# Patient Record
Sex: FEMALE | Race: WHITE | Employment: UNEMPLOYED | URBAN - METROPOLITAN AREA
[De-identification: names, ages, dates, MRNs, and addresses within clinical notes are randomized per-mention and may not be internally consistent; named-entity substitution may affect disease eponyms.]

---

## 2019-04-16 ENCOUNTER — TELEPHONE (OUTPATIENT)
Dept: SURGERY | Age: 59
End: 2019-04-16

## 2019-04-16 NOTE — TELEPHONE ENCOUNTER
Called patient left message on voice mail to schedule appointment with Dr. Clara Phillip. Dr. Jhoana Betts sent over progress notes ( scanned in chart) for Dr. Clara Phillip for Consult appointment. Spoke with patients Brother [phone], he will contact her also and give her message to call us.

## 2019-05-08 ENCOUNTER — OFFICE VISIT (OUTPATIENT)
Dept: SURGERY | Age: 59
End: 2019-05-08
Payer: COMMERCIAL

## 2019-05-08 VITALS
SYSTOLIC BLOOD PRESSURE: 125 MMHG | HEART RATE: 110 BPM | DIASTOLIC BLOOD PRESSURE: 76 MMHG | TEMPERATURE: 97.8 F | HEIGHT: 68 IN | BODY MASS INDEX: 35.77 KG/M2 | OXYGEN SATURATION: 96 % | WEIGHT: 236 LBS

## 2019-05-08 DIAGNOSIS — Z17.0 MALIGNANT NEOPLASM OF UPPER-INNER QUADRANT OF LEFT BREAST IN FEMALE, ESTROGEN RECEPTOR POSITIVE (HCC): Primary | ICD-10-CM

## 2019-05-08 DIAGNOSIS — Z72.0 TOBACCO ABUSE: ICD-10-CM

## 2019-05-08 DIAGNOSIS — C50.212 MALIGNANT NEOPLASM OF UPPER-INNER QUADRANT OF LEFT BREAST IN FEMALE, ESTROGEN RECEPTOR POSITIVE (HCC): Primary | ICD-10-CM

## 2019-05-08 PROCEDURE — 99205 OFFICE O/P NEW HI 60 MIN: CPT | Performed by: SURGERY

## 2019-05-08 RX ORDER — AMLODIPINE BESYLATE 10 MG/1
10 TABLET ORAL DAILY
Status: ON HOLD | COMMUNITY
End: 2020-03-07 | Stop reason: HOSPADM

## 2019-05-08 ASSESSMENT — ENCOUNTER SYMPTOMS
SHORTNESS OF BREATH: 1
BACK PAIN: 0
DIARRHEA: 1
APNEA: 0
ABDOMINAL DISTENTION: 0
ABDOMINAL PAIN: 0

## 2019-05-08 NOTE — PROGRESS NOTES
Maritza Cowden is a 66 y.o. postmenopausal woman who initially presented to [facility]. [address] with chest pain in , and was noted to have a large left breast mass. She reports a breast mass on the left, since 2018, associated with an axillary mass and thickened overlying skin. She was subsequently diagnosed with left breast infiltrating ductal carcinoma grade 2 ER + DC+ HER2 positive, clinical stage IIIA. Initial chest CT 19 showed 4.9x4.8cm mass with standing and skin thickening. Multiple enlarged left axillary nodes present, largest 3.5x2.2cm. She also had prominent nodes in the mediastinum, with right hilar node at 1.7x1.1cm. Bronchoscopy and biopsy 19 negative. Bone scan with no evidence of mets. CT abd/pelvis with left nephrectomy, cholecystectomy, appendectomy, cortical scar upper mid right kidney. She has undergone neoadjuvant chemotherapy with 5 rounds of TCHP and has noted decrease in the size of the mass, improvement in skin changes. Diarrhea is under control from perjeta. She does have a family history of breast cancer, in her mother at age 79. Her mother  at age 68. Ilene Covarrubias tells me that she was not a candidate for genetic testing. She has a h/o amphetamine abuse. She currently lives in a nursing home, as she was previously homeless. She is accompanied by her brother, Devon Hester, who traveled from [place] for the appointment. She is a current smoker, although is smoking less and using nicotine patch. Menstrual history:  Menarche age 17.      Age first live birth 15  Breastfeeding No  postmenopausal   Ovaries/uterus intact  Oral contraceptives Yes for 5 years  Hormone replacement No     Past Medical History:   Diagnosis Date    Eczema     on hands bi for yrs    Hypertension     Kidney calculi     L-kidney 35yrs ago    Kidney disorder     one kidney/L-kidney removed 35yrs ago abscess kidney stone   :  Past Surgical History: Procedure Laterality Date    APPENDECTOMY      CHOLECYSTECTOMY      TUBAL LIGATION     :  Social History     Socioeconomic History    Marital status: Single     Spouse name: Not on file    Number of children: 1    Years of education: 8    Highest education level: Not on file   Occupational History    Not on file   Social Needs    Financial resource strain: Not on file    Food insecurity:     Worry: Not on file     Inability: Not on file    Transportation needs:     Medical: Not on file     Non-medical: Not on file   Tobacco Use    Smoking status: Current Every Day Smoker     Packs/day: 1.00     Years: 35.00     Pack years: 35.00    Smokeless tobacco: Never Used   Substance and Sexual Activity    Alcohol use: No    Drug use: No    Sexual activity: Not on file   Lifestyle    Physical activity:     Days per week: Not on file     Minutes per session: Not on file    Stress: Not on file   Relationships    Social connections:     Talks on phone: Not on file     Gets together: Not on file     Attends [employer] service: Not on file     Active member of club or organization: Not on file     Attends meetings of clubs or organizations: Not on file     Relationship status: Not on file    Intimate partner violence:     Fear of current or ex partner: Not on file     Emotionally abused: Not on file     Physically abused: Not on file     Forced sexual activity: Not on file   Other Topics Concern    Not on file   Social History Narrative    Not on file     Current Outpatient Medications on File Prior to Visit   Medication Sig Dispense Refill    amLODIPine (NORVASC) 10 MG tablet Take 10 mg by mouth daily      phenyleph-promethazine-codeine (PHENERGAN VC W/CODEINE) 5-6.25-10 MG/5ML SYRP syrup Take 5 mLs by mouth every 6 hours as needed. 280 mL 0    albuterol (PROVENTIL) (5 MG/ML) 0.5% nebulizer solution Take 0.5 mLs by nebulization every 4 hours.  60 each 1    albuterol (PROVENTIL HFA) 108 (90 BASE) MCG/ACT inhaler Inhale 2 puffs into the lungs every 6 hours as needed for Wheezing. 1 Inhaler 3    nicotine (NICODERM CQ) 14 MG/24HR Place 1 patch onto the skin daily. 30 patch 1    ALPRAZolam (XANAX) 0.5 MG tablet Take 0.5 mg by mouth 3 times daily as needed for Anxiety. No current facility-administered medications on file prior to visit. Allergies   Allergen Reactions    Pcn [Penicillins] Anaphylaxis     Review of Systems   Constitutional: Positive for fatigue. Negative for chills and fever. Respiratory: Positive for shortness of breath. Negative for apnea. Cardiovascular: Positive for leg swelling. Negative for chest pain. Gastrointestinal: Positive for diarrhea. Negative for abdominal distention and abdominal pain. Genitourinary: Negative for difficulty urinating and dysuria. Musculoskeletal: Negative for back pain. Allergic/Immunologic: Negative for environmental allergies and food allergies. Neurological: Negative for dizziness, light-headedness and headaches. Hematological: Negative for adenopathy. Does not bruise/bleed easily. Psychiatric/Behavioral: Negative for dysphoric mood. The patient is not nervous/anxious. Exam:  /76   Pulse 110   Temp 97.8 °F (36.6 °C) (Oral)   Ht 5' 8\" (1.727 m)   Wt 236 lb (107 kg)   SpO2 96%   Breastfeeding? No   BMI 35.88 kg/m²   Physical Exam  Constitutional: She appears well-nourished. No apparent distress. Head: Normocephalic and atraumatic. Eyes: EOM are normal. Pupils are equal, round, and reactive to light. Neck: Neck supple. No tracheal deviation present. Cardiovascular: Regular rhythm, mildly tachycardic. Pulmonary: Respirations are non-labored no wheezing. Lymphatics: No palpable cervical, supraclavicular, or right axillary lymphadenopathy. Breast:  Right: No masses, nipple discharge, or overlying skin changes. Very dense breast tissue in upper outer quadrant. Flattened nipple.    Left: 10:30, 7CMFN 4x3.5cm mass, fixed to chest wall,  Nipple is flat, symmetric. Overlying skin is thickened and mildly erythematous. She continues to have left palpable axillary lymphadenopathy. Hypoechoic mass and prominent left axillary node easily identified on sonogram.   Skin: Left breast skin changes evident. Extremities: Well perfused, no edema. Neurologic: Alert and oriented. Assessment/Plan:    Clinical [id number] left sided infiltrating ductal carcinoma grade 2ER + GA+ HER2 positive. She is s/p 5 rounds TCHP, with only partial clinical response. Round 6 scheduled for 5/28/19. Consider re-staging scans given minimal clinical response. Will see her 6/5/19 with repeat breast imaging prior to appointment. Conference presentation in the interim. Re-address option for genetic counseling/testing, given her personal and fam hx. Dr. Gypsy Tripp referral to discuss options to potentially assist with closure. Might consider wise pattern to preserve skin laterally. She would like reconstruction, but we discussed that this would need to be delayed. We discussed left modified radical mastectomy, adjuvant chest wall radiation, adjuvant chemo with TDM1, 10 years endocrine therapy. Smoking cessation strongly, strongly encouraged. All of the patient's questions were answered at this time. Approximately 60 minutes of time were spent in this visit of which 50% or more of the time was related to coordination of care.

## 2019-05-20 ENCOUNTER — OFFICE VISIT (OUTPATIENT)
Dept: SURGERY | Age: 59
End: 2019-05-20
Payer: COMMERCIAL

## 2019-05-20 VITALS
HEART RATE: 110 BPM | WEIGHT: 227 LBS | OXYGEN SATURATION: 95 % | BODY MASS INDEX: 34.4 KG/M2 | RESPIRATION RATE: 16 BRPM | SYSTOLIC BLOOD PRESSURE: 140 MMHG | HEIGHT: 68 IN | DIASTOLIC BLOOD PRESSURE: 88 MMHG | TEMPERATURE: 97.4 F

## 2019-05-20 DIAGNOSIS — C50.912 MALIGNANT NEOPLASM OF LEFT FEMALE BREAST, UNSPECIFIED ESTROGEN RECEPTOR STATUS, UNSPECIFIED SITE OF BREAST (HCC): Primary | ICD-10-CM

## 2019-05-20 PROCEDURE — 99205 OFFICE O/P NEW HI 60 MIN: CPT | Performed by: SURGERY

## 2019-05-20 PROCEDURE — 3017F COLORECTAL CA SCREEN DOC REV: CPT | Performed by: SURGERY

## 2019-05-20 PROCEDURE — G8427 DOCREV CUR MEDS BY ELIG CLIN: HCPCS | Performed by: SURGERY

## 2019-05-20 PROCEDURE — G8417 CALC BMI ABV UP PARAM F/U: HCPCS | Performed by: SURGERY

## 2019-05-20 PROCEDURE — 4004F PT TOBACCO SCREEN RCVD TLK: CPT | Performed by: SURGERY

## 2019-05-20 NOTE — PROGRESS NOTES
[facility]    CC: Breast CA     Referring physician: Radha Lobo MD    HPI: This is a 66 y.o. female with a PMHx as delineated below who presents in consultation for breast reconstruction. She was found to have left breast cancer and desires to proceed with left mastectomy. Given her pathology, has high risk disease and will require adjuvant radiation therapy. Plastic surgery was consulted for assistance with coverage. The specifics of her breast cancer workup / treatment is as follows:     Diagnosis: Infiltrating ductal   Mo/Yr Diagnosed: 4/19  Breast Involved:Left  Stage: 3A  Node: Positive  ER: Positive LA: Positive HER2: Positive    Post Op Plan:  Oncologist: Gail Buitrago MD  Radiation: YES (WILL NEED ADJUVANT TREATMENT)    Chemo/Meds: Completed 5 rounds of neoadjuvant (1 more left)  Pregnancy/Miscarriages: 3 / 0    PMHx:   Past Medical History:   Diagnosis Date    Eczema     on hands bi for yrs    Hypertension     Kidney calculi     L-kidney 35yrs ago    Kidney disorder     one kidney/L-kidney removed 35yrs ago abscess kidney stone     PSHx:   Past Surgical History:   Procedure Laterality Date    APPENDECTOMY      CHOLECYSTECTOMY      TUBAL LIGATION       Allergies: Allergies   Allergen Reactions    Pcn [Penicillins] Anaphylaxis     FHx: Past history of breast CA: Yes (mom)    Meds:   Current Outpatient Medications   Medication Sig Dispense Refill    amLODIPine (NORVASC) 10 MG tablet Take 10 mg by mouth daily      phenyleph-promethazine-codeine (PHENERGAN VC W/CODEINE) 5-6.25-10 MG/5ML SYRP syrup Take 5 mLs by mouth every 6 hours as needed. 280 mL 0    albuterol (PROVENTIL) (5 MG/ML) 0.5% nebulizer solution Take 0.5 mLs by nebulization every 4 hours. 60 each 1    albuterol (PROVENTIL HFA) 108 (90 BASE) MCG/ACT inhaler Inhale 2 puffs into the lungs every 6 hours as needed for Wheezing.  1 Inhaler 3    nicotine (NICODERM CQ) 14 MG/24HR Place 1 patch onto the skin 3   L: 3  The left breast size is larger with erythema medially compared to the right breast.  There was a large, fixed, palpable mass in the superomedial aspect of the left breast with no palpable lymphadenopathy in the ipsilateral left axilla. Nipple retraction: No    Left breast sternal notch to nipple: 31.6 cm  Right breast sternal notch to nipple: 31 cm    Left breast nipple to inframammary fold: 10.1 cm  Right breast nipple to inframammary fold: 10 cm    Left breast width 15.6 cm  Right breast width 15.6 cm    IMAGING: Reviewed    IMP: 66 y.o. female with breast cancer who presents for discussion regarding chesy reconstruction options  PLAN: Difficult situation given comorbid condition. Patient to have skin biopsies after completion of her final stage of chemo. If skin margins negative for malignancy, then will attempt closure with Rashid pattern to advance lateral breast tissue for closure. If skin margins positive, then will need a latiss flap for chest wall coverage in prep for radiation therapy. If skin doesn't heal as the patient is continuing to actively smoke, would then perform a latiss flap. Given the kocher incision, would ideally attempt to avoid her abdomen as a donor site, but may require if issues after radiation therapy. Will see again after evaluation with Dr. Shaina Higgins and re-staging. She was strongly counseled on the importance of complete nicotine abstinence. A discussion regarding surgicaloptions including immediate vs delayed approaches, implant based vs autologous, as well as additional planned revisional surgeries was performed with the patient. Multiple autologous donor sites were discussed as well. The risks, benefits, alternatives, outcomes, personnel involved were discussed.   Specifically, the risks including, but not limited to: bleeding that may necessitate transfusion or operation, infection, seroma, reoperation, nonhealing, poor cosmetic outcome, asymmetry, scarring, partial or total flap loss, donor site morbidity, VTE (DVT/PE), and death were discussed. \"A significant amount of time was also allocated to nicotine's effect on wound healing and thepatient understands that a sub-optimal wound healing and cosmetic result may occur with continued utilization. All questions were answered in a satisfactory manner according to the patient. This consultation lasted 60 minutes with > 50% of the time spent in counseling and coordination of care of the patient's treatment.     Fredi Pathak MD  [address] [facility]  05/20/19

## 2019-05-23 DIAGNOSIS — Z17.0 MALIGNANT NEOPLASM OF UPPER-INNER QUADRANT OF LEFT BREAST IN FEMALE, ESTROGEN RECEPTOR POSITIVE (HCC): Primary | ICD-10-CM

## 2019-05-23 DIAGNOSIS — C50.212 MALIGNANT NEOPLASM OF UPPER-INNER QUADRANT OF LEFT BREAST IN FEMALE, ESTROGEN RECEPTOR POSITIVE (HCC): Primary | ICD-10-CM

## 2019-05-30 ENCOUNTER — HOSPITAL ENCOUNTER (OUTPATIENT)
Age: 59
Setting detail: SPECIMEN
Discharge: HOME | End: 2019-05-30
Payer: COMMERCIAL

## 2019-06-05 ENCOUNTER — HOSPITAL ENCOUNTER (OUTPATIENT)
Dept: MAMMOGRAPHY | Age: 59
Discharge: HOME | End: 2019-06-05
Payer: COMMERCIAL

## 2019-06-05 ENCOUNTER — HOSPITAL ENCOUNTER (OUTPATIENT)
Dept: ULTRASOUND IMAGING | Age: 59
Discharge: HOME | End: 2019-06-05
Payer: COMMERCIAL

## 2019-06-05 ENCOUNTER — OFFICE VISIT (OUTPATIENT)
Dept: SURGERY | Age: 59
End: 2019-06-05
Payer: COMMERCIAL

## 2019-06-05 VITALS — HEART RATE: 111 BPM | OXYGEN SATURATION: 97 % | DIASTOLIC BLOOD PRESSURE: 94 MMHG | SYSTOLIC BLOOD PRESSURE: 146 MMHG

## 2019-06-05 DIAGNOSIS — C50.212 MALIGNANT NEOPLASM OF UPPER-INNER QUADRANT OF LEFT BREAST IN FEMALE, ESTROGEN RECEPTOR POSITIVE (HCC): ICD-10-CM

## 2019-06-05 DIAGNOSIS — Z17.0 MALIGNANT NEOPLASM OF UPPER-INNER QUADRANT OF LEFT BREAST IN FEMALE, ESTROGEN RECEPTOR POSITIVE (HCC): ICD-10-CM

## 2019-06-05 DIAGNOSIS — C50.212 MALIGNANT NEOPLASM OF UPPER-INNER QUADRANT OF LEFT BREAST IN FEMALE, ESTROGEN RECEPTOR POSITIVE (HCC): Primary | ICD-10-CM

## 2019-06-05 DIAGNOSIS — Z17.0 MALIGNANT NEOPLASM OF UPPER-INNER QUADRANT OF LEFT BREAST IN FEMALE, ESTROGEN RECEPTOR POSITIVE (HCC): Primary | ICD-10-CM

## 2019-06-05 PROCEDURE — 11104 PUNCH BX SKIN SINGLE LESION: CPT | Performed by: SURGERY

## 2019-06-05 PROCEDURE — 76642 ULTRASOUND BREAST LIMITED: CPT

## 2019-06-05 PROCEDURE — G0279 TOMOSYNTHESIS, MAMMO: HCPCS

## 2019-06-05 PROCEDURE — 11105 PUNCH BX SKIN EA SEP/ADDL: CPT | Performed by: SURGERY

## 2019-06-05 ASSESSMENT — ENCOUNTER SYMPTOMS
COLOR CHANGE: 1
BACK PAIN: 0
ABDOMINAL DISTENTION: 0
SHORTNESS OF BREATH: 0
ABDOMINAL PAIN: 0
DIARRHEA: 0
APNEA: 0

## 2019-06-05 NOTE — PROGRESS NOTES
Sulma Sherman is a 66 y.o. postmenopausal woman with left breast infiltrating ductal carcinoma grade 2 ER + TX+ HER2 positive, clinical stage IIIA. She has undergone neoadjuvant chemotherapy with 6 rounds of TCHP and has noted decrease in the size of the mass, improvement in skin changes. Imaging today reviewed with Dr. Dona Vasquez, primary breast mass decreased from 5x5cm to 2.5x3.3cm. Multiple enlarged axillary lymph nodes remain. Presented at conference, all agree with plan to perform punch biopsy on 4 quadrants of skin to evaluate for involvement vs edema. We also agreed to re-stage with PET, prior to surgery, given poor/partial clinical response to therapy. She has seen Dr. Skye Chapin, who recommends wise pattern mastectomy vs LD flap for coverage, depending on punch biopsy results. She has a h/o amphetamine abuse. She currently lives in a nursing home, as she was previously homeless. She is accompanied by her brother, Jarret Gaines, who traveled from [place] for the appointment. She is a current smoker, although is smoking less and using nicotine patch. Initial chest CT 19 showed 4.9x4.8cm mass with standing and skin thickening. Multiple enlarged left axillary nodes present, largest 3.5x2.2cm. She also had prominent nodes in the mediastinum, with right hilar node at 1.7x1.1cm. Bronchoscopy and biopsy 19 negative. Bone scan with no evidence of mets. CT abd/pelvis with left nephrectomy, cholecystectomy, appendectomy, cortical scar upper mid right kidney. Positive family history of breast cancer, in her mother at age 79. Her mother  at age 68. Fredy Magui tells me that she was not a candidate for genetic testing.    Past Medical History:   Diagnosis Date    Cancer [facility])     Breast cancer    Eczema     on hands bi for yrs    Hypertension     Kidney calculi     L-kidney 35yrs ago    Kidney disorder     one kidney/L-kidney removed 35yrs ago abscess kidney stone   :  Past (90 BASE) MCG/ACT inhaler Inhale 2 puffs into the lungs every 6 hours as needed for Wheezing. 1 Inhaler 3    nicotine (NICODERM CQ) 14 MG/24HR Place 1 patch onto the skin daily. 30 patch 1     No current facility-administered medications on file prior to visit. Allergies   Allergen Reactions    Pcn [Penicillins] Anaphylaxis     Review of Systems   Constitutional: Positive for fatigue. Negative for chills and fever. Respiratory: Negative for apnea and shortness of breath. Cardiovascular: Positive for leg swelling. Negative for chest pain. Gastrointestinal: Negative for abdominal distention, abdominal pain and diarrhea. Genitourinary: Negative for difficulty urinating and dysuria. Musculoskeletal: Negative for back pain. Skin: Positive for color change. Allergic/Immunologic: Negative for environmental allergies and food allergies. Neurological: Negative for dizziness, light-headedness and headaches. Hematological: Negative for adenopathy. Does not bruise/bleed easily. Psychiatric/Behavioral: Negative for dysphoric mood. The patient is not nervous/anxious. Exam:  BP (!) 146/94   Pulse 111   SpO2 97%   Physical Exam  Constitutional: She appears well-nourished. No apparent distress. Head: Normocephalic and atraumatic. Eyes: EOM are normal. Pupils are equal, round, and reactive to light. Neck: Neck supple. No tracheal deviation present. Cardiovascular: Regular rhythm, mildly tachycardic. Pulmonary: Respirations are non-labored no wheezing. Lymphatics: No palpable cervical, supraclavicular, or right axillary lymphadenopathy. Breast:  Right: No masses, nipple discharge, or overlying skin changes. Very dense breast tissue in upper outer quadrant. Flattened nipple. Left: 10:30, 7CMFN 3x3cm mass, mobile,  Nipple is flat, symmetric. Overlying skin is thickened and mildly erythematous, improved from last visit. She continues to have left palpable axillary lymphadenopathy. Skin: Left breast skin changes evident. Extremities: Well perfused, no edema. Neurologic: Alert and oriented. Assessment/Plan:    Clinical [id number] left sided infiltrating ductal carcinoma grade 2 ER + AK+ HER2 positive. She is s/p 6 rounds TCHP, with partial clinical response. Punch biopsies performed today at 12,3,6,9, left breast skin, 3mm each, utilizing lidocaine and sterile technique. Patient preferred to have no stitches, and therefore sterile bandaids were placed. Results should be available tomorrow. Plan is for left modified radical mastectomy (closure to be determined by biopsy results), adjuvant chest wall radiation, adjuvant chemo with TDM1, 10 years endocrine therapy. Smoking cessation strongly, strongly encouraged. Tentatively schedule for June 8th or 9th, approximately 6 weeks s/p last chemotherapy. All of the patient's questions were answered at this time. Approximately 30 minutes of time were spent in this visit of which 50% or more of the time was related to coordination of care.

## 2019-06-07 ENCOUNTER — TELEPHONE (OUTPATIENT)
Dept: SURGERY | Age: 59
End: 2019-06-07

## 2019-06-18 ENCOUNTER — TELEPHONE (OUTPATIENT)
Dept: SURGERY | Age: 59
End: 2019-06-18

## 2019-06-21 ENCOUNTER — TELEPHONE (OUTPATIENT)
Dept: SURGERY | Age: 59
End: 2019-06-21

## 2019-06-24 ENCOUNTER — TELEPHONE (OUTPATIENT)
Dept: SURGERY | Age: 59
End: 2019-06-24

## 2019-06-24 NOTE — TELEPHONE ENCOUNTER
Patient informed surgery is scheduled for July 2, 2019 at [facility].. Patient informed to arrive at 7:00 AM and follow all pre-op instructions. Patient verbalized understanding.

## 2019-06-28 NOTE — PROGRESS NOTES
option #2 if you have not been preregistered yet. On the day of your procedure bring your insurance card and photo ID. You will be registered at your bedside once brought back to your room. 5. DO NOT EAT ANYTHING eight hours prior to surgery. May have 8 ounces of water 4 hours prior to surgery. 6. MEDICATIONS    Take the following medications with a SMALL sip of water: AMLODIPINE    Use your usual dose of inhalers the morning of surgery. BRING your rescue inhaler with you to hospital.    Anesthesia does NOT want you to take insulin the morning of surgery. They will control your blood sugar while you are at the hospital. Please contact your ordering physician for instructions regarding your insulin the night before your procedure. If you have an insulin pump, please keep it set on basal rate. 7. Do not swallow water when brushing teeth. No gum, candy, mints or ice chips. Refrain from smoking or at least decrease the amount. 8. Dress in loose, comfortable clothing appropriate for redressing after your procedure. Do not wear jewelry (including body piercings), make-up (especially NO eye make-up), fingernail polish (NO toenail polish if foot/leg surgery), lotion, powders or metal hairclips. 9. Dentures, glasses, or contacts will need to be removed before your procedure. Bring cases for your glasses, contacts, dentures, or hearing aids to protect them while you are in surgery. 10. If you use a CPAP, please bring it with you on the day of your procedure. 11. We recommend that valuable personal  belongings such as cash, cell phones, e-tablets or jewelry, be left at home during your stay. The hospital will not be responsible for valuables that are not secured in the hospital safe. However, if your insurance requires a co-pay, you may want to bring a method of payment, i.e. Check or credit card, if you wish to pay your co-pay the day of surgery.       12. If you are to stay overnight, you may bring a bag with personal items. Please have any large items you may need brought in by your family after your arrival to your hospital room. 15. If you have a Living Will or Durable Power of , please bring a copy on the day of your procedure. 15. With your permission, one family member may accompany you while you are being prepared for surgery. Once you are ready, additional family members may join you. HOW WE KEEP YOU SAFE and WORK TO PREVENT SURGICAL SITE INFECTIONS:  1. Health care workers should always check your ID bracelet to verify your name and birth date. You will be asked many times to state your name, date of birth, and allergies. 2. Health care workers should always clean their hands with soap or alcohol gel before providing care to you. It is okay to ask anyone if they cleaned their hands before they touch you. 3. You will be actively involved in verifying the type of procedure you are having and ensuring the correct surgical site. This will be confirmed multiple times prior to your procedure. Do NOT michelle your surgery site UNLESS instructed to by your surgeon. 4. Do not shave or wax for 72 hours prior to procedure near your operative site. Shaving with a razor can irritate your skin and make it easier to develop an infection. On the day of your procedure, any hair that needs to be removed near the surgical site will be clipped by a healthcare worker using a special clippers designed to avoid skin irritation. 5. When you are in the operating room, your surgical site will be cleansed with a special soap, and in most cases, you will be given an antibiotic before the surgery begins. What to expect AFTER YOUR PROCEDURE:  1. Immediately following your procedure, your will be taken to the PACU for the first phase of your recovery.   Your nurse will help you recover from any potential side effects of anesthesia, such as extreme drowsiness, changes in your vital signs or

## 2019-07-01 ENCOUNTER — ANESTHESIA EVENT (OUTPATIENT)
Dept: OPERATING ROOM | Age: 59
DRG: 362 | End: 2019-07-01
Payer: COMMERCIAL

## 2019-07-02 ENCOUNTER — HOSPITAL ENCOUNTER (INPATIENT)
Age: 59
LOS: 6 days | Discharge: NURSING, REHAB OR LONG-TERM CARE | DRG: 362 | End: 2019-07-08
Attending: SURGERY | Admitting: SURGERY
Payer: COMMERCIAL

## 2019-07-02 ENCOUNTER — ANESTHESIA (OUTPATIENT)
Dept: OPERATING ROOM | Age: 59
DRG: 362 | End: 2019-07-02
Payer: COMMERCIAL

## 2019-07-02 VITALS
RESPIRATION RATE: 22 BRPM | SYSTOLIC BLOOD PRESSURE: 108 MMHG | TEMPERATURE: 94.6 F | OXYGEN SATURATION: 100 % | DIASTOLIC BLOOD PRESSURE: 75 MMHG

## 2019-07-02 PROBLEM — C50.912: Status: ACTIVE | Noted: 2019-07-02

## 2019-07-02 LAB
A/G RATIO: 1.1 (ref 1.1–2.2)
ABO/RH: NORMAL
ALBUMIN SERPL-MCNC: 3.7 G/DL (ref 3.4–5)
ALP BLD-CCNC: 130 U/L (ref 40–129)
ALT SERPL-CCNC: 14 U/L (ref 10–40)
ANION GAP SERPL CALCULATED.3IONS-SCNC: 14 MMOL/L (ref 3–16)
ANTIBODY SCREEN: NORMAL
AST SERPL-CCNC: 15 U/L (ref 15–37)
BILIRUB SERPL-MCNC: <0.2 MG/DL (ref 0–1)
BUN BLDV-MCNC: 31 MG/DL (ref 7–20)
CALCIUM SERPL-MCNC: 9.3 MG/DL (ref 8.3–10.6)
CHLORIDE BLD-SCNC: 106 MMOL/L (ref 99–110)
CO2: 24 MMOL/L (ref 21–32)
CREAT SERPL-MCNC: 1.1 MG/DL (ref 0.6–1.1)
GFR AFRICAN AMERICAN: >60
GFR NON-AFRICAN AMERICAN: 51
GLOBULIN: 3.5 G/DL
GLUCOSE BLD-MCNC: 105 MG/DL (ref 70–99)
HCT VFR BLD CALC: 33.6 % (ref 36–48)
HEMOGLOBIN: 11.2 G/DL (ref 12–16)
MCH RBC QN AUTO: 34.6 PG (ref 26–34)
MCHC RBC AUTO-ENTMCNC: 33.3 G/DL (ref 31–36)
MCV RBC AUTO: 103.8 FL (ref 80–100)
PDW BLD-RTO: 15.2 % (ref 12.4–15.4)
PLATELET # BLD: 350 K/UL (ref 135–450)
PMV BLD AUTO: 6.8 FL (ref 5–10.5)
POTASSIUM SERPL-SCNC: 4 MMOL/L (ref 3.5–5.1)
RBC # BLD: 3.23 M/UL (ref 4–5.2)
SODIUM BLD-SCNC: 144 MMOL/L (ref 136–145)
TOTAL PROTEIN: 7.2 G/DL (ref 6.4–8.2)
WBC # BLD: 9.5 K/UL (ref 4–11)

## 2019-07-02 PROCEDURE — 2500000003 HC RX 250 WO HCPCS: Performed by: NURSE ANESTHETIST, CERTIFIED REGISTERED

## 2019-07-02 PROCEDURE — 6370000000 HC RX 637 (ALT 250 FOR IP): Performed by: NURSE ANESTHETIST, CERTIFIED REGISTERED

## 2019-07-02 PROCEDURE — 94150 VITAL CAPACITY TEST: CPT

## 2019-07-02 PROCEDURE — 2500000003 HC RX 250 WO HCPCS: Performed by: SURGERY

## 2019-07-02 PROCEDURE — 94761 N-INVAS EAR/PLS OXIMETRY MLT: CPT

## 2019-07-02 PROCEDURE — 6360000002 HC RX W HCPCS: Performed by: SURGERY

## 2019-07-02 PROCEDURE — 3700000000 HC ANESTHESIA ATTENDED CARE: Performed by: SURGERY

## 2019-07-02 PROCEDURE — 6370000000 HC RX 637 (ALT 250 FOR IP): Performed by: STUDENT IN AN ORGANIZED HEALTH CARE EDUCATION/TRAINING PROGRAM

## 2019-07-02 PROCEDURE — 3600000012 HC SURGERY LEVEL 2 ADDTL 15MIN: Performed by: SURGERY

## 2019-07-02 PROCEDURE — 19307 MAST MOD RAD: CPT | Performed by: SURGERY

## 2019-07-02 PROCEDURE — 88309 TISSUE EXAM BY PATHOLOGIST: CPT

## 2019-07-02 PROCEDURE — 2580000003 HC RX 258: Performed by: ANESTHESIOLOGY

## 2019-07-02 PROCEDURE — 3700000001 HC ADD 15 MINUTES (ANESTHESIA): Performed by: SURGERY

## 2019-07-02 PROCEDURE — 6360000002 HC RX W HCPCS: Performed by: ANESTHESIOLOGY

## 2019-07-02 PROCEDURE — 7100000001 HC PACU RECOVERY - ADDTL 15 MIN: Performed by: SURGERY

## 2019-07-02 PROCEDURE — 3600000002 HC SURGERY LEVEL 2 BASE: Performed by: SURGERY

## 2019-07-02 PROCEDURE — 2580000003 HC RX 258: Performed by: SURGERY

## 2019-07-02 PROCEDURE — 86901 BLOOD TYPING SEROLOGIC RH(D): CPT

## 2019-07-02 PROCEDURE — 86850 RBC ANTIBODY SCREEN: CPT

## 2019-07-02 PROCEDURE — 85027 COMPLETE CBC AUTOMATED: CPT

## 2019-07-02 PROCEDURE — 2700000000 HC OXYGEN THERAPY PER DAY

## 2019-07-02 PROCEDURE — 2580000003 HC RX 258: Performed by: NURSE ANESTHETIST, CERTIFIED REGISTERED

## 2019-07-02 PROCEDURE — 7100000000 HC PACU RECOVERY - FIRST 15 MIN: Performed by: SURGERY

## 2019-07-02 PROCEDURE — 1200000000 HC SEMI PRIVATE

## 2019-07-02 PROCEDURE — 80053 COMPREHEN METABOLIC PANEL: CPT

## 2019-07-02 PROCEDURE — 2580000003 HC RX 258: Performed by: STUDENT IN AN ORGANIZED HEALTH CARE EDUCATION/TRAINING PROGRAM

## 2019-07-02 PROCEDURE — C9290 INJ, BUPIVACAINE LIPOSOME: HCPCS | Performed by: SURGERY

## 2019-07-02 PROCEDURE — 2709999900 HC NON-CHARGEABLE SUPPLY: Performed by: SURGERY

## 2019-07-02 PROCEDURE — 2720000010 HC SURG SUPPLY STERILE: Performed by: SURGERY

## 2019-07-02 PROCEDURE — 88305 TISSUE EXAM BY PATHOLOGIST: CPT

## 2019-07-02 PROCEDURE — 86900 BLOOD TYPING SEROLOGIC ABO: CPT

## 2019-07-02 PROCEDURE — 6360000002 HC RX W HCPCS: Performed by: NURSE ANESTHETIST, CERTIFIED REGISTERED

## 2019-07-02 PROCEDURE — 0HTU0ZZ RESECTION OF LEFT BREAST, OPEN APPROACH: ICD-10-PCS | Performed by: SURGERY

## 2019-07-02 RX ORDER — LABETALOL 20 MG/4 ML (5 MG/ML) INTRAVENOUS SYRINGE
5 EVERY 10 MIN PRN
Status: DISCONTINUED | OUTPATIENT
Start: 2019-07-02 | End: 2019-07-02 | Stop reason: HOSPADM

## 2019-07-02 RX ORDER — SODIUM CHLORIDE, SODIUM LACTATE, POTASSIUM CHLORIDE, CALCIUM CHLORIDE 600; 310; 30; 20 MG/100ML; MG/100ML; MG/100ML; MG/100ML
INJECTION, SOLUTION INTRAVENOUS CONTINUOUS PRN
Status: DISCONTINUED | OUTPATIENT
Start: 2019-07-02 | End: 2019-07-02 | Stop reason: SDUPTHER

## 2019-07-02 RX ORDER — HYDROXYZINE 50 MG/1
25 TABLET, FILM COATED ORAL DAILY
Status: ON HOLD | COMMUNITY
End: 2020-03-07 | Stop reason: HOSPADM

## 2019-07-02 RX ORDER — CEFAZOLIN SODIUM 2 G/50ML
2 SOLUTION INTRAVENOUS ONCE
Status: DISCONTINUED | OUTPATIENT
Start: 2019-07-02 | End: 2019-07-08 | Stop reason: HOSPADM

## 2019-07-02 RX ORDER — ROCURONIUM BROMIDE 10 MG/ML
INJECTION, SOLUTION INTRAVENOUS PRN
Status: DISCONTINUED | OUTPATIENT
Start: 2019-07-02 | End: 2019-07-02 | Stop reason: SDUPTHER

## 2019-07-02 RX ORDER — EPHEDRINE SULFATE 50 MG/ML
INJECTION, SOLUTION INTRAVENOUS PRN
Status: DISCONTINUED | OUTPATIENT
Start: 2019-07-02 | End: 2019-07-02 | Stop reason: SDUPTHER

## 2019-07-02 RX ORDER — CHOLECALCIFEROL (VITAMIN D3) 25 MCG
2 TABLET ORAL NIGHTLY
Status: ON HOLD | COMMUNITY
End: 2019-08-08

## 2019-07-02 RX ORDER — PERPHENAZINE 4 MG/1
4 TABLET, FILM COATED ORAL 2 TIMES DAILY
Status: ON HOLD | COMMUNITY
End: 2019-12-06 | Stop reason: HOSPADM

## 2019-07-02 RX ORDER — FAMOTIDINE 20 MG/1
40 TABLET, FILM COATED ORAL DAILY
Status: DISCONTINUED | OUTPATIENT
Start: 2019-07-02 | End: 2019-07-08 | Stop reason: HOSPADM

## 2019-07-02 RX ORDER — CLINDAMYCIN PHOSPHATE 900 MG/50ML
900 INJECTION INTRAVENOUS ONCE
Status: COMPLETED | OUTPATIENT
Start: 2019-07-02 | End: 2019-07-02

## 2019-07-02 RX ORDER — VASOPRESSIN 20 U/ML
INJECTION PARENTERAL PRN
Status: DISCONTINUED | OUTPATIENT
Start: 2019-07-02 | End: 2019-07-02 | Stop reason: SDUPTHER

## 2019-07-02 RX ORDER — OXYCODONE HYDROCHLORIDE AND ACETAMINOPHEN 5; 325 MG/1; MG/1
2 TABLET ORAL EVERY 4 HOURS PRN
Status: DISCONTINUED | OUTPATIENT
Start: 2019-07-02 | End: 2019-07-06

## 2019-07-02 RX ORDER — ALBUTEROL SULFATE 2.5 MG/3ML
2.5 SOLUTION RESPIRATORY (INHALATION) EVERY 4 HOURS PRN
Status: DISCONTINUED | OUTPATIENT
Start: 2019-07-02 | End: 2019-07-08 | Stop reason: HOSPADM

## 2019-07-02 RX ORDER — PROPOFOL 10 MG/ML
INJECTION, EMULSION INTRAVENOUS PRN
Status: DISCONTINUED | OUTPATIENT
Start: 2019-07-02 | End: 2019-07-02 | Stop reason: SDUPTHER

## 2019-07-02 RX ORDER — DEXAMETHASONE SODIUM PHOSPHATE 4 MG/ML
INJECTION, SOLUTION INTRA-ARTICULAR; INTRALESIONAL; INTRAMUSCULAR; INTRAVENOUS; SOFT TISSUE PRN
Status: DISCONTINUED | OUTPATIENT
Start: 2019-07-02 | End: 2019-07-02 | Stop reason: SDUPTHER

## 2019-07-02 RX ORDER — ONDANSETRON 2 MG/ML
INJECTION INTRAMUSCULAR; INTRAVENOUS PRN
Status: DISCONTINUED | OUTPATIENT
Start: 2019-07-02 | End: 2019-07-02 | Stop reason: SDUPTHER

## 2019-07-02 RX ORDER — MORPHINE SULFATE 2 MG/ML
2 INJECTION, SOLUTION INTRAMUSCULAR; INTRAVENOUS
Status: DISCONTINUED | OUTPATIENT
Start: 2019-07-02 | End: 2019-07-03

## 2019-07-02 RX ORDER — 0.9 % SODIUM CHLORIDE 0.9 %
500 INTRAVENOUS SOLUTION INTRAVENOUS
Status: DISCONTINUED | OUTPATIENT
Start: 2019-07-02 | End: 2019-07-02 | Stop reason: HOSPADM

## 2019-07-02 RX ORDER — MIDAZOLAM HYDROCHLORIDE 1 MG/ML
INJECTION INTRAMUSCULAR; INTRAVENOUS PRN
Status: DISCONTINUED | OUTPATIENT
Start: 2019-07-02 | End: 2019-07-02

## 2019-07-02 RX ORDER — MEPERIDINE HYDROCHLORIDE 25 MG/ML
12.5 INJECTION INTRAMUSCULAR; INTRAVENOUS; SUBCUTANEOUS EVERY 5 MIN PRN
Status: DISCONTINUED | OUTPATIENT
Start: 2019-07-02 | End: 2019-07-02

## 2019-07-02 RX ORDER — OXYCODONE HYDROCHLORIDE AND ACETAMINOPHEN 5; 325 MG/1; MG/1
1 TABLET ORAL ONCE
Status: DISCONTINUED | OUTPATIENT
Start: 2019-07-02 | End: 2019-07-02 | Stop reason: HOSPADM

## 2019-07-02 RX ORDER — CHLORPROMAZINE HYDROCHLORIDE 25 MG/1
25 TABLET, FILM COATED ORAL NIGHTLY
Status: ON HOLD | COMMUNITY
End: 2019-08-27 | Stop reason: CLARIF

## 2019-07-02 RX ORDER — BUPIVACAINE HYDROCHLORIDE 2.5 MG/ML
INJECTION, SOLUTION EPIDURAL; INFILTRATION; INTRACAUDAL PRN
Status: DISCONTINUED | OUTPATIENT
Start: 2019-07-02 | End: 2019-07-02 | Stop reason: ALTCHOICE

## 2019-07-02 RX ORDER — TRAZODONE HYDROCHLORIDE 50 MG/1
50 TABLET ORAL NIGHTLY
Status: ON HOLD | COMMUNITY
End: 2019-08-27 | Stop reason: CLARIF

## 2019-07-02 RX ORDER — SUCCINYLCHOLINE/SOD CL,ISO/PF 200MG/10ML
SYRINGE (ML) INTRAVENOUS PRN
Status: DISCONTINUED | OUTPATIENT
Start: 2019-07-02 | End: 2019-07-02 | Stop reason: SDUPTHER

## 2019-07-02 RX ORDER — ONDANSETRON 2 MG/ML
4 INJECTION INTRAMUSCULAR; INTRAVENOUS EVERY 30 MIN PRN
Status: DISCONTINUED | OUTPATIENT
Start: 2019-07-02 | End: 2019-07-02 | Stop reason: HOSPADM

## 2019-07-02 RX ORDER — ALBUTEROL SULFATE 90 UG/1
AEROSOL, METERED RESPIRATORY (INHALATION) PRN
Status: DISCONTINUED | OUTPATIENT
Start: 2019-07-02 | End: 2019-07-02 | Stop reason: SDUPTHER

## 2019-07-02 RX ORDER — PROMETHAZINE HYDROCHLORIDE 25 MG/ML
6.25 INJECTION, SOLUTION INTRAMUSCULAR; INTRAVENOUS
Status: DISCONTINUED | OUTPATIENT
Start: 2019-07-02 | End: 2019-07-02 | Stop reason: HOSPADM

## 2019-07-02 RX ORDER — ONDANSETRON 4 MG/1
4 TABLET, FILM COATED ORAL EVERY 8 HOURS PRN
Status: ON HOLD | COMMUNITY
End: 2019-08-27 | Stop reason: CLARIF

## 2019-07-02 RX ORDER — SODIUM CHLORIDE 0.9 % (FLUSH) 0.9 %
10 SYRINGE (ML) INJECTION PRN
Status: DISCONTINUED | OUTPATIENT
Start: 2019-07-02 | End: 2019-07-08 | Stop reason: HOSPADM

## 2019-07-02 RX ORDER — FENTANYL CITRATE 50 UG/ML
25 INJECTION, SOLUTION INTRAMUSCULAR; INTRAVENOUS EVERY 5 MIN PRN
Status: DISCONTINUED | OUTPATIENT
Start: 2019-07-02 | End: 2019-07-02

## 2019-07-02 RX ORDER — DEXAMETHASONE 4 MG/1
8 TABLET ORAL 2 TIMES DAILY WITH MEALS
Status: ON HOLD | COMMUNITY
End: 2019-08-09 | Stop reason: HOSPADM

## 2019-07-02 RX ORDER — LORATADINE 10 MG/1
10 CAPSULE, LIQUID FILLED ORAL 2 TIMES DAILY
Status: ON HOLD | COMMUNITY
End: 2019-08-09 | Stop reason: HOSPADM

## 2019-07-02 RX ORDER — HYDRALAZINE HYDROCHLORIDE 20 MG/ML
5 INJECTION INTRAMUSCULAR; INTRAVENOUS EVERY 10 MIN PRN
Status: DISCONTINUED | OUTPATIENT
Start: 2019-07-02 | End: 2019-07-02

## 2019-07-02 RX ORDER — MAGNESIUM HYDROXIDE 1200 MG/15ML
LIQUID ORAL CONTINUOUS PRN
Status: COMPLETED | OUTPATIENT
Start: 2019-07-02 | End: 2019-07-02

## 2019-07-02 RX ORDER — DIPHENHYDRAMINE HYDROCHLORIDE 50 MG/ML
12.5 INJECTION INTRAMUSCULAR; INTRAVENOUS
Status: DISCONTINUED | OUTPATIENT
Start: 2019-07-02 | End: 2019-07-02 | Stop reason: HOSPADM

## 2019-07-02 RX ORDER — ARIPIPRAZOLE 10 MG/1
10 TABLET ORAL EVERY EVENING
COMMUNITY
End: 2021-06-10

## 2019-07-02 RX ORDER — SODIUM CHLORIDE, SODIUM LACTATE, POTASSIUM CHLORIDE, CALCIUM CHLORIDE 600; 310; 30; 20 MG/100ML; MG/100ML; MG/100ML; MG/100ML
INJECTION, SOLUTION INTRAVENOUS CONTINUOUS
Status: DISCONTINUED | OUTPATIENT
Start: 2019-07-02 | End: 2019-07-02

## 2019-07-02 RX ORDER — ALBUTEROL SULFATE 2.5 MG/3ML
SOLUTION RESPIRATORY (INHALATION) EVERY 6 HOURS PRN
Status: DISCONTINUED | OUTPATIENT
Start: 2019-07-02 | End: 2019-07-08 | Stop reason: HOSPADM

## 2019-07-02 RX ORDER — LIDOCAINE HYDROCHLORIDE 20 MG/ML
INJECTION, SOLUTION INTRAVENOUS PRN
Status: DISCONTINUED | OUTPATIENT
Start: 2019-07-02 | End: 2019-07-02 | Stop reason: SDUPTHER

## 2019-07-02 RX ORDER — ACETAMINOPHEN 325 MG/1
650 TABLET ORAL EVERY 4 HOURS PRN
Status: DISCONTINUED | OUTPATIENT
Start: 2019-07-02 | End: 2019-07-08 | Stop reason: HOSPADM

## 2019-07-02 RX ORDER — HYDROXYZINE HYDROCHLORIDE 25 MG/1
25 TABLET, FILM COATED ORAL
Status: ON HOLD | COMMUNITY
End: 2019-08-09 | Stop reason: HOSPADM

## 2019-07-02 RX ORDER — SODIUM CHLORIDE 0.9 % (FLUSH) 0.9 %
10 SYRINGE (ML) INJECTION EVERY 12 HOURS SCHEDULED
Status: DISCONTINUED | OUTPATIENT
Start: 2019-07-02 | End: 2019-07-08 | Stop reason: HOSPADM

## 2019-07-02 RX ORDER — FENTANYL CITRATE 50 UG/ML
INJECTION, SOLUTION INTRAMUSCULAR; INTRAVENOUS PRN
Status: DISCONTINUED | OUTPATIENT
Start: 2019-07-02 | End: 2019-07-02 | Stop reason: SDUPTHER

## 2019-07-02 RX ORDER — ONDANSETRON 2 MG/ML
4 INJECTION INTRAMUSCULAR; INTRAVENOUS EVERY 6 HOURS PRN
Status: DISCONTINUED | OUTPATIENT
Start: 2019-07-02 | End: 2019-07-08 | Stop reason: HOSPADM

## 2019-07-02 RX ORDER — FAMOTIDINE 40 MG/1
40 TABLET, FILM COATED ORAL DAILY
COMMUNITY
End: 2021-06-10

## 2019-07-02 RX ORDER — TRAZODONE HYDROCHLORIDE 50 MG/1
50 TABLET ORAL NIGHTLY
Status: DISCONTINUED | OUTPATIENT
Start: 2019-07-02 | End: 2019-07-08 | Stop reason: HOSPADM

## 2019-07-02 RX ORDER — MIDAZOLAM HYDROCHLORIDE 1 MG/ML
INJECTION INTRAMUSCULAR; INTRAVENOUS PRN
Status: DISCONTINUED | OUTPATIENT
Start: 2019-07-02 | End: 2019-07-02 | Stop reason: SDUPTHER

## 2019-07-02 RX ORDER — OXYCODONE HYDROCHLORIDE AND ACETAMINOPHEN 5; 325 MG/1; MG/1
1 TABLET ORAL EVERY 4 HOURS PRN
Status: DISCONTINUED | OUTPATIENT
Start: 2019-07-02 | End: 2019-07-06

## 2019-07-02 RX ORDER — GUAIFENESIN 600 MG/1
600 TABLET, EXTENDED RELEASE ORAL 2 TIMES DAILY
COMMUNITY
End: 2021-06-10

## 2019-07-02 RX ADMIN — PHENYLEPHRINE HYDROCHLORIDE 80 MCG: 10 INJECTION, SOLUTION INTRAMUSCULAR; INTRAVENOUS; SUBCUTANEOUS at 09:46

## 2019-07-02 RX ADMIN — SODIUM CHLORIDE, SODIUM LACTATE, POTASSIUM CHLORIDE, AND CALCIUM CHLORIDE: 600; 310; 30; 20 INJECTION, SOLUTION INTRAVENOUS at 13:02

## 2019-07-02 RX ADMIN — PHENYLEPHRINE HYDROCHLORIDE 80 MCG: 10 INJECTION, SOLUTION INTRAMUSCULAR; INTRAVENOUS; SUBCUTANEOUS at 09:45

## 2019-07-02 RX ADMIN — PHENYLEPHRINE HYDROCHLORIDE 80 MCG: 10 INJECTION, SOLUTION INTRAMUSCULAR; INTRAVENOUS; SUBCUTANEOUS at 09:42

## 2019-07-02 RX ADMIN — PHENYLEPHRINE HYDROCHLORIDE 80 MCG: 10 INJECTION, SOLUTION INTRAMUSCULAR; INTRAVENOUS; SUBCUTANEOUS at 09:34

## 2019-07-02 RX ADMIN — CLINDAMYCIN PHOSPHATE 900 MG: 18 INJECTION, SOLUTION INTRAVENOUS at 09:30

## 2019-07-02 RX ADMIN — PHENYLEPHRINE HYDROCHLORIDE 80 MCG: 10 INJECTION, SOLUTION INTRAMUSCULAR; INTRAVENOUS; SUBCUTANEOUS at 09:43

## 2019-07-02 RX ADMIN — PROPOFOL 100 MG: 10 INJECTION, EMULSION INTRAVENOUS at 09:28

## 2019-07-02 RX ADMIN — DEXAMETHASONE SODIUM PHOSPHATE 4 MG: 4 INJECTION, SOLUTION INTRAMUSCULAR; INTRAVENOUS at 09:22

## 2019-07-02 RX ADMIN — PHENYLEPHRINE HYDROCHLORIDE 80 MCG: 10 INJECTION, SOLUTION INTRAMUSCULAR; INTRAVENOUS; SUBCUTANEOUS at 09:30

## 2019-07-02 RX ADMIN — SODIUM CHLORIDE, SODIUM LACTATE, POTASSIUM CHLORIDE, AND CALCIUM CHLORIDE: 600; 310; 30; 20 INJECTION, SOLUTION INTRAVENOUS at 09:45

## 2019-07-02 RX ADMIN — Medication 100 MG: at 09:28

## 2019-07-02 RX ADMIN — VASOPRESSIN 2 UNITS: 20 INJECTION INTRAVENOUS at 09:48

## 2019-07-02 RX ADMIN — FAMOTIDINE 20 MG: 10 INJECTION, SOLUTION INTRAVENOUS at 09:36

## 2019-07-02 RX ADMIN — Medication 10 ML: at 21:14

## 2019-07-02 RX ADMIN — SODIUM CHLORIDE, POTASSIUM CHLORIDE, SODIUM LACTATE AND CALCIUM CHLORIDE: 600; 310; 30; 20 INJECTION, SOLUTION INTRAVENOUS at 08:35

## 2019-07-02 RX ADMIN — FENTANYL CITRATE 50 MCG: 50 INJECTION INTRAMUSCULAR; INTRAVENOUS at 09:21

## 2019-07-02 RX ADMIN — VASOPRESSIN 2 UNITS: 20 INJECTION INTRAVENOUS at 09:51

## 2019-07-02 RX ADMIN — EPHEDRINE SULFATE 25 MG: 50 INJECTION, SOLUTION INTRAMUSCULAR; INTRAVENOUS; SUBCUTANEOUS at 09:41

## 2019-07-02 RX ADMIN — VASOPRESSIN 2 UNITS: 20 INJECTION INTRAVENOUS at 09:57

## 2019-07-02 RX ADMIN — HYDROMORPHONE HYDROCHLORIDE 0.5 MG: 1 INJECTION, SOLUTION INTRAMUSCULAR; INTRAVENOUS; SUBCUTANEOUS at 15:15

## 2019-07-02 RX ADMIN — VASOPRESSIN 2 UNITS: 20 INJECTION INTRAVENOUS at 09:45

## 2019-07-02 RX ADMIN — PHENYLEPHRINE HYDROCHLORIDE 80 MCG: 10 INJECTION, SOLUTION INTRAMUSCULAR; INTRAVENOUS; SUBCUTANEOUS at 09:44

## 2019-07-02 RX ADMIN — FENTANYL CITRATE 50 MCG: 50 INJECTION INTRAMUSCULAR; INTRAVENOUS at 10:23

## 2019-07-02 RX ADMIN — TRAZODONE HYDROCHLORIDE 50 MG: 50 TABLET ORAL at 21:13

## 2019-07-02 RX ADMIN — EPHEDRINE SULFATE 25 MG: 50 INJECTION, SOLUTION INTRAMUSCULAR; INTRAVENOUS; SUBCUTANEOUS at 09:45

## 2019-07-02 RX ADMIN — OXYCODONE HYDROCHLORIDE AND ACETAMINOPHEN 2 TABLET: 5; 325 TABLET ORAL at 19:45

## 2019-07-02 RX ADMIN — ONDANSETRON 4 MG: 2 INJECTION INTRAMUSCULAR; INTRAVENOUS at 09:36

## 2019-07-02 RX ADMIN — SODIUM CHLORIDE, SODIUM LACTATE, POTASSIUM CHLORIDE, AND CALCIUM CHLORIDE: 600; 310; 30; 20 INJECTION, SOLUTION INTRAVENOUS at 09:15

## 2019-07-02 RX ADMIN — MIDAZOLAM HYDROCHLORIDE 1 MG: 2 INJECTION, SOLUTION INTRAMUSCULAR; INTRAVENOUS at 09:20

## 2019-07-02 RX ADMIN — FENTANYL CITRATE 50 MCG: 50 INJECTION INTRAMUSCULAR; INTRAVENOUS at 10:11

## 2019-07-02 RX ADMIN — Medication 2 PUFF: at 09:23

## 2019-07-02 RX ADMIN — FENTANYL CITRATE 50 MCG: 50 INJECTION INTRAMUSCULAR; INTRAVENOUS at 10:15

## 2019-07-02 RX ADMIN — SODIUM CHLORIDE, SODIUM LACTATE, POTASSIUM CHLORIDE, AND CALCIUM CHLORIDE: 600; 310; 30; 20 INJECTION, SOLUTION INTRAVENOUS at 09:57

## 2019-07-02 RX ADMIN — ROCURONIUM BROMIDE 5 MG: 10 INJECTION, SOLUTION INTRAVENOUS at 09:28

## 2019-07-02 RX ADMIN — LIDOCAINE HYDROCHLORIDE 100 MG: 20 INJECTION, SOLUTION INTRAVENOUS at 09:28

## 2019-07-02 ASSESSMENT — PULMONARY FUNCTION TESTS
PIF_VALUE: 20
PIF_VALUE: 0
PIF_VALUE: 20
PIF_VALUE: 21
PIF_VALUE: 20
PIF_VALUE: 20
PIF_VALUE: 1
PIF_VALUE: 26
PIF_VALUE: 20
PIF_VALUE: 22
PIF_VALUE: 21
PIF_VALUE: 20
PIF_VALUE: 21
PIF_VALUE: 21
PIF_VALUE: 20
PIF_VALUE: 21
PIF_VALUE: 19
PIF_VALUE: 20
PIF_VALUE: 13
PIF_VALUE: 20
PIF_VALUE: 21
PIF_VALUE: 21
PIF_VALUE: 20
PIF_VALUE: 21
PIF_VALUE: 21
PIF_VALUE: 20
PIF_VALUE: 21
PIF_VALUE: 20
PIF_VALUE: 20
PIF_VALUE: 26
PIF_VALUE: 20
PIF_VALUE: 22
PIF_VALUE: 20
PIF_VALUE: 21
PIF_VALUE: 20
PIF_VALUE: 21
PIF_VALUE: 23
PIF_VALUE: 20
PIF_VALUE: 19
PIF_VALUE: 20
PIF_VALUE: 10
PIF_VALUE: 21
PIF_VALUE: 20
PIF_VALUE: 21
PIF_VALUE: 22
PIF_VALUE: 20
PIF_VALUE: 20
PIF_VALUE: 21
PIF_VALUE: 21
PIF_VALUE: 20
PIF_VALUE: 22
PIF_VALUE: 20
PIF_VALUE: 20
PIF_VALUE: 19
PIF_VALUE: 18
PIF_VALUE: 20
PIF_VALUE: 21
PIF_VALUE: 22
PIF_VALUE: 19
PIF_VALUE: 21
PIF_VALUE: 22
PIF_VALUE: 20
PIF_VALUE: 21
PIF_VALUE: 20
PIF_VALUE: 21
PIF_VALUE: 20
PIF_VALUE: 19
PIF_VALUE: 21
PIF_VALUE: 20
PIF_VALUE: 21
PIF_VALUE: 20
PIF_VALUE: 22
PIF_VALUE: 21
PIF_VALUE: 20
PIF_VALUE: 21
PIF_VALUE: 2
PIF_VALUE: 20
PIF_VALUE: 22
PIF_VALUE: 20
PIF_VALUE: 20
PIF_VALUE: 27
PIF_VALUE: 21
PIF_VALUE: 32
PIF_VALUE: 20
PIF_VALUE: 21
PIF_VALUE: 20
PIF_VALUE: 22
PIF_VALUE: 21
PIF_VALUE: 19
PIF_VALUE: 20
PIF_VALUE: 19
PIF_VALUE: 21
PIF_VALUE: 22
PIF_VALUE: 20
PIF_VALUE: 20
PIF_VALUE: 6
PIF_VALUE: 1
PIF_VALUE: 20
PIF_VALUE: 3
PIF_VALUE: 24
PIF_VALUE: 21
PIF_VALUE: 20
PIF_VALUE: 21
PIF_VALUE: 20
PIF_VALUE: 20
PIF_VALUE: 19
PIF_VALUE: 20
PIF_VALUE: 20
PIF_VALUE: 27
PIF_VALUE: 20
PIF_VALUE: 21
PIF_VALUE: 20
PIF_VALUE: 19
PIF_VALUE: 21
PIF_VALUE: 21
PIF_VALUE: 20
PIF_VALUE: 21
PIF_VALUE: 20
PIF_VALUE: 21
PIF_VALUE: 20
PIF_VALUE: 22
PIF_VALUE: 22
PIF_VALUE: 19
PIF_VALUE: 4
PIF_VALUE: 20
PIF_VALUE: 18
PIF_VALUE: 20
PIF_VALUE: 20
PIF_VALUE: 0
PIF_VALUE: 19
PIF_VALUE: 20
PIF_VALUE: 21
PIF_VALUE: 20
PIF_VALUE: 22
PIF_VALUE: 20
PIF_VALUE: 19
PIF_VALUE: 21
PIF_VALUE: 20
PIF_VALUE: 20
PIF_VALUE: 21
PIF_VALUE: 21
PIF_VALUE: 20
PIF_VALUE: 21
PIF_VALUE: 21
PIF_VALUE: 3
PIF_VALUE: 20
PIF_VALUE: 21
PIF_VALUE: 20
PIF_VALUE: 0
PIF_VALUE: 19
PIF_VALUE: 21
PIF_VALUE: 20
PIF_VALUE: 22
PIF_VALUE: 19
PIF_VALUE: 22
PIF_VALUE: 20
PIF_VALUE: 21
PIF_VALUE: 21
PIF_VALUE: 20
PIF_VALUE: 21
PIF_VALUE: 20
PIF_VALUE: 21
PIF_VALUE: 20
PIF_VALUE: 19
PIF_VALUE: 20
PIF_VALUE: 21
PIF_VALUE: 21
PIF_VALUE: 23
PIF_VALUE: 21
PIF_VALUE: 20
PIF_VALUE: 5
PIF_VALUE: 20
PIF_VALUE: 20
PIF_VALUE: 21
PIF_VALUE: 20
PIF_VALUE: 21
PIF_VALUE: 20
PIF_VALUE: 19
PIF_VALUE: 21
PIF_VALUE: 19
PIF_VALUE: 27
PIF_VALUE: 20
PIF_VALUE: 20
PIF_VALUE: 21
PIF_VALUE: 21
PIF_VALUE: 20
PIF_VALUE: 20
PIF_VALUE: 21
PIF_VALUE: 20
PIF_VALUE: 21
PIF_VALUE: 18
PIF_VALUE: 21
PIF_VALUE: 19
PIF_VALUE: 21
PIF_VALUE: 20
PIF_VALUE: 21
PIF_VALUE: 21
PIF_VALUE: 20
PIF_VALUE: 20
PIF_VALUE: 22
PIF_VALUE: 20
PIF_VALUE: 20
PIF_VALUE: 21
PIF_VALUE: 21
PIF_VALUE: 20
PIF_VALUE: 20
PIF_VALUE: 21
PIF_VALUE: 21
PIF_VALUE: 19
PIF_VALUE: 21
PIF_VALUE: 19
PIF_VALUE: 20
PIF_VALUE: 22
PIF_VALUE: 21
PIF_VALUE: 19
PIF_VALUE: 22
PIF_VALUE: 0
PIF_VALUE: 20
PIF_VALUE: 2
PIF_VALUE: 22
PIF_VALUE: 21
PIF_VALUE: 20

## 2019-07-02 ASSESSMENT — PAIN DESCRIPTION - ORIENTATION: ORIENTATION: LEFT

## 2019-07-02 ASSESSMENT — PAIN SCALES - GENERAL
PAINLEVEL_OUTOF10: 6
PAINLEVEL_OUTOF10: 0
PAINLEVEL_OUTOF10: 7
PAINLEVEL_OUTOF10: 3
PAINLEVEL_OUTOF10: 4

## 2019-07-02 ASSESSMENT — PAIN - FUNCTIONAL ASSESSMENT: PAIN_FUNCTIONAL_ASSESSMENT: 0-10

## 2019-07-02 ASSESSMENT — PAIN DESCRIPTION - LOCATION: LOCATION: BREAST

## 2019-07-02 ASSESSMENT — PAIN DESCRIPTION - PAIN TYPE: TYPE: SURGICAL PAIN

## 2019-07-02 ASSESSMENT — PAIN DESCRIPTION - ONSET: ONSET: GRADUAL

## 2019-07-02 ASSESSMENT — LIFESTYLE VARIABLES: SMOKING_STATUS: 1

## 2019-07-02 ASSESSMENT — PAIN DESCRIPTION - DESCRIPTORS: DESCRIPTORS: ACHING
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
## 2019-07-02 NOTE — PROGRESS NOTES
PACU Transfer Note    Vitals:    07/02/19 1548   BP:    Pulse: 97   Resp: 13   Temp:    SpO2: 99%       In: 189 [P.O.:50; I.V.:139]  Out: 40 [Drains:40]    Pain assessment: VS stable. Pain level tolerable. No nausea. Family called and updated. Report given to Mandy Corey at bedside. Patient to transfer to room # [phone] as scheduled.   Pain Level: 6    Report given to Receiving unit RN.    7/2/2019 3:52 PM

## 2019-07-02 NOTE — BRIEF OP NOTE
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 Surgeon(s):  MD Jenn Rivas MD    Assistant: Dr. Sobia Read  PGY-IV    Estimated Blood Loss (mL): less than 50     Complications: None    Specimens:   ID Type Source Tests Collected by Time Destination   A : Left Modified Radical Mastectomy; 2 Stitch Long Lateral, 2 Stitch Short Superior, Skin is Anterior  Tissue Breast SURGICAL PATHOLOGY Jenn Hickman MD 7/2/2019 1234    B : New Anterior Margin Left Breast Tissue Tissue SURGICAL PATHOLOGY Jenn Hickman MD 7/2/2019 1242        Implants:  * No implants in log *      Drains:   Closed/Suction Drain Left Breast Bulb 15 [address] (Active)   Dressing Status Clean;Dry; Intact 7/2/2019  1:59 PM       Closed/Suction Drain Left Breast Bulb 15 [address] (Active)   Dressing Status Clean;Dry; Intact 7/2/2019  2:00 PM       Urethral Catheter 16 fr (Active)       Findings: see op note    Nishant Gusman MD  Date: 7/2/2019  Time: 2:06 PM

## 2019-07-02 NOTE — PROGRESS NOTES
Pt instructed on the use and importance of IS. Pt able to achieve 1483-7484 mL towards a minimum goal of 986 mL. Pt demonstrated proper technique and understanding.
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
## 2019-07-02 NOTE — PROGRESS NOTES
RESPIRATORY THERAPY ASSESSMENT    Name:  Bubba Sargent  Medical Record Number:  [id number]  Age: 64 y.o. Gender: female  : 1960  Today's Date:  2019  Room:  5325321-01    Assessment     Is the patient being admitted for a COPD or Asthma exacerbation? No   (If yes the patient will be seen every 4 hours for the first 24 hours and then reassessed)    Patient Admission Diagnosis      Allergies  Allergies   Allergen Reactions    Pcn [Penicillins] Anaphylaxis       Minimum Predicted Vital Capacity:     986          Actual Vital Capacity:      5020-4166              Pulmonary History:Asthma  Home Oxygen Therapy:  room air  Home Respiratory Therapy:Albuterol   Current Respiratory Therapy:  Q4 Albuterol neb, Q4 Albuterol MDI          Respiratory Severity Index(RSI)   Patients with orders for inhalation medications, oxygen, or any therapeutic treatment modality will be placed on Respiratory Protocol. They will be assessed with the first treatment and at least every 72 hours thereafter. The following severity scale will be used to determine frequency of treatment intervention.     Smoking History: Pulmonary Disease or Smoking History, Greater than 15 pack year = 2    Social History  Social History     Tobacco Use    Smoking status: Current Every Day Smoker     Packs/day: 0.75     Years: 35.00     Pack years: 26.25    Smokeless tobacco: Never Used    Tobacco comment: trying to quit smoking    Substance Use Topics    Alcohol use: No    Drug use: No       Recent Surgical History: None = 0  Past Surgical History  Past Surgical History:   Procedure Laterality Date    APPENDECTOMY      CHOLECYSTECTOMY      MASTECTOMY Left 2019    LEFT MODIFIED RADICAL MASTECTOMY; EXPAREL INTERCOSTAL BLOCK performed by Viviane Cortes MD at [place] [address] Left     TUBAL LIGATION      TUNNELED VENOUS PORT PLACEMENT      still in        Level of Consciousness: Alert, Oriented, and Cooperative =

## 2019-07-03 LAB
ALBUMIN SERPL-MCNC: 3.5 G/DL (ref 3.4–5)
ANION GAP SERPL CALCULATED.3IONS-SCNC: 13 MMOL/L (ref 3–16)
BASOPHILS ABSOLUTE: 0.1 K/UL (ref 0–0.2)
BASOPHILS RELATIVE PERCENT: 0.4 %
BUN BLDV-MCNC: 32 MG/DL (ref 7–20)
CALCIUM SERPL-MCNC: 9.3 MG/DL (ref 8.3–10.6)
CHLORIDE BLD-SCNC: 103 MMOL/L (ref 99–110)
CO2: 24 MMOL/L (ref 21–32)
CREAT SERPL-MCNC: 1.1 MG/DL (ref 0.6–1.1)
EOSINOPHILS ABSOLUTE: 0 K/UL (ref 0–0.6)
EOSINOPHILS RELATIVE PERCENT: 0.1 %
GFR AFRICAN AMERICAN: >60
GFR NON-AFRICAN AMERICAN: 51
GLUCOSE BLD-MCNC: 103 MG/DL (ref 70–99)
HCT VFR BLD CALC: 30.6 % (ref 36–48)
HEMOGLOBIN: 10 G/DL (ref 12–16)
LYMPHOCYTES ABSOLUTE: 1.9 K/UL (ref 1–5.1)
LYMPHOCYTES RELATIVE PERCENT: 14.6 %
MAGNESIUM: 1.6 MG/DL (ref 1.8–2.4)
MCH RBC QN AUTO: 34.4 PG (ref 26–34)
MCHC RBC AUTO-ENTMCNC: 32.8 G/DL (ref 31–36)
MCV RBC AUTO: 104.8 FL (ref 80–100)
MONOCYTES ABSOLUTE: 1.1 K/UL (ref 0–1.3)
MONOCYTES RELATIVE PERCENT: 8.4 %
NEUTROPHILS ABSOLUTE: 10.2 K/UL (ref 1.7–7.7)
NEUTROPHILS RELATIVE PERCENT: 76.5 %
PDW BLD-RTO: 15.5 % (ref 12.4–15.4)
PHOSPHORUS: 4 MG/DL (ref 2.5–4.9)
PLATELET # BLD: 287 K/UL (ref 135–450)
PMV BLD AUTO: 7 FL (ref 5–10.5)
POTASSIUM SERPL-SCNC: 4.5 MMOL/L (ref 3.5–5.1)
RBC # BLD: 2.92 M/UL (ref 4–5.2)
SODIUM BLD-SCNC: 140 MMOL/L (ref 136–145)
WBC # BLD: 13.3 K/UL (ref 4–11)

## 2019-07-03 PROCEDURE — 36415 COLL VENOUS BLD VENIPUNCTURE: CPT

## 2019-07-03 PROCEDURE — 6360000002 HC RX W HCPCS: Performed by: STUDENT IN AN ORGANIZED HEALTH CARE EDUCATION/TRAINING PROGRAM

## 2019-07-03 PROCEDURE — 6370000000 HC RX 637 (ALT 250 FOR IP): Performed by: STUDENT IN AN ORGANIZED HEALTH CARE EDUCATION/TRAINING PROGRAM

## 2019-07-03 PROCEDURE — 94760 N-INVAS EAR/PLS OXIMETRY 1: CPT

## 2019-07-03 PROCEDURE — 80069 RENAL FUNCTION PANEL: CPT

## 2019-07-03 PROCEDURE — 83735 ASSAY OF MAGNESIUM: CPT

## 2019-07-03 PROCEDURE — 2580000003 HC RX 258: Performed by: STUDENT IN AN ORGANIZED HEALTH CARE EDUCATION/TRAINING PROGRAM

## 2019-07-03 PROCEDURE — 85025 COMPLETE CBC W/AUTO DIFF WBC: CPT

## 2019-07-03 PROCEDURE — 1200000000 HC SEMI PRIVATE

## 2019-07-03 RX ORDER — MAGNESIUM SULFATE IN WATER 40 MG/ML
2 INJECTION, SOLUTION INTRAVENOUS ONCE
Status: COMPLETED | OUTPATIENT
Start: 2019-07-03 | End: 2019-07-03

## 2019-07-03 RX ADMIN — NITROGLYCERIN 0.5 INCH: 20 OINTMENT TOPICAL at 14:01

## 2019-07-03 RX ADMIN — FAMOTIDINE 40 MG: 20 TABLET ORAL at 08:27

## 2019-07-03 RX ADMIN — OXYCODONE HYDROCHLORIDE AND ACETAMINOPHEN 2 TABLET: 5; 325 TABLET ORAL at 23:22

## 2019-07-03 RX ADMIN — MAGNESIUM SULFATE HEPTAHYDRATE 2 G: 40 INJECTION, SOLUTION INTRAVENOUS at 11:34

## 2019-07-03 RX ADMIN — OXYCODONE HYDROCHLORIDE AND ACETAMINOPHEN 1 TABLET: 5; 325 TABLET ORAL at 13:16

## 2019-07-03 RX ADMIN — ENOXAPARIN SODIUM 40 MG: 40 INJECTION SUBCUTANEOUS at 08:27

## 2019-07-03 RX ADMIN — OXYCODONE HYDROCHLORIDE AND ACETAMINOPHEN 2 TABLET: 5; 325 TABLET ORAL at 03:06

## 2019-07-03 RX ADMIN — TRAZODONE HYDROCHLORIDE 50 MG: 50 TABLET ORAL at 21:47

## 2019-07-03 RX ADMIN — Medication 10 ML: at 21:47

## 2019-07-03 RX ADMIN — Medication 10 ML: at 08:27

## 2019-07-03 RX ADMIN — OXYCODONE HYDROCHLORIDE AND ACETAMINOPHEN 1 TABLET: 5; 325 TABLET ORAL at 19:10

## 2019-07-03 ASSESSMENT — PAIN SCALES - GENERAL
PAINLEVEL_OUTOF10: 5
PAINLEVEL_OUTOF10: 7
PAINLEVEL_OUTOF10: 6
PAINLEVEL_OUTOF10: 0
PAINLEVEL_OUTOF10: 4
PAINLEVEL_OUTOF10: 7
PAINLEVEL_OUTOF10: 4
PAINLEVEL_OUTOF10: 0

## 2019-07-03 ASSESSMENT — PAIN DESCRIPTION - FREQUENCY
FREQUENCY: INTERMITTENT

## 2019-07-03 ASSESSMENT — PAIN DESCRIPTION - ORIENTATION
ORIENTATION: LEFT

## 2019-07-03 ASSESSMENT — PAIN DESCRIPTION - LOCATION
LOCATION: BREAST

## 2019-07-03 ASSESSMENT — PAIN DESCRIPTION - ONSET
ONSET: ON-GOING
ONSET: GRADUAL

## 2019-07-03 ASSESSMENT — PAIN DESCRIPTION - DESCRIPTORS
DESCRIPTORS: ACHING

## 2019-07-03 ASSESSMENT — PAIN - FUNCTIONAL ASSESSMENT: PAIN_FUNCTIONAL_ASSESSMENT: ACTIVITIES ARE NOT PREVENTED

## 2019-07-03 ASSESSMENT — PAIN DESCRIPTION - PAIN TYPE
TYPE: SURGICAL PAIN

## 2019-07-03 ASSESSMENT — PAIN DESCRIPTION - PROGRESSION
CLINICAL_PROGRESSION: GRADUALLY IMPROVING
CLINICAL_PROGRESSION: GRADUALLY IMPROVING

## 2019-07-03 NOTE — ANESTHESIA POSTPROCEDURE EVALUATION
Department of Anesthesiology  Postprocedure Note    Patient: Karen Fuentes  MRN: [id number]  YOB: 1960  Date of evaluation: 7/2/2019  Time:  9:59 PM     Procedure Summary     Date:  07/02/19 Room / Location:  [facility] / [facility]    Anesthesia Start:  3885 Anesthesia Stop:  0420    Procedure:  LEFT MODIFIED RADICAL MASTECTOMY; EXPAREL INTERCOSTAL BLOCK (Left Breast) Diagnosis:  (MALIGANANT NEOPLASM OF UPPER INNER QUADRANT OF THE LEFT BREATS IN FEMALE ESTROGEN RECEPTOR POSITIVE B69.437)    Surgeon:  Aylin Baker MD Responsible Provider:  Lorne España MD    Anesthesia Type:  general ASA Status:  3          Anesthesia Type: No value filed. Carole Phase I: Carole Score: 9    Carole Phase II:      Last vitals: Reviewed and per EMR flowsheets.        Anesthesia Post Evaluation    Patient location during evaluation: PACU  Patient participation: complete - patient participated  Level of consciousness: awake and alert  Pain score: 6  Airway patency: patent  Nausea & Vomiting: no nausea and no vomiting  Complications: no  Cardiovascular status: hemodynamically stable  Respiratory status: acceptable  Hydration status: euvolemic

## 2019-07-03 NOTE — CARE COORDINATION
Cm following, per surgery note pt will stay until Friday. Spoke with Gloria Alicia [phone], will be ready to take pt back on Friday. Pt is long term care and ill not require precert per Gloria Alicia.   Electronically signed by Jodi Javier RN on 7/3/2019 at 11:45 AM  [phone]
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
## 2019-07-03 NOTE — PROGRESS NOTES
Surgery Daily Progress Note      CC: Stage 3 L Breast Cancer    SUBJECTIVE:  No overnight events, no N/V, tolerated CLD. ROS:   A 14 point review of systems was conducted, significant findings as noted above. All other systems negative. OBJECTIVE:    PHYSICAL EXAM:  Vitals:    07/02/19 1718 07/02/19 1946 07/02/19 2308 07/03/19 0248   BP:  120/74 114/67 130/86   Pulse:  100 90 85   Resp:  14 14 14   Temp:  97.4 °F (36.3 °C) 97.1 °F (36.2 °C) 97.6 °F (36.4 °C)   TempSrc:  Oral Oral Oral   SpO2: 98% 96% 94% 95%   Weight:       Height:           General appearance: alert, no acute distress, grooming appropriate  Chest/Lungs: CTAB, no crackles/rales, wheezes/rhonchi, normal effort, incisions are clean/dry/intact at mastectomy site, 2 CEZAR's with SS output, appropriately tender, no hematoma, bleeding  Cardiovascular: RRR, no murmurs/gallops/rubs  Abdomen: soft, non-tender, non-distended, +BS, no guarding/rigidity  Extremities: no edema, no cyanosis  Neuro: A&Ox3, no focal deficits, sensation intact    ASSESSMENT & PLAN:   This is a 64 y.o. female with a diagnosis of stage 3 breast cancer s/p L radical mastectomy.    -Cont CEZAR's to suction  -Cont gen diet  -Saline lock  -PO pain meds  -F/U labs   -OOB and IS use  -Staying until Friday    Alla Fuentes DO PGY1  07/03/19  6:15 AM     Patient seen and examined. Inferior lateral portion of wound with evidence of superficial ischemia. Will monitor. Otherwise doing very well. Agree with assessment and plan.

## 2019-07-04 PROCEDURE — 6360000002 HC RX W HCPCS: Performed by: STUDENT IN AN ORGANIZED HEALTH CARE EDUCATION/TRAINING PROGRAM

## 2019-07-04 PROCEDURE — 2580000003 HC RX 258: Performed by: STUDENT IN AN ORGANIZED HEALTH CARE EDUCATION/TRAINING PROGRAM

## 2019-07-04 PROCEDURE — 1200000000 HC SEMI PRIVATE

## 2019-07-04 PROCEDURE — 6370000000 HC RX 637 (ALT 250 FOR IP): Performed by: STUDENT IN AN ORGANIZED HEALTH CARE EDUCATION/TRAINING PROGRAM

## 2019-07-04 RX ORDER — AMLODIPINE BESYLATE 10 MG/1
10 TABLET ORAL DAILY
Status: DISCONTINUED | OUTPATIENT
Start: 2019-07-04 | End: 2019-07-08 | Stop reason: HOSPADM

## 2019-07-04 RX ADMIN — OXYCODONE HYDROCHLORIDE AND ACETAMINOPHEN 1 TABLET: 5; 325 TABLET ORAL at 07:45

## 2019-07-04 RX ADMIN — FAMOTIDINE 40 MG: 20 TABLET ORAL at 07:49

## 2019-07-04 RX ADMIN — OXYCODONE HYDROCHLORIDE AND ACETAMINOPHEN 1 TABLET: 5; 325 TABLET ORAL at 19:29

## 2019-07-04 RX ADMIN — ENOXAPARIN SODIUM 40 MG: 40 INJECTION SUBCUTANEOUS at 07:45

## 2019-07-04 RX ADMIN — Medication 10 ML: at 07:45

## 2019-07-04 RX ADMIN — AMLODIPINE BESYLATE 10 MG: 10 TABLET ORAL at 13:31

## 2019-07-04 RX ADMIN — Medication 10 ML: at 21:26

## 2019-07-04 RX ADMIN — OXYCODONE HYDROCHLORIDE AND ACETAMINOPHEN 2 TABLET: 5; 325 TABLET ORAL at 13:33

## 2019-07-04 RX ADMIN — TRAZODONE HYDROCHLORIDE 50 MG: 50 TABLET ORAL at 21:25

## 2019-07-04 RX ADMIN — OXYCODONE HYDROCHLORIDE AND ACETAMINOPHEN 2 TABLET: 5; 325 TABLET ORAL at 23:27

## 2019-07-04 ASSESSMENT — PAIN SCALES - GENERAL
PAINLEVEL_OUTOF10: 7
PAINLEVEL_OUTOF10: 7
PAINLEVEL_OUTOF10: 6
PAINLEVEL_OUTOF10: 2
PAINLEVEL_OUTOF10: 0
PAINLEVEL_OUTOF10: 2
PAINLEVEL_OUTOF10: 0
PAINLEVEL_OUTOF10: 5

## 2019-07-04 ASSESSMENT — PAIN DESCRIPTION - PROGRESSION
CLINICAL_PROGRESSION: GRADUALLY WORSENING

## 2019-07-04 ASSESSMENT — PAIN DESCRIPTION - ORIENTATION
ORIENTATION: LEFT

## 2019-07-04 ASSESSMENT — PAIN DESCRIPTION - FREQUENCY
FREQUENCY: INTERMITTENT
FREQUENCY: INTERMITTENT
FREQUENCY: CONTINUOUS

## 2019-07-04 ASSESSMENT — PAIN DESCRIPTION - PAIN TYPE
TYPE: SURGICAL PAIN

## 2019-07-04 ASSESSMENT — PAIN DESCRIPTION - DESCRIPTORS
DESCRIPTORS: ACHING

## 2019-07-04 ASSESSMENT — PAIN DESCRIPTION - LOCATION
LOCATION: BREAST

## 2019-07-04 ASSESSMENT — PAIN DESCRIPTION - ONSET
ONSET: ON-GOING
ONSET: GRADUAL
ONSET: GRADUAL

## 2019-07-04 NOTE — PROGRESS NOTES
Patient alert and oriented. VSS Patient c/o  pain or nausea ,Percocet given per protocol. Surgical bras in place. 2JPs in place with milky output. Assessment done. see flowsheet. Patient in bed lowest position call light within reach. All needs are met at this time.  Will continue to monitor  /75   Pulse 94   Temp 98.5 °F (36.9 °C) (Oral)   Resp 16   Ht 5' 8\" (1.727 m)   Wt 220 lb (99.8 kg)   LMP 03/09/2014   SpO2 95%   BMI 33.45 kg/m²

## 2019-07-04 NOTE — PROGRESS NOTES
Pt has been A&Ox4, VSS, lungs clear, using IS well, no adventitious heart sounds, active bowel sounds, pt's surgical sites remain CD&I with fluff gauze and surgical bra. No numbness or tingling in LUE. Pt voiding well, and ambulating in hallways. Will continue to monitor.

## 2019-07-05 PROCEDURE — 99024 POSTOP FOLLOW-UP VISIT: CPT | Performed by: SURGERY

## 2019-07-05 PROCEDURE — 1200000000 HC SEMI PRIVATE

## 2019-07-05 PROCEDURE — 6370000000 HC RX 637 (ALT 250 FOR IP): Performed by: STUDENT IN AN ORGANIZED HEALTH CARE EDUCATION/TRAINING PROGRAM

## 2019-07-05 PROCEDURE — 2580000003 HC RX 258: Performed by: STUDENT IN AN ORGANIZED HEALTH CARE EDUCATION/TRAINING PROGRAM

## 2019-07-05 PROCEDURE — 6360000002 HC RX W HCPCS: Performed by: STUDENT IN AN ORGANIZED HEALTH CARE EDUCATION/TRAINING PROGRAM

## 2019-07-05 RX ADMIN — FAMOTIDINE 40 MG: 20 TABLET ORAL at 09:15

## 2019-07-05 RX ADMIN — OXYCODONE HYDROCHLORIDE AND ACETAMINOPHEN 1 TABLET: 5; 325 TABLET ORAL at 05:05

## 2019-07-05 RX ADMIN — AMLODIPINE BESYLATE 10 MG: 10 TABLET ORAL at 09:15

## 2019-07-05 RX ADMIN — Medication 10 ML: at 20:03

## 2019-07-05 RX ADMIN — OXYCODONE HYDROCHLORIDE AND ACETAMINOPHEN 1 TABLET: 5; 325 TABLET ORAL at 09:15

## 2019-07-05 RX ADMIN — ENOXAPARIN SODIUM 40 MG: 40 INJECTION SUBCUTANEOUS at 09:15

## 2019-07-05 RX ADMIN — OXYCODONE HYDROCHLORIDE AND ACETAMINOPHEN 2 TABLET: 5; 325 TABLET ORAL at 13:32

## 2019-07-05 RX ADMIN — OXYCODONE HYDROCHLORIDE AND ACETAMINOPHEN 2 TABLET: 5; 325 TABLET ORAL at 19:59

## 2019-07-05 RX ADMIN — TRAZODONE HYDROCHLORIDE 50 MG: 50 TABLET ORAL at 20:03

## 2019-07-05 RX ADMIN — Medication 10 ML: at 13:33

## 2019-07-05 ASSESSMENT — PAIN SCALES - GENERAL
PAINLEVEL_OUTOF10: 7
PAINLEVEL_OUTOF10: 4
PAINLEVEL_OUTOF10: 8
PAINLEVEL_OUTOF10: 6
PAINLEVEL_OUTOF10: 0
PAINLEVEL_OUTOF10: 6

## 2019-07-05 ASSESSMENT — PAIN DESCRIPTION - LOCATION
LOCATION: BREAST

## 2019-07-05 ASSESSMENT — PAIN DESCRIPTION - PAIN TYPE
TYPE: SURGICAL PAIN
TYPE: SURGICAL PAIN

## 2019-07-05 ASSESSMENT — PAIN DESCRIPTION - ORIENTATION
ORIENTATION: LEFT

## 2019-07-05 ASSESSMENT — PAIN DESCRIPTION - DESCRIPTORS
DESCRIPTORS: ACHING

## 2019-07-05 ASSESSMENT — PAIN DESCRIPTION - PROGRESSION
CLINICAL_PROGRESSION: GRADUALLY WORSENING
CLINICAL_PROGRESSION: GRADUALLY WORSENING

## 2019-07-05 ASSESSMENT — PAIN DESCRIPTION - FREQUENCY
FREQUENCY: INTERMITTENT
FREQUENCY: INTERMITTENT

## 2019-07-05 ASSESSMENT — PAIN DESCRIPTION - ONSET
ONSET: GRADUAL
ONSET: GRADUAL

## 2019-07-05 NOTE — DISCHARGE INSTR - COC
Continuity of Care Form    Patient Name: Chey Cedeno   :  1960  MRN:  [id number]    Admit date:  2019  Discharge date:  2019    Code Status Order: Full Code   Advance Directives:   [facility] Documentation     Date/Time Healthcare Directive Type of Healthcare Directive Copy in [address] [place] [address] [address] Agent's Name Healthcare Agent's Phone Number    19 1904  No, patient does not have an advance directive for healthcare treatment -- -- -- -- --    19 0757  No, patient does not have an advance directive for healthcare treatment -- -- -- -- --    19 1106  No, patient does not have an advance directive for healthcare treatment -- -- -- -- --          Admitting Physician:  Chelsea Santoyo MD  [facility]P: [facility]    Discharging Nurse: [facility] Unit/Room#: 2368/4203-74  Discharging Unit Phone Number: ***    Emergency Contact:   Extended Emergency Contact Information  Primary Emergency Contact: devante españa  Home Phone: [phone]  Work Phone: [phone]  Mobile Phone: [phone]  Relation: Brother/Sister   needed?  No    Past Surgical History:  Past Surgical History:   Procedure Laterality Date    APPENDECTOMY      CHOLECYSTECTOMY      MASTECTOMY Left 2019    LEFT MODIFIED RADICAL MASTECTOMY; EXPAREL INTERCOSTAL BLOCK performed by Chelsea Santoyo MD at [facility] Left 's    TUBAL LIGATION      TUNNELED VENOUS PORT PLACEMENT  2019    still in        Immunization History:   Immunization History   Administered Date(s) Administered    Influenza Virus Vaccine 2014    Pneumococcal Polysaccharide ([id number]) 2014       Active Problems:  Patient Active Problem List   Diagnosis Code    Sepsis ([facility] A41.9    Cancer of left breast, stage 3 ([facility] C50.912       Isolation/Infection:   Isolation          No Isolation            Nurse Assessment:  Last Vital Signs: BP (!) 144/80   Pulse 91

## 2019-07-06 PROCEDURE — 6370000000 HC RX 637 (ALT 250 FOR IP): Performed by: SURGERY

## 2019-07-06 PROCEDURE — 2580000003 HC RX 258: Performed by: STUDENT IN AN ORGANIZED HEALTH CARE EDUCATION/TRAINING PROGRAM

## 2019-07-06 PROCEDURE — 6370000000 HC RX 637 (ALT 250 FOR IP): Performed by: STUDENT IN AN ORGANIZED HEALTH CARE EDUCATION/TRAINING PROGRAM

## 2019-07-06 PROCEDURE — 1200000000 HC SEMI PRIVATE

## 2019-07-06 PROCEDURE — 6360000002 HC RX W HCPCS: Performed by: STUDENT IN AN ORGANIZED HEALTH CARE EDUCATION/TRAINING PROGRAM

## 2019-07-06 RX ORDER — OXYCODONE HYDROCHLORIDE AND ACETAMINOPHEN 5; 325 MG/1; MG/1
1 TABLET ORAL EVERY 6 HOURS PRN
Status: DISCONTINUED | OUTPATIENT
Start: 2019-07-06 | End: 2019-07-08 | Stop reason: HOSPADM

## 2019-07-06 RX ORDER — IBUPROFEN 200 MG
400 TABLET ORAL EVERY 6 HOURS PRN
Status: DISCONTINUED | OUTPATIENT
Start: 2019-07-06 | End: 2019-07-08 | Stop reason: HOSPADM

## 2019-07-06 RX ADMIN — OXYCODONE HYDROCHLORIDE AND ACETAMINOPHEN 1 TABLET: 5; 325 TABLET ORAL at 00:03

## 2019-07-06 RX ADMIN — ENOXAPARIN SODIUM 40 MG: 40 INJECTION SUBCUTANEOUS at 09:03

## 2019-07-06 RX ADMIN — Medication 10 ML: at 09:04

## 2019-07-06 RX ADMIN — OXYCODONE HYDROCHLORIDE AND ACETAMINOPHEN 1 TABLET: 5; 325 TABLET ORAL at 06:02

## 2019-07-06 RX ADMIN — AMLODIPINE BESYLATE 10 MG: 10 TABLET ORAL at 09:04

## 2019-07-06 RX ADMIN — TRAZODONE HYDROCHLORIDE 50 MG: 50 TABLET ORAL at 20:29

## 2019-07-06 RX ADMIN — OXYCODONE HYDROCHLORIDE AND ACETAMINOPHEN 1 TABLET: 5; 325 TABLET ORAL at 18:48

## 2019-07-06 RX ADMIN — IBUPROFEN 400 MG: 200 TABLET, FILM COATED ORAL at 15:11

## 2019-07-06 RX ADMIN — Medication 10 ML: at 20:29

## 2019-07-06 RX ADMIN — FAMOTIDINE 40 MG: 20 TABLET ORAL at 09:04

## 2019-07-06 RX ADMIN — ACETAMINOPHEN 650 MG: 325 TABLET ORAL at 11:30

## 2019-07-06 ASSESSMENT — PAIN DESCRIPTION - FREQUENCY
FREQUENCY: INTERMITTENT

## 2019-07-06 ASSESSMENT — PAIN DESCRIPTION - LOCATION
LOCATION: BREAST

## 2019-07-06 ASSESSMENT — PAIN DESCRIPTION - PAIN TYPE
TYPE: ACUTE PAIN
TYPE: SURGICAL PAIN

## 2019-07-06 ASSESSMENT — PAIN DESCRIPTION - DESCRIPTORS
DESCRIPTORS: ACHING

## 2019-07-06 ASSESSMENT — PAIN SCALES - GENERAL
PAINLEVEL_OUTOF10: 6
PAINLEVEL_OUTOF10: 3
PAINLEVEL_OUTOF10: 5
PAINLEVEL_OUTOF10: 5
PAINLEVEL_OUTOF10: 4
PAINLEVEL_OUTOF10: 5
PAINLEVEL_OUTOF10: 4
PAINLEVEL_OUTOF10: 4
PAINLEVEL_OUTOF10: 0
PAINLEVEL_OUTOF10: 0

## 2019-07-06 ASSESSMENT — PAIN DESCRIPTION - ONSET
ONSET: GRADUAL

## 2019-07-06 ASSESSMENT — PAIN DESCRIPTION - PROGRESSION
CLINICAL_PROGRESSION: GRADUALLY WORSENING

## 2019-07-06 ASSESSMENT — PAIN DESCRIPTION - ORIENTATION
ORIENTATION: LEFT

## 2019-07-06 ASSESSMENT — PAIN - FUNCTIONAL ASSESSMENT: PAIN_FUNCTIONAL_ASSESSMENT: ACTIVITIES ARE NOT PREVENTED

## 2019-07-06 NOTE — PROGRESS NOTES
General Surgery   Daily Progress Note    CC: Stage 3 L Breast Cancer    SUBJECTIVE:  Patient rested well overnight. Pain is well-controlled. Continues tolerate a general diet well, reporting no post-prandial pain, nausea, or vomiting. Patient is voiding independently and without difficulty. ROS:   A 14 point review of systems was conducted, significant findings as noted above. All other systems negative. OBJECTIVE:  PHYSICAL EXAM:  Vitals:    07/05/19 1556 07/05/19 1959 07/05/19 2345 07/06/19 0345   BP: 125/74 136/78 122/75 122/72   Pulse: 76 84 81 82   Resp:  16 14 16   Temp: 98.1 °F (36.7 °C) 98 °F (36.7 °C) 98.4 °F (36.9 °C) 98.4 °F (36.9 °C)   TempSrc: Oral Oral Oral Oral   SpO2: 92%  94% 95%   Weight:       Height:         General appearance: alert, no acute distress, grooming appropriate  Chest/Lungs: CTAB, no crackles/rales, wheezes/rhonchi, normal effort, incisions are clean/dry/intact at mastectomy site, lateral superior area of superficial ischemia is slightly improving, 2 CEZAR's-- lateral drain with milky-appearing output, medial with serosang, appropriately tender, no hematoma, bleeding  Cardiovascular: RRR, no murmurs/gallops/rubs  Abdomen: soft, non-tender, non-distended, no guarding/rigidity  Extremities: no edema, no cyanosis  Neuro: A&Ox3, no focal deficits, sensation intact    ASSESSMENT & PLAN:   This is a 64 y.o. female with a diagnosis of stage 3 breast cancer s/p L radical mastectomy(7/2). POD4    - Continue Nitropaste for total of 3 days for superficial ischemia  - Continue CEZAR's to suction  - Continue general diet  - Encourage frequent ambulation and OOB to chair during the day  - Continue frequent use of IS (minimum 10x/hr)   - Patient requires ECHO prior to starting Herceptin -- Will need to stay until Monday for ECHO     Brissa Flores MD, MPH  PGY-1 General Surgery  07/06/19  7:27 AM  [phone]    I am post-call and off campus today.  If you have any questions or concerns regarding this patient's care today, please page:  Jermain Dominguez DO PGY-1 [phone]

## 2019-07-06 NOTE — CARE COORDINATION
CM  Received  Call from MD  Stating pt  Not medically ready for  D/c today  [address] and  And placed on will call. CM cont to follow for  D/c planning and return to [facility]. Patricia Lai RN Case Manager  [facility]  [address] [place] [address].   [facility] [address] [phone]  Fax [phone]

## 2019-07-07 PROCEDURE — 6370000000 HC RX 637 (ALT 250 FOR IP): Performed by: SURGERY

## 2019-07-07 PROCEDURE — 97110 THERAPEUTIC EXERCISES: CPT

## 2019-07-07 PROCEDURE — 97161 PT EVAL LOW COMPLEX 20 MIN: CPT

## 2019-07-07 PROCEDURE — 6370000000 HC RX 637 (ALT 250 FOR IP): Performed by: STUDENT IN AN ORGANIZED HEALTH CARE EDUCATION/TRAINING PROGRAM

## 2019-07-07 PROCEDURE — 2580000003 HC RX 258: Performed by: STUDENT IN AN ORGANIZED HEALTH CARE EDUCATION/TRAINING PROGRAM

## 2019-07-07 PROCEDURE — 6360000002 HC RX W HCPCS: Performed by: STUDENT IN AN ORGANIZED HEALTH CARE EDUCATION/TRAINING PROGRAM

## 2019-07-07 PROCEDURE — 1200000000 HC SEMI PRIVATE

## 2019-07-07 RX ADMIN — OXYCODONE HYDROCHLORIDE AND ACETAMINOPHEN 2 TABLET: 5; 325 TABLET ORAL at 08:07

## 2019-07-07 RX ADMIN — OXYCODONE HYDROCHLORIDE AND ACETAMINOPHEN 1 TABLET: 5; 325 TABLET ORAL at 00:46

## 2019-07-07 RX ADMIN — OXYCODONE HYDROCHLORIDE AND ACETAMINOPHEN 1 TABLET: 5; 325 TABLET ORAL at 20:28

## 2019-07-07 RX ADMIN — ENOXAPARIN SODIUM 40 MG: 40 INJECTION SUBCUTANEOUS at 08:08

## 2019-07-07 RX ADMIN — AMLODIPINE BESYLATE 10 MG: 10 TABLET ORAL at 08:07

## 2019-07-07 RX ADMIN — FAMOTIDINE 40 MG: 20 TABLET ORAL at 08:07

## 2019-07-07 RX ADMIN — Medication 10 ML: at 08:08

## 2019-07-07 RX ADMIN — OXYCODONE HYDROCHLORIDE AND ACETAMINOPHEN 2 TABLET: 5; 325 TABLET ORAL at 14:31

## 2019-07-07 RX ADMIN — TRAZODONE HYDROCHLORIDE 50 MG: 50 TABLET ORAL at 20:25

## 2019-07-07 RX ADMIN — Medication 10 ML: at 20:25

## 2019-07-07 ASSESSMENT — PAIN DESCRIPTION - FREQUENCY: FREQUENCY: INTERMITTENT

## 2019-07-07 ASSESSMENT — PAIN SCALES - GENERAL
PAINLEVEL_OUTOF10: 2
PAINLEVEL_OUTOF10: 7
PAINLEVEL_OUTOF10: 2
PAINLEVEL_OUTOF10: 4
PAINLEVEL_OUTOF10: 6
PAINLEVEL_OUTOF10: 6
PAINLEVEL_OUTOF10: 0

## 2019-07-07 ASSESSMENT — PAIN DESCRIPTION - ORIENTATION
ORIENTATION: LEFT

## 2019-07-07 ASSESSMENT — PAIN DESCRIPTION - LOCATION
LOCATION: BREAST

## 2019-07-07 ASSESSMENT — PAIN DESCRIPTION - PAIN TYPE
TYPE: SURGICAL PAIN

## 2019-07-07 NOTE — PLAN OF CARE
Problem: Pain:  Description  Pain management should include both nonpharmacologic and pharmacologic interventions. Goal: Pain level will decrease  Description  Pain level will decrease  7/7/2019 1740 by Cara Alicia RN  Outcome: Met This Shift  7/7/2019 0351 by Ayaka Chang RN  Outcome: Ongoing  Goal: Control of acute pain  Description  Control of acute pain  Outcome: Met This Shift  Goal: Control of chronic pain  Description  Control of chronic pain  Outcome: Met This Shift     Problem:  Activity:  Goal: Ability to maintain or regain function will improve  Description  Ability to maintain or regain function will improve  7/7/2019 1740 by Cara Alicia RN  Outcome: Met This Shift  7/7/2019 0351 by Ayaka Chang RN  Outcome: Ongoing  Goal: Ability to tolerate increased activity will improve  Description  Ability to tolerate increased activity will improve  Outcome: Met This Shift     Problem: Physical Regulation:  Goal: Postoperative complications will be avoided or minimized  Description  Postoperative complications will be avoided or minimized  Outcome: Met This Shift     Problem: Self-Concept:  Goal: Level of anxiety will decrease  Description  Level of anxiety will decrease  Outcome: Met This Shift  Goal: Ability to verbalize positive feelings about self will improve  Description  Ability to verbalize positive feelings about self will improve  Outcome: Met This Shift     Problem: Sensory:  Goal: Pain level will decrease  Description  Pain level will decrease  7/7/2019 1740 by Cara Alicia RN  Outcome: Met This Shift  7/7/2019 0351 by Ayaka Chang RN  Outcome: Ongoing  Goal: Expressions of feelings of enhanced comfort will increase  Description  Expressions of feelings of enhanced comfort will increase  Outcome: Met This Shift     Problem: Skin Integrity:  Goal: Demonstration of wound healing without infection will improve  Description  Demonstration of wound healing without

## 2019-07-08 VITALS
RESPIRATION RATE: 16 BRPM | HEART RATE: 110 BPM | BODY MASS INDEX: 33.34 KG/M2 | WEIGHT: 220 LBS | SYSTOLIC BLOOD PRESSURE: 131 MMHG | OXYGEN SATURATION: 97 % | TEMPERATURE: 98.4 F | HEIGHT: 68 IN | DIASTOLIC BLOOD PRESSURE: 87 MMHG

## 2019-07-08 LAB
LV EF: 58 %
LVEF MODALITY: NORMAL

## 2019-07-08 PROCEDURE — 97110 THERAPEUTIC EXERCISES: CPT

## 2019-07-08 PROCEDURE — 6370000000 HC RX 637 (ALT 250 FOR IP): Performed by: SURGERY

## 2019-07-08 PROCEDURE — 2580000003 HC RX 258: Performed by: STUDENT IN AN ORGANIZED HEALTH CARE EDUCATION/TRAINING PROGRAM

## 2019-07-08 PROCEDURE — 6370000000 HC RX 637 (ALT 250 FOR IP): Performed by: STUDENT IN AN ORGANIZED HEALTH CARE EDUCATION/TRAINING PROGRAM

## 2019-07-08 PROCEDURE — 6360000004 HC RX CONTRAST MEDICATION: Performed by: INTERNAL MEDICINE

## 2019-07-08 PROCEDURE — C8929 TTE W OR WO FOL WCON,DOPPLER: HCPCS

## 2019-07-08 PROCEDURE — 6360000002 HC RX W HCPCS: Performed by: STUDENT IN AN ORGANIZED HEALTH CARE EDUCATION/TRAINING PROGRAM

## 2019-07-08 RX ADMIN — AMLODIPINE BESYLATE 10 MG: 10 TABLET ORAL at 09:32

## 2019-07-08 RX ADMIN — Medication 10 ML: at 09:33

## 2019-07-08 RX ADMIN — OXYCODONE HYDROCHLORIDE AND ACETAMINOPHEN 1 TABLET: 5; 325 TABLET ORAL at 12:48

## 2019-07-08 RX ADMIN — ENOXAPARIN SODIUM 40 MG: 40 INJECTION SUBCUTANEOUS at 09:32

## 2019-07-08 RX ADMIN — FAMOTIDINE 40 MG: 20 TABLET ORAL at 09:32

## 2019-07-08 RX ADMIN — PERFLUTREN 1.65 MG: 6.52 INJECTION, SUSPENSION INTRAVENOUS at 12:22

## 2019-07-08 ASSESSMENT — PAIN DESCRIPTION - LOCATION: LOCATION: BREAST

## 2019-07-08 ASSESSMENT — PAIN DESCRIPTION - PAIN TYPE: TYPE: ACUTE PAIN

## 2019-07-08 ASSESSMENT — PAIN DESCRIPTION - ONSET: ONSET: GRADUAL

## 2019-07-08 ASSESSMENT — PAIN SCALES - GENERAL
PAINLEVEL_OUTOF10: 3
PAINLEVEL_OUTOF10: 0
PAINLEVEL_OUTOF10: 0
PAINLEVEL_OUTOF10: 6

## 2019-07-08 ASSESSMENT — PAIN - FUNCTIONAL ASSESSMENT: PAIN_FUNCTIONAL_ASSESSMENT: ACTIVITIES ARE NOT PREVENTED

## 2019-07-08 ASSESSMENT — PAIN DESCRIPTION - PROGRESSION: CLINICAL_PROGRESSION: GRADUALLY WORSENING

## 2019-07-08 ASSESSMENT — PAIN DESCRIPTION - ORIENTATION: ORIENTATION: LEFT

## 2019-07-08 ASSESSMENT — PAIN DESCRIPTION - DESCRIPTORS: DESCRIPTORS: DISCOMFORT;SORE

## 2019-07-08 ASSESSMENT — PAIN DESCRIPTION - FREQUENCY: FREQUENCY: INTERMITTENT

## 2019-07-08 NOTE — PLAN OF CARE
Problem: Pain:  Goal: Pain level will decrease  Description  Pain level will decrease  7/8/2019 0035 by Elaine Mckeon, RN  Outcome: Ongoing   Explained to patient and daughter the dose of pain medication ordered in [facility]. Pt verbalized understanding that if pain isn't managed, we will let the MD know. Pt verbalized satisfaction after medicated.

## 2019-07-08 NOTE — PROGRESS NOTES
Patient alert and oriented, up as tolerated. Vitals stable. Surgical pain controlled. Surgical incision with surgical tape and glue, wound clean and dry, edges well approximated. Patient received discharge orders. Report called to [place] at [facility]. IV removed. Daughter to transport to Nursing home.

## 2019-07-08 NOTE — FLOWSHEET NOTE
07/08/19 1500   Encounter Summary   Services provided to: Patient and family together   Referral/Consult From: Rounding   Continue Visiting   (7/8, johny )   Complexity of Encounter Moderate   Length of Encounter 15 minutes   Routine   Type Initial   Assessment Calm; Approachable; Hopeful   Intervention Active listening;Explored feelings, thoughts, concerns;Nurtured hope   Outcome Comfort;Expressed gratitude

## 2019-07-08 NOTE — PROGRESS NOTES
Physical Therapy    Daily Treatment Note and Discharge Summary    Assessment:  Pt demonstrates independence with HEP. Tolerated 1 rep shoulder stretch to about 120º of flexion. Pt reports working on HEP throughout the day. PT goal met. No other PT needs identified. Will sign off. Pt to keep performing HEP as prescribed by PT. Pt plans to return to [facility] with PT for L UE stretching upon d/c. Discharge Recommendations:  Leah Brown scored a 23/24 on the AM-PAC short mobility form. At this time, no further PT is recommended upon discharge due to PT goals met. Recommend patient returns to prior setting with prior services. Equipment Needs:  None    Chart Reviewed: Yes   Other position/activity restrictions: PT order: stretching for lymphedema prevention. PT had L axilary lymph nodes dissection   Additional Pertinent Hx: LEFT MODIFIED RADICAL MASTECTOMY; EXPAREL INTERCOSTAL BLOCK on 7/2      Diagnosis: MALIGANANT NEOPLASM OF UPPER INNER QUADRANT OF THE LEFT BREATS IN FEMALE ESTROGEN RECEPTOR POSITIVE   Treatment Diagnosis: impaired LUE ROM      Subjective:  Pt found supine. \"I am leaving today. \"  Pt reports independence with HEP and agreeable for PT. Pain:  Surgical pain, not rated, RN aware. \"I just had a pain pill. \"      Objective:    Exercises  Scapular squeezes: x20 independently  Shoulder shrugs: x10 independently  Elbow flexion/extension: x20 independently  Hand flexion/extension: x20 independently  Shoulder flexion: x10 reps independently (to 90º flexion)  Shoulder flexion stretch: 1 rep x15 sec hold to ~120º flexion (pt refused to do more than one stretch)    Patient Education  Reviewed HEP. Pt verbalizes understanding. Safety Devices  Pt left with needs in reach, supine in bed with daughter in room. Assessment:  Pt demonstrates independence with HEP. Tolerated 1 rep shoulder stretch to about 120º of flexion. Pt reports working on HEP throughout the day. PT goal met.   No other

## 2019-07-08 NOTE — CARE COORDINATION
Case Management Assessment            Discharge Note                    Date / Time of Note: 7/8/2019 3:23 PM                  Discharge Note Completed by: Renee Arronshoshana    Patient Name: Danielle West   YOB: 1960  Diagnosis: Cancer of left breast, stage 3 ([address] Kalani Ware   Date / Time: 7/2/2019  7:13 AM    Current PCP: Kimberley Leonard patient: No    Hospitalization in the last 30 days: No    Advance Directives:  Code Status: Full Code  [place] DNR form completed and on chart: No    Financial:  Payor: Urvashi Salmeron / Plan: Gallo Costa / Product Type: *No Product type* /      Pharmacy:  No Pharmacies [address] [place] [employer] medications?: Potential Assistance Purchasing Medications: No  Assistance provided by Case Management: None at this time    Does patient want to participate in local refill/ meds to beds program?: Not Assessed    Meds To [employer] [employer] Rules:  1. Can ONLY be done Monday- Friday between 8:30am-5pm  2. Prescription(s) must be in pharmacy by 3pm to be filled same day  3. Copy of patient's insurance/ prescription drug card and patient face sheet must be sent along with the prescription(s)  4. Cost of Rx cannot be added to hospital bill. If financial assistance is needed, please contact unit  or ;  or  CANNOT provide pharmacy voucher for patients co-pays  5.  Patients can then  the prescription on their way out of the hospital at discharge, or pharmacy can deliver to the bedside if staff is available. (payment due at time of pick-up or delivery - cash, check, or card accepted)     Able to afford home medications/ co-pay costs: Yes    ADLS:  Current PT AM-PAC Score: 23 /24  Current OT AM-PAC Score:   /24      DISCHARGE Disposition: [facility] (C): [place]  Phone: [phone]  Fax: [phone]    LOC at discharge: [address] Completed: Yes    Notification completed in HENS/PAS?:  Not Applicable    IMM Completed:   Not Indicated    Transportation:  Transportation PLAN for discharge: family daughter Adriana Davis driving back  Mode of Transport: private car        Additional CM Notes: Pt from [facility] 1690 will return Long term, per Courtney hollis to return LTC, no precert needed, no HENS needed. Pt daughter Adriana Davis will drive home    Shoaib and her family were provided with choice of provider; she and her family are in agreement with the discharge plan.     Care Transitions patient: Hilda Brizuela RN  [facility]  Case Management Department  Ph: [phone]  Fax: [phone]

## 2019-07-11 ENCOUNTER — OFFICE VISIT (OUTPATIENT)
Dept: SURGERY | Age: 59
End: 2019-07-11

## 2019-07-11 VITALS
DIASTOLIC BLOOD PRESSURE: 75 MMHG | WEIGHT: 228.6 LBS | SYSTOLIC BLOOD PRESSURE: 121 MMHG | HEART RATE: 88 BPM | HEIGHT: 68 IN | BODY MASS INDEX: 34.65 KG/M2

## 2019-07-11 DIAGNOSIS — C50.212 MALIGNANT NEOPLASM OF UPPER-INNER QUADRANT OF LEFT BREAST IN FEMALE, ESTROGEN RECEPTOR POSITIVE (HCC): Primary | ICD-10-CM

## 2019-07-11 DIAGNOSIS — Z17.0 MALIGNANT NEOPLASM OF UPPER-INNER QUADRANT OF LEFT BREAST IN FEMALE, ESTROGEN RECEPTOR POSITIVE (HCC): Primary | ICD-10-CM

## 2019-07-11 PROCEDURE — 99024 POSTOP FOLLOW-UP VISIT: CPT | Performed by: SURGERY

## 2019-07-11 RX ORDER — SULFAMETHOXAZOLE AND TRIMETHOPRIM 800; 160 MG/1; MG/1
1 TABLET ORAL 2 TIMES DAILY
Qty: 28 TABLET | Refills: 0 | Status: SHIPPED | OUTPATIENT
Start: 2019-07-11 | End: 2019-07-25

## 2019-07-16 ENCOUNTER — TELEPHONE (OUTPATIENT)
Dept: SURGERY | Age: 59
End: 2019-07-16

## 2019-07-16 ENCOUNTER — OFFICE VISIT (OUTPATIENT)
Dept: SURGERY | Age: 59
End: 2019-07-16

## 2019-07-16 VITALS
HEART RATE: 105 BPM | BODY MASS INDEX: 34.25 KG/M2 | WEIGHT: 226 LBS | DIASTOLIC BLOOD PRESSURE: 73 MMHG | HEIGHT: 68 IN | SYSTOLIC BLOOD PRESSURE: 128 MMHG | OXYGEN SATURATION: 96 %

## 2019-07-16 DIAGNOSIS — Z17.0 MALIGNANT NEOPLASM OF UPPER-INNER QUADRANT OF LEFT BREAST IN FEMALE, ESTROGEN RECEPTOR POSITIVE (HCC): Primary | ICD-10-CM

## 2019-07-16 DIAGNOSIS — C50.212 MALIGNANT NEOPLASM OF UPPER-INNER QUADRANT OF LEFT BREAST IN FEMALE, ESTROGEN RECEPTOR POSITIVE (HCC): Primary | ICD-10-CM

## 2019-07-16 PROCEDURE — 99024 POSTOP FOLLOW-UP VISIT: CPT | Performed by: SURGERY

## 2019-07-16 NOTE — PATIENT INSTRUCTIONS
Ms. Nury Giles needs to be on a no-fat diet. A special chocolate protein powder will arrive via [employer], at [facility], with Ms. Nury Giles' name on it. Please mix only with water, not milk (this has fat). Also, frozen bananas would help add texture and taste to the shake. No dairy, so no cheese, no milk, oil, butter, meat. Please help her eat a diet with lots of beans, vegetables, tomatoes. Non dairy, vegetarian soups would be acceptable. Regarding her wound, she will need daily wet to dry dressings. Use sterile gauze, sterile normal saline (small amount), and a large tegaderm with the goal of completely sealing the wound. This is important, so that the drains can hold suction. Please continue to measure daily drain output and record.    She will see Dr. Stevie Saini next Wednesday, July 24th, at 2pm.   I (Dr. Esther Foreman) will see her the following week on Wednesday, July 31st, at 1:30pm.     Thank you,     Georgette Bojorquez MD  [phone]

## 2019-07-16 NOTE — TELEPHONE ENCOUNTER
Joanie Glover from [facility] called [phone]. Protein shake- How times a day should patient be given? Please call [phone]  Thank you.

## 2019-07-17 ENCOUNTER — TELEPHONE (OUTPATIENT)
Dept: SURGERY | Age: 59
End: 2019-07-17

## 2019-07-17 NOTE — TELEPHONE ENCOUNTER
She would like to know how long patient will be on the shakes and the name of them? Please advise. Thank you.

## 2019-07-23 ENCOUNTER — TELEPHONE (OUTPATIENT)
Dept: SURGERY | Age: 59
End: 2019-07-23

## 2019-07-24 ENCOUNTER — OFFICE VISIT (OUTPATIENT)
Dept: SURGERY | Age: 59
End: 2019-07-24
Payer: COMMERCIAL

## 2019-07-24 VITALS
DIASTOLIC BLOOD PRESSURE: 86 MMHG | WEIGHT: 223 LBS | TEMPERATURE: 97.5 F | HEART RATE: 96 BPM | OXYGEN SATURATION: 99 % | BODY MASS INDEX: 33.91 KG/M2 | SYSTOLIC BLOOD PRESSURE: 131 MMHG

## 2019-07-24 DIAGNOSIS — C50.912 MALIGNANT NEOPLASM OF LEFT FEMALE BREAST, UNSPECIFIED ESTROGEN RECEPTOR STATUS, UNSPECIFIED SITE OF BREAST (HCC): Primary | ICD-10-CM

## 2019-07-24 DIAGNOSIS — Z09 POSTOP CHECK: ICD-10-CM

## 2019-07-24 PROCEDURE — 99213 OFFICE O/P EST LOW 20 MIN: CPT | Performed by: SURGERY

## 2019-07-24 PROCEDURE — G8417 CALC BMI ABV UP PARAM F/U: HCPCS | Performed by: SURGERY

## 2019-07-24 PROCEDURE — 1111F DSCHRG MED/CURRENT MED MERGE: CPT | Performed by: SURGERY

## 2019-07-24 PROCEDURE — 3017F COLORECTAL CA SCREEN DOC REV: CPT | Performed by: SURGERY

## 2019-07-24 PROCEDURE — G8427 DOCREV CUR MEDS BY ELIG CLIN: HCPCS | Performed by: SURGERY

## 2019-07-24 PROCEDURE — 1036F TOBACCO NON-USER: CPT | Performed by: SURGERY

## 2019-07-31 ENCOUNTER — OFFICE VISIT (OUTPATIENT)
Dept: SURGERY | Age: 59
End: 2019-07-31
Payer: COMMERCIAL

## 2019-07-31 ENCOUNTER — OFFICE VISIT (OUTPATIENT)
Dept: SURGERY | Age: 59
End: 2019-07-31

## 2019-07-31 VITALS
DIASTOLIC BLOOD PRESSURE: 72 MMHG | BODY MASS INDEX: 36.34 KG/M2 | WEIGHT: 239 LBS | HEART RATE: 107 BPM | SYSTOLIC BLOOD PRESSURE: 134 MMHG | OXYGEN SATURATION: 97 %

## 2019-07-31 VITALS
WEIGHT: 223 LBS | TEMPERATURE: 98 F | HEART RATE: 103 BPM | DIASTOLIC BLOOD PRESSURE: 91 MMHG | BODY MASS INDEX: 33.91 KG/M2 | OXYGEN SATURATION: 99 % | SYSTOLIC BLOOD PRESSURE: 147 MMHG

## 2019-07-31 DIAGNOSIS — C50.212 MALIGNANT NEOPLASM OF UPPER-INNER QUADRANT OF LEFT BREAST IN FEMALE, ESTROGEN RECEPTOR POSITIVE (HCC): Primary | ICD-10-CM

## 2019-07-31 DIAGNOSIS — Z17.0 MALIGNANT NEOPLASM OF UPPER-INNER QUADRANT OF LEFT BREAST IN FEMALE, ESTROGEN RECEPTOR POSITIVE (HCC): Primary | ICD-10-CM

## 2019-07-31 DIAGNOSIS — C50.912 MALIGNANT NEOPLASM OF LEFT FEMALE BREAST, UNSPECIFIED ESTROGEN RECEPTOR STATUS, UNSPECIFIED SITE OF BREAST (HCC): Primary | ICD-10-CM

## 2019-07-31 PROCEDURE — G8417 CALC BMI ABV UP PARAM F/U: HCPCS | Performed by: SURGERY

## 2019-07-31 PROCEDURE — 99213 OFFICE O/P EST LOW 20 MIN: CPT | Performed by: SURGERY

## 2019-07-31 PROCEDURE — G8428 CUR MEDS NOT DOCUMENT: HCPCS | Performed by: SURGERY

## 2019-07-31 PROCEDURE — 3017F COLORECTAL CA SCREEN DOC REV: CPT | Performed by: SURGERY

## 2019-07-31 PROCEDURE — 1036F TOBACCO NON-USER: CPT | Performed by: SURGERY

## 2019-07-31 PROCEDURE — 1111F DSCHRG MED/CURRENT MED MERGE: CPT | Performed by: SURGERY

## 2019-07-31 PROCEDURE — 99024 POSTOP FOLLOW-UP VISIT: CPT | Performed by: SURGERY

## 2019-07-31 NOTE — PROGRESS NOTES
healed  NEURO: No focal deficits, no obvious CN deficits  BACK: Bilateral latiss muscle intact  BREAST: Medial/lateral incision well healed              T intersection with granulation tissue without purulence                  Drain more serous than previous     IMP: 58 y.o. female with breast cancer who presents after mastectomy with debridement for mastectomy skin flap necrosis  PLAN: Will plan for STSG with possible ATT at [facility]. Will admit for 23 hour observation. Continue with vac in the interim changed qMWF.       R/B/A/O/P discussed in detail with patient who agrees to proceed.     Brisa Rabago MD  [address] Reconstructive Surgery  [phone]  07/31/19

## 2019-07-31 NOTE — Clinical Note
Drain is decreasing and the wound has excellent granulation tissue. I'm scheduling her for skin graft next week to get her to radiation. Once that fails, she will likely need a latiss (hopefully not). Thanks! Nicole De Los Santos

## 2019-08-01 ENCOUNTER — TELEPHONE (OUTPATIENT)
Dept: SURGERY | Age: 59
End: 2019-08-01

## 2019-08-05 ENCOUNTER — TELEPHONE (OUTPATIENT)
Dept: SURGERY | Age: 59
End: 2019-08-05

## 2019-08-06 NOTE — PROGRESS NOTES
#2 if you have not been preregistered yet. On the day of your procedure bring your insurance card and photo ID. You will be registered at your bedside once brought back to your room. 5. DO NOT EAT ANYTHING eight hours prior to surgery. May have 8 ounces of water 4 hours prior to surgery. 6. MEDICATIONS    Take the following medications with a SMALL sip of water: amlodipine   Use your usual dose of inhalers the morning of surgery. BRING your rescue inhaler with you to hospital.    Anesthesia does NOT want you to take insulin the morning of surgery. They will control your blood sugar while you are at the hospital. Please contact your ordering physician for instructions regarding your insulin the night before your procedure. If you have an insulin pump, please keep it set on basal rate. 7. Do not swallow water when brushing teeth. No gum, candy, mints or ice chips. Refrain from smoking or at least decrease the amount. 8. Dress in loose, comfortable clothing appropriate for redressing after your procedure. Do not wear jewelry (including body piercings), make-up (especially NO eye make-up), fingernail polish (NO toenail polish if foot/leg surgery), lotion, powders or metal hairclips. 9. Dentures, glasses, or contacts will need to be removed before your procedure. Bring cases for your glasses, contacts, dentures, or hearing aids to protect them while you are in surgery. 10. If you use a CPAP, please bring it with you on the day of your procedure. 11. We recommend that valuable personal  belongings such as cash, cell phones, e-tablets or jewelry, be left at home during your stay. The hospital will not be responsible for valuables that are not secured in the hospital safe. However, if your insurance requires a co-pay, you may want to bring a method of payment, i.e. Check or credit card, if you wish to pay your co-pay the day of surgery.       12. If you are to stay overnight, you may bring a bag with personal items. Please have any large items you may need brought in by your family after your arrival to your hospital room. 15. If you have a Living Will or Durable Power of , please bring a copy on the day of your procedure. 15. With your permission, one family member may accompany you while you are being prepared for surgery. Once you are ready, additional family members may join you. HOW WE KEEP YOU SAFE and WORK TO PREVENT SURGICAL SITE INFECTIONS:  1. Health care workers should always check your ID bracelet to verify your name and birth date. You will be asked many times to state your name, date of birth, and allergies. 2. Health care workers should always clean their hands with soap or alcohol gel before providing care to you. It is okay to ask anyone if they cleaned their hands before they touch you. 3. You will be actively involved in verifying the type of procedure you are having and ensuring the correct surgical site. This will be confirmed multiple times prior to your procedure. Do NOT michelle your surgery site UNLESS instructed to by your surgeon. 4. Do not shave or wax for 72 hours prior to procedure near your operative site. Shaving with a razor can irritate your skin and make it easier to develop an infection. On the day of your procedure, any hair that needs to be removed near the surgical site will be clipped by a healthcare worker using a special clippers designed to avoid skin irritation. 5. When you are in the operating room, your surgical site will be cleansed with a special soap, and in most cases, you will be given an antibiotic before the surgery begins. What to expect AFTER YOUR PROCEDURE:  1. Immediately following your procedure, your will be taken to the PACU for the first phase of your recovery.   Your nurse will help you recover from any potential side effects of anesthesia, such as extreme drowsiness, changes in your vital signs or breathing

## 2019-08-07 ENCOUNTER — ANESTHESIA EVENT (OUTPATIENT)
Dept: OPERATING ROOM | Age: 59
End: 2019-08-07
Payer: COMMERCIAL

## 2019-08-08 ENCOUNTER — ANESTHESIA (OUTPATIENT)
Dept: OPERATING ROOM | Age: 59
End: 2019-08-08
Payer: COMMERCIAL

## 2019-08-08 ENCOUNTER — HOSPITAL ENCOUNTER (OUTPATIENT)
Age: 59
Setting detail: OBSERVATION
Discharge: NURSING, REHAB OR LONG-TERM CARE | End: 2019-08-09
Attending: SURGERY | Admitting: SURGERY
Payer: COMMERCIAL

## 2019-08-08 VITALS — DIASTOLIC BLOOD PRESSURE: 85 MMHG | SYSTOLIC BLOOD PRESSURE: 125 MMHG | OXYGEN SATURATION: 96 %

## 2019-08-08 PROBLEM — S21.002S BREAST WOUND, LEFT, SEQUELA: Status: ACTIVE | Noted: 2019-08-08

## 2019-08-08 PROCEDURE — 2580000003 HC RX 258: Performed by: ANESTHESIOLOGY

## 2019-08-08 PROCEDURE — 94761 N-INVAS EAR/PLS OXIMETRY MLT: CPT

## 2019-08-08 PROCEDURE — 6360000002 HC RX W HCPCS: Performed by: STUDENT IN AN ORGANIZED HEALTH CARE EDUCATION/TRAINING PROGRAM

## 2019-08-08 PROCEDURE — 2580000003 HC RX 258: Performed by: SURGERY

## 2019-08-08 PROCEDURE — 6360000002 HC RX W HCPCS: Performed by: ANESTHESIOLOGY

## 2019-08-08 PROCEDURE — 3700000001 HC ADD 15 MINUTES (ANESTHESIA): Performed by: SURGERY

## 2019-08-08 PROCEDURE — 13101 CMPLX RPR TRUNK 2.6-7.5 CM: CPT | Performed by: SURGERY

## 2019-08-08 PROCEDURE — 3700000000 HC ANESTHESIA ATTENDED CARE: Performed by: SURGERY

## 2019-08-08 PROCEDURE — 96372 THER/PROPH/DIAG INJ SC/IM: CPT

## 2019-08-08 PROCEDURE — 94799 UNLISTED PULMONARY SVC/PX: CPT

## 2019-08-08 PROCEDURE — 2700000000 HC OXYGEN THERAPY PER DAY

## 2019-08-08 PROCEDURE — 6370000000 HC RX 637 (ALT 250 FOR IP): Performed by: SURGERY

## 2019-08-08 PROCEDURE — 15002 WOUND PREP TRK/ARM/LEG: CPT | Performed by: SURGERY

## 2019-08-08 PROCEDURE — G0378 HOSPITAL OBSERVATION PER HR: HCPCS

## 2019-08-08 PROCEDURE — 2500000003 HC RX 250 WO HCPCS: Performed by: SURGERY

## 2019-08-08 PROCEDURE — 7100000000 HC PACU RECOVERY - FIRST 15 MIN: Performed by: SURGERY

## 2019-08-08 PROCEDURE — 6360000002 HC RX W HCPCS: Performed by: SURGERY

## 2019-08-08 PROCEDURE — 7100000001 HC PACU RECOVERY - ADDTL 15 MIN: Performed by: SURGERY

## 2019-08-08 PROCEDURE — 2709999900 HC NON-CHARGEABLE SUPPLY: Performed by: SURGERY

## 2019-08-08 PROCEDURE — 15200 FTH/GFT FR TRNK 20 SQ CM/<: CPT | Performed by: SURGERY

## 2019-08-08 PROCEDURE — 3600000014 HC SURGERY LEVEL 4 ADDTL 15MIN: Performed by: SURGERY

## 2019-08-08 PROCEDURE — 3600000004 HC SURGERY LEVEL 4 BASE: Performed by: SURGERY

## 2019-08-08 RX ORDER — ALBUTEROL SULFATE 2.5 MG/3ML
2.5 SOLUTION RESPIRATORY (INHALATION) EVERY 4 HOURS PRN
Status: DISCONTINUED | OUTPATIENT
Start: 2019-08-08 | End: 2019-08-09 | Stop reason: HOSPADM

## 2019-08-08 RX ORDER — ARIPIPRAZOLE 5 MG/1
10 TABLET ORAL DAILY
Status: DISCONTINUED | OUTPATIENT
Start: 2019-08-08 | End: 2019-08-09 | Stop reason: HOSPADM

## 2019-08-08 RX ORDER — ONDANSETRON 2 MG/ML
4 INJECTION INTRAMUSCULAR; INTRAVENOUS EVERY 6 HOURS PRN
Status: DISCONTINUED | OUTPATIENT
Start: 2019-08-08 | End: 2019-08-09 | Stop reason: HOSPADM

## 2019-08-08 RX ORDER — PROPOFOL 10 MG/ML
INJECTION, EMULSION INTRAVENOUS PRN
Status: DISCONTINUED | OUTPATIENT
Start: 2019-08-08 | End: 2019-08-08 | Stop reason: SDUPTHER

## 2019-08-08 RX ORDER — SODIUM CHLORIDE, SODIUM LACTATE, POTASSIUM CHLORIDE, CALCIUM CHLORIDE 600; 310; 30; 20 MG/100ML; MG/100ML; MG/100ML; MG/100ML
INJECTION, SOLUTION INTRAVENOUS CONTINUOUS
Status: DISCONTINUED | OUTPATIENT
Start: 2019-08-08 | End: 2019-08-08

## 2019-08-08 RX ORDER — LIDOCAINE HYDROCHLORIDE AND EPINEPHRINE 10; 10 MG/ML; UG/ML
INJECTION, SOLUTION INFILTRATION; PERINEURAL PRN
Status: DISCONTINUED | OUTPATIENT
Start: 2019-08-08 | End: 2019-08-08 | Stop reason: ALTCHOICE

## 2019-08-08 RX ORDER — DOCUSATE SODIUM 100 MG/1
100 CAPSULE, LIQUID FILLED ORAL 2 TIMES DAILY
Status: DISCONTINUED | OUTPATIENT
Start: 2019-08-08 | End: 2019-08-09 | Stop reason: HOSPADM

## 2019-08-08 RX ORDER — MIRTAZAPINE 15 MG/1
15 TABLET, FILM COATED ORAL NIGHTLY
COMMUNITY
Start: 2019-08-02 | End: 2021-06-10

## 2019-08-08 RX ORDER — OXYCODONE HYDROCHLORIDE 5 MG/1
5 TABLET ORAL EVERY 4 HOURS PRN
Status: DISCONTINUED | OUTPATIENT
Start: 2019-08-08 | End: 2019-08-09 | Stop reason: HOSPADM

## 2019-08-08 RX ORDER — BUPIVACAINE HYDROCHLORIDE 5 MG/ML
INJECTION, SOLUTION EPIDURAL; INTRACAUDAL PRN
Status: DISCONTINUED | OUTPATIENT
Start: 2019-08-08 | End: 2019-08-08 | Stop reason: ALTCHOICE

## 2019-08-08 RX ORDER — FENTANYL CITRATE 50 UG/ML
50 INJECTION, SOLUTION INTRAMUSCULAR; INTRAVENOUS EVERY 5 MIN PRN
Status: DISCONTINUED | OUTPATIENT
Start: 2019-08-08 | End: 2019-08-08 | Stop reason: HOSPADM

## 2019-08-08 RX ORDER — FENTANYL CITRATE 50 UG/ML
25 INJECTION, SOLUTION INTRAMUSCULAR; INTRAVENOUS EVERY 5 MIN PRN
Status: DISCONTINUED | OUTPATIENT
Start: 2019-08-08 | End: 2019-08-08 | Stop reason: HOSPADM

## 2019-08-08 RX ORDER — PROMETHAZINE HYDROCHLORIDE 25 MG/ML
6.25 INJECTION, SOLUTION INTRAMUSCULAR; INTRAVENOUS
Status: DISCONTINUED | OUTPATIENT
Start: 2019-08-08 | End: 2019-08-08 | Stop reason: HOSPADM

## 2019-08-08 RX ORDER — ACETAMINOPHEN 325 MG/1
650 TABLET ORAL EVERY 4 HOURS PRN
Status: DISCONTINUED | OUTPATIENT
Start: 2019-08-08 | End: 2019-08-09 | Stop reason: HOSPADM

## 2019-08-08 RX ORDER — HYDROXYZINE HYDROCHLORIDE 25 MG/1
50 TABLET, FILM COATED ORAL NIGHTLY
Status: DISCONTINUED | OUTPATIENT
Start: 2019-08-08 | End: 2019-08-09 | Stop reason: HOSPADM

## 2019-08-08 RX ORDER — ONDANSETRON 2 MG/ML
4 INJECTION INTRAMUSCULAR; INTRAVENOUS
Status: DISCONTINUED | OUTPATIENT
Start: 2019-08-08 | End: 2019-08-08 | Stop reason: HOSPADM

## 2019-08-08 RX ORDER — CETIRIZINE HYDROCHLORIDE 10 MG/1
5 TABLET ORAL DAILY
Status: DISCONTINUED | OUTPATIENT
Start: 2019-08-08 | End: 2019-08-09 | Stop reason: HOSPADM

## 2019-08-08 RX ORDER — SODIUM CHLORIDE 0.9 % (FLUSH) 0.9 %
10 SYRINGE (ML) INJECTION PRN
Status: DISCONTINUED | OUTPATIENT
Start: 2019-08-08 | End: 2019-08-09 | Stop reason: HOSPADM

## 2019-08-08 RX ORDER — PROCHLORPERAZINE MALEATE 10 MG
10 TABLET ORAL EVERY 6 HOURS PRN
COMMUNITY
End: 2021-06-10

## 2019-08-08 RX ORDER — LORATADINE 10 MG/1
10 CAPSULE, LIQUID FILLED ORAL EVERY EVENING
COMMUNITY
End: 2021-06-10

## 2019-08-08 RX ORDER — SODIUM CHLORIDE 0.9 % (FLUSH) 0.9 %
10 SYRINGE (ML) INJECTION EVERY 12 HOURS SCHEDULED
Status: DISCONTINUED | OUTPATIENT
Start: 2019-08-08 | End: 2019-08-09 | Stop reason: HOSPADM

## 2019-08-08 RX ORDER — ONDANSETRON 2 MG/ML
INJECTION INTRAMUSCULAR; INTRAVENOUS PRN
Status: DISCONTINUED | OUTPATIENT
Start: 2019-08-08 | End: 2019-08-08 | Stop reason: SDUPTHER

## 2019-08-08 RX ORDER — TRAZODONE HYDROCHLORIDE 100 MG/1
100 TABLET ORAL NIGHTLY
Status: DISCONTINUED | OUTPATIENT
Start: 2019-08-08 | End: 2019-08-09 | Stop reason: HOSPADM

## 2019-08-08 RX ORDER — AMLODIPINE BESYLATE 10 MG/1
10 TABLET ORAL DAILY
Status: DISCONTINUED | OUTPATIENT
Start: 2019-08-09 | End: 2019-08-09 | Stop reason: HOSPADM

## 2019-08-08 RX ORDER — CLINDAMYCIN PHOSPHATE 900 MG/50ML
900 INJECTION INTRAVENOUS ONCE
Status: COMPLETED | OUTPATIENT
Start: 2019-08-08 | End: 2019-08-08

## 2019-08-08 RX ORDER — MIRTAZAPINE 15 MG/1
15 TABLET, FILM COATED ORAL NIGHTLY
Status: DISCONTINUED | OUTPATIENT
Start: 2019-08-08 | End: 2019-08-08

## 2019-08-08 RX ORDER — MORPHINE SULFATE 2 MG/ML
2 INJECTION, SOLUTION INTRAMUSCULAR; INTRAVENOUS
Status: DISCONTINUED | OUTPATIENT
Start: 2019-08-08 | End: 2019-08-09 | Stop reason: HOSPADM

## 2019-08-08 RX ORDER — ACETAMINOPHEN 325 MG/1
325-650 TABLET ORAL EVERY 8 HOURS PRN
Status: ON HOLD | COMMUNITY
End: 2019-08-09 | Stop reason: HOSPADM

## 2019-08-08 RX ORDER — PERPHENAZINE 4 MG/1
4 TABLET, FILM COATED ORAL 2 TIMES DAILY
Status: DISCONTINUED | OUTPATIENT
Start: 2019-08-08 | End: 2019-08-09 | Stop reason: HOSPADM

## 2019-08-08 RX ORDER — MAGNESIUM HYDROXIDE 1200 MG/15ML
LIQUID ORAL CONTINUOUS PRN
Status: COMPLETED | OUTPATIENT
Start: 2019-08-08 | End: 2019-08-08

## 2019-08-08 RX ORDER — MINERAL OIL
OIL (ML) MISCELLANEOUS PRN
Status: DISCONTINUED | OUTPATIENT
Start: 2019-08-08 | End: 2019-08-08 | Stop reason: ALTCHOICE

## 2019-08-08 RX ORDER — FENTANYL CITRATE 50 UG/ML
INJECTION, SOLUTION INTRAMUSCULAR; INTRAVENOUS PRN
Status: DISCONTINUED | OUTPATIENT
Start: 2019-08-08 | End: 2019-08-08 | Stop reason: SDUPTHER

## 2019-08-08 RX ORDER — FAMOTIDINE 20 MG/1
40 TABLET, FILM COATED ORAL DAILY
Status: DISCONTINUED | OUTPATIENT
Start: 2019-08-08 | End: 2019-08-09 | Stop reason: HOSPADM

## 2019-08-08 RX ORDER — HYDROXYZINE HYDROCHLORIDE 10 MG/1
25 TABLET, FILM COATED ORAL
Status: DISCONTINUED | OUTPATIENT
Start: 2019-08-09 | End: 2019-08-09 | Stop reason: HOSPADM

## 2019-08-08 RX ORDER — LANOLIN ALCOHOL/MO/W.PET/CERES
6 CREAM (GRAM) TOPICAL NIGHTLY
Status: ON HOLD | COMMUNITY
End: 2019-08-27 | Stop reason: CLARIF

## 2019-08-08 RX ORDER — CHOLECALCIFEROL (VITAMIN D3) 25 MCG
2 TABLET ORAL NIGHTLY
Status: DISCONTINUED | OUTPATIENT
Start: 2019-08-08 | End: 2019-08-08

## 2019-08-08 RX ORDER — ONDANSETRON 4 MG/1
4 TABLET, FILM COATED ORAL EVERY 8 HOURS PRN
Status: DISCONTINUED | OUTPATIENT
Start: 2019-08-08 | End: 2019-08-09 | Stop reason: HOSPADM

## 2019-08-08 RX ORDER — MIDAZOLAM HYDROCHLORIDE 1 MG/ML
INJECTION INTRAMUSCULAR; INTRAVENOUS PRN
Status: DISCONTINUED | OUTPATIENT
Start: 2019-08-08 | End: 2019-08-08 | Stop reason: SDUPTHER

## 2019-08-08 RX ORDER — LIDOCAINE HYDROCHLORIDE 20 MG/ML
INJECTION, SOLUTION INTRAVENOUS PRN
Status: DISCONTINUED | OUTPATIENT
Start: 2019-08-08 | End: 2019-08-08 | Stop reason: SDUPTHER

## 2019-08-08 RX ORDER — LOPERAMIDE HYDROCHLORIDE 2 MG/1
2 CAPSULE ORAL
Status: ON HOLD | COMMUNITY
End: 2019-08-27 | Stop reason: CLARIF

## 2019-08-08 RX ADMIN — TRAZODONE HYDROCHLORIDE 100 MG: 100 TABLET ORAL at 20:32

## 2019-08-08 RX ADMIN — FENTANYL CITRATE 25 MCG: 50 INJECTION INTRAMUSCULAR; INTRAVENOUS at 16:07

## 2019-08-08 RX ADMIN — FAMOTIDINE 40 MG: 20 TABLET ORAL at 17:33

## 2019-08-08 RX ADMIN — FENTANYL CITRATE 25 MCG: 50 INJECTION INTRAMUSCULAR; INTRAVENOUS at 15:44

## 2019-08-08 RX ADMIN — MIDAZOLAM HYDROCHLORIDE 2 MG: 2 INJECTION, SOLUTION INTRAMUSCULAR; INTRAVENOUS at 13:50

## 2019-08-08 RX ADMIN — HYDROXYZINE HYDROCHLORIDE 50 MG: 25 TABLET, FILM COATED ORAL at 21:33

## 2019-08-08 RX ADMIN — SODIUM CHLORIDE, SODIUM LACTATE, POTASSIUM CHLORIDE, AND CALCIUM CHLORIDE: 600; 310; 30; 20 INJECTION, SOLUTION INTRAVENOUS at 12:34

## 2019-08-08 RX ADMIN — FENTANYL CITRATE 25 MCG: 50 INJECTION INTRAMUSCULAR; INTRAVENOUS at 15:49

## 2019-08-08 RX ADMIN — LIDOCAINE HYDROCHLORIDE 100 MG: 20 INJECTION, SOLUTION INTRAVENOUS at 14:00

## 2019-08-08 RX ADMIN — ONDANSETRON 4 MG: 2 INJECTION INTRAMUSCULAR; INTRAVENOUS at 14:50

## 2019-08-08 RX ADMIN — OXYCODONE HYDROCHLORIDE 5 MG: 5 TABLET ORAL at 21:32

## 2019-08-08 RX ADMIN — FENTANYL CITRATE 50 MCG: 50 INJECTION INTRAMUSCULAR; INTRAVENOUS at 14:00

## 2019-08-08 RX ADMIN — SODIUM CHLORIDE, SODIUM LACTATE, POTASSIUM CHLORIDE, AND CALCIUM CHLORIDE: 600; 310; 30; 20 INJECTION, SOLUTION INTRAVENOUS at 13:54

## 2019-08-08 RX ADMIN — PROPOFOL 50 MG: 10 INJECTION, EMULSION INTRAVENOUS at 14:00

## 2019-08-08 RX ADMIN — ACETAMINOPHEN 650 MG: 325 TABLET ORAL at 20:32

## 2019-08-08 RX ADMIN — ENOXAPARIN SODIUM 40 MG: 40 INJECTION SUBCUTANEOUS at 17:33

## 2019-08-08 RX ADMIN — OXYCODONE HYDROCHLORIDE 5 MG: 5 TABLET ORAL at 17:33

## 2019-08-08 RX ADMIN — Medication 10 ML: at 20:34

## 2019-08-08 RX ADMIN — CLINDAMYCIN PHOSPHATE 900 MG: 18 INJECTION, SOLUTION INTRAVENOUS at 14:05

## 2019-08-08 ASSESSMENT — PULMONARY FUNCTION TESTS
PIF_VALUE: 3
PIF_VALUE: 6
PIF_VALUE: 4
PIF_VALUE: 6
PIF_VALUE: 3
PIF_VALUE: 4
PIF_VALUE: 5
PIF_VALUE: 5
PIF_VALUE: 3
PIF_VALUE: 4
PIF_VALUE: 3
PIF_VALUE: 5
PIF_VALUE: 4
PIF_VALUE: 5
PIF_VALUE: 4
PIF_VALUE: 6
PIF_VALUE: 4
PIF_VALUE: 6
PIF_VALUE: 5
PIF_VALUE: 4
PIF_VALUE: 3
PIF_VALUE: 5
PIF_VALUE: 8
PIF_VALUE: 3
PIF_VALUE: 17
PIF_VALUE: 6
PIF_VALUE: 4
PIF_VALUE: 4
PIF_VALUE: 3
PIF_VALUE: 3
PIF_VALUE: 4
PIF_VALUE: 3
PIF_VALUE: 4
PIF_VALUE: 0
PIF_VALUE: 0
PIF_VALUE: 7
PIF_VALUE: 5
PIF_VALUE: 0
PIF_VALUE: 4
PIF_VALUE: 3
PIF_VALUE: 1
PIF_VALUE: 3
PIF_VALUE: 0
PIF_VALUE: 4
PIF_VALUE: 5
PIF_VALUE: 3
PIF_VALUE: 0
PIF_VALUE: 3
PIF_VALUE: 0
PIF_VALUE: 10
PIF_VALUE: 3
PIF_VALUE: 6
PIF_VALUE: 3
PIF_VALUE: 4
PIF_VALUE: 1
PIF_VALUE: 4
PIF_VALUE: 4
PIF_VALUE: 5
PIF_VALUE: 3
PIF_VALUE: 0
PIF_VALUE: 1
PIF_VALUE: 4
PIF_VALUE: 6
PIF_VALUE: 3
PIF_VALUE: 4
PIF_VALUE: 6

## 2019-08-08 ASSESSMENT — PAIN DESCRIPTION - ONSET
ONSET: GRADUAL
ONSET: GRADUAL
ONSET: ON-GOING

## 2019-08-08 ASSESSMENT — PAIN SCALES - GENERAL
PAINLEVEL_OUTOF10: 7
PAINLEVEL_OUTOF10: 4
PAINLEVEL_OUTOF10: 3
PAINLEVEL_OUTOF10: 5
PAINLEVEL_OUTOF10: 4
PAINLEVEL_OUTOF10: 0
PAINLEVEL_OUTOF10: 3
PAINLEVEL_OUTOF10: 0
PAINLEVEL_OUTOF10: 5
PAINLEVEL_OUTOF10: 7
PAINLEVEL_OUTOF10: 4
PAINLEVEL_OUTOF10: 5

## 2019-08-08 ASSESSMENT — PAIN DESCRIPTION - PAIN TYPE
TYPE: SURGICAL PAIN
TYPE: ACUTE PAIN;SURGICAL PAIN
TYPE: SURGICAL PAIN
TYPE: SURGICAL PAIN

## 2019-08-08 ASSESSMENT — PAIN - FUNCTIONAL ASSESSMENT
PAIN_FUNCTIONAL_ASSESSMENT: ACTIVITIES ARE NOT PREVENTED
PAIN_FUNCTIONAL_ASSESSMENT: 0-10
PAIN_FUNCTIONAL_ASSESSMENT: ACTIVITIES ARE NOT PREVENTED
PAIN_FUNCTIONAL_ASSESSMENT: ACTIVITIES ARE NOT PREVENTED

## 2019-08-08 ASSESSMENT — PAIN DESCRIPTION - LOCATION
LOCATION: CHEST
LOCATION: BREAST
LOCATION: CHEST
LOCATION: BREAST
LOCATION: BREAST

## 2019-08-08 ASSESSMENT — PAIN DESCRIPTION - FREQUENCY
FREQUENCY: CONTINUOUS
FREQUENCY: CONTINUOUS
FREQUENCY: INTERMITTENT
FREQUENCY: INTERMITTENT
FREQUENCY: CONTINUOUS

## 2019-08-08 ASSESSMENT — PAIN DESCRIPTION - ORIENTATION
ORIENTATION: LEFT

## 2019-08-08 ASSESSMENT — PAIN DESCRIPTION - DESCRIPTORS
DESCRIPTORS: ACHING;DISCOMFORT;SORE
DESCRIPTORS: SORE
DESCRIPTORS: SORE
DESCRIPTORS: ACHING
DESCRIPTORS: SORE
DESCRIPTORS: ACHING;DISCOMFORT;SHOOTING;SORE
DESCRIPTORS: SORE
DESCRIPTORS: SORE

## 2019-08-08 ASSESSMENT — PAIN DESCRIPTION - PROGRESSION
CLINICAL_PROGRESSION: GRADUALLY WORSENING

## 2019-08-08 ASSESSMENT — LIFESTYLE VARIABLES: SMOKING_STATUS: 0

## 2019-08-08 NOTE — BRIEF OP NOTE
Brief Postoperative Note  ______________________________________________________________    Patient: Roz Campbell  YOB: 1960  MRN: [id number]  Date of Procedure: 8/8/2019    Pre-Op Diagnosis: BREAST CANCER    Post-Op Diagnosis: Same       Procedure(s):  1) Complex closure (5 cm)  2) Full thickness skin graft (4x3 cm)    Anesthesia: General    Surgeon(s):  Lia Michaels MD      Estimated Blood Loss (mL): less than 50     Complications: None    Specimens:   * No specimens in log *    Implants:  * No implants in log *      Drains:   [REMOVED] Closed/Suction Drain Left Breast Bulb 15 [place] citizen (Removed)       [REMOVED] Closed/Suction Drain Left Breast Bulb 15 [place] citizen (Removed)       Findings: See operative dictation    Lia Michaels MD  Date: 8/8/2019  Time: 2:59 PM

## 2019-08-08 NOTE — PROGRESS NOTES
CEZAR dressing saturated, and unable to hold seal. Additional dressing applied with large tegaderm then  CEZAR refunctioning.

## 2019-08-08 NOTE — ANESTHESIA PRE PROCEDURE
07/02/2019    AST 15 07/02/2019    ALT 14 07/02/2019       POC Tests: No results for input(s): POCGLU, POCNA, POCK, POCCL, POCBUN, POCHEMO, POCHCT in the last 72 hours. Coags: No results found for: PROTIME, INR, APTT    HCG (If Applicable): No results found for: PREGTESTUR, PREGSERUM, HCG, HCGQUANT     ABGs: No results found for: PHART, PO2ART, MLS0RGT, FZT5YPQ, BEART, C3LFJEBZ     Type & Screen (If Applicable):  No results found for: LABABO, [address]    Anesthesia Evaluation  Patient summary reviewed no history of anesthetic complications:   Airway: Mallampati: II  TM distance: >3 FB   Neck ROM: full  Mouth opening: > = 3 FB Dental:    (+) upper dentures      Pulmonary:normal exam    (+) asthma:     (-) not a current smoker                          ROS comment: Quit smoking 1 month ago   Cardiovascular:    (+) hypertension: moderate,         Rhythm: regular  Rate: normal                    Neuro/Psych:   Negative Neuro/Psych ROS              GI/Hepatic/Renal:   (+) 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 Endo/Other:        (-) diabetes mellitus, hypothyroidism               Abdominal:           Vascular:                                        Anesthesia Plan      general     ASA 3       Induction: intravenous. MIPS: Postoperative opioids intended and Prophylactic antiemetics administered. Anesthetic plan and risks discussed with patient. Plan discussed with CRNA.     Attending anesthesiologist reviewed and agrees with Lorraine Dougherty MD   8/8/2019

## 2019-08-08 NOTE — H&P
 Alcohol use: No    Drug use: No    Sexual activity: Not on file   Lifestyle    Physical activity:     Days per week: Not on file     Minutes per session: Not on file    Stress: Not on file   Relationships    Social connections:     Talks on phone: Not on file     Gets together: Not on file     Attends Zoroastrianism service: Not on file     Active member of club or organization: Not on file     Attends meetings of clubs or organizations: Not on file     Relationship status: Not on file    Intimate partner violence:     Fear of current or ex partner: Not on file     Emotionally abused: Not on file     Physically abused: Not on file     Forced sexual activity: Not on file   Other Topics Concern    Not on file   Social History Narrative    Not on file         Medications Prior to Admission:      Prior to Admission medications    Medication Sig Start Date End Date Taking?  Authorizing Provider   loratadine (CLARITIN) 10 MG capsule Take 10 mg by mouth daily    Historical Provider, MD   ARIPiprazole (ABILIFY) 10 MG tablet Take 10 mg by mouth daily    Historical Provider, MD   chlorproMAZINE (THORAZINE) 25 MG tablet Take 75 mg by mouth nightly    Historical Provider, MD   dexamethasone (DECADRON) 4 MG tablet Take 4 mg by mouth 2 times daily (with meals)    Historical Provider, MD   famotidine (PEPCID) 40 MG tablet Take 40 mg by mouth daily    Historical Provider, MD   hydrOXYzine (ATARAX) 25 MG tablet Take 25 mg by mouth daily (before lunch)    Historical Provider, MD   hydrOXYzine (ATARAX) 50 MG tablet Take 50 mg by mouth nightly    Historical Provider, MD   guaiFENesin (MUCINEX) 600 MG extended release tablet Take 1,200 mg by mouth 2 times daily    Historical Provider, MD   traZODone (DESYREL) 50 MG tablet Take 100 mg by mouth nightly     Historical Provider, MD   Melatonin CR (MELADOX) 3 MG TBCR Take 2 tablets by mouth nightly    Historical Provider, MD   ondansetron (ZOFRAN) 4 MG tablet Take 4 mg by mouth every 8

## 2019-08-08 NOTE — PROGRESS NOTES
RESPIRATORY THERAPY ASSESSMENT    Name:  Fox Leach  Medical Record Number:  [id number]  Age: 64 y.o. Gender: female  : 1960  Today's Date:  2019  Room:  [facility]    Assessment     Is the patient being admitted for a COPD or Asthma exacerbation? No   (If yes the patient will be seen every 4 hours for the first 24 hours and then reassessed)    Patient Admission Diagnosis      Allergies  Allergies   Allergen Reactions    Pcn [Penicillins] Anaphylaxis       Minimum Predicted Vital Capacity:    952          Actual Vital Capacity:      1250              Pulmonary History:Asthma  Home Oxygen Therapy: none  Home Respiratory Therapy: Albuterol Q6H PRN   Current Respiratory Therapy:  Albuterol Q6H PRN          Respiratory Severity Index(RSI)   Patients with orders for inhalation medications, oxygen, or any therapeutic treatment modality will be placed on Respiratory Protocol. They will be assessed with the first treatment and at least every 72 hours thereafter. The following severity scale will be used to determine frequency of treatment intervention.     Smoking History: Pulmonary Disease or Smoking History, Greater than 15 pack year = 2    Social History  Social History     Tobacco Use    Smoking status: Former Smoker     Packs/day: 0.75     Years: 35.00     Pack years: 26.25     Types: Cigarettes     Last attempt to quit: 2019     Years since quittin.1    Smokeless tobacco: Never Used    Tobacco comment: trying to quit smoking    Substance Use Topics    Alcohol use: No    Drug use: No       Recent Surgical History: Surgery of Extremities = 1  Past Surgical History  Past Surgical History:   Procedure Laterality Date    APPENDECTOMY      CHOLECYSTECTOMY      MASTECTOMY Left 2019    LEFT MODIFIED RADICAL MASTECTOMY; EXPAREL INTERCOSTAL BLOCK performed by Cely Vargas MD at [address] [place] [address] Left 2019    COMPLEX CLOSURE OF LEFT BREAST, FULL THICKNESS SKIN GRAFT LEFT

## 2019-08-09 VITALS
WEIGHT: 230 LBS | HEART RATE: 95 BPM | SYSTOLIC BLOOD PRESSURE: 139 MMHG | RESPIRATION RATE: 18 BRPM | BODY MASS INDEX: 34.86 KG/M2 | HEIGHT: 68 IN | OXYGEN SATURATION: 95 % | TEMPERATURE: 98 F | DIASTOLIC BLOOD PRESSURE: 84 MMHG

## 2019-08-09 PROCEDURE — 6370000000 HC RX 637 (ALT 250 FOR IP): Performed by: SURGERY

## 2019-08-09 PROCEDURE — 6360000002 HC RX W HCPCS: Performed by: SURGERY

## 2019-08-09 PROCEDURE — G0378 HOSPITAL OBSERVATION PER HR: HCPCS

## 2019-08-09 PROCEDURE — 99024 POSTOP FOLLOW-UP VISIT: CPT | Performed by: SURGERY

## 2019-08-09 PROCEDURE — 96372 THER/PROPH/DIAG INJ SC/IM: CPT

## 2019-08-09 RX ORDER — CLINDAMYCIN HYDROCHLORIDE 300 MG/1
300 CAPSULE ORAL 3 TIMES DAILY
Qty: 30 CAPSULE | Refills: 0 | Status: SHIPPED | OUTPATIENT
Start: 2019-08-09 | End: 2019-08-19

## 2019-08-09 RX ORDER — CLINDAMYCIN HYDROCHLORIDE 300 MG/1
300 CAPSULE ORAL 2 TIMES DAILY
Qty: 20 CAPSULE | Refills: 0 | Status: SHIPPED | OUTPATIENT
Start: 2019-08-09 | End: 2019-08-09 | Stop reason: SDUPTHER

## 2019-08-09 RX ADMIN — OXYCODONE HYDROCHLORIDE 5 MG: 5 TABLET ORAL at 09:12

## 2019-08-09 RX ADMIN — AMLODIPINE BESYLATE 10 MG: 10 TABLET ORAL at 10:01

## 2019-08-09 RX ADMIN — OXYCODONE HYDROCHLORIDE 5 MG: 5 TABLET ORAL at 03:05

## 2019-08-09 RX ADMIN — ENOXAPARIN SODIUM 40 MG: 40 INJECTION SUBCUTANEOUS at 10:01

## 2019-08-09 RX ADMIN — HYDROXYZINE HYDROCHLORIDE 25 MG: 25 TABLET, FILM COATED ORAL at 11:29

## 2019-08-09 RX ADMIN — ARIPIPRAZOLE 10 MG: 5 TABLET ORAL at 10:01

## 2019-08-09 RX ADMIN — FAMOTIDINE 40 MG: 20 TABLET ORAL at 10:01

## 2019-08-09 RX ADMIN — PERPHENAZINE 4 MG: 4 TABLET, FILM COATED ORAL at 11:29

## 2019-08-09 RX ADMIN — CETIRIZINE HYDROCHLORIDE 5 MG: 10 TABLET, FILM COATED ORAL at 10:01

## 2019-08-09 ASSESSMENT — PAIN DESCRIPTION - ORIENTATION
ORIENTATION: LEFT
ORIENTATION: LEFT

## 2019-08-09 ASSESSMENT — PAIN DESCRIPTION - PROGRESSION
CLINICAL_PROGRESSION: GRADUALLY WORSENING
CLINICAL_PROGRESSION: GRADUALLY WORSENING

## 2019-08-09 ASSESSMENT — PAIN SCALES - GENERAL
PAINLEVEL_OUTOF10: 0
PAINLEVEL_OUTOF10: 7
PAINLEVEL_OUTOF10: 6
PAINLEVEL_OUTOF10: 0

## 2019-08-09 ASSESSMENT — PAIN DESCRIPTION - DESCRIPTORS
DESCRIPTORS: ACHING;DISCOMFORT
DESCRIPTORS: ACHING;SORE

## 2019-08-09 ASSESSMENT — PAIN DESCRIPTION - ONSET
ONSET: GRADUAL
ONSET: ON-GOING

## 2019-08-09 ASSESSMENT — PAIN DESCRIPTION - PAIN TYPE
TYPE: SURGICAL PAIN
TYPE: SURGICAL PAIN

## 2019-08-09 ASSESSMENT — PAIN - FUNCTIONAL ASSESSMENT: PAIN_FUNCTIONAL_ASSESSMENT: ACTIVITIES ARE NOT PREVENTED

## 2019-08-09 ASSESSMENT — PAIN DESCRIPTION - FREQUENCY
FREQUENCY: INTERMITTENT
FREQUENCY: CONTINUOUS

## 2019-08-09 ASSESSMENT — PAIN DESCRIPTION - LOCATION
LOCATION: BREAST
LOCATION: BREAST

## 2019-08-09 NOTE — PROGRESS NOTES
Discharge order received. Patient informed of discharge order. Discharge instructions reviewed with patient and brother. Copy of discharge instructions given to patient. Signed prescriptions x1 given to patient. Patient and brother verbalized understanding, denies needs or questions at this time. IV removed. All patient belongings packed and sent with patient upon discharge. Patient left in wheelchair to car with brother to go to [facility]. Nurse called report to Catracho Luu.

## 2019-08-09 NOTE — DISCHARGE INSTR - COC
[facility] A41.9    Cancer of left breast, stage 3 ([facility] C50.912    Breast wound, left, sequela S21.002S       Isolation/Infection:   Isolation          No Isolation            Nurse Assessment:  Last Vital Signs: /84   Pulse 95   Temp 98 °F (36.7 °C) (Oral)   Resp 18   Ht 5' 8\" (1.727 m)   Wt 230 lb (104.3 kg)   LMP 03/09/2014   SpO2 95%   BMI 34.97 kg/m²     Last documented pain score (0-10 scale): Pain Level: 6  Last Weight:   Wt Readings from Last 1 Encounters:   08/08/19 230 lb (104.3 kg)     Mental Status:  {IP PT MENTAL STATUS:73649}    IV Access:  { JHOAN IV ACCESS:106517938}    Nursing Mobility/ADLs:  Walking   {P DME BUDU:752709949}  Transfer  {P DME CCFD:491116504}  Bathing  {P DME GSUB:380299784}  Dressing  {P DME ZFQP:424748688}  Toileting  {P DME DJXB:075152096}  Feeding  {P DME QVHY:795948722}  Med Admin  {[facility] DME GVUQ:731737923}  Med Delivery   { JHOAN MED Delivery:912994717}    Wound Care Documentation and Therapy:  Wound 08/08/19 Breast Left (Active)   Number of days: 0        Elimination:  Continence:   · Bowel: {YES / CJ:21734}  · Bladder: {YES / KU:97183}  Urinary Catheter: {Urinary Catheter:057512856}   Colostomy/Ileostomy/Ileal Conduit: {YES / HI:38842}       Date of Last BM: ***    Intake/Output Summary (Last 24 hours) at 8/9/2019 1045  Last data filed at 8/9/2019 0308  Gross per 24 hour   Intake 1590 ml   Output 450 ml   Net 1140 ml     I/O last 3 completed shifts:   In: 8629 [P.O.:580; I.V.:1010]  Out: 450 [Urine:450]    Safety Concerns:     [address] Safety Concerns:262311678}    Impairments/Disabilities:      [address] Impairments/Disabilities:700292738}    Nutrition Therapy:  Current Nutrition Therapy:   [address] Espar[address] JHOAN Diet List:174304779}    Routes of Feeding: {CHP DME Other Feedings:060624650}  Liquids: {Slp liquid thickness:62970}  Daily Fluid Restriction: {CHP DME Yes amt example:476759595}  Last Modified Barium Swallow with Video (Video Swallowing Test): {Done Not Done TNMP:716080911}    Treatments at the Time of Hospital Discharge:   Respiratory Treatments: ***  Oxygen Therapy:  {Therapy; copd oxygen:10899}  Ventilator:    {[facility] Vent RQBY:355131071}    Rehab Therapies: {THERAPEUTIC INTERVENTION:8366202172}  Weight Bearing Status/Restrictions: {[facility] Weight Bearin}  Other Medical Equipment (for information only, NOT a DME order):  {EQUIPMENT:172837839}  Other Treatments: ***    Patient's personal belongings (please select all that are sent with patient):  {P DME Belongings:594548074}    RN SIGNATURE:  {Esignature:047994180}    CASE MANAGEMENT/SOCIAL WORK SECTION    Inpatient Status Date:Observation-1day  Readmission Risk Assessment Score:  Readmission Risk              Risk of Unplanned Readmission:        12           Discharging to Facility/ Agency   Name:   [address] [place] [address], [address] [place] [address]         Phone: [phone]       Fax: [phone]          / signature: Electronically signed by Melany Tripp RN on 19 at 10:46 AM    PHYSICIAN SECTION    Prognosis: {Prognosis:6699420958}    Condition at Discharge: Rissa Esparza Patient Condition:209433872}    Rehab Potential (if transferring to Rehab): {Prognosis:7655644259}    Recommended Labs or Other Treatments After Discharge: ***    Physician Certification: I certify the above information and transfer of Andres Blake  is necessary for the continuing treatment of the diagnosis listed and that she requires {Admit to Appropriate Level of Care:58672} for {GREATER/LESS:674098337} 30 days.      Update Admission H&P: {CHP DME Changes in LYPSJ:520030115}    PHYSICIAN SIGNATURE:  {Esignature:354817251}

## 2019-08-09 NOTE — ANESTHESIA POSTPROCEDURE EVALUATION
Department of Anesthesiology  Postprocedure Note    Patient: Brock Landry  MRN: [id number]  [place]: 1960  Date of evaluation: 8/8/2019  Time:  8:23 PM     Procedure Summary     Date:  08/08/19 Room / Location:  [facility] OR 02 / [facility] OR    Anesthesia Start:  6477 Anesthesia Stop:  1514    Procedure:  COMPLEX CLOSURE OF LEFT BREAST, FULL THICKNESS SKIN GRAFT LEFT BREAST 4x3 (Left ) Diagnosis:  (BREAST CANCER)    Surgeon:  Bebe Gosselin, MD Responsible Provider:  Sherine Mustafa MD    Anesthesia Type:  general ASA Status:  3          Anesthesia Type: general    Carole Phase I: Carole Score: 10    Carole Phase II:      Last vitals: Reviewed and per EMR flowsheets.        Anesthesia Post Evaluation    Patient location during evaluation: PACU  Patient participation: complete - patient participated  Level of consciousness: awake and alert  Airway patency: patent  Nausea & Vomiting: no nausea and no vomiting  Cardiovascular status: blood pressure returned to baseline  Respiratory status: acceptable  Hydration status: euvolemic

## 2019-08-10 NOTE — PROGRESS NOTES
[facility] PLASTICS    (LATE ENTRY)    Patient seen and examined. No issues overnight  Dressings intact    Plan for DC home today.     Zhou Lopes MD  [address] [place] [address] 399 Reconstructive Surgery  [phone]  08/10/19

## 2019-08-10 NOTE — DISCHARGE SUMMARY
traZODone (DESYREL) 50 MG tablet Take 25 mg by mouth nightly Historical Med      perphenazine 4 MG tablet Take 4 mg by mouth 2 times dailyHistorical Med      amLODIPine (NORVASC) 10 MG tablet Take 10 mg by mouth dailyHistorical Med      mirtazapine (REMERON) 15 MG tablet Historical Med      prochlorperazine (COMPAZINE) 10 MG tablet Take 10 mg by mouth every 6 hours as neededHistorical Med      ondansetron (ZOFRAN) 4 MG tablet Take 4 mg by mouth every 8 hours as needed for Nausea or VomitingHistorical Med      albuterol (PROVENTIL HFA) 108 (90 BASE) MCG/ACT inhaler Inhale 2 puffs into the lungs every 6 hours as needed for Wheezing., Disp-1 Inhaler, R-3         STOP taking these medications       nicotine (NICODERM CQ) 7 MG/24HR Comments:   Reason for Stopping:         acetaminophen (TYLENOL) 325 MG tablet Comments:   Reason for Stopping:         dexamethasone (DECADRON) 4 MG tablet Comments:   Reason for Stopping:         Melatonin CR (MELADOX) 3 MG TBCR Comments:   Reason for Stopping:         albuterol (PROVENTIL) (5 MG/ML) 0.5% nebulizer solution Comments:   Reason for Stopping: Follow-up with me in 1 week.     Signed:  Olivia Anna  8/10/2019  6:52 AM

## 2019-08-14 ENCOUNTER — TELEPHONE (OUTPATIENT)
Dept: SURGERY | Age: 59
End: 2019-08-14

## 2019-08-14 ENCOUNTER — OFFICE VISIT (OUTPATIENT)
Dept: SURGERY | Age: 59
End: 2019-08-14

## 2019-08-14 VITALS
TEMPERATURE: 97.8 F | OXYGEN SATURATION: 97 % | WEIGHT: 239 LBS | HEART RATE: 112 BPM | BODY MASS INDEX: 36.34 KG/M2 | SYSTOLIC BLOOD PRESSURE: 107 MMHG | DIASTOLIC BLOOD PRESSURE: 68 MMHG

## 2019-08-14 DIAGNOSIS — Z09 POSTOP CHECK: Primary | ICD-10-CM

## 2019-08-14 PROCEDURE — 99024 POSTOP FOLLOW-UP VISIT: CPT | Performed by: SURGERY

## 2019-08-14 NOTE — PROGRESS NOTES
[facility]    PROCEDURE: 1.  Debridement of left breast in preparation for full-thickness skin  graft (8 x 5 cm). 2.  Complex closure of left breast (5 cm). 3.  Full-thickness skin graft to left breast (4 x 3 cm). DATE: 8/8/19    eLah Brown has been recovering satisfactorily since her procedure. Pain has been well controlled with intermittent pain medications     EXAM    /68 (Site: Right Upper Arm, Position: Sitting, Cuff Size: Medium Adult)   Pulse 112   Temp 97.8 °F (36.6 °C) (Oral)   Wt 239 lb (108.4 kg)   LMP 03/09/2014   SpO2 97%   BMI 36.34 kg/m²     GEN: NAD   BREAST: Incision healing well  FTSG with near 100% take  No evidence of infection  ABD: Incision healing well  EXT: LUE with swelling    IMP: 64 y. o.female s/p closure and L breast FTSG  PLAN: Doing well overall. Notes that her left arm swelling has improved over the past several days Continue with local wound care (Bacitracin/Xeroform to L breast daily). Will follow-up in 3 weeks. If healed, will then plan for radiation initiation.     Kenna Sanderson MD  [address] Reconstructive Surgery  [phone]  08/14/19

## 2019-08-21 ENCOUNTER — OFFICE VISIT (OUTPATIENT)
Dept: SURGERY | Age: 59
End: 2019-08-21

## 2019-08-21 VITALS — OXYGEN SATURATION: 96 % | HEART RATE: 100 BPM | SYSTOLIC BLOOD PRESSURE: 128 MMHG | DIASTOLIC BLOOD PRESSURE: 76 MMHG

## 2019-08-21 DIAGNOSIS — C50.212 MALIGNANT NEOPLASM OF UPPER-INNER QUADRANT OF LEFT BREAST IN FEMALE, ESTROGEN RECEPTOR POSITIVE (HCC): Primary | ICD-10-CM

## 2019-08-21 DIAGNOSIS — Z17.0 MALIGNANT NEOPLASM OF UPPER-INNER QUADRANT OF LEFT BREAST IN FEMALE, ESTROGEN RECEPTOR POSITIVE (HCC): Primary | ICD-10-CM

## 2019-08-21 PROCEDURE — 99024 POSTOP FOLLOW-UP VISIT: CPT | Performed by: SURGERY

## 2019-08-21 RX ORDER — LETROZOLE 2.5 MG/1
TABLET, FILM COATED ORAL
Status: ON HOLD | COMMUNITY
Start: 2019-08-13 | End: 2019-08-27 | Stop reason: CLARIF

## 2019-08-21 NOTE — PROGRESS NOTES
Bubba Records is 7 weeks postop after left modified radical mastectomy via wise pattern incisions. She developed superficial skin necrosis at the apex of her wound, at IM fold, and underwent STSG 2 weeks ago for closure, so she can begin radiation therapy. She has seen Dr. 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 She will see radiation therapy asap to start treatment. She was presented today at conference, and all agreed with above plan. /76   Pulse 100   LMP 03/09/2014   SpO2 96%   Well appearing, NAD. Mild flushing. No lymphedema in LUE. Incisions healed well, and skin graft with good take. No left axillary swelling. Plan:  64year old postmenopausal WF with [id number] IIIA IDC left breast, s/p TCHP x 6 with partial clinical response, but minimal if any pathologic response. Kadcyla, radiation therapy. Chest wall coverage with Dr. Brisa Gracia, once treatment is completed.     F/U 6 months for exam.

## 2019-08-27 ENCOUNTER — APPOINTMENT (OUTPATIENT)
Dept: GENERAL RADIOLOGY | Age: 59
DRG: 197 | End: 2019-08-27
Payer: COMMERCIAL

## 2019-08-27 ENCOUNTER — APPOINTMENT (OUTPATIENT)
Dept: CT IMAGING | Age: 59
DRG: 197 | End: 2019-08-27
Payer: COMMERCIAL

## 2019-08-27 ENCOUNTER — APPOINTMENT (OUTPATIENT)
Dept: VASCULAR LAB | Age: 59
DRG: 197 | End: 2019-08-27
Payer: COMMERCIAL

## 2019-08-27 ENCOUNTER — HOSPITAL ENCOUNTER (INPATIENT)
Age: 59
LOS: 4 days | Discharge: NURSING, REHAB OR LONG-TERM CARE | DRG: 197 | End: 2019-08-31
Attending: EMERGENCY MEDICINE | Admitting: INTERNAL MEDICINE
Payer: COMMERCIAL

## 2019-08-27 DIAGNOSIS — I26.99 OTHER ACUTE PULMONARY EMBOLISM WITHOUT ACUTE COR PULMONALE (HCC): Primary | ICD-10-CM

## 2019-08-27 PROBLEM — N17.9 AKI (ACUTE KIDNEY INJURY) (HCC): Status: ACTIVE | Noted: 2019-08-27

## 2019-08-27 LAB
ANION GAP SERPL CALCULATED.3IONS-SCNC: 13 MMOL/L (ref 3–16)
ANTI-XA UNFRAC HEPARIN: 1.55 IU/ML (ref 0.3–0.7)
ANTI-XA UNFRAC HEPARIN: <0.04 IU/ML (ref 0.3–0.7)
APTT: 30.6 SEC (ref 26–36)
BASE EXCESS VENOUS: -5 (ref -3–3)
BASOPHILS ABSOLUTE: 0 K/UL (ref 0–0.2)
BASOPHILS RELATIVE PERCENT: 0.4 %
BUN BLDV-MCNC: 28 MG/DL (ref 7–20)
CALCIUM SERPL-MCNC: 9.2 MG/DL (ref 8.3–10.6)
CHLORIDE BLD-SCNC: 101 MMOL/L (ref 99–110)
CO2: 23 MMOL/L (ref 21–32)
CREAT SERPL-MCNC: 1.4 MG/DL (ref 0.6–1.1)
EKG ATRIAL RATE: 123 BPM
EKG DIAGNOSIS: NORMAL
EKG P AXIS: 59 DEGREES
EKG P-R INTERVAL: 134 MS
EKG Q-T INTERVAL: 332 MS
EKG QRS DURATION: 92 MS
EKG QTC CALCULATION (BAZETT): 475 MS
EKG R AXIS: 24 DEGREES
EKG T AXIS: 69 DEGREES
EKG VENTRICULAR RATE: 123 BPM
EOSINOPHILS ABSOLUTE: 0 K/UL (ref 0–0.6)
EOSINOPHILS RELATIVE PERCENT: 0.1 %
GFR AFRICAN AMERICAN: 47
GFR NON-AFRICAN AMERICAN: 39
GLUCOSE BLD-MCNC: 114 MG/DL (ref 70–99)
GLUCOSE BLD-MCNC: 137 MG/DL (ref 70–99)
GLUCOSE BLD-MCNC: 227 MG/DL (ref 70–99)
HCO3 VENOUS: 20.5 MMOL/L (ref 23–29)
HCT VFR BLD CALC: 36.7 % (ref 36–48)
HEMOGLOBIN: 12.1 G/DL (ref 12–16)
INR BLD: 1.13 (ref 0.86–1.14)
LACTIC ACID: 2.3 MMOL/L (ref 0.4–2)
LV EF: 58 %
LVEF MODALITY: NORMAL
LYMPHOCYTES ABSOLUTE: 1.5 K/UL (ref 1–5.1)
LYMPHOCYTES RELATIVE PERCENT: 15.6 %
MCH RBC QN AUTO: 32.8 PG (ref 26–34)
MCHC RBC AUTO-ENTMCNC: 33 G/DL (ref 31–36)
MCV RBC AUTO: 99.5 FL (ref 80–100)
MONOCYTES ABSOLUTE: 0.9 K/UL (ref 0–1.3)
MONOCYTES RELATIVE PERCENT: 10 %
NEUTROPHILS ABSOLUTE: 6.9 K/UL (ref 1.7–7.7)
NEUTROPHILS RELATIVE PERCENT: 73.9 %
O2 SAT, VEN: 82 %
PCO2, VEN: 34.8 MM HG (ref 40–50)
PDW BLD-RTO: 14.2 % (ref 12.4–15.4)
PERFORMED ON: ABNORMAL
PH VENOUS: 7.38 (ref 7.35–7.45)
PLATELET # BLD: 98 K/UL (ref 135–450)
PLATELET SLIDE REVIEW: ABNORMAL
PMV BLD AUTO: 7.6 FL (ref 5–10.5)
PO2, VEN: 47 MM HG
POC SAMPLE TYPE: ABNORMAL
POLYCHROMASIA: ABNORMAL
POTASSIUM SERPL-SCNC: 3.7 MMOL/L (ref 3.5–5.1)
PRO-BNP: 21 PG/ML (ref 0–124)
PROCALCITONIN: 0.24 NG/ML (ref 0–0.15)
PROTHROMBIN TIME: 12.9 SEC (ref 9.8–13)
RBC # BLD: 3.69 M/UL (ref 4–5.2)
SODIUM BLD-SCNC: 137 MMOL/L (ref 136–145)
TCO2 CALC VENOUS: 22 MMOL/L
TROPONIN: <0.01 NG/ML
TROPONIN: <0.01 NG/ML
WBC # BLD: 9.4 K/UL (ref 4–11)

## 2019-08-27 PROCEDURE — 82803 BLOOD GASES ANY COMBINATION: CPT

## 2019-08-27 PROCEDURE — 83880 ASSAY OF NATRIURETIC PEPTIDE: CPT

## 2019-08-27 PROCEDURE — 99291 CRITICAL CARE FIRST HOUR: CPT

## 2019-08-27 PROCEDURE — 96368 THER/DIAG CONCURRENT INF: CPT

## 2019-08-27 PROCEDURE — 96365 THER/PROPH/DIAG IV INF INIT: CPT

## 2019-08-27 PROCEDURE — 6360000002 HC RX W HCPCS: Performed by: EMERGENCY MEDICINE

## 2019-08-27 PROCEDURE — 93005 ELECTROCARDIOGRAM TRACING: CPT | Performed by: INTERNAL MEDICINE

## 2019-08-27 PROCEDURE — 6360000002 HC RX W HCPCS: Performed by: STUDENT IN AN ORGANIZED HEALTH CARE EDUCATION/TRAINING PROGRAM

## 2019-08-27 PROCEDURE — 2060000000 HC ICU INTERMEDIATE R&B

## 2019-08-27 PROCEDURE — 2580000003 HC RX 258: Performed by: STUDENT IN AN ORGANIZED HEALTH CARE EDUCATION/TRAINING PROGRAM

## 2019-08-27 PROCEDURE — 94761 N-INVAS EAR/PLS OXIMETRY MLT: CPT

## 2019-08-27 PROCEDURE — 85025 COMPLETE CBC W/AUTO DIFF WBC: CPT

## 2019-08-27 PROCEDURE — 6370000000 HC RX 637 (ALT 250 FOR IP): Performed by: STUDENT IN AN ORGANIZED HEALTH CARE EDUCATION/TRAINING PROGRAM

## 2019-08-27 PROCEDURE — 36415 COLL VENOUS BLD VENIPUNCTURE: CPT

## 2019-08-27 PROCEDURE — 85610 PROTHROMBIN TIME: CPT

## 2019-08-27 PROCEDURE — 1200000000 HC SEMI PRIVATE

## 2019-08-27 PROCEDURE — 84145 PROCALCITONIN (PCT): CPT

## 2019-08-27 PROCEDURE — 87040 BLOOD CULTURE FOR BACTERIA: CPT

## 2019-08-27 PROCEDURE — 36591 DRAW BLOOD OFF VENOUS DEVICE: CPT

## 2019-08-27 PROCEDURE — 71260 CT THORAX DX C+: CPT

## 2019-08-27 PROCEDURE — 93320 DOPPLER ECHO COMPLETE: CPT

## 2019-08-27 PROCEDURE — 6360000004 HC RX CONTRAST MEDICATION: Performed by: EMERGENCY MEDICINE

## 2019-08-27 PROCEDURE — 93970 EXTREMITY STUDY: CPT

## 2019-08-27 PROCEDURE — 71046 X-RAY EXAM CHEST 2 VIEWS: CPT

## 2019-08-27 PROCEDURE — 85520 HEPARIN ASSAY: CPT

## 2019-08-27 PROCEDURE — 83605 ASSAY OF LACTIC ACID: CPT

## 2019-08-27 PROCEDURE — 80048 BASIC METABOLIC PNL TOTAL CA: CPT

## 2019-08-27 PROCEDURE — 6370000000 HC RX 637 (ALT 250 FOR IP): Performed by: EMERGENCY MEDICINE

## 2019-08-27 PROCEDURE — 93325 DOPPLER ECHO COLOR FLOW MAPG: CPT

## 2019-08-27 PROCEDURE — 84484 ASSAY OF TROPONIN QUANT: CPT

## 2019-08-27 PROCEDURE — 93308 TTE F-UP OR LMTD: CPT

## 2019-08-27 PROCEDURE — 94640 AIRWAY INHALATION TREATMENT: CPT

## 2019-08-27 PROCEDURE — 85730 THROMBOPLASTIN TIME PARTIAL: CPT

## 2019-08-27 RX ORDER — HYDROXYZINE HYDROCHLORIDE 25 MG/1
25 TABLET, FILM COATED ORAL
Status: ON HOLD | COMMUNITY
End: 2019-08-27 | Stop reason: CLARIF

## 2019-08-27 RX ORDER — TRAZODONE HYDROCHLORIDE 100 MG/1
100 TABLET ORAL NIGHTLY
Status: ON HOLD | COMMUNITY
End: 2021-11-30

## 2019-08-27 RX ORDER — IPRATROPIUM BROMIDE AND ALBUTEROL SULFATE 2.5; .5 MG/3ML; MG/3ML
1 SOLUTION RESPIRATORY (INHALATION) EVERY 4 HOURS PRN
Status: DISCONTINUED | OUTPATIENT
Start: 2019-08-27 | End: 2019-08-27 | Stop reason: SDUPTHER

## 2019-08-27 RX ORDER — IPRATROPIUM BROMIDE AND ALBUTEROL SULFATE 2.5; .5 MG/3ML; MG/3ML
1 SOLUTION RESPIRATORY (INHALATION) EVERY 4 HOURS PRN
Status: DISCONTINUED | OUTPATIENT
Start: 2019-08-27 | End: 2019-08-31 | Stop reason: HOSPADM

## 2019-08-27 RX ORDER — DIAPER,BRIEF,INFANT-TODD,DISP
EACH MISCELLANEOUS DAILY
Status: ON HOLD | COMMUNITY
End: 2019-08-27 | Stop reason: CLARIF

## 2019-08-27 RX ORDER — LIDOCAINE 4 G/G
1 PATCH TOPICAL DAILY
Status: DISCONTINUED | OUTPATIENT
Start: 2019-08-27 | End: 2019-08-31 | Stop reason: HOSPADM

## 2019-08-27 RX ORDER — NICOTINE POLACRILEX 4 MG
15 LOZENGE BUCCAL PRN
Status: DISCONTINUED | OUTPATIENT
Start: 2019-08-27 | End: 2019-08-31 | Stop reason: HOSPADM

## 2019-08-27 RX ORDER — ACETAMINOPHEN 325 MG/1
650 TABLET ORAL EVERY 6 HOURS PRN
Status: ON HOLD | COMMUNITY
End: 2019-08-27 | Stop reason: CLARIF

## 2019-08-27 RX ORDER — SODIUM CHLORIDE 0.9 % (FLUSH) 0.9 %
10 SYRINGE (ML) INJECTION EVERY 12 HOURS SCHEDULED
Status: DISCONTINUED | OUTPATIENT
Start: 2019-08-27 | End: 2019-08-31 | Stop reason: HOSPADM

## 2019-08-27 RX ORDER — PHENOL 1.4 %
20 AEROSOL, SPRAY (ML) MUCOUS MEMBRANE NIGHTLY
Status: ON HOLD | COMMUNITY
End: 2021-11-30

## 2019-08-27 RX ORDER — LETROZOLE 2.5 MG/1
2.5 TABLET, FILM COATED ORAL DAILY
COMMUNITY

## 2019-08-27 RX ORDER — HEPARIN SODIUM 5000 [USP'U]/ML
80 INJECTION, SOLUTION INTRAVENOUS; SUBCUTANEOUS ONCE
Status: COMPLETED | OUTPATIENT
Start: 2019-08-27 | End: 2019-08-27

## 2019-08-27 RX ORDER — HEPARIN SODIUM 10000 [USP'U]/100ML
12 INJECTION, SOLUTION INTRAVENOUS CONTINUOUS
Status: DISCONTINUED | OUTPATIENT
Start: 2019-08-27 | End: 2019-08-29

## 2019-08-27 RX ORDER — LEVOFLOXACIN 5 MG/ML
750 INJECTION, SOLUTION INTRAVENOUS EVERY 24 HOURS
Status: DISCONTINUED | OUTPATIENT
Start: 2019-08-28 | End: 2019-08-27

## 2019-08-27 RX ORDER — PERPHENAZINE 4 MG/1
4 TABLET, FILM COATED ORAL 2 TIMES DAILY
Status: DISCONTINUED | OUTPATIENT
Start: 2019-08-27 | End: 2019-08-31 | Stop reason: HOSPADM

## 2019-08-27 RX ORDER — PROCHLORPERAZINE MALEATE 10 MG
10 TABLET ORAL EVERY 6 HOURS PRN
Status: DISCONTINUED | OUTPATIENT
Start: 2019-08-27 | End: 2019-08-31 | Stop reason: HOSPADM

## 2019-08-27 RX ORDER — UREA 10 %
10 LOTION (ML) TOPICAL NIGHTLY PRN
Status: DISCONTINUED | OUTPATIENT
Start: 2019-08-27 | End: 2019-08-31 | Stop reason: HOSPADM

## 2019-08-27 RX ORDER — HYDROXYZINE HYDROCHLORIDE 25 MG/1
50 TABLET, FILM COATED ORAL NIGHTLY
Status: DISCONTINUED | OUTPATIENT
Start: 2019-08-27 | End: 2019-08-31 | Stop reason: HOSPADM

## 2019-08-27 RX ORDER — MORPHINE SULFATE 2 MG/ML
2 INJECTION, SOLUTION INTRAMUSCULAR; INTRAVENOUS
Status: DISCONTINUED | OUTPATIENT
Start: 2019-08-27 | End: 2019-08-28

## 2019-08-27 RX ORDER — CLONIDINE HYDROCHLORIDE 0.1 MG/1
0.1 TABLET ORAL PRN
Status: ON HOLD | COMMUNITY
End: 2020-09-26 | Stop reason: HOSPADM

## 2019-08-27 RX ORDER — SODIUM CHLORIDE 0.9 % (FLUSH) 0.9 %
10 SYRINGE (ML) INJECTION PRN
Status: DISCONTINUED | OUTPATIENT
Start: 2019-08-27 | End: 2019-08-31 | Stop reason: HOSPADM

## 2019-08-27 RX ORDER — ONDANSETRON 2 MG/ML
4 INJECTION INTRAMUSCULAR; INTRAVENOUS EVERY 6 HOURS PRN
Status: DISCONTINUED | OUTPATIENT
Start: 2019-08-27 | End: 2019-08-31 | Stop reason: HOSPADM

## 2019-08-27 RX ORDER — HEPARIN SODIUM 5000 [USP'U]/ML
80 INJECTION, SOLUTION INTRAVENOUS; SUBCUTANEOUS PRN
Status: DISCONTINUED | OUTPATIENT
Start: 2019-08-27 | End: 2019-08-29

## 2019-08-27 RX ORDER — SODIUM CHLORIDE 9 MG/ML
INJECTION, SOLUTION INTRAVENOUS CONTINUOUS
Status: DISCONTINUED | OUTPATIENT
Start: 2019-08-27 | End: 2019-08-30

## 2019-08-27 RX ORDER — DEXAMETHASONE 4 MG/1
4 TABLET ORAL 2 TIMES DAILY WITH MEALS
Status: ON HOLD | COMMUNITY
End: 2019-08-27 | Stop reason: CLARIF

## 2019-08-27 RX ORDER — MORPHINE SULFATE 2 MG/ML
1 INJECTION, SOLUTION INTRAMUSCULAR; INTRAVENOUS
Status: DISCONTINUED | OUTPATIENT
Start: 2019-08-27 | End: 2019-08-28

## 2019-08-27 RX ORDER — FAMOTIDINE 20 MG/1
40 TABLET, FILM COATED ORAL DAILY
Status: DISCONTINUED | OUTPATIENT
Start: 2019-08-29 | End: 2019-08-31 | Stop reason: HOSPADM

## 2019-08-27 RX ORDER — MORPHINE SULFATE 2 MG/ML
2 INJECTION, SOLUTION INTRAMUSCULAR; INTRAVENOUS ONCE
Status: COMPLETED | OUTPATIENT
Start: 2019-08-27 | End: 2019-08-27

## 2019-08-27 RX ORDER — DEXTROSE MONOHYDRATE 25 G/50ML
12.5 INJECTION, SOLUTION INTRAVENOUS PRN
Status: DISCONTINUED | OUTPATIENT
Start: 2019-08-27 | End: 2019-08-31 | Stop reason: HOSPADM

## 2019-08-27 RX ORDER — ALBUTEROL SULFATE 2.5 MG/3ML
2.5 SOLUTION RESPIRATORY (INHALATION) EVERY 4 HOURS
Status: ON HOLD | COMMUNITY
End: 2019-08-27 | Stop reason: CLARIF

## 2019-08-27 RX ORDER — LEVOFLOXACIN 5 MG/ML
750 INJECTION, SOLUTION INTRAVENOUS ONCE
Status: COMPLETED | OUTPATIENT
Start: 2019-08-27 | End: 2019-08-27

## 2019-08-27 RX ORDER — ARIPIPRAZOLE 5 MG/1
10 TABLET ORAL DAILY
Status: DISCONTINUED | OUTPATIENT
Start: 2019-08-27 | End: 2019-08-31 | Stop reason: HOSPADM

## 2019-08-27 RX ORDER — LETROZOLE 2.5 MG/1
2.5 TABLET, FILM COATED ORAL DAILY
Status: DISCONTINUED | OUTPATIENT
Start: 2019-08-28 | End: 2019-08-31 | Stop reason: HOSPADM

## 2019-08-27 RX ORDER — CHLORPROMAZINE HYDROCHLORIDE 25 MG/1
75 TABLET, FILM COATED ORAL NIGHTLY
Status: DISCONTINUED | OUTPATIENT
Start: 2019-08-27 | End: 2019-08-27

## 2019-08-27 RX ORDER — IPRATROPIUM BROMIDE AND ALBUTEROL SULFATE 2.5; .5 MG/3ML; MG/3ML
1 SOLUTION RESPIRATORY (INHALATION)
Status: DISCONTINUED | OUTPATIENT
Start: 2019-08-27 | End: 2019-08-27

## 2019-08-27 RX ORDER — DEXTROSE MONOHYDRATE 50 MG/ML
100 INJECTION, SOLUTION INTRAVENOUS PRN
Status: DISCONTINUED | OUTPATIENT
Start: 2019-08-27 | End: 2019-08-31 | Stop reason: HOSPADM

## 2019-08-27 RX ORDER — HEPARIN SODIUM 5000 [USP'U]/ML
40 INJECTION, SOLUTION INTRAVENOUS; SUBCUTANEOUS PRN
Status: DISCONTINUED | OUTPATIENT
Start: 2019-08-27 | End: 2019-08-29

## 2019-08-27 RX ORDER — ONDANSETRON HYDROCHLORIDE 8 MG/1
8 TABLET, FILM COATED ORAL EVERY 8 HOURS PRN
COMMUNITY
End: 2021-06-10

## 2019-08-27 RX ORDER — ALBUTEROL SULFATE 90 UG/1
2 AEROSOL, METERED RESPIRATORY (INHALATION) EVERY 6 HOURS PRN
COMMUNITY
End: 2021-06-10

## 2019-08-27 RX ADMIN — LEVOFLOXACIN 750 MG: 5 INJECTION, SOLUTION INTRAVENOUS at 12:42

## 2019-08-27 RX ADMIN — Medication 10 MG: at 22:32

## 2019-08-27 RX ADMIN — IOPAMIDOL 80 ML: 755 INJECTION, SOLUTION INTRAVENOUS at 11:27

## 2019-08-27 RX ADMIN — HEPARIN SODIUM AND DEXTROSE 18 UNITS/KG/HR: 10000; 5 INJECTION INTRAVENOUS at 12:40

## 2019-08-27 RX ADMIN — MORPHINE SULFATE 2 MG: 2 INJECTION, SOLUTION INTRAMUSCULAR; INTRAVENOUS at 18:07

## 2019-08-27 RX ADMIN — SODIUM CHLORIDE: 9 INJECTION, SOLUTION INTRAVENOUS at 17:24

## 2019-08-27 RX ADMIN — ARIPIPRAZOLE 10 MG: 5 TABLET ORAL at 17:07

## 2019-08-27 RX ADMIN — MORPHINE SULFATE 1 MG: 2 INJECTION, SOLUTION INTRAMUSCULAR; INTRAVENOUS at 20:35

## 2019-08-27 RX ADMIN — MORPHINE SULFATE 2 MG: 2 INJECTION, SOLUTION INTRAMUSCULAR; INTRAVENOUS at 23:33

## 2019-08-27 RX ADMIN — PERPHENAZINE 4 MG: 4 TABLET, FILM COATED ORAL at 22:32

## 2019-08-27 RX ADMIN — IPRATROPIUM BROMIDE AND ALBUTEROL SULFATE 1 AMPULE: .5; 3 SOLUTION RESPIRATORY (INHALATION) at 11:52

## 2019-08-27 RX ADMIN — HYDROXYZINE HYDROCHLORIDE 50 MG: 25 TABLET, FILM COATED ORAL at 22:32

## 2019-08-27 RX ADMIN — HEPARIN SODIUM 8350 UNITS: 5000 INJECTION INTRAVENOUS; SUBCUTANEOUS at 12:39

## 2019-08-27 RX ADMIN — Medication 10 ML: at 20:35

## 2019-08-27 ASSESSMENT — PAIN DESCRIPTION - DESCRIPTORS
DESCRIPTORS: STABBING

## 2019-08-27 ASSESSMENT — PAIN DESCRIPTION - LOCATION
LOCATION: RIB CAGE
LOCATION: CHEST
LOCATION: RIB CAGE
LOCATION: RIB CAGE
LOCATION: CHEST;RIB CAGE

## 2019-08-27 ASSESSMENT — PAIN DESCRIPTION - FREQUENCY
FREQUENCY: INTERMITTENT
FREQUENCY: CONTINUOUS

## 2019-08-27 ASSESSMENT — PAIN SCALES - GENERAL
PAINLEVEL_OUTOF10: 5
PAINLEVEL_OUTOF10: 7
PAINLEVEL_OUTOF10: 10
PAINLEVEL_OUTOF10: 8
PAINLEVEL_OUTOF10: 6

## 2019-08-27 ASSESSMENT — PAIN DESCRIPTION - ONSET
ONSET: ON-GOING

## 2019-08-27 ASSESSMENT — PAIN DESCRIPTION - ORIENTATION
ORIENTATION: RIGHT;LOWER
ORIENTATION: LEFT
ORIENTATION: RIGHT;LOWER
ORIENTATION: RIGHT
ORIENTATION: RIGHT

## 2019-08-27 ASSESSMENT — PAIN DESCRIPTION - PAIN TYPE
TYPE: ACUTE PAIN
TYPE: ACUTE PAIN;SURGICAL PAIN
TYPE: ACUTE PAIN

## 2019-08-27 ASSESSMENT — PAIN - FUNCTIONAL ASSESSMENT
PAIN_FUNCTIONAL_ASSESSMENT: PREVENTS OR INTERFERES SOME ACTIVE ACTIVITIES AND ADLS
PAIN_FUNCTIONAL_ASSESSMENT: ACTIVITIES ARE NOT PREVENTED

## 2019-08-27 ASSESSMENT — PAIN DESCRIPTION - PROGRESSION
CLINICAL_PROGRESSION: GRADUALLY WORSENING
CLINICAL_PROGRESSION: NOT CHANGED
CLINICAL_PROGRESSION: GRADUALLY WORSENING
CLINICAL_PROGRESSION: RAPIDLY WORSENING
CLINICAL_PROGRESSION: GRADUALLY WORSENING

## 2019-08-27 NOTE — H&P
hours.  INR:   Recent Labs     08/27/19  1235   INR 1.13       U/A:No results for input(s): NITRITE, COLORU, PHUR, LABCAST, WBCUA, RBCUA, MUCUS, TRICHOMONAS, YEAST, BACTERIA, CLARITYU, SPECGRAV, LEUKOCYTESUR, UROBILINOGEN, BILIRUBINUR, BLOODU, GLUCOSEU, AMORPHOUS in the last 72 hours. Invalid input(s): KETONESU    CT CHEST PULMONARY EMBOLISM W CONTRAST   Final Result      Acute, bilateral pulmonary emboli as above. Right lower lobe consolidation, which could reflect pneumonia. Bilateral hilar adenopathy, reactive versus neoplastic. 4.7 x 4.1 cm mass in the left breast, indeterminate. This could be postoperative. Follow-up is indicated. These findings are communicated to Dr. Donovan Jackman at 12:00 on 8/27/2019. XR CHEST STANDARD (2 VW)   Final Result      1. No discrete findings for acute cardiopulmonary disease. 2.  Right-sided PowerPort with tip in the right subclavian vein. IMPRESSION:      1. No findings for acute cardiopulmonary disease. ASSESSMENT AND PLAN:    Ms. Jenny Cunningham is a stable 63 y/o WF w/ a PMHx of breast cancer s/p mastectomy in January, asthma, kidney abscesses s/p unilateral nephrectomy 40 years ago, and a recent admission for a skin graft who presented with shortness of breath and pleuritic chest pain. Patient was admitted for bilateral submassive pulmonary embolism diagnosed on CTPA, mild CHANTELLE, and lactic acidosis    Acute:  Bilateral submassive pulmonary embolism - pt had recent admission for skin graft following a mastectomy for breast cancer in January. CXR showed no CP disease, but CTPA showed bilateral pulmonary embolism. No signs of R-heart strain on CTPA or EKG, appropriate for admission to floor with heparin drip.   Received one dose IV levaquin for possible infectious etiology of pulmonary disease.  - Trops <0.01, BNP 21  - Starting duonebs for symptomatic care  - Continue heparin gtt  - Ordered echo, will follow up for R-heart
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
## 2019-08-27 NOTE — PROGRESS NOTES
RESPIRATORY THERAPY ASSESSMENT    Name:  John Gr  Medical Record Number:  [id number]  Age: 64 y.o. Gender: female  : 1960  Today's Date:  2019  Room:  [place] [address]    Assessment     Is the patient being admitted for a COPD or Asthma exacerbation? No   (If yes the patient will be seen every 4 hours for the first 24 hours and then reassessed)    Patient Admission Diagnosis      Allergies  Allergies   Allergen Reactions    Pcn [Penicillins] Anaphylaxis             Pulmonary History: smoker  Home Oxygen Therapy:  room air  Home Respiratory Therapy:Albuterol prn  Current Respiratory Therapy:  [facility] D[facility] QID  Treatment Type: [facility]  Medications: Albuterol/Ipratropium    Respiratory Severity Index(RSI)   Patients with orders for inhalation medications, oxygen, or any therapeutic treatment modality will be placed on Respiratory Protocol. They will be assessed with the first treatment and at least every 72 hours thereafter. The following severity scale will be used to determine frequency of treatment intervention.     Smoking History: Pulmonary Disease or Smoking History, Greater than 15 pack year = 2    Social History  Social History     Tobacco Use    Smoking status: Former Smoker     Packs/day: 0.75     Years: 35.00     Pack years: 26.25     Types: Cigarettes     Last attempt to quit: 2019     Years since quittin.1    Smokeless tobacco: Never Used    Tobacco comment: trying to quit smoking    Substance Use Topics    Alcohol use: No    Drug use: No       Recent Surgical History: None = 0  Past Surgical History  Past Surgical History:   Procedure Laterality Date    APPENDECTOMY      CHOLECYSTECTOMY      MASTECTOMY Left 2019    LEFT MODIFIED RADICAL MASTECTOMY; EXPAREL INTERCOSTAL BLOCK performed by Queenie Smith MD at [address] [place] [address] Left 2019    COMPLEX CLOSURE OF LEFT BREAST, FULL THICKNESS SKIN GRAFT LEFT BREAST 4x3 performed by Sonia Ferguson MD at [facility]

## 2019-08-27 NOTE — PROGRESS NOTES
Pt admitted to room 4459 from ED. Vitals stable, SR/ST on tele. Pt c/o pain to right chest with inspiration. Resident paged for pain meds. Heparin gtt infusing @ 18 units/kg/hr, anti xa due @ 1830. Pt has previous mastectomy incision to left breast, dressed with adaptic and gauze, wound care consulted. Also recently had skin graft to left groin, area clean dry and intact with surgical glue. Oriented pt to room and call light. Bed alarm on, call light within reach. Instructed pt to call for assistance. Will continue to monitor.   Kiarra Soto  8/27/2019  2:00 PM

## 2019-08-28 LAB
ANION GAP SERPL CALCULATED.3IONS-SCNC: 13 MMOL/L (ref 3–16)
ANTI-XA UNFRAC HEPARIN: 0.54 IU/ML (ref 0.3–0.7)
ANTI-XA UNFRAC HEPARIN: 0.68 IU/ML (ref 0.3–0.7)
ANTI-XA UNFRAC HEPARIN: 1.01 IU/ML (ref 0.3–0.7)
BASOPHILS ABSOLUTE: 0 K/UL (ref 0–0.2)
BASOPHILS RELATIVE PERCENT: 0.4 %
BUN BLDV-MCNC: 25 MG/DL (ref 7–20)
CALCIUM SERPL-MCNC: 8.9 MG/DL (ref 8.3–10.6)
CHLORIDE BLD-SCNC: 102 MMOL/L (ref 99–110)
CO2: 24 MMOL/L (ref 21–32)
CREAT SERPL-MCNC: 1.4 MG/DL (ref 0.6–1.1)
EOSINOPHILS ABSOLUTE: 0 K/UL (ref 0–0.6)
EOSINOPHILS RELATIVE PERCENT: 0.1 %
GFR AFRICAN AMERICAN: 47
GFR NON-AFRICAN AMERICAN: 39
GLUCOSE BLD-MCNC: 116 MG/DL (ref 70–99)
GLUCOSE BLD-MCNC: 116 MG/DL (ref 70–99)
GLUCOSE BLD-MCNC: 141 MG/DL (ref 70–99)
HCT VFR BLD CALC: 31 % (ref 36–48)
HEMOGLOBIN: 10.3 G/DL (ref 12–16)
LACTIC ACID: 1.1 MMOL/L (ref 0.4–2)
LYMPHOCYTES ABSOLUTE: 2.2 K/UL (ref 1–5.1)
LYMPHOCYTES RELATIVE PERCENT: 24.2 %
MAGNESIUM: 1.8 MG/DL (ref 1.8–2.4)
MCH RBC QN AUTO: 33 PG (ref 26–34)
MCHC RBC AUTO-ENTMCNC: 33.3 G/DL (ref 31–36)
MCV RBC AUTO: 99.1 FL (ref 80–100)
MONOCYTES ABSOLUTE: 1.1 K/UL (ref 0–1.3)
MONOCYTES RELATIVE PERCENT: 12.5 %
NEUTROPHILS ABSOLUTE: 5.6 K/UL (ref 1.7–7.7)
NEUTROPHILS RELATIVE PERCENT: 62.8 %
PDW BLD-RTO: 14.3 % (ref 12.4–15.4)
PERFORMED ON: ABNORMAL
PERFORMED ON: ABNORMAL
PLATELET # BLD: 117 K/UL (ref 135–450)
PMV BLD AUTO: 7.3 FL (ref 5–10.5)
POTASSIUM REFLEX MAGNESIUM: 3.5 MMOL/L (ref 3.5–5.1)
RBC # BLD: 3.13 M/UL (ref 4–5.2)
SODIUM BLD-SCNC: 139 MMOL/L (ref 136–145)
WBC # BLD: 9 K/UL (ref 4–11)

## 2019-08-28 PROCEDURE — 6360000002 HC RX W HCPCS: Performed by: STUDENT IN AN ORGANIZED HEALTH CARE EDUCATION/TRAINING PROGRAM

## 2019-08-28 PROCEDURE — 83605 ASSAY OF LACTIC ACID: CPT

## 2019-08-28 PROCEDURE — 2580000003 HC RX 258: Performed by: STUDENT IN AN ORGANIZED HEALTH CARE EDUCATION/TRAINING PROGRAM

## 2019-08-28 PROCEDURE — 6370000000 HC RX 637 (ALT 250 FOR IP): Performed by: STUDENT IN AN ORGANIZED HEALTH CARE EDUCATION/TRAINING PROGRAM

## 2019-08-28 PROCEDURE — 85520 HEPARIN ASSAY: CPT

## 2019-08-28 PROCEDURE — 85025 COMPLETE CBC W/AUTO DIFF WBC: CPT

## 2019-08-28 PROCEDURE — 83735 ASSAY OF MAGNESIUM: CPT

## 2019-08-28 PROCEDURE — 94761 N-INVAS EAR/PLS OXIMETRY MLT: CPT

## 2019-08-28 PROCEDURE — 80048 BASIC METABOLIC PNL TOTAL CA: CPT

## 2019-08-28 PROCEDURE — 6360000002 HC RX W HCPCS

## 2019-08-28 PROCEDURE — 2060000000 HC ICU INTERMEDIATE R&B

## 2019-08-28 RX ORDER — AMLODIPINE BESYLATE 10 MG/1
10 TABLET ORAL DAILY
Status: DISCONTINUED | OUTPATIENT
Start: 2019-08-28 | End: 2019-08-31 | Stop reason: HOSPADM

## 2019-08-28 RX ORDER — MORPHINE SULFATE 2 MG/ML
2 INJECTION, SOLUTION INTRAMUSCULAR; INTRAVENOUS EVERY 6 HOURS PRN
Status: DISPENSED | OUTPATIENT
Start: 2019-08-28 | End: 2019-08-30

## 2019-08-28 RX ORDER — OXYCODONE HYDROCHLORIDE AND ACETAMINOPHEN 5; 325 MG/1; MG/1
1 TABLET ORAL EVERY 4 HOURS PRN
Status: DISCONTINUED | OUTPATIENT
Start: 2019-08-28 | End: 2019-08-29

## 2019-08-28 RX ADMIN — Medication 10 ML: at 21:56

## 2019-08-28 RX ADMIN — Medication 10 MG: at 22:01

## 2019-08-28 RX ADMIN — MORPHINE SULFATE 2 MG: 2 INJECTION, SOLUTION INTRAMUSCULAR; INTRAVENOUS at 14:54

## 2019-08-28 RX ADMIN — AMLODIPINE BESYLATE 10 MG: 10 TABLET ORAL at 12:15

## 2019-08-28 RX ADMIN — Medication 10 ML: at 10:01

## 2019-08-28 RX ADMIN — MORPHINE SULFATE 2 MG: 2 INJECTION, SOLUTION INTRAMUSCULAR; INTRAVENOUS at 22:02

## 2019-08-28 RX ADMIN — PERPHENAZINE 4 MG: 4 TABLET, FILM COATED ORAL at 09:12

## 2019-08-28 RX ADMIN — SODIUM CHLORIDE: 9 INJECTION, SOLUTION INTRAVENOUS at 03:31

## 2019-08-28 RX ADMIN — PERPHENAZINE 4 MG: 4 TABLET, FILM COATED ORAL at 21:55

## 2019-08-28 RX ADMIN — OXYCODONE HYDROCHLORIDE AND ACETAMINOPHEN 1 TABLET: 5; 325 TABLET ORAL at 19:21

## 2019-08-28 RX ADMIN — ARIPIPRAZOLE 10 MG: 5 TABLET ORAL at 18:22

## 2019-08-28 RX ADMIN — HYDROXYZINE HYDROCHLORIDE 50 MG: 25 TABLET, FILM COATED ORAL at 21:55

## 2019-08-28 RX ADMIN — LETROZOLE 2.5 MG: 2.5 TABLET ORAL at 09:12

## 2019-08-28 RX ADMIN — MORPHINE SULFATE 1 MG: 2 INJECTION, SOLUTION INTRAMUSCULAR; INTRAVENOUS at 09:12

## 2019-08-28 RX ADMIN — MORPHINE SULFATE 2 MG: 2 INJECTION, SOLUTION INTRAMUSCULAR; INTRAVENOUS at 03:29

## 2019-08-28 RX ADMIN — HEPARIN SODIUM AND DEXTROSE 12 UNITS/KG/HR: 10000; 5 INJECTION INTRAVENOUS at 03:57

## 2019-08-28 ASSESSMENT — PAIN DESCRIPTION - ORIENTATION
ORIENTATION: LEFT

## 2019-08-28 ASSESSMENT — ENCOUNTER SYMPTOMS
ALLERGIC/IMMUNOLOGIC NEGATIVE: 1
COUGH: 1
EYES NEGATIVE: 1
GASTROINTESTINAL NEGATIVE: 1
SHORTNESS OF BREATH: 1

## 2019-08-28 ASSESSMENT — PAIN DESCRIPTION - PROGRESSION
CLINICAL_PROGRESSION: GRADUALLY WORSENING
CLINICAL_PROGRESSION: GRADUALLY IMPROVING
CLINICAL_PROGRESSION: GRADUALLY WORSENING

## 2019-08-28 ASSESSMENT — PAIN DESCRIPTION - ONSET
ONSET: ON-GOING

## 2019-08-28 ASSESSMENT — PAIN DESCRIPTION - LOCATION
LOCATION: BREAST

## 2019-08-28 ASSESSMENT — PAIN SCALES - GENERAL
PAINLEVEL_OUTOF10: 3
PAINLEVEL_OUTOF10: 6
PAINLEVEL_OUTOF10: 0
PAINLEVEL_OUTOF10: 7
PAINLEVEL_OUTOF10: 8
PAINLEVEL_OUTOF10: 7
PAINLEVEL_OUTOF10: 8
PAINLEVEL_OUTOF10: 6
PAINLEVEL_OUTOF10: 4
PAINLEVEL_OUTOF10: 0
PAINLEVEL_OUTOF10: 0

## 2019-08-28 ASSESSMENT — PAIN DESCRIPTION - PAIN TYPE
TYPE: SURGICAL PAIN

## 2019-08-28 ASSESSMENT — PAIN DESCRIPTION - FREQUENCY
FREQUENCY: CONTINUOUS

## 2019-08-28 ASSESSMENT — PAIN DESCRIPTION - DESCRIPTORS
DESCRIPTORS: SORE;DISCOMFORT

## 2019-08-28 NOTE — PROGRESS NOTES
weight loss or gain, fever, chills, diaphoresis, appetite change  HENT: No congestion, sinus pain, rhinorrhea, dysphagia, odynophagia, hearing loss; Poor dentition  Eyes: No eye discharge, redness, pain. No visual changes, photophobia  Respiratory: No wheezing, choking; Chest tightness, pleuritic pain, shortness of breath, light cough  Cardio: No chest pain, leg swelling, palpitations  GI: No abdominal distension, abdominal pain, blood in stool, constipation, diarrhea, nausea, vomiting  Endocrine: No heat or cold intolerance, no polyuria  : No difficulty urinating, dysuria, increased urinary frequency or urgency  MSK: No arthralgias, myalgias, back pain, neck pain or stiffness  Skin: No wound, pallor, color change; Dry, flaky skin  Immuno: H/o breast cancer s/p mastectomy  Neuro: No dizziness, lightheadedness, numbness, headache, weakness, seizures, tremors  Heme/Lymph: Does not bruise or bleed easily, no lymphadenopathy  Psych: No agitation, behavioral problems, confusion, anxiety, depression, sleep disturbance, hyperactivity        Physical Exam:  Const: Well nourished, well developed, not distressed, not diaphoretic; Appears older than stated age  Eyes: PERRL, EOMI, no conjunctival injection, no discharge  HENT: Normocephalic, atraumatic, oropharynx not erythematous, no postnasal drip; Missing teeth  Neck: Supple, no JVD, no thyromegaly, trachea midline  CV: Regular rate and rhythm, heart sounds normal, no murmur, no gallop, no rub, capillary refill <2s, 2+ pulses in bilateral distal upper and lower extremities  RESP: Clear to auscultation bilaterally, normal breath sounds, no wheezes, no rhonchi, no stridor; Mildly increased work of breathing, chest wall tenderness in R posterolateral area; Port present in R chest  GI: soft, non-tender, non-distended, no masses, no rebound, bowel sounds normal  MSK/Extremities: No gross deformities appreciated, no edema noted, no tenderness to palpation  Skin: Warm, dry.  No

## 2019-08-28 NOTE — DISCHARGE SUMMARY
INTERNAL MEDICINE    RESIDENT DISCHARGE SUMMARY   Discharge Summaries      Patient ID: Leti Pean   Gender: female      :  1960  AGE: 64 y.o. MRN:  [id number]  Code Status: Prior   [facility]    Admit date:  2019      Discharge date: 2019    Admitting Physician:  Malik Venegas MD    Discharge Physician: Devon Celeste MD     Admission Condition:  fair  Discharged Condition:  good Stable    Discharge Diagnoses:  Bilateral Pulmonary Embolism  Proteinuria  Breast Cancer s/p unilateral mastectomy     Active Hospital Problems    Diagnosis Date Noted    Acute respiratory failure with hypoxia ([address] [J96.01] 2019    Acute deep vein thrombosis (DVT) of lower extremity ([address] [I82.409] 2019    Bilateral pulmonary embolism ([address] [I26.99] 2019    CHANTELLE (acute kidney injury) ([address] [N17.9] 2019       The patient was seen and examined on day of discharge and this discharge summary is in conjunction with any daily progress note from day of discharge. Hospital Course:     HPI:  Ms. Felix Ruelas is a 63 y/o WF w/ a PMHx of breast cancer s/p mastectomy, asthma, kidney abscesses s/p unilateral nephrectomy 40 years ago, and a recent admission for a skin graft who presented with shortness of breath and pleuritic chest pain. Said she was sitting in her house last night when she developed constant, non-radiating, pleuritic chest pain that was 9/10 in severity. Improved to 6/10 following breathing treatments in ED. Said there was no precipitating event or trauma. Denies alleviating factors, says that taking a deep breath worsens the pain. Pt was 8 weeks post-op L modified radical mastectomy and underwent skin graft surgery for superficial skin necrosis on 19. Patient had completed neoadjuvant chemo for 6 cycles with Dr. Dora Maher prior to the mastectomy. Currently on ado-trastuzamab emtansine.   Pt endorsed a mild cough associated with shortness of breath, but could NEPHROLOGY      Discharge Instructions/Follow-up:  Pt instructed to follow-up with Nephrology and Heme/Onc outpatient for continued management. Pt also given information for the [address] to establish a PCP. Activity: activity as tolerated    Diet: regular diet    Discharge Medications:     Discharge Medication List as of 8/31/2019  4:23 PM           Details   oxyCODONE-acetaminophen (PERCOCET) 5-325 MG per tablet Take 1 tablet by mouth every 6 hours as needed for Pain for up to 3 days. Intended supply: 3 days.  Take lowest dose possible to manage pain, Disp-12 tablet, R-0Print              Details   apixaban (ELIQUIS) 5 MG TABS tablet Take two tablets twice daily until 9/4/2019 (18 tablets including 8/31/2019)  Then, take one tablet twice daily afterwards from 9/5/2019., Disp-78 tablet, R-3Print      docusate (COLACE, DULCOLAX) 100 MG CAPS Take 100 mg by mouth 2 times daily, Disp-30 capsule, R-1Print              Details   albuterol sulfate  (90 Base) MCG/ACT inhaler Inhale 2 puffs into the lungs every 6 hours as needed for WheezingHistorical Med      letrozole (FEMARA) 2.5 MG tablet Take 2.5 mg by mouth dailyHistorical Med      Melatonin 10 MG TABS Take 20 mg by mouth nightlyHistorical Med      ondansetron (ZOFRAN) 8 MG tablet Take 8 mg by mouth every 8 hours as needed for Nausea or VomitingHistorical Med      traZODone (DESYREL) 100 MG tablet Take 100 mg by mouth nightlyHistorical Med      cloNIDine (CATAPRES) 0.1 MG tablet Take 0.1 mg by mouth as needed for High Blood Pressure (for BP > 160/90)Historical Med      mirtazapine (REMERON) 15 MG tablet Take 15 mg by mouth nightly Historical Med      loratadine (CLARITIN) 10 MG capsule Take 10 mg by mouth every evening Historical Med      prochlorperazine (COMPAZINE) 10 MG tablet Take 10 mg by mouth every 6 hours as neededHistorical Med      ARIPiprazole (ABILIFY) 10 MG tablet Take 10 mg by mouth every evening Historical Med famotidine (PEPCID) 40 MG tablet Take 40 mg by mouth dailyHistorical Med      hydrOXYzine (ATARAX) 50 MG tablet Take 50 mg by mouth nightlyHistorical Med      guaiFENesin (MUCINEX) 600 MG extended release tablet Take 600 mg by mouth 2 times daily Historical Med      perphenazine 4 MG tablet Take 4 mg by mouth 2 times dailyHistorical Med      amLODIPine (NORVASC) 10 MG tablet Take 10 mg by mouth dailyHistorical Med             Time Spent on discharge is more than 30 minutes in the examination, evaluation, counseling and review of medications and discharge plan. Signed:    Shane Rucker  Internal Medicine Resident, PGY-1  8/31/2019    Thank you [facility] for the opportunity to be involved in this patient's care. If you have any questions or concerns please feel free to contact me. Patient seen and examined, plan of care discussed with residents. Agree with their assessment and plan with following addendum:  Briefly, patient was admitted with pleuritic chest pain and dyspnea, found to have bilateral PE, R>L, and R LE DVT. She improved with pain management and anticoagulation, but still required supplemental O2. Discharged in stalbe condition on eliquis to her SNF. Time spent on discharge more than 30 minutes.        Srinivas Malik
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
## 2019-08-28 NOTE — CONSULTS
history. REVIEW OF SYSTEMS:    Review of Systems   Constitutional: Negative. HENT: Negative. Eyes: Negative. Respiratory: Positive for cough and shortness of breath. Cardiovascular: Positive for chest pain. Gastrointestinal: Negative. Endocrine: Negative. Genitourinary: Negative. Musculoskeletal: Negative. Skin: Negative. Allergic/Immunologic: Negative. Neurological: Negative. Hematological: Negative. Psychiatric/Behavioral: Negative. PHYSICAL EXAM:      Vitals:  BP (!) 141/97   Pulse 81   Temp 98 °F (36.7 °C) (Oral)   Resp 16   Ht 5' 8\" (1.727 m)   Wt 230 lb (104.3 kg)   LMP 03/09/2014   SpO2 92%   BMI 34.97 kg/m²       Intake/Output Summary (Last 24 hours) at 8/28/2019 1028  Last data filed at 8/28/2019 3148  Gross per 24 hour   Intake 1430 ml   Output 1500 ml   Net -70 ml       Physical Exam   Constitutional: She is oriented to person, place, and time. No distress. HENT:   Head: Normocephalic and atraumatic. Eyes: Pupils are equal, round, and reactive to light. Conjunctivae and EOM are normal. No scleral icterus. Neck: Neck supple. Cardiovascular: Normal rate, regular rhythm and normal heart sounds. Pulmonary/Chest: She exhibits tenderness.   - SOB  - pleuritic chest pain  - non-productive cough  - tenderness on the R lateral chest   Abdominal: Soft. Bowel sounds are normal.   Musculoskeletal: She exhibits no edema. Neurological: She is alert and oriented to person, place, and time. Skin: Skin is warm and dry. She is not diaphoretic. Psychiatric: She has a normal mood and affect.  Her behavior is normal. Thought content normal.       DATA:  Recent Labs     08/28/19  0535 08/27/19  1036   WBC 9.0 9.4   NEUTROABS 5.6 6.9   LYMPHOPCT 24.2 15.6   RBC 3.13* 3.69*   HGB 10.3* 12.1   HCT 31.0* 36.7   MCV 99.1 99.5   MCH 33.0 32.8   MCHC 33.3 33.0   RDW 14.3 14.2   * 98*       Lab Results   Component Value Date     08/28/2019    K 3.5 Bilateral    Result Date: 8/27/2019  Vascular Lower Extremities DVT Study Procedure -- PRELIMINARY SONOGRAPHER REPORT --   Demographics   Patient Name       Jemma Hamilton   Date of Study      08/27/2019         Gender              Female   Patient Number     [id number]         Date of Birth       1960   Visit Number       [id number]          Age                 64 year(s)   Accession Number   [id number]          Room Number         7906   Corporate ID       [id number]           Sonographer         Jodee Hair RVT   Ordering Physician Lali Steel   Interpreting        [facility]                     MD PAULETTE              Physician           Readers  Procedure Type of Study:   Veins:Lower Extremities DVT Study, VASC EXTREMITY VENOUS DUPLEX BILATERAL. Tech Comments Right Acute partially to totally occluding deep vein thrombosis involving the right popliteal vein. Acute totally occluding deep vein thrombosis involving the right proximal posterior tibial vein. No other evidence of deep vein or superficial vein thrombosis involving the right lower extremity. Left The common femoral and sapheno-femoral junction could not be visualized due to recent skin graft from that area. No evidence of deep vein or superficial vein thrombosis involving the left lower extremity. Problem List  Patient Active Problem List   Diagnosis    Sepsis ([address]    Cancer of left breast, stage 3 ([address]    Breast wound, left, sequela    Bilateral pulmonary embolism ([address]    CHANTELLE (acute kidney injury) ([address]       IMPRESSION/RECOMMENDATIONS:  Bilat PE - Her provocation is likely recent surgery. Agree with therapeutic anticoagulation. Recommend transition to Eliquis for her oral outpt regimen. It is unlikely this is due to letrozole. Discussed with patient and/or family. Thank you for the consultation.   Faye Ferreira DO  8/28/2019  10:28 AM     This patient has been staffed and discussed with Dr. Anny Eastman MD.

## 2019-08-28 NOTE — PROGRESS NOTES
Patient's /93, . Patient complaining of 8/10 pain and also returned from bathroom. Pain medications administered. Recheck in approximately 30 minutes - /103, HR 92 NSR. States she takes amlodipine 10 mg at home which is not prescribed here in the hospital. Notified on call resident, instructed to monitor BP, and if does not come down after will consider BP medication.

## 2019-08-29 LAB
ANION GAP SERPL CALCULATED.3IONS-SCNC: 10 MMOL/L (ref 3–16)
ANTI-XA UNFRAC HEPARIN: 0.46 IU/ML (ref 0.3–0.7)
BASOPHILS ABSOLUTE: 0 K/UL (ref 0–0.2)
BASOPHILS RELATIVE PERCENT: 0.3 %
BILIRUBIN URINE: NEGATIVE
BLOOD, URINE: ABNORMAL
BUN BLDV-MCNC: 20 MG/DL (ref 7–20)
CALCIUM SERPL-MCNC: 8.7 MG/DL (ref 8.3–10.6)
CHLORIDE BLD-SCNC: 104 MMOL/L (ref 99–110)
CLARITY: CLEAR
CO2: 24 MMOL/L (ref 21–32)
COLOR: YELLOW
CREAT SERPL-MCNC: 1.3 MG/DL (ref 0.6–1.1)
CREATININE URINE: 119.9 MG/DL (ref 28–259)
EOSINOPHIL,URINE: NORMAL
EOSINOPHILS ABSOLUTE: 0 K/UL (ref 0–0.6)
EOSINOPHILS RELATIVE PERCENT: 0.3 %
GFR AFRICAN AMERICAN: 51
GFR NON-AFRICAN AMERICAN: 42
GLUCOSE BLD-MCNC: 97 MG/DL (ref 70–99)
GLUCOSE BLD-MCNC: 99 MG/DL (ref 70–99)
GLUCOSE URINE: NEGATIVE MG/DL
HCT VFR BLD CALC: 28.8 % (ref 36–48)
HEMOGLOBIN: 9.5 G/DL (ref 12–16)
KETONES, URINE: NEGATIVE MG/DL
LEUKOCYTE ESTERASE, URINE: NEGATIVE
LYMPHOCYTES ABSOLUTE: 2.3 K/UL (ref 1–5.1)
LYMPHOCYTES RELATIVE PERCENT: 27.8 %
MAGNESIUM: 1.7 MG/DL (ref 1.8–2.4)
MCH RBC QN AUTO: 33.1 PG (ref 26–34)
MCHC RBC AUTO-ENTMCNC: 33.2 G/DL (ref 31–36)
MCV RBC AUTO: 100 FL (ref 80–100)
MICROSCOPIC EXAMINATION: YES
MONOCYTES ABSOLUTE: 1 K/UL (ref 0–1.3)
MONOCYTES RELATIVE PERCENT: 11.7 %
NEUTROPHILS ABSOLUTE: 4.9 K/UL (ref 1.7–7.7)
NEUTROPHILS RELATIVE PERCENT: 59.9 %
NITRITE, URINE: NEGATIVE
PDW BLD-RTO: 14.2 % (ref 12.4–15.4)
PERFORMED ON: NORMAL
PH UA: 6 (ref 5–8)
PLATELET # BLD: 140 K/UL (ref 135–450)
PMV BLD AUTO: 7.6 FL (ref 5–10.5)
POTASSIUM REFLEX MAGNESIUM: 3.4 MMOL/L (ref 3.5–5.1)
PROTEIN PROTEIN: 291 MG/DL
PROTEIN UA: 100 MG/DL
PROTEIN/CREAT RATIO: 2.4 MG/DL
RBC # BLD: 2.88 M/UL (ref 4–5.2)
RBC UA: NORMAL /HPF (ref 0–2)
SODIUM BLD-SCNC: 138 MMOL/L (ref 136–145)
SPECIFIC GRAVITY UA: >=1.03 (ref 1–1.03)
URINE TYPE: ABNORMAL
UROBILINOGEN, URINE: 0.2 E.U./DL
WBC # BLD: 8.2 K/UL (ref 4–11)
WBC UA: NORMAL /HPF (ref 0–5)

## 2019-08-29 PROCEDURE — 87205 SMEAR GRAM STAIN: CPT

## 2019-08-29 PROCEDURE — 6370000000 HC RX 637 (ALT 250 FOR IP): Performed by: STUDENT IN AN ORGANIZED HEALTH CARE EDUCATION/TRAINING PROGRAM

## 2019-08-29 PROCEDURE — 2060000000 HC ICU INTERMEDIATE R&B

## 2019-08-29 PROCEDURE — 2580000003 HC RX 258: Performed by: STUDENT IN AN ORGANIZED HEALTH CARE EDUCATION/TRAINING PROGRAM

## 2019-08-29 PROCEDURE — 80048 BASIC METABOLIC PNL TOTAL CA: CPT

## 2019-08-29 PROCEDURE — 82570 ASSAY OF URINE CREATININE: CPT

## 2019-08-29 PROCEDURE — 6370000000 HC RX 637 (ALT 250 FOR IP): Performed by: INTERNAL MEDICINE

## 2019-08-29 PROCEDURE — 6360000002 HC RX W HCPCS: Performed by: STUDENT IN AN ORGANIZED HEALTH CARE EDUCATION/TRAINING PROGRAM

## 2019-08-29 PROCEDURE — 85520 HEPARIN ASSAY: CPT

## 2019-08-29 PROCEDURE — 85025 COMPLETE CBC W/AUTO DIFF WBC: CPT

## 2019-08-29 PROCEDURE — 6360000002 HC RX W HCPCS

## 2019-08-29 PROCEDURE — 84156 ASSAY OF PROTEIN URINE: CPT

## 2019-08-29 PROCEDURE — 81001 URINALYSIS AUTO W/SCOPE: CPT

## 2019-08-29 PROCEDURE — 83735 ASSAY OF MAGNESIUM: CPT

## 2019-08-29 RX ORDER — MAGNESIUM SULFATE IN WATER 40 MG/ML
2 INJECTION, SOLUTION INTRAVENOUS ONCE
Status: COMPLETED | OUTPATIENT
Start: 2019-08-29 | End: 2019-08-29

## 2019-08-29 RX ORDER — POTASSIUM CHLORIDE 1.5 G/1.77G
40 POWDER, FOR SOLUTION ORAL ONCE
Status: COMPLETED | OUTPATIENT
Start: 2019-08-29 | End: 2019-08-29

## 2019-08-29 RX ORDER — OXYCODONE HYDROCHLORIDE AND ACETAMINOPHEN 5; 325 MG/1; MG/1
1 TABLET ORAL EVERY 4 HOURS PRN
Status: DISCONTINUED | OUTPATIENT
Start: 2019-08-29 | End: 2019-08-31 | Stop reason: HOSPADM

## 2019-08-29 RX ORDER — OXYCODONE HYDROCHLORIDE AND ACETAMINOPHEN 5; 325 MG/1; MG/1
2 TABLET ORAL EVERY 4 HOURS PRN
Status: DISCONTINUED | OUTPATIENT
Start: 2019-08-29 | End: 2019-08-31 | Stop reason: HOSPADM

## 2019-08-29 RX ADMIN — PERPHENAZINE 4 MG: 4 TABLET, FILM COATED ORAL at 09:48

## 2019-08-29 RX ADMIN — MORPHINE SULFATE 2 MG: 2 INJECTION, SOLUTION INTRAMUSCULAR; INTRAVENOUS at 20:57

## 2019-08-29 RX ADMIN — FAMOTIDINE 40 MG: 20 TABLET ORAL at 09:52

## 2019-08-29 RX ADMIN — Medication 10 MG: at 21:06

## 2019-08-29 RX ADMIN — SODIUM CHLORIDE: 9 INJECTION, SOLUTION INTRAVENOUS at 00:32

## 2019-08-29 RX ADMIN — LETROZOLE 2.5 MG: 2.5 TABLET ORAL at 09:48

## 2019-08-29 RX ADMIN — APIXABAN 10 MG: 5 TABLET, FILM COATED ORAL at 12:37

## 2019-08-29 RX ADMIN — SODIUM CHLORIDE: 9 INJECTION, SOLUTION INTRAVENOUS at 12:37

## 2019-08-29 RX ADMIN — ARIPIPRAZOLE 10 MG: 5 TABLET ORAL at 17:45

## 2019-08-29 RX ADMIN — MAGNESIUM SULFATE HEPTAHYDRATE 2 G: 40 INJECTION, SOLUTION INTRAVENOUS at 09:47

## 2019-08-29 RX ADMIN — HEPARIN SODIUM AND DEXTROSE 12 UNITS/KG/HR: 10000; 5 INJECTION INTRAVENOUS at 00:32

## 2019-08-29 RX ADMIN — SODIUM CHLORIDE: 9 INJECTION, SOLUTION INTRAVENOUS at 20:58

## 2019-08-29 RX ADMIN — OXYCODONE HYDROCHLORIDE AND ACETAMINOPHEN 1 TABLET: 5; 325 TABLET ORAL at 09:58

## 2019-08-29 RX ADMIN — AMLODIPINE BESYLATE 10 MG: 10 TABLET ORAL at 09:48

## 2019-08-29 RX ADMIN — HYDROXYZINE HYDROCHLORIDE 50 MG: 25 TABLET, FILM COATED ORAL at 20:57

## 2019-08-29 RX ADMIN — Medication 10 ML: at 09:54

## 2019-08-29 RX ADMIN — POTASSIUM CHLORIDE 40 MEQ: 1.5 POWDER, FOR SOLUTION ORAL at 09:48

## 2019-08-29 RX ADMIN — OXYCODONE HYDROCHLORIDE AND ACETAMINOPHEN 1 TABLET: 5; 325 TABLET ORAL at 02:01

## 2019-08-29 RX ADMIN — PERPHENAZINE 4 MG: 4 TABLET, FILM COATED ORAL at 22:03

## 2019-08-29 RX ADMIN — OXYCODONE HYDROCHLORIDE AND ACETAMINOPHEN 2 TABLET: 5; 325 TABLET ORAL at 17:45

## 2019-08-29 RX ADMIN — APIXABAN 10 MG: 5 TABLET, FILM COATED ORAL at 20:57

## 2019-08-29 RX ADMIN — OXYCODONE HYDROCHLORIDE AND ACETAMINOPHEN 1 TABLET: 5; 325 TABLET ORAL at 06:00

## 2019-08-29 RX ADMIN — MORPHINE SULFATE 2 MG: 2 INJECTION, SOLUTION INTRAMUSCULAR; INTRAVENOUS at 04:02

## 2019-08-29 RX ADMIN — MORPHINE SULFATE 2 MG: 2 INJECTION, SOLUTION INTRAMUSCULAR; INTRAVENOUS at 14:56

## 2019-08-29 ASSESSMENT — PAIN DESCRIPTION - PROGRESSION
CLINICAL_PROGRESSION: NOT CHANGED

## 2019-08-29 ASSESSMENT — PAIN DESCRIPTION - DESCRIPTORS
DESCRIPTORS: DISCOMFORT;SORE

## 2019-08-29 ASSESSMENT — PAIN DESCRIPTION - ONSET
ONSET: AWAKENED FROM SLEEP

## 2019-08-29 ASSESSMENT — PAIN SCALES - GENERAL
PAINLEVEL_OUTOF10: 2
PAINLEVEL_OUTOF10: 0
PAINLEVEL_OUTOF10: 7
PAINLEVEL_OUTOF10: 7
PAINLEVEL_OUTOF10: 5
PAINLEVEL_OUTOF10: 8
PAINLEVEL_OUTOF10: 8
PAINLEVEL_OUTOF10: 6
PAINLEVEL_OUTOF10: 4
PAINLEVEL_OUTOF10: 0
PAINLEVEL_OUTOF10: 4

## 2019-08-29 ASSESSMENT — PAIN DESCRIPTION - PAIN TYPE
TYPE: SURGICAL PAIN

## 2019-08-29 ASSESSMENT — PAIN DESCRIPTION - ORIENTATION
ORIENTATION: LEFT

## 2019-08-29 ASSESSMENT — PAIN DESCRIPTION - FREQUENCY
FREQUENCY: CONTINUOUS

## 2019-08-29 ASSESSMENT — PAIN DESCRIPTION - LOCATION
LOCATION: BREAST;CHEST
LOCATION: BREAST
LOCATION: BREAST

## 2019-08-29 ASSESSMENT — ENCOUNTER SYMPTOMS
COUGH: 1
CONSTIPATION: 0
SHORTNESS OF BREATH: 1
DIARRHEA: 0
NAUSEA: 0
VOMITING: 0
SHORTNESS OF BREATH: 0
ABDOMINAL PAIN: 0

## 2019-08-29 ASSESSMENT — PAIN DESCRIPTION - DIRECTION: RADIATING_TOWARDS: LEFT ABDOMEN

## 2019-08-29 NOTE — PLAN OF CARE
Problem: Falls - Risk of:  Goal: Will remain free from falls  Description  Will remain free from falls  8/29/2019 0250 by Jorge Luis Guerrero RN  Outcome: Ongoing  Note:   Patient remained free from falls overnight. Calls appropriately for assist out of bed. Bed alarm remains active, bed in low/locked position, non-skid footwear in place, and call light in reach. Will continue fall risk protocol. Jorge Luis Guerrero 8/29/2019      Problem: Pain:  Goal: Pain level will decrease  Description  Pain level will decrease  Outcome: Ongoing  Note:   Patient c/o pain to chest/surgical incision. Given PO Percocet at [place] and 0201, and IV Morphine at 2201. On pain reassess patient was satisfied with decrease in pain or appeared to be resting comfortably in bed. Will continue to monitor. Jorge Luis Guerrero 8/29/2019      Problem: Bleeding:  Goal: Will show no signs and symptoms of excessive bleeding  Description  Will show no signs and symptoms of excessive bleeding  8/29/2019 0250 by Jorge Luis Guerrero RN  Note:   Patient showed no s/s of bleeding overnight, vitals stable and remains on Heparin drip as ordered. Will continue to monitor.     Jorge Luis Guerrero 8/29/2019

## 2019-08-29 NOTE — PROGRESS NOTES
Progress Note    Admit Date: 8/27/2019  Day: 2               Diet: DIET GENERAL;    CC:   Chief Complaint   Patient presents with    Chest Pain    Shortness of Breath       Interval history:   - Patient was seen at bedside this morning.  - Patient states shortness of breath has improved but she is still experiencing pleuritic chest pain. - Denies nausea, vomiting, fevers, fatigue    HPI: Ms. Rubin Zavala  is a 64 y.o. female we are seeing in consultation for breast cancer. She was diagnosed with ER/ND pos, HER2 pos left breast cancer in Jan 2019 [facility]). She underwent neoadjuvant therapy with TCHP x 6 cycles, complete June 2019. She underwent a modified radical mastectomy with axillary lymph node dissection. There was residual disease in both the primary tumor and the axillary lymph nodes (see below). She was then started on adjuvant ado-trastuzumab emtansine (Kadcyla; first dose Aug 20, 2019) and letrozole.     Medications:     Scheduled Meds:   amLODIPine  10 mg Oral Daily    ARIPiprazole  10 mg Oral Daily    famotidine  40 mg Oral Daily    hydrOXYzine  50 mg Oral Nightly    letrozole  2.5 mg Oral Daily    sodium chloride flush  10 mL Intravenous 2 times per day    insulin lispro  0-6 Units Subcutaneous TID WC    insulin lispro  0-3 Units Subcutaneous Nightly    lidocaine  1 patch Transdermal Daily    perphenazine  4 mg Oral BID     Continuous Infusions:   heparin (porcine) 12 Units/kg/hr (08/29/19 0032)    sodium chloride 100 mL/hr at 08/29/19 0032    dextrose       PRN Meds:oxyCODONE-acetaminophen, morphine, heparin (porcine), heparin (porcine), ipratropium-albuterol, melatonin, sodium chloride flush, magnesium hydroxide, ondansetron, glucose, dextrose, glucagon (rDNA), dextrose, prochlorperazine    Objective:   Vitals:   T-max:  Patient Vitals for the past 8 hrs:   BP Temp Temp src Pulse Resp SpO2   08/29/19 0414 124/77 98.8 °F (37.1 °C) Oral 96 18 90 %   08/29/19 0033 -- -- -- -- -- 92 % Intake/Output Summary (Last 24 hours) at 8/29/2019 0831  Last data filed at 8/29/2019 0414  Gross per 24 hour   Intake 3771 ml   Output 2600 ml   Net 1171 ml       Review of Systems   Constitutional: Negative for fever. Respiratory: Positive for cough and shortness of breath. Cardiovascular: Positive for chest pain. Gastrointestinal: Negative for abdominal pain. Genitourinary: Negative. Musculoskeletal: Negative. Neurological: Negative. Physical Exam   Constitutional: She is oriented to person, place, and time. She appears well-developed. Sitting up in bed, no apparent distress    HENT:   Head: Atraumatic. Eyes: Conjunctivae are normal.   Neck: Neck supple. Cardiovascular: Normal rate, regular rhythm and normal heart sounds. No murmur heard. Pulmonary/Chest: Effort normal and breath sounds normal. No respiratory distress. Abdominal: Soft. Bowel sounds are normal. There is no tenderness. Musculoskeletal: She exhibits no tenderness. Neurological: She is alert and oriented to person, place, and time. LABS:    CBC:   Recent Labs     08/27/19  1036 08/28/19  0535 08/29/19  0410   WBC 9.4 9.0 8.2   HGB 12.1 10.3* 9.5*   HCT 36.7 31.0* 28.8*   PLT 98* 117* 140   MCV 99.5 99.1 100.0     Renal:    Recent Labs     08/27/19  1036 08/28/19  0535 08/29/19  0410    139 138   K 3.7 3.5 3.4*    102 104   CO2 23 24 24   BUN 28* 25* 20   CREATININE 1.4* 1.4* 1.3*   GLUCOSE 227* 116* 97   CALCIUM 9.2 8.9 8.7   MG  --  1.80 1.70*   ANIONGAP 13 13 10     Hepatic: No results for input(s): AST, ALT, BILITOT, BILIDIR, PROT, LABALBU, ALKPHOS in the last 72 hours. Troponin:   Recent Labs     08/27/19  1036 08/27/19  1826   TROPONINI <0.01 <0.01     BNP: No results for input(s): BNP in the last 72 hours. Lipids: No results for input(s): CHOL, HDL in the last 72 hours.     Invalid input(s): LDLCALCU, TRIGLYCERIDE  ABGs:  No results for input(s): PHART, IDN3DRP, PO2ART, WYG6PVI,

## 2019-08-29 NOTE — CONSULTS
improvement and acute nature of kidney injury. Hypokelamia  · On replacement  Breast Ca  · Oncology following on Kadcyla now. I thank Dr. Keaton Camarillo MD for consulting [place] in the care of your patient. Please call with any questions or concerns. [phone].   Adama Sick

## 2019-08-29 NOTE — PROGRESS NOTES
Thank you for consulting [facility] in the care of your patient. A full consult will follow but if you have any questions I can be reached through our office at [phone] or through [address] [place] [address]. Thank you.   Dr. Ayaz Kirkland

## 2019-08-29 NOTE — PROGRESS NOTES
Physical Exam   Constitutional: She appears well-developed and well-nourished. No distress. HENT:   Head: Normocephalic and atraumatic. Cardiovascular: Normal rate, regular rhythm and normal heart sounds. Exam reveals no gallop and no friction rub. No murmur heard. Pulmonary/Chest: Effort normal and breath sounds normal. No stridor. No respiratory distress. She has no wheezes. Abdominal: Soft. Bowel sounds are normal. She exhibits no distension. There is no tenderness. Musculoskeletal: She exhibits edema (1+ b/l pitting edema). Neurological: She is alert. Skin: Skin is warm and dry. She is not diaphoretic. LABS:    CBC:   Recent Labs     08/27/19  1036 08/28/19  0535 08/29/19  0410   WBC 9.4 9.0 8.2   HGB 12.1 10.3* 9.5*   HCT 36.7 31.0* 28.8*   PLT 98* 117* 140   MCV 99.5 99.1 100.0     Renal:    Recent Labs     08/27/19  1036 08/28/19  0535 08/29/19  0410    139 138   K 3.7 3.5 3.4*    102 104   CO2 23 24 24   BUN 28* 25* 20   CREATININE 1.4* 1.4* 1.3*   GLUCOSE 227* 116* 97   CALCIUM 9.2 8.9 8.7   MG  --  1.80 1.70*   ANIONGAP 13 13 10     Hepatic: No results for input(s): AST, ALT, BILITOT, BILIDIR, PROT, LABALBU, ALKPHOS in the last 72 hours. Troponin:   Recent Labs     08/27/19  1036 08/27/19  1826   TROPONINI <0.01 <0.01     BNP: No results for input(s): BNP in the last 72 hours. Lipids: No results for input(s): CHOL, HDL in the last 72 hours. Invalid input(s): LDLCALCU, TRIGLYCERIDE  ABGs:  No results for input(s): PHART, FXJ7JVS, PO2ART, HJN3UPN, BEART, THGBART, C0UYFIRF, ODF5WUW in the last 72 hours. INR:   Recent Labs     08/27/19  1235   INR 1.13     Lactate: No results for input(s): LACTATE in the last 72 hours. Cultures:  -----------------------------------------------------------------  RAD:   VL Extremity Venous Bilateral         CT CHEST PULMONARY EMBOLISM W CONTRAST   Final Result      Acute, bilateral pulmonary emboli as above.       Right lower lobe Continue lidocaine patch for pleuritic chest pain  - Discontinued prn morphine, adding prn percocet 5mg. Pain panel increased  - Consulted oncology, appreciate recs- Plan to continue Kadcyla and Letrozole     Acute kidney injury - pt only has one kidney following a unilateral nephrectomy in her teenage years for abscesses 2/2 kidney stones; baseline Cr 1.0-1.1, currently 1.4 in ED. Danial Fairbanks possible kidney injury 2/2 chemo administration.  - Continue continuous IV fluids 100cc/h  - Consulted Nephrology on fluid management     Lactic acidosis - VBG in ED showed 7.379  34.8  47  20.5; lactic acid 2.3  - Continue continuous IV fluids 100cc/h  - Blood cultures x2- No growth to date  - Repeat lactic acid 1.1     Chronic thrombocytopenia - h/o breast cancer s/p mastectomy and aromatase inhibitor therapy.  Was recently admitted for skin graft  - 287 on last admission, 98 upon presentation to ED  - 140 today, will continue to trend     Chronic:  H/o breast cancer  - Continued home letrozole  - Oncology does not believe PE d/t aromatase inhibitor     HTN  - Continue 10mg amlodipine po qd     Asthma  - Continue duonebs     T2DM  - LDSSI discontinued     Bipolar I disorder  - Continue home aripriprazole, perphenazine, hydroxyzine  - Prochlorperazine prn following IV line use     GERD  - Continue famotidine        Code Status: Full code  Ally Mccain MD, PGY-1  08/29/19  2:12 PM    This patient has been staffed and discussed with Mark Muniz MD.       Patient seen and examined, plan of care discussed with residents. Agree with their assessment and plan with following addendum:  Patient is doing a bit better. Creatinine is slightly improved. Will change to oral eliquis, cont with IVF. Since she has solitary kidney- will ask nephrology to see her.        Mark Muniz

## 2019-08-30 LAB
ANION GAP SERPL CALCULATED.3IONS-SCNC: 10 MMOL/L (ref 3–16)
BASOPHILS ABSOLUTE: 0 K/UL (ref 0–0.2)
BASOPHILS RELATIVE PERCENT: 0.3 %
BUN BLDV-MCNC: 20 MG/DL (ref 7–20)
CALCIUM SERPL-MCNC: 9.1 MG/DL (ref 8.3–10.6)
CHLORIDE BLD-SCNC: 110 MMOL/L (ref 99–110)
CO2: 24 MMOL/L (ref 21–32)
CREAT SERPL-MCNC: 1.2 MG/DL (ref 0.6–1.1)
EOSINOPHILS ABSOLUTE: 0 K/UL (ref 0–0.6)
EOSINOPHILS RELATIVE PERCENT: 0.7 %
FERRITIN: 1259 NG/ML (ref 15–150)
FOLATE: 7.23 NG/ML (ref 4.78–24.2)
GFR AFRICAN AMERICAN: 56
GFR NON-AFRICAN AMERICAN: 46
GLUCOSE BLD-MCNC: 111 MG/DL (ref 70–99)
GLUCOSE BLD-MCNC: 115 MG/DL (ref 70–99)
GLUCOSE BLD-MCNC: 119 MG/DL (ref 70–99)
GLUCOSE BLD-MCNC: 131 MG/DL (ref 70–99)
HCT VFR BLD CALC: 26.4 % (ref 36–48)
HCT VFR BLD CALC: 31 % (ref 36–48)
HEMOGLOBIN: 8.6 G/DL (ref 12–16)
IMMATURE RETIC FRACT: 0.43 (ref 0.21–0.37)
IRON SATURATION: 9 % (ref 15–50)
IRON: 17 UG/DL (ref 37–145)
LYMPHOCYTES ABSOLUTE: 1.9 K/UL (ref 1–5.1)
LYMPHOCYTES RELATIVE PERCENT: 26.5 %
MAGNESIUM: 2.1 MG/DL (ref 1.8–2.4)
MCH RBC QN AUTO: 32.6 PG (ref 26–34)
MCHC RBC AUTO-ENTMCNC: 32.7 G/DL (ref 31–36)
MCV RBC AUTO: 99.8 FL (ref 80–100)
MONOCYTES ABSOLUTE: 0.8 K/UL (ref 0–1.3)
MONOCYTES RELATIVE PERCENT: 10.5 %
NEUTROPHILS ABSOLUTE: 4.5 K/UL (ref 1.7–7.7)
NEUTROPHILS RELATIVE PERCENT: 62 %
PDW BLD-RTO: 14.7 % (ref 12.4–15.4)
PERFORMED ON: ABNORMAL
PLATELET # BLD: 160 K/UL (ref 135–450)
PMV BLD AUTO: 7.1 FL (ref 5–10.5)
POTASSIUM REFLEX MAGNESIUM: 4.1 MMOL/L (ref 3.5–5.1)
RBC # BLD: 2.64 M/UL (ref 4–5.2)
RETICULOCYTE ABSOLUTE COUNT: 0.05 M/UL (ref 0.02–0.1)
RETICULOCYTE COUNT PCT: 1.47 % (ref 0.5–2.18)
SODIUM BLD-SCNC: 144 MMOL/L (ref 136–145)
TOTAL IRON BINDING CAPACITY: 185 UG/DL (ref 260–445)
TSH SERPL DL<=0.05 MIU/L-ACNC: 2.29 UIU/ML (ref 0.27–4.2)
VITAMIN B-12: 580 PG/ML (ref 211–911)
WBC # BLD: 7.2 K/UL (ref 4–11)

## 2019-08-30 PROCEDURE — 82607 VITAMIN B-12: CPT

## 2019-08-30 PROCEDURE — 83540 ASSAY OF IRON: CPT

## 2019-08-30 PROCEDURE — 6370000000 HC RX 637 (ALT 250 FOR IP): Performed by: STUDENT IN AN ORGANIZED HEALTH CARE EDUCATION/TRAINING PROGRAM

## 2019-08-30 PROCEDURE — 83735 ASSAY OF MAGNESIUM: CPT

## 2019-08-30 PROCEDURE — 82746 ASSAY OF FOLIC ACID SERUM: CPT

## 2019-08-30 PROCEDURE — 6360000002 HC RX W HCPCS: Performed by: STUDENT IN AN ORGANIZED HEALTH CARE EDUCATION/TRAINING PROGRAM

## 2019-08-30 PROCEDURE — 83550 IRON BINDING TEST: CPT

## 2019-08-30 PROCEDURE — 6370000000 HC RX 637 (ALT 250 FOR IP): Performed by: INTERNAL MEDICINE

## 2019-08-30 PROCEDURE — 85045 AUTOMATED RETICULOCYTE COUNT: CPT

## 2019-08-30 PROCEDURE — 84443 ASSAY THYROID STIM HORMONE: CPT

## 2019-08-30 PROCEDURE — 80048 BASIC METABOLIC PNL TOTAL CA: CPT

## 2019-08-30 PROCEDURE — 82728 ASSAY OF FERRITIN: CPT

## 2019-08-30 PROCEDURE — 2580000003 HC RX 258: Performed by: STUDENT IN AN ORGANIZED HEALTH CARE EDUCATION/TRAINING PROGRAM

## 2019-08-30 PROCEDURE — 85025 COMPLETE CBC W/AUTO DIFF WBC: CPT

## 2019-08-30 PROCEDURE — 2060000000 HC ICU INTERMEDIATE R&B

## 2019-08-30 RX ORDER — DIAPER,BRIEF,INFANT-TODD,DISP
EACH MISCELLANEOUS 2 TIMES DAILY
Status: DISCONTINUED | OUTPATIENT
Start: 2019-08-30 | End: 2019-08-31 | Stop reason: HOSPADM

## 2019-08-30 RX ORDER — DOCUSATE SODIUM 100 MG/1
100 CAPSULE, LIQUID FILLED ORAL 2 TIMES DAILY
Status: DISCONTINUED | OUTPATIENT
Start: 2019-08-30 | End: 2019-08-31 | Stop reason: HOSPADM

## 2019-08-30 RX ADMIN — PERPHENAZINE 4 MG: 4 TABLET, FILM COATED ORAL at 08:57

## 2019-08-30 RX ADMIN — HYDROXYZINE HYDROCHLORIDE 50 MG: 25 TABLET, FILM COATED ORAL at 20:25

## 2019-08-30 RX ADMIN — APIXABAN 10 MG: 5 TABLET, FILM COATED ORAL at 08:57

## 2019-08-30 RX ADMIN — DOCUSATE SODIUM 100 MG: 100 CAPSULE, LIQUID FILLED ORAL at 12:04

## 2019-08-30 RX ADMIN — HYDROCORTISONE: 1 CREAM TOPICAL at 20:25

## 2019-08-30 RX ADMIN — HYDROCORTISONE: 1 CREAM TOPICAL at 11:18

## 2019-08-30 RX ADMIN — LETROZOLE 2.5 MG: 2.5 TABLET ORAL at 08:58

## 2019-08-30 RX ADMIN — NALOXEGOL OXALATE 25 MG: 12.5 TABLET, FILM COATED ORAL at 12:04

## 2019-08-30 RX ADMIN — ARIPIPRAZOLE 10 MG: 5 TABLET ORAL at 17:34

## 2019-08-30 RX ADMIN — MORPHINE SULFATE 2 MG: 2 INJECTION, SOLUTION INTRAMUSCULAR; INTRAVENOUS at 08:58

## 2019-08-30 RX ADMIN — SODIUM CHLORIDE: 9 INJECTION, SOLUTION INTRAVENOUS at 05:55

## 2019-08-30 RX ADMIN — OXYCODONE HYDROCHLORIDE AND ACETAMINOPHEN 2 TABLET: 5; 325 TABLET ORAL at 17:34

## 2019-08-30 RX ADMIN — OXYCODONE HYDROCHLORIDE AND ACETAMINOPHEN 2 TABLET: 5; 325 TABLET ORAL at 12:04

## 2019-08-30 RX ADMIN — FAMOTIDINE 40 MG: 20 TABLET ORAL at 08:57

## 2019-08-30 RX ADMIN — Medication 10 ML: at 20:25

## 2019-08-30 RX ADMIN — APIXABAN 10 MG: 5 TABLET, FILM COATED ORAL at 20:25

## 2019-08-30 RX ADMIN — AMLODIPINE BESYLATE 10 MG: 10 TABLET ORAL at 08:57

## 2019-08-30 RX ADMIN — Medication 10 MG: at 20:25

## 2019-08-30 RX ADMIN — OXYCODONE HYDROCHLORIDE AND ACETAMINOPHEN 2 TABLET: 5; 325 TABLET ORAL at 21:40

## 2019-08-30 RX ADMIN — Medication 10 ML: at 08:59

## 2019-08-30 RX ADMIN — OXYCODONE HYDROCHLORIDE AND ACETAMINOPHEN 2 TABLET: 5; 325 TABLET ORAL at 00:04

## 2019-08-30 ASSESSMENT — PAIN SCALES - GENERAL
PAINLEVEL_OUTOF10: 7
PAINLEVEL_OUTOF10: 5
PAINLEVEL_OUTOF10: 7
PAINLEVEL_OUTOF10: 7
PAINLEVEL_OUTOF10: 5
PAINLEVEL_OUTOF10: 5

## 2019-08-30 ASSESSMENT — ENCOUNTER SYMPTOMS
DIARRHEA: 0
NAUSEA: 0
ABDOMINAL PAIN: 0
CONSTIPATION: 1
VOMITING: 0

## 2019-08-30 ASSESSMENT — PAIN DESCRIPTION - FREQUENCY
FREQUENCY: INTERMITTENT

## 2019-08-30 ASSESSMENT — PAIN DESCRIPTION - PROGRESSION
CLINICAL_PROGRESSION: GRADUALLY WORSENING
CLINICAL_PROGRESSION: GRADUALLY WORSENING
CLINICAL_PROGRESSION: NOT CHANGED
CLINICAL_PROGRESSION: GRADUALLY WORSENING
CLINICAL_PROGRESSION: GRADUALLY WORSENING
CLINICAL_PROGRESSION: NOT CHANGED
CLINICAL_PROGRESSION: NOT CHANGED

## 2019-08-30 ASSESSMENT — PAIN DESCRIPTION - ORIENTATION
ORIENTATION: RIGHT
ORIENTATION: LEFT

## 2019-08-30 ASSESSMENT — PAIN - FUNCTIONAL ASSESSMENT
PAIN_FUNCTIONAL_ASSESSMENT: ACTIVITIES ARE NOT PREVENTED

## 2019-08-30 ASSESSMENT — PAIN DESCRIPTION - LOCATION
LOCATION: CHEST
LOCATION: CHEST;FLANK
LOCATION: CHEST
LOCATION: BREAST;CHEST
LOCATION: CHEST

## 2019-08-30 ASSESSMENT — PAIN DESCRIPTION - PAIN TYPE
TYPE: ACUTE PAIN
TYPE: SURGICAL PAIN
TYPE: ACUTE PAIN

## 2019-08-30 ASSESSMENT — PAIN DESCRIPTION - DESCRIPTORS
DESCRIPTORS: DISCOMFORT

## 2019-08-30 ASSESSMENT — PAIN DESCRIPTION - ONSET
ONSET: ON-GOING

## 2019-08-30 NOTE — PLAN OF CARE
Problem: Falls - Risk of:  Goal: Will remain free from falls  Description  Will remain free from falls  8/30/2019 1342 by Parker Carvalho RN  Note:   Call light in reach at all times and uses appropriately for staff supervision when OOB  ambulates with steady gait  nonskid socks on when OOB  bed low with wheels locked  continue fall prevention measures     Problem: Pain:  Goal: Control of acute pain  Description  Control of acute pain  Note:   Percocet per prn order/pt request  pt endorses pain reduction with pain medicine  continue assess pain

## 2019-08-30 NOTE — PROGRESS NOTES
chest pain  - Discontinued prn morphine, adding prn percocet 5mg. Pain panel increased  - Consulted oncology, appreciate recs- Plan to continue Kadcyla and Letrozole     Acute kidney injury - pt only has one kidney following a unilateral nephrectomy in her teenage years for abscesses 2/2 kidney stones; baseline Cr 1.0-1.1, currently 1.4 in ED. Ryan Duff possible kidney injury 2/2 chemo administration.  - Discontinued continuous IV fluids 100cc/h as per Nephro recs  - Nephrology following     Lactic acidosis - VBG in ED showed 7.379  34.8  47  20.5; lactic acid 2.3  - Continue continuous IV fluids 100cc/h  - Blood cultures x2- No growth to date  - Repeat lactic acid 1.1     Chronic thrombocytopenia - h/o breast cancer s/p mastectomy and aromatase inhibitor therapy.  Was recently admitted for skin graft  - 287 on last admission, 98 upon presentation to ED  - 140 today, will continue to trend    Anemia- Unknown etiology. H/H of 12.1/36.7 on admit, continuing to drop, H/H today of 8.6/26.4  - Fe, TIBC, Folate, Reticulocyte count, B12, TSH, Mg pending       Chronic:  H/o breast cancer  - Continued home letrozole  - Oncology does not believe PE d/t aromatase inhibitor     HTN  - Continue 10mg amlodipine po qd     Asthma  - Continue duonebs     T2DM  - LDSSI discontinued     Bipolar I disorder  - Continue home aripriprazole, perphenazine, hydroxyzine  - Prochlorperazine prn following IV line use     GERD  - Continue famotidine        Code Status: Full code  Lili Fernandez  PPX: [place] MD, PGY-1  08/30/19  10:26 AM    This patient has been staffed and discussed with Dennys Morales MD.       Patient seen and examined, plan of care discussed with residents. Agree with their assessment and plan with following addendum:  Patient continues to report leurotic R chest pain and dyspnea. It is not worse but also not getting much better. She is tolerating some PO, no emesis, but appetite is poor. She is becoming a bit more edematous with some crackles on the left as well. Will stop IVF. Creatinine is better. She will need follow up with nephrology. Will get nutritional markers for anemia. Cont with inpatient monitoring for one more day.        Dennys Faucett

## 2019-08-31 VITALS
SYSTOLIC BLOOD PRESSURE: 136 MMHG | HEART RATE: 96 BPM | WEIGHT: 230 LBS | DIASTOLIC BLOOD PRESSURE: 89 MMHG | HEIGHT: 68 IN | TEMPERATURE: 98.3 F | BODY MASS INDEX: 34.86 KG/M2 | OXYGEN SATURATION: 94 % | RESPIRATION RATE: 18 BRPM

## 2019-08-31 PROBLEM — I82.409 ACUTE DEEP VEIN THROMBOSIS (DVT) OF LOWER EXTREMITY (HCC): Status: ACTIVE | Noted: 2019-08-31

## 2019-08-31 PROBLEM — J96.01 ACUTE RESPIRATORY FAILURE WITH HYPOXIA (HCC): Status: ACTIVE | Noted: 2019-08-31

## 2019-08-31 LAB
ANION GAP SERPL CALCULATED.3IONS-SCNC: 12 MMOL/L (ref 3–16)
BASOPHILS ABSOLUTE: 0 K/UL (ref 0–0.2)
BASOPHILS RELATIVE PERCENT: 0.3 %
BUN BLDV-MCNC: 15 MG/DL (ref 7–20)
CALCIUM SERPL-MCNC: 9.7 MG/DL (ref 8.3–10.6)
CHLORIDE BLD-SCNC: 107 MMOL/L (ref 99–110)
CO2: 26 MMOL/L (ref 21–32)
CREAT SERPL-MCNC: 1 MG/DL (ref 0.6–1.1)
EOSINOPHILS ABSOLUTE: 0.1 K/UL (ref 0–0.6)
EOSINOPHILS RELATIVE PERCENT: 1 %
GFR AFRICAN AMERICAN: >60
GFR NON-AFRICAN AMERICAN: 57
GLUCOSE BLD-MCNC: 100 MG/DL (ref 70–99)
GLUCOSE BLD-MCNC: 117 MG/DL (ref 70–99)
GLUCOSE BLD-MCNC: 93 MG/DL (ref 70–99)
GLUCOSE BLD-MCNC: 96 MG/DL (ref 70–99)
HCT VFR BLD CALC: 26.6 % (ref 36–48)
HEMOGLOBIN: 8.7 G/DL (ref 12–16)
LYMPHOCYTES ABSOLUTE: 1.8 K/UL (ref 1–5.1)
LYMPHOCYTES RELATIVE PERCENT: 28 %
MAGNESIUM: 1.6 MG/DL (ref 1.8–2.4)
MCH RBC QN AUTO: 32.4 PG (ref 26–34)
MCHC RBC AUTO-ENTMCNC: 32.5 G/DL (ref 31–36)
MCV RBC AUTO: 99.5 FL (ref 80–100)
MONOCYTES ABSOLUTE: 0.7 K/UL (ref 0–1.3)
MONOCYTES RELATIVE PERCENT: 10.3 %
NEUTROPHILS ABSOLUTE: 3.9 K/UL (ref 1.7–7.7)
NEUTROPHILS RELATIVE PERCENT: 60.4 %
PDW BLD-RTO: 14.6 % (ref 12.4–15.4)
PERFORMED ON: ABNORMAL
PERFORMED ON: ABNORMAL
PERFORMED ON: NORMAL
PLATELET # BLD: 180 K/UL (ref 135–450)
PMV BLD AUTO: 6.7 FL (ref 5–10.5)
POTASSIUM REFLEX MAGNESIUM: 3.3 MMOL/L (ref 3.5–5.1)
RBC # BLD: 2.68 M/UL (ref 4–5.2)
SODIUM BLD-SCNC: 145 MMOL/L (ref 136–145)
WBC # BLD: 6.4 K/UL (ref 4–11)

## 2019-08-31 PROCEDURE — 97161 PT EVAL LOW COMPLEX 20 MIN: CPT

## 2019-08-31 PROCEDURE — 6370000000 HC RX 637 (ALT 250 FOR IP): Performed by: STUDENT IN AN ORGANIZED HEALTH CARE EDUCATION/TRAINING PROGRAM

## 2019-08-31 PROCEDURE — 97535 SELF CARE MNGMENT TRAINING: CPT

## 2019-08-31 PROCEDURE — 6360000002 HC RX W HCPCS: Performed by: STUDENT IN AN ORGANIZED HEALTH CARE EDUCATION/TRAINING PROGRAM

## 2019-08-31 PROCEDURE — 6360000002 HC RX W HCPCS: Performed by: INTERNAL MEDICINE

## 2019-08-31 PROCEDURE — 80048 BASIC METABOLIC PNL TOTAL CA: CPT

## 2019-08-31 PROCEDURE — 97116 GAIT TRAINING THERAPY: CPT

## 2019-08-31 PROCEDURE — 85025 COMPLETE CBC W/AUTO DIFF WBC: CPT

## 2019-08-31 PROCEDURE — 83735 ASSAY OF MAGNESIUM: CPT

## 2019-08-31 PROCEDURE — 2580000003 HC RX 258: Performed by: STUDENT IN AN ORGANIZED HEALTH CARE EDUCATION/TRAINING PROGRAM

## 2019-08-31 PROCEDURE — 2580000003 HC RX 258: Performed by: INTERNAL MEDICINE

## 2019-08-31 PROCEDURE — 97165 OT EVAL LOW COMPLEX 30 MIN: CPT

## 2019-08-31 PROCEDURE — 6370000000 HC RX 637 (ALT 250 FOR IP): Performed by: INTERNAL MEDICINE

## 2019-08-31 RX ORDER — HEPARIN SODIUM (PORCINE) LOCK FLUSH IV SOLN 100 UNIT/ML 100 UNIT/ML
1500 SOLUTION INTRAVENOUS ONCE
Status: DISCONTINUED | OUTPATIENT
Start: 2019-08-31 | End: 2019-08-31 | Stop reason: HOSPADM

## 2019-08-31 RX ORDER — MAGNESIUM SULFATE IN WATER 40 MG/ML
2 INJECTION, SOLUTION INTRAVENOUS ONCE
Status: COMPLETED | OUTPATIENT
Start: 2019-08-31 | End: 2019-08-31

## 2019-08-31 RX ORDER — HEPARIN SODIUM (PORCINE) LOCK FLUSH IV SOLN 100 UNIT/ML 100 UNIT/ML
500 SOLUTION INTRAVENOUS PRN
Status: DISCONTINUED | OUTPATIENT
Start: 2019-08-31 | End: 2019-08-31 | Stop reason: HOSPADM

## 2019-08-31 RX ORDER — POTASSIUM CHLORIDE 1.5 G/1.77G
40 POWDER, FOR SOLUTION ORAL ONCE
Status: COMPLETED | OUTPATIENT
Start: 2019-08-31 | End: 2019-08-31

## 2019-08-31 RX ORDER — OXYCODONE HYDROCHLORIDE AND ACETAMINOPHEN 5; 325 MG/1; MG/1
1 TABLET ORAL EVERY 6 HOURS PRN
Qty: 12 TABLET | Refills: 0 | Status: SHIPPED | OUTPATIENT
Start: 2019-08-31 | End: 2019-09-03

## 2019-08-31 RX ORDER — PSEUDOEPHEDRINE HCL 30 MG
100 TABLET ORAL 2 TIMES DAILY
Qty: 30 CAPSULE | Refills: 1 | Status: SHIPPED | OUTPATIENT
Start: 2019-08-31 | End: 2021-06-10

## 2019-08-31 RX ORDER — PSEUDOEPHEDRINE HCL 30 MG
100 TABLET ORAL 2 TIMES DAILY
Qty: 30 CAPSULE | Refills: 1 | Status: SHIPPED | OUTPATIENT
Start: 2019-08-31 | End: 2019-08-31

## 2019-08-31 RX ADMIN — MAGNESIUM SULFATE HEPTAHYDRATE 2 G: 40 INJECTION, SOLUTION INTRAVENOUS at 08:08

## 2019-08-31 RX ADMIN — AMLODIPINE BESYLATE 10 MG: 10 TABLET ORAL at 07:59

## 2019-08-31 RX ADMIN — PERPHENAZINE 4 MG: 4 TABLET, FILM COATED ORAL at 00:46

## 2019-08-31 RX ADMIN — PERPHENAZINE 4 MG: 4 TABLET, FILM COATED ORAL at 07:57

## 2019-08-31 RX ADMIN — OXYCODONE HYDROCHLORIDE AND ACETAMINOPHEN 2 TABLET: 5; 325 TABLET ORAL at 12:51

## 2019-08-31 RX ADMIN — IRON SUCROSE 200 MG: 20 INJECTION, SOLUTION INTRAVENOUS at 10:14

## 2019-08-31 RX ADMIN — OXYCODONE HYDROCHLORIDE AND ACETAMINOPHEN 2 TABLET: 5; 325 TABLET ORAL at 07:57

## 2019-08-31 RX ADMIN — LETROZOLE 2.5 MG: 2.5 TABLET ORAL at 08:00

## 2019-08-31 RX ADMIN — POTASSIUM CHLORIDE 40 MEQ: 1.5 POWDER, FOR SOLUTION ORAL at 07:59

## 2019-08-31 RX ADMIN — APIXABAN 10 MG: 5 TABLET, FILM COATED ORAL at 07:59

## 2019-08-31 RX ADMIN — HYDROCORTISONE: 1 CREAM TOPICAL at 08:01

## 2019-08-31 RX ADMIN — FAMOTIDINE 40 MG: 20 TABLET ORAL at 07:59

## 2019-08-31 RX ADMIN — Medication 10 ML: at 07:59

## 2019-08-31 RX ADMIN — HEPARIN SODIUM (PORCINE) LOCK FLUSH IV SOLN 100 UNIT/ML 500 UNITS: 100 SOLUTION at 16:31

## 2019-08-31 RX ADMIN — ARIPIPRAZOLE 10 MG: 5 TABLET ORAL at 16:31

## 2019-08-31 ASSESSMENT — PAIN DESCRIPTION - FREQUENCY
FREQUENCY: CONTINUOUS
FREQUENCY: CONTINUOUS
FREQUENCY: INTERMITTENT
FREQUENCY: INTERMITTENT

## 2019-08-31 ASSESSMENT — PAIN DESCRIPTION - DESCRIPTORS
DESCRIPTORS: ACHING

## 2019-08-31 ASSESSMENT — PAIN SCALES - GENERAL
PAINLEVEL_OUTOF10: 4
PAINLEVEL_OUTOF10: 5
PAINLEVEL_OUTOF10: 6
PAINLEVEL_OUTOF10: 8
PAINLEVEL_OUTOF10: 7
PAINLEVEL_OUTOF10: 0

## 2019-08-31 ASSESSMENT — PAIN - FUNCTIONAL ASSESSMENT
PAIN_FUNCTIONAL_ASSESSMENT: PREVENTS OR INTERFERES SOME ACTIVE ACTIVITIES AND ADLS
PAIN_FUNCTIONAL_ASSESSMENT: PREVENTS OR INTERFERES SOME ACTIVE ACTIVITIES AND ADLS
PAIN_FUNCTIONAL_ASSESSMENT: ACTIVITIES ARE NOT PREVENTED
PAIN_FUNCTIONAL_ASSESSMENT: PREVENTS OR INTERFERES SOME ACTIVE ACTIVITIES AND ADLS

## 2019-08-31 ASSESSMENT — PAIN DESCRIPTION - PAIN TYPE
TYPE: ACUTE PAIN

## 2019-08-31 ASSESSMENT — PAIN DESCRIPTION - ONSET
ONSET: ON-GOING

## 2019-08-31 ASSESSMENT — PAIN DESCRIPTION - ORIENTATION
ORIENTATION: RIGHT

## 2019-08-31 ASSESSMENT — PAIN DESCRIPTION - PROGRESSION
CLINICAL_PROGRESSION: NOT CHANGED

## 2019-08-31 ASSESSMENT — PAIN DESCRIPTION - LOCATION
LOCATION: FLANK
LOCATION: RIB CAGE;FLANK
LOCATION: RIB CAGE;FLANK
LOCATION: FLANK
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
## 2019-08-31 NOTE — CARE COORDINATION
CM received call from Marjorie in admissions regarding pre-cert for patient to return to her LTC bed. Pre-cert has been obtained and patient can return over the weekend. CM to call admissions Alex Chisholm [phone]) with time of transport. Nursing to call report to [phone] and orders can be faxed to [phone].     Thank you,  Destinee Dominguez RN,   4th Floor Progressive Care Unit  [phone]
then  the prescription on their way out of the hospital at discharge, or pharmacy can deliver to the bedside if staff is available. (payment due at time of pick-up or delivery - cash, check, or card accepted)     Able to afford home medications/ co-pay costs: Yes    ADLS:  Current PT AM-PAC Score:   /24  Current OT AM-PAC Score:   /24      Discharge Disposition: [facility] (SNF):   [address]  Report: [phone]  Fax: [phone]       LOC at discharge: Jovanny Magui Kumar Completed: Yes    Notification completed in HENS/PAS?:  Not Applicable    IMM Completed:   Not Indicated    Transportation:  Transportation Plan for discharge: EMS transportation   Mode of Transport: Ambulance stretcher - BLS    Reason for medical transport: Other: unable to obtain ambulette   Name of Transport Company: [address]  Phone: [phone]  Transport Time: 6:00 pm     Transportation form completed: Not Indicated    Dialysis:  Dialysis patient: No    Dialysis Center:  Not Applicable    Referrals made at Discharge for outpatient continued care:  Not Applicable    Shoaib and her family were provided with choice of provider; she and her family are in agreement with the discharge plan.     Care Transitions patient: No    SRINI Garcia Cap  [facility]  Case Management Department  Ph: [phone]  Fax: [phone]

## 2019-08-31 NOTE — DISCHARGE INSTR - COC
wound, left, sequela S21.002S    Bilateral pulmonary embolism (HCC) I26.99    CHANTELLE (acute kidney injury) ([facility] [address] N17.9       Isolation/Infection:   Isolation          No Isolation            Nurse Assessment:  Last Vital Signs: BP (!) 152/73   Pulse 95   Temp 98.1 °F (36.7 °C) (Oral)   Resp 20   Ht 5' 8\" (1.727 m)   Wt 230 lb (104.3 kg)   LMP 03/09/2014   SpO2 96%   BMI 34.97 kg/m²     Last documented pain score (0-10 scale): Pain Level: 6  Last Weight:   Wt Readings from Last 1 Encounters:   08/27/19 230 lb (104.3 kg)     Mental Status:  oriented, alert, coherent, logical, thought processes intact and able to concentrate and follow conversation    IV Access:  - None    Nursing Mobility/ADLs:  Walking   Assisted  Transfer  Assisted  Bathing  Assisted  Dressing  Assisted  Toileting  Assisted  Feeding  Independent  Med [address]  Med Delivery   whole    Wound Care Documentation and Therapy:  Wound 08/08/19 Breast Left (Active)   Number of days: 22        Elimination:  Continence:   · Bowel: Yes  · Bladder: Yes  Urinary Catheter: None   Colostomy/Ileostomy/Ileal Conduit: No       Date of Last BM: 8/30/19    Intake/Output Summary (Last 24 hours) at 8/31/2019 1200  Last data filed at 8/31/2019 1112  Gross per 24 hour   Intake 869 ml   Output --   Net 869 ml     I/O last 3 completed shifts: In: 009 [P.O.:600; I.V.:153]  Out: 1300 [Urine:1300]    Safety Concerns:     None    Impairments/Disabilities:      None    Nutrition Therapy:  Current Nutrition Therapy:   - Oral Diet:  General    Routes of Feeding: Oral  Liquids: Thin Liquids  Daily Fluid Restriction: no  Last Modified Barium Swallow with Video (Video Swallowing Test): not done    Treatments at the Time of Hospital Discharge:   Respiratory Treatments: none  Oxygen Therapy:  is on oxygen at 3 L/min per nasal cannula.   Ventilator:    - No ventilator support    Rehab Therapies: Physical Therapy and Occupational Therapy  Weight Bearing

## 2019-09-01 LAB
BLOOD CULTURE, ROUTINE: NORMAL
CULTURE, BLOOD 2: NORMAL

## 2019-09-10 NOTE — PROGRESS NOTES
[facility]     PROCEDURE: 1.  Debridement of left breast in preparation for full-thickness skin  graft (8 x 5 cm). 2.  Complex closure of left breast (5 cm). 3.  Full-thickness skin graft to left breast (4 x 3 cm). DATE: 8/8/19     Chey Cedeno has been recovering satisfactorily since her procedure. Pain has been well controlled with intermittent pain medications. She was found to have bilateral PE treated with anticoagulation late last month.      EXAM     /89   Pulse 101   Temp 98.6 °F (37 °C) (Oral)   Wt 230 lb (104.3 kg)   LMP 03/09/2014   SpO2 99%   BMI 34.97 kg/m²      GEN: NAD   BREAST: Incision healing well                      FTSG healed                No evidence of infection  ABD: Incision healing well  EXT: LUE with swelling     IMP: 64 y. o.female s/p closure and L breast FTSG  PLAN: No issues from PRS standpoint. [address] [address] Hartmann for radiation.   If her wound does not heal after radiation, then will see her again for discussion regarding latissimus flap reconstruction for chest wall coverage.      Girish Chapman MD  [address] [place] [address] [address] Reconstructive Surgery  [phone]  09/11/19

## 2019-09-11 ENCOUNTER — OFFICE VISIT (OUTPATIENT)
Dept: SURGERY | Age: 59
End: 2019-09-11

## 2019-09-11 VITALS
SYSTOLIC BLOOD PRESSURE: 132 MMHG | OXYGEN SATURATION: 99 % | DIASTOLIC BLOOD PRESSURE: 89 MMHG | HEART RATE: 101 BPM | WEIGHT: 230 LBS | TEMPERATURE: 98.6 F | BODY MASS INDEX: 34.97 KG/M2

## 2019-09-11 DIAGNOSIS — Z09 POSTOP CHECK: Primary | ICD-10-CM

## 2019-09-11 PROCEDURE — 99024 POSTOP FOLLOW-UP VISIT: CPT | Performed by: SURGERY

## 2019-09-17 ENCOUNTER — CLINICAL DOCUMENTATION (OUTPATIENT)
Dept: INTERVENTIONAL RADIOLOGY/VASCULAR | Age: 59
End: 2019-09-17

## 2019-09-17 ENCOUNTER — HOSPITAL ENCOUNTER (OUTPATIENT)
Dept: GENERAL RADIOLOGY | Age: 59
Discharge: HOME | End: 2019-09-17
Payer: COMMERCIAL

## 2019-09-17 DIAGNOSIS — C50.919 MALIGNANT NEOPLASM OF FEMALE BREAST, UNSPECIFIED ESTROGEN RECEPTOR STATUS, UNSPECIFIED LATERALITY, UNSPECIFIED SITE OF BREAST (HCC): ICD-10-CM

## 2019-12-03 ENCOUNTER — APPOINTMENT (OUTPATIENT)
Dept: CT IMAGING | Age: 59
DRG: 469 | End: 2019-12-03
Payer: COMMERCIAL

## 2019-12-03 ENCOUNTER — HOSPITAL ENCOUNTER (INPATIENT)
Age: 59
LOS: 2 days | Discharge: NURSING, REHAB OR LONG-TERM CARE | DRG: 469 | End: 2019-12-06
Attending: EMERGENCY MEDICINE | Admitting: INTERNAL MEDICINE
Payer: COMMERCIAL

## 2019-12-03 ENCOUNTER — APPOINTMENT (OUTPATIENT)
Dept: GENERAL RADIOLOGY | Age: 59
DRG: 469 | End: 2019-12-03
Payer: COMMERCIAL

## 2019-12-03 DIAGNOSIS — N17.9 AKI (ACUTE KIDNEY INJURY) (HCC): Primary | ICD-10-CM

## 2019-12-03 DIAGNOSIS — J43.2 CENTRILOBULAR EMPHYSEMA (HCC): ICD-10-CM

## 2019-12-03 DIAGNOSIS — J96.01 ACUTE RESPIRATORY FAILURE WITH HYPOXIA (HCC): ICD-10-CM

## 2019-12-03 DIAGNOSIS — R09.02 HYPOXEMIA: ICD-10-CM

## 2019-12-03 DIAGNOSIS — J18.9 PNEUMONIA OF LEFT UPPER LOBE DUE TO INFECTIOUS ORGANISM: ICD-10-CM

## 2019-12-03 PROBLEM — Z17.0 MALIGNANT NEOPLASM OF UPPER-INNER QUADRANT OF LEFT BREAST IN FEMALE, ESTROGEN RECEPTOR POSITIVE (HCC): Status: ACTIVE | Noted: 2019-07-02

## 2019-12-03 PROBLEM — C50.212 MALIGNANT NEOPLASM OF UPPER-INNER QUADRANT OF LEFT BREAST IN FEMALE, ESTROGEN RECEPTOR POSITIVE (HCC): Status: ACTIVE | Noted: 2019-07-02

## 2019-12-03 LAB
ALBUMIN SERPL-MCNC: 3 G/DL (ref 3.4–5)
ALP BLD-CCNC: 125 U/L (ref 40–129)
ALT SERPL-CCNC: 17 U/L (ref 10–40)
AMORPHOUS: ABNORMAL /HPF
ANION GAP SERPL CALCULATED.3IONS-SCNC: 12 MMOL/L (ref 3–16)
ANION GAP SERPL CALCULATED.3IONS-SCNC: 14 MMOL/L (ref 3–16)
AST SERPL-CCNC: 38 U/L (ref 15–37)
BASE EXCESS VENOUS: 1.9 MMOL/L (ref -2–3)
BASOPHILS ABSOLUTE: 0 K/UL (ref 0–0.2)
BASOPHILS RELATIVE PERCENT: 0.5 %
BILIRUB SERPL-MCNC: 0.3 MG/DL (ref 0–1)
BILIRUBIN DIRECT: <0.2 MG/DL (ref 0–0.3)
BILIRUBIN URINE: NEGATIVE
BILIRUBIN, INDIRECT: ABNORMAL MG/DL (ref 0–1)
BLOOD, URINE: ABNORMAL
BUN BLDV-MCNC: 21 MG/DL (ref 7–20)
BUN BLDV-MCNC: 26 MG/DL (ref 7–20)
CALCIUM SERPL-MCNC: 9.2 MG/DL (ref 8.3–10.6)
CALCIUM SERPL-MCNC: 9.3 MG/DL (ref 8.3–10.6)
CARBOXYHEMOGLOBIN: 2.3 % (ref 0–1.5)
CHLORIDE BLD-SCNC: 100 MMOL/L (ref 99–110)
CHLORIDE BLD-SCNC: 102 MMOL/L (ref 99–110)
CLARITY: CLEAR
CO2: 24 MMOL/L (ref 21–32)
CO2: 24 MMOL/L (ref 21–32)
COLOR: YELLOW
CREAT SERPL-MCNC: 1.5 MG/DL (ref 0.6–1.1)
CREAT SERPL-MCNC: 1.7 MG/DL (ref 0.6–1.1)
EKG ATRIAL RATE: 106 BPM
EKG DIAGNOSIS: NORMAL
EKG P AXIS: 52 DEGREES
EKG P-R INTERVAL: 152 MS
EKG Q-T INTERVAL: 396 MS
EKG QRS DURATION: 106 MS
EKG QTC CALCULATION (BAZETT): 526 MS
EKG R AXIS: 60 DEGREES
EKG T AXIS: 59 DEGREES
EKG VENTRICULAR RATE: 106 BPM
EOSINOPHILS ABSOLUTE: 0.2 K/UL (ref 0–0.6)
EOSINOPHILS RELATIVE PERCENT: 2.8 %
EPITHELIAL CELLS, UA: ABNORMAL /HPF
GFR AFRICAN AMERICAN: 37
GFR AFRICAN AMERICAN: 43
GFR NON-AFRICAN AMERICAN: 31
GFR NON-AFRICAN AMERICAN: 36
GLUCOSE BLD-MCNC: 132 MG/DL (ref 70–99)
GLUCOSE BLD-MCNC: 137 MG/DL (ref 70–99)
GLUCOSE URINE: NEGATIVE MG/DL
HCO3 VENOUS: 25.9 MMOL/L (ref 24–28)
HCT VFR BLD CALC: 32.6 % (ref 36–48)
HEMOGLOBIN, VEN, REDUCED: 12.6 %
HEMOGLOBIN: 10.7 G/DL (ref 12–16)
INR BLD: 1.68 (ref 0.86–1.14)
KETONES, URINE: NEGATIVE MG/DL
LACTIC ACID: 1.7 MMOL/L (ref 0.4–2)
LEUKOCYTE ESTERASE, URINE: NEGATIVE
LYMPHOCYTES ABSOLUTE: 1.2 K/UL (ref 1–5.1)
LYMPHOCYTES RELATIVE PERCENT: 20.6 %
MAGNESIUM: 1.4 MG/DL (ref 1.8–2.4)
MAGNESIUM: 1.4 MG/DL (ref 1.8–2.4)
MCH RBC QN AUTO: 29.9 PG (ref 26–34)
MCHC RBC AUTO-ENTMCNC: 32.9 G/DL (ref 31–36)
MCV RBC AUTO: 91.1 FL (ref 80–100)
METHEMOGLOBIN VENOUS: 0.4 % (ref 0–1.5)
MICROSCOPIC EXAMINATION: YES
MONOCYTES ABSOLUTE: 0.7 K/UL (ref 0–1.3)
MONOCYTES RELATIVE PERCENT: 12.3 %
MUCUS: ABNORMAL /LPF
NEUTROPHILS ABSOLUTE: 3.6 K/UL (ref 1.7–7.7)
NEUTROPHILS RELATIVE PERCENT: 63.8 %
NITRITE, URINE: NEGATIVE
O2 SAT, VEN: 87 %
PCO2, VEN: 40.7 MMHG (ref 41–51)
PDW BLD-RTO: 17.5 % (ref 12.4–15.4)
PH UA: 6 (ref 5–8)
PH VENOUS: 7.42 (ref 7.35–7.45)
PLATELET # BLD: 120 K/UL (ref 135–450)
PMV BLD AUTO: 7.6 FL (ref 5–10.5)
PO2, VEN: 51.9 MMHG (ref 25–40)
POTASSIUM REFLEX MAGNESIUM: 3 MMOL/L (ref 3.5–5.1)
POTASSIUM SERPL-SCNC: 3.3 MMOL/L (ref 3.5–5.1)
PRO-BNP: 42 PG/ML (ref 0–124)
PROTEIN UA: 100 MG/DL
PROTHROMBIN TIME: 19.6 SEC (ref 10–13.2)
RBC # BLD: 3.58 M/UL (ref 4–5.2)
RBC UA: ABNORMAL /HPF (ref 0–2)
SODIUM BLD-SCNC: 138 MMOL/L (ref 136–145)
SODIUM BLD-SCNC: 138 MMOL/L (ref 136–145)
SPECIFIC GRAVITY UA: 1.02 (ref 1–1.03)
TCO2 CALC VENOUS: 27 MMOL/L
TOTAL PROTEIN: 7.6 G/DL (ref 6.4–8.2)
TROPONIN: <0.01 NG/ML
URINE TYPE: ABNORMAL
UROBILINOGEN, URINE: 0.2 E.U./DL
WBC # BLD: 5.7 K/UL (ref 4–11)
WBC UA: ABNORMAL /HPF (ref 0–5)

## 2019-12-03 PROCEDURE — 2580000003 HC RX 258: Performed by: INTERNAL MEDICINE

## 2019-12-03 PROCEDURE — 74177 CT ABD & PELVIS W/CONTRAST: CPT

## 2019-12-03 PROCEDURE — 71275 CT ANGIOGRAPHY CHEST: CPT

## 2019-12-03 PROCEDURE — 84484 ASSAY OF TROPONIN QUANT: CPT

## 2019-12-03 PROCEDURE — 93005 ELECTROCARDIOGRAM TRACING: CPT | Performed by: EMERGENCY MEDICINE

## 2019-12-03 PROCEDURE — 94640 AIRWAY INHALATION TREATMENT: CPT

## 2019-12-03 PROCEDURE — 6370000000 HC RX 637 (ALT 250 FOR IP): Performed by: INTERNAL MEDICINE

## 2019-12-03 PROCEDURE — 96360 HYDRATION IV INFUSION INIT: CPT

## 2019-12-03 PROCEDURE — 99285 EMERGENCY DEPT VISIT HI MDM: CPT

## 2019-12-03 PROCEDURE — 36415 COLL VENOUS BLD VENIPUNCTURE: CPT

## 2019-12-03 PROCEDURE — 80076 HEPATIC FUNCTION PANEL: CPT

## 2019-12-03 PROCEDURE — 83735 ASSAY OF MAGNESIUM: CPT

## 2019-12-03 PROCEDURE — 96361 HYDRATE IV INFUSION ADD-ON: CPT

## 2019-12-03 PROCEDURE — 71046 X-RAY EXAM CHEST 2 VIEWS: CPT

## 2019-12-03 PROCEDURE — 6370000000 HC RX 637 (ALT 250 FOR IP): Performed by: STUDENT IN AN ORGANIZED HEALTH CARE EDUCATION/TRAINING PROGRAM

## 2019-12-03 PROCEDURE — 96365 THER/PROPH/DIAG IV INF INIT: CPT

## 2019-12-03 PROCEDURE — 80048 BASIC METABOLIC PNL TOTAL CA: CPT

## 2019-12-03 PROCEDURE — 94761 N-INVAS EAR/PLS OXIMETRY MLT: CPT

## 2019-12-03 PROCEDURE — 85025 COMPLETE CBC W/AUTO DIFF WBC: CPT

## 2019-12-03 PROCEDURE — G0378 HOSPITAL OBSERVATION PER HR: HCPCS

## 2019-12-03 PROCEDURE — 2580000003 HC RX 258: Performed by: EMERGENCY MEDICINE

## 2019-12-03 PROCEDURE — 81001 URINALYSIS AUTO W/SCOPE: CPT

## 2019-12-03 PROCEDURE — 82803 BLOOD GASES ANY COMBINATION: CPT

## 2019-12-03 PROCEDURE — 83605 ASSAY OF LACTIC ACID: CPT

## 2019-12-03 PROCEDURE — 83880 ASSAY OF NATRIURETIC PEPTIDE: CPT

## 2019-12-03 PROCEDURE — 2700000000 HC OXYGEN THERAPY PER DAY

## 2019-12-03 PROCEDURE — 6360000002 HC RX W HCPCS: Performed by: INTERNAL MEDICINE

## 2019-12-03 PROCEDURE — 6360000004 HC RX CONTRAST MEDICATION: Performed by: STUDENT IN AN ORGANIZED HEALTH CARE EDUCATION/TRAINING PROGRAM

## 2019-12-03 PROCEDURE — 85610 PROTHROMBIN TIME: CPT

## 2019-12-03 RX ORDER — SODIUM CHLORIDE, SODIUM LACTATE, POTASSIUM CHLORIDE, CALCIUM CHLORIDE 600; 310; 30; 20 MG/100ML; MG/100ML; MG/100ML; MG/100ML
INJECTION, SOLUTION INTRAVENOUS CONTINUOUS
Status: DISCONTINUED | OUTPATIENT
Start: 2019-12-03 | End: 2019-12-06

## 2019-12-03 RX ORDER — IPRATROPIUM BROMIDE AND ALBUTEROL SULFATE 2.5; .5 MG/3ML; MG/3ML
1 SOLUTION RESPIRATORY (INHALATION) EVERY 4 HOURS PRN
Status: DISCONTINUED | OUTPATIENT
Start: 2019-12-03 | End: 2019-12-06 | Stop reason: HOSPADM

## 2019-12-03 RX ORDER — PREDNISONE 20 MG/1
80 TABLET ORAL DAILY
Status: DISCONTINUED | OUTPATIENT
Start: 2019-12-04 | End: 2019-12-06 | Stop reason: HOSPADM

## 2019-12-03 RX ORDER — POTASSIUM CHLORIDE 20 MEQ/1
20 TABLET, EXTENDED RELEASE ORAL ONCE
Status: COMPLETED | OUTPATIENT
Start: 2019-12-03 | End: 2019-12-03

## 2019-12-03 RX ORDER — MIRTAZAPINE 15 MG/1
15 TABLET, FILM COATED ORAL NIGHTLY
Status: DISCONTINUED | OUTPATIENT
Start: 2019-12-03 | End: 2019-12-06 | Stop reason: HOSPADM

## 2019-12-03 RX ORDER — CLONIDINE HYDROCHLORIDE 0.1 MG/1
0.1 TABLET ORAL PRN
Status: DISCONTINUED | OUTPATIENT
Start: 2019-12-03 | End: 2019-12-06 | Stop reason: HOSPADM

## 2019-12-03 RX ORDER — TRAZODONE HYDROCHLORIDE 100 MG/1
100 TABLET ORAL NIGHTLY
Status: DISCONTINUED | OUTPATIENT
Start: 2019-12-03 | End: 2019-12-06 | Stop reason: HOSPADM

## 2019-12-03 RX ORDER — IPRATROPIUM BROMIDE AND ALBUTEROL SULFATE 2.5; .5 MG/3ML; MG/3ML
1 SOLUTION RESPIRATORY (INHALATION) 3 TIMES DAILY
Status: DISCONTINUED | OUTPATIENT
Start: 2019-12-03 | End: 2019-12-06

## 2019-12-03 RX ORDER — PREDNISONE 20 MG/1
20 TABLET ORAL DAILY
Status: DISCONTINUED | OUTPATIENT
Start: 2019-12-03 | End: 2019-12-03

## 2019-12-03 RX ORDER — LEVOFLOXACIN 5 MG/ML
500 INJECTION, SOLUTION INTRAVENOUS EVERY 24 HOURS
Status: DISCONTINUED | OUTPATIENT
Start: 2019-12-03 | End: 2019-12-06

## 2019-12-03 RX ORDER — SODIUM CHLORIDE 0.9 % (FLUSH) 0.9 %
10 SYRINGE (ML) INJECTION PRN
Status: DISCONTINUED | OUTPATIENT
Start: 2019-12-03 | End: 2019-12-06 | Stop reason: HOSPADM

## 2019-12-03 RX ORDER — OXYCODONE HYDROCHLORIDE 5 MG/1
5 TABLET ORAL ONCE
Status: COMPLETED | OUTPATIENT
Start: 2019-12-03 | End: 2019-12-03

## 2019-12-03 RX ORDER — CYCLOBENZAPRINE HCL 10 MG
5 TABLET ORAL 3 TIMES DAILY PRN
Status: DISCONTINUED | OUTPATIENT
Start: 2019-12-03 | End: 2019-12-06 | Stop reason: HOSPADM

## 2019-12-03 RX ORDER — LETROZOLE 2.5 MG/1
2.5 TABLET, FILM COATED ORAL DAILY
Status: DISCONTINUED | OUTPATIENT
Start: 2019-12-03 | End: 2019-12-06 | Stop reason: HOSPADM

## 2019-12-03 RX ORDER — OXYCODONE HYDROCHLORIDE AND ACETAMINOPHEN 5; 325 MG/1; MG/1
1 TABLET ORAL EVERY 8 HOURS PRN
Status: DISCONTINUED | OUTPATIENT
Start: 2019-12-03 | End: 2019-12-06 | Stop reason: HOSPADM

## 2019-12-03 RX ORDER — OXYCODONE HYDROCHLORIDE 5 MG/1
5 TABLET ORAL ONCE
Status: DISCONTINUED | OUTPATIENT
Start: 2019-12-03 | End: 2019-12-03

## 2019-12-03 RX ORDER — AMLODIPINE BESYLATE 10 MG/1
10 TABLET ORAL DAILY
Status: DISCONTINUED | OUTPATIENT
Start: 2019-12-03 | End: 2019-12-06 | Stop reason: HOSPADM

## 2019-12-03 RX ORDER — PROCHLORPERAZINE MALEATE 10 MG
10 TABLET ORAL EVERY 6 HOURS PRN
Status: DISCONTINUED | OUTPATIENT
Start: 2019-12-03 | End: 2019-12-06 | Stop reason: HOSPADM

## 2019-12-03 RX ORDER — SODIUM CHLORIDE, SODIUM LACTATE, POTASSIUM CHLORIDE, CALCIUM CHLORIDE 600; 310; 30; 20 MG/100ML; MG/100ML; MG/100ML; MG/100ML
1000 INJECTION, SOLUTION INTRAVENOUS ONCE
Status: DISCONTINUED | OUTPATIENT
Start: 2019-12-03 | End: 2019-12-03

## 2019-12-03 RX ORDER — SODIUM CHLORIDE 0.9 % (FLUSH) 0.9 %
10 SYRINGE (ML) INJECTION EVERY 12 HOURS SCHEDULED
Status: DISCONTINUED | OUTPATIENT
Start: 2019-12-03 | End: 2019-12-06 | Stop reason: HOSPADM

## 2019-12-03 RX ORDER — ONDANSETRON 2 MG/ML
4 INJECTION INTRAMUSCULAR; INTRAVENOUS EVERY 6 HOURS PRN
Status: DISCONTINUED | OUTPATIENT
Start: 2019-12-03 | End: 2019-12-06 | Stop reason: HOSPADM

## 2019-12-03 RX ORDER — ACETAMINOPHEN 325 MG/1
650 TABLET ORAL EVERY 4 HOURS PRN
Status: DISCONTINUED | OUTPATIENT
Start: 2019-12-03 | End: 2019-12-06 | Stop reason: HOSPADM

## 2019-12-03 RX ORDER — ARIPIPRAZOLE 5 MG/1
10 TABLET ORAL EVERY EVENING
Status: DISCONTINUED | OUTPATIENT
Start: 2019-12-03 | End: 2019-12-06 | Stop reason: HOSPADM

## 2019-12-03 RX ORDER — 0.9 % SODIUM CHLORIDE 0.9 %
1000 INTRAVENOUS SOLUTION INTRAVENOUS ONCE
Status: COMPLETED | OUTPATIENT
Start: 2019-12-03 | End: 2019-12-03

## 2019-12-03 RX ORDER — IPRATROPIUM BROMIDE AND ALBUTEROL SULFATE 2.5; .5 MG/3ML; MG/3ML
1 SOLUTION RESPIRATORY (INHALATION)
Status: DISCONTINUED | OUTPATIENT
Start: 2019-12-03 | End: 2019-12-03

## 2019-12-03 RX ADMIN — TRAZODONE HYDROCHLORIDE 100 MG: 100 TABLET ORAL at 20:30

## 2019-12-03 RX ADMIN — IPRATROPIUM BROMIDE AND ALBUTEROL SULFATE 1 AMPULE: .5; 3 SOLUTION RESPIRATORY (INHALATION) at 14:46

## 2019-12-03 RX ADMIN — OXYCODONE HYDROCHLORIDE AND ACETAMINOPHEN 1 TABLET: 5; 325 TABLET ORAL at 19:08

## 2019-12-03 RX ADMIN — PROCHLORPERAZINE MALEATE 10 MG: 10 TABLET ORAL at 12:55

## 2019-12-03 RX ADMIN — LEVOFLOXACIN 500 MG: 5 INJECTION, SOLUTION INTRAVENOUS at 09:17

## 2019-12-03 RX ADMIN — PROCHLORPERAZINE MALEATE 10 MG: 10 TABLET ORAL at 20:30

## 2019-12-03 RX ADMIN — APIXABAN 5 MG: 5 TABLET, FILM COATED ORAL at 20:30

## 2019-12-03 RX ADMIN — SODIUM CHLORIDE, SODIUM LACTATE, POTASSIUM CHLORIDE, AND CALCIUM CHLORIDE: 600; 310; 30; 20 INJECTION, SOLUTION INTRAVENOUS at 22:13

## 2019-12-03 RX ADMIN — SODIUM CHLORIDE 1000 ML: 0.9 INJECTION, SOLUTION INTRAVENOUS at 04:00

## 2019-12-03 RX ADMIN — OXYCODONE HYDROCHLORIDE AND ACETAMINOPHEN 1 TABLET: 5; 325 TABLET ORAL at 11:24

## 2019-12-03 RX ADMIN — AMLODIPINE BESYLATE 10 MG: 10 TABLET ORAL at 11:24

## 2019-12-03 RX ADMIN — APIXABAN 5 MG: 5 TABLET, FILM COATED ORAL at 11:24

## 2019-12-03 RX ADMIN — IPRATROPIUM BROMIDE AND ALBUTEROL SULFATE 1 AMPULE: .5; 3 SOLUTION RESPIRATORY (INHALATION) at 08:12

## 2019-12-03 RX ADMIN — OXYCODONE HYDROCHLORIDE 5 MG: 5 TABLET ORAL at 03:05

## 2019-12-03 RX ADMIN — LETROZOLE 2.5 MG: 2.5 TABLET ORAL at 11:25

## 2019-12-03 RX ADMIN — PREDNISONE 20 MG: 20 TABLET ORAL at 09:17

## 2019-12-03 RX ADMIN — Medication 10 ML: at 09:19

## 2019-12-03 RX ADMIN — CYCLOBENZAPRINE 5 MG: 10 TABLET, FILM COATED ORAL at 16:24

## 2019-12-03 RX ADMIN — ARIPIPRAZOLE 10 MG: 5 TABLET ORAL at 18:20

## 2019-12-03 RX ADMIN — MIRTAZAPINE 15 MG: 15 TABLET, FILM COATED ORAL at 20:30

## 2019-12-03 RX ADMIN — IOPAMIDOL 80 ML: 755 INJECTION, SOLUTION INTRAVENOUS at 01:49

## 2019-12-03 RX ADMIN — POTASSIUM CHLORIDE 20 MEQ: 20 TABLET, EXTENDED RELEASE ORAL at 15:24

## 2019-12-03 RX ADMIN — SODIUM CHLORIDE, SODIUM LACTATE, POTASSIUM CHLORIDE, AND CALCIUM CHLORIDE: 600; 310; 30; 20 INJECTION, SOLUTION INTRAVENOUS at 09:17

## 2019-12-03 RX ADMIN — IPRATROPIUM BROMIDE AND ALBUTEROL SULFATE 1 AMPULE: .5; 3 SOLUTION RESPIRATORY (INHALATION) at 20:56

## 2019-12-03 ASSESSMENT — ENCOUNTER SYMPTOMS
COUGH: 0
CONSTIPATION: 0
BACK PAIN: 1
ABDOMINAL PAIN: 0
DIARRHEA: 0
SHORTNESS OF BREATH: 1
EYE PAIN: 0
SINUS PAIN: 0

## 2019-12-03 ASSESSMENT — PAIN SCALES - GENERAL
PAINLEVEL_OUTOF10: 6
PAINLEVEL_OUTOF10: 8
PAINLEVEL_OUTOF10: 9
PAINLEVEL_OUTOF10: 6
PAINLEVEL_OUTOF10: 6
PAINLEVEL_OUTOF10: 3
PAINLEVEL_OUTOF10: 6
PAINLEVEL_OUTOF10: 7
PAINLEVEL_OUTOF10: 0

## 2019-12-03 ASSESSMENT — PAIN DESCRIPTION - PROGRESSION
CLINICAL_PROGRESSION: GRADUALLY IMPROVING
CLINICAL_PROGRESSION: GRADUALLY IMPROVING

## 2019-12-03 ASSESSMENT — PAIN DESCRIPTION - ONSET
ONSET: SUDDEN
ONSET: SUDDEN
ONSET: GRADUAL

## 2019-12-03 ASSESSMENT — PAIN DESCRIPTION - PAIN TYPE
TYPE: ACUTE PAIN

## 2019-12-03 ASSESSMENT — PAIN DESCRIPTION - DESCRIPTORS
DESCRIPTORS: SHARP
DESCRIPTORS: ACHING
DESCRIPTORS: ACHING

## 2019-12-03 ASSESSMENT — PAIN DESCRIPTION - FREQUENCY
FREQUENCY: INTERMITTENT
FREQUENCY: CONTINUOUS
FREQUENCY: INTERMITTENT

## 2019-12-03 ASSESSMENT — PAIN DESCRIPTION - ORIENTATION
ORIENTATION: RIGHT
ORIENTATION: RIGHT;LOWER
ORIENTATION: LOWER
ORIENTATION: RIGHT
ORIENTATION: RIGHT

## 2019-12-03 ASSESSMENT — PAIN DESCRIPTION - LOCATION
LOCATION: FLANK
LOCATION: FLANK
LOCATION: BACK
LOCATION: BACK
LOCATION: FLANK

## 2019-12-03 ASSESSMENT — VISUAL ACUITY: OU: 1

## 2019-12-03 ASSESSMENT — PAIN - FUNCTIONAL ASSESSMENT: PAIN_FUNCTIONAL_ASSESSMENT: ACTIVITIES ARE NOT PREVENTED

## 2019-12-04 PROBLEM — R09.02 HYPOXEMIA: Status: ACTIVE | Noted: 2019-12-04

## 2019-12-04 PROBLEM — N17.9 ACUTE RENAL FAILURE (ARF) (HCC): Status: ACTIVE | Noted: 2019-12-04

## 2019-12-04 PROBLEM — J18.9 PNEUMONIA DUE TO INFECTIOUS ORGANISM: Status: ACTIVE | Noted: 2019-12-04

## 2019-12-04 PROBLEM — J43.2 CENTRILOBULAR EMPHYSEMA (HCC): Status: ACTIVE | Noted: 2019-12-04

## 2019-12-04 LAB
ANION GAP SERPL CALCULATED.3IONS-SCNC: 11 MMOL/L (ref 3–16)
BUN BLDV-MCNC: 22 MG/DL (ref 7–20)
CALCIUM SERPL-MCNC: 9.5 MG/DL (ref 8.3–10.6)
CHLORIDE BLD-SCNC: 106 MMOL/L (ref 99–110)
CO2: 25 MMOL/L (ref 21–32)
CREAT SERPL-MCNC: 1.5 MG/DL (ref 0.6–1.1)
GFR AFRICAN AMERICAN: 43
GFR NON-AFRICAN AMERICAN: 36
GLUCOSE BLD-MCNC: 100 MG/DL (ref 70–99)
HCT VFR BLD CALC: 32.1 % (ref 36–48)
HEMOGLOBIN: 10.4 G/DL (ref 12–16)
MCH RBC QN AUTO: 30.1 PG (ref 26–34)
MCHC RBC AUTO-ENTMCNC: 32.5 G/DL (ref 31–36)
MCV RBC AUTO: 92.7 FL (ref 80–100)
PDW BLD-RTO: 17.6 % (ref 12.4–15.4)
PLATELET # BLD: 127 K/UL (ref 135–450)
PMV BLD AUTO: 8 FL (ref 5–10.5)
POTASSIUM REFLEX MAGNESIUM: 3.6 MMOL/L (ref 3.5–5.1)
RBC # BLD: 3.46 M/UL (ref 4–5.2)
SODIUM BLD-SCNC: 142 MMOL/L (ref 136–145)
WBC # BLD: 4.7 K/UL (ref 4–11)

## 2019-12-04 PROCEDURE — 6360000002 HC RX W HCPCS: Performed by: INTERNAL MEDICINE

## 2019-12-04 PROCEDURE — 99223 1ST HOSP IP/OBS HIGH 75: CPT | Performed by: INTERNAL MEDICINE

## 2019-12-04 PROCEDURE — 6370000000 HC RX 637 (ALT 250 FOR IP): Performed by: INTERNAL MEDICINE

## 2019-12-04 PROCEDURE — 2700000000 HC OXYGEN THERAPY PER DAY

## 2019-12-04 PROCEDURE — 6370000000 HC RX 637 (ALT 250 FOR IP): Performed by: STUDENT IN AN ORGANIZED HEALTH CARE EDUCATION/TRAINING PROGRAM

## 2019-12-04 PROCEDURE — 80048 BASIC METABOLIC PNL TOTAL CA: CPT

## 2019-12-04 PROCEDURE — 94761 N-INVAS EAR/PLS OXIMETRY MLT: CPT

## 2019-12-04 PROCEDURE — 1200000000 HC SEMI PRIVATE

## 2019-12-04 PROCEDURE — 2580000003 HC RX 258: Performed by: INTERNAL MEDICINE

## 2019-12-04 PROCEDURE — 96366 THER/PROPH/DIAG IV INF ADDON: CPT

## 2019-12-04 PROCEDURE — 85027 COMPLETE CBC AUTOMATED: CPT

## 2019-12-04 PROCEDURE — 94640 AIRWAY INHALATION TREATMENT: CPT

## 2019-12-04 PROCEDURE — 36415 COLL VENOUS BLD VENIPUNCTURE: CPT

## 2019-12-04 RX ADMIN — AMLODIPINE BESYLATE 10 MG: 10 TABLET ORAL at 09:09

## 2019-12-04 RX ADMIN — APIXABAN 5 MG: 5 TABLET, FILM COATED ORAL at 09:09

## 2019-12-04 RX ADMIN — ACETAMINOPHEN 650 MG: 325 TABLET ORAL at 20:59

## 2019-12-04 RX ADMIN — SODIUM CHLORIDE, SODIUM LACTATE, POTASSIUM CHLORIDE, AND CALCIUM CHLORIDE: 600; 310; 30; 20 INJECTION, SOLUTION INTRAVENOUS at 12:00

## 2019-12-04 RX ADMIN — MAGNESIUM HYDROXIDE 30 ML: 400 SUSPENSION ORAL at 20:59

## 2019-12-04 RX ADMIN — PREDNISONE 80 MG: 20 TABLET ORAL at 09:10

## 2019-12-04 RX ADMIN — OXYCODONE HYDROCHLORIDE AND ACETAMINOPHEN 1 TABLET: 5; 325 TABLET ORAL at 16:58

## 2019-12-04 RX ADMIN — ARIPIPRAZOLE 10 MG: 5 TABLET ORAL at 16:59

## 2019-12-04 RX ADMIN — TRAZODONE HYDROCHLORIDE 100 MG: 100 TABLET ORAL at 20:52

## 2019-12-04 RX ADMIN — PROCHLORPERAZINE MALEATE 10 MG: 10 TABLET ORAL at 21:47

## 2019-12-04 RX ADMIN — LETROZOLE 2.5 MG: 2.5 TABLET ORAL at 09:36

## 2019-12-04 RX ADMIN — IPRATROPIUM BROMIDE AND ALBUTEROL SULFATE 1 AMPULE: .5; 3 SOLUTION RESPIRATORY (INHALATION) at 15:42

## 2019-12-04 RX ADMIN — OXYCODONE HYDROCHLORIDE AND ACETAMINOPHEN 1 TABLET: 5; 325 TABLET ORAL at 05:28

## 2019-12-04 RX ADMIN — APIXABAN 5 MG: 5 TABLET, FILM COATED ORAL at 20:52

## 2019-12-04 RX ADMIN — Medication 10 ML: at 21:47

## 2019-12-04 RX ADMIN — LEVOFLOXACIN 500 MG: 5 INJECTION, SOLUTION INTRAVENOUS at 09:10

## 2019-12-04 RX ADMIN — Medication 10 ML: at 09:10

## 2019-12-04 RX ADMIN — IPRATROPIUM BROMIDE AND ALBUTEROL SULFATE 1 AMPULE: .5; 3 SOLUTION RESPIRATORY (INHALATION) at 21:32

## 2019-12-04 RX ADMIN — MIRTAZAPINE 15 MG: 15 TABLET, FILM COATED ORAL at 20:52

## 2019-12-04 RX ADMIN — IPRATROPIUM BROMIDE AND ALBUTEROL SULFATE 1 AMPULE: .5; 3 SOLUTION RESPIRATORY (INHALATION) at 08:40

## 2019-12-04 RX ADMIN — PROCHLORPERAZINE MALEATE 10 MG: 10 TABLET ORAL at 16:24

## 2019-12-04 ASSESSMENT — PAIN DESCRIPTION - FREQUENCY
FREQUENCY: INTERMITTENT
FREQUENCY: CONTINUOUS

## 2019-12-04 ASSESSMENT — ENCOUNTER SYMPTOMS
CONSTIPATION: 0
ABDOMINAL PAIN: 0
COLOR CHANGE: 0
BACK PAIN: 0
SHORTNESS OF BREATH: 1
DIARRHEA: 0
EYE PAIN: 0
COUGH: 0
SINUS PAIN: 0

## 2019-12-04 ASSESSMENT — PAIN SCALES - GENERAL
PAINLEVEL_OUTOF10: 0
PAINLEVEL_OUTOF10: 6
PAINLEVEL_OUTOF10: 4
PAINLEVEL_OUTOF10: 4
PAINLEVEL_OUTOF10: 0
PAINLEVEL_OUTOF10: 6

## 2019-12-04 ASSESSMENT — PAIN DESCRIPTION - PAIN TYPE
TYPE: ACUTE PAIN

## 2019-12-04 ASSESSMENT — PAIN DESCRIPTION - ORIENTATION
ORIENTATION: LOWER;RIGHT
ORIENTATION: LOWER
ORIENTATION: LOWER

## 2019-12-04 ASSESSMENT — PAIN DESCRIPTION - ONSET
ONSET: ON-GOING
ONSET: ON-GOING

## 2019-12-04 ASSESSMENT — PAIN DESCRIPTION - DESCRIPTORS
DESCRIPTORS: ACHING
DESCRIPTORS: SHOOTING;STABBING

## 2019-12-04 ASSESSMENT — PAIN DESCRIPTION - LOCATION
LOCATION: BACK

## 2019-12-04 ASSESSMENT — VISUAL ACUITY: OU: 1

## 2019-12-04 ASSESSMENT — PAIN DESCRIPTION - PROGRESSION
CLINICAL_PROGRESSION: GRADUALLY WORSENING
CLINICAL_PROGRESSION: NOT CHANGED

## 2019-12-05 LAB
ANION GAP SERPL CALCULATED.3IONS-SCNC: 14 MMOL/L (ref 3–16)
BUN BLDV-MCNC: 23 MG/DL (ref 7–20)
CALCIUM SERPL-MCNC: 9.9 MG/DL (ref 8.3–10.6)
CHLORIDE BLD-SCNC: 103 MMOL/L (ref 99–110)
CO2: 25 MMOL/L (ref 21–32)
CREAT SERPL-MCNC: 1.2 MG/DL (ref 0.6–1.1)
GFR AFRICAN AMERICAN: 56
GFR NON-AFRICAN AMERICAN: 46
GLUCOSE BLD-MCNC: 116 MG/DL (ref 70–99)
HCT VFR BLD CALC: 32.2 % (ref 36–48)
HEMOGLOBIN: 10.6 G/DL (ref 12–16)
MCH RBC QN AUTO: 29.9 PG (ref 26–34)
MCHC RBC AUTO-ENTMCNC: 32.8 G/DL (ref 31–36)
MCV RBC AUTO: 91.3 FL (ref 80–100)
PDW BLD-RTO: 17.4 % (ref 12.4–15.4)
PLATELET # BLD: 124 K/UL (ref 135–450)
PMV BLD AUTO: 8.1 FL (ref 5–10.5)
POTASSIUM SERPL-SCNC: 4 MMOL/L (ref 3.5–5.1)
RBC # BLD: 3.53 M/UL (ref 4–5.2)
SODIUM BLD-SCNC: 142 MMOL/L (ref 136–145)
WBC # BLD: 6.1 K/UL (ref 4–11)

## 2019-12-05 PROCEDURE — 97530 THERAPEUTIC ACTIVITIES: CPT

## 2019-12-05 PROCEDURE — 80048 BASIC METABOLIC PNL TOTAL CA: CPT

## 2019-12-05 PROCEDURE — 94640 AIRWAY INHALATION TREATMENT: CPT

## 2019-12-05 PROCEDURE — 6370000000 HC RX 637 (ALT 250 FOR IP): Performed by: STUDENT IN AN ORGANIZED HEALTH CARE EDUCATION/TRAINING PROGRAM

## 2019-12-05 PROCEDURE — 2580000003 HC RX 258: Performed by: INTERNAL MEDICINE

## 2019-12-05 PROCEDURE — 6370000000 HC RX 637 (ALT 250 FOR IP): Performed by: INTERNAL MEDICINE

## 2019-12-05 PROCEDURE — 85027 COMPLETE CBC AUTOMATED: CPT

## 2019-12-05 PROCEDURE — 97161 PT EVAL LOW COMPLEX 20 MIN: CPT

## 2019-12-05 PROCEDURE — 6360000002 HC RX W HCPCS: Performed by: INTERNAL MEDICINE

## 2019-12-05 PROCEDURE — 2700000000 HC OXYGEN THERAPY PER DAY

## 2019-12-05 PROCEDURE — 97165 OT EVAL LOW COMPLEX 30 MIN: CPT

## 2019-12-05 PROCEDURE — 94761 N-INVAS EAR/PLS OXIMETRY MLT: CPT

## 2019-12-05 PROCEDURE — 36415 COLL VENOUS BLD VENIPUNCTURE: CPT

## 2019-12-05 PROCEDURE — 97116 GAIT TRAINING THERAPY: CPT

## 2019-12-05 PROCEDURE — 99233 SBSQ HOSP IP/OBS HIGH 50: CPT | Performed by: INTERNAL MEDICINE

## 2019-12-05 PROCEDURE — 1200000000 HC SEMI PRIVATE

## 2019-12-05 RX ADMIN — LEVOFLOXACIN 500 MG: 5 INJECTION, SOLUTION INTRAVENOUS at 09:28

## 2019-12-05 RX ADMIN — TRAZODONE HYDROCHLORIDE 100 MG: 100 TABLET ORAL at 21:35

## 2019-12-05 RX ADMIN — LETROZOLE 2.5 MG: 2.5 TABLET ORAL at 09:29

## 2019-12-05 RX ADMIN — AMLODIPINE BESYLATE 10 MG: 10 TABLET ORAL at 09:27

## 2019-12-05 RX ADMIN — ARIPIPRAZOLE 10 MG: 5 TABLET ORAL at 18:10

## 2019-12-05 RX ADMIN — MIRTAZAPINE 15 MG: 15 TABLET, FILM COATED ORAL at 21:35

## 2019-12-05 RX ADMIN — OXYCODONE HYDROCHLORIDE AND ACETAMINOPHEN 1 TABLET: 5; 325 TABLET ORAL at 09:27

## 2019-12-05 RX ADMIN — Medication 10 ML: at 21:35

## 2019-12-05 RX ADMIN — IPRATROPIUM BROMIDE AND ALBUTEROL SULFATE 1 AMPULE: .5; 3 SOLUTION RESPIRATORY (INHALATION) at 08:21

## 2019-12-05 RX ADMIN — IPRATROPIUM BROMIDE AND ALBUTEROL SULFATE 1 AMPULE: .5; 3 SOLUTION RESPIRATORY (INHALATION) at 15:17

## 2019-12-05 RX ADMIN — OXYCODONE HYDROCHLORIDE AND ACETAMINOPHEN 1 TABLET: 5; 325 TABLET ORAL at 01:14

## 2019-12-05 RX ADMIN — APIXABAN 5 MG: 5 TABLET, FILM COATED ORAL at 09:27

## 2019-12-05 RX ADMIN — Medication 10 ML: at 09:29

## 2019-12-05 RX ADMIN — PREDNISONE 80 MG: 20 TABLET ORAL at 09:30

## 2019-12-05 RX ADMIN — IPRATROPIUM BROMIDE AND ALBUTEROL SULFATE 1 AMPULE: .5; 3 SOLUTION RESPIRATORY (INHALATION) at 22:03

## 2019-12-05 RX ADMIN — PROCHLORPERAZINE MALEATE 10 MG: 10 TABLET ORAL at 17:07

## 2019-12-05 RX ADMIN — SODIUM CHLORIDE, SODIUM LACTATE, POTASSIUM CHLORIDE, AND CALCIUM CHLORIDE: 600; 310; 30; 20 INJECTION, SOLUTION INTRAVENOUS at 01:17

## 2019-12-05 RX ADMIN — APIXABAN 5 MG: 5 TABLET, FILM COATED ORAL at 21:35

## 2019-12-05 RX ADMIN — SODIUM CHLORIDE, SODIUM LACTATE, POTASSIUM CHLORIDE, AND CALCIUM CHLORIDE: 600; 310; 30; 20 INJECTION, SOLUTION INTRAVENOUS at 15:43

## 2019-12-05 RX ADMIN — OXYCODONE HYDROCHLORIDE AND ACETAMINOPHEN 1 TABLET: 5; 325 TABLET ORAL at 17:25

## 2019-12-05 RX ADMIN — CYCLOBENZAPRINE 5 MG: 10 TABLET, FILM COATED ORAL at 17:07

## 2019-12-05 ASSESSMENT — PAIN DESCRIPTION - PAIN TYPE
TYPE: CHRONIC PAIN
TYPE: CHRONIC PAIN
TYPE: ACUTE PAIN

## 2019-12-05 ASSESSMENT — PAIN DESCRIPTION - LOCATION
LOCATION: GENERALIZED
LOCATION: BACK
LOCATION: BACK

## 2019-12-05 ASSESSMENT — PAIN DESCRIPTION - DESCRIPTORS: DESCRIPTORS: ACHING

## 2019-12-05 ASSESSMENT — PAIN SCALES - GENERAL
PAINLEVEL_OUTOF10: 7
PAINLEVEL_OUTOF10: 5
PAINLEVEL_OUTOF10: 6
PAINLEVEL_OUTOF10: 0
PAINLEVEL_OUTOF10: 5

## 2019-12-05 ASSESSMENT — PAIN DESCRIPTION - ONSET
ONSET: ON-GOING
ONSET: ON-GOING

## 2019-12-06 VITALS
BODY MASS INDEX: 35.61 KG/M2 | SYSTOLIC BLOOD PRESSURE: 152 MMHG | OXYGEN SATURATION: 95 % | HEIGHT: 68 IN | HEART RATE: 87 BPM | WEIGHT: 235 LBS | DIASTOLIC BLOOD PRESSURE: 88 MMHG | RESPIRATION RATE: 18 BRPM | TEMPERATURE: 97.8 F

## 2019-12-06 PROCEDURE — 6370000000 HC RX 637 (ALT 250 FOR IP): Performed by: INTERNAL MEDICINE

## 2019-12-06 PROCEDURE — 99232 SBSQ HOSP IP/OBS MODERATE 35: CPT | Performed by: INTERNAL MEDICINE

## 2019-12-06 PROCEDURE — 6360000002 HC RX W HCPCS: Performed by: INTERNAL MEDICINE

## 2019-12-06 PROCEDURE — 2580000003 HC RX 258: Performed by: INTERNAL MEDICINE

## 2019-12-06 PROCEDURE — 6370000000 HC RX 637 (ALT 250 FOR IP): Performed by: STUDENT IN AN ORGANIZED HEALTH CARE EDUCATION/TRAINING PROGRAM

## 2019-12-06 RX ORDER — LEVOFLOXACIN 500 MG/1
750 TABLET, FILM COATED ORAL DAILY
Status: DISCONTINUED | OUTPATIENT
Start: 2019-12-07 | End: 2019-12-06 | Stop reason: HOSPADM

## 2019-12-06 RX ORDER — LEVOFLOXACIN 750 MG/1
750 TABLET ORAL DAILY
Qty: 7 TABLET | Refills: 0 | Status: SHIPPED | OUTPATIENT
Start: 2019-12-07 | End: 2019-12-14

## 2019-12-06 RX ORDER — PREDNISONE 20 MG/1
80 TABLET ORAL DAILY
Qty: 28 TABLET | Refills: 0 | Status: SHIPPED | OUTPATIENT
Start: 2019-12-07 | End: 2019-12-14

## 2019-12-06 RX ADMIN — LETROZOLE 2.5 MG: 2.5 TABLET ORAL at 09:17

## 2019-12-06 RX ADMIN — AMLODIPINE BESYLATE 10 MG: 10 TABLET ORAL at 09:17

## 2019-12-06 RX ADMIN — SODIUM CHLORIDE, SODIUM LACTATE, POTASSIUM CHLORIDE, AND CALCIUM CHLORIDE: 600; 310; 30; 20 INJECTION, SOLUTION INTRAVENOUS at 05:22

## 2019-12-06 RX ADMIN — OXYCODONE HYDROCHLORIDE AND ACETAMINOPHEN 1 TABLET: 5; 325 TABLET ORAL at 09:52

## 2019-12-06 RX ADMIN — Medication 10 ML: at 09:18

## 2019-12-06 RX ADMIN — LEVOFLOXACIN 500 MG: 5 INJECTION, SOLUTION INTRAVENOUS at 09:52

## 2019-12-06 RX ADMIN — OXYCODONE HYDROCHLORIDE AND ACETAMINOPHEN 1 TABLET: 5; 325 TABLET ORAL at 01:37

## 2019-12-06 RX ADMIN — PREDNISONE 80 MG: 20 TABLET ORAL at 09:17

## 2019-12-06 RX ADMIN — APIXABAN 5 MG: 5 TABLET, FILM COATED ORAL at 09:17

## 2019-12-06 ASSESSMENT — PAIN DESCRIPTION - DESCRIPTORS
DESCRIPTORS: ACHING
DESCRIPTORS: ACHING

## 2019-12-06 ASSESSMENT — PAIN SCALES - GENERAL
PAINLEVEL_OUTOF10: 6
PAINLEVEL_OUTOF10: 6

## 2019-12-06 ASSESSMENT — PAIN DESCRIPTION - ORIENTATION
ORIENTATION: RIGHT
ORIENTATION: LOWER

## 2019-12-06 ASSESSMENT — PAIN DESCRIPTION - PROGRESSION
CLINICAL_PROGRESSION: NOT CHANGED
CLINICAL_PROGRESSION: NOT CHANGED

## 2019-12-06 ASSESSMENT — PAIN DESCRIPTION - ONSET
ONSET: ON-GOING
ONSET: ON-GOING

## 2019-12-06 ASSESSMENT — PAIN DESCRIPTION - PAIN TYPE
TYPE: ACUTE PAIN
TYPE: CHRONIC PAIN

## 2019-12-06 ASSESSMENT — PAIN DESCRIPTION - FREQUENCY
FREQUENCY: INTERMITTENT
FREQUENCY: CONTINUOUS

## 2019-12-06 ASSESSMENT — PAIN DESCRIPTION - LOCATION
LOCATION: BACK
LOCATION: FLANK

## 2019-12-06 ASSESSMENT — PAIN - FUNCTIONAL ASSESSMENT: PAIN_FUNCTIONAL_ASSESSMENT: ACTIVITIES ARE NOT PREVENTED

## 2020-03-05 ENCOUNTER — APPOINTMENT (OUTPATIENT)
Dept: GENERAL RADIOLOGY | Age: 60
DRG: 385 | End: 2020-03-05
Payer: COMMERCIAL

## 2020-03-05 ENCOUNTER — HOSPITAL ENCOUNTER (INPATIENT)
Age: 60
LOS: 2 days | Discharge: NURSING, REHAB OR LONG-TERM CARE | DRG: 385 | End: 2020-03-07
Attending: EMERGENCY MEDICINE | Admitting: INTERNAL MEDICINE
Payer: COMMERCIAL

## 2020-03-05 ENCOUNTER — APPOINTMENT (OUTPATIENT)
Dept: CT IMAGING | Age: 60
DRG: 385 | End: 2020-03-05
Payer: COMMERCIAL

## 2020-03-05 PROBLEM — N04.9 NEPHROTIC SYNDROME: Status: ACTIVE | Noted: 2020-03-05

## 2020-03-05 LAB
ALBUMIN SERPL-MCNC: 3.1 G/DL (ref 3.4–5)
ALP BLD-CCNC: 125 U/L (ref 40–129)
ALT SERPL-CCNC: 14 U/L (ref 10–40)
ANION GAP SERPL CALCULATED.3IONS-SCNC: 12 MMOL/L (ref 3–16)
AST SERPL-CCNC: 32 U/L (ref 15–37)
BASOPHILS ABSOLUTE: 0 K/UL (ref 0–0.2)
BASOPHILS RELATIVE PERCENT: 0.7 %
BILIRUB SERPL-MCNC: 0.4 MG/DL (ref 0–1)
BILIRUBIN DIRECT: <0.2 MG/DL (ref 0–0.3)
BILIRUBIN URINE: NEGATIVE
BILIRUBIN, INDIRECT: ABNORMAL MG/DL (ref 0–1)
BLOOD, URINE: ABNORMAL
BUN BLDV-MCNC: 19 MG/DL (ref 7–20)
CALCIUM SERPL-MCNC: 9.8 MG/DL (ref 8.3–10.6)
CHLORIDE BLD-SCNC: 104 MMOL/L (ref 99–110)
CLARITY: CLEAR
CO2: 23 MMOL/L (ref 21–32)
COLOR: YELLOW
CREAT SERPL-MCNC: 1.3 MG/DL (ref 0.6–1.1)
EKG ATRIAL RATE: 99 BPM
EKG DIAGNOSIS: NORMAL
EKG P AXIS: 70 DEGREES
EKG P-R INTERVAL: 154 MS
EKG Q-T INTERVAL: 386 MS
EKG QRS DURATION: 100 MS
EKG QTC CALCULATION (BAZETT): 495 MS
EKG R AXIS: 66 DEGREES
EKG T AXIS: 54 DEGREES
EKG VENTRICULAR RATE: 99 BPM
EOSINOPHILS ABSOLUTE: 0.1 K/UL (ref 0–0.6)
EOSINOPHILS RELATIVE PERCENT: 1.8 %
GFR AFRICAN AMERICAN: 51
GFR NON-AFRICAN AMERICAN: 42
GLUCOSE BLD-MCNC: 145 MG/DL (ref 70–99)
GLUCOSE URINE: NEGATIVE MG/DL
HCT VFR BLD CALC: 37.2 % (ref 36–48)
HEMOGLOBIN: 12.1 G/DL (ref 12–16)
KETONES, URINE: NEGATIVE MG/DL
LEUKOCYTE ESTERASE, URINE: NEGATIVE
LYMPHOCYTES ABSOLUTE: 1.6 K/UL (ref 1–5.1)
LYMPHOCYTES RELATIVE PERCENT: 29.8 %
MCH RBC QN AUTO: 30.8 PG (ref 26–34)
MCHC RBC AUTO-ENTMCNC: 32.5 G/DL (ref 31–36)
MCV RBC AUTO: 94.8 FL (ref 80–100)
MICROSCOPIC EXAMINATION: YES
MONOCYTES ABSOLUTE: 0.7 K/UL (ref 0–1.3)
MONOCYTES RELATIVE PERCENT: 12.7 %
NEUTROPHILS ABSOLUTE: 3 K/UL (ref 1.7–7.7)
NEUTROPHILS RELATIVE PERCENT: 55 %
NITRITE, URINE: NEGATIVE
PDW BLD-RTO: 17.8 % (ref 12.4–15.4)
PH UA: 6 (ref 5–8)
PLATELET # BLD: ABNORMAL K/UL (ref 135–450)
PLATELET SLIDE REVIEW: ABNORMAL
PMV BLD AUTO: 7.5 FL (ref 5–10.5)
POTASSIUM REFLEX MAGNESIUM: 3.7 MMOL/L (ref 3.5–5.1)
PRO-BNP: 38 PG/ML (ref 0–124)
PROTEIN UA: >=300 MG/DL
RBC # BLD: 3.93 M/UL (ref 4–5.2)
RBC UA: NORMAL /HPF (ref 0–4)
SODIUM BLD-SCNC: 139 MMOL/L (ref 136–145)
SPECIFIC GRAVITY UA: >=1.03 (ref 1–1.03)
TOTAL PROTEIN: 7.7 G/DL (ref 6.4–8.2)
URINE TYPE: ABNORMAL
UROBILINOGEN, URINE: 0.2 E.U./DL
WBC # BLD: 5.5 K/UL (ref 4–11)
WBC UA: NORMAL /HPF (ref 0–5)

## 2020-03-05 PROCEDURE — 71275 CT ANGIOGRAPHY CHEST: CPT

## 2020-03-05 PROCEDURE — 83880 ASSAY OF NATRIURETIC PEPTIDE: CPT

## 2020-03-05 PROCEDURE — G0378 HOSPITAL OBSERVATION PER HR: HCPCS

## 2020-03-05 PROCEDURE — 93005 ELECTROCARDIOGRAM TRACING: CPT | Performed by: STUDENT IN AN ORGANIZED HEALTH CARE EDUCATION/TRAINING PROGRAM

## 2020-03-05 PROCEDURE — 84156 ASSAY OF PROTEIN URINE: CPT

## 2020-03-05 PROCEDURE — 71046 X-RAY EXAM CHEST 2 VIEWS: CPT

## 2020-03-05 PROCEDURE — 80076 HEPATIC FUNCTION PANEL: CPT

## 2020-03-05 PROCEDURE — 85025 COMPLETE CBC W/AUTO DIFF WBC: CPT

## 2020-03-05 PROCEDURE — 6360000004 HC RX CONTRAST MEDICATION: Performed by: STUDENT IN AN ORGANIZED HEALTH CARE EDUCATION/TRAINING PROGRAM

## 2020-03-05 PROCEDURE — 81001 URINALYSIS AUTO W/SCOPE: CPT

## 2020-03-05 PROCEDURE — 99285 EMERGENCY DEPT VISIT HI MDM: CPT

## 2020-03-05 PROCEDURE — 1200000000 HC SEMI PRIVATE

## 2020-03-05 PROCEDURE — 82570 ASSAY OF URINE CREATININE: CPT

## 2020-03-05 PROCEDURE — 80048 BASIC METABOLIC PNL TOTAL CA: CPT

## 2020-03-05 RX ORDER — TRAZODONE HYDROCHLORIDE 100 MG/1
100 TABLET ORAL NIGHTLY
Status: DISCONTINUED | OUTPATIENT
Start: 2020-03-06 | End: 2020-03-07 | Stop reason: HOSPADM

## 2020-03-05 RX ORDER — SODIUM CHLORIDE 0.9 % (FLUSH) 0.9 %
10 SYRINGE (ML) INJECTION PRN
Status: DISCONTINUED | OUTPATIENT
Start: 2020-03-05 | End: 2020-03-07 | Stop reason: HOSPADM

## 2020-03-05 RX ORDER — ALBUTEROL SULFATE 2.5 MG/3ML
2.5 SOLUTION RESPIRATORY (INHALATION) EVERY 4 HOURS PRN
Status: DISCONTINUED | OUTPATIENT
Start: 2020-03-05 | End: 2020-03-07 | Stop reason: HOSPADM

## 2020-03-05 RX ORDER — FAMOTIDINE 20 MG/1
20 TABLET, FILM COATED ORAL 2 TIMES DAILY
Status: DISCONTINUED | OUTPATIENT
Start: 2020-03-06 | End: 2020-03-07 | Stop reason: HOSPADM

## 2020-03-05 RX ORDER — LETROZOLE 2.5 MG/1
2.5 TABLET, FILM COATED ORAL DAILY
Status: DISCONTINUED | OUTPATIENT
Start: 2020-03-06 | End: 2020-03-07 | Stop reason: HOSPADM

## 2020-03-05 RX ORDER — DOCUSATE SODIUM 100 MG/1
100 CAPSULE, LIQUID FILLED ORAL 2 TIMES DAILY PRN
Status: DISCONTINUED | OUTPATIENT
Start: 2020-03-05 | End: 2020-03-07 | Stop reason: HOSPADM

## 2020-03-05 RX ORDER — HYDROXYZINE HYDROCHLORIDE 25 MG/1
50 TABLET, FILM COATED ORAL NIGHTLY
Status: DISCONTINUED | OUTPATIENT
Start: 2020-03-06 | End: 2020-03-07 | Stop reason: HOSPADM

## 2020-03-05 RX ORDER — ONDANSETRON 2 MG/ML
4 INJECTION INTRAMUSCULAR; INTRAVENOUS EVERY 6 HOURS PRN
Status: DISCONTINUED | OUTPATIENT
Start: 2020-03-05 | End: 2020-03-07 | Stop reason: HOSPADM

## 2020-03-05 RX ORDER — ACETAMINOPHEN 650 MG/1
650 SUPPOSITORY RECTAL EVERY 6 HOURS PRN
Status: DISCONTINUED | OUTPATIENT
Start: 2020-03-05 | End: 2020-03-07 | Stop reason: HOSPADM

## 2020-03-05 RX ORDER — SODIUM CHLORIDE 0.9 % (FLUSH) 0.9 %
10 SYRINGE (ML) INJECTION EVERY 12 HOURS SCHEDULED
Status: DISCONTINUED | OUTPATIENT
Start: 2020-03-06 | End: 2020-03-07 | Stop reason: HOSPADM

## 2020-03-05 RX ORDER — POLYETHYLENE GLYCOL 3350 17 G/17G
17 POWDER, FOR SOLUTION ORAL DAILY PRN
Status: DISCONTINUED | OUTPATIENT
Start: 2020-03-05 | End: 2020-03-07 | Stop reason: HOSPADM

## 2020-03-05 RX ORDER — MIRTAZAPINE 15 MG/1
15 TABLET, FILM COATED ORAL NIGHTLY
Status: DISCONTINUED | OUTPATIENT
Start: 2020-03-06 | End: 2020-03-07 | Stop reason: HOSPADM

## 2020-03-05 RX ORDER — ACETAMINOPHEN 325 MG/1
650 TABLET ORAL EVERY 6 HOURS PRN
Status: DISCONTINUED | OUTPATIENT
Start: 2020-03-05 | End: 2020-03-07 | Stop reason: HOSPADM

## 2020-03-05 RX ORDER — ARIPIPRAZOLE 5 MG/1
10 TABLET ORAL EVERY EVENING
Status: DISCONTINUED | OUTPATIENT
Start: 2020-03-06 | End: 2020-03-07 | Stop reason: HOSPADM

## 2020-03-05 RX ORDER — PROMETHAZINE HYDROCHLORIDE 12.5 MG/1
12.5 TABLET ORAL EVERY 6 HOURS PRN
Status: DISCONTINUED | OUTPATIENT
Start: 2020-03-05 | End: 2020-03-07 | Stop reason: HOSPADM

## 2020-03-05 RX ORDER — UREA 10 %
10 LOTION (ML) TOPICAL NIGHTLY
Status: DISCONTINUED | OUTPATIENT
Start: 2020-03-06 | End: 2020-03-07 | Stop reason: HOSPADM

## 2020-03-05 RX ADMIN — IOPAMIDOL 100 ML: 755 INJECTION, SOLUTION INTRAVENOUS at 19:03

## 2020-03-05 ASSESSMENT — PAIN SCALES - GENERAL: PAINLEVEL_OUTOF10: 6

## 2020-03-05 ASSESSMENT — PAIN DESCRIPTION - PAIN TYPE: TYPE: ACUTE PAIN

## 2020-03-05 ASSESSMENT — PAIN DESCRIPTION - LOCATION: LOCATION: ABDOMEN;GENERALIZED

## 2020-03-05 ASSESSMENT — PAIN DESCRIPTION - DESCRIPTORS: DESCRIPTORS: OTHER (COMMENT)

## 2020-03-05 NOTE — ED PROVIDER NOTES
ED Attending Attestation Note     Date of evaluation: 3/5/2020    This patient was seen by the resident. I have seen and examined the patient, agree with the workup, evaluation, management and diagnosis. The care plan has been discussed. I have reviewed the ECG and concur with the resident's interpretation. My assessment reveals a somewhat chronically ill-appearing patient, older appearing than her stated age, in no acute distress. She presents the emergency department with complaints of swelling in her bilateral arms, hands and neck, which seems to have developed over the last few days. On examination, she does have pretty symmetric pitting edema of the bilateral upper extremities, with some mild edema of the face. She is also noted to have fairly thickly scattered telangiectasias over the face, neck, and chest, which she states just developed in the last couple of days. Given the patient's history of thromboembolic disease, with an internal jugular port in place, as well as her oncologic history, there is concern for SVC syndrome, and vascular imaging of the chest is pursued.          Debo Jerez MD  03/05/20 [id number]
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
## 2020-03-06 ENCOUNTER — APPOINTMENT (OUTPATIENT)
Dept: ULTRASOUND IMAGING | Age: 60
DRG: 385 | End: 2020-03-06
Payer: COMMERCIAL

## 2020-03-06 ENCOUNTER — APPOINTMENT (OUTPATIENT)
Dept: VASCULAR LAB | Age: 60
DRG: 385 | End: 2020-03-06
Payer: COMMERCIAL

## 2020-03-06 LAB
ALBUMIN SERPL-MCNC: 3.1 G/DL (ref 3.4–5)
ALBUMIN SERPL-MCNC: 3.2 G/DL (ref 3.4–5)
ANION GAP SERPL CALCULATED.3IONS-SCNC: 13 MMOL/L (ref 3–16)
ANION GAP SERPL CALCULATED.3IONS-SCNC: 15 MMOL/L (ref 3–16)
APTT: 42.7 SEC (ref 24.2–36.2)
BASOPHILS ABSOLUTE: 0 K/UL (ref 0–0.2)
BASOPHILS ABSOLUTE: 0 K/UL (ref 0–0.2)
BASOPHILS RELATIVE PERCENT: 0 %
BASOPHILS RELATIVE PERCENT: 0.7 %
BUN BLDV-MCNC: 17 MG/DL (ref 7–20)
BUN BLDV-MCNC: 20 MG/DL (ref 7–20)
CALCIUM SERPL-MCNC: 10.3 MG/DL (ref 8.3–10.6)
CALCIUM SERPL-MCNC: 9.9 MG/DL (ref 8.3–10.6)
CHLORIDE BLD-SCNC: 103 MMOL/L (ref 99–110)
CHLORIDE BLD-SCNC: 105 MMOL/L (ref 99–110)
CHOLESTEROL, TOTAL: 216 MG/DL (ref 0–199)
CO2: 22 MMOL/L (ref 21–32)
CO2: 25 MMOL/L (ref 21–32)
CREAT SERPL-MCNC: 1.3 MG/DL (ref 0.6–1.1)
CREAT SERPL-MCNC: 1.4 MG/DL (ref 0.6–1.1)
CREATININE URINE: 100.5 MG/DL (ref 28–259)
EOSINOPHILS ABSOLUTE: 0.1 K/UL (ref 0–0.6)
EOSINOPHILS ABSOLUTE: 0.2 K/UL (ref 0–0.6)
EOSINOPHILS RELATIVE PERCENT: 2 %
EOSINOPHILS RELATIVE PERCENT: 3 %
GFR AFRICAN AMERICAN: 47
GFR AFRICAN AMERICAN: 51
GFR NON-AFRICAN AMERICAN: 39
GFR NON-AFRICAN AMERICAN: 42
GLUCOSE BLD-MCNC: 105 MG/DL (ref 70–99)
GLUCOSE BLD-MCNC: 113 MG/DL (ref 70–99)
HCT VFR BLD CALC: 36 % (ref 36–48)
HCT VFR BLD CALC: 37.3 % (ref 36–48)
HDLC SERPL-MCNC: 26 MG/DL (ref 40–60)
HEMOGLOBIN: 12 G/DL (ref 12–16)
HEMOGLOBIN: 12.4 G/DL (ref 12–16)
INR BLD: 1.19 (ref 0.86–1.14)
LDL CHOLESTEROL CALCULATED: 140 MG/DL
LV EF: 68 %
LVEF MODALITY: NORMAL
LYMPHOCYTES ABSOLUTE: 1.6 K/UL (ref 1–5.1)
LYMPHOCYTES ABSOLUTE: 1.8 K/UL (ref 1–5.1)
LYMPHOCYTES RELATIVE PERCENT: 31.2 %
LYMPHOCYTES RELATIVE PERCENT: 34 %
MAGNESIUM: 1.8 MG/DL (ref 1.8–2.4)
MAGNESIUM: 1.9 MG/DL (ref 1.8–2.4)
MCH RBC QN AUTO: 30.8 PG (ref 26–34)
MCH RBC QN AUTO: 30.8 PG (ref 26–34)
MCHC RBC AUTO-ENTMCNC: 33.1 G/DL (ref 31–36)
MCHC RBC AUTO-ENTMCNC: 33.2 G/DL (ref 31–36)
MCV RBC AUTO: 92.8 FL (ref 80–100)
MCV RBC AUTO: 92.9 FL (ref 80–100)
MONOCYTES ABSOLUTE: 0.6 K/UL (ref 0–1.3)
MONOCYTES ABSOLUTE: 0.8 K/UL (ref 0–1.3)
MONOCYTES RELATIVE PERCENT: 12 %
MONOCYTES RELATIVE PERCENT: 14.5 %
NEUTROPHILS ABSOLUTE: 2.7 K/UL (ref 1.7–7.7)
NEUTROPHILS ABSOLUTE: 2.8 K/UL (ref 1.7–7.7)
NEUTROPHILS RELATIVE PERCENT: 51 %
NEUTROPHILS RELATIVE PERCENT: 51.6 %
PDW BLD-RTO: 17.5 % (ref 12.4–15.4)
PDW BLD-RTO: 18 % (ref 12.4–15.4)
PHOSPHORUS: 3.8 MG/DL (ref 2.5–4.9)
PHOSPHORUS: 4 MG/DL (ref 2.5–4.9)
PLATELET # BLD: 92 K/UL (ref 135–450)
PLATELET # BLD: 94 K/UL (ref 135–450)
PLATELET SLIDE REVIEW: ABNORMAL
PMV BLD AUTO: 7.6 FL (ref 5–10.5)
PMV BLD AUTO: 8 FL (ref 5–10.5)
POLYCHROMASIA: ABNORMAL
POTASSIUM SERPL-SCNC: 3.6 MMOL/L (ref 3.5–5.1)
POTASSIUM SERPL-SCNC: 3.7 MMOL/L (ref 3.5–5.1)
PROTEIN PROTEIN: 147.92 MG/DL
PROTHROMBIN TIME: 13.8 SEC (ref 10–13.2)
RBC # BLD: 3.88 M/UL (ref 4–5.2)
RBC # BLD: 4.01 M/UL (ref 4–5.2)
SODIUM BLD-SCNC: 140 MMOL/L (ref 136–145)
SODIUM BLD-SCNC: 143 MMOL/L (ref 136–145)
STOMATOCYTES: ABNORMAL
TRIGL SERPL-MCNC: 251 MG/DL (ref 0–150)
VLDLC SERPL CALC-MCNC: 50 MG/DL
WBC # BLD: 5.2 K/UL (ref 4–11)
WBC # BLD: 5.4 K/UL (ref 4–11)

## 2020-03-06 PROCEDURE — 97530 THERAPEUTIC ACTIVITIES: CPT

## 2020-03-06 PROCEDURE — 6370000000 HC RX 637 (ALT 250 FOR IP): Performed by: STUDENT IN AN ORGANIZED HEALTH CARE EDUCATION/TRAINING PROGRAM

## 2020-03-06 PROCEDURE — 93306 TTE W/DOPPLER COMPLETE: CPT

## 2020-03-06 PROCEDURE — 83735 ASSAY OF MAGNESIUM: CPT

## 2020-03-06 PROCEDURE — 2580000003 HC RX 258: Performed by: STUDENT IN AN ORGANIZED HEALTH CARE EDUCATION/TRAINING PROGRAM

## 2020-03-06 PROCEDURE — 36591 DRAW BLOOD OFF VENOUS DEVICE: CPT

## 2020-03-06 PROCEDURE — G0378 HOSPITAL OBSERVATION PER HR: HCPCS

## 2020-03-06 PROCEDURE — 80061 LIPID PANEL: CPT

## 2020-03-06 PROCEDURE — 80069 RENAL FUNCTION PANEL: CPT

## 2020-03-06 PROCEDURE — 1200000000 HC SEMI PRIVATE

## 2020-03-06 PROCEDURE — 85610 PROTHROMBIN TIME: CPT

## 2020-03-06 PROCEDURE — 76770 US EXAM ABDO BACK WALL COMP: CPT

## 2020-03-06 PROCEDURE — 6360000002 HC RX W HCPCS: Performed by: STUDENT IN AN ORGANIZED HEALTH CARE EDUCATION/TRAINING PROGRAM

## 2020-03-06 PROCEDURE — 85025 COMPLETE CBC W/AUTO DIFF WBC: CPT

## 2020-03-06 PROCEDURE — 85730 THROMBOPLASTIN TIME PARTIAL: CPT

## 2020-03-06 PROCEDURE — 97116 GAIT TRAINING THERAPY: CPT

## 2020-03-06 PROCEDURE — 36415 COLL VENOUS BLD VENIPUNCTURE: CPT

## 2020-03-06 PROCEDURE — 83036 HEMOGLOBIN GLYCOSYLATED A1C: CPT

## 2020-03-06 PROCEDURE — 93971 EXTREMITY STUDY: CPT

## 2020-03-06 RX ORDER — M-VIT,TX,IRON,MINS/CALC/FOLIC 27MG-0.4MG
1 TABLET ORAL DAILY
COMMUNITY
End: 2021-06-10

## 2020-03-06 RX ORDER — HEPARIN SODIUM 10000 [USP'U]/100ML
15 INJECTION, SOLUTION INTRAVENOUS CONTINUOUS
Status: DISCONTINUED | OUTPATIENT
Start: 2020-03-06 | End: 2020-03-07

## 2020-03-06 RX ORDER — HYDROXYZINE 50 MG/1
25 TABLET, FILM COATED ORAL 2 TIMES DAILY
COMMUNITY

## 2020-03-06 RX ORDER — ERGOCALCIFEROL (VITAMIN D2) 1250 MCG
50000 CAPSULE ORAL WEEKLY
Status: ON HOLD | COMMUNITY
End: 2020-09-26 | Stop reason: HOSPADM

## 2020-03-06 RX ORDER — TEMAZEPAM 15 MG/1
15 CAPSULE ORAL NIGHTLY
COMMUNITY

## 2020-03-06 RX ORDER — LISINOPRIL 5 MG/1
5 TABLET ORAL DAILY
Status: DISCONTINUED | OUTPATIENT
Start: 2020-03-06 | End: 2020-03-07 | Stop reason: HOSPADM

## 2020-03-06 RX ORDER — SIMETHICONE 80 MG
80 TABLET,CHEWABLE ORAL EVERY 6 HOURS PRN
Status: DISCONTINUED | OUTPATIENT
Start: 2020-03-06 | End: 2020-03-07 | Stop reason: HOSPADM

## 2020-03-06 RX ORDER — POTASSIUM CHLORIDE 750 MG/1
10 CAPSULE, EXTENDED RELEASE ORAL DAILY
COMMUNITY
End: 2020-07-21 | Stop reason: ALTCHOICE

## 2020-03-06 RX ORDER — HEPARIN SODIUM 5000 [USP'U]/ML
40 INJECTION, SOLUTION INTRAVENOUS; SUBCUTANEOUS PRN
Status: DISCONTINUED | OUTPATIENT
Start: 2020-03-06 | End: 2020-03-07

## 2020-03-06 RX ORDER — HEPARIN SODIUM 5000 [USP'U]/ML
80 INJECTION, SOLUTION INTRAVENOUS; SUBCUTANEOUS PRN
Status: DISCONTINUED | OUTPATIENT
Start: 2020-03-06 | End: 2020-03-07

## 2020-03-06 RX ADMIN — APIXABAN 5 MG: 5 TABLET, FILM COATED ORAL at 03:13

## 2020-03-06 RX ADMIN — Medication 10 MG: at 21:02

## 2020-03-06 RX ADMIN — MIRTAZAPINE 15 MG: 15 TABLET, FILM COATED ORAL at 21:02

## 2020-03-06 RX ADMIN — LISINOPRIL 5 MG: 5 TABLET ORAL at 12:29

## 2020-03-06 RX ADMIN — APIXABAN 5 MG: 5 TABLET, FILM COATED ORAL at 09:10

## 2020-03-06 RX ADMIN — FAMOTIDINE 20 MG: 20 TABLET, FILM COATED ORAL at 21:02

## 2020-03-06 RX ADMIN — Medication 10 ML: at 09:10

## 2020-03-06 RX ADMIN — MIRTAZAPINE 15 MG: 15 TABLET, FILM COATED ORAL at 03:08

## 2020-03-06 RX ADMIN — FAMOTIDINE 20 MG: 20 TABLET, FILM COATED ORAL at 09:10

## 2020-03-06 RX ADMIN — ACETAMINOPHEN 650 MG: 325 TABLET ORAL at 17:55

## 2020-03-06 RX ADMIN — Medication 10 ML: at 21:12

## 2020-03-06 RX ADMIN — LETROZOLE 2.5 MG: 2.5 TABLET, FILM COATED ORAL at 09:10

## 2020-03-06 RX ADMIN — HYDROXYZINE HYDROCHLORIDE 50 MG: 25 TABLET, FILM COATED ORAL at 21:02

## 2020-03-06 RX ADMIN — HEPARIN SODIUM 18 UNITS/KG/HR: 10000 INJECTION, SOLUTION INTRAVENOUS at 21:08

## 2020-03-06 RX ADMIN — HYDROXYZINE HYDROCHLORIDE 50 MG: 25 TABLET, FILM COATED ORAL at 03:09

## 2020-03-06 RX ADMIN — TRAZODONE HYDROCHLORIDE 100 MG: 100 TABLET ORAL at 03:14

## 2020-03-06 RX ADMIN — Medication 10 MG: at 03:12

## 2020-03-06 RX ADMIN — TRAZODONE HYDROCHLORIDE 100 MG: 100 TABLET ORAL at 21:02

## 2020-03-06 RX ADMIN — ARIPIPRAZOLE 10 MG: 5 TABLET ORAL at 03:08

## 2020-03-06 RX ADMIN — ARIPIPRAZOLE 10 MG: 5 TABLET ORAL at 16:46

## 2020-03-06 ASSESSMENT — PAIN SCALES - GENERAL
PAINLEVEL_OUTOF10: 0
PAINLEVEL_OUTOF10: 4
PAINLEVEL_OUTOF10: 4
PAINLEVEL_OUTOF10: 0
PAINLEVEL_OUTOF10: 6
PAINLEVEL_OUTOF10: 3

## 2020-03-06 ASSESSMENT — ENCOUNTER SYMPTOMS
ABDOMINAL DISTENTION: 1
FACIAL SWELLING: 1
COLOR CHANGE: 0
ABDOMINAL PAIN: 1
WHEEZING: 0
SHORTNESS OF BREATH: 1
BLOOD IN STOOL: 0
DIARRHEA: 0
VOMITING: 0
COUGH: 0
CHEST TIGHTNESS: 0
CONSTIPATION: 1
NAUSEA: 0

## 2020-03-06 ASSESSMENT — PAIN DESCRIPTION - ONSET: ONSET: ON-GOING

## 2020-03-06 ASSESSMENT — PAIN DESCRIPTION - LOCATION
LOCATION: ABDOMEN
LOCATION: ABDOMEN;GENERALIZED
LOCATION: ABDOMEN
LOCATION: ABDOMEN;CHEST

## 2020-03-06 ASSESSMENT — PAIN DESCRIPTION - PAIN TYPE
TYPE: ACUTE PAIN

## 2020-03-06 ASSESSMENT — PAIN DESCRIPTION - DESCRIPTORS
DESCRIPTORS: SHARP;ACHING
DESCRIPTORS: ACHING;SHARP

## 2020-03-06 ASSESSMENT — PAIN DESCRIPTION - DIRECTION: RADIATING_TOWARDS: CHEST

## 2020-03-06 ASSESSMENT — PAIN DESCRIPTION - PROGRESSION: CLINICAL_PROGRESSION: NOT CHANGED

## 2020-03-06 ASSESSMENT — PAIN DESCRIPTION - FREQUENCY: FREQUENCY: INTERMITTENT

## 2020-03-06 ASSESSMENT — PAIN - FUNCTIONAL ASSESSMENT: PAIN_FUNCTIONAL_ASSESSMENT: ACTIVITIES ARE NOT PREVENTED

## 2020-03-06 ASSESSMENT — PAIN DESCRIPTION - ORIENTATION
ORIENTATION: RIGHT;LEFT
ORIENTATION: LEFT;RIGHT

## 2020-03-06 NOTE — PROGRESS NOTES
Pt unsure of home medications. Maryann Mao RN at [facility] for med rec to be faxed to this unit. Will complete admission once it has been received.

## 2020-03-06 NOTE — PROGRESS NOTES
Progress Note    Admit Date: 3/5/2020  Day: 1  Diet: DIET CARB CONTROL; Carb Control: 4 carb choices (60 gms)/meal    CC: Left arm/neck swelling    Interval history: Pt's arm and neck swelling are resolving. She feels some abdominal distention and discomfort but endorses having bowel movements. She has some new redness of her face and continues to have her chronic LE swelling. No fevers, chills, nausea, vomiting, LAKE, palpitations, chest pain or SOB. HPI: Ms. Eileen Shay is a 65 yo M with PMH breast cancer (s/p L mastectomy 2019, on chemo), nephrectomy (2/2 infected stone complicated with renal abscess, 1980s), HTN, VTE (on eliquis) who presented with left arm/neck swelling and abdominal bloating for 3 days. She reported the arm swelling occurs every few weeks and self-resolves. No pain, redness or warmth in arm. SVC syndrome was ruled out via CTPA. She reported her urine to be frothy for a few months. No dysuria, frequency, hematuria. UA >300 protein.     Medications:     Scheduled Meds:   lisinopril  5 mg Oral Daily    apixaban  5 mg Oral BID    ARIPiprazole  10 mg Oral QPM    famotidine  20 mg Oral BID    hydrOXYzine  50 mg Oral Nightly    letrozole  2.5 mg Oral Daily    melatonin  10 mg Oral Nightly    mirtazapine  15 mg Oral Nightly    traZODone  100 mg Oral Nightly    sodium chloride flush  10 mL Intravenous 2 times per day     Continuous Infusions:  PRN Meds:simethicone, albuterol, docusate sodium, sodium chloride flush, acetaminophen **OR** acetaminophen, polyethylene glycol, promethazine **OR** ondansetron    Objective:   Vitals:   T-max:  Patient Vitals for the past 8 hrs:   BP Temp Temp src Pulse Resp SpO2   03/06/20 0902 134/78 98.5 °F (36.9 °C) Oral 100 17 93 %       Intake/Output Summary (Last 24 hours) at 3/6/2020 1144  Last data filed at 3/6/2020 1051  Gross per 24 hour   Intake 1320 ml   Output 1300 ml   Net 20 ml       Review of Systems   Constitutional: Negative for chills, fatigue time.   Psychiatric:         Behavior: Behavior normal.     LABS:    CBC:   Recent Labs     03/05/20 1759 03/06/20  0540   WBC 5.5 5.4   HGB 12.1 12.0   HCT 37.2 36.0   PLT see below 92*   MCV 94.8 92.8     Renal:    Recent Labs     03/05/20 1759 03/06/20  0540    140   K 3.7 3.7    103   CO2 23 22   BUN 19 17   CREATININE 1.3* 1.3*   GLUCOSE 145* 105*   CALCIUM 9.8 9.9   MG  --  1.80   PHOS  --  4.0   ANIONGAP 12 15     Hepatic:   Recent Labs     03/05/20 1759 03/06/20  0540   AST 32  --    ALT 14  --    BILITOT 0.4  --    BILIDIR <0.2  --    PROT 7.7  --    LABALBU 3.1* 3.2*   ALKPHOS 125  --      Troponin: No results for input(s): TROPONINI in the last 72 hours. BNP: No results for input(s): BNP in the last 72 hours. Lipids: No results for input(s): CHOL, HDL in the last 72 hours. Invalid input(s): LDLCALCU, TRIGLYCERIDE  ABGs:  No results for input(s): PHART, EAM2GZO, PO2ART, DML5KMY, BEART, THGBART, W3BRUOMT, FDQ5ZKH in the last 72 hours. INR: No results for input(s): INR in the last 72 hours. Lactate: No results for input(s): LACTATE in the last 72 hours. Cultures:  -----------------------------------------------------------------  RAD:   CTA CHEST W CONTRAST   Final Result      1. No evidence of SVC occlusion or stenosis. Unchanged right IJ approach power port terminating in the right subclavian vein. 2.  No evidence of central pulmonary embolism with subsegmental pulmonary arteries not well evaluated. 3.  Emphysema. No acute pulmonary consolidation. 4.  Prior left mastectomy. Decreased size of postoperative walled off seroma in the left axilla. XR CHEST STANDARD (2 VW)   Final Result      No acute pulmonary disease. VL Extremity Venous Left    (Results Pending)   US RENAL COMPLETE    (Results Pending)       Assessment/Plan:     Proteinuria  S/p nephrectomy d/t infected stone complicated by renal abscess. Albumin 3.1, no WBCs or RBCs in urine.  Jose urine protein 147. Hepatitis screen (2/2019) negative. - Renal ultrasound  - HbA1c, lipid panel  - HIV screen  - Urine Cr pending  - Lisinopril 5 mg daily  - Nephrology (Dr. Bernadette Luz) following    LUE Swelling - likely 2/2 lymphedema, resolving  Mastectomy with lymph nodes removed in 2019. Has port on right side.   - Dopplers of LUE to r/o DVT  - TTE    Hx of VTE  CTPA negative for SVC syndrome or PE.  - Continue home eliquis (will hold if pt requires renal bx)    HTN - controlled  - Hold home amlodipine d/t possible peripheral edema    Hx of Breast Cancer  Chemo with [facility], last tx Feb 17, next on 3/10.  - Continue letrozole    Anxiety  - Continue home atarax, trazodone, remeron, abilify    Eczema  Face with red lesions, not bothering pt at present.  - F/u with dermatology outpatient if condition persists    Code Status: Full Code   FEN: Carb control diet  PPX: Eliquis  DISPO: GMF    Travis Aguilar MD, PGY-1  03/06/20  11:44 AM    This patient has been staffed and discussed with Edgar Hall MD.
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
## 2020-03-06 NOTE — H&P
PLACEMENT  2019    still in        Medications Priorto Admission:    Medications Prior to Admission: apixaban (ELIQUIS) 5 MG TABS tablet, Take two tablets twice daily until 9/4/2019 (18 tablets including 8/31/2019) Then, take one tablet twice daily afterwards from 9/5/2019. docusate (COLACE, DULCOLAX) 100 MG CAPS, Take 100 mg by mouth 2 times daily (Patient taking differently: Take 100 mg by mouth 2 times daily as needed )  albuterol sulfate  (90 Base) MCG/ACT inhaler, Inhale 2 puffs into the lungs every 6 hours as needed for Wheezing  letrozole (FEMARA) 2.5 MG tablet, Take 2.5 mg by mouth daily  Melatonin 10 MG TABS, Take 20 mg by mouth nightly  ondansetron (ZOFRAN) 8 MG tablet, Take 8 mg by mouth every 8 hours as needed for Nausea or Vomiting  traZODone (DESYREL) 100 MG tablet, Take 100 mg by mouth nightly  cloNIDine (CATAPRES) 0.1 MG tablet, Take 0.1 mg by mouth as needed for High Blood Pressure (for BP > 160/90)  mirtazapine (REMERON) 15 MG tablet, Take 15 mg by mouth nightly   loratadine (CLARITIN) 10 MG capsule, Take 10 mg by mouth every evening   prochlorperazine (COMPAZINE) 10 MG tablet, Take 10 mg by mouth every 6 hours as needed  ARIPiprazole (ABILIFY) 10 MG tablet, Take 10 mg by mouth every evening   famotidine (PEPCID) 40 MG tablet, Take 40 mg by mouth daily  hydrOXYzine (ATARAX) 50 MG tablet, Take 50 mg by mouth nightly  guaiFENesin (MUCINEX) 600 MG extended release tablet, Take 600 mg by mouth 2 times daily   amLODIPine (NORVASC) 10 MG tablet, Take 10 mg by mouth daily    Allergies:  Pcn [penicillins]    Social History:   · TOBACCO:   reports that she quit smoking about 8 months ago. Her smoking use included cigarettes. She has a 26.25 pack-year smoking history. She has never used smokeless tobacco.  · ETOH:   reports no history of alcohol use. · DRUGS : denies  · Patient currently lives in a nursing home  ·   Family History:   No family history on file.     Review of Systems   Constitutional: Findings: No erythema. Neurological:      Mental Status: She is alert and oriented to person, place, and time. Psychiatric:         Behavior: Behavior normal.       Physical exam:       Vitals:    03/05/20 2349   BP: 131/81   Pulse: 109   Resp: 22   Temp: 97.9 °F (36.6 °C)   SpO2:        DATA:    Labs:  CBC:   Recent Labs     03/05/20  1759   WBC 5.5   HGB 12.1   HCT 37.2   PLT see below       BMP:   Recent Labs     03/05/20  1759      K 3.7      CO2 23   BUN 19   CREATININE 1.3*   GLUCOSE 145*     LFT's:   Recent Labs     03/05/20  1759   AST 32   ALT 14   BILITOT 0.4   ALKPHOS 125     Troponin: No results for input(s): TROPONINI in the last 72 hours. BNP:No results for input(s): BNP in the last 72 hours. ABGs: No results for input(s): PHART, JJL4JKO, PO2ART in the last 72 hours. INR: No results for input(s): INR in the last 72 hours. U/A:  Recent Labs     03/05/20  1826   COLORU Yellow   PHUR 6.0   WBCUA 0-2   RBCUA 0-2   CLARITYU Clear   SPECGRAV >=1.030   LEUKOCYTESUR Negative   UROBILINOGEN 0.2   BILIRUBINUR Negative   BLOODU TRACE-INTACT*   GLUCOSEU Negative       CTA CHEST W CONTRAST   Final Result      1. No evidence of SVC occlusion or stenosis. Unchanged right IJ approach power port terminating in the right subclavian vein. 2.  No evidence of central pulmonary embolism with subsegmental pulmonary arteries not well evaluated. 3.  Emphysema. No acute pulmonary consolidation. 4.  Prior left mastectomy. Decreased size of postoperative walled off seroma in the left axilla. XR CHEST STANDARD (2 VW)   Final Result      No acute pulmonary disease. VL Extremity Venous Left    (Results Pending)       ASSESSMENT AND PLAN:  Proteinuria  UA with >300 protein, otherwise unremarkable. S/p nephrectomy d/t infected stone in 1980s. -Urine Cr/Pr pending  -Nephrology consulted    LUE swelling  Mastectomy with lymph nodes removed in 2019. Swelling likely from lymphedema.  Has hx of DVT on eliquis. Does have port on right side.  -Doppler of LUE to rule out DVT    Hx of VTE  CTPA done on admission without concern for SVC syndrome or PE.  -Continue home eliquis    HTN  -Holding home amlodipine d/t possible side effect of peripheral edema. Hx of Breast Cancer  S/p L mastectomy in 2019. Chemotherapy with [facility] [place], last treatment Feb 17th.  -Ordered home Femara    Anxiety  -Continued home regime of atarax, trazedone, remeron, abilify.      Will discuss with attending physician Dr. Rice Newer    Code Status:Full code  FEN: DIET CARB CONTROL; Carb Control: 4 carb choices (60 gms)/meal  PPX: Eliquis  DISPO: Zay Metzger MD  3/6/2020,  12:37 AM

## 2020-03-06 NOTE — PROGRESS NOTES
Occupational Therapy  No treatment   Order received, chart reviewed. Pt currently leaving floor for testing. Will follow up as schedule allows. Per CM /SW notes pt is LTC resident at [facility] and plans to return to d/c.      José Miguel Hernandez OTR/L  [id number]

## 2020-03-06 NOTE — PLAN OF CARE
Problem: Falls - Risk of:  Goal: Will remain free from falls  Description  Will remain free from falls  3/6/2020 1144 by Leny Tang RN  Note:   Fall prevention protocol in place at this time. Bed is locked to the lowest position with alarm on and side  rails up x2. Bed side table, call button and personal belongings within reach. Patient reminded to call nursing staff for assistance when needed. Patient verbalized understanding. Will continue to monitor and reassess.

## 2020-03-06 NOTE — CARE COORDINATION
Case Management Assessment           Initial Evaluation                Date / Time of Evaluation: 3/6/2020 5:07 PM                 Assessment Completed by: Nirav Hannah    Patient Name: Kasey Giles     YOB: 1960  Diagnosis: Nephrotic syndrome [N04.9]  Nephrotic syndrome [N04.9]     Date / Time: 3/5/2020  5:00 PM    Patient Admission Status: Inpatient    If patient is discharged prior to next notation, then this note serves as note for discharge by case management. Current PCP: Batool Siddiqi MD  Clinic Patient: No    Chart Reviewed: Yes  Patient/ Family Interviewed: Yes    Initial assessment completed at bedside with: patient    Hospitalization in the last 30 days: No    Emergency Contacts:  Extended Emergency Contact Information  Primary Emergency Contact: devante españa  Home Phone: [phone]  Work Phone: [phone]  Mobile Phone: [phone]  Relation: Brother/Sister   needed? No  Secondary Emergency Contact: brad oneill  Home Phone: [phone]  Mobile Phone: [phone]  Relation: Child  Preferred language: English   needed? Yes    Advance Directives:   Code Status: Full Code    Healthcare Power of : No  Financial  Payor: Sanket Leon / Plan: Leesa Ken / Product Type: *No Product type* /     Pre-cert required for SNF: Yes    Pharmacy    [address]  [address] [address]  Phone: [phone] Fax: [facility] [address] [place] [address] [phone]  [address] [place] [address]  Phone: [phone] Fax: [phone]      Potential assistance Purchasing Medications: Potential Assistance Purchasing Medications: No  Does Patient want to participate in local refill/ meds to beds program?: No    Meds To Beds General Rules:  1.  Can ONLY be done Monday- Friday between

## 2020-03-06 NOTE — PROGRESS NOTES
RESPIRATORY THERAPY ASSESSMENT    Name:  Asha Schultz  Medical Record Number:  [id number]  Age: 64 y.o. Gender: female  : 1960  Today's Date:  3/6/2020  Room:  6328/6328-01    Assessment     Is the patient being admitted for a COPD or Asthma exacerbation? No   (If yes the patient will be seen every 4 hours for the first 24 hours and then reassessed)    Patient Admission Diagnosis      Allergies  Allergies   Allergen Reactions    Pcn [Penicillins] Anaphylaxis       Minimum Predicted Vital Capacity:     952          Actual Vital Capacity:      n/a              Pulmonary History:Asthma, Former smoker  Home Oxygen Therapy:  room air  Home Respiratory Therapy:Albuterol   Current Respiratory Therapy: Albuterol           Respiratory Severity Index(RSI)   Patients with orders for inhalation medications, oxygen, or any therapeutic treatment modality will be placed on Respiratory Protocol. They will be assessed with the first treatment and at least every 72 hours thereafter. The following severity scale will be used to determine frequency of treatment intervention.     Smoking History: Pulmonary Disease or Smoking History, Greater than 15 pack year = 2    Social History  Social History     Tobacco Use    Smoking status: Former Smoker     Packs/day: 0.75     Years: 35.00     Pack years: 26.25     Types: Cigarettes     Last attempt to quit: 2019     Years since quittin.6    Smokeless tobacco: Never Used    Tobacco comment: trying to quit smoking    Substance Use Topics    Alcohol use: No    Drug use: No       Recent Surgical History: None = 0  Past Surgical History  Past Surgical History:   Procedure Laterality Date    APPENDECTOMY      CHOLECYSTECTOMY      MASTECTOMY Left 2019    LEFT MODIFIED RADICAL MASTECTOMY; EXPAREL INTERCOSTAL BLOCK performed by Kwasi Carrion MD at [address] [place] [address] Left 2019    COMPLEX CLOSURE OF LEFT BREAST, FULL THICKNESS SKIN GRAFT LEFT BREAST 4x3 Inhaler (MDI) with spacer when the following criteria are met:  a. Alert and cooperative     b. HR < 140 bpm  c. RR < 30 bpm                d. Can demonstrate a 2-3 second inspiratory hold  4. Bronchodilators will be administered via Hand Held Nebulizer [facility]) to patients when ANY of the following criteria are met  a. Incognizant or uncooperative          b. Patients treated with HHN at Home        c. Unable to demonstrate proper use of MDI with spacer     d. RR > 30 bpm   5. Bronchodilators will be delivered via Metered Dose Inhaler (MDI), HHN, Aerogen to intubated patients on mechanical ventilation. 6. Inhalation medication orders will be delivered and/or substituted as outlined below. Aerosolized Medications Ordering and Administration Guidelines:    1. All Medications will be ordered by a physician, and their frequency and/or modality will be adjusted as defined by the patients Respiratory Severity Index (RSI) score. 2. If the patient does not have documented COPD, consider discontinuing anticholinergics when RSI is less than 9.  3. If the bronchospasm worsens (increased RSI), then the bronchodilator frequency can be increased to a maximum of every 4 hours. If greater than every 4 hours is required, the physician will be contacted. 4. If the bronchospasm improves, the frequency of the bronchodilator can be decreased, based on the patient's RSI, but not less than home treatment regimen frequency. 5. Bronchodilator(s) will be discontinued if patient has a RSI less than 9 and has received no scheduled or as needed treatment for 72  Hrs. Patients Ordered on a Mucolytic Agent:    1. Must always be administered with a bronchodilator. 2. Discontinue if patient experiences worsened bronchospasm, or secretions have lessened to the point that the patient is able to clear them with a cough. Anti-inflammatory and Combination Medications:    1.  If the patient lacks prior history of lung disease, is not using

## 2020-03-06 NOTE — PLAN OF CARE
Problem: Falls - Risk of:  Goal: Will remain free from falls  Description  Will remain free from falls  Outcome: Ongoing   Patient is a fall risk. See Fall Risk Assessment for details. Bed is in low, lock position; call bell/belongings within reach. Calls appropriately when assistance is needed. Hourly rounds and bed alarm in place for safety. Will continue to monitor and reassess throughout shift.

## 2020-03-07 VITALS
BODY MASS INDEX: 38.22 KG/M2 | HEIGHT: 68 IN | OXYGEN SATURATION: 93 % | RESPIRATION RATE: 17 BRPM | HEART RATE: 95 BPM | SYSTOLIC BLOOD PRESSURE: 121 MMHG | DIASTOLIC BLOOD PRESSURE: 72 MMHG | TEMPERATURE: 98.1 F | WEIGHT: 252.21 LBS

## 2020-03-07 LAB
ANTI-XA UNFRAC HEPARIN: 1.18 IU/ML (ref 0.3–0.7)
ESTIMATED AVERAGE GLUCOSE: 114 MG/DL
HBA1C MFR BLD: 5.6 %

## 2020-03-07 PROCEDURE — 85520 HEPARIN ASSAY: CPT

## 2020-03-07 PROCEDURE — 6370000000 HC RX 637 (ALT 250 FOR IP): Performed by: STUDENT IN AN ORGANIZED HEALTH CARE EDUCATION/TRAINING PROGRAM

## 2020-03-07 PROCEDURE — 2580000003 HC RX 258: Performed by: STUDENT IN AN ORGANIZED HEALTH CARE EDUCATION/TRAINING PROGRAM

## 2020-03-07 PROCEDURE — 6360000002 HC RX W HCPCS: Performed by: STUDENT IN AN ORGANIZED HEALTH CARE EDUCATION/TRAINING PROGRAM

## 2020-03-07 PROCEDURE — G0378 HOSPITAL OBSERVATION PER HR: HCPCS

## 2020-03-07 RX ORDER — LISINOPRIL 5 MG/1
5 TABLET ORAL DAILY
Qty: 30 TABLET | Refills: 3 | Status: SHIPPED | OUTPATIENT
Start: 2020-03-08 | End: 2020-07-21 | Stop reason: ALTCHOICE

## 2020-03-07 RX ORDER — DIAPER,BRIEF,INFANT-TODD,DISP
EACH MISCELLANEOUS 2 TIMES DAILY
Status: DISCONTINUED | OUTPATIENT
Start: 2020-03-07 | End: 2020-03-07 | Stop reason: HOSPADM

## 2020-03-07 RX ORDER — DIAPER,BRIEF,INFANT-TODD,DISP
EACH MISCELLANEOUS 2 TIMES DAILY
Status: DISCONTINUED | OUTPATIENT
Start: 2020-03-07 | End: 2020-03-07

## 2020-03-07 RX ORDER — HEPARIN SODIUM (PORCINE) LOCK FLUSH IV SOLN 100 UNIT/ML 100 UNIT/ML
500 SOLUTION INTRAVENOUS PRN
Status: DISCONTINUED | OUTPATIENT
Start: 2020-03-07 | End: 2020-03-07 | Stop reason: HOSPADM

## 2020-03-07 RX ORDER — DIAPER,BRIEF,INFANT-TODD,DISP
EACH MISCELLANEOUS
Qty: 30 G | Refills: 0 | Status: SHIPPED | OUTPATIENT
Start: 2020-03-07 | End: 2021-06-10

## 2020-03-07 RX ADMIN — FAMOTIDINE 20 MG: 20 TABLET, FILM COATED ORAL at 10:09

## 2020-03-07 RX ADMIN — LISINOPRIL 5 MG: 5 TABLET ORAL at 10:09

## 2020-03-07 RX ADMIN — HEPARIN SODIUM 15 UNITS/KG/HR: 10000 INJECTION, SOLUTION INTRAVENOUS at 05:20

## 2020-03-07 RX ADMIN — HEPARIN SODIUM (PORCINE) LOCK FLUSH IV SOLN 100 UNIT/ML 500 UNITS: 100 SOLUTION at 14:52

## 2020-03-07 RX ADMIN — LETROZOLE 2.5 MG: 2.5 TABLET, FILM COATED ORAL at 10:13

## 2020-03-07 RX ADMIN — Medication 10 ML: at 10:10

## 2020-03-07 RX ADMIN — HYDROCORTISONE: 10 OINTMENT TOPICAL at 14:56

## 2020-03-07 ASSESSMENT — PAIN SCALES - GENERAL
PAINLEVEL_OUTOF10: 0
PAINLEVEL_OUTOF10: 0

## 2020-03-07 NOTE — DISCHARGE INSTR - COC
Continuity of Care Form    Patient Name: Oscar Mena   :  1960  MRN:  [id number]    Admit date:  3/5/2020  Discharge date:  2020    Code Status Order: Full Code   Advance Directives:   [facility] Documentation     Date/Time Healthcare Directive Type of Healthcare Directive Copy in [facility] [address] Agent's Name Healthcare Agent's Phone Number    [phone]  No, patient does not have an advance directive for healthcare treatment -- -- -- -- --          Admitting Physician:  Roque Parham MD  PCP: Percilla Councilman, MD    Discharging Nurse: Jazmyne Carr. [address] Unit/Room#: 4127/4023-81  Discharging Unit Phone Number: [phone]    Emergency Contact:   Extended Emergency Contact Information  Primary Emergency Contact: devante españa  Home Phone: [phone]  Work Phone: [phone]  Mobile Phone: [phone]  Relation: Brother/Sister   needed? No  Secondary Emergency Contact: brad oneill  Home Phone: [phone]  Mobile Phone: [phone]  Relation: Child  Preferred language: English   needed?  Yes    Past Surgical History:  Past Surgical History:   Procedure Laterality Date    APPENDECTOMY      CHOLECYSTECTOMY      MASTECTOMY Left 2019    LEFT MODIFIED RADICAL MASTECTOMY; EXPAREL INTERCOSTAL BLOCK performed by Nela Do MD at [address] [place] [address] Left 2019    COMPLEX CLOSURE OF LEFT BREAST, FULL THICKNESS SKIN GRAFT LEFT BREAST 4x3 performed by Lenore Hair MD at [place] [address] Left     TUBAL LIGATION      TUNNELED VENOUS PORT PLACEMENT  2019    still in        Immunization History:   Immunization History   Administered Date(s) Administered    Influenza Virus Vaccine 2014    Pneumococcal Polysaccharide (Hbzuizhtr26) 2014       Active Problems:  Patient Active Problem List   Diagnosis Code    Sepsis ([facility] A41.9    Malignant neoplasm of upper-inner quadrant of left breast in female, estrogen receptor positive [facility] C50.212, Z17.0    Breast wound, left, sequela S21.002S    Bilateral pulmonary embolism (HCC) I26.99    CHANTELLE (acute kidney injury) ([facility] N17.9    Acute respiratory failure with hypoxia ([facility]) J96.01    Acute deep vein thrombosis (DVT) of lower extremity ([facility]) I82.409    Acute renal failure (ARF) (HCC) N17.9    Centrilobular emphysema (HCC) J43.2    Hypoxemia R09.02    Pneumonia due to infectious organism J18.9    Nephrotic syndrome N04.9       Isolation/Infection:   Isolation          No Isolation        Patient Infection Status     None to display          Nurse Assessment:  Last Vital Signs: /70   Pulse 96   Temp 98.3 °F (36.8 °C) (Oral)   Resp 16   Ht 5' 8\" (1.727 m)   Wt 252 lb 3.3 oz (114.4 kg)   LMP 03/09/2014   SpO2 93%   BMI 38.35 kg/m²     Last documented pain score (0-10 scale): Pain Level: 0  Last Weight:   Wt Readings from Last 1 Encounters:   03/05/20 252 lb 3.3 oz (114.4 kg)     Mental Status:  alert    IV Access:  - None    Nursing Mobility/ADLs:  Walking   Assisted  Transfer  Assisted  Bathing  Assisted  Dressing  Assisted  Toileting  Assisted  Feeding  Assisted  Med Admin  Assisted  Med Delivery   whole    Wound Care Documentation and Therapy:  Wound 08/08/19 Breast Left (Active)   Number of days: 211        Elimination:  Continence:   · Bowel: Yes  · Bladder: Yes  Urinary Catheter: None   Colostomy/Ileostomy/Ileal Conduit: No       Date of Last BM: 03/06/2020    Intake/Output Summary (Last 24 hours) at 3/7/2020 1101  Last data filed at 3/7/2020 0710  Gross per 24 hour   Intake 120 ml   Output 2150 ml   Net -2030 ml     I/O last 3 completed shifts:   In: 480 [P.O.:480]  Out: 2250 [Urine:2250]    Safety Concerns:     [address] JHOAN Safety Concerns:872721764}    Impairments/Disabilities:      [address] Impairments/Disabilities:712953399}    Nutrition Therapy:  Current Nutrition Therapy:   [address] Diet List:116175847}    Routes of Feeding: {CHP DME Other Feedings:736228017}  Liquids: {Slp liquid thickness:82201}  Daily Fluid Restriction: {CHP DME Yes amt example:511900110}  Last Modified Barium Swallow with Video (Video Swallowing Test): {Done Not Done ADAU:418455651}    Treatments at the Time of Hospital Discharge:   Respiratory Treatments: ***  Oxygen Therapy:  {Therapy; copd oxygen:59792}  Ventilator:    { CC Vent PYJF:675406850}    Rehab Therapies: {THERAPEUTIC INTERVENTION:6307223393}  Weight Bearing Status/Restrictions: [facility] CC Weight Bearin}  Other Medical Equipment (for information only, NOT a DME order):  {EQUIPMENT:282976524}  Other Treatments: ***    Patient's personal belongings (please select all that are sent with patient):  {CHP DME Belongings:889521249}    RN SIGNATURE:  {Esignature:075780039}    CASE MANAGEMENT/SOCIAL WORK SECTION    Inpatient Status Date: 2020    Readmission Risk Assessment Score:  Readmission Risk              Risk of Unplanned Readmission:        27           Discharging to Facility/ Agency   [address], [address] [place] [address]       Phone: [phone]       Fax: [phone]        Dialysis Facility (if applicable) NA  · Name:  · Address:  · Dialysis Schedule:  · Phone:  · Fax:    / signature: Electronically signed by Adriana Martínez RN on 3/7/20 at 11:02 AM EST    PHYSICIAN SECTION    Prognosis: Good    Condition at Discharge: Stable    Rehab Potential (if transferring to Rehab): Fair    Recommended Labs or Other Treatments After Discharge: BMP after 3 days, follow up with nephrology and PCP    Physician Certification: I certify the above information and transfer of Royanne Koyanagi  is necessary for the continuing treatment of the diagnosis listed and that she requires [facility] for greater 30 days.      Update Admission H&P: No change in H&P    PHYSICIAN SIGNATURE:  Electronically signed by Lanny Eisenmenger, MD on 3/7/20 at 12:26 PM EST

## 2020-03-07 NOTE — PROGRESS NOTES
Patient discharged to [facility] via [employer].. Port deaccessed with 5ml flush Heparin, patient belongings taken at time of discharge, patient belonging stake at time of discharge.

## 2020-03-07 NOTE — PROGRESS NOTES
03/06/20  0540   CHOL 216*   TRIG 251*   HDL 26*   LDLCALC 140*   LABVLDL 50     ABGs: No results for input(s): PHART, PO2ART, CUN4YSS in the last 72 hours. INR:   Recent Labs     03/06/20  1630   INR 1.19*     UA:  Recent Labs     03/05/20  1826   COLORU Yellow   CLARITYU Clear   GLUCOSEU Negative   BILIRUBINUR Negative   KETUA Negative   SPECGRAV >=1.030   BLOODU TRACE-INTACT*   PHUR 6.0   PROTEINU >=300*   UROBILINOGEN 0.2   NITRU Negative   LEUKOCYTESUR Negative   LABMICR YES   URINETYPE Voided      Urine Microscopic:   Recent Labs     03/05/20  1826   WBCUA 0-2   RBCUA 0-2     Urine Culture: No results for input(s): LABURIN in the last 72 hours. Urine Chemistry:   Recent Labs     03/05/20  2324   LABCREA 100.5   PROTEINUR 147.92*             IMAGING:  VL Extremity Venous Left         US RENAL COMPLETE   Final Result   1. Simple cyst in the right kidney. No hydronephrosis or mass otherwise. CTA CHEST W CONTRAST   Final Result      1. No evidence of SVC occlusion or stenosis. Unchanged right IJ approach power port terminating in the right subclavian vein. 2.  No evidence of central pulmonary embolism with subsegmental pulmonary arteries not well evaluated. 3.  Emphysema. No acute pulmonary consolidation. 4.  Prior left mastectomy. Decreased size of postoperative walled off seroma in the left axilla. XR CHEST STANDARD (2 VW)   Final Result      No acute pulmonary disease.                Edema - sec to meds and hx of Breat sx leading to lymphedema   - dont use CCB as it will inc edema   - no Signs of volume overload   - No need for duretics   - Proteinuria not significant to cause Nephrotic syndrome   - No need for renal bx   - BNP normal       Solitary kidney   - High risk for renal bx       Hx of breast Ca  - s/p resection             Thank you for allowing us to participate in care of [address] free to contact me   [facility]
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
## 2020-03-07 NOTE — PLAN OF CARE
POC reviewed with patient, verbalized understanding. Moves frequently and independently in bed, no skin breakdown noted this shift. VSS. Heparin infusion initiated per MD order, AntiXA drawn and rate changed per protocol. Problem: Pain:  Goal: Pain level will decrease  Description: Pain level will decrease  Outcome: Ongoing  Note: No complaints of pain noted this shift. Problem: Falls - Risk of:  Goal: Will remain free from falls  Description: Will remain free from falls  Outcome: Ongoing  Note: No attempts made OOB. Call bell used appropriately, encouraged to call for assistance. Fall precautions in place, no falls this shift.

## 2020-03-08 NOTE — DISCHARGE SUMMARY
left arm and neck swelling had resolved. She was urinating without any dysuria, hematuria or frequency. She did have her chronic leg swelling but no fevers, chills, nausea, vomiting, palpitations, abdominal pain, chest pain or SOB. She was examined and determined to be stable for discharge back to her SNF. She should follow up with her PCP and nephrology. Disposition: To a non-[facility] facility    Physical Exam Performed:     /72   Pulse 95   Temp 98.1 °F (36.7 °C) (Oral)   Resp 17   Ht 5' 8\" (1.727 m)   Wt 252 lb 3.3 oz (114.4 kg)   LMP 03/09/2014   SpO2 93%   BMI 38.35 kg/m²     Physical Exam   Constitutional:       General: She is not in acute distress.     Appearance: She is well-developed. She is not diaphoretic. HENT:      Head: Normocephalic and atraumatic.      Nose: No congestion. Cardiovascular:      Rate and Rhythm: Normal rate and regular rhythm.      Heart sounds: Normal heart sounds. No murmur. Pulmonary:      Effort: Pulmonary effort is normal. No respiratory distress.      Breath sounds: Normal breath sounds. No wheezing or rales. Chest:      Chest wall: No swelling, tenderness or edema.      Breasts:         Right: Normal.         Left: Absent. Abdominal:      Palpations: Abdomen is soft, non-tender and non-distended.      Comments: No pitting edema noted   Musculoskeletal:         General: LUE pitting edema resolved present. No tenderness.      Right lower leg: Edema (2+ to knee) present.      Left lower leg: Edema (2+ to knee) present. Skin:     General: Skin is warm and dry.      Capillary Refill: Capillary refill takes less than 2 seconds.      Findings: No erythema. Neurological:      Mental Status: She is alert and oriented to person, place, and time. Psychiatric:         Behavior: Behavior normal.     Labs:  For convenience and continuity at follow-up the following most recent labs are provided:      CBC:    Lab Results   Component Value Date    WBC 5.2 03/06/2020    HGB 12.4 03/06/2020    HCT 37.3 03/06/2020    PLT 94 03/06/2020       Renal:    Lab Results   Component Value Date     03/06/2020    K 3.6 03/06/2020    K 3.7 03/05/2020     03/06/2020    CO2 25 03/06/2020    BUN 20 03/06/2020    CREATININE 1.4 03/06/2020    CALCIUM 10.3 03/06/2020    PHOS 3.8 03/06/2020         Significant Diagnostic Studies    Radiology:   VL Extremity Venous Left         US RENAL COMPLETE   Final Result   1. Simple cyst in the right kidney. No hydronephrosis or mass otherwise. CTA CHEST W CONTRAST   Final Result      1. No evidence of SVC occlusion or stenosis. Unchanged right IJ approach power port terminating in the right subclavian vein. 2.  No evidence of central pulmonary embolism with subsegmental pulmonary arteries not well evaluated. 3.  Emphysema. No acute pulmonary consolidation. 4.  Prior left mastectomy. Decreased size of postoperative walled off seroma in the left axilla. XR CHEST STANDARD (2 VW)   Final Result      No acute pulmonary disease. Consults:     IP CONSULT TO HOSPITALIST  IP CONSULT TO CASE MANAGEMENT  IP CONSULT TO SOCIAL WORK      Discharge Instructions/Follow-up:  PCP, nephrology    Activity: activity as tolerated    Diet: General diet    Discharge Medications:     Discharge Medication List as of 3/7/2020 12:48 PM           Details   lisinopril (PRINIVIL;ZESTRIL) 5 MG tablet Take 1 tablet by mouth daily, Disp-30 tablet, R-3Normal      hydrocortisone 1 % ointment Apply topically 2 times daily. , Disp-30 g, R-0, Normal              Details   Multiple Vitamins-Minerals (THERAPEUTIC MULTIVITAMIN-MINERALS) tablet Take 1 tablet by mouth dailyHistorical Med      temazepam (RESTORIL) 15 MG capsule Take 15 mg by mouth nightly. Historical Med      potassium chloride (MICRO-K) 10 MEQ extended release capsule Take 10 mEq by mouth dailyHistorical Med      ergocalciferol (ERGOCALCIFEROL) 1.25 MG (39922 UT) capsule Take

## 2020-03-16 NOTE — ED PROVIDER NOTES
5.1 K/uL    Monocytes Absolute 0.7 0.0 - 1.3 K/uL    Eosinophils Absolute 0.1 0.0 - 0.6 K/uL    Basophils Absolute 0.0 0.0 - 0.2 K/uL   Basic Metabolic Panel w/ Reflex to MG   Result Value Ref Range    Sodium 139 136 - 145 mmol/L    Potassium reflex Magnesium 3.7 3.5 - 5.1 mmol/L    Chloride 104 99 - 110 mmol/L    CO2 23 21 - 32 mmol/L    Anion Gap 12 3 - 16    Glucose 145 (H) 70 - 99 mg/dL    BUN 19 7 - 20 mg/dL    CREATININE 1.3 (H) 0.6 - 1.1 mg/dL    GFR Non-African American 42 (A) >60    GFR  51 (A) >60    Calcium 9.8 8.3 - 10.6 mg/dL   Hepatic Function Panel   Result Value Ref Range    Total Protein 7.7 6.4 - 8.2 g/dL    Alb 3.1 (L) 3.4 - 5.0 g/dL    Alkaline Phosphatase 125 40 - 129 U/L    ALT 14 10 - 40 U/L    AST 32 15 - 37 U/L    Total Bilirubin 0.4 0.0 - 1.0 mg/dL    Bilirubin, Direct <0.2 0.0 - 0.3 mg/dL    Bilirubin, Indirect see below 0.0 - 1.0 mg/dL   Brain Natriuretic Peptide   Result Value Ref Range    Pro-BNP 38 0 - 124 pg/mL   Urinalysis, reflex to microscopic   Result Value Ref Range    Color, UA Yellow Straw/Yellow    Clarity, UA Clear Clear    Glucose, Ur Negative Negative mg/dL    Bilirubin Urine Negative Negative    Ketones, Urine Negative Negative mg/dL    Specific Gravity, UA >=1.030 1.005 - 1.030    Blood, Urine TRACE-INTACT (A) Negative    pH, UA 6.0 5.0 - 8.0    Protein, UA >=300 (A) Negative mg/dL    Urobilinogen, Urine 0.2 <2.0 E.U./dL    Nitrite, Urine Negative Negative    Leukocyte Esterase, Urine Negative Negative    Microscopic Examination YES     Urine Type Voided    Microscopic Urinalysis   Result Value Ref Range    WBC, UA 0-2 0 - 5 /HPF    RBC, UA 0-2 0 - 4 /HPF   Protein, urine, random   Result Value Ref Range    Protein, Ur 147.92 (H) <12 mg/dL   Creatinine, urine, random   Result Value Ref Range    Creatinine, Ur 100.5 28.0 - 259.0 mg/dL   EKG 12 Lead   Result Value Ref Range    Ventricular Rate 99 BPM    Atrial Rate 99 BPM    P-R Interval 154 ms    QRS internal medicine service for further evaluation management. I discussed this plan at length the patient who verbalizes understanding and is in agreement. The patient is currently stable and will be discharged home for continued self-care. Please see patient's AVS for additional discharge instructions. The patient was seen and evaluated by myself and the attending physician, Aundrea Tian MD, who agrees with my assessment, treatment and plan. Clinical Impression     1.  Nephrotic syndrome        Disposition     DISPOSITION Admitted 03/05/2020 10:40:58 PM         [address] [place] [address]  03/16/20 1006

## 2020-03-25 PROBLEM — N17.9 AKI (ACUTE KIDNEY INJURY) (HCC): Status: RESOLVED | Noted: 2019-08-27 | Resolved: 2020-03-24

## 2020-08-08 DIAGNOSIS — N18.30 CKD (CHRONIC KIDNEY DISEASE), STAGE III (HCC): ICD-10-CM

## 2020-08-08 LAB
ALBUMIN SERPL-MCNC: 3.5 G/DL (ref 3.4–5)
ANION GAP SERPL CALCULATED.3IONS-SCNC: 14 MMOL/L (ref 3–16)
BILIRUBIN URINE: NEGATIVE
BLOOD, URINE: ABNORMAL
BUN BLDV-MCNC: 37 MG/DL (ref 7–20)
C3 COMPLEMENT: 195.1 MG/DL (ref 90–180)
C4 COMPLEMENT: 37.9 MG/DL (ref 10–40)
CALCIUM SERPL-MCNC: 12 MG/DL (ref 8.3–10.6)
CHLORIDE BLD-SCNC: 107 MMOL/L (ref 99–110)
CLARITY: CLEAR
CO2: 16 MMOL/L (ref 21–32)
COLOR: YELLOW
CREAT SERPL-MCNC: 1.6 MG/DL (ref 0.6–1.1)
CREATININE URINE: 105.6 MG/DL (ref 28–259)
EPITHELIAL CELLS, UA: 1 /HPF (ref 0–5)
GFR AFRICAN AMERICAN: 40
GFR NON-AFRICAN AMERICAN: 33
GLUCOSE BLD-MCNC: 142 MG/DL (ref 70–99)
GLUCOSE URINE: NEGATIVE MG/DL
HYALINE CASTS: 3 /LPF (ref 0–8)
KETONES, URINE: NEGATIVE MG/DL
LEUKOCYTE ESTERASE, URINE: NEGATIVE
MICROSCOPIC EXAMINATION: YES
NITRITE, URINE: NEGATIVE
PH UA: 6 (ref 5–8)
PHOSPHORUS: 3.3 MG/DL (ref 2.5–4.9)
POTASSIUM SERPL-SCNC: 3.8 MMOL/L (ref 3.5–5.1)
PROTEIN PROTEIN: 0.63 G/DL
PROTEIN PROTEIN: 632 MG/DL
PROTEIN UA: >=300 MG/DL
RBC UA: 12 /HPF (ref 0–4)
RHEUMATOID FACTOR: 15 IU/ML
SEDIMENTATION RATE, ERYTHROCYTE: 76 MM/HR (ref 0–30)
SODIUM BLD-SCNC: 137 MMOL/L (ref 136–145)
SPECIFIC GRAVITY UA: 1.02 (ref 1–1.03)
URINE TYPE: ABNORMAL
UROBILINOGEN, URINE: 0.2 E.U./DL
WBC UA: 4 /HPF (ref 0–5)

## 2020-08-09 LAB
ANTI-NUCLEAR ANTIBODY (ANA): NEGATIVE
HAV IGM SER IA-ACNC: ABNORMAL
HEPATITIS B CORE IGM ANTIBODY: ABNORMAL
HEPATITIS B SURFACE ANTIGEN INTERPRETATION: ABNORMAL
HEPATITIS C ANTIBODY INTERPRETATION: REACTIVE

## 2020-08-11 LAB
KAPPA, FREE LIGHT CHAINS, SERUM: 128.62 MG/L (ref 3.3–19.4)
KAPPA/LAMBDA RATIO: 1.11 (ref 0.26–1.65)
KAPPA/LAMBDA TEST COMMENT: ABNORMAL
LAMBDA, FREE LIGHT CHAINS, SERUM: 115.69 MG/L (ref 5.71–26.3)

## 2020-08-13 LAB — CRYOGLOBULIN, QUALITATIVE: NORMAL

## 2020-09-10 DIAGNOSIS — R80.9 PROTEINURIA, UNSPECIFIED TYPE: ICD-10-CM

## 2020-09-10 LAB
ALBUMIN SERPL-MCNC: 3.5 G/DL (ref 3.4–5)
ANION GAP SERPL CALCULATED.3IONS-SCNC: 15 MMOL/L (ref 3–16)
BUN BLDV-MCNC: 38 MG/DL (ref 7–20)
CALCIUM SERPL-MCNC: 12.5 MG/DL (ref 8.3–10.6)
CHLORIDE BLD-SCNC: 105 MMOL/L (ref 99–110)
CO2: 18 MMOL/L (ref 21–32)
CREAT SERPL-MCNC: 2.1 MG/DL (ref 0.6–1.1)
GFR AFRICAN AMERICAN: 29
GFR NON-AFRICAN AMERICAN: 24
GLUCOSE BLD-MCNC: 156 MG/DL (ref 70–99)
INR BLD: 1.43 (ref 0.86–1.14)
PHOSPHORUS: 3.5 MG/DL (ref 2.5–4.9)
POTASSIUM SERPL-SCNC: 4.3 MMOL/L (ref 3.5–5.1)
PROTHROMBIN TIME: 16.6 SEC (ref 10–13.2)
SODIUM BLD-SCNC: 138 MMOL/L (ref 136–145)

## 2020-09-14 LAB
HCV QNT BY NAAT IU/ML: NOT DETECTED IU/ML
HCV QNT BY NAAT LOG IU/ML: NOT DETECTED LOG IU/ML
INTERPRETATION: NOT DETECTED

## 2020-09-22 ENCOUNTER — HOSPITAL ENCOUNTER (INPATIENT)
Age: 60
LOS: 4 days | Discharge: NURSING, REHAB OR LONG-TERM CARE | DRG: 425 | End: 2020-09-26
Attending: EMERGENCY MEDICINE | Admitting: INTERNAL MEDICINE
Payer: COMMERCIAL

## 2020-09-22 PROBLEM — N17.9 AKI (ACUTE KIDNEY INJURY) (HCC): Status: ACTIVE | Noted: 2020-09-22

## 2020-09-22 LAB
ALBUMIN SERPL-MCNC: 3.5 G/DL (ref 3.4–5)
ALP BLD-CCNC: 134 U/L (ref 40–129)
ALT SERPL-CCNC: 20 U/L (ref 10–40)
ANION GAP SERPL CALCULATED.3IONS-SCNC: 12 MMOL/L (ref 3–16)
AST SERPL-CCNC: 35 U/L (ref 15–37)
BASE EXCESS VENOUS: -2.7 MMOL/L (ref -2–3)
BASOPHILS ABSOLUTE: 0 K/UL (ref 0–0.2)
BASOPHILS RELATIVE PERCENT: 0.5 %
BILIRUB SERPL-MCNC: 0.6 MG/DL (ref 0–1)
BILIRUBIN DIRECT: 0.3 MG/DL (ref 0–0.3)
BILIRUBIN URINE: NEGATIVE
BILIRUBIN, INDIRECT: 0.3 MG/DL (ref 0–1)
BLOOD, URINE: ABNORMAL
BUN BLDV-MCNC: 36 MG/DL (ref 7–20)
CALCIUM IONIZED: 1.59 MMOL/L (ref 1.12–1.32)
CALCIUM SERPL-MCNC: 12.3 MG/DL (ref 8.3–10.6)
CARBOXYHEMOGLOBIN: 4.5 % (ref 0–1.5)
CHLORIDE BLD-SCNC: 107 MMOL/L (ref 99–110)
CLARITY: CLEAR
CO2: 19 MMOL/L (ref 21–32)
COLOR: YELLOW
CREAT SERPL-MCNC: 1.9 MG/DL (ref 0.6–1.1)
EKG ATRIAL RATE: 123 BPM
EKG DIAGNOSIS: NORMAL
EKG P AXIS: 52 DEGREES
EKG P-R INTERVAL: 152 MS
EKG Q-T INTERVAL: 330 MS
EKG QRS DURATION: 98 MS
EKG QTC CALCULATION (BAZETT): 472 MS
EKG R AXIS: 22 DEGREES
EKG T AXIS: 73 DEGREES
EKG VENTRICULAR RATE: 123 BPM
EOSINOPHILS ABSOLUTE: 0.1 K/UL (ref 0–0.6)
EOSINOPHILS RELATIVE PERCENT: 0.9 %
EPITHELIAL CELLS, UA: ABNORMAL /HPF (ref 0–5)
GFR AFRICAN AMERICAN: 33
GFR NON-AFRICAN AMERICAN: 27
GLUCOSE BLD-MCNC: 122 MG/DL (ref 70–99)
GLUCOSE URINE: NEGATIVE MG/DL
HCO3 VENOUS: 22.4 MMOL/L (ref 24–28)
HCT VFR BLD CALC: 43.4 % (ref 36–48)
HEMOGLOBIN, VEN, REDUCED: 35.2 %
HEMOGLOBIN: 14.7 G/DL (ref 12–16)
KETONES, URINE: NEGATIVE MG/DL
LEUKOCYTE ESTERASE, URINE: NEGATIVE
LYMPHOCYTES ABSOLUTE: 1.8 K/UL (ref 1–5.1)
LYMPHOCYTES RELATIVE PERCENT: 26.9 %
MAGNESIUM: 1.8 MG/DL (ref 1.8–2.4)
MCH RBC QN AUTO: 33 PG (ref 26–34)
MCHC RBC AUTO-ENTMCNC: 33.9 G/DL (ref 31–36)
MCV RBC AUTO: 97.2 FL (ref 80–100)
METHEMOGLOBIN VENOUS: 0.7 % (ref 0–1.5)
MICROSCOPIC EXAMINATION: YES
MONOCYTES ABSOLUTE: 0.6 K/UL (ref 0–1.3)
MONOCYTES RELATIVE PERCENT: 9.5 %
NEUTROPHILS ABSOLUTE: 4.2 K/UL (ref 1.7–7.7)
NEUTROPHILS RELATIVE PERCENT: 62.2 %
NITRITE, URINE: NEGATIVE
O2 SAT, VEN: 63 %
PARATHYROID HORMONE INTACT: 22.2 PG/ML (ref 14–72)
PCO2, VEN: 41.9 MMHG (ref 41–51)
PDW BLD-RTO: 15.3 % (ref 12.4–15.4)
PH UA: 6 (ref 5–8)
PH VENOUS: 7.35 (ref 7.35–7.45)
PH VENOUS: 7.35 (ref 7.35–7.45)
PHOSPHORUS: 3.1 MG/DL (ref 2.5–4.9)
PLATELET # BLD: 157 K/UL (ref 135–450)
PMV BLD AUTO: 6.9 FL (ref 5–10.5)
PO2, VEN: 33.4 MMHG (ref 25–40)
POTASSIUM SERPL-SCNC: 4.2 MMOL/L (ref 3.5–5.1)
PROTEIN UA: 100 MG/DL
RBC # BLD: 4.46 M/UL (ref 4–5.2)
RBC UA: ABNORMAL /HPF (ref 0–4)
SODIUM BLD-SCNC: 138 MMOL/L (ref 136–145)
SPECIFIC GRAVITY UA: 1.02 (ref 1–1.03)
TCO2 CALC VENOUS: 24 MMOL/L
TOTAL PROTEIN: 8.1 G/DL (ref 6.4–8.2)
TRICHOMONAS: ABNORMAL /HPF
URINE TYPE: ABNORMAL
UROBILINOGEN, URINE: 0.2 E.U./DL
VITAMIN D 25-HYDROXY: 71.6 NG/ML
WBC # BLD: 6.7 K/UL (ref 4–11)
WBC UA: ABNORMAL /HPF (ref 0–5)

## 2020-09-22 PROCEDURE — 6370000000 HC RX 637 (ALT 250 FOR IP): Performed by: INTERNAL MEDICINE

## 2020-09-22 PROCEDURE — 93005 ELECTROCARDIOGRAM TRACING: CPT | Performed by: EMERGENCY MEDICINE

## 2020-09-22 PROCEDURE — 85025 COMPLETE CBC W/AUTO DIFF WBC: CPT

## 2020-09-22 PROCEDURE — 82330 ASSAY OF CALCIUM: CPT

## 2020-09-22 PROCEDURE — 36415 COLL VENOUS BLD VENIPUNCTURE: CPT

## 2020-09-22 PROCEDURE — 83970 ASSAY OF PARATHORMONE: CPT

## 2020-09-22 PROCEDURE — 2580000003 HC RX 258: Performed by: EMERGENCY MEDICINE

## 2020-09-22 PROCEDURE — 6360000002 HC RX W HCPCS: Performed by: INTERNAL MEDICINE

## 2020-09-22 PROCEDURE — 81001 URINALYSIS AUTO W/SCOPE: CPT

## 2020-09-22 PROCEDURE — 83735 ASSAY OF MAGNESIUM: CPT

## 2020-09-22 PROCEDURE — 2060000000 HC ICU INTERMEDIATE R&B

## 2020-09-22 PROCEDURE — U0003 INFECTIOUS AGENT DETECTION BY NUCLEIC ACID (DNA OR RNA); SEVERE ACUTE RESPIRATORY SYNDROME CORONAVIRUS 2 (SARS-COV-2) (CORONAVIRUS DISEASE [COVID-19]), AMPLIFIED PROBE TECHNIQUE, MAKING USE OF HIGH THROUGHPUT TECHNOLOGIES AS DESCRIBED BY CMS-2020-01-R: HCPCS

## 2020-09-22 PROCEDURE — 84100 ASSAY OF PHOSPHORUS: CPT

## 2020-09-22 PROCEDURE — 82306 VITAMIN D 25 HYDROXY: CPT

## 2020-09-22 PROCEDURE — 99284 EMERGENCY DEPT VISIT MOD MDM: CPT

## 2020-09-22 PROCEDURE — 80076 HEPATIC FUNCTION PANEL: CPT

## 2020-09-22 PROCEDURE — 82803 BLOOD GASES ANY COMBINATION: CPT

## 2020-09-22 PROCEDURE — 80048 BASIC METABOLIC PNL TOTAL CA: CPT

## 2020-09-22 RX ORDER — DOCUSATE SODIUM 100 MG/1
100 CAPSULE, LIQUID FILLED ORAL 2 TIMES DAILY
Status: DISCONTINUED | OUTPATIENT
Start: 2020-09-22 | End: 2020-09-26 | Stop reason: HOSPADM

## 2020-09-22 RX ORDER — SODIUM CHLORIDE 0.9 % (FLUSH) 0.9 %
10 SYRINGE (ML) INJECTION PRN
Status: DISCONTINUED | OUTPATIENT
Start: 2020-09-22 | End: 2020-09-26 | Stop reason: HOSPADM

## 2020-09-22 RX ORDER — MECOBALAMIN 5000 MCG
20 TABLET,DISINTEGRATING ORAL NIGHTLY
Status: DISCONTINUED | OUTPATIENT
Start: 2020-09-22 | End: 2020-09-26 | Stop reason: HOSPADM

## 2020-09-22 RX ORDER — ACETAMINOPHEN 650 MG/1
650 SUPPOSITORY RECTAL EVERY 6 HOURS PRN
Status: DISCONTINUED | OUTPATIENT
Start: 2020-09-22 | End: 2020-09-26 | Stop reason: HOSPADM

## 2020-09-22 RX ORDER — MIRTAZAPINE 15 MG/1
15 TABLET, FILM COATED ORAL NIGHTLY
Status: DISCONTINUED | OUTPATIENT
Start: 2020-09-22 | End: 2020-09-26 | Stop reason: HOSPADM

## 2020-09-22 RX ORDER — SODIUM CHLORIDE 9 MG/ML
1000 INJECTION, SOLUTION INTRAVENOUS CONTINUOUS
Status: DISCONTINUED | OUTPATIENT
Start: 2020-09-22 | End: 2020-09-23

## 2020-09-22 RX ORDER — PROMETHAZINE HYDROCHLORIDE 25 MG/1
12.5 TABLET ORAL EVERY 6 HOURS PRN
Status: DISCONTINUED | OUTPATIENT
Start: 2020-09-22 | End: 2020-09-26 | Stop reason: HOSPADM

## 2020-09-22 RX ORDER — LETROZOLE 2.5 MG/1
2.5 TABLET, FILM COATED ORAL DAILY
Status: DISCONTINUED | OUTPATIENT
Start: 2020-09-23 | End: 2020-09-26 | Stop reason: HOSPADM

## 2020-09-22 RX ORDER — HYDROXYZINE HYDROCHLORIDE 25 MG/1
50 TABLET, FILM COATED ORAL NIGHTLY
Status: DISCONTINUED | OUTPATIENT
Start: 2020-09-22 | End: 2020-09-23

## 2020-09-22 RX ORDER — HEPARIN SODIUM 5000 [USP'U]/ML
5000 INJECTION, SOLUTION INTRAVENOUS; SUBCUTANEOUS EVERY 8 HOURS SCHEDULED
Status: DISCONTINUED | OUTPATIENT
Start: 2020-09-22 | End: 2020-09-22

## 2020-09-22 RX ORDER — TRAZODONE HYDROCHLORIDE 100 MG/1
100 TABLET ORAL NIGHTLY
Status: DISCONTINUED | OUTPATIENT
Start: 2020-09-22 | End: 2020-09-26 | Stop reason: HOSPADM

## 2020-09-22 RX ORDER — ONDANSETRON 2 MG/ML
4 INJECTION INTRAMUSCULAR; INTRAVENOUS EVERY 6 HOURS PRN
Status: DISCONTINUED | OUTPATIENT
Start: 2020-09-22 | End: 2020-09-26 | Stop reason: HOSPADM

## 2020-09-22 RX ORDER — ACETAMINOPHEN 325 MG/1
650 TABLET ORAL EVERY 6 HOURS PRN
Status: DISCONTINUED | OUTPATIENT
Start: 2020-09-22 | End: 2020-09-26 | Stop reason: HOSPADM

## 2020-09-22 RX ORDER — ARIPIPRAZOLE 5 MG/1
10 TABLET ORAL EVERY EVENING
Status: DISCONTINUED | OUTPATIENT
Start: 2020-09-22 | End: 2020-09-26 | Stop reason: HOSPADM

## 2020-09-22 RX ORDER — POLYETHYLENE GLYCOL 3350 17 G/17G
17 POWDER, FOR SOLUTION ORAL DAILY PRN
Status: DISCONTINUED | OUTPATIENT
Start: 2020-09-22 | End: 2020-09-26 | Stop reason: HOSPADM

## 2020-09-22 RX ORDER — SODIUM CHLORIDE 0.9 % (FLUSH) 0.9 %
10 SYRINGE (ML) INJECTION EVERY 12 HOURS SCHEDULED
Status: DISCONTINUED | OUTPATIENT
Start: 2020-09-22 | End: 2020-09-26 | Stop reason: HOSPADM

## 2020-09-22 RX ADMIN — TRAZODONE HYDROCHLORIDE 100 MG: 100 TABLET ORAL at 22:02

## 2020-09-22 RX ADMIN — HEPARIN SODIUM 5000 UNITS: 5000 INJECTION INTRAVENOUS; SUBCUTANEOUS at 20:49

## 2020-09-22 RX ADMIN — SODIUM CHLORIDE 1000 ML: 9 INJECTION, SOLUTION INTRAVENOUS at 14:54

## 2020-09-22 RX ADMIN — ARIPIPRAZOLE 10 MG: 5 TABLET ORAL at 18:27

## 2020-09-22 RX ADMIN — Medication 20 MG: at 22:02

## 2020-09-22 RX ADMIN — ACETAMINOPHEN 650 MG: 325 TABLET ORAL at 20:49

## 2020-09-22 RX ADMIN — HYDROXYZINE HYDROCHLORIDE 50 MG: 25 TABLET ORAL at 22:02

## 2020-09-22 RX ADMIN — MIRTAZAPINE 15 MG: 15 TABLET, FILM COATED ORAL at 22:02

## 2020-09-22 ASSESSMENT — PAIN SCALES - GENERAL
PAINLEVEL_OUTOF10: 3
PAINLEVEL_OUTOF10: 0
PAINLEVEL_OUTOF10: 7
PAINLEVEL_OUTOF10: 0
PAINLEVEL_OUTOF10: 0

## 2020-09-22 ASSESSMENT — PAIN DESCRIPTION - DESCRIPTORS: DESCRIPTORS: ACHING;SHOOTING

## 2020-09-22 ASSESSMENT — PAIN DESCRIPTION - PROGRESSION
CLINICAL_PROGRESSION: NOT CHANGED

## 2020-09-22 ASSESSMENT — PAIN DESCRIPTION - ONSET: ONSET: ON-GOING

## 2020-09-22 ASSESSMENT — PAIN DESCRIPTION - PAIN TYPE: TYPE: ACUTE PAIN

## 2020-09-22 ASSESSMENT — PAIN - FUNCTIONAL ASSESSMENT: PAIN_FUNCTIONAL_ASSESSMENT: ACTIVITIES ARE NOT PREVENTED

## 2020-09-22 ASSESSMENT — PAIN DESCRIPTION - ORIENTATION: ORIENTATION: RIGHT

## 2020-09-22 ASSESSMENT — PAIN DESCRIPTION - LOCATION: LOCATION: KNEE

## 2020-09-22 ASSESSMENT — PAIN DESCRIPTION - FREQUENCY: FREQUENCY: CONTINUOUS
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
## 2020-09-22 NOTE — ED TRIAGE NOTES
Patient advised to come to the ED for follow-up on abnormal labs per her PCP. Patient presents otherwise well-appearing in no acute distress or discomfort.

## 2020-09-22 NOTE — PROGRESS NOTES
Transferred to [id number] [phone] from [address] [place] [address] for R/O COVID; see vs, assessment cont to monitor

## 2020-09-22 NOTE — PROGRESS NOTES
4 Eyes Admission Assessment     I agree as the admission nurse that 2 RN's have performed a thorough Head to Toe Skin Assessment on the patient. ALL assessment sites listed below have been assessed on admission. Areas assessed by both nurses:   [x]   Head, Face, and Ears   [x]   Shoulders, Back, and Chest  [x]   Arms, Elbows, and Hands   [x]   Coccyx, Sacrum, and Ischium  [x]   Legs, Feet, and Heels        Does the Patient have Skin Breakdown?   No         Manan Prevention initiated:  No   Wound Care Orders initiated:  No      [facility] nurse consulted for Pressure Injury (Stage 3,4, Unstageable, DTI, NWPT, and Complex wounds) or Manan score 18 or lower:  No      Nurse 1 eSignature: Electronically signed by Nica Francois RN on 9/22/20 at 5:16 PM EDT    **SHARE this note so that the co-signing nurse is able to place an eSignature**    Nurse 2 eSignature: Electronically signed by Ari Shabazz RN on 9/22/20 at 5:20 PM EDT
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
## 2020-09-22 NOTE — PROGRESS NOTES
4 Eyes Admission Assessment     I agree as the admission nurse that 2 RN's have performed a thorough Head to Toe Skin Assessment on the patient. ALL assessment sites listed below have been assessed on admission. Areas assessed by both nurses:Arlene/ Dusty Head  [x]   Head, Face, and Ears   [x]   Shoulders, Back, and Chest  [x]   Arms, Elbows, and Hands   [x]   Coccyx, Sacrum, and Ischium  [x]   Legs, Feet, and Heels        Does the Patient have Skin Breakdown?   No         Manan Prevention initiated:  No   Wound Care Orders initiated:  No      [facility] nurse consulted for Pressure Injury (Stage 3,4, Unstageable, DTI, NWPT, and Complex wounds) or Manan score 18 or lower:  No      Nurse 1 eSignature: Electronically signed by Jewell Cesar RN on 9/22/20 at 7:17 PM EDT    **SHARE this note so that the co-signing nurse is able to place an eSignature**    Nurse 2 eSignature: Electronically signed by Jaime Davey RN on 9/22/20 at 8:15 PM EDT     Dusty Morgan aware to do assessment at shift change
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
## 2020-09-22 NOTE — ED NOTES
Port accessed using sterile technique, the tape would not stay attached to skin so recleaned skin and redressed, still would not stAY New Ian Monk RN  09/22/20 [id number]

## 2020-09-22 NOTE — PROGRESS NOTES
Patient aware that she is to be transferring to the PCU for covid testing. RN attempting to call report, bed not assigned yet.

## 2020-09-23 LAB
ALBUMIN SERPL-MCNC: 3.3 G/DL (ref 3.4–5)
ANION GAP SERPL CALCULATED.3IONS-SCNC: 11 MMOL/L (ref 3–16)
BUN BLDV-MCNC: 33 MG/DL (ref 7–20)
CALCIUM IONIZED: 1.51 MMOL/L (ref 1.12–1.32)
CALCIUM SERPL-MCNC: 11.2 MG/DL (ref 8.3–10.6)
CHLORIDE BLD-SCNC: 112 MMOL/L (ref 99–110)
CO2: 20 MMOL/L (ref 21–32)
CREAT SERPL-MCNC: 2 MG/DL (ref 0.6–1.1)
CREATININE URINE: 37.1 MG/DL (ref 28–259)
GFR AFRICAN AMERICAN: 31
GFR NON-AFRICAN AMERICAN: 26
GLUCOSE BLD-MCNC: 84 MG/DL (ref 70–99)
PH VENOUS: 7.33 (ref 7.35–7.45)
PHOSPHORUS: 3.1 MG/DL (ref 2.5–4.9)
POTASSIUM SERPL-SCNC: 4.1 MMOL/L (ref 3.5–5.1)
PROTEIN PROTEIN: 100.4 MG/DL
SODIUM BLD-SCNC: 143 MMOL/L (ref 136–145)
VITAMIN D 25-HYDROXY: 29.7 NG/ML

## 2020-09-23 PROCEDURE — 82542 COL CHROMOTOGRAPHY QUAL/QUAN: CPT

## 2020-09-23 PROCEDURE — 2580000003 HC RX 258: Performed by: INTERNAL MEDICINE

## 2020-09-23 PROCEDURE — 82306 VITAMIN D 25 HYDROXY: CPT

## 2020-09-23 PROCEDURE — 6370000000 HC RX 637 (ALT 250 FOR IP): Performed by: INTERNAL MEDICINE

## 2020-09-23 PROCEDURE — 2060000000 HC ICU INTERMEDIATE R&B

## 2020-09-23 PROCEDURE — 82330 ASSAY OF CALCIUM: CPT

## 2020-09-23 PROCEDURE — 84156 ASSAY OF PROTEIN URINE: CPT

## 2020-09-23 PROCEDURE — 80069 RENAL FUNCTION PANEL: CPT

## 2020-09-23 PROCEDURE — 2580000003 HC RX 258: Performed by: EMERGENCY MEDICINE

## 2020-09-23 PROCEDURE — 6360000002 HC RX W HCPCS: Performed by: INTERNAL MEDICINE

## 2020-09-23 PROCEDURE — 82570 ASSAY OF URINE CREATININE: CPT

## 2020-09-23 RX ORDER — SODIUM CHLORIDE 9 MG/ML
1000 INJECTION, SOLUTION INTRAVENOUS CONTINUOUS
Status: DISCONTINUED | OUTPATIENT
Start: 2020-09-23 | End: 2020-09-25

## 2020-09-23 RX ORDER — HYDROXYZINE HYDROCHLORIDE 25 MG/1
25 TABLET, FILM COATED ORAL 2 TIMES DAILY PRN
Status: DISCONTINUED | OUTPATIENT
Start: 2020-09-23 | End: 2020-09-26 | Stop reason: HOSPADM

## 2020-09-23 RX ORDER — LABETALOL HYDROCHLORIDE 5 MG/ML
10 INJECTION, SOLUTION INTRAVENOUS EVERY 4 HOURS PRN
Status: DISCONTINUED | OUTPATIENT
Start: 2020-09-23 | End: 2020-09-26 | Stop reason: HOSPADM

## 2020-09-23 RX ADMIN — SODIUM CHLORIDE 1000 ML: 9 INJECTION, SOLUTION INTRAVENOUS at 21:28

## 2020-09-23 RX ADMIN — ONDANSETRON HYDROCHLORIDE 4 MG: 2 SOLUTION INTRAMUSCULAR; INTRAVENOUS at 23:35

## 2020-09-23 RX ADMIN — ARIPIPRAZOLE 10 MG: 5 TABLET ORAL at 17:45

## 2020-09-23 RX ADMIN — ACETAMINOPHEN 650 MG: 325 TABLET ORAL at 09:42

## 2020-09-23 RX ADMIN — PAMIDRONATE DISODIUM 30 MG: 3 INJECTION, SOLUTION INTRAVENOUS at 00:37

## 2020-09-23 RX ADMIN — SODIUM CHLORIDE 1000 ML: 9 INJECTION, SOLUTION INTRAVENOUS at 23:35

## 2020-09-23 RX ADMIN — Medication 20 MG: at 22:00

## 2020-09-23 RX ADMIN — LETROZOLE 2.5 MG: 2.5 TABLET ORAL at 09:42

## 2020-09-23 RX ADMIN — TRAZODONE HYDROCHLORIDE 100 MG: 100 TABLET ORAL at 22:00

## 2020-09-23 RX ADMIN — Medication 10 ML: at 21:28

## 2020-09-23 RX ADMIN — HYDROXYZINE HYDROCHLORIDE 25 MG: 25 TABLET ORAL at 16:46

## 2020-09-23 RX ADMIN — HYDROXYZINE HYDROCHLORIDE 25 MG: 25 TABLET ORAL at 21:29

## 2020-09-23 RX ADMIN — MIRTAZAPINE 15 MG: 15 TABLET, FILM COATED ORAL at 21:27

## 2020-09-23 RX ADMIN — SODIUM CHLORIDE 1000 ML: 9 INJECTION, SOLUTION INTRAVENOUS at 00:37

## 2020-09-23 ASSESSMENT — PAIN DESCRIPTION - PROGRESSION
CLINICAL_PROGRESSION: NOT CHANGED

## 2020-09-23 ASSESSMENT — PAIN SCALES - GENERAL
PAINLEVEL_OUTOF10: 2
PAINLEVEL_OUTOF10: 0
PAINLEVEL_OUTOF10: 0
PAINLEVEL_OUTOF10: 3
PAINLEVEL_OUTOF10: 0

## 2020-09-23 NOTE — PLAN OF CARE
Problem: Falls - Risk of:  Goal: Will remain free from falls  Description: Will remain free from falls  Outcome: Ongoing  Note: Pt is a Fall Risk. See George Hubbard Fall Risk Score. Pt bed in low position and side rails up and bed alarm on. Call light and belongings in reach. Pt encouraged to call for assistance. Will continue with hourly rounds for PO intake, pain needs, toileting, and repositioning as needed.
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
## 2020-09-23 NOTE — PROGRESS NOTES
Hospital Medicine Progress Note     PCP: Patricia Gupta MD    Date of Admission: 9/22/2020    Date of Service: Pt seen/examined on 9/22/2020 and Admitted to Inpatient with expected LOS greater than two midnights due to medical therapy. Chief Complaint: Abnrmal labs      History Of Present Illness:    65 yo F with PMH breast cancer (s/p L mastectomy 2019, status post radiation therapy,  on Femera), nephrectomy (2/2 infected stone complicated with renal abscess, 1980s), HTN, VTE (on eliquis) who presented due to abnormal labs. Patient reports that she had labs drawn as an outpatient and was told to come to the Ed, as her calcium was high. She dennies any acute physical complaints at this time. On review of medical records and previous labs, alysha has had abnormal Calcium since at least 8/2020. Prior to that, she had high normal calcium in the 9s-10 range. She has also had abnormal Cr for many months, and was lowest at 1.3- 1.4 during an admission in March 2020. Since then, has been 1.6 -1.9 in July and August.     She has a single kidney following a nephrectomy in the 1695L, due to complication of renal stone with renal abscess. She denies recent increased use of NSAIDs or recent significant medication changes. Her med list include spironolactone, ergocalciferol and multivitamins. She denies significant change in bowel or bladder habits. During admission in 3.2020, she reported her urine to be frothy for a few months. Cr then was 1.3- 1.4. She had no dysuria, frequency, hematuria. UA >300 protein. Nephrotic syndrome workup was negative and pt was started on lisinopril 5 mg daily. Renal biopsy was not felt required at this time with plan to follow up with nephrology outpatient. In the ED, she was afebrile with normal vitals. Labs showed normal hgb, normal CBC, and elevated Ca of and Cr of 1.9 with HCO3 of 19 and normal AG. She was placed on IV fluids.              Interval History  Denies new complaints. No fever    No dyspnea          Medications Prior to Admission:      Prior to Admission medications    Medication Sig Start Date End Date Taking? Authorizing Provider   spironolactone (ALDACTONE) 25 MG tablet Take 1 tablet by mouth daily 7/21/20  Yes Jaleesa Rosas MD   Multiple Vitamins-Minerals (THERAPEUTIC MULTIVITAMIN-MINERALS) tablet Take 1 tablet by mouth daily   Yes Historical Provider, MD   temazepam (RESTORIL) 15 MG capsule Take 30 mg by mouth every other day. Yes Historical Provider, MD   ergocalciferol (ERGOCALCIFEROL) 1.25 MG (50412 UT) capsule Take 50,000 Units by mouth once a week   Yes Historical Provider, MD   hydrOXYzine (ATARAX) 50 MG tablet Take 50 mg by mouth nightly   Yes Historical Provider, MD   apixaban (ELIQUIS) 5 MG TABS tablet Take two tablets twice daily until 9/4/2019 (18 tablets including 8/31/2019)  Then, take one tablet twice daily afterwards from 9/5/2019. 8/31/19  Yes Uziel Sherman MD   letrozole [facility]) 2.5 MG tablet Take 2.5 mg by mouth daily   Yes Historical Provider, MD   Melatonin 10 MG TABS Take 20 mg by mouth nightly   Yes Historical Provider, MD   traZODone (DESYREL) 100 MG tablet Take 100 mg by mouth nightly   Yes Historical Provider, MD   mirtazapine (REMERON) 15 MG tablet Take 15 mg by mouth nightly  8/2/19  Yes Historical Provider, MD   loratadine (CLARITIN) 10 MG capsule Take 10 mg by mouth every evening    Yes Historical Provider, MD   ARIPiprazole (ABILIFY) 10 MG tablet Take 10 mg by mouth every evening    Yes Historical Provider, MD   famotidine (PEPCID) 40 MG tablet Take 40 mg by mouth daily   Yes Historical Provider, MD   guaiFENesin (MUCINEX) 600 MG extended release tablet Take 600 mg by mouth 2 times daily    Yes Historical Provider, MD   hydrocortisone 1 % ointment Apply topically 2 times daily.  3/7/20   Sammie Mondragon MD   docusate (COLACE, DULCOLAX) 100 MG CAPS Take 100 mg by mouth 2 times daily 8/31/19   Larnell Quiet

## 2020-09-23 NOTE — CARE COORDINATION
Case Management Assessment           Initial Evaluation                Date / Time of Evaluation: 9/23/2020 4:29 PM                 Assessment Completed by: Kathy Reyes    Patient Name: Kiara Leung     YOB: 1960  Diagnosis: CHANTELLE (acute kidney injury) [facility]) [N17.9]     Date / Time: 9/22/2020  1:08 PM    Patient Admission Status: Inpatient    If patient is discharged prior to next notation, then this note serves as note for discharge by case management. Current PCP: Wendy Ellison MD  Clinic Patient: No    Chart Reviewed: Yes  Patient/ Family Interviewed: Yes    Initial assessment completed at bedside with: patient over the phone due to isolation    Hospitalization in the last 30 days: No    Emergency Contacts:  Extended Emergency Contact Information  Primary Emergency Contact: devante españa  Home Phone: [phone]  Work Phone: [phone]  Mobile Phone: [phone]  Relation: Brother/Sister   needed? No  Secondary Emergency Contact: brad oneill  Home Phone: [phone]  Mobile Phone: [phone]  Relation: Child  Preferred language: English   needed?  Yes    Advance Directives:   Code Status: Full Code    Healthcare Power of : Yes  Agent: Gisele anguiano  Contact Number: [phone]    Copy present: No     In paper Chart: No    Scanned into EMR No    Financial  Payor: Martina Lathamer / Plan: Kiara Real / Product Type: *No Product type* /     Pre-cert required for SNF: Yes    Pharmacy    [facility] [address]  [address] [place] [address]  Phone: [phone] Fax: [facility] [place] [address] [phone]  [address] [facility] [address]  Phone: [phone] Fax: [phone]      Potential assistance Purchasing Medications: Potential Assistance Purchasing Medications: No  Does Patient want to participate in local refill/ meds to beds program?: Not Assessed    Meds To Beds General Rules:  1. Can ONLY be done Monday- Friday between 8:30am-5pm  2. Prescription(s) must be in pharmacy by 3pm to be filled same day  3. Copy of patient's insurance/ prescription drug card and patient face sheet must be sent along with the prescription(s)  4. Cost of Rx cannot be added to hospital bill. If financial assistance is needed, please contact unit  or ;  or  CANNOT provide pharmacy voucher for patients co-pays  5. Patients can then  the prescription on their way out of the hospital at discharge, or pharmacy can deliver to the bedside if staff is available. (payment due at time of pick-up or delivery - cash, check, or card accepted)     Able to afford home medications/ co-pay costs: Yes    ADLS  Support Systems: Family Members    PT AM-PAC:   /24  OT AM-PAC:   /24    [place]: from LTC at [facility]  Steps:     Plans to RETURN to current housing: Yes  Barriers to RETURNING to current housing: medical complications    Concha [address] 80  Currently ACTIVE with [address]: No  Home Care Agency: Not Applicable          Durable Medical Equipment  DME Provider: n/a  Equipment: defer    Home Oxygen and [address] [place] prior to admission: No  [address]: Not Applicable      Dialysis  Active with HD/PD prior to admission: No  Nephrologist:     HD Center:  Not Applicable    DISCHARGE PLAN:  Disposition: [facility] ([facility]): [facility]  Phone: [phone]  Fax: [phone]    Transportation PLAN for discharge: EMS transportation     Factors facilitating achievement of predicted outcomes: Family support    Barriers to discharge: Medical complications    Additional Case Management Notes:   CM spoke with patient over the phone due to isolation.   Patient plans to return to LTC at [facility] when discharged. CM called [facility] to see if any pre-cert needed, left VM. The Plan for Transition of Care is related to the following treatment goals of CHANTELLE (acute kidney injury) ([facility] [address] [N17.9]    The Patient and/or patient representative Shoaib and her family were provided with a choice of provider and agrees with the discharge plan Yes    Freedom of choice list was provided with basic dialogue that supports the patient's individualized plan of care/goals and shares the quality data associated with the providers.  Yes    Care Transition patient: No    Yossi Vizcaino RN  [facility] [employer]  Case Management Department  Ph: [phone]   Fax: [phone]

## 2020-09-24 LAB
ANION GAP SERPL CALCULATED.3IONS-SCNC: 10 MMOL/L (ref 3–16)
BUN BLDV-MCNC: 23 MG/DL (ref 7–20)
CALCIUM IONIZED: 1.38 MMOL/L (ref 1.12–1.32)
CALCIUM SERPL-MCNC: 10.7 MG/DL (ref 8.3–10.6)
CHLORIDE BLD-SCNC: 108 MMOL/L (ref 99–110)
CO2: 22 MMOL/L (ref 21–32)
CREAT SERPL-MCNC: 1.6 MG/DL (ref 0.6–1.1)
GFR AFRICAN AMERICAN: 40
GFR NON-AFRICAN AMERICAN: 33
GLUCOSE BLD-MCNC: 134 MG/DL (ref 70–99)
PH VENOUS: 7.35 (ref 7.35–7.45)
POTASSIUM SERPL-SCNC: 3.9 MMOL/L (ref 3.5–5.1)
SARS-COV-2, NAA: NOT DETECTED
SODIUM BLD-SCNC: 140 MMOL/L (ref 136–145)

## 2020-09-24 PROCEDURE — 80048 BASIC METABOLIC PNL TOTAL CA: CPT

## 2020-09-24 PROCEDURE — 2060000000 HC ICU INTERMEDIATE R&B

## 2020-09-24 PROCEDURE — 6360000002 HC RX W HCPCS: Performed by: INTERNAL MEDICINE

## 2020-09-24 PROCEDURE — 2580000003 HC RX 258: Performed by: INTERNAL MEDICINE

## 2020-09-24 PROCEDURE — 6370000000 HC RX 637 (ALT 250 FOR IP): Performed by: INTERNAL MEDICINE

## 2020-09-24 PROCEDURE — 82330 ASSAY OF CALCIUM: CPT

## 2020-09-24 RX ORDER — CARVEDILOL 6.25 MG/1
6.25 TABLET ORAL 2 TIMES DAILY WITH MEALS
Status: DISCONTINUED | OUTPATIENT
Start: 2020-09-24 | End: 2020-09-26 | Stop reason: HOSPADM

## 2020-09-24 RX ORDER — AMLODIPINE BESYLATE 5 MG/1
5 TABLET ORAL 2 TIMES DAILY
Status: DISCONTINUED | OUTPATIENT
Start: 2020-09-24 | End: 2020-09-26 | Stop reason: HOSPADM

## 2020-09-24 RX ADMIN — LETROZOLE 2.5 MG: 2.5 TABLET ORAL at 09:50

## 2020-09-24 RX ADMIN — MIRTAZAPINE 15 MG: 15 TABLET, FILM COATED ORAL at 21:17

## 2020-09-24 RX ADMIN — HYDROXYZINE HYDROCHLORIDE 25 MG: 25 TABLET ORAL at 20:21

## 2020-09-24 RX ADMIN — ACETAMINOPHEN 650 MG: 325 TABLET ORAL at 03:01

## 2020-09-24 RX ADMIN — TRAZODONE HYDROCHLORIDE 100 MG: 100 TABLET ORAL at 21:17

## 2020-09-24 RX ADMIN — ARIPIPRAZOLE 10 MG: 5 TABLET ORAL at 18:25

## 2020-09-24 RX ADMIN — AMLODIPINE BESYLATE 5 MG: 5 TABLET ORAL at 21:17

## 2020-09-24 RX ADMIN — ACETAMINOPHEN 650 MG: 325 TABLET ORAL at 17:21

## 2020-09-24 RX ADMIN — ONDANSETRON HYDROCHLORIDE 4 MG: 2 SOLUTION INTRAMUSCULAR; INTRAVENOUS at 18:35

## 2020-09-24 RX ADMIN — CARVEDILOL 6.25 MG: 6.25 TABLET, FILM COATED ORAL at 15:12

## 2020-09-24 RX ADMIN — Medication 10 ML: at 08:57

## 2020-09-24 RX ADMIN — Medication 20 MG: at 21:17

## 2020-09-24 RX ADMIN — AMLODIPINE BESYLATE 5 MG: 5 TABLET ORAL at 15:11

## 2020-09-24 RX ADMIN — SODIUM CHLORIDE 1000 ML: 9 INJECTION, SOLUTION INTRAVENOUS at 18:25

## 2020-09-24 RX ADMIN — ONDANSETRON HYDROCHLORIDE 4 MG: 2 SOLUTION INTRAMUSCULAR; INTRAVENOUS at 09:50

## 2020-09-24 ASSESSMENT — PAIN DESCRIPTION - PAIN TYPE
TYPE: CHRONIC PAIN
TYPE: CHRONIC PAIN

## 2020-09-24 ASSESSMENT — PAIN DESCRIPTION - DESCRIPTORS
DESCRIPTORS: ACHING
DESCRIPTORS: STABBING

## 2020-09-24 ASSESSMENT — PAIN SCALES - GENERAL
PAINLEVEL_OUTOF10: 0
PAINLEVEL_OUTOF10: 5
PAINLEVEL_OUTOF10: 7
PAINLEVEL_OUTOF10: 3
PAINLEVEL_OUTOF10: 0
PAINLEVEL_OUTOF10: 0

## 2020-09-24 ASSESSMENT — PAIN DESCRIPTION - ONSET
ONSET: ON-GOING
ONSET: ON-GOING

## 2020-09-24 ASSESSMENT — PAIN DESCRIPTION - LOCATION
LOCATION: KNEE
LOCATION: KNEE

## 2020-09-24 ASSESSMENT — PAIN - FUNCTIONAL ASSESSMENT: PAIN_FUNCTIONAL_ASSESSMENT: ACTIVITIES ARE NOT PREVENTED

## 2020-09-24 ASSESSMENT — PAIN DESCRIPTION - PROGRESSION
CLINICAL_PROGRESSION: GRADUALLY WORSENING
CLINICAL_PROGRESSION: GRADUALLY WORSENING

## 2020-09-24 ASSESSMENT — PAIN DESCRIPTION - ORIENTATION
ORIENTATION: RIGHT
ORIENTATION: RIGHT

## 2020-09-24 ASSESSMENT — PAIN DESCRIPTION - FREQUENCY
FREQUENCY: INTERMITTENT
FREQUENCY: INTERMITTENT

## 2020-09-24 NOTE — CARE COORDINATION
Patient plans to return to LTC at [facility] when discharged. COVID negative on 9/24/2020. Spoke to Karine Hernandez in admissions at [facility]. They are starting precert for this patient to come back understanding that precert for [employer] can take a bit of time. CM will continue to follow patient until discharge.   Electronically signed by Malik Borden RN on 9/24/2020 at 3:33 PM

## 2020-09-24 NOTE — PROGRESS NOTES
Pt stated that she would like to wait until morning to sign consent for renal biopsy. She wants to make sure she is medicated for her anxiety prior to biopsy and will sign consent at that time. Dr. Danny Romo is aware of need for pre-medication.  Electronically signed by Niall Hadley RN on 9/24/2020 at 7:47 PM

## 2020-09-24 NOTE — PROGRESS NOTES
Pt stated that she has been providing family with updates and that it is not necessary for nursing staff to provide updates at this time.  Electronically signed by Alec Wagner RN on 9/24/2020 at 7:48 PM

## 2020-09-24 NOTE — PLAN OF CARE
Problem: Falls - Risk of:  Goal: Will remain free from falls  Description: Will remain free from falls  Outcome: Ongoing  Note: Pt has remained free from falls during this shift. Pt has a steady gait and ambulates independently in room with standby, no hands on assist. Pt uses call light appropriately and waits for nursing staff before getting out of bed. Non-skid footwear is in place. Call light and personal belongings are within reach. Will continue to monitor. Electronically signed by Des James RN on 9/24/2020 at 5:51 PM       Problem: Tissue Perfusion - Renal, Altered:  Goal: Electrolytes within specified parameters  Description: Electrolytes within specified parameters  Outcome: Ongoing  Note: Pt admitted with hypercalcemia and CHANTELLE. Nephrology is following. Renal biopsy scheduled for 9/25/20. Ionized calcium is being monitored daily. Will continue to monitor and update MD as needed.  Electronically signed by Des James RN on 9/24/2020 at 5:49 PM

## 2020-09-24 NOTE — PROGRESS NOTES
Hospital Medicine Progress Note    PCP: Wendy Ellison MD    Date of Admission: 9/22/2020    Date of Service: Pt seen/examined on 9/22/2020 and Admitted to Inpatient with expected LOS greater than two midnights due to medical therapy. Chief Complaint: Abnrmal labs      History Of Present Illness:    63 yo F with PMH breast cancer (s/p L mastectomy 2019, status post radiation therapy,  on Femera), nephrectomy (2/2 infected stone complicated with renal abscess, 1980s), HTN, VTE (on eliquis) who presented due to abnormal labs. Patient reports that she had labs drawn as an outpatient and was told to come to the Ed, as her calcium was high. She dennies any acute physical complaints at this time. On review of medical records and previous labs, alysha has had abnormal Calcium since at least 8/2020. Prior to that, she had high normal calcium in the 9s-10 range. She has also had abnormal Cr for many months, and was lowest at 1.3- 1.4 during an admission in March 2020. Since then, has been 1.6 -1.9 in July and August.     She has a single kidney following a nephrectomy in the 7179X, due to complication of renal stone with renal abscess. She denies recent increased use of NSAIDs or recent significant medication changes. Her med list include spironolactone, ergocalciferol and multivitamins. She denies significant change in bowel or bladder habits. During admission in 3.2020, she reported her urine to be frothy for a few months. Cr then was 1.3- 1.4. She had no dysuria, frequency, hematuria. UA >300 protein. Nephrotic syndrome workup was negative and pt was started on lisinopril 5 mg daily. Renal biopsy was not felt required at this time with plan to follow up with nephrology outpatient. In the ED, she was afebrile with normal vitals. Labs showed normal hgb, normal CBC, and elevated Ca of and Cr of 1.9 with HCO3 of 19 and normal AG. She was placed on IV fluids.              Interval History  Denies new complaints. No fever    No dyspnea          Medications Prior to Admission:      Prior to Admission medications    Medication Sig Start Date End Date Taking? Authorizing Provider   spironolactone (ALDACTONE) 25 MG tablet Take 1 tablet by mouth daily 7/21/20  Yes Jame Stein MD   Multiple Vitamins-Minerals (THERAPEUTIC MULTIVITAMIN-MINERALS) tablet Take 1 tablet by mouth daily   Yes Historical Provider, MD   temazepam (RESTORIL) 15 MG capsule Take 30 mg by mouth every other day. Yes Historical Provider, MD   ergocalciferol (ERGOCALCIFEROL) 1.25 MG (56315 UT) capsule Take 50,000 Units by mouth once a week   Yes Historical Provider, MD   hydrOXYzine (ATARAX) 50 MG tablet Take 50 mg by mouth nightly   Yes Historical Provider, MD   letrozole (FEMARA) 2.5 MG tablet Take 2.5 mg by mouth daily   Yes Historical Provider, MD   Melatonin 10 MG TABS Take 20 mg by mouth nightly   Yes Historical Provider, MD   traZODone (DESYREL) 100 MG tablet Take 100 mg by mouth nightly   Yes Historical Provider, MD   mirtazapine (REMERON) 15 MG tablet Take 15 mg by mouth nightly  8/2/19  Yes Historical Provider, MD   loratadine (CLARITIN) 10 MG capsule Take 10 mg by mouth every evening    Yes Historical Provider, MD   ARIPiprazole (ABILIFY) 10 MG tablet Take 10 mg by mouth every evening    Yes Historical Provider, MD   famotidine (PEPCID) 40 MG tablet Take 40 mg by mouth daily   Yes Historical Provider, MD   guaiFENesin (MUCINEX) 600 MG extended release tablet Take 600 mg by mouth 2 times daily    Yes Historical Provider, MD   hydrocortisone 1 % ointment Apply topically 2 times daily.  3/7/20   Sammie Mondragon MD   apixaban (ELIQUIS) 5 MG TABS tablet Take two tablets twice daily until 9/4/2019 (18 tablets including 8/31/2019)  Then, take one tablet twice daily afterwards from 9/5/2019. 8/31/19   Kiera Faith MD   docusate (COLACE, DULCOLAX) 100 MG CAPS Take 100 mg by mouth 2 times daily 8/31/19   Shayy Spine Ignacio Smith MD   albuterol sulfate  (90 Base) MCG/ACT inhaler Inhale 2 puffs into the lungs every 6 hours as needed for Wheezing    Historical Provider, MD   ondansetron (ZOFRAN) 8 MG tablet Take 8 mg by mouth every 8 hours as needed for Nausea or Vomiting    Historical Provider, MD   cloNIDine (CATAPRES) 0.1 MG tablet Take 0.1 mg by mouth as needed for High Blood Pressure (for BP > 160/90)    Historical Provider, MD   prochlorperazine (COMPAZINE) 10 MG tablet Take 10 mg by mouth every 6 hours as needed    Historical Provider, MD       Allergies:  Pcn [penicillins] and Hydralazine    Social History:    TOBACCO:   reports that she quit smoking about 14 months ago. Her smoking use included cigarettes. She has a 26.25 pack-year smoking history. She has never used smokeless tobacco.  ETOH:   reports no history of alcohol use. E-Cigarettes Vaping or Juuling     Questions Responses    Vaping Use Former User    Start Date     Does device contain nicotine? Quit Date     Vaping Type             Family History:     Reviewed in detail and negative for DM, CAD, Cancer, CVA. Positive as follows:    History reviewed. No pertinent family history. REVIEW OF SYSTEMS:   Pertinent positives as noted in the HPI. All other systems reviewed and negative.     PHYSICAL EXAM PERFORMED:    BP (!) 149/92   Pulse 106   Temp 97.5 °F (36.4 °C) (Axillary)   Resp 20   Ht 5' 9\" (1.753 m)   Wt 240 lb (108.9 kg)   LMP 03/09/2014   SpO2 95%   BMI 35.44 kg/m²     General: 68-year-old female appears older than stated age sitting in bed with no acute cardiorespiratory distress  Chest: nonlabored respirations, equal chest rise bilaterally, no accessory muscle use  Cardiovascular: Tachycardic rate with a regular rhythm, 2+ radial pulses bilaterally, capillary refill 2 seconds  Abdominal: Soft, nontender, nondistended, positive bowel sounds throughout, no rebound or guarding  Skin: Warm, dry well perfused, no rashes  Musculoskeletal: no continue holding diuretics (aldactone: appears on for HTN) and continue hydration: fluid rate decreased. Monitor Calcium levels; ionized Calcium: improving    Vitamin D supplements discontinued on admit. Pxt was sent in by Nephrology:  consulted. Following. Biopsy of solitary kidney planned. Start coreg and amlodipine for BP mgt. Add another agent if sub-optimal control on these. Pxt is on eliquis with hx of VTE  (Appears was in 8/2019: right popliteal and right posterior tibial venins). Per ER doc, Nephrology, may be planning biopsy. Will hold Eliquis for now to clarify plans. She has completed at least 6 mnths of therapy and should be ok (LE  in 3/2020: no DVT). Will keep on prophylactic heparin for now, and resume eliquis as she remains high risk for VTE, if no C/i, if no procedures planned.      Continue home regime of atarax, trazedone, remeron, for Anxiety disorder       COVID testing as from a SNF: Negative. DVT Prophylaxis: On Eliquis.  Heparin when off eliquis      Diet: DIET LOW SODIUM 2 GM;  Code Status: Full Code      PT/OT Eval Status: Up independent    Dispo - In patient  To DC home when medically ready back to LTC at [facility], likely in AM.       Manjinder Valdes MD

## 2020-09-25 ENCOUNTER — APPOINTMENT (OUTPATIENT)
Dept: CT IMAGING | Age: 60
DRG: 425 | End: 2020-09-25
Payer: COMMERCIAL

## 2020-09-25 LAB
ALBUMIN SERPL-MCNC: 3.3 G/DL (ref 3.4–5)
ANION GAP SERPL CALCULATED.3IONS-SCNC: 12 MMOL/L (ref 3–16)
APTT: 34.6 SEC (ref 24.2–36.2)
BASOPHILS ABSOLUTE: 0 K/UL (ref 0–0.2)
BASOPHILS RELATIVE PERCENT: 0.5 %
BUN BLDV-MCNC: 21 MG/DL (ref 7–20)
CALCIUM IONIZED: 1.35 MMOL/L (ref 1.12–1.32)
CALCIUM SERPL-MCNC: 10.4 MG/DL (ref 8.3–10.6)
CHLORIDE BLD-SCNC: 110 MMOL/L (ref 99–110)
CO2: 20 MMOL/L (ref 21–32)
CREAT SERPL-MCNC: 1.6 MG/DL (ref 0.6–1.1)
EOSINOPHILS ABSOLUTE: 0.1 K/UL (ref 0–0.6)
EOSINOPHILS RELATIVE PERCENT: 1.5 %
GFR AFRICAN AMERICAN: 40
GFR NON-AFRICAN AMERICAN: 33
GLUCOSE BLD-MCNC: 95 MG/DL (ref 70–99)
HCT VFR BLD CALC: 41.6 % (ref 36–48)
HEMOGLOBIN: 13.7 G/DL (ref 12–16)
INR BLD: 1.06 (ref 0.86–1.14)
LYMPHOCYTES ABSOLUTE: 1.3 K/UL (ref 1–5.1)
LYMPHOCYTES RELATIVE PERCENT: 26.3 %
MCH RBC QN AUTO: 32.6 PG (ref 26–34)
MCHC RBC AUTO-ENTMCNC: 33 G/DL (ref 31–36)
MCV RBC AUTO: 98.7 FL (ref 80–100)
MONOCYTES ABSOLUTE: 0.5 K/UL (ref 0–1.3)
MONOCYTES RELATIVE PERCENT: 10.2 %
NEUTROPHILS ABSOLUTE: 2.9 K/UL (ref 1.7–7.7)
NEUTROPHILS RELATIVE PERCENT: 61.5 %
PDW BLD-RTO: 15.3 % (ref 12.4–15.4)
PH VENOUS: 7.36 (ref 7.35–7.45)
PHOSPHORUS: 2.7 MG/DL (ref 2.5–4.9)
PLATELET # BLD: 125 K/UL (ref 135–450)
PMV BLD AUTO: 6.9 FL (ref 5–10.5)
POTASSIUM SERPL-SCNC: 4 MMOL/L (ref 3.5–5.1)
PROTHROMBIN TIME: 12.3 SEC (ref 10–13.2)
RBC # BLD: 4.21 M/UL (ref 4–5.2)
SODIUM BLD-SCNC: 142 MMOL/L (ref 136–145)
WBC # BLD: 4.8 K/UL (ref 4–11)

## 2020-09-25 PROCEDURE — 6360000002 HC RX W HCPCS: Performed by: INTERNAL MEDICINE

## 2020-09-25 PROCEDURE — 2060000000 HC ICU INTERMEDIATE R&B

## 2020-09-25 PROCEDURE — 2500000003 HC RX 250 WO HCPCS: Performed by: RADIOLOGY

## 2020-09-25 PROCEDURE — 0TB03ZX EXCISION OF RIGHT KIDNEY, PERCUTANEOUS APPROACH, DIAGNOSTIC: ICD-10-PCS | Performed by: RADIOLOGY

## 2020-09-25 PROCEDURE — 85730 THROMBOPLASTIN TIME PARTIAL: CPT

## 2020-09-25 PROCEDURE — 2580000003 HC RX 258: Performed by: INTERNAL MEDICINE

## 2020-09-25 PROCEDURE — 6370000000 HC RX 637 (ALT 250 FOR IP): Performed by: INTERNAL MEDICINE

## 2020-09-25 PROCEDURE — 2709999900 CT BIOPSY RENAL

## 2020-09-25 PROCEDURE — 88305 TISSUE EXAM BY PATHOLOGIST: CPT

## 2020-09-25 PROCEDURE — 85610 PROTHROMBIN TIME: CPT

## 2020-09-25 PROCEDURE — 82330 ASSAY OF CALCIUM: CPT

## 2020-09-25 PROCEDURE — 85025 COMPLETE CBC W/AUTO DIFF WBC: CPT

## 2020-09-25 PROCEDURE — 80069 RENAL FUNCTION PANEL: CPT

## 2020-09-25 PROCEDURE — 6360000002 HC RX W HCPCS: Performed by: RADIOLOGY

## 2020-09-25 RX ORDER — LORAZEPAM 2 MG/ML
0.5 INJECTION INTRAMUSCULAR ONCE
Status: COMPLETED | OUTPATIENT
Start: 2020-09-25 | End: 2020-09-25

## 2020-09-25 RX ORDER — MIDAZOLAM HYDROCHLORIDE 1 MG/ML
INJECTION INTRAMUSCULAR; INTRAVENOUS
Status: COMPLETED | OUTPATIENT
Start: 2020-09-25 | End: 2020-09-25

## 2020-09-25 RX ORDER — HYDROCODONE BITARTRATE AND ACETAMINOPHEN 5; 325 MG/1; MG/1
1 TABLET ORAL ONCE
Status: COMPLETED | OUTPATIENT
Start: 2020-09-25 | End: 2020-09-25

## 2020-09-25 RX ORDER — FENTANYL CITRATE 50 UG/ML
INJECTION, SOLUTION INTRAMUSCULAR; INTRAVENOUS
Status: COMPLETED | OUTPATIENT
Start: 2020-09-25 | End: 2020-09-25

## 2020-09-25 RX ORDER — LIDOCAINE HYDROCHLORIDE 20 MG/ML
INJECTION, SOLUTION EPIDURAL; INFILTRATION; INTRACAUDAL; PERINEURAL
Status: COMPLETED | OUTPATIENT
Start: 2020-09-25 | End: 2020-09-25

## 2020-09-25 RX ADMIN — ARIPIPRAZOLE 10 MG: 5 TABLET ORAL at 18:27

## 2020-09-25 RX ADMIN — Medication 20 MG: at 22:25

## 2020-09-25 RX ADMIN — DOCUSATE SODIUM 100 MG: 100 CAPSULE, LIQUID FILLED ORAL at 08:32

## 2020-09-25 RX ADMIN — AMLODIPINE BESYLATE 5 MG: 5 TABLET ORAL at 08:32

## 2020-09-25 RX ADMIN — CARVEDILOL 6.25 MG: 6.25 TABLET, FILM COATED ORAL at 16:25

## 2020-09-25 RX ADMIN — Medication 10 ML: at 08:32

## 2020-09-25 RX ADMIN — LETROZOLE 2.5 MG: 2.5 TABLET ORAL at 08:33

## 2020-09-25 RX ADMIN — HYDROCODONE BITARTRATE AND ACETAMINOPHEN 1 TABLET: 5; 325 TABLET ORAL at 18:27

## 2020-09-25 RX ADMIN — LIDOCAINE HYDROCHLORIDE 20 ML: 20 INJECTION, SOLUTION EPIDURAL; INFILTRATION; INTRACAUDAL; PERINEURAL at 10:08

## 2020-09-25 RX ADMIN — LORAZEPAM 0.5 MG: 2 INJECTION INTRAMUSCULAR; INTRAVENOUS at 08:56

## 2020-09-25 RX ADMIN — MIRTAZAPINE 15 MG: 15 TABLET, FILM COATED ORAL at 22:26

## 2020-09-25 RX ADMIN — ACETAMINOPHEN 650 MG: 325 TABLET ORAL at 16:24

## 2020-09-25 RX ADMIN — CARVEDILOL 6.25 MG: 6.25 TABLET, FILM COATED ORAL at 08:32

## 2020-09-25 RX ADMIN — Medication 10 ML: at 22:27

## 2020-09-25 RX ADMIN — FENTANYL CITRATE 50 MCG: 50 INJECTION INTRAMUSCULAR; INTRAVENOUS at 10:07

## 2020-09-25 RX ADMIN — MIDAZOLAM HYDROCHLORIDE 1 MG: 2 INJECTION, SOLUTION INTRAMUSCULAR; INTRAVENOUS at 10:07

## 2020-09-25 RX ADMIN — TRAZODONE HYDROCHLORIDE 100 MG: 100 TABLET ORAL at 22:26

## 2020-09-25 ASSESSMENT — PAIN DESCRIPTION - ONSET: ONSET: GRADUAL

## 2020-09-25 ASSESSMENT — PAIN SCALES - GENERAL
PAINLEVEL_OUTOF10: 0
PAINLEVEL_OUTOF10: 0
PAINLEVEL_OUTOF10: 7
PAINLEVEL_OUTOF10: 0
PAINLEVEL_OUTOF10: 7
PAINLEVEL_OUTOF10: 6
PAINLEVEL_OUTOF10: 0

## 2020-09-25 ASSESSMENT — PAIN DESCRIPTION - PROGRESSION: CLINICAL_PROGRESSION: NOT CHANGED

## 2020-09-25 ASSESSMENT — PAIN DESCRIPTION - FREQUENCY: FREQUENCY: INTERMITTENT

## 2020-09-25 ASSESSMENT — PAIN DESCRIPTION - PAIN TYPE: TYPE: ACUTE PAIN

## 2020-09-25 ASSESSMENT — PAIN DESCRIPTION - DESCRIPTORS: DESCRIPTORS: SORE

## 2020-09-25 ASSESSMENT — PAIN DESCRIPTION - ORIENTATION: ORIENTATION: RIGHT;LOWER

## 2020-09-25 ASSESSMENT — PAIN DESCRIPTION - LOCATION: LOCATION: BACK

## 2020-09-25 ASSESSMENT — PAIN - FUNCTIONAL ASSESSMENT: PAIN_FUNCTIONAL_ASSESSMENT: ACTIVITIES ARE NOT PREVENTED

## 2020-09-25 NOTE — PLAN OF CARE
Problem: Falls - Risk of:  Goal: Will remain free from falls  Description: Will remain free from falls  9/25/2020 0611 by Paz May RN  Outcome: Ongoing     Problem: Gas Exchange - Impaired  Goal: Absence of hypoxia  Outcome: Ongoing     Problem: Breathing Pattern - Ineffective  Goal: Ability to achieve and maintain a regular respiratory rate  Outcome: Ongoing     Problem: Skin Integrity:  Goal: Will show no infection signs and symptoms  Description: Will show no infection signs and symptoms  Outcome: Ongoing

## 2020-09-25 NOTE — H&P
(ERGOCALCIFEROL) 1.25 MG (34499 UT) capsule Take 50,000 Units by mouth once a week      hydrOXYzine (ATARAX) 50 MG tablet Take 50 mg by mouth nightly      apixaban (ELIQUIS) 5 MG TABS tablet Take two tablets twice daily until 9/4/2019 (18 tablets including 8/31/2019)  Then, take one tablet twice daily afterwards from 9/5/2019. 78 tablet 3    letrozole (FEMARA) 2.5 MG tablet Take 2.5 mg by mouth daily      Melatonin 10 MG TABS Take 20 mg by mouth nightly      traZODone (DESYREL) 100 MG tablet Take 100 mg by mouth nightly      mirtazapine (REMERON) 15 MG tablet Take 15 mg by mouth nightly       loratadine (CLARITIN) 10 MG capsule Take 10 mg by mouth every evening       ARIPiprazole (ABILIFY) 10 MG tablet Take 10 mg by mouth every evening       famotidine (PEPCID) 40 MG tablet Take 40 mg by mouth daily      guaiFENesin (MUCINEX) 600 MG extended release tablet Take 600 mg by mouth 2 times daily       hydrocortisone 1 % ointment Apply topically 2 times daily.  30 g 0    docusate (COLACE, DULCOLAX) 100 MG CAPS Take 100 mg by mouth 2 times daily 30 capsule 1    albuterol sulfate  (90 Base) MCG/ACT inhaler Inhale 2 puffs into the lungs every 6 hours as needed for Wheezing      ondansetron (ZOFRAN) 8 MG tablet Take 8 mg by mouth every 8 hours as needed for Nausea or Vomiting      cloNIDine (CATAPRES) 0.1 MG tablet Take 0.1 mg by mouth as needed for High Blood Pressure (for BP > 160/90)      prochlorperazine (COMPAZINE) 10 MG tablet Take 10 mg by mouth every 6 hours as needed         Current Meds  amLODIPine (NORVASC) tablet 5 mg, BID  carvedilol (COREG) tablet 6.25 mg, BID WC  hydrOXYzine (ATARAX) tablet 25 mg, BID PRN  labetalol (NORMODYNE;TRANDATE) injection 10 mg, Q4H PRN  ARIPiprazole (ABILIFY) tablet 10 mg, QPM  docusate sodium (COLACE) capsule 100 mg, BID  letrozole [facility]) tablet 2.5 mg, Daily  melatonin disintegrating tablet 20 mg, Nightly  mirtazapine (REMERON) tablet 15 mg, Nightly  traZODone (DESYREL) tablet 100 mg, Nightly  sodium chloride flush 0.9 % injection 10 mL, 2 times per day  sodium chloride flush 0.9 % injection 10 mL, PRN  acetaminophen (TYLENOL) tablet 650 mg, Q6H PRN    Or  acetaminophen (TYLENOL) suppository 650 mg, Q6H PRN  polyethylene glycol (GLYCOLAX) packet 17 g, Daily PRN  promethazine (PHENERGAN) tablet 12.5 mg, Q6H PRN    Or  ondansetron (ZOFRAN) injection 4 mg, Q6H PRN          ASA 2 - Patient with mild systemic disease with no functional limitations    II (soft palate, uvula, fauces visible)    Activity:  2 - Able to move 4 extremities voluntarily on command  Respiration:  2 - Able to breathe deeply and cough freely  Circulation:  2 - BP+/- 20mmHg of normal  Consciousness:  2 - Fully awake  Oxygen Saturation (color):  2 - Able to maintain oxygen saturation >92% on room air    Sedation : Moderate sedation planned

## 2020-09-25 NOTE — PROCEDURES
IR Brief Postoperative Note    Rodrigo Soto  YOB: 1960  [id number]    Pre-operative Diagnosis: jarrett    Post-operative Diagnosis: Same    Procedure: right renal bx    Anesthesia: moderate    Surgeons/Assistants: shanique    Estimated Blood Loss: Minimal    Complications: none    Specimens: were obtained    See full procedure dictation to follow      Rizwana Mendez MD  9/25/2020

## 2020-09-25 NOTE — PROGRESS NOTES
inhaler Inhale 2 puffs into the lungs every 6 hours as needed for Wheezing    Historical Provider, MD   ondansetron (ZOFRAN) 8 MG tablet Take 8 mg by mouth every 8 hours as needed for Nausea or Vomiting    Historical Provider, MD   cloNIDine (CATAPRES) 0.1 MG tablet Take 0.1 mg by mouth as needed for High Blood Pressure (for BP > 160/90)    Historical Provider, MD   prochlorperazine (COMPAZINE) 10 MG tablet Take 10 mg by mouth every 6 hours as needed    Historical Provider, MD       Allergies:  Pcn [penicillins] and Hydralazine    Social History:    TOBACCO:   reports that she quit smoking about 14 months ago. Her smoking use included cigarettes. She has a 26.25 pack-year smoking history. She has never used smokeless tobacco.  ETOH:   reports no history of alcohol use. E-Cigarettes Vaping or Juuling     Questions Responses    Vaping Use Former User    Start Date     Does device contain nicotine?      Quit Date     Vaping Type           PHYSICAL EXAM     /79   Pulse 95   Temp 96.2 °F (35.7 °C) (Oral)   Resp 18   Ht 5' 9\" (1.753 m)   Wt 240 lb (108.9 kg)   LMP 03/09/2014   SpO2 94%   BMI 35.44 kg/m²     General: 53-year-old female appears older than stated age sitting in bed with no acute cardiorespiratory distress  Chest: nonlabored respirations, equal chest rise bilaterally, no accessory muscle use  Cardiovascular: Normal rate with a regular rhythm  Abdominal: Soft, nontender, nondistended, positive bowel sounds throughout, no rebound or guarding  Musculoskeletal: no obvious deformities, no tenderness to palpation diffusely  Neurologic:  alert and oriented x4, speech is clear and intact without dysarthria      Labs:     Recent Labs     09/25/20  0530   WBC 4.8   HGB 13.7   HCT 41.6   *     Recent Labs     09/23/20  0601 09/24/20  1526 09/25/20  0530    140 142   K 4.1 3.9 4.0   * 108 110   CO2 20* 22 20*   BUN 33* 23* 21*   CREATININE 2.0* 1.6* 1.6*   CALCIUM 11.2* 10.7* 10.4   PHOS 3.1  --  2.7     No results for input(s): AST, ALT, BILIDIR, BILITOT, ALKPHOS in the last 72 hours. Recent Labs     09/25/20  0530   INR 1.06     No results for input(s): Amaryllis Goodell in the last 72 hours. Urinalysis:      Lab Results   Component Value Date    NITRU Negative 09/22/2020    WBCUA 0-2 09/22/2020    RBCUA 0-2 09/22/2020    BLOODU TRACE-INTACT 09/22/2020    SPECGRAV 1.025 09/22/2020    GLUCOSEU Negative 09/22/2020       Radiology:       CT BIOPSY RENAL   Final Result   1. Satisfactory uneventful CT-guided percutaneous renal core biopsy, lower pole right kidney. 2. Conscious sedation without complication. ASSESSMENT:  63 yo F with PMH breast cancer (s/p L mastectomy 2019, status post radiation therapy,  on Femera), nephrectomy (2/2 infected stone complicated with renal abscess, 1980s), HTN, VTE (on eliquis) who presented due to abnormal labs. CHANTELLE on CKD stage 3 of solitary kidney  Hypercalcemia  Hx of breast Ca s/p s/p resection, radiation on Femera  Hxt of VTE on Eliquis  HTN        IPTH: WNL : PTH independent    Her hypercalcemia is probably iatrogenic related to  Vitamin D supplements: appears to be on ergo 50K weekly, and may have been on this for many months. Vitamin D levels: elevated at 71.6. Differentials of her hypercalcemia will include hypercalcemia of malignancy with her hx of breast cancer, (S/p L mastectomy in 2019. Chemotherapy with Veleria Talha, last treatment Feb 17th ?2019 on Femera.) . PTHrP pending. Multiple myeloma with her abnormal kidney function, although this is probably less likely with normal hgb, and negative negative urine electrophoresis and kappa/lamda ratio recently        Will continue holding diuretics (aldactone: appears on for HTN)   Hydrated. Monitor Calcium levels; ionized Calcium: improving    Vitamin D supplements discontinued on admit. Pxt was sent in by Nephrology:  consulted. Following. Biopsy of solitary kidney planned. Start coreg and amlodipine for BP mgt. Add another agent if sub-optimal control on these. Pxt is on eliquis with hx of VTE  (Appears was in 8/2019: right popliteal and right posterior tibial venins). Eliquis held for biopsy. She has completed at least 6 mnths of therapy and should be ok ([facility]: no DVT). Will keep on prophylactic heparin for now    To resume eliquis as she remains high risk for VTE, likely on Sunday. (OK per IR).    Continue home regime of atarax, trazedone, remeron, for Anxiety disorder       COVID testing as from a SNF: Negative. Stool for C diff if diarrhea continues. DVT Prophylaxis: On Eliquis.  Heparin when off eliquis      Diet: DIET LOW SODIUM 2 GM;  Code Status: Full Code      PT/OT Eval Status: from LTC      Dispo - In patient  To LTC at [facility], with Kindra Mota MD

## 2020-09-25 NOTE — FLOWSHEET NOTE
Patient arrived alert and orientated x 4, breathing easily on room air, denies pain. Spoke to Dr. Luis F Burger prior to procedure. Labs and medications reviewed. Consent obtained and verified. Tolerated procedure well,r renal core biopsy done, dsd applied, no bleeding at site done. Breathing easily on room air. Report called to 5 S Brenna RN and patient transported in stable condition to room.     Sedation: Versed:1 mg Fentanyl:50 mcg

## 2020-09-25 NOTE — PROGRESS NOTES
Informed consent obtained for renal biopsy and placed in pt's chart. 0.5 mg IV lorazepam given per MD order for patient anxiety.  Electronically signed by Jose Cortes RN on 9/25/2020 at 9:03 AM

## 2020-09-26 VITALS
BODY MASS INDEX: 35.55 KG/M2 | TEMPERATURE: 98.1 F | DIASTOLIC BLOOD PRESSURE: 72 MMHG | RESPIRATION RATE: 18 BRPM | WEIGHT: 240 LBS | HEIGHT: 69 IN | OXYGEN SATURATION: 94 % | HEART RATE: 85 BPM | SYSTOLIC BLOOD PRESSURE: 110 MMHG

## 2020-09-26 LAB
ANION GAP SERPL CALCULATED.3IONS-SCNC: 10 MMOL/L (ref 3–16)
BUN BLDV-MCNC: 24 MG/DL (ref 7–20)
CALCIUM SERPL-MCNC: 9.5 MG/DL (ref 8.3–10.6)
CHLORIDE BLD-SCNC: 107 MMOL/L (ref 99–110)
CO2: 21 MMOL/L (ref 21–32)
CREAT SERPL-MCNC: 1.7 MG/DL (ref 0.6–1.1)
GFR AFRICAN AMERICAN: 37
GFR NON-AFRICAN AMERICAN: 31
GLUCOSE BLD-MCNC: 118 MG/DL (ref 70–99)
POTASSIUM SERPL-SCNC: 4.1 MMOL/L (ref 3.5–5.1)
SODIUM BLD-SCNC: 138 MMOL/L (ref 136–145)

## 2020-09-26 PROCEDURE — 6360000002 HC RX W HCPCS: Performed by: INTERNAL MEDICINE

## 2020-09-26 PROCEDURE — 6370000000 HC RX 637 (ALT 250 FOR IP): Performed by: INTERNAL MEDICINE

## 2020-09-26 PROCEDURE — 2580000003 HC RX 258: Performed by: INTERNAL MEDICINE

## 2020-09-26 PROCEDURE — 80048 BASIC METABOLIC PNL TOTAL CA: CPT

## 2020-09-26 RX ORDER — MORPHINE SULFATE 2 MG/ML
2 INJECTION, SOLUTION INTRAMUSCULAR; INTRAVENOUS ONCE
Status: COMPLETED | OUTPATIENT
Start: 2020-09-26 | End: 2020-09-26

## 2020-09-26 RX ORDER — CARVEDILOL 6.25 MG/1
6.25 TABLET ORAL 2 TIMES DAILY WITH MEALS
Qty: 60 TABLET | Refills: 3 | Status: SHIPPED | OUTPATIENT
Start: 2020-09-26 | End: 2021-06-10

## 2020-09-26 RX ORDER — AMLODIPINE BESYLATE 5 MG/1
5 TABLET ORAL 2 TIMES DAILY
Qty: 30 TABLET | Refills: 3 | Status: SHIPPED | OUTPATIENT
Start: 2020-09-26 | End: 2020-10-01 | Stop reason: SDUPTHER

## 2020-09-26 RX ORDER — HEPARIN SODIUM (PORCINE) LOCK FLUSH IV SOLN 100 UNIT/ML 100 UNIT/ML
500 SOLUTION INTRAVENOUS PRN
Status: DISCONTINUED | OUTPATIENT
Start: 2020-09-26 | End: 2020-09-26 | Stop reason: HOSPADM

## 2020-09-26 RX ADMIN — CARVEDILOL 6.25 MG: 6.25 TABLET, FILM COATED ORAL at 08:39

## 2020-09-26 RX ADMIN — HEPARIN SODIUM (PORCINE) LOCK FLUSH IV SOLN 100 UNIT/ML 500 UNITS: 100 SOLUTION at 14:50

## 2020-09-26 RX ADMIN — ACETAMINOPHEN 650 MG: 325 TABLET ORAL at 00:09

## 2020-09-26 RX ADMIN — Medication 10 ML: at 08:41

## 2020-09-26 RX ADMIN — MORPHINE SULFATE 2 MG: 2 INJECTION, SOLUTION INTRAMUSCULAR; INTRAVENOUS at 10:05

## 2020-09-26 RX ADMIN — AMLODIPINE BESYLATE 5 MG: 5 TABLET ORAL at 08:39

## 2020-09-26 RX ADMIN — LETROZOLE 2.5 MG: 2.5 TABLET ORAL at 08:39

## 2020-09-26 ASSESSMENT — PAIN DESCRIPTION - DESCRIPTORS
DESCRIPTORS: ACHING;DULL
DESCRIPTORS: ACHING;DULL
DESCRIPTORS: DULL;ACHING

## 2020-09-26 ASSESSMENT — PAIN DESCRIPTION - ORIENTATION
ORIENTATION: RIGHT

## 2020-09-26 ASSESSMENT — PAIN SCALES - GENERAL
PAINLEVEL_OUTOF10: 3
PAINLEVEL_OUTOF10: 4
PAINLEVEL_OUTOF10: 4
PAINLEVEL_OUTOF10: 7
PAINLEVEL_OUTOF10: 8

## 2020-09-26 ASSESSMENT — PAIN DESCRIPTION - ONSET
ONSET: AWAKENED FROM SLEEP
ONSET: AWAKENED FROM SLEEP

## 2020-09-26 ASSESSMENT — PAIN - FUNCTIONAL ASSESSMENT
PAIN_FUNCTIONAL_ASSESSMENT: ACTIVITIES ARE NOT PREVENTED
PAIN_FUNCTIONAL_ASSESSMENT: ACTIVITIES ARE NOT PREVENTED

## 2020-09-26 ASSESSMENT — PAIN DESCRIPTION - PAIN TYPE
TYPE: ACUTE PAIN

## 2020-09-26 ASSESSMENT — PAIN DESCRIPTION - PROGRESSION
CLINICAL_PROGRESSION: GRADUALLY WORSENING
CLINICAL_PROGRESSION: NOT CHANGED
CLINICAL_PROGRESSION: GRADUALLY WORSENING

## 2020-09-26 ASSESSMENT — PAIN DESCRIPTION - FREQUENCY
FREQUENCY: INTERMITTENT

## 2020-09-26 ASSESSMENT — PAIN DESCRIPTION - LOCATION
LOCATION: FLANK
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
## 2020-09-26 NOTE — DISCHARGE SUMMARY
Hospital Discharge Summary    Patient's PCP: Jose Courtney MD  Admit Date: 9/22/2020   Discharge Date: 9/26/2020    Admitting Physician: Dr. Aren Madrid MD  Discharge Physician: Dr. Jamal Guo: nephrology    HPI:   65 yo F with PMH breast cancer (s/p L mastectomy 2019, status post radiation therapy,  on Femera), nephrectomy (2/2 infected stone complicated with renal abscess, 1980s), HTN, VTE (on eliquis) who presented due to abnormal labs.      Patient reports that she had labs drawn as an outpatient and was told to come to the Ed, as her calcium was high.  She dennies any acute physical complaints at this time.     On review of medical records and previous labs, pxt has had abnormal Calcium since at least 8/2020. Prior to that, she had high normal calcium in the 9s-10 range.      She has also had abnormal Cr for many months, and was lowest at 1.3- 1.4 during an admission in March 2020. Since then, has been 1.6 -1.9 in July and August.      She has a single kidney following a nephrectomy in the 85U, due to complication of renal stone with renal abscess.      She denies recent increased use of NSAIDs or recent significant medication changes. Her med list include spironolactone, ergocalciferol and multivitamins. She denies significant change in bowel or bladder habits.      During admission in 3.2020, she reported her urine to be frothy for a few months. Cr then was 1.3- 1.4. She had no dysuria, frequency, hematuria. UA >300 protein. Nephrotic syndrome workup was negative and pt was started on lisinopril 5 mg daily. Renal biopsy was not felt required at this time with plan to follow up with nephrology outpatient.     In the ED, she was afebrile with normal vitals.  Labs showed normal hgb, normal CBC, and elevated Ca of and Cr of 1.9 with HCO3 of 19 and normal AG.              CHANTELLE on CKD stage 3 of solitary kidney  Hypercalcemia  Hx of breast Ca s/p s/p resection, radiation on Femera  Hxt of VTE on Eliquis  HTN           IPTH: WNL : PTH independent     Her hypercalcemia is probably iatrogenic related to  Vitamin D supplements: appears to be on ergo 50K weekly, and may have been on this for many months. Vitamin D levels: elevated at 71. 6.     Differentials of her hypercalcemia will include hypercalcemia of malignancy with her hx of breast cancer, (S/p L mastectomy in 2019. Chemotherapy with Ambrocio July, last treatment Feb 17th ?2019 on Femera.) . PTHrP pending.     Multiple myeloma with her abnormal kidney function, although this is probably less likely with normal hgb, and negative negative urine electrophoresis and kappa/lamda ratio recently         Will continue holding diuretics (aldactone: appears on for HTN)   Hydrated.     Monitor Calcium levels; ionized Calcium: improving     Vitamin D supplements discontinued on admit.     Pxt was sent in by Nephrology:  consulted. Following. Biopsy of solitary kidney planned.      Start coreg and amlodipine for BP mgt. Add another agent if sub-optimal control on these.      Pxt is on eliquis with hx of VTE  (Appears was in 8/2019: right popliteal and right posterior tibial venins).       Eliquis held for biopsy. She has completed at least 6 mnths of therapy and should be ok (LE  in 3/2020: no DVT). Will keep on prophylactic heparin for now. Resume Eliquis tomorrow.      To resume eliquis as she remains high risk for VTE, likely on Sunday. (OK per IR).    Continue home regime of atarax, trazedone, remeron, for Anxiety disorder        COVID testing as from a SNF: Negative.     Stool for C diff if diarrhea continues: Has not continued.  .            Discharge Diagnoses:   Patient Active Problem List   Diagnosis    Sepsis ([address]    Malignant neoplasm of upper-inner quadrant of left breast in female, estrogen receptor positive ([address]    Breast wound, left, sequela    Bilateral pulmonary embolism ([address]    Acute respiratory failure with hypoxia ([address]    Acute deep vein thrombosis (DVT) of lower extremity (HCC)    Acute renal failure (ARF) (HCC)    Centrilobular emphysema (HCC)    Hypoxemia    Pneumonia due to infectious organism    Nephrotic syndrome    CHANTELLE [facility]acute kidney injury) ([facility]       Physical Exam: /72   Pulse 85   Temp 98.1 °F (36.7 °C) (Axillary)   Resp 18   Ht 5' 9\" (1.753 m)   Wt 240 lb (108.9 kg)   LMP 03/09/2014   SpO2 94%   BMI 35.44 kg/m²     No results for input(s): POCGLU in the last 72 hours. General: 64year-old female appears older than stated age sitting in bed with no acute cardiorespiratory distress  Chest: nonlabored respirations, equal chest rise bilaterally, no accessory muscle use  Cardiovascular: Normal rate with a regular rhythm  Abdominal: Soft, nontender, nondistended, positive bowel sounds throughout, no rebound or guarding  Musculoskeletal: no obvious deformities, no tenderness to palpation diffusely  Neurologic:  alert and oriented x4, speech is clear and intact without dysarthria       LABS:  Recent Labs     09/25/20  0530   WBC 4.8   HGB 13.7   *      Recent Labs     09/26/20  0611      K 4.1      CO2 21   BUN 24*   CREATININE 1.7*   GLUCOSE 118*     Recent Labs     09/25/20  0530   INR 1.06         Discharge Medications:   Shaka Brock   Home Medication Instructions DLX:[id number]    Printed on:09/26/20 1414   Medication Information                      albuterol sulfate  (90 Base) MCG/ACT inhaler  Inhale 2 puffs into the lungs every 6 hours as needed for Wheezing             amLODIPine (NORVASC) 5 MG tablet  Take 1 tablet by mouth 2 times daily             apixaban (ELIQUIS) 5 MG TABS tablet  Take two tablets twice daily until 9/4/2019 (18 tablets including 8/31/2019)  Then, take one tablet twice daily afterwards from 9/5/2019.              ARIPiprazole (ABILIFY) 10 MG tablet  Take 10 mg by mouth every evening              carvedilol (COREG) 6.25 MG tablet  Take 1 tablet by mouth 2 times daily (with meals)             docusate (COLACE, DULCOLAX) 100 MG CAPS  Take 100 mg by mouth 2 times daily             famotidine (PEPCID) 40 MG tablet  Take 40 mg by mouth daily             guaiFENesin (MUCINEX) 600 MG extended release tablet  Take 600 mg by mouth 2 times daily              hydrocortisone 1 % ointment  Apply topically 2 times daily. hydrOXYzine (ATARAX) 50 MG tablet  Take 50 mg by mouth nightly             letrozole (FEMARA) 2.5 MG tablet  Take 2.5 mg by mouth daily             loratadine (CLARITIN) 10 MG capsule  Take 10 mg by mouth every evening              Melatonin 10 MG TABS  Take 20 mg by mouth nightly             mirtazapine (REMERON) 15 MG tablet  Take 15 mg by mouth nightly              Multiple Vitamins-Minerals (THERAPEUTIC MULTIVITAMIN-MINERALS) tablet  Take 1 tablet by mouth daily             ondansetron (ZOFRAN) 8 MG tablet  Take 8 mg by mouth every 8 hours as needed for Nausea or Vomiting             prochlorperazine (COMPAZINE) 10 MG tablet  Take 10 mg by mouth every 6 hours as needed             temazepam (RESTORIL) 15 MG capsule  Take 30 mg by mouth every other day. traZODone (DESYREL) 100 MG tablet  Take 100 mg by mouth nightly                Activity: activity as tolerated    Diet: Low salt diet. Disposition: long term care facility  Discharged Condition: Stable  Follow Up: Primary Care Physician in one week    Total time spent on discharge, finalizing medications, referrals and arranging outpatient follow up was more than 45 minutes    Thank you Dr. Niall Hadley MD for the opportunity to be involved in this patients care. If you have any questions or concerns please feel free to contact me at [phone].
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
## 2020-09-26 NOTE — CARE COORDINATION
Patient to transport back to [facility] by [facility] today at 3:00 pm.   Nurse report [phone]  Fax [phone]  Faxed Discharge packet to [facility]  Electronically signed by Malik Borden RN on 9/26/2020 at 2:11 PM

## 2020-09-26 NOTE — DISCHARGE INSTR - COC
Continuity of Care Form    Patient Name: Kiara Leung   :  1960  MRN:  [id number]    Admit date:  2020  Discharge date:  2020    Code Status Order: Full Code   Advance Directives:   [facility] Documentation       Date/Time Healthcare Directive Type of Healthcare Directive Copy in [facility] [address] Agent's Name Healthcare Agent's Phone Number    [phone]  No, patient does not have an advance directive for healthcare treatment -- -- -- -- --            Admitting Physician:  Radha Mojica MD  PCP: Wendy Ellison MD    Discharging Nurse: [facility] Unit/Room#: 6573/7075-93  Discharging Unit Phone Number: [phone]    Emergency Contact:   Extended Emergency Contact Information  Primary Emergency Contact: devante españa  Home Phone: [phone]  Work Phone: [phone]  Mobile Phone: [phone]  Relation: Brother/Sister   needed? No  Secondary Emergency Contact: brad oneill  Home Phone: [phone]  Mobile Phone: [phone]  Relation: Child  Preferred language: English   needed?  Yes    Past Surgical History:  Past Surgical History:   Procedure Laterality Date    APPENDECTOMY      CHOLECYSTECTOMY      CT BIOPSY RENAL  2020    CT BIOPSY RENAL 2020 [facility] CT SCAN    MASTECTOMY Left 2019    LEFT MODIFIED RADICAL MASTECTOMY; EXPAREL INTERCOSTAL BLOCK performed by [facility]anna [facility]kin MD at [facility] Left 2019    COMPLEX CLOSURE OF LEFT BREAST, FULL THICKNESS SKIN GRAFT LEFT BREAST 4x3 performed by Fernando Ellison MD at [facility] Left     TUBAL LIGATION      TUNNELED VENOUS PORT PLACEMENT  2019    still in        Immunization History:   Immunization History   Administered Date(s) Administered    Influenza Virus Vaccine 2014    Pneumococcal Polysaccharide ([id number]) 2014       Active Problems:  Patient Active Problem List   Diagnosis Code    S[facility] A41.9    Malignant neoplasm of upper-inner quadrant of left breast in female, estrogen receptor positive ([facility] C50.212, Z17.0    Breast wound, left, sequela S21.002S    Bilateral pulmonary embolism (HCC) I26.99    Acute respiratory failure with hypoxia (HCC) J96.01    Acute deep vein thrombosis (DVT) of lower extremity (HCC) I82.409    Acute renal failure (ARF) (HCC) N17.9    Centrilobular emphysema (HCC) J43.2    Hypoxemia R09.02    Pneumonia due to infectious organism J18.9    Nephrotic syndrome N04.9    CHANTELLE (acute kidney injury) ([facility] N17.9       Isolation/Infection:   Isolation            No Isolation          Patient Infection Status       Infection Onset Added Last Indicated Last Indicated By Review Planned Expiration Resolved Resolved By    None active    Resolved    COVID-19 Rule Out 09/22/20 09/22/20 09/22/20 COVID-19 (Ordered)   09/24/20 Rule-Out Test Resulted            Nurse Assessment:  Last Vital Signs: /72   Pulse 85   Temp 98.1 °F (36.7 °C) (Axillary)   Resp 18   Ht 5' 9\" (1.753 m)   Wt 240 lb (108.9 kg)   LMP 03/09/2014   SpO2 94%   BMI 35.44 kg/m²     Last documented pain score (0-10 scale): Pain Level: 4  Last Weight:   Wt Readings from Last 1 Encounters:   09/22/20 240 lb (108.9 kg)     Mental Status:  oriented    IV Access:  - R chest port    Nursing Mobility/ADLs:  Walking   Assisted  Transfer  Assisted  Bathing  Assisted  Dressing  Assisted  Toileting  Assisted  Feeding  Assisted  Med Admin  Assisted  Med Delivery   whole    Wound Care Documentation and Therapy:  Wound 08/08/19 Breast Left (Active)   Number of days: 414       Wound 09/25/20 Back Lateral;Lower;Right (Active)   Wound Other 09/25/20 1957   Dressing Status Clean;Dry; Intact 09/26/20 1133   Adamaris-wound Assessment Other (Comment) 09/25/20 1430   Number of days: 1        Elimination:  Continence:   · Bowel:  Yes  · Bladder: Yes  Urinary Catheter: None   Colostomy/Ileostomy/Ileal Conduit: No Date of Last BM: 09/26/2020    Intake/Output Summary (Last 24 hours) at 9/26/2020 1405  Last data filed at 9/26/2020 0730  Gross per 24 hour   Intake 490 ml   Output 600 ml   Net -110 ml     I/O last 3 completed shifts: In: 56 [P.O.:480; I.V.:10]  Out: 900 [Urine:900]    Safety Concerns:     None    Impairments/Disabilities:      None    Nutrition Therapy:  Current Nutrition Therapy:   - Oral Diet:  General    Routes of Feeding: Oral  Liquids: Thin Liquids  Daily Fluid Restriction: no  Last Modified Barium Swallow with Video (Video Swallowing Test): not done    Treatments at the Time of Hospital Discharge:   Respiratory Treatments: ***  Oxygen Therapy:  is not on home oxygen therapy. Ventilator:    - No ventilator support    Rehab Therapies: Physical Therapy and Occupational Therapy  Weight Bearing Status/Restrictions: No weight bearing restirctions  Other Medical Equipment (for information only, NOT a DME order):  none  Other Treatments: ***    Patient's personal belongings (please select all that are sent with patient):  Clothes, phone    RN SIGNATURE:  Electronically signed by Eusebia Lawton RN on 9/26/20 at 2:24 PM EDT    CASE MANAGEMENT/SOCIAL WORK SECTION    Inpatient Status Date: 9-    Readmission Risk Assessment Score:  Readmission Risk              Risk of Unplanned Readmission:        26           Discharging to Facility/ Agency   · Name: [facility] Address:  · Phone:[phone]        IYF:[phone]    / signature: Electronically signed by Nehemias Angeles RN on 9/26/20 at 2:07 PM EDT        PHYSICIAN SECTION    Prognosis: Fair    Condition at Discharge: Stable    Rehab Potential (if transferring to Rehab): Good    Recommended Labs or Other Treatments After Discharge:   BMP to monitor calcium in 5-7 days    Follow with Dr. Orquidea Martin for pathology result, PTHrP result and monitoring of blood calcium level     Please STOP vitamin D supplements. Physician Certification: I certify the above information and transfer of Mik Simon  is necessary for the continuing treatment of the diagnosis listed and that she requires [facility] for greater 30 days.      Update Admission H&P: No change in H&P    PHYSICIAN SIGNATURE:  Electronically signed by Pedro Rodrigez MD on 9/26/20 at 2:11 PM EDT

## 2020-09-26 NOTE — PROGRESS NOTES
Pt d/c with transport with all personal belongings. AVS, Rx, and f/u appt reviewed prior to leaving. Port access and tele removed and tolerated well. Pt denies any questions at this time.

## 2020-09-26 NOTE — PROGRESS NOTES
Hospital Medicine Progress Note    PCP: Ritta Sever, MD    Date of Admission: 9/22/2020      Chief Complaint: Abnrmal labs      History Of Present Illness:    63 yo F with PMH breast cancer (s/p L mastectomy 2019, status post radiation therapy,  on Femera), nephrectomy (2/2 infected stone complicated with renal abscess, 1980s), HTN, VTE (on eliquis) who presented due to abnormal labs. Patient reports that she had labs drawn as an outpatient and was told to come to the Ed, as her calcium was high. She dennies any acute physical complaints at this time. On review of medical records and previous labs, alysha has had abnormal Calcium since at least 8/2020. Prior to that, she had high normal calcium in the 9s-10 range. She has also had abnormal Cr for many months, and was lowest at 1.3- 1.4 during an admission in March 2020. Since then, has been 1.6 -1.9 in July and August.     She has a single kidney following a nephrectomy in the 7336X, due to complication of renal stone with renal abscess. She denies recent increased use of NSAIDs or recent significant medication changes. Her med list include spironolactone, ergocalciferol and multivitamins. She denies significant change in bowel or bladder habits. During admission in 3.2020, she reported her urine to be frothy for a few months. Cr then was 1.3- 1.4. She had no dysuria, frequency, hematuria. UA >300 protein. Nephrotic syndrome workup was negative and pt was started on lisinopril 5 mg daily. Renal biopsy was not felt required at this time with plan to follow up with nephrology outpatient. In the ED, she was afebrile with normal vitals. Labs showed normal hgb, normal CBC, and elevated Ca of and Cr of 1.9 with HCO3 of 19 and normal AG. She was placed on IV fluids. Interval History  Denies new complaints. No fever    No dyspnea      No fever/ chills.            Medications Prior to Admission:      Prior to Admission medications    Medication Sig Start Date End Date Taking? Authorizing Provider   amLODIPine (NORVASC) 5 MG tablet Take 1 tablet by mouth 2 times daily 9/26/20  Yes Carmita Keita MD   carvedilol (COREG) 6.25 MG tablet Take 1 tablet by mouth 2 times daily (with meals) 9/26/20  Yes Carmita Keita MD   Multiple Vitamins-Minerals (THERAPEUTIC MULTIVITAMIN-MINERALS) tablet Take 1 tablet by mouth daily   Yes Historical Provider, MD   temazepam (RESTORIL) 15 MG capsule Take 30 mg by mouth every other day. Yes Historical Provider, MD   hydrOXYzine (ATARAX) 50 MG tablet Take 50 mg by mouth nightly   Yes Historical Provider, MD   apixaban (ELIQUIS) 5 MG TABS tablet Take two tablets twice daily until 9/4/2019 (18 tablets including 8/31/2019)  Then, take one tablet twice daily afterwards from 9/5/2019. 8/31/19  Yes Manas Watts MD   letrozole [facility]) 2.5 MG tablet Take 2.5 mg by mouth daily   Yes Historical Provider, MD   Melatonin 10 MG TABS Take 20 mg by mouth nightly   Yes Historical Provider, MD   traZODone (DESYREL) 100 MG tablet Take 100 mg by mouth nightly   Yes Historical Provider, MD   mirtazapine (REMERON) 15 MG tablet Take 15 mg by mouth nightly  8/2/19  Yes Historical Provider, MD   loratadine (CLARITIN) 10 MG capsule Take 10 mg by mouth every evening    Yes Historical Provider, MD   ARIPiprazole (ABILIFY) 10 MG tablet Take 10 mg by mouth every evening    Yes Historical Provider, MD   famotidine (PEPCID) 40 MG tablet Take 40 mg by mouth daily   Yes Historical Provider, MD   guaiFENesin (MUCINEX) 600 MG extended release tablet Take 600 mg by mouth 2 times daily    Yes Historical Provider, MD   hydrocortisone 1 % ointment Apply topically 2 times daily.  3/7/20   Sammie Mondragon MD   docusate (COLACE, DULCOLAX) 100 MG CAPS Take 100 mg by mouth 2 times daily 8/31/19   Manas Watts MD   albuterol sulfate  (90 Base) MCG/ACT inhaler Inhale 2 puffs into the lungs every 6 hours as needed for Wheezing Historical Provider, MD   ondansetron (ZOFRAN) 8 MG tablet Take 8 mg by mouth every 8 hours as needed for Nausea or Vomiting    Historical Provider, MD   prochlorperazine (COMPAZINE) 10 MG tablet Take 10 mg by mouth every 6 hours as needed    Historical Provider, MD       Allergies:  Pcn [penicillins] and Hydralazine    Social History:    TOBACCO:   reports that she quit smoking about 14 months ago. Her smoking use included cigarettes. She has a 26.25 pack-year smoking history. She has never used smokeless tobacco.  ETOH:   reports no history of alcohol use. E-Cigarettes Vaping or Juuling     Questions Responses    Vaping Use Former User    Start Date     Does device contain nicotine? Quit Date     Vaping Type           PHYSICAL EXAM     /78   Pulse 86   Temp 98 °F (36.7 °C) (Axillary)   Resp 18   Ht 5' 9\" (1.753 m)   Wt 240 lb (108.9 kg)   LMP 03/09/2014   SpO2 95%   BMI 35.44 kg/m²     General: 53-year-old female appears older than stated age sitting in bed with no acute cardiorespiratory distress  Chest: nonlabored respirations, equal chest rise bilaterally, no accessory muscle use  Cardiovascular: Normal rate with a regular rhythm  Abdominal: Soft, nontender, nondistended, positive bowel sounds throughout, no rebound or guarding  Musculoskeletal: no obvious deformities, no tenderness to palpation diffusely  Neurologic:  alert and oriented x4, speech is clear and intact without dysarthria      Labs:     Recent Labs     09/25/20  0530   WBC 4.8   HGB 13.7   HCT 41.6   *     Recent Labs     09/24/20  1526 09/25/20  0530 09/26/20  0611    142 138   K 3.9 4.0 4.1    110 107   CO2 22 20* 21   BUN 23* 21* 24*   CREATININE 1.6* 1.6* 1.7*   CALCIUM 10.7* 10.4 9.5   PHOS  --  2.7  --      No results for input(s): AST, ALT, BILIDIR, BILITOT, ALKPHOS in the last 72 hours.   Recent Labs     09/25/20  0530   INR 1.06     No results for input(s): Delfino Service in the last 72 Eliquis held for biopsy. She has completed at least 6 mnths of therapy and should be ok (LE US in 3/2020: no DVT). Will keep on prophylactic heparin for now. Resume Eliquis tomorrow. To resume eliquis as she remains high risk for VTE, likely on Sunday. (OK per IR).    Continue home regime of atarax, trazedone, remeron, for Anxiety disorder       COVID testing as from a SNF: Negative. Stool for C diff if diarrhea continues: Has not continued. Conchetta Peaks DVT Prophylaxis: On Eliquis.  Heparin when off eliquis      Diet: DIET LOW SODIUM 2 GM;  Code Status: Full Code      PT/OT Eval Status: from LTC      Disposition - In patient  To LTC at [facility], with Suzan Nguyen MD

## 2020-09-26 NOTE — CARE COORDINATION
Case Management Assessment            Discharge Note                    Date / Time of Note: 9/26/2020 2:22 PM                  Discharge Note Completed by: Myles Terry    Patient Name: Pari Burgos   YOB: 1960  Diagnosis: CHANTELLE (acute kidney injury) [facility]) [N17.9]   Date / Time: 9/22/2020  1:08 PM    Current PCP: Nabil Denton MD  Clinic patient: No    Hospitalization in the last 30 days: No    Advance Directives:  Code Status: Full Code  [place] DNR form completed and on chart: No    Financial:  Payor: Gin Louis / Plan: Ju See / Product Type: *No Product type* /      Pharmacy:    [facility] [address] [place] [address]  Phone: [phone] Fax: [facility] [place] [address] [phone] - F [phone]  [address] [address]  Phone: [phone] Fax: [phone]      Assistance purchasing medications?: Potential Assistance Purchasing Medications: No  Assistance provided by Case Management: None at this time    Does patient want to participate in local refill/ meds to beds program?: Not Assessed    Meds To Beds General Rules:  1. Can ONLY be done Monday- Friday between 8:30am-5pm  2. Prescription(s) must be in pharmacy by 3pm to be filled same day  3. Copy of patient's insurance/ prescription drug card and patient face sheet must be sent along with the prescription(s)  4. Cost of Rx cannot be added to hospital bill. If financial assistance is needed, please contact unit  or ;  or  CANNOT provide pharmacy voucher for patients co-pays  5.  Patients can then  the prescription on their way out of the hospital at discharge, or pharmacy can deliver to the bedside if staff is available. (payment due at time of pick-up or delivery - cash, check, or card accepted) Able to afford home medications/ co-pay costs: LTC    ADLS:  Current PT AM-PAC Score:   /24  Current OT AM-PAC Score:   /24      DISCHARGE Disposition: [place] (LTC): [facility] [address]  Phone: [phone]  Fax: [phone]    LOC at discharge: [facility] Kumar Completed: Yes    Notification completed in HENS/PAS?:  Not Applicable    IMM Completed:   No    Transportation:  Transportation PLAN for discharge: EMS transportation   Mode of Transport: [address]  Name of [address] [place] [address], [address]: [address]  Phone: [phone]  Time of Transport: 1500    Transport form completed: Yes and faxed to [facility]    [id number]}      Additional CM Notes: Patient needed no precert to return today to facility. Called [facility] for transport and they are unable to schedule transport. [facility] with [facility] back to facility. The Plan for Transition of Care is related to the following treatment goals of CHANTELLE (acute kidney injury) ([facility] [address] [N17.9]    The Patient and/or patient representative Shoaib and her family were provided with a choice of provider and agrees with the discharge plan Yes    Freedom of choice list was provided with basic dialogue that supports the patient's individualized plan of care/goals and shares the quality data associated with the providers.  Yes    Care Transitions patient: No    Nessa Edwards RN  [facility] [employer]  Case Management Department  Ph: [phone]

## 2020-09-26 NOTE — PROGRESS NOTES
Pt declines family notification at this time.  Electronically signed by Maggie Cortes RN on 9/26/2020 at 8:01 AM

## 2020-09-27 LAB — PTH RELATED PEPTIDE: 2.7 PMOL/L (ref 0–3.4)

## 2020-10-11 NOTE — PROGRESS NOTES
General: Sitting in bed with no acute cardiorespiratory distress  HEENT:  head is atraumatic, pupils equal round and reactive to light, sclera are clear, oropharynx is nonerythematous  Neck: supple, no lymphadenopathy  Chest: nonlabored respirations, equal chest rise bilaterally, no accessory muscle use  Cardiovascular: Tachycardic rate with a regular rhythm, 2+ radial pulses bilaterally, capillary refill 2 seconds  Abdominal: Soft, nontender, nondistended, positive bowel sounds throughout, no rebound or guarding  Skin: Warm, dry well perfused, no rashes  Musculoskeletal: no obvious deformities, no tenderness to palpation diffusely  Neurologic:  alert and oriented x4, speech is clear and intact without dysarthria       Data:   Labs:  CBC:   No results for input(s): WBC, HGB, PLT in the last 72 hours. BMP:    No results for input(s): NA, K, CL, CO2, BUN, CREATININE, GLUCOSE in the last 72 hours. Ca/Mg/Phos:   No results for input(s): CALCIUM, MG, PHOS in the last 72 hours. Hepatic:   No results for input(s): AST, ALT, ALB, BILITOT, ALKPHOS in the last 72 hours. Troponin: No results for input(s): TROPONINI in the last 72 hours. BNP: No results for input(s): BNP in the last 72 hours. Lipids: No results for input(s): CHOL, TRIG, HDL, LDLCALC, LABVLDL in the last 72 hours. ABGs: No results for input(s): PHART, PO2ART, [id number] in the last 72 hours. INR:   No results for input(s): INR in the last 72 hours. UA:  No results for input(s): Laren Pair, GLUCOSEU, BILIRUBINUR, KETUA, SPECGRAV, BLOODU, PHUR, PROTEINU, UROBILINOGEN, NITRU, LEUKOCYTESUR, Tank [place] in the last 72 hours. Urine Microscopic:   No results for input(s): LABCAST, BACTERIA, COMU, HYALCAST, WBCUA, RBCUA, EPIU in the last 72 hours. Urine Culture: No results for input(s): LABURIN in the last 72 hours. Urine Chemistry:   No results for input(s): Black Quivers, PROTEINUR, NAUR in the last 72 hours.     IMAGING:  CT BIOPSY RENAL

## 2020-11-02 NOTE — PROGRESS NOTES
[id number] STAGE:  IIIA left breast cancer  grade 3+ ER + [place]+ HER2 positive    Shima Ayala is a 64 y.o.  woman with history of  left breast cancer. She is here for 1 year follow up. Her treatment included neoadjuvant TCHP x 6, total mastectomy, axillary dissection and chest wall irradiation, adjuvant kadcycla completed in 6/2020, now on letrozole. Knee pain, right greater than left, on the medication. This is tolerable. She is due for her right breast screening mammogram. She would like her port removed. Recent renal biopsy with focal segmental glomerular sclerosis, seeing nephrology, on steroids. COPD with SOB. Review of Systems   Constitutional: Negative for appetite change, fatigue and unexpected weight change. Respiratory: Positive for shortness of breath. Negative for cough. Cardiovascular: Negative for chest pain and leg swelling. Gastrointestinal: Negative for abdominal distention and abdominal pain. Genitourinary: Negative for difficulty urinating and dysuria. Musculoskeletal: Positive for arthralgias. Negative for back pain. Skin: Negative for rash and wound. Allergic/Immunologic: Negative for environmental allergies and food allergies. Neurological: Negative for dizziness and headaches. Hematological: Negative for adenopathy. Does not bruise/bleed easily. Psychiatric/Behavioral: Negative for dysphoric mood. The patient is not nervous/anxious. Exam:  LMP 03/09/2014   General: Well appearing, no acute distress, extra-ocular movements intact, anicteric  Breasts/Chest wall: There is a well healed scar on the left chest wall . There are expected  post surgical and radiation related changes. She did have STSG which is healed, however just lateral and superior to this there is nodularity of the chest wall. This certainly could be secondary to scar tissue, however warrants additional investigation.  She has excellent range of motion with her left shoulder. The contralateral breast has no new masses, skin changes, nipple inversion or discharge. Lymphatic: No cervical, supraclavicular, or axillary lymphadenopathy bilaterally. Abdomen: Soft, non-tender, non-distended, no masses or organomegaly. Respiratory: Respirations are non-labored and there is no audible distress. Cardiovascular: Regular rate, extremities appear well perfused. Neurologic: Alert, oriented, and appropriate. Assessment/Plan:  [id number] STAGE:  IIIA left breast cancer  grade 3+ ER + [place]+ HER2 positive  She has completed kadcycla and is currently tolerating letrozole with some knee pain. Mild nodularity left chest wall, likely secondary to scarring, but this is my first exam since her post op visit. Subcutaneous nodules present on left chest wall. Planning for screening right mammogram- BIRADS 2, see below. Left chest wall u/s- see below, biopsy of two lesions is required. Will schedule for port removal, as long as the above biopsy results are benign. Follow up in 6 months for exam with Ruben Mercado NP.     US CHEST INCLUDING MEDIASTINUM ON 11/5/2020         HISTORY: Patient with left mastectomy in 2020 and new palpable areas on left chest wall as marked by surgeon         COMPARISON: None         LIMITATIONS: None         FINDINGS:              ULTRASOUND LEFT CHEST WALL:         Ultrasound the left chest wall were marked in the areas of the palpable abnormality as per Dr. Alfreda Askew.         There are 2 adjacent hypoechoic nodules identified approximately 11 mm apart.         There is a superficial homogeneously hypoechoic 10 x 3 mm nodule.  There is a well-circumscribed homogeneously hypoechoic 8 x 3 mm nodule slightly deeper adjacent nodule.              Please note that mammography is not 100% accurate in diagnosing breast cancer.  A routine breast exam is recommended to correlate with mammographic findings.         Any palpable abnormality must be assessed clinically. [facility]

## 2020-11-03 PROBLEM — N17.9 ACUTE RENAL FAILURE (ARF) (HCC): Status: RESOLVED | Noted: 2019-12-04 | Resolved: 2020-11-03

## 2020-11-05 ENCOUNTER — HOSPITAL ENCOUNTER (OUTPATIENT)
Dept: MAMMOGRAPHY | Age: 60
Discharge: HOME | End: 2020-11-05
Payer: MEDICAID

## 2020-11-05 ENCOUNTER — HOSPITAL ENCOUNTER (OUTPATIENT)
Dept: ULTRASOUND IMAGING | Age: 60
Discharge: HOME | End: 2020-11-05
Payer: MEDICAID

## 2020-11-05 ENCOUNTER — OFFICE VISIT (OUTPATIENT)
Dept: SURGERY | Age: 60
End: 2020-11-05
Payer: MEDICAID

## 2020-11-05 VITALS
TEMPERATURE: 97.2 F | DIASTOLIC BLOOD PRESSURE: 76 MMHG | WEIGHT: 236 LBS | OXYGEN SATURATION: 97 % | HEIGHT: 68 IN | SYSTOLIC BLOOD PRESSURE: 111 MMHG | BODY MASS INDEX: 35.77 KG/M2 | HEART RATE: 80 BPM

## 2020-11-05 PROCEDURE — G8484 FLU IMMUNIZE NO ADMIN: HCPCS | Performed by: SURGERY

## 2020-11-05 PROCEDURE — 3017F COLORECTAL CA SCREEN DOC REV: CPT | Performed by: SURGERY

## 2020-11-05 PROCEDURE — 1036F TOBACCO NON-USER: CPT | Performed by: SURGERY

## 2020-11-05 PROCEDURE — 76604 US EXAM CHEST: CPT

## 2020-11-05 PROCEDURE — 77063 BREAST TOMOSYNTHESIS BI: CPT

## 2020-11-05 PROCEDURE — 99214 OFFICE O/P EST MOD 30 MIN: CPT | Performed by: SURGERY

## 2020-11-05 PROCEDURE — G8417 CALC BMI ABV UP PARAM F/U: HCPCS | Performed by: SURGERY

## 2020-11-05 PROCEDURE — G8427 DOCREV CUR MEDS BY ELIG CLIN: HCPCS | Performed by: SURGERY

## 2020-11-05 ASSESSMENT — ENCOUNTER SYMPTOMS
BACK PAIN: 0
ABDOMINAL DISTENTION: 0
SHORTNESS OF BREATH: 1
COUGH: 0
ABDOMINAL PAIN: 0

## 2020-11-09 ENCOUNTER — HOSPITAL ENCOUNTER (OUTPATIENT)
Dept: ULTRASOUND IMAGING | Age: 60
Discharge: HOME | End: 2020-11-09
Payer: MEDICAID

## 2020-11-09 ENCOUNTER — TELEPHONE (OUTPATIENT)
Dept: SURGERY | Age: 60
End: 2020-11-09

## 2020-11-09 PROCEDURE — 19083 BX BREAST 1ST LESION US IMAG: CPT

## 2020-11-09 PROCEDURE — 88305 TISSUE EXAM BY PATHOLOGIST: CPT

## 2020-11-09 PROCEDURE — 19084 BX BREAST ADD LESION US IMAG: CPT

## 2020-11-09 NOTE — TELEPHONE ENCOUNTER
Spoke with patient, states she would like to wait on pathology results from breast biopsy today to decide if she needs to take her port out yet or not.   Patient is tentatively scheduled for 11/23/20 with Dr Sincere Gonzales for removal.  Patient states she would call back after results

## 2020-11-10 NOTE — TELEPHONE ENCOUNTER
Patient called this morning wanting to know the results of her pathology report that was taken yesterday.      Please call patient Katie Lee back at [phone]

## 2020-11-12 ENCOUNTER — TELEPHONE (OUTPATIENT)
Dept: SURGERY | Age: 60
End: 2020-11-12

## 2020-11-12 NOTE — TELEPHONE ENCOUNTER
VM left for patient to confirm if she would like to have surgery with Dr Kristy Cardona on 11/23 or not.  Asked patient to return call to office

## 2020-12-16 ENCOUNTER — HOSPITAL ENCOUNTER (OUTPATIENT)
Dept: GENERAL RADIOLOGY | Age: 60
Discharge: HOME | End: 2020-12-16
Payer: MEDICAID

## 2020-12-16 PROCEDURE — 77080 DXA BONE DENSITY AXIAL: CPT

## 2020-12-30 ENCOUNTER — APPOINTMENT (OUTPATIENT)
Dept: GENERAL RADIOLOGY | Age: 60
End: 2020-12-30
Payer: MEDICAID

## 2020-12-30 ENCOUNTER — HOSPITAL ENCOUNTER (EMERGENCY)
Age: 60
Discharge: HOME | End: 2020-12-31
Attending: EMERGENCY MEDICINE
Payer: MEDICAID

## 2020-12-30 DIAGNOSIS — R07.9 CHEST PAIN, UNSPECIFIED TYPE: Primary | ICD-10-CM

## 2020-12-30 LAB
ANION GAP SERPL CALCULATED.3IONS-SCNC: 11 MMOL/L (ref 3–16)
BASE EXCESS VENOUS: -2.5 MMOL/L (ref -2–3)
BASOPHILS ABSOLUTE: 0 K/UL (ref 0–0.2)
BASOPHILS RELATIVE PERCENT: 0.1 %
BUN BLDV-MCNC: 31 MG/DL (ref 7–20)
CALCIUM SERPL-MCNC: 9.8 MG/DL (ref 8.3–10.6)
CARBOXYHEMOGLOBIN: 2.5 % (ref 0–1.5)
CHLORIDE BLD-SCNC: 105 MMOL/L (ref 99–110)
CO2: 21 MMOL/L (ref 21–32)
CREAT SERPL-MCNC: 1.3 MG/DL (ref 0.6–1.1)
EKG ATRIAL RATE: 97 BPM
EKG DIAGNOSIS: NORMAL
EKG P AXIS: 70 DEGREES
EKG P-R INTERVAL: 148 MS
EKG Q-T INTERVAL: 368 MS
EKG QRS DURATION: 92 MS
EKG QTC CALCULATION (BAZETT): 467 MS
EKG R AXIS: 60 DEGREES
EKG T AXIS: 65 DEGREES
EKG VENTRICULAR RATE: 97 BPM
EOSINOPHILS ABSOLUTE: 0 K/UL (ref 0–0.6)
EOSINOPHILS RELATIVE PERCENT: 0 %
GFR AFRICAN AMERICAN: 51
GFR NON-AFRICAN AMERICAN: 42
GLUCOSE BLD-MCNC: 142 MG/DL (ref 70–99)
HCO3 VENOUS: 21.6 MMOL/L (ref 24–28)
HCT VFR BLD CALC: 41.5 % (ref 36–48)
HEMOGLOBIN, VEN, REDUCED: 11.2 %
HEMOGLOBIN: 13.9 G/DL (ref 12–16)
INR BLD: 1.1 (ref 0.86–1.14)
LYMPHOCYTES ABSOLUTE: 1.2 K/UL (ref 1–5.1)
LYMPHOCYTES RELATIVE PERCENT: 17.9 %
MCH RBC QN AUTO: 33.7 PG (ref 26–34)
MCHC RBC AUTO-ENTMCNC: 33.6 G/DL (ref 31–36)
MCV RBC AUTO: 100.6 FL (ref 80–100)
METHEMOGLOBIN VENOUS: 0.5 % (ref 0–1.5)
MONOCYTES ABSOLUTE: 0.2 K/UL (ref 0–1.3)
MONOCYTES RELATIVE PERCENT: 2.5 %
NEUTROPHILS ABSOLUTE: 5.4 K/UL (ref 1.7–7.7)
NEUTROPHILS RELATIVE PERCENT: 79.5 %
O2 SAT, VEN: 89 %
PCO2, VEN: 37.2 MMHG (ref 41–51)
PDW BLD-RTO: 15 % (ref 12.4–15.4)
PH VENOUS: 7.38 (ref 7.35–7.45)
PLATELET # BLD: 127 K/UL (ref 135–450)
PMV BLD AUTO: 7.1 FL (ref 5–10.5)
PO2, VEN: 54.2 MMHG (ref 25–40)
POTASSIUM REFLEX MAGNESIUM: 4.5 MMOL/L (ref 3.5–5.1)
PRO-BNP: 121 PG/ML (ref 0–124)
PROTHROMBIN TIME: 12.8 SEC (ref 10–13.2)
RBC # BLD: 4.13 M/UL (ref 4–5.2)
SODIUM BLD-SCNC: 137 MMOL/L (ref 136–145)
TCO2 CALC VENOUS: 23 MMOL/L
TROPONIN: <0.01 NG/ML
TROPONIN: <0.01 NG/ML
WBC # BLD: 6.8 K/UL (ref 4–11)

## 2020-12-30 PROCEDURE — 6370000000 HC RX 637 (ALT 250 FOR IP): Performed by: NURSE PRACTITIONER

## 2020-12-30 PROCEDURE — 2580000003 HC RX 258: Performed by: NURSE PRACTITIONER

## 2020-12-30 PROCEDURE — 82803 BLOOD GASES ANY COMBINATION: CPT

## 2020-12-30 PROCEDURE — 6360000002 HC RX W HCPCS: Performed by: NURSE PRACTITIONER

## 2020-12-30 PROCEDURE — 80048 BASIC METABOLIC PNL TOTAL CA: CPT

## 2020-12-30 PROCEDURE — 83880 ASSAY OF NATRIURETIC PEPTIDE: CPT

## 2020-12-30 PROCEDURE — 99285 EMERGENCY DEPT VISIT HI MDM: CPT

## 2020-12-30 PROCEDURE — 36415 COLL VENOUS BLD VENIPUNCTURE: CPT

## 2020-12-30 PROCEDURE — 96375 TX/PRO/DX INJ NEW DRUG ADDON: CPT

## 2020-12-30 PROCEDURE — 96374 THER/PROPH/DIAG INJ IV PUSH: CPT

## 2020-12-30 PROCEDURE — 85610 PROTHROMBIN TIME: CPT

## 2020-12-30 PROCEDURE — 85025 COMPLETE CBC W/AUTO DIFF WBC: CPT

## 2020-12-30 PROCEDURE — 93005 ELECTROCARDIOGRAM TRACING: CPT | Performed by: NURSE PRACTITIONER

## 2020-12-30 PROCEDURE — 84484 ASSAY OF TROPONIN QUANT: CPT

## 2020-12-30 PROCEDURE — 71045 X-RAY EXAM CHEST 1 VIEW: CPT

## 2020-12-30 RX ORDER — SODIUM CHLORIDE 0.9 % (FLUSH) 0.9 %
10 SYRINGE (ML) INJECTION PRN
Status: DISCONTINUED | OUTPATIENT
Start: 2020-12-30 | End: 2020-12-31 | Stop reason: HOSPADM

## 2020-12-30 RX ORDER — DOBUTAMINE HYDROCHLORIDE 200 MG/100ML
10 INJECTION INTRAVENOUS CONTINUOUS
Status: DISCONTINUED | OUTPATIENT
Start: 2020-12-30 | End: 2020-12-31

## 2020-12-30 RX ORDER — LANOLIN ALCOHOL/MO/W.PET/CERES
3 CREAM (GRAM) TOPICAL NIGHTLY PRN
Status: DISCONTINUED | OUTPATIENT
Start: 2020-12-31 | End: 2020-12-30

## 2020-12-30 RX ORDER — MORPHINE SULFATE 4 MG/ML
4 INJECTION, SOLUTION INTRAMUSCULAR; INTRAVENOUS ONCE
Status: COMPLETED | OUTPATIENT
Start: 2020-12-30 | End: 2020-12-30

## 2020-12-30 RX ORDER — ONDANSETRON 2 MG/ML
4 INJECTION INTRAMUSCULAR; INTRAVENOUS ONCE
Status: COMPLETED | OUTPATIENT
Start: 2020-12-30 | End: 2020-12-30

## 2020-12-30 RX ORDER — LANOLIN ALCOHOL/MO/W.PET/CERES
3 CREAM (GRAM) TOPICAL ONCE
Status: COMPLETED | OUTPATIENT
Start: 2020-12-30 | End: 2020-12-30

## 2020-12-30 RX ORDER — ASPIRIN 81 MG/1
81 TABLET, CHEWABLE ORAL DAILY
Status: DISCONTINUED | OUTPATIENT
Start: 2020-12-30 | End: 2020-12-31 | Stop reason: HOSPADM

## 2020-12-30 RX ORDER — SODIUM CHLORIDE 0.9 % (FLUSH) 0.9 %
10 SYRINGE (ML) INJECTION EVERY 12 HOURS SCHEDULED
Status: DISCONTINUED | OUTPATIENT
Start: 2020-12-30 | End: 2020-12-31 | Stop reason: HOSPADM

## 2020-12-30 RX ADMIN — Medication 3 MG: at 23:26

## 2020-12-30 RX ADMIN — ASPIRIN 81 MG: 81 TABLET, CHEWABLE ORAL at 21:17

## 2020-12-30 RX ADMIN — Medication 10 ML: at 21:19

## 2020-12-30 RX ADMIN — MORPHINE SULFATE 4 MG: 4 INJECTION INTRAVENOUS at 19:55

## 2020-12-30 RX ADMIN — ONDANSETRON 4 MG: 2 INJECTION INTRAMUSCULAR; INTRAVENOUS at 19:55

## 2020-12-30 ASSESSMENT — PAIN DESCRIPTION - PAIN TYPE: TYPE: ACUTE PAIN

## 2020-12-30 ASSESSMENT — ENCOUNTER SYMPTOMS
CHEST TIGHTNESS: 1
SHORTNESS OF BREATH: 1
NAUSEA: 1
VOMITING: 0

## 2020-12-30 ASSESSMENT — PAIN DESCRIPTION - PROGRESSION: CLINICAL_PROGRESSION: RAPIDLY WORSENING

## 2020-12-30 ASSESSMENT — PAIN SCALES - GENERAL: PAINLEVEL_OUTOF10: 8

## 2020-12-30 ASSESSMENT — PAIN SCALES - WONG BAKER: WONGBAKER_NUMERICALRESPONSE: 0

## 2020-12-30 ASSESSMENT — PAIN DESCRIPTION - DESCRIPTORS: DESCRIPTORS: ACHING

## 2020-12-30 ASSESSMENT — PAIN DESCRIPTION - ORIENTATION: ORIENTATION: MID

## 2020-12-30 ASSESSMENT — PAIN DESCRIPTION - FREQUENCY: FREQUENCY: INTERMITTENT

## 2020-12-30 ASSESSMENT — PAIN DESCRIPTION - ONSET: ONSET: ON-GOING

## 2020-12-30 NOTE — ED TRIAGE NOTES
Pt from [facility] with CP that started about 45 minutes ago. Described pain as substernal pressure. Rated 8/10.  She had ASA and 0.4 of Nitro via EMS prior to arrival.

## 2020-12-30 NOTE — Clinical Note
Patient Class: Inpatient [101]   REQUIRED: Diagnosis: Sepsis ([facility] [address] [[id number]]   Estimated Length of Stay: Estimated stay of more than 2 midnights   Admitting Provider: Bethany Montana [[id number]]   Telemetry Bed Required?: Yes

## 2020-12-30 NOTE — ED NOTES
Bed: B16-16  Expected date:   Expected time:   Means of arrival:   Comments:  Matt Shirley RN  12/30/20 2893
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
## 2020-12-31 VITALS
RESPIRATION RATE: 14 BRPM | BODY MASS INDEX: 39.99 KG/M2 | TEMPERATURE: 98.9 F | OXYGEN SATURATION: 93 % | HEIGHT: 65 IN | DIASTOLIC BLOOD PRESSURE: 90 MMHG | WEIGHT: 240 LBS | HEART RATE: 88 BPM | SYSTOLIC BLOOD PRESSURE: 126 MMHG

## 2020-12-31 PROBLEM — R07.9 CHEST PAIN: Status: ACTIVE | Noted: 2020-12-31

## 2020-12-31 LAB
EKG ATRIAL RATE: 100 BPM
EKG ATRIAL RATE: 82 BPM
EKG DIAGNOSIS: NORMAL
EKG DIAGNOSIS: NORMAL
EKG P AXIS: 61 DEGREES
EKG P AXIS: 82 DEGREES
EKG P-R INTERVAL: 152 MS
EKG P-R INTERVAL: 152 MS
EKG Q-T INTERVAL: 366 MS
EKG Q-T INTERVAL: 410 MS
EKG QRS DURATION: 100 MS
EKG QRS DURATION: 92 MS
EKG QTC CALCULATION (BAZETT): 472 MS
EKG QTC CALCULATION (BAZETT): 479 MS
EKG R AXIS: 55 DEGREES
EKG R AXIS: 78 DEGREES
EKG T AXIS: 76 DEGREES
EKG T AXIS: 79 DEGREES
EKG VENTRICULAR RATE: 100 BPM
EKG VENTRICULAR RATE: 82 BPM
LV EF: 82 %
LVEF MODALITY: NORMAL
TROPONIN: <0.01 NG/ML

## 2020-12-31 PROCEDURE — 6370000000 HC RX 637 (ALT 250 FOR IP): Performed by: EMERGENCY MEDICINE

## 2020-12-31 PROCEDURE — 78452 HT MUSCLE IMAGE SPECT MULT: CPT

## 2020-12-31 PROCEDURE — 2580000003 HC RX 258: Performed by: NURSE PRACTITIONER

## 2020-12-31 PROCEDURE — 3430000000 HC RX DIAGNOSTIC RADIOPHARMACEUTICAL: Performed by: EMERGENCY MEDICINE

## 2020-12-31 PROCEDURE — 93005 ELECTROCARDIOGRAM TRACING: CPT | Performed by: NURSE PRACTITIONER

## 2020-12-31 PROCEDURE — A9502 TC99M TETROFOSMIN: HCPCS | Performed by: EMERGENCY MEDICINE

## 2020-12-31 PROCEDURE — 93017 CV STRESS TEST TRACING ONLY: CPT

## 2020-12-31 PROCEDURE — 6370000000 HC RX 637 (ALT 250 FOR IP): Performed by: NURSE PRACTITIONER

## 2020-12-31 PROCEDURE — 6360000002 HC RX W HCPCS: Performed by: EMERGENCY MEDICINE

## 2020-12-31 RX ORDER — NITROGLYCERIN 0.4 MG/1
0.4 TABLET SUBLINGUAL EVERY 5 MIN PRN
Status: DISCONTINUED | OUTPATIENT
Start: 2020-12-31 | End: 2020-12-31 | Stop reason: HOSPADM

## 2020-12-31 RX ORDER — ACETAMINOPHEN 325 MG/1
650 TABLET ORAL ONCE
Status: COMPLETED | OUTPATIENT
Start: 2020-12-31 | End: 2020-12-31

## 2020-12-31 RX ADMIN — Medication 10 ML: at 08:54

## 2020-12-31 RX ADMIN — ASPIRIN 81 MG: 81 TABLET, CHEWABLE ORAL at 11:50

## 2020-12-31 RX ADMIN — NITROGLYCERIN 0.4 MG: 0.4 TABLET SUBLINGUAL at 00:43

## 2020-12-31 RX ADMIN — REGADENOSON 0.4 MG: 0.08 INJECTION, SOLUTION INTRAVENOUS at 11:00

## 2020-12-31 RX ADMIN — Medication 10 ML: at 12:11

## 2020-12-31 RX ADMIN — Medication 10 ML: at 11:08

## 2020-12-31 RX ADMIN — ACETAMINOPHEN 650 MG: 325 TABLET ORAL at 13:35

## 2020-12-31 RX ADMIN — TETROFOSMIN 30 MILLICURIE: 1.38 INJECTION, POWDER, LYOPHILIZED, FOR SOLUTION INTRAVENOUS at 11:37

## 2020-12-31 RX ADMIN — TETROFOSMIN 10 MILLICURIE: 1.38 INJECTION, POWDER, LYOPHILIZED, FOR SOLUTION INTRAVENOUS at 08:54

## 2020-12-31 ASSESSMENT — PAIN SCALES - GENERAL: PAINLEVEL_OUTOF10: 2

## 2020-12-31 ASSESSMENT — PAIN DESCRIPTION - PROGRESSION: CLINICAL_PROGRESSION: GRADUALLY IMPROVING
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
## 2020-12-31 NOTE — ED PROVIDER NOTES
[facility] Emergency Department  ED Observation Disposition Note   Emergency Physicians         Diagnostic Evaluation      Diagnostic studies germane to this period of clinical observation include:    - Nuclear stress test and serial cardiac enzymes all of which are normal.  - EKG with chest pain normal       Impression and Plan      Dada Wong has been cared for according to the standard [facility] observation protocol for chest pain. This extended period of observation was specifically requiredto determine the need for hospitalization. Prior to discharge from observation, the final physical exam is documented above. Significant events during the course of observation based on the goals of the clinicalproblem list include:    -Successful nuclear stress test  -Serial cardiac enzymes negative and normal EKG    Based on the patient's condition and test results, the plan is to Discharge to nursing home    Thetotal length of observation was 14 hours. Dr. Alfreda Briggs is the CDU disposition attending. The patient will follow-up with:    -Primary care    As appropriate, please see the AVS for comprehensive discharge instructions. Subjective      Shoaib Rothman hasundergone comprehensive diagnostic evaluation and therapeutic management in accordance with the CDU guidelines for chest pain. Based on her clinical response and diagnostic information obtained during this period of observation, the disposition is Discharge to nursing home     Physical Examination      Physical Exam  Vitals signs and nursing note reviewed. Constitutional:       General: She is not in acute distress. Appearance: She is well-developed. She is not diaphoretic. Cardiovascular:      Rate and Rhythm: Normal rate and regular rhythm. Pulmonary:      Effort: Pulmonary effort is normal.   Skin:     General: Skin is warm and dry.    Neurological:      Mental Status: She is alert and oriented to person, place, and time.    Psychiatric:         Behavior: Behavior normal.             Jace Mendoza MD  12/31/20 [id number]

## 2020-12-31 NOTE — ED NOTES
Pt DC from ED in wheelchair. Patient verbalized understanding to DC instructions. VSS.       Tacho Culver RN  12/31/20 9430

## 2020-12-31 NOTE — ED NOTES
Nuclear med called with an approximate time of 0900 for stress test examination.       Tacho Culver RN  12/31/20 [id number]

## 2020-12-31 NOTE — ED PROVIDER NOTES
[facility] Emergency Department  ED Observation Progress Note   Emergency Physicians          DiagnosticEvaluation      Chest pain       Assessment      Lopez London continues to be managed in accordance with the CDU clinical guidelines for chest pain. An update of her clinical problem list includes:    - continued episodes of chest pain  - no improvement with nitroglycerin       Plan      - Stress test in AM         Subjective      Lopez London has been admitted to the CDU for 9.5 hours. Serial assessments of her clinical progress include:    - Occasional episodes of chest pain  - Negative serial troponins       Physical Examination      Physical Exam  Vitals signs and nursing note reviewed. Constitutional:       General: She is not in acute distress. Cardiovascular:      Rate and Rhythm: Normal rate and regular rhythm. Pulmonary:      Effort: Pulmonary effort is normal.      Breath sounds: Normal breath sounds. Neurological:      Mental Status: She is alert.                  Gracy Dickson MD  12/31/20 5345

## 2021-01-13 ENCOUNTER — HOSPITAL ENCOUNTER (EMERGENCY)
Age: 61
Discharge: NURSING, REHAB OR LONG-TERM CARE | End: 2021-01-14
Attending: EMERGENCY MEDICINE
Payer: MEDICAID

## 2021-01-13 ENCOUNTER — APPOINTMENT (OUTPATIENT)
Dept: GENERAL RADIOLOGY | Age: 61
End: 2021-01-13
Payer: MEDICAID

## 2021-01-13 VITALS
SYSTOLIC BLOOD PRESSURE: 103 MMHG | DIASTOLIC BLOOD PRESSURE: 79 MMHG | HEART RATE: 110 BPM | RESPIRATION RATE: 19 BRPM | TEMPERATURE: 98 F | OXYGEN SATURATION: 97 %

## 2021-01-13 DIAGNOSIS — R07.89 OTHER CHEST PAIN: ICD-10-CM

## 2021-01-13 DIAGNOSIS — R06.02 SHORTNESS OF BREATH: Primary | ICD-10-CM

## 2021-01-13 LAB
ANION GAP SERPL CALCULATED.3IONS-SCNC: 11 MMOL/L (ref 3–16)
BASE EXCESS VENOUS: 1 MMOL/L (ref -2–3)
BASOPHILS ABSOLUTE: 0 K/UL (ref 0–0.2)
BASOPHILS RELATIVE PERCENT: 0.3 %
BUN BLDV-MCNC: 35 MG/DL (ref 7–20)
CALCIUM SERPL-MCNC: 9.6 MG/DL (ref 8.3–10.6)
CARBOXYHEMOGLOBIN: 3.4 % (ref 0–1.5)
CHLORIDE BLD-SCNC: 105 MMOL/L (ref 99–110)
CO2: 25 MMOL/L (ref 21–32)
CREAT SERPL-MCNC: 1.7 MG/DL (ref 0.6–1.1)
D DIMER: <200 NG/ML DDU (ref 0–229)
EOSINOPHILS ABSOLUTE: 0 K/UL (ref 0–0.6)
EOSINOPHILS RELATIVE PERCENT: 0.6 %
GFR AFRICAN AMERICAN: 37
GFR NON-AFRICAN AMERICAN: 31
GLUCOSE BLD-MCNC: 156 MG/DL (ref 70–99)
HCO3 VENOUS: 26.1 MMOL/L (ref 24–28)
HCT VFR BLD CALC: 40.7 % (ref 36–48)
HEMOGLOBIN: 13.7 G/DL (ref 12–16)
LACTIC ACID: 2.2 MMOL/L (ref 0.4–2)
LYMPHOCYTES ABSOLUTE: 1.9 K/UL (ref 1–5.1)
LYMPHOCYTES RELATIVE PERCENT: 25.8 %
MCH RBC QN AUTO: 33.9 PG (ref 26–34)
MCHC RBC AUTO-ENTMCNC: 33.8 G/DL (ref 31–36)
MCV RBC AUTO: 100.4 FL (ref 80–100)
METHEMOGLOBIN VENOUS: 0.4 % (ref 0–1.5)
MONOCYTES ABSOLUTE: 0.5 K/UL (ref 0–1.3)
MONOCYTES RELATIVE PERCENT: 7.2 %
NEUTROPHILS ABSOLUTE: 4.9 K/UL (ref 1.7–7.7)
NEUTROPHILS RELATIVE PERCENT: 66.1 %
O2 SAT, VEN: 87 %
PCO2, VEN: 42.1 MMHG (ref 41–51)
PDW BLD-RTO: 14.7 % (ref 12.4–15.4)
PH VENOUS: 7.4 (ref 7.35–7.45)
PLATELET # BLD: 160 K/UL (ref 135–450)
PMV BLD AUTO: 7.2 FL (ref 5–10.5)
PO2, VEN: 50.2 MMHG (ref 25–40)
POTASSIUM REFLEX MAGNESIUM: 3.9 MMOL/L (ref 3.5–5.1)
RBC # BLD: 4.05 M/UL (ref 4–5.2)
SODIUM BLD-SCNC: 141 MMOL/L (ref 136–145)
TCO2 CALC VENOUS: 42 MMOL/L
TROPONIN: <0.01 NG/ML
TROPONIN: <0.01 NG/ML
WBC # BLD: 7.5 K/UL (ref 4–11)

## 2021-01-13 PROCEDURE — 2580000003 HC RX 258: Performed by: STUDENT IN AN ORGANIZED HEALTH CARE EDUCATION/TRAINING PROGRAM

## 2021-01-13 PROCEDURE — 6360000002 HC RX W HCPCS: Performed by: STUDENT IN AN ORGANIZED HEALTH CARE EDUCATION/TRAINING PROGRAM

## 2021-01-13 PROCEDURE — 85379 FIBRIN DEGRADATION QUANT: CPT

## 2021-01-13 PROCEDURE — 71046 X-RAY EXAM CHEST 2 VIEWS: CPT

## 2021-01-13 PROCEDURE — 80048 BASIC METABOLIC PNL TOTAL CA: CPT

## 2021-01-13 PROCEDURE — 84484 ASSAY OF TROPONIN QUANT: CPT

## 2021-01-13 PROCEDURE — 96374 THER/PROPH/DIAG INJ IV PUSH: CPT

## 2021-01-13 PROCEDURE — 6370000000 HC RX 637 (ALT 250 FOR IP): Performed by: STUDENT IN AN ORGANIZED HEALTH CARE EDUCATION/TRAINING PROGRAM

## 2021-01-13 PROCEDURE — 94640 AIRWAY INHALATION TREATMENT: CPT

## 2021-01-13 PROCEDURE — 82803 BLOOD GASES ANY COMBINATION: CPT

## 2021-01-13 PROCEDURE — 93005 ELECTROCARDIOGRAM TRACING: CPT | Performed by: STUDENT IN AN ORGANIZED HEALTH CARE EDUCATION/TRAINING PROGRAM

## 2021-01-13 PROCEDURE — 83605 ASSAY OF LACTIC ACID: CPT

## 2021-01-13 PROCEDURE — 96375 TX/PRO/DX INJ NEW DRUG ADDON: CPT

## 2021-01-13 PROCEDURE — 85025 COMPLETE CBC W/AUTO DIFF WBC: CPT

## 2021-01-13 PROCEDURE — 36415 COLL VENOUS BLD VENIPUNCTURE: CPT

## 2021-01-13 PROCEDURE — 99284 EMERGENCY DEPT VISIT MOD MDM: CPT

## 2021-01-13 RX ORDER — HEPARIN SODIUM (PORCINE) LOCK FLUSH IV SOLN 100 UNIT/ML 100 UNIT/ML
100 SOLUTION INTRAVENOUS PRN
Status: DISCONTINUED | OUTPATIENT
Start: 2021-01-13 | End: 2021-01-14 | Stop reason: HOSPADM

## 2021-01-13 RX ORDER — 0.9 % SODIUM CHLORIDE 0.9 %
500 INTRAVENOUS SOLUTION INTRAVENOUS ONCE
Status: COMPLETED | OUTPATIENT
Start: 2021-01-13 | End: 2021-01-14

## 2021-01-13 RX ORDER — IPRATROPIUM BROMIDE AND ALBUTEROL SULFATE 2.5; .5 MG/3ML; MG/3ML
1 SOLUTION RESPIRATORY (INHALATION)
Status: DISCONTINUED | OUTPATIENT
Start: 2021-01-14 | End: 2021-01-14 | Stop reason: HOSPADM

## 2021-01-13 RX ORDER — IPRATROPIUM BROMIDE AND ALBUTEROL SULFATE 2.5; .5 MG/3ML; MG/3ML
1 SOLUTION RESPIRATORY (INHALATION) PRN
Status: DISCONTINUED | OUTPATIENT
Start: 2021-01-13 | End: 2021-01-14 | Stop reason: HOSPADM

## 2021-01-13 RX ORDER — METHYLPREDNISOLONE SODIUM SUCCINATE 125 MG/2ML
125 INJECTION, POWDER, LYOPHILIZED, FOR SOLUTION INTRAMUSCULAR; INTRAVENOUS ONCE
Status: COMPLETED | OUTPATIENT
Start: 2021-01-13 | End: 2021-01-13

## 2021-01-13 RX ORDER — MORPHINE SULFATE 2 MG/ML
2 INJECTION, SOLUTION INTRAMUSCULAR; INTRAVENOUS ONCE
Status: COMPLETED | OUTPATIENT
Start: 2021-01-13 | End: 2021-01-13

## 2021-01-13 RX ADMIN — IPRATROPIUM BROMIDE AND ALBUTEROL SULFATE 1 AMPULE: .5; 3 SOLUTION RESPIRATORY (INHALATION) at 23:02

## 2021-01-13 RX ADMIN — MORPHINE SULFATE 2 MG: 2 INJECTION, SOLUTION INTRAMUSCULAR; INTRAVENOUS at 21:18

## 2021-01-13 RX ADMIN — SODIUM CHLORIDE 500 ML: 9 INJECTION, SOLUTION INTRAVENOUS at 22:07

## 2021-01-13 RX ADMIN — METHYLPREDNISOLONE SODIUM SUCCINATE 125 MG: 125 INJECTION, POWDER, FOR SOLUTION INTRAMUSCULAR; INTRAVENOUS at 23:16

## 2021-01-13 ASSESSMENT — ENCOUNTER SYMPTOMS
TROUBLE SWALLOWING: 0
SHORTNESS OF BREATH: 1
NAUSEA: 0
CHEST TIGHTNESS: 0
COUGH: 0
SINUS PAIN: 0
ABDOMINAL PAIN: 0
VOMITING: 0
BACK PAIN: 0
WHEEZING: 0
SORE THROAT: 0
RHINORRHEA: 0
DIARRHEA: 0
CONSTIPATION: 0
ABDOMINAL DISTENTION: 0
SINUS PRESSURE: 0

## 2021-01-13 ASSESSMENT — PAIN DESCRIPTION - LOCATION: LOCATION: CHEST

## 2021-01-13 ASSESSMENT — PAIN DESCRIPTION - FREQUENCY: FREQUENCY: CONTINUOUS

## 2021-01-13 ASSESSMENT — PAIN SCALES - GENERAL
PAINLEVEL_OUTOF10: 4
PAINLEVEL_OUTOF10: 4

## 2021-01-13 ASSESSMENT — VISUAL ACUITY: OU: 1

## 2021-01-13 ASSESSMENT — PAIN DESCRIPTION - DESCRIPTORS: DESCRIPTORS: PRESSURE

## 2021-01-14 LAB
EKG ATRIAL RATE: 124 BPM
EKG DIAGNOSIS: NORMAL
EKG P AXIS: 68 DEGREES
EKG P-R INTERVAL: 138 MS
EKG Q-T INTERVAL: 326 MS
EKG QRS DURATION: 86 MS
EKG QTC CALCULATION (BAZETT): 468 MS
EKG R AXIS: 54 DEGREES
EKG T AXIS: 74 DEGREES
EKG VENTRICULAR RATE: 124 BPM

## 2021-01-14 PROCEDURE — 6360000002 HC RX W HCPCS: Performed by: EMERGENCY MEDICINE

## 2021-01-14 RX ADMIN — Medication 100 UNITS: at 00:29
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
## 2021-01-14 NOTE — ED TRIAGE NOTES
Pt began having SOB 1 hour ago roughly 40 minutes ago pt began experiencing chest pain as well. Per squad ekg pt in sinus tach with a HR of 133. Pt a/o x4. Pt denies LOC, light headedness or blurred vision.

## 2021-01-14 NOTE — ED PROVIDER NOTES
[address] [facility] [address] ENCOUNTER          ATTENDING PHYSICIAN NOTE       Date of evaluation: 1/13/2021    ADDENDUM:      Care of this patient was assumed from Dr. Vani Medina under the supervision of Dr. Julianna Gonzalez.  The patient was seen for Chest Pain and Shortness of Breath  . The patient's initial evaluation and plan have been discussed with the prior provider who initially evaluated the patient. Nursing Notes, Past Medical Hx, Past Surgical Hx, Social Hx, Allergies, and Family Hx were all reviewed. Patient is a 62-year-old female with multiple medical problems, including hypertension, thromboembolic disease, FSGS on 50 mg of prednisone daily, and history of breast cancer. Patient presented complaining of shortness of breath and chest pain. Patient is on anticoagulation and was admitted to the hospital for chest pain approximately 2 weeks ago where she had an unremarkable stress test.  On arrival, the patient was noted to have stable vital signs except for tachycardia of 125 which did improve to 110. She was noted to have tachycardia with normal heart sounds and diminished breath sounds present throughout. EKG showed sinus tachycardia with no acute ischemic abnormalities. Laboratory studies were unremarkable including negative D-dimer. Chest x-ray showed no acute airspace disease. Patient was written for a DuoNeb. At time of turnover, repeat troponin and response to nebulizer treatment was pending. Diagnostic Results     EKG   EKG shows sinus tachycardia with nonspecific ST segment changes. No significant change from prior EKG. RADIOLOGY:  XR CHEST (2 VW)   Final Result   1. No acute abnormality. 2. Stable right upper chest port with tip in the region of the subclavian vein.              LABS:   Results for orders placed or performed during the hospital encounter of 01/13/21   CBC Auto Differential   Result Value Ref Range    WBC 7.5 4.0 - 11.0 K/uL    RBC 4.05 4.00 - 5.20 M/uL    Hemoglobin 13.7 12.0 - 16.0 g/dL    Hematocrit 40.7 36.0 - 48.0 %    .4 (H) 80.0 - 100.0 fL    MCH 33.9 26.0 - 34.0 pg    MCHC 33.8 31.0 - 36.0 g/dL    RDW 14.7 12.4 - 15.4 %    Platelets 754 474 - 429 K/uL    MPV 7.2 5.0 - 10.5 fL    Neutrophils % 66.1 %    Lymphocytes % 25.8 %    Monocytes % 7.2 %    Eosinophils % 0.6 %    Basophils % 0.3 %    Neutrophils Absolute 4.9 1.7 - 7.7 K/uL    Lymphocytes Absolute 1.9 1.0 - 5.1 K/uL    Monocytes Absolute 0.5 0.0 - 1.3 K/uL    Eosinophils Absolute 0.0 0.0 - 0.6 K/uL    Basophils Absolute 0.0 0.0 - 0.2 K/uL   Basic Metabolic Panel w/ Reflex to MG   Result Value Ref Range    Sodium 141 136 - 145 mmol/L    Potassium reflex Magnesium 3.9 3.5 - 5.1 mmol/L    Chloride 105 99 - 110 mmol/L    CO2 25 21 - 32 mmol/L    Anion Gap 11 3 - 16    Glucose 156 (H) 70 - 99 mg/dL    BUN 35 (H) 7 - 20 mg/dL    CREATININE 1.7 (H) 0.6 - 1.1 mg/dL    GFR Non- 31 (A) >60    GFR  37 (A) >60    Calcium 9.6 8.3 - 10.6 mg/dL   Troponin   Result Value Ref Range    Troponin <0.01 <0.01 ng/mL   Blood gas, venous (Lab)   Result Value Ref Range    pH, Fredrick 7.400 7.350 - 7.450    pCO2, Fredrick 42.1 41.0 - 51.0 mmHg    pO2, Fredrick 50.2 (H) 25.0 - 40.0 mmHg    HCO3, Venous 26.1 24.0 - 28.0 mmol/L    Base Excess, Fredrick 1.0 -2.0 - 3.0 mmol/L    O2 Sat, Fredrick 87 Not established %    Carboxyhemoglobin 3.4 (H) 0.0 - 1.5 %    MetHgb, Fredrick 0.4 0.0 - 1.5 %    TC02 (Calc), Fredrick 42 mmol/L   Lactic Acid, Plasma   Result Value Ref Range    Lactic Acid 2.2 (H) 0.4 - 2.0 mmol/L   D-Dimer, Quantitative   Result Value Ref Range    D-Dimer, Quant <200 0 - 229 ng/mL DDU   Troponin   Result Value Ref Range    Troponin <0.01 <0.01 ng/mL       RECENT VITALS:  BP: 103/79, Temp: 98 °F (36.7 °C), Pulse: 110, Resp: 19, SpO2: 97 %     Procedures     N/A    ED Course     The patient was given the following medications:  Orders Placed This Encounter   Medications    morphine (PF) injection 2 mg    ipratropium-albuterol (DUONEB) nebulizer solution 1 ampule    0.9 % sodium chloride bolus    methylPREDNISolone sodium (SOLU-MEDROL) injection 125 mg    ipratropium-albuterol (DUONEB) nebulizer solution 1 ampule     Respiratory Therapy may adjust bronchodilator frequency and modality based on Respiratory Protocols. Frequency will be based on patient's Respiratory Severity Index and documented in Progress Notes. CONSULTS:  None    MEDICAL DECISION MAKING / ASSESSMENT / Preston  is a 64 y.o. female with multiple medical problems presents to the emergency department complaining of shortness of breath and chest pain. Patient describes acute onset of symptoms prior to arrival.  Patient was noted have diminished breath sounds bilaterally and was noted to be tachycardic. EKG shows sinus tachycardia that is unchanged from prior EKG. Chest x-ray shows no acute airspace disease. Laboratory studies are remarkable for a negative D-dimer and negative serial troponins. Patient was given a DuoNeb with improvement of her symptoms. With the patient's recent unremarkable stress test and 2 - troponins, I do not feel any further cardiac evaluation is necessary. Patient was given a DuoNeb with improvement of her symptoms. When I listen to her breath sounds, she has good air movement bilaterally with faint wheezing the bilateral apices. Patient stated that her breathing felt back to baseline. I feel likely the patient's symptoms are due to exacerbation of her emphysema. Patient stated that her nursing facility was out of her inhalers, but when nursing staff here contacted the facility they said they did have an inhaler. I will have him do scheduled albuterol treatments for the next 2 days. She is already on a significant dose of prednisone so I cannot up her steroids. Patient will be instructed to follow-up with her primary care provider. Clinical Impression     1. Shortness of breath    2.  Other chest pain        Disposition     PATIENT REFERRED TO:  No follow-up provider specified.     DISCHARGE MEDICATIONS:  Current Discharge Medication List          DISPOSITION Decision To Discharge 01/13/2021 10:45:51 PM        Maranda Davey MD  01/13/21 [id number]

## 2021-01-14 NOTE — ED NOTES
Bed: B23-23  Expected date:   Expected time:   Means of arrival:   Comments:  [address], RN  01/13/21 2009

## 2021-02-25 DIAGNOSIS — N05.1 FSGS (FOCAL SEGMENTAL GLOMERULOSCLEROSIS): ICD-10-CM

## 2021-02-25 LAB
ALBUMIN SERPL-MCNC: 3.6 G/DL (ref 3.4–5)
ANION GAP SERPL CALCULATED.3IONS-SCNC: 16 MMOL/L (ref 3–16)
BASOPHILS ABSOLUTE: 0 K/UL (ref 0–0.2)
BASOPHILS RELATIVE PERCENT: 0.1 %
BUN BLDV-MCNC: 40 MG/DL (ref 7–20)
CALCIUM SERPL-MCNC: 9.9 MG/DL (ref 8.3–10.6)
CHLORIDE BLD-SCNC: 105 MMOL/L (ref 99–110)
CO2: 20 MMOL/L (ref 21–32)
CREAT SERPL-MCNC: 1.4 MG/DL (ref 0.6–1.1)
EOSINOPHILS ABSOLUTE: 0 K/UL (ref 0–0.6)
EOSINOPHILS RELATIVE PERCENT: 0.1 %
GFR AFRICAN AMERICAN: 47
GFR NON-AFRICAN AMERICAN: 39
GLUCOSE BLD-MCNC: 196 MG/DL (ref 70–99)
HCT VFR BLD CALC: 40.9 % (ref 36–48)
HEMOGLOBIN: 13.7 G/DL (ref 12–16)
LYMPHOCYTES ABSOLUTE: 0.9 K/UL (ref 1–5.1)
LYMPHOCYTES RELATIVE PERCENT: 13.8 %
MCH RBC QN AUTO: 33.2 PG (ref 26–34)
MCHC RBC AUTO-ENTMCNC: 33.4 G/DL (ref 31–36)
MCV RBC AUTO: 99.6 FL (ref 80–100)
MONOCYTES ABSOLUTE: 0.3 K/UL (ref 0–1.3)
MONOCYTES RELATIVE PERCENT: 4.5 %
NEUTROPHILS ABSOLUTE: 5.5 K/UL (ref 1.7–7.7)
NEUTROPHILS RELATIVE PERCENT: 81.5 %
PDW BLD-RTO: 14.5 % (ref 12.4–15.4)
PHOSPHORUS: 3 MG/DL (ref 2.5–4.9)
PLATELET # BLD: 117 K/UL (ref 135–450)
PMV BLD AUTO: 7.9 FL (ref 5–10.5)
POTASSIUM SERPL-SCNC: 4.7 MMOL/L (ref 3.5–5.1)
RBC # BLD: 4.11 M/UL (ref 4–5.2)
SODIUM BLD-SCNC: 141 MMOL/L (ref 136–145)
WBC # BLD: 6.8 K/UL (ref 4–11)

## 2021-02-26 LAB
CREATININE URINE: 111.9 MG/DL (ref 28–259)
MICROALBUMIN UR-MCNC: 320.5 MG/DL
MICROALBUMIN/CREAT UR-RTO: 2864.2 MG/G (ref 0–30)

## 2021-04-30 ENCOUNTER — HOSPITAL ENCOUNTER (INPATIENT)
Age: 61
LOS: 2 days | Discharge: NURSING, REHAB OR LONG-TERM CARE | DRG: 463 | End: 2021-05-02
Attending: EMERGENCY MEDICINE | Admitting: INTERNAL MEDICINE
Payer: MEDICAID

## 2021-04-30 ENCOUNTER — APPOINTMENT (OUTPATIENT)
Dept: CT IMAGING | Age: 61
DRG: 463 | End: 2021-04-30
Payer: MEDICAID

## 2021-04-30 DIAGNOSIS — N30.01 ACUTE CYSTITIS WITH HEMATURIA: ICD-10-CM

## 2021-04-30 DIAGNOSIS — R31.0 GROSS HEMATURIA: Primary | ICD-10-CM

## 2021-04-30 PROBLEM — R31.9 HEMATURIA: Status: ACTIVE | Noted: 2021-04-30

## 2021-04-30 LAB
ALBUMIN SERPL-MCNC: 3.2 G/DL (ref 3.4–5)
ALBUMIN SERPL-MCNC: 3.5 G/DL (ref 3.4–5)
ANION GAP SERPL CALCULATED.3IONS-SCNC: 10 MMOL/L (ref 3–16)
ANION GAP SERPL CALCULATED.3IONS-SCNC: 11 MMOL/L (ref 3–16)
APTT: 35.4 SEC (ref 24.2–36.2)
BACTERIA: ABNORMAL /HPF
BASOPHILS ABSOLUTE: 0 K/UL (ref 0–0.2)
BASOPHILS RELATIVE PERCENT: 0.3 %
BILIRUBIN URINE: ABNORMAL
BLOOD, URINE: ABNORMAL
BUN BLDV-MCNC: 32 MG/DL (ref 7–20)
BUN BLDV-MCNC: 32 MG/DL (ref 7–20)
CALCIUM SERPL-MCNC: 9.7 MG/DL (ref 8.3–10.6)
CALCIUM SERPL-MCNC: 9.8 MG/DL (ref 8.3–10.6)
CHLORIDE BLD-SCNC: 103 MMOL/L (ref 99–110)
CHLORIDE BLD-SCNC: 105 MMOL/L (ref 99–110)
CLARITY: CLEAR
CO2: 22 MMOL/L (ref 21–32)
CO2: 23 MMOL/L (ref 21–32)
COLOR: ABNORMAL
CREAT SERPL-MCNC: 1.9 MG/DL (ref 0.6–1.1)
CREAT SERPL-MCNC: 2 MG/DL (ref 0.6–1.1)
EOSINOPHILS ABSOLUTE: 0 K/UL (ref 0–0.6)
EOSINOPHILS RELATIVE PERCENT: 0.1 %
EPITHELIAL CELLS, UA: ABNORMAL /HPF (ref 0–5)
GFR AFRICAN AMERICAN: 31
GFR AFRICAN AMERICAN: 33
GFR NON-AFRICAN AMERICAN: 26
GFR NON-AFRICAN AMERICAN: 27
GLUCOSE BLD-MCNC: 117 MG/DL (ref 70–99)
GLUCOSE BLD-MCNC: 143 MG/DL (ref 70–99)
GLUCOSE URINE: 100 MG/DL
HCT VFR BLD CALC: 40.5 % (ref 36–48)
HEMOGLOBIN: 13.7 G/DL (ref 12–16)
INR BLD: 1.3 (ref 0.86–1.14)
KETONES, URINE: 40 MG/DL
LEUKOCYTE ESTERASE, URINE: ABNORMAL
LYMPHOCYTES ABSOLUTE: 1.6 K/UL (ref 1–5.1)
LYMPHOCYTES RELATIVE PERCENT: 21.3 %
MCH RBC QN AUTO: 33.3 PG (ref 26–34)
MCHC RBC AUTO-ENTMCNC: 33.8 G/DL (ref 31–36)
MCV RBC AUTO: 98.6 FL (ref 80–100)
MICROSCOPIC EXAMINATION: YES
MONOCYTES ABSOLUTE: 0.3 K/UL (ref 0–1.3)
MONOCYTES RELATIVE PERCENT: 4.2 %
NEUTROPHILS ABSOLUTE: 5.6 K/UL (ref 1.7–7.7)
NEUTROPHILS RELATIVE PERCENT: 74.1 %
NITRITE, URINE: POSITIVE
OSMOLALITY URINE: 541 MOSM/KG (ref 390–1070)
OSMOLALITY: 309 MOSM/KG (ref 278–305)
PDW BLD-RTO: 15.1 % (ref 12.4–15.4)
PH UA: 6.5 (ref 5–8)
PHOSPHORUS: 3.1 MG/DL (ref 2.5–4.9)
PHOSPHORUS: 3.6 MG/DL (ref 2.5–4.9)
PLATELET # BLD: 189 K/UL (ref 135–450)
PMV BLD AUTO: 8.1 FL (ref 5–10.5)
POTASSIUM SERPL-SCNC: 4.9 MMOL/L (ref 3.5–5.1)
POTASSIUM SERPL-SCNC: 6.2 MMOL/L (ref 3.5–5.1)
PROTEIN UA: >=300 MG/DL
PROTHROMBIN TIME: 15.1 SEC (ref 10–13.2)
RBC # BLD: 4.11 M/UL (ref 4–5.2)
RBC UA: >100 /HPF (ref 0–4)
SODIUM BLD-SCNC: 135 MMOL/L (ref 136–145)
SODIUM BLD-SCNC: 139 MMOL/L (ref 136–145)
SODIUM URINE: 43 MMOL/L
SPECIFIC GRAVITY UA: 1.01 (ref 1–1.03)
URINE TYPE: ABNORMAL
UROBILINOGEN, URINE: >=8 E.U./DL
WBC # BLD: 7.6 K/UL (ref 4–11)
WBC UA: ABNORMAL /HPF (ref 0–5)

## 2021-04-30 PROCEDURE — 83930 ASSAY OF BLOOD OSMOLALITY: CPT

## 2021-04-30 PROCEDURE — 6360000002 HC RX W HCPCS: Performed by: EMERGENCY MEDICINE

## 2021-04-30 PROCEDURE — 81001 URINALYSIS AUTO W/SCOPE: CPT

## 2021-04-30 PROCEDURE — 85025 COMPLETE CBC W/AUTO DIFF WBC: CPT

## 2021-04-30 PROCEDURE — 87086 URINE CULTURE/COLONY COUNT: CPT

## 2021-04-30 PROCEDURE — 2580000003 HC RX 258: Performed by: EMERGENCY MEDICINE

## 2021-04-30 PROCEDURE — 85730 THROMBOPLASTIN TIME PARTIAL: CPT

## 2021-04-30 PROCEDURE — 83935 ASSAY OF URINE OSMOLALITY: CPT

## 2021-04-30 PROCEDURE — 99284 EMERGENCY DEPT VISIT MOD MDM: CPT

## 2021-04-30 PROCEDURE — 74176 CT ABD & PELVIS W/O CONTRAST: CPT

## 2021-04-30 PROCEDURE — 1200000000 HC SEMI PRIVATE

## 2021-04-30 PROCEDURE — 82570 ASSAY OF URINE CREATININE: CPT

## 2021-04-30 PROCEDURE — 80069 RENAL FUNCTION PANEL: CPT

## 2021-04-30 PROCEDURE — 84300 ASSAY OF URINE SODIUM: CPT

## 2021-04-30 PROCEDURE — 85610 PROTHROMBIN TIME: CPT

## 2021-04-30 PROCEDURE — 2580000003 HC RX 258: Performed by: STUDENT IN AN ORGANIZED HEALTH CARE EDUCATION/TRAINING PROGRAM

## 2021-04-30 PROCEDURE — 96365 THER/PROPH/DIAG IV INF INIT: CPT

## 2021-04-30 RX ORDER — ONDANSETRON 2 MG/ML
4 INJECTION INTRAMUSCULAR; INTRAVENOUS EVERY 6 HOURS PRN
Status: DISCONTINUED | OUTPATIENT
Start: 2021-04-30 | End: 2021-05-02 | Stop reason: HOSPADM

## 2021-04-30 RX ORDER — MIRTAZAPINE 15 MG/1
15 TABLET, FILM COATED ORAL NIGHTLY
Status: DISCONTINUED | OUTPATIENT
Start: 2021-05-01 | End: 2021-05-01

## 2021-04-30 RX ORDER — ACETAMINOPHEN 650 MG/1
650 SUPPOSITORY RECTAL EVERY 6 HOURS PRN
Status: DISCONTINUED | OUTPATIENT
Start: 2021-04-30 | End: 2021-05-02 | Stop reason: HOSPADM

## 2021-04-30 RX ORDER — SODIUM CHLORIDE 0.9 % (FLUSH) 0.9 %
5-40 SYRINGE (ML) INJECTION EVERY 12 HOURS SCHEDULED
Status: DISCONTINUED | OUTPATIENT
Start: 2021-05-01 | End: 2021-05-02 | Stop reason: HOSPADM

## 2021-04-30 RX ORDER — TRAZODONE HYDROCHLORIDE 50 MG/1
25 TABLET ORAL NIGHTLY
Status: DISCONTINUED | OUTPATIENT
Start: 2021-05-01 | End: 2021-05-02 | Stop reason: HOSPADM

## 2021-04-30 RX ORDER — POLYETHYLENE GLYCOL 3350 17 G/17G
17 POWDER, FOR SOLUTION ORAL DAILY PRN
Status: DISCONTINUED | OUTPATIENT
Start: 2021-04-30 | End: 2021-05-02 | Stop reason: HOSPADM

## 2021-04-30 RX ORDER — SODIUM CHLORIDE 0.9 % (FLUSH) 0.9 %
5-40 SYRINGE (ML) INJECTION PRN
Status: DISCONTINUED | OUTPATIENT
Start: 2021-04-30 | End: 2021-05-02 | Stop reason: HOSPADM

## 2021-04-30 RX ORDER — SODIUM CHLORIDE, SODIUM LACTATE, POTASSIUM CHLORIDE, CALCIUM CHLORIDE 600; 310; 30; 20 MG/100ML; MG/100ML; MG/100ML; MG/100ML
INJECTION, SOLUTION INTRAVENOUS CONTINUOUS
Status: DISCONTINUED | OUTPATIENT
Start: 2021-05-01 | End: 2021-05-01

## 2021-04-30 RX ORDER — LOSARTAN POTASSIUM 50 MG/1
100 TABLET ORAL DAILY
Status: DISCONTINUED | OUTPATIENT
Start: 2021-05-01 | End: 2021-05-02 | Stop reason: HOSPADM

## 2021-04-30 RX ORDER — PROMETHAZINE HYDROCHLORIDE 12.5 MG/1
12.5 TABLET ORAL EVERY 6 HOURS PRN
Status: DISCONTINUED | OUTPATIENT
Start: 2021-04-30 | End: 2021-05-02 | Stop reason: HOSPADM

## 2021-04-30 RX ORDER — 0.9 % SODIUM CHLORIDE 0.9 %
1000 INTRAVENOUS SOLUTION INTRAVENOUS ONCE
Status: COMPLETED | OUTPATIENT
Start: 2021-04-30 | End: 2021-04-30

## 2021-04-30 RX ORDER — SODIUM CHLORIDE 9 MG/ML
25 INJECTION, SOLUTION INTRAVENOUS PRN
Status: DISCONTINUED | OUTPATIENT
Start: 2021-04-30 | End: 2021-05-02 | Stop reason: HOSPADM

## 2021-04-30 RX ORDER — CARVEDILOL 6.25 MG/1
6.25 TABLET ORAL 2 TIMES DAILY WITH MEALS
Status: DISCONTINUED | OUTPATIENT
Start: 2021-05-01 | End: 2021-05-01

## 2021-04-30 RX ORDER — ARIPIPRAZOLE 5 MG/1
10 TABLET ORAL EVERY EVENING
Status: DISCONTINUED | OUTPATIENT
Start: 2021-05-01 | End: 2021-05-01

## 2021-04-30 RX ORDER — LETROZOLE 2.5 MG/1
2.5 TABLET, FILM COATED ORAL DAILY
Status: DISCONTINUED | OUTPATIENT
Start: 2021-05-01 | End: 2021-05-02 | Stop reason: HOSPADM

## 2021-04-30 RX ORDER — ACETAMINOPHEN 325 MG/1
650 TABLET ORAL EVERY 6 HOURS PRN
Status: DISCONTINUED | OUTPATIENT
Start: 2021-04-30 | End: 2021-05-02 | Stop reason: HOSPADM

## 2021-04-30 RX ORDER — PREDNISONE 10 MG/1
10 TABLET ORAL DAILY
Status: DISCONTINUED | OUTPATIENT
Start: 2021-05-01 | End: 2021-05-02 | Stop reason: HOSPADM

## 2021-04-30 RX ADMIN — SODIUM CHLORIDE 1000 ML: 9 INJECTION, SOLUTION INTRAVENOUS at 21:21

## 2021-04-30 RX ADMIN — CEFTRIAXONE 1000 MG: 1 INJECTION, POWDER, FOR SOLUTION INTRAMUSCULAR; INTRAVENOUS at 21:21

## 2021-04-30 ASSESSMENT — ENCOUNTER SYMPTOMS
CONSTIPATION: 0
ANAL BLEEDING: 0
BLOOD IN STOOL: 0
ABDOMINAL PAIN: 0
NAUSEA: 0
VOMITING: 0
CHEST TIGHTNESS: 0

## 2021-04-30 ASSESSMENT — PAIN - FUNCTIONAL ASSESSMENT: PAIN_FUNCTIONAL_ASSESSMENT: PREVENTS OR INTERFERES SOME ACTIVE ACTIVITIES AND ADLS

## 2021-04-30 ASSESSMENT — PAIN DESCRIPTION - PAIN TYPE: TYPE: ACUTE PAIN

## 2021-04-30 ASSESSMENT — PAIN DESCRIPTION - PROGRESSION: CLINICAL_PROGRESSION: NOT CHANGED

## 2021-04-30 ASSESSMENT — PAIN SCALES - GENERAL: PAINLEVEL_OUTOF10: 6

## 2021-04-30 ASSESSMENT — PAIN DESCRIPTION - DESCRIPTORS: DESCRIPTORS: ACHING

## 2021-04-30 NOTE — ED PROVIDER NOTES
ED Attending Attestation Note     Date of evaluation: 4/30/2021    This patient was seen by the resident. I have seen and examined the patient, agree with the workup, evaluation, management and diagnosis. The care plan has been discussed. My assessment reveals here for hematuria for the past 3 days. Denies any fevers dysuria or frequency. She denies any back pain or abdominal pain. She only has 1 kidney and states 40 years ago she had a kidney removed due to kidney stone with abscess. Also had a history of focal glomerular sclerosis in the past.  On physical exam patient has cushingoid type facies and in no acute distress. Afebrile and no CVA tenderness abdominal tenderness.      Ann-Marie Olvera MD  05/05/21 1001
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
## 2021-05-01 ENCOUNTER — APPOINTMENT (OUTPATIENT)
Dept: GENERAL RADIOLOGY | Age: 61
DRG: 463 | End: 2021-05-01
Payer: MEDICAID

## 2021-05-01 LAB
ANION GAP SERPL CALCULATED.3IONS-SCNC: 9 MMOL/L (ref 3–16)
BASOPHILS ABSOLUTE: 0 K/UL (ref 0–0.2)
BASOPHILS RELATIVE PERCENT: 0.5 %
BUN BLDV-MCNC: 30 MG/DL (ref 7–20)
CALCIUM SERPL-MCNC: 9.6 MG/DL (ref 8.3–10.6)
CHLORIDE BLD-SCNC: 108 MMOL/L (ref 99–110)
CO2: 26 MMOL/L (ref 21–32)
CREAT SERPL-MCNC: 1.4 MG/DL (ref 0.6–1.1)
CREATININE URINE: 157.9 MG/DL (ref 28–259)
EOSINOPHILS ABSOLUTE: 0.1 K/UL (ref 0–0.6)
EOSINOPHILS RELATIVE PERCENT: 0.9 %
GFR AFRICAN AMERICAN: 47
GFR NON-AFRICAN AMERICAN: 39
GLUCOSE BLD-MCNC: 117 MG/DL (ref 70–99)
GLUCOSE BLD-MCNC: 175 MG/DL (ref 70–99)
GLUCOSE BLD-MCNC: 181 MG/DL (ref 70–99)
GLUCOSE BLD-MCNC: 79 MG/DL (ref 70–99)
GLUCOSE BLD-MCNC: 87 MG/DL (ref 70–99)
HCT VFR BLD CALC: 40.8 % (ref 36–48)
HEMOGLOBIN: 13.4 G/DL (ref 12–16)
LYMPHOCYTES ABSOLUTE: 1.7 K/UL (ref 1–5.1)
LYMPHOCYTES RELATIVE PERCENT: 24.2 %
MCH RBC QN AUTO: 32.8 PG (ref 26–34)
MCHC RBC AUTO-ENTMCNC: 32.8 G/DL (ref 31–36)
MCV RBC AUTO: 100 FL (ref 80–100)
MONOCYTES ABSOLUTE: 0.5 K/UL (ref 0–1.3)
MONOCYTES RELATIVE PERCENT: 7.3 %
NEUTROPHILS ABSOLUTE: 4.6 K/UL (ref 1.7–7.7)
NEUTROPHILS RELATIVE PERCENT: 67.1 %
PDW BLD-RTO: 15.5 % (ref 12.4–15.4)
PERFORMED ON: ABNORMAL
PERFORMED ON: NORMAL
PLATELET # BLD: 144 K/UL (ref 135–450)
PMV BLD AUTO: 7.6 FL (ref 5–10.5)
POTASSIUM REFLEX MAGNESIUM: 3.9 MMOL/L (ref 3.5–5.1)
RBC # BLD: 4.08 M/UL (ref 4–5.2)
SODIUM BLD-SCNC: 143 MMOL/L (ref 136–145)
WBC # BLD: 6.8 K/UL (ref 4–11)

## 2021-05-01 PROCEDURE — 71045 X-RAY EXAM CHEST 1 VIEW: CPT

## 2021-05-01 PROCEDURE — 6370000000 HC RX 637 (ALT 250 FOR IP): Performed by: INTERNAL MEDICINE

## 2021-05-01 PROCEDURE — 2580000003 HC RX 258: Performed by: INTERNAL MEDICINE

## 2021-05-01 PROCEDURE — 94664 DEMO&/EVAL PT USE INHALER: CPT

## 2021-05-01 PROCEDURE — 80048 BASIC METABOLIC PNL TOTAL CA: CPT

## 2021-05-01 PROCEDURE — 94760 N-INVAS EAR/PLS OXIMETRY 1: CPT

## 2021-05-01 PROCEDURE — 2500000003 HC RX 250 WO HCPCS: Performed by: INTERNAL MEDICINE

## 2021-05-01 PROCEDURE — 94640 AIRWAY INHALATION TREATMENT: CPT

## 2021-05-01 PROCEDURE — 51798 US URINE CAPACITY MEASURE: CPT

## 2021-05-01 PROCEDURE — 36415 COLL VENOUS BLD VENIPUNCTURE: CPT

## 2021-05-01 PROCEDURE — 6360000002 HC RX W HCPCS: Performed by: INTERNAL MEDICINE

## 2021-05-01 PROCEDURE — 1200000000 HC SEMI PRIVATE

## 2021-05-01 PROCEDURE — 99223 1ST HOSP IP/OBS HIGH 75: CPT | Performed by: INTERNAL MEDICINE

## 2021-05-01 PROCEDURE — 85025 COMPLETE CBC W/AUTO DIFF WBC: CPT

## 2021-05-01 RX ORDER — IPRATROPIUM BROMIDE AND ALBUTEROL SULFATE 2.5; .5 MG/3ML; MG/3ML
1 SOLUTION RESPIRATORY (INHALATION) PRN
Status: DISCONTINUED | OUTPATIENT
Start: 2021-05-01 | End: 2021-05-01

## 2021-05-01 RX ORDER — METOPROLOL TARTRATE 50 MG/1
50 TABLET, FILM COATED ORAL ONCE
Status: COMPLETED | OUTPATIENT
Start: 2021-05-01 | End: 2021-05-01

## 2021-05-01 RX ORDER — ALBUTEROL SULFATE 2.5 MG/3ML
2.5 SOLUTION RESPIRATORY (INHALATION) EVERY 6 HOURS PRN
Status: DISCONTINUED | OUTPATIENT
Start: 2021-05-01 | End: 2021-05-02 | Stop reason: HOSPADM

## 2021-05-01 RX ORDER — RISPERIDONE 0.25 MG/1
0.5 TABLET, FILM COATED ORAL 2 TIMES DAILY
Status: DISCONTINUED | OUTPATIENT
Start: 2021-05-01 | End: 2021-05-02 | Stop reason: HOSPADM

## 2021-05-01 RX ORDER — METOPROLOL TARTRATE 50 MG/1
50 TABLET, FILM COATED ORAL 2 TIMES DAILY
Status: DISCONTINUED | OUTPATIENT
Start: 2021-05-01 | End: 2021-05-01

## 2021-05-01 RX ORDER — LABETALOL HYDROCHLORIDE 5 MG/ML
10 INJECTION, SOLUTION INTRAVENOUS EVERY 4 HOURS PRN
Status: DISCONTINUED | OUTPATIENT
Start: 2021-05-01 | End: 2021-05-02 | Stop reason: HOSPADM

## 2021-05-01 RX ORDER — LORAZEPAM 0.5 MG/1
0.5 TABLET ORAL EVERY 6 HOURS PRN
Status: DISCONTINUED | OUTPATIENT
Start: 2021-05-01 | End: 2021-05-02 | Stop reason: HOSPADM

## 2021-05-01 RX ORDER — HYDROXYZINE HYDROCHLORIDE 25 MG/1
50 TABLET, FILM COATED ORAL NIGHTLY
Status: DISCONTINUED | OUTPATIENT
Start: 2021-05-01 | End: 2021-05-02 | Stop reason: HOSPADM

## 2021-05-01 RX ORDER — LANOLIN ALCOHOL/MO/W.PET/CERES
3 CREAM (GRAM) TOPICAL NIGHTLY
Status: DISCONTINUED | OUTPATIENT
Start: 2021-05-01 | End: 2021-05-02 | Stop reason: HOSPADM

## 2021-05-01 RX ORDER — RISPERIDONE 0.5 MG/1
0.5 TABLET, FILM COATED ORAL 2 TIMES DAILY
COMMUNITY

## 2021-05-01 RX ORDER — METOPROLOL TARTRATE 50 MG/1
50 TABLET, FILM COATED ORAL 2 TIMES DAILY
Status: DISCONTINUED | OUTPATIENT
Start: 2021-05-02 | End: 2021-05-02 | Stop reason: HOSPADM

## 2021-05-01 RX ORDER — IPRATROPIUM BROMIDE AND ALBUTEROL SULFATE 2.5; .5 MG/3ML; MG/3ML
1 SOLUTION RESPIRATORY (INHALATION) EVERY 6 HOURS
Status: ON HOLD | COMMUNITY
End: 2022-04-04

## 2021-05-01 RX ORDER — BUDESONIDE AND FORMOTEROL FUMARATE DIHYDRATE 160; 4.5 UG/1; UG/1
2 AEROSOL RESPIRATORY (INHALATION) 2 TIMES DAILY
Status: DISCONTINUED | OUTPATIENT
Start: 2021-05-01 | End: 2021-05-01 | Stop reason: CLARIF

## 2021-05-01 RX ORDER — BUDESONIDE 0.5 MG/2ML
0.5 INHALANT ORAL 2 TIMES DAILY
Status: DISCONTINUED | OUTPATIENT
Start: 2021-05-01 | End: 2021-05-02 | Stop reason: HOSPADM

## 2021-05-01 RX ORDER — ARFORMOTEROL TARTRATE 15 UG/2ML
15 SOLUTION RESPIRATORY (INHALATION) 2 TIMES DAILY
Status: DISCONTINUED | OUTPATIENT
Start: 2021-05-01 | End: 2021-05-02 | Stop reason: HOSPADM

## 2021-05-01 RX ORDER — IPRATROPIUM BROMIDE AND ALBUTEROL SULFATE 2.5; .5 MG/3ML; MG/3ML
1 SOLUTION RESPIRATORY (INHALATION) 2 TIMES DAILY
Status: DISCONTINUED | OUTPATIENT
Start: 2021-05-01 | End: 2021-05-02 | Stop reason: HOSPADM

## 2021-05-01 RX ORDER — METOPROLOL TARTRATE 50 MG/1
50 TABLET, FILM COATED ORAL 2 TIMES DAILY
Status: ON HOLD | COMMUNITY
End: 2022-04-05 | Stop reason: HOSPADM

## 2021-05-01 RX ADMIN — LETROZOLE 2.5 MG: 2.5 TABLET ORAL at 08:56

## 2021-05-01 RX ADMIN — METOPROLOL TARTRATE 50 MG: 50 TABLET, FILM COATED ORAL at 08:56

## 2021-05-01 RX ADMIN — SODIUM CHLORIDE, POTASSIUM CHLORIDE, SODIUM LACTATE AND CALCIUM CHLORIDE: 600; 310; 30; 20 INJECTION, SOLUTION INTRAVENOUS at 00:38

## 2021-05-01 RX ADMIN — LABETALOL HYDROCHLORIDE 10 MG: 5 INJECTION, SOLUTION INTRAVENOUS at 17:11

## 2021-05-01 RX ADMIN — TRAZODONE HYDROCHLORIDE 25 MG: 50 TABLET ORAL at 03:12

## 2021-05-01 RX ADMIN — PREDNISONE 10 MG: 10 TABLET ORAL at 08:56

## 2021-05-01 RX ADMIN — ARFORMOTEROL TARTRATE 15 MCG: 15 SOLUTION RESPIRATORY (INHALATION) at 09:22

## 2021-05-01 RX ADMIN — SODIUM CHLORIDE, POTASSIUM CHLORIDE, SODIUM LACTATE AND CALCIUM CHLORIDE: 600; 310; 30; 20 INJECTION, SOLUTION INTRAVENOUS at 08:57

## 2021-05-01 RX ADMIN — RISPERIDONE 0.5 MG: 0.25 TABLET ORAL at 08:56

## 2021-05-01 RX ADMIN — IPRATROPIUM BROMIDE AND ALBUTEROL SULFATE 1 AMPULE: .5; 2.5 SOLUTION RESPIRATORY (INHALATION) at 09:21

## 2021-05-01 RX ADMIN — IPRATROPIUM BROMIDE AND ALBUTEROL SULFATE 1 AMPULE: .5; 2.5 SOLUTION RESPIRATORY (INHALATION) at 20:39

## 2021-05-01 RX ADMIN — Medication 10 ML: at 08:56

## 2021-05-01 RX ADMIN — LORAZEPAM 0.5 MG: 0.5 TABLET ORAL at 22:18

## 2021-05-01 RX ADMIN — HYDROXYZINE HYDROCHLORIDE 50 MG: 25 TABLET ORAL at 21:23

## 2021-05-01 RX ADMIN — BUDESONIDE 500 MCG: 0.5 SUSPENSION RESPIRATORY (INHALATION) at 09:22

## 2021-05-01 RX ADMIN — Medication 10 ML: at 17:11

## 2021-05-01 RX ADMIN — ARFORMOTEROL TARTRATE 15 MCG: 15 SOLUTION RESPIRATORY (INHALATION) at 20:39

## 2021-05-01 RX ADMIN — Medication 10 ML: at 21:24

## 2021-05-01 RX ADMIN — METOPROLOL TARTRATE 50 MG: 50 TABLET, FILM COATED ORAL at 18:22

## 2021-05-01 RX ADMIN — LOSARTAN POTASSIUM 100 MG: 50 TABLET, FILM COATED ORAL at 08:56

## 2021-05-01 RX ADMIN — TRAZODONE HYDROCHLORIDE 25 MG: 50 TABLET ORAL at 21:23

## 2021-05-01 RX ADMIN — LORAZEPAM 0.5 MG: 0.5 TABLET ORAL at 12:01

## 2021-05-01 RX ADMIN — RISPERIDONE 0.5 MG: 0.25 TABLET ORAL at 21:23

## 2021-05-01 RX ADMIN — BUDESONIDE 500 MCG: 0.5 SUSPENSION RESPIRATORY (INHALATION) at 20:39

## 2021-05-01 RX ADMIN — ACETAMINOPHEN 650 MG: 325 TABLET ORAL at 02:08

## 2021-05-01 ASSESSMENT — PAIN SCALES - GENERAL
PAINLEVEL_OUTOF10: 0
PAINLEVEL_OUTOF10: 3
PAINLEVEL_OUTOF10: 0

## 2021-05-01 NOTE — PROGRESS NOTES
4 Eyes Admission Assessment     I agree as the admission nurse that 2 RN's have performed a thorough Head to Toe Skin Assessment on the patient. ALL assessment sites listed below have been assessed on admission. Areas assessed by both nurses:   [x]   Head, Face, and Ears   [x]   Shoulders, Back, and Chest  [x]   Arms, Elbows, and Hands   [x]   Coccyx, Sacrum, and Ischium  [x]   Legs, Feet, and Heels        Does the Patient have Skin Breakdown?   Yes a wound was noted on the Admission Assessment and an LDA was Initiated documentation include the Adamaris-wound, Wound Assessment, Measurements, Dressing Treatment, Drainage, and Color\",         Manan Prevention initiated:  Yes   Wound Care Orders initiated:  Yes      [id number] nurse consulted for Pressure Injury (Stage 3,4, Unstageable, DTI, NWPT, and Complex wounds) or Manan score 18 or lower:  No      Nurse 1 eSignature: Electronically signed by Magda Jackson RN on 5/1/21 at 1:43 AM EDT    **SHARE this note so that the co-signing nurse is able to place an eSignature**    Nurse 2 eSignature: Electronically signed by Bre Martinez RN on 5/1/21 at 2:57 AM EDT
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
## 2021-05-01 NOTE — PLAN OF CARE
D: Admitted from ED per w/c to 6324 around 2340. Admitted from F with bloody urine since Tuesday. Denies dysuria, but c/o pain RLQ that radiated into R-flank back area see pain assessment. Oriented to room, call light, POC, & surroundings. Explained fall protocol and instructed to call for assist to amb. Still voiding bright red urine this am. IV flds LR @ 100ml/hr still infusing. A: Cont to monitor during hourly rounds    Problem: Falls - Risk of:  Goal: Will remain free from falls  Description: Will remain free from falls  Outcome: Ongoing  Goal: Absence of physical injury  Description: Absence of physical injury  Outcome: Ongoing     Problem: Sensory:  Goal: General experience of comfort will improve  Description: General experience of comfort will improve  Outcome: Ongoing     Problem: Urinary Elimination:  Goal: Signs and symptoms of infection will decrease  Description: Signs and symptoms of infection will decrease  Outcome: Ongoing  Goal: Ability to reestablish a normal urinary elimination pattern will improve - after catheter removal  Description: Ability to reestablish a normal urinary elimination pattern will improve  Outcome: Ongoing  Goal: Complications related to the disease process, condition or treatment will be avoided or minimized  Description: Complications related to the disease process, condition or treatment will be avoided or minimized  Outcome: Ongoing     Problem: Pain:  Description: Pain management should include both nonpharmacologic and pharmacologic interventions.   Goal: Pain level will decrease  Description: Pain level will decrease  Outcome: Ongoing  Goal: Control of acute pain  Description: Control of acute pain  Outcome: Ongoing  Goal: Control of chronic pain  Description: Control of chronic pain  Outcome: Ongoing
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
## 2021-05-01 NOTE — PROGRESS NOTES
RESPIRATORY THERAPY ASSESSMENT    Name:  Katherine Crowder  Medical Record Number:  [id number]  Age: 64 y.o. Gender: female  : 1960  Today's Date:  2021  Room:  63/6324-01    Assessment     Is the patient being admitted for a COPD or Asthma exacerbation? No   (If yes the patient will be seen every 4 hours for the first 24 hours and then reassessed)    Patient Admission Diagnosis admitted with Hematuria      Allergies  Allergies   Allergen Reactions    Pcn [Penicillins] Anaphylaxis    Hydralazine            Pulmonary History:Asthma  Home Oxygen Therapy:  room air  Home Respiratory Therapy:Albuterol, Albuterol/Ipratropium Bromide HHN and Budesonide/Formoterol    Current Respiratory Therapy:  HHN Duoneb QID, HHN Duoneb prn          Respiratory Severity Index(RSI)   Patients with orders for inhalation medications, oxygen, or any therapeutic treatment modality will be placed on Respiratory Protocol. They will be assessed with the first treatment and at least every 72 hours thereafter. The following severity scale will be used to determine frequency of treatment intervention.     Smoking History: Mild Exacerbation = 3    Social History  Social History     Tobacco Use    Smoking status: Former Smoker     Packs/day: 0.75     Years: 35.00     Pack years: 26.25     Types: Cigarettes     Quit date: 2019     Years since quittin.8    Smokeless tobacco: Never Used   Substance Use Topics    Alcohol use: No    Drug use: No       Recent Surgical History: None = 0  Past Surgical History  Past Surgical History:   Procedure Laterality Date    APPENDECTOMY      CHOLECYSTECTOMY      CT BIOPSY RENAL  2020    CT BIOPSY RENAL 2020 [facility] CT SCAN    MASTECTOMY Left 2019    LEFT MODIFIED RADICAL MASTECTOMY; EXPAREL INTERCOSTAL BLOCK performed by Lisbeth Morales MD at [address] [place] [address] Left 2019    COMPLEX CLOSURE OF LEFT BREAST, FULL THICKNESS SKIN GRAFT LEFT BREAST 4x3 performed by Corky Sena MD at [facility] Left 1980's    TUBAL LIGATION      TUNNELED VENOUS PORT PLACEMENT  2019    still in    [address] LEFT  11/9/2020    US BREAST BIOPSY NEEDLE ADDITIONAL LEFT 11/9/2020 Cindy Tong  [address] [facility]OB ULTRASOUND       Level of Consciousness: Alert, Oriented, and Cooperative = 0    Level of Activity: Walking with assistance = 1    Respiratory Pattern: Regular Pattern; RR 8-20 = 0    Breath Sounds: Absent bilaterally and/or with wheezes = 3    Sputum   ,  ,    Cough: Strong, spontaneous, non-productive = 0    Vital Signs   BP (!) 150/96   Pulse 89   Temp 98.5 °F (36.9 °C) (Oral)   Resp 18   Ht 5' 8\" (1.727 m)   Wt 264 lb 15.9 oz (120.2 kg)   LMP 03/09/2014   SpO2 94%   BMI 40.29 kg/m²   SPO2 (COPD values may differ): Greater than or equal to 92% on room air = 0    Peak Flow (asthma only): not applicable = 0    RSI: 7-8 = BID and Q4HPRN (every four hours as needed) for dyspnea        Plan       Goals: medication delivery    Patient/caregiver was educated on the proper method of use for Respiratory Care Devices:  Yes      Level of patient/caregiver understanding able to:   ? Verbalize understanding   ? Demonstrate understanding       ? Teach back        ? Needs reinforcement       ? No available caregiver               ? Other:     Response to education:  Good     Is patient being placed on Home Treatment Regimen? No     Does the patient have everything they need prior to discharge? NA     Comments: pt states that she takes Dulera BID and Duoneb as needed, pt admitted with hematuria    Plan of Care: HHN Duoneb BID, home regimen of Dulera BID (pharmacy conversion)    Electronically signed by Kalpesh Mtz RCP on 5/1/2021 at 5:13 AM    Respiratory Protocol Guidelines     1.  Assessment and treatment by Respiratory Therapy will be initiated for medication and therapeutic interventions upon initiation of aerosolized bronchodilator. 2. Discontinue if patient experiences worsened bronchospasm, or secretions have lessened to the point that the patient is able to clear them with a cough. Anti-inflammatory and Combination Medications:    1. If the patient lacks prior history of lung disease, is not using inhaled anti-inflammatory medication at home, and lacks wheezing by examination or by history for at least 24 hours, contact physician for possible discontinuation.

## 2021-05-01 NOTE — CONSULTS
Consult received  Full note to follow    Dangelo Mckeon  4/30/2021    [facility] [address]  Office : [phone]  Fax :[phone]

## 2021-05-01 NOTE — PROGRESS NOTES
Hospitalist Progress Note      PCP: Corey Reyes MD    Chief Complaint. Presented to hospital for blood in urine    Date of Admission: 4/30/2021      Subjective:, Patient is moving in bed and seems anxious, denies chest pain, nausea, vomiting, shortness of breath, fever or chills. mention feels overall better    Medications:  Reviewed    Infusion Medications    sodium chloride       Scheduled Medications    melatonin  3 mg Oral Nightly    ipratropium-albuterol  1 ampule Inhalation BID    metoprolol tartrate  50 mg Oral BID    risperiDONE  0.5 mg Oral BID    budesonide  0.5 mg Nebulization BID    And    Arformoterol Tartrate  15 mcg Nebulization BID    hydrOXYzine  50 mg Oral Nightly    losartan  100 mg Oral Daily    traZODone  25 mg Oral Nightly    sodium chloride flush  5-40 mL Intravenous 2 times per day    predniSONE  10 mg Oral Daily    letrozole  2.5 mg Oral Daily     PRN Meds: albuterol, LORazepam, labetalol, sodium chloride flush, sodium chloride, promethazine **OR** ondansetron, polyethylene glycol, acetaminophen **OR** acetaminophen      Intake/Output Summary (Last 24 hours) at 5/1/2021 1641  Last data filed at 5/1/2021 1405  Gross per 24 hour   Intake 2902.29 ml   Output 2050 ml   Net 852.29 ml       Physical Exam Performed:    BP (!) 181/112   Pulse 107   Temp 97.9 °F (36.6 °C) (Oral)   Resp 20   Ht 5' 8\" (1.727 m)   Wt 263 lb 0.1 oz (119.3 kg)   LMP 03/09/2014   SpO2 92%   BMI 39.99 kg/m²     General appearance: No apparent distress, patient seems anxious  HEENT:  Conjunctivae/corneas clear. Neck: Supple, with full range of motion. Respiratory:  Normal respiratory effort. Clear to auscultation, bilaterally without Rales/Wheezes/Rhonchi. Cardiovascular: Regular rate and rhythm with normal S1/S2 without murmurs or rubs  Abdomen: Soft, non-tender, non-distended, normal bowel sounds.   Musculoskeletal: No cyanosis or edema bilaterally  Neurologic:  without any focal sensory/motor deficits. grossly non-focal.  Psychiatric: Alert and oriented, Normal mood  Peripheral Pulses: +2 palpable, equal bilaterally       Labs:   Recent Labs     04/30/21 1959 05/01/21  0659   WBC 7.6 6.8   HGB 13.7 13.4   HCT 40.5 40.8    144     Recent Labs     04/30/21 1959 04/30/21 2040 05/01/21  0659   * 139 143   K 6.2* 4.9 3.9    105 108   CO2 22 23 26   BUN 32* 32* 30*   CREATININE 2.0* 1.9* 1.4*   CALCIUM 9.7 9.8 9.6   PHOS 3.6 3.1  --      No results for input(s): AST, ALT, BILIDIR, BILITOT, ALKPHOS in the last 72 hours. Recent Labs     04/30/21 1959   INR 1.30*     No results for input(s): Tennis Tyrone in the last 72 hours. Urinalysis:      Lab Results   Component Value Date    NITRU POSITIVE 04/30/2021    WBCUA 6-9 04/30/2021    BACTERIA 3+ 04/30/2021    RBCUA >100 04/30/2021    BLOODU LARGE 04/30/2021    SPECGRAV 1.015 04/30/2021    GLUCOSEU 100 04/30/2021       Radiology:  XR CHEST 1 VIEW   Final Result      Mild patchy bibasilar airspace disease, atelectasis or pneumonia. CT ABDOMEN PELVIS WO CONTRAST Additional Contrast? None   Final Result      Status post left nephrectomy. No acute findings in the abdomen or pelvis. Assessment/Plan:    Active Hospital Problems    Diagnosis    Hematuria [R31.9]     Patient is a 65-year-old female with past medical history of hypertension, DVT/PE on Eliquis who presents to the hospital for hematuria.     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

## 2021-05-01 NOTE — PLAN OF CARE
Problem: Falls - Risk of:  Goal: Will remain free from falls  Description: Will remain free from falls  5/1/2021 1509 by Hiral Hanley RN  Outcome: Ongoing  Note: Patient is a fall risk. See Fall Risk assessment for details. Bed is in low, lock position; call light/belongings within reach. No attempts to get out of bed have been made, calls appropriately when assistance is needed. Bed alarm and hourly rounds in place for safety. Pt is steady when ambulating as a stand by assist. No devices needed. Pt wearing non-skid socks when up ambulating. Problem: Urinary Elimination:  Goal: Signs and symptoms of infection will decrease  Description: Signs and symptoms of infection will decrease  5/1/2021 1509 by Hiral Hanley RN  Outcome: Ongoing  Note: Pt is continent of urine, output being recorded via hat in pt's toliet. Pt having bright red urine, small clots at times. IV fluids stopped this afternoon.

## 2021-05-02 VITALS
DIASTOLIC BLOOD PRESSURE: 93 MMHG | BODY MASS INDEX: 39.56 KG/M2 | SYSTOLIC BLOOD PRESSURE: 167 MMHG | RESPIRATION RATE: 18 BRPM | TEMPERATURE: 98.3 F | HEIGHT: 68 IN | HEART RATE: 88 BPM | OXYGEN SATURATION: 92 % | WEIGHT: 261.02 LBS

## 2021-05-02 LAB
ANION GAP SERPL CALCULATED.3IONS-SCNC: 15 MMOL/L (ref 3–16)
BUN BLDV-MCNC: 25 MG/DL (ref 7–20)
CALCIUM SERPL-MCNC: 10 MG/DL (ref 8.3–10.6)
CHLORIDE BLD-SCNC: 104 MMOL/L (ref 99–110)
CO2: 23 MMOL/L (ref 21–32)
CREAT SERPL-MCNC: 1.2 MG/DL (ref 0.6–1.1)
GFR AFRICAN AMERICAN: 56
GFR NON-AFRICAN AMERICAN: 46
GLUCOSE BLD-MCNC: 110 MG/DL (ref 70–99)
GLUCOSE BLD-MCNC: 123 MG/DL (ref 70–99)
GLUCOSE BLD-MCNC: 79 MG/DL (ref 70–99)
HCT VFR BLD CALC: 40.4 % (ref 36–48)
HEMOGLOBIN: 13.3 G/DL (ref 12–16)
MCH RBC QN AUTO: 32.7 PG (ref 26–34)
MCHC RBC AUTO-ENTMCNC: 33 G/DL (ref 31–36)
MCV RBC AUTO: 99.1 FL (ref 80–100)
PDW BLD-RTO: 15.3 % (ref 12.4–15.4)
PERFORMED ON: ABNORMAL
PERFORMED ON: NORMAL
PLATELET # BLD: 144 K/UL (ref 135–450)
PMV BLD AUTO: 7.9 FL (ref 5–10.5)
POTASSIUM REFLEX MAGNESIUM: 3.6 MMOL/L (ref 3.5–5.1)
RBC # BLD: 4.08 M/UL (ref 4–5.2)
SODIUM BLD-SCNC: 142 MMOL/L (ref 136–145)
URINE CULTURE, ROUTINE: NORMAL
WBC # BLD: 6.4 K/UL (ref 4–11)

## 2021-05-02 PROCEDURE — 6370000000 HC RX 637 (ALT 250 FOR IP): Performed by: INTERNAL MEDICINE

## 2021-05-02 PROCEDURE — 6360000002 HC RX W HCPCS: Performed by: INTERNAL MEDICINE

## 2021-05-02 PROCEDURE — 80048 BASIC METABOLIC PNL TOTAL CA: CPT

## 2021-05-02 PROCEDURE — 2500000003 HC RX 250 WO HCPCS: Performed by: INTERNAL MEDICINE

## 2021-05-02 PROCEDURE — 36415 COLL VENOUS BLD VENIPUNCTURE: CPT

## 2021-05-02 PROCEDURE — 2580000003 HC RX 258: Performed by: INTERNAL MEDICINE

## 2021-05-02 PROCEDURE — 85027 COMPLETE CBC AUTOMATED: CPT

## 2021-05-02 PROCEDURE — 99232 SBSQ HOSP IP/OBS MODERATE 35: CPT | Performed by: INTERNAL MEDICINE

## 2021-05-02 PROCEDURE — 94640 AIRWAY INHALATION TREATMENT: CPT

## 2021-05-02 RX ORDER — HEPARIN SODIUM (PORCINE) LOCK FLUSH IV SOLN 100 UNIT/ML 100 UNIT/ML
500 SOLUTION INTRAVENOUS ONCE
Status: COMPLETED | OUTPATIENT
Start: 2021-05-02 | End: 2021-05-02

## 2021-05-02 RX ADMIN — ARFORMOTEROL TARTRATE 15 MCG: 15 SOLUTION RESPIRATORY (INHALATION) at 08:46

## 2021-05-02 RX ADMIN — METOPROLOL TARTRATE 50 MG: 50 TABLET, FILM COATED ORAL at 09:04

## 2021-05-02 RX ADMIN — IPRATROPIUM BROMIDE AND ALBUTEROL SULFATE 1 AMPULE: .5; 2.5 SOLUTION RESPIRATORY (INHALATION) at 08:46

## 2021-05-02 RX ADMIN — HEPARIN SODIUM (PORCINE) LOCK FLUSH IV SOLN 100 UNIT/ML 500 UNITS: 100 SOLUTION at 14:21

## 2021-05-02 RX ADMIN — LABETALOL HYDROCHLORIDE 10 MG: 5 INJECTION, SOLUTION INTRAVENOUS at 02:41

## 2021-05-02 RX ADMIN — BUDESONIDE 500 MCG: 0.5 SUSPENSION RESPIRATORY (INHALATION) at 08:46

## 2021-05-02 RX ADMIN — LORAZEPAM 0.5 MG: 0.5 TABLET ORAL at 04:36

## 2021-05-02 RX ADMIN — LOSARTAN POTASSIUM 100 MG: 50 TABLET, FILM COATED ORAL at 09:04

## 2021-05-02 RX ADMIN — Medication 5 ML: at 11:18

## 2021-05-02 RX ADMIN — LETROZOLE 2.5 MG: 2.5 TABLET ORAL at 11:18

## 2021-05-02 RX ADMIN — PREDNISONE 10 MG: 10 TABLET ORAL at 09:04

## 2021-05-02 RX ADMIN — LORAZEPAM 0.5 MG: 0.5 TABLET ORAL at 11:18

## 2021-05-02 RX ADMIN — RISPERIDONE 0.5 MG: 0.25 TABLET ORAL at 09:04

## 2021-05-02 ASSESSMENT — PAIN SCALES - WONG BAKER
WONGBAKER_NUMERICALRESPONSE: 0
WONGBAKER_NUMERICALRESPONSE: 0

## 2021-05-02 ASSESSMENT — PAIN SCALES - GENERAL: PAINLEVEL_OUTOF10: 0

## 2021-05-02 NOTE — PLAN OF CARE
Problem: Urinary Elimination:  Goal: Signs and symptoms of infection will decrease  Description: Signs and symptoms of infection will decrease  5/2/2021 0352 by Chyna Her  Outcome: Ongoing  Note: Patient remains continent of urine, output being recorded via measuring device. Urine continues to be red with blood clots noted, no foul odor noted. Continue to monitor.

## 2021-05-02 NOTE — DISCHARGE INSTR - COC
Continuity of Care Form    Patient Name: Ketna Mi   :  1960  MRN:  [id number]    Admit date:  2021  Discharge date:  2021    Code Status Order: Full Code   Advance Directives:   [facility] Documentation       Date/Time Healthcare Directive Type of Healthcare Directive Copy in [facility] [address] Agent's Name Healthcare Agent's Phone Number    21 0221  No, patient does not have an advance directive for healthcare treatment -- -- -- -- --            Admitting Physician:  Sowmya Cunningham MD  PCP: Geri Hayes MD    Discharging Nurse: Susan Overton. [address] Unit/Room#: 0957/9440-40  Discharging Unit Phone Number: [phone]    Emergency Contact:   Extended Emergency Contact Information  Primary Emergency Contact: [address] [place] [address] Phone: [phone]  Work Phone: [phone]  Mobile Phone: [phone]  Relation: Brother/Sister   needed? No  Secondary Emergency Contact: brad oneill  Home Phone: [phone]  Mobile Phone: [phone]  Relation: Child  Preferred language: English   needed?  Yes    Past Surgical History:  Past Surgical History:   Procedure Laterality Date    APPENDECTOMY      CHOLECYSTECTOMY      CT BIOPSY RENAL  2020    CT BIOPSY RENAL 2020 [facility] CT SCAN    MASTECTOMY Left 2019    LEFT MODIFIED RADICAL MASTECTOMY; EXPAREL INTERCOSTAL BLOCK performed by Jody Bacon MD at [address] [place] [address] Bypass Left 2019    COMPLEX CLOSURE OF LEFT BREAST, FULL THICKNESS SKIN GRAFT LEFT BREAST 4x3 performed by Axel Cisneros MD at [place] [address] Left     TUBAL LIGATION      TUNNELED VENOUS PORT PLACEMENT  2019    still in    [facility] LEFT  2020    US BREAST BIOPSY NEEDLE ADDITIONAL LEFT 2020 Janet Hernandez MD [facility] ULTRASOUND       Immunization History:   Immunization History   Administered Date(s) Administered   [facility] Influenza Virus Vaccine 01/31/2014    Pneumococcal Polysaccharide ([id number]) 01/31/2014       Active Problems:  Patient Active Problem List   Diagnosis Code    Malignant neoplasm of upper-inner quadrant of left breast in female, estrogen receptor positive ([facility] C50.212, Z17.0    Breast wound, left, sequela S21.002S    Bilateral pulmonary embolism (HCC) I26.99    Acute respiratory failure with hypoxia (HCC) J96.01    Acute deep vein thrombosis (DVT) of lower extremity (HCC) I82.409    Centrilobular emphysema (HCC) J43.2    Hypoxemia R09.02    Pneumonia due to infectious organism J18.9    Nephrotic syndrome N04.9    CHANTELLE (acute kidney injury) ([facility] N17.9    Chest pain R07.9    Hematuria R31.9       Isolation/Infection:   Isolation            No Isolation          Patient Infection Status       Infection Onset Added Last Indicated Last Indicated By Review Planned Expiration Resolved Resolved By    None active    Resolved    COVID-19 Rule Out 09/22/20 09/22/20 09/22/20 COVID-19 (Ordered)   09/24/20 Rule-Out Test Resulted            Nurse Assessment:  Last Vital Signs: BP (!) 153/91   Pulse 103   Temp 97.3 °F (36.3 °C) (Oral)   Resp 17   Ht 5' 8\" (1.727 m)   Wt 261 lb 0.4 oz (118.4 kg)   LMP 03/09/2014   SpO2 97%   BMI 39.69 kg/m²     Last documented pain score (0-10 scale): Pain Level: 0  Last Weight:   Wt Readings from Last 1 Encounters:   05/02/21 261 lb 0.4 oz (118.4 kg)     Mental Status:  alert    IV Access:  - None    Nursing Mobility/ADLs:  Walking   Assisted  Transfer  Assisted  Bathing  Assisted  Dressing  Assisted  Toileting  Assisted  Feeding  Assisted  Med Admin  Assisted  Med Delivery   whole    Wound Care Documentation and Therapy:  Wound 09/25/20 Back Lateral;Lower;Right (Active)   Number of days: 218        Elimination:  Continence:   · Bowel:  Yes  · Bladder: Yes  Urinary Catheter: None   Colostomy/Ileostomy/Ileal Conduit: No       Date of Last BM: 05/01/2021    Intake/Output Summary (Last 24 hours) at 5/2/2021 1109  Last data filed at 5/2/2021 0154  Gross per 24 hour   Intake 654.9 ml   Output 2350 ml   Net -1695.1 ml     I/O last 3 completed shifts: In: 1311.3 [P.O.:700; I.V.:611.3]  Out: 3050 [Urine:3050]    Safety Concerns: At Risk for Falls    Impairments/Disabilities:      None    Nutrition Therapy:  Current Nutrition Therapy:   - Oral Diet:  Cardiac    Routes of Feeding: Oral  Liquids: Thin Liquids  Daily Fluid Restriction: no  Last Modified Barium Swallow with Video (Video Swallowing Test): not done    Treatments at the Time of Hospital Discharge:   Respiratory Treatments: NA  Oxygen Therapy:  is not on home oxygen therapy. Ventilator:    - No ventilator support    Rehab Therapies: Physical Therapy and Occupational Therapy  Weight Bearing Status/Restrictions: No weight bearing restirctions  Other Medical Equipment (for information only, NOT a DME order):  walker  Other Treatments: NA    Patient's personal belongings (please select all that are sent with patient):  None    RN SIGNATURE:  Electronically signed by Tenny Nyhan, RN on 5/2/21 at 12:12 PM EDT    CASE MANAGEMENT/SOCIAL WORK SECTION    Inpatient Status Date: ***    Readmission Risk Assessment Score:  Readmission Risk              Risk of Unplanned Readmission:        32           Discharging to Facility/ [address] [place] [id number]       Phone: [phone]       Fax: [phone]        ·     / signature: Electronically signed by Jeanne Rowe RN on 5/2/21 at 12:01 PM EDT    PHYSICIAN SECTION    Prognosis: Fair    Condition at Discharge: Stable    Rehab Potential (if transferring to Rehab):  Fair    Recommended Labs or Other Treatments After Discharge: follow up with PCP and urologist if blood in urine do not stop in 3 days    Physician Certification: I certify the above information and transfer of Adolfo Daley  is necessary for the continuing treatment of the diagnosis listed and that she requires [place] for greater 30 days.      Update Admission H&P: No change in H&P    PHYSICIAN SIGNATURE:  Electronically signed by Kostas Carranza MD on 5/2/21 at 11:10 AM EDT

## 2021-05-02 NOTE — PROGRESS NOTES
Patient daughter An Brink called and requested we flush patient port prior to discharge. Advised patient is schedule to leave in45 minutes via transport, however will attempt to contact MD to request orders to complete. Secure message sent to Dr Flip Villar to request.  Awaiting response.
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
## 2021-05-02 NOTE — DISCHARGE SUMMARY
Hospital Medicine Discharge Summary    Patient ID: Pio Valerio      Patient's PCP: Corey Reyes MD    Admit Date: 4/30/2021     Discharge Date:      Admitting Physician: Gil Lo MD     Discharge Physician: Zita Manley MD     Discharge Diagnoses: Active Hospital Problems    Diagnosis Date Noted    Hematuria [R31.9] 04/30/2021       The patient was seen and examined on day of discharge and this discharge summary is in conjunction with any daily progress note from day of discharge. Condition at discharge - stable    Hospital Course: patient seen and evaluated on the day of discharge. Patient informed about following up with appointments. Patient verbalized understanding for follow-up appointments. All questions of the patient answered, patient is in agreement with the discharge plan. Medical reconciliation performed. Patient will be discharged stable condition. Patient presented to hospital with hematuria, patient's hematuria has been improving since been holding Eliquis, patient's hemoglobin is a stable. Patient does not have any complaints including no abdominal pain chest pain shortness of breath nausea vomiting at the day of discharge. Patient agreed with the discharge plan, patient will be discharged stable condition, continue to hold Eliquis for 5 days, patient given follow-up information for urology as outpatient.   Patient is a 62-year-old female with past medical history of hypertension, DVT/PE on Eliquis who presents to the hospital for hematuria.     Assessment  Hematuria-improved  Suspect UTI  Status post left nephrectomy  Chronic anticoagulation for DVT/PE-on Eliquis  History of FSGS  Hypertension            Exam:     BP (!) 167/93   Pulse 88   Temp 98.3 °F (36.8 °C) (Oral)   Resp 18   Ht 5' 8\" (1.727 m)   Wt 261 lb 0.4 oz (118.4 kg)   LMP 03/09/2014   SpO2 92%   BMI 39.69 kg/m²     General appearance: No apparent distress  HEENT: Conjunctivae/corneas clear. Neck: Supple, No jugular venous distention. Respiratory:  Normal respiratory effort. Clear to auscultation, bilaterally without Rales/Wheezes/Rhonchi. Cardiovascular: Regular rate and rhythm with normal S1/S2 without murmurs, rubs or gallops. Abdomen: Soft, non-tender, non-distended, normal bowel sounds. Musculoskelatal: No clubbing, cyanosis or edema bilaterally. Skin: Skin color, texture, turgor normal.   Neurologic: no focal neurologic deficits. grossly non-focal.  Psychiatric: Alert and oriented, normal mood    Consults:     IP CONSULT TO NEPHROLOGY  IP CONSULT TO HOSPITALIST  IP CONSULT TO SOCIAL WORK      Code Status:  Full Code    Activity: activity as tolerated    Labs:  For convenience and continuity at follow-up the following most recent labs are provided:      CBC:    Lab Results   Component Value Date    WBC 6.4 05/02/2021    HGB 13.3 05/02/2021    HCT 40.4 05/02/2021     05/02/2021       Renal:    Lab Results   Component Value Date     05/02/2021    K 3.6 05/02/2021     05/02/2021    CO2 23 05/02/2021    BUN 25 05/02/2021    CREATININE 1.2 05/02/2021    CALCIUM 10.0 05/02/2021    PHOS 3.1 04/30/2021       Discharge Medications:     Current Discharge Medication List           Details   apixaban (ELIQUIS) 5 MG TABS tablet Take 1 tablet by mouth 2 times daily  Qty: 60 tablet, Refills: 5              Details   mometasone-formoterol (DULERA) 200-5 MCG/ACT inhaler Inhale 2 puffs into the lungs every 12 hours      ipratropium-albuterol (DUONEB) 0.5-2.5 (3) MG/3ML SOLN nebulizer solution Inhale 1 vial into the lungs every 4 hours      risperiDONE (RISPERDAL) 0.5 MG tablet Take 0.5 mg by mouth 2 times daily      metoprolol tartrate (LOPRESSOR) 50 MG tablet Take 50 mg by mouth 2 times daily      Acetaminophen (TYLENOL) 325 MG CAPS Take by mouth      predniSONE (DELTASONE) 10 MG tablet Take 1 tablet by mouth daily  Qty: 30 tablet, Refills: 5      losartan (COZAAR) 100 MG tablet Take 1 tablet by mouth daily  Qty: 30 tablet, Refills: 5      temazepam (RESTORIL) 15 MG capsule Take 30 mg by mouth every other day.       hydrOXYzine (ATARAX) 50 MG tablet Take 50 mg by mouth nightly      letrozole (FEMARA) 2.5 MG tablet Take 2.5 mg by mouth daily      Melatonin 10 MG TABS Take 20 mg by mouth nightly      traZODone (DESYREL) 100 MG tablet Take 100 mg by mouth nightly      carvedilol (COREG) 6.25 MG tablet Take 1 tablet by mouth 2 times daily (with meals)  Qty: 60 tablet, Refills: 3      hydrocortisone 1 % ointment Apply topically 2 times daily. Qty: 30 g, Refills: 0      Multiple Vitamins-Minerals (THERAPEUTIC MULTIVITAMIN-MINERALS) tablet Take 1 tablet by mouth daily      docusate (COLACE, DULCOLAX) 100 MG CAPS Take 100 mg by mouth 2 times daily  Qty: 30 capsule, Refills: 1      albuterol sulfate  (90 Base) MCG/ACT inhaler Inhale 2 puffs into the lungs every 6 hours as needed for Wheezing      ondansetron (ZOFRAN) 8 MG tablet Take 8 mg by mouth every 8 hours as needed for Nausea or Vomiting      mirtazapine (REMERON) 15 MG tablet Take 15 mg by mouth nightly       loratadine (CLARITIN) 10 MG capsule Take 10 mg by mouth every evening       prochlorperazine (COMPAZINE) 10 MG tablet Take 10 mg by mouth every 6 hours as needed      ARIPiprazole (ABILIFY) 10 MG tablet Take 10 mg by mouth every evening       famotidine (PEPCID) 40 MG tablet Take 40 mg by mouth daily      guaiFENesin (MUCINEX) 600 MG extended release tablet Take 600 mg by mouth 2 times daily              Time Spent on discharge is more than 30 mints in the examination, evaluation, counseling and review of medications and discharge plan. Signed:    James See MD   5/2/2021      Thank you Loree Ocampo MD for the opportunity to be involved in this patient's care. If you have any questions or concerns please feel free to contact me at [phone].

## 2021-05-02 NOTE — CARE COORDINATION
patient: Hilda Westfall RN  [facility]  Case Management Department  Ph: [phone]  Fax: [phone]

## 2021-05-02 NOTE — PLAN OF CARE
Problem: Falls - Risk of:  Goal: Will remain free from falls  Description: Will remain free from falls  5/2/2021 0350 by Olivia CHOPRA  Outcome: Ongoing  Note:  Pt is a Low fall risk. See Jackie Evan Fall Score and ABCDS Injury Risk assessments. - Screening for Orthostasis AND not a [place] Risk per [place]/ABCDS: Pt bed is in low position, side rails up, call light and belongings are in reach. Fall risk light is on outside pts room. Pt encouraged to call for assistance as needed. Will continue with hourly rounds for PO intake, pain needs, toileting and repositioning as needed.

## 2021-05-02 NOTE — PROGRESS NOTES
[facility], patient verbalized understanding of discharge plan, IV removed prior to discharge. Awaiting transportation scheduled for 1430.

## 2021-05-06 ENCOUNTER — HOSPITAL ENCOUNTER (OUTPATIENT)
Dept: ULTRASOUND IMAGING | Age: 61
Discharge: HOME | End: 2021-05-06
Payer: MEDICAID

## 2021-05-06 DIAGNOSIS — Z09 FOLLOW UP: ICD-10-CM

## 2021-05-06 DIAGNOSIS — N63.0 LUMP OR MASS IN BREAST: ICD-10-CM

## 2021-05-06 PROCEDURE — 76642 ULTRASOUND BREAST LIMITED: CPT

## 2021-07-16 ENCOUNTER — HOSPITAL ENCOUNTER (INPATIENT)
Age: 61
LOS: 2 days | Discharge: NURSING, REHAB OR LONG-TERM CARE | DRG: 140 | End: 2021-07-18
Attending: EMERGENCY MEDICINE | Admitting: INTERNAL MEDICINE
Payer: MEDICAID

## 2021-07-16 ENCOUNTER — APPOINTMENT (OUTPATIENT)
Dept: CT IMAGING | Age: 61
DRG: 140 | End: 2021-07-16
Payer: MEDICAID

## 2021-07-16 ENCOUNTER — APPOINTMENT (OUTPATIENT)
Dept: GENERAL RADIOLOGY | Age: 61
DRG: 140 | End: 2021-07-16
Payer: MEDICAID

## 2021-07-16 DIAGNOSIS — J44.1 COPD EXACERBATION (HCC): Primary | ICD-10-CM

## 2021-07-16 LAB
ANION GAP SERPL CALCULATED.3IONS-SCNC: 12 MMOL/L (ref 3–16)
BASE EXCESS VENOUS: -2.3 MMOL/L (ref -2–3)
BASOPHILS ABSOLUTE: 0 K/UL (ref 0–0.2)
BASOPHILS RELATIVE PERCENT: 0.6 %
BUN BLDV-MCNC: 34 MG/DL (ref 7–20)
CALCIUM SERPL-MCNC: 10.1 MG/DL (ref 8.3–10.6)
CARBOXYHEMOGLOBIN: 2.1 % (ref 0–1.5)
CHLORIDE BLD-SCNC: 105 MMOL/L (ref 99–110)
CO2: 22 MMOL/L (ref 21–32)
CREAT SERPL-MCNC: 1.6 MG/DL (ref 0.6–1.1)
EKG ATRIAL RATE: 125 BPM
EKG DIAGNOSIS: NORMAL
EKG P AXIS: 76 DEGREES
EKG P-R INTERVAL: 134 MS
EKG Q-T INTERVAL: 336 MS
EKG QRS DURATION: 82 MS
EKG QTC CALCULATION (BAZETT): 484 MS
EKG R AXIS: 67 DEGREES
EKG T AXIS: 86 DEGREES
EKG VENTRICULAR RATE: 125 BPM
EOSINOPHILS ABSOLUTE: 0 K/UL (ref 0–0.6)
EOSINOPHILS RELATIVE PERCENT: 0.2 %
GFR AFRICAN AMERICAN: 40
GFR NON-AFRICAN AMERICAN: 33
GLUCOSE BLD-MCNC: 113 MG/DL (ref 70–99)
HCO3 VENOUS: 23.9 MMOL/L (ref 24–28)
HCT VFR BLD CALC: 31.2 % (ref 36–48)
HEMOGLOBIN, VEN, REDUCED: 30.7 %
HEMOGLOBIN: 10.3 G/DL (ref 12–16)
LACTIC ACID: 2.3 MMOL/L (ref 0.4–2)
LYMPHOCYTES ABSOLUTE: 1.9 K/UL (ref 1–5.1)
LYMPHOCYTES RELATIVE PERCENT: 25.5 %
MCH RBC QN AUTO: 29.5 PG (ref 26–34)
MCHC RBC AUTO-ENTMCNC: 33 G/DL (ref 31–36)
MCV RBC AUTO: 89.5 FL (ref 80–100)
METHEMOGLOBIN VENOUS: 0.5 % (ref 0–1.5)
MONOCYTES ABSOLUTE: 0.7 K/UL (ref 0–1.3)
MONOCYTES RELATIVE PERCENT: 9.4 %
NEUTROPHILS ABSOLUTE: 4.9 K/UL (ref 1.7–7.7)
NEUTROPHILS RELATIVE PERCENT: 64.3 %
O2 SAT, VEN: 69 %
PCO2, VEN: 46.1 MMHG (ref 41–51)
PDW BLD-RTO: 16.3 % (ref 12.4–15.4)
PH VENOUS: 7.32 (ref 7.35–7.45)
PLATELET # BLD: 290 K/UL (ref 135–450)
PMV BLD AUTO: 6.9 FL (ref 5–10.5)
PO2, VEN: 39.5 MMHG (ref 25–40)
POTASSIUM REFLEX MAGNESIUM: 4.2 MMOL/L (ref 3.5–5.1)
PRO-BNP: 252 PG/ML (ref 0–124)
RBC # BLD: 3.48 M/UL (ref 4–5.2)
SODIUM BLD-SCNC: 139 MMOL/L (ref 136–145)
TCO2 CALC VENOUS: 25 MMOL/L
TROPONIN: <0.01 NG/ML
WBC # BLD: 7.6 K/UL (ref 4–11)

## 2021-07-16 PROCEDURE — 83605 ASSAY OF LACTIC ACID: CPT

## 2021-07-16 PROCEDURE — 6360000002 HC RX W HCPCS: Performed by: INTERNAL MEDICINE

## 2021-07-16 PROCEDURE — 85025 COMPLETE CBC W/AUTO DIFF WBC: CPT

## 2021-07-16 PROCEDURE — 6360000002 HC RX W HCPCS: Performed by: EMERGENCY MEDICINE

## 2021-07-16 PROCEDURE — 83880 ASSAY OF NATRIURETIC PEPTIDE: CPT

## 2021-07-16 PROCEDURE — 84484 ASSAY OF TROPONIN QUANT: CPT

## 2021-07-16 PROCEDURE — 71045 X-RAY EXAM CHEST 1 VIEW: CPT

## 2021-07-16 PROCEDURE — U0005 INFEC AGEN DETEC AMPLI PROBE: HCPCS

## 2021-07-16 PROCEDURE — G0378 HOSPITAL OBSERVATION PER HR: HCPCS

## 2021-07-16 PROCEDURE — U0003 INFECTIOUS AGENT DETECTION BY NUCLEIC ACID (DNA OR RNA); SEVERE ACUTE RESPIRATORY SYNDROME CORONAVIRUS 2 (SARS-COV-2) (CORONAVIRUS DISEASE [COVID-19]), AMPLIFIED PROBE TECHNIQUE, MAKING USE OF HIGH THROUGHPUT TECHNOLOGIES AS DESCRIBED BY CMS-2020-01-R: HCPCS

## 2021-07-16 PROCEDURE — 2580000003 HC RX 258: Performed by: EMERGENCY MEDICINE

## 2021-07-16 PROCEDURE — 99223 1ST HOSP IP/OBS HIGH 75: CPT | Performed by: INTERNAL MEDICINE

## 2021-07-16 PROCEDURE — 82803 BLOOD GASES ANY COMBINATION: CPT

## 2021-07-16 PROCEDURE — 2580000003 HC RX 258: Performed by: INTERNAL MEDICINE

## 2021-07-16 PROCEDURE — 94640 AIRWAY INHALATION TREATMENT: CPT

## 2021-07-16 PROCEDURE — 71250 CT THORAX DX C-: CPT

## 2021-07-16 PROCEDURE — 93005 ELECTROCARDIOGRAM TRACING: CPT | Performed by: EMERGENCY MEDICINE

## 2021-07-16 PROCEDURE — 6370000000 HC RX 637 (ALT 250 FOR IP): Performed by: STUDENT IN AN ORGANIZED HEALTH CARE EDUCATION/TRAINING PROGRAM

## 2021-07-16 PROCEDURE — 2060000000 HC ICU INTERMEDIATE R&B

## 2021-07-16 PROCEDURE — 80048 BASIC METABOLIC PNL TOTAL CA: CPT

## 2021-07-16 PROCEDURE — 6370000000 HC RX 637 (ALT 250 FOR IP): Performed by: INTERNAL MEDICINE

## 2021-07-16 PROCEDURE — 94660 CPAP INITIATION&MGMT: CPT

## 2021-07-16 PROCEDURE — 94761 N-INVAS EAR/PLS OXIMETRY MLT: CPT

## 2021-07-16 PROCEDURE — 99285 EMERGENCY DEPT VISIT HI MDM: CPT

## 2021-07-16 PROCEDURE — 36415 COLL VENOUS BLD VENIPUNCTURE: CPT

## 2021-07-16 PROCEDURE — 96375 TX/PRO/DX INJ NEW DRUG ADDON: CPT

## 2021-07-16 PROCEDURE — 96365 THER/PROPH/DIAG IV INF INIT: CPT

## 2021-07-16 PROCEDURE — 2700000000 HC OXYGEN THERAPY PER DAY

## 2021-07-16 PROCEDURE — 36591 DRAW BLOOD OFF VENOUS DEVICE: CPT

## 2021-07-16 RX ORDER — ALBUTEROL SULFATE 90 UG/1
2 AEROSOL, METERED RESPIRATORY (INHALATION) EVERY 4 HOURS PRN
COMMUNITY

## 2021-07-16 RX ORDER — ONDANSETRON 4 MG/1
4 TABLET, ORALLY DISINTEGRATING ORAL EVERY 8 HOURS PRN
Status: DISCONTINUED | OUTPATIENT
Start: 2021-07-16 | End: 2021-07-18 | Stop reason: HOSPADM

## 2021-07-16 RX ORDER — NICOTINE 21 MG/24HR
1 PATCH, TRANSDERMAL 24 HOURS TRANSDERMAL DAILY
Status: DISCONTINUED | OUTPATIENT
Start: 2021-07-16 | End: 2021-07-18 | Stop reason: HOSPADM

## 2021-07-16 RX ORDER — ACETAMINOPHEN 650 MG/1
650 SUPPOSITORY RECTAL EVERY 6 HOURS PRN
Status: DISCONTINUED | OUTPATIENT
Start: 2021-07-16 | End: 2021-07-18 | Stop reason: HOSPADM

## 2021-07-16 RX ORDER — ALBUTEROL SULFATE 2.5 MG/3ML
2.5 SOLUTION RESPIRATORY (INHALATION) EVERY 6 HOURS PRN
Status: DISCONTINUED | OUTPATIENT
Start: 2021-07-16 | End: 2021-07-18 | Stop reason: HOSPADM

## 2021-07-16 RX ORDER — METOPROLOL TARTRATE 50 MG/1
50 TABLET, FILM COATED ORAL 2 TIMES DAILY
Status: DISCONTINUED | OUTPATIENT
Start: 2021-07-16 | End: 2021-07-18 | Stop reason: HOSPADM

## 2021-07-16 RX ORDER — RISPERIDONE 0.25 MG/1
0.5 TABLET, FILM COATED ORAL 2 TIMES DAILY
Status: DISCONTINUED | OUTPATIENT
Start: 2021-07-16 | End: 2021-07-18 | Stop reason: HOSPADM

## 2021-07-16 RX ORDER — LETROZOLE 2.5 MG/1
2.5 TABLET, FILM COATED ORAL DAILY
Status: DISCONTINUED | OUTPATIENT
Start: 2021-07-16 | End: 2021-07-18 | Stop reason: HOSPADM

## 2021-07-16 RX ORDER — METHYLPREDNISOLONE SODIUM SUCCINATE 125 MG/2ML
125 INJECTION, POWDER, LYOPHILIZED, FOR SOLUTION INTRAMUSCULAR; INTRAVENOUS ONCE
Status: COMPLETED | OUTPATIENT
Start: 2021-07-16 | End: 2021-07-16

## 2021-07-16 RX ORDER — LORAZEPAM 2 MG/ML
0.5 INJECTION INTRAMUSCULAR ONCE
Status: COMPLETED | OUTPATIENT
Start: 2021-07-16 | End: 2021-07-16

## 2021-07-16 RX ORDER — BUDESONIDE AND FORMOTEROL FUMARATE DIHYDRATE 160; 4.5 UG/1; UG/1
2 AEROSOL RESPIRATORY (INHALATION) 2 TIMES DAILY
Status: DISCONTINUED | OUTPATIENT
Start: 2021-07-16 | End: 2021-07-16

## 2021-07-16 RX ORDER — SODIUM CHLORIDE 9 MG/ML
25 INJECTION, SOLUTION INTRAVENOUS PRN
Status: DISCONTINUED | OUTPATIENT
Start: 2021-07-16 | End: 2021-07-18 | Stop reason: HOSPADM

## 2021-07-16 RX ORDER — METHYLPREDNISOLONE SODIUM SUCCINATE 40 MG/ML
40 INJECTION, POWDER, LYOPHILIZED, FOR SOLUTION INTRAMUSCULAR; INTRAVENOUS EVERY 8 HOURS
Status: DISCONTINUED | OUTPATIENT
Start: 2021-07-17 | End: 2021-07-17

## 2021-07-16 RX ORDER — ONDANSETRON 2 MG/ML
4 INJECTION INTRAMUSCULAR; INTRAVENOUS EVERY 6 HOURS PRN
Status: DISCONTINUED | OUTPATIENT
Start: 2021-07-16 | End: 2021-07-18 | Stop reason: HOSPADM

## 2021-07-16 RX ORDER — LANOLIN ALCOHOL/MO/W.PET/CERES
3 CREAM (GRAM) TOPICAL NIGHTLY
Status: DISCONTINUED | OUTPATIENT
Start: 2021-07-16 | End: 2021-07-18 | Stop reason: HOSPADM

## 2021-07-16 RX ORDER — TRAZODONE HYDROCHLORIDE 50 MG/1
25 TABLET ORAL NIGHTLY
Status: DISCONTINUED | OUTPATIENT
Start: 2021-07-16 | End: 2021-07-18 | Stop reason: HOSPADM

## 2021-07-16 RX ORDER — SODIUM CHLORIDE, SODIUM LACTATE, POTASSIUM CHLORIDE, AND CALCIUM CHLORIDE .6; .31; .03; .02 G/100ML; G/100ML; G/100ML; G/100ML
1000 INJECTION, SOLUTION INTRAVENOUS ONCE
Status: COMPLETED | OUTPATIENT
Start: 2021-07-16 | End: 2021-07-16

## 2021-07-16 RX ORDER — POLYETHYLENE GLYCOL 3350 17 G/17G
17 POWDER, FOR SOLUTION ORAL DAILY PRN
Status: DISCONTINUED | OUTPATIENT
Start: 2021-07-16 | End: 2021-07-18 | Stop reason: HOSPADM

## 2021-07-16 RX ORDER — LORAZEPAM 0.5 MG/1
0.5 TABLET ORAL NIGHTLY PRN
Status: DISCONTINUED | OUTPATIENT
Start: 2021-07-16 | End: 2021-07-17

## 2021-07-16 RX ORDER — HYDROXYZINE HYDROCHLORIDE 25 MG/1
25 TABLET, FILM COATED ORAL 2 TIMES DAILY
Status: DISCONTINUED | OUTPATIENT
Start: 2021-07-16 | End: 2021-07-18 | Stop reason: HOSPADM

## 2021-07-16 RX ORDER — SODIUM CHLORIDE 0.9 % (FLUSH) 0.9 %
5-40 SYRINGE (ML) INJECTION PRN
Status: DISCONTINUED | OUTPATIENT
Start: 2021-07-16 | End: 2021-07-18 | Stop reason: HOSPADM

## 2021-07-16 RX ORDER — ACETAMINOPHEN 325 MG/1
650 TABLET ORAL EVERY 6 HOURS PRN
Status: DISCONTINUED | OUTPATIENT
Start: 2021-07-16 | End: 2021-07-18 | Stop reason: HOSPADM

## 2021-07-16 RX ORDER — SODIUM CHLORIDE 0.9 % (FLUSH) 0.9 %
5-40 SYRINGE (ML) INJECTION EVERY 12 HOURS SCHEDULED
Status: DISCONTINUED | OUTPATIENT
Start: 2021-07-16 | End: 2021-07-18 | Stop reason: HOSPADM

## 2021-07-16 RX ADMIN — Medication 10 ML: at 21:40

## 2021-07-16 RX ADMIN — METHYLPREDNISOLONE SODIUM SUCCINATE 125 MG: 125 INJECTION, POWDER, FOR SOLUTION INTRAMUSCULAR; INTRAVENOUS at 11:16

## 2021-07-16 RX ADMIN — APIXABAN 2.5 MG: 2.5 TABLET, FILM COATED ORAL at 21:36

## 2021-07-16 RX ADMIN — APIXABAN 2.5 MG: 2.5 TABLET, FILM COATED ORAL at 15:15

## 2021-07-16 RX ADMIN — RISPERIDONE 0.5 MG: 0.25 TABLET ORAL at 21:36

## 2021-07-16 RX ADMIN — HYDROXYZINE HYDROCHLORIDE 25 MG: 25 TABLET ORAL at 21:37

## 2021-07-16 RX ADMIN — RISPERIDONE 0.5 MG: 0.25 TABLET ORAL at 15:15

## 2021-07-16 RX ADMIN — LORAZEPAM 0.5 MG: 2 INJECTION INTRAMUSCULAR; INTRAVENOUS at 13:14

## 2021-07-16 RX ADMIN — METOPROLOL TARTRATE 50 MG: 50 TABLET, FILM COATED ORAL at 15:15

## 2021-07-16 RX ADMIN — METOPROLOL TARTRATE 50 MG: 50 TABLET, FILM COATED ORAL at 21:37

## 2021-07-16 RX ADMIN — TRAZODONE HYDROCHLORIDE 25 MG: 50 TABLET ORAL at 21:37

## 2021-07-16 RX ADMIN — SODIUM CHLORIDE, POTASSIUM CHLORIDE, SODIUM LACTATE AND CALCIUM CHLORIDE 1000 ML: 600; 310; 30; 20 INJECTION, SOLUTION INTRAVENOUS at 11:16

## 2021-07-16 RX ADMIN — ALBUTEROL SULFATE 2.5 MG: 2.5 SOLUTION RESPIRATORY (INHALATION) at 08:53

## 2021-07-16 RX ADMIN — HYDROXYZINE HYDROCHLORIDE 25 MG: 25 TABLET ORAL at 15:15

## 2021-07-16 RX ADMIN — IPRATROPIUM BROMIDE AND ALBUTEROL 1 PUFF: 20; 100 SPRAY, METERED RESPIRATORY (INHALATION) at 23:00

## 2021-07-16 RX ADMIN — AZITHROMYCIN MONOHYDRATE 500 MG: 500 INJECTION, POWDER, LYOPHILIZED, FOR SOLUTION INTRAVENOUS at 12:17

## 2021-07-16 RX ADMIN — ALBUTEROL SULFATE 2.5 MG: 2.5 SOLUTION RESPIRATORY (INHALATION) at 09:04

## 2021-07-16 RX ADMIN — Medication 3 MG: at 21:37

## 2021-07-16 RX ADMIN — ALBUTEROL SULFATE 2.5 MG: 2.5 SOLUTION RESPIRATORY (INHALATION) at 16:51

## 2021-07-16 ASSESSMENT — PAIN SCALES - GENERAL
PAINLEVEL_OUTOF10: 0
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
## 2021-07-16 NOTE — PROGRESS NOTES
Pt taken off Bipap to ambulate to bathroom. Placed on 4L nc oxygen reading 94%. After return to bed, pt very tachypnic, reading 80%. Oxygen increased to 6L; slowly recovered to 91% with reinforcement to purse-lip breathe.  Evi Main RN
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
## 2021-07-16 NOTE — ED NOTES
Report called to Joanna Miranda, RN, PCU. Patient ready for transport, awaiting available RT to transport with BiPAP.       Yesenia Mattson RN  07/16/21 [id number]

## 2021-07-16 NOTE — ED NOTES
Pt arrives to the ED per ems from [facility] with c/o worsening shortness of breath x3 days. Pt reports she has a hx of COPD and asthma. Pt with edema to bilateral eyelids and BLE. Pt is a/o x4, answering questions, difficulty speaking in full sentences due to breathing. Pt 89% on RA upon arrival, placed on 4 L NC and now increased to 94 % on 4L NC.       Neri Barfield RN  07/16/21 [id number]

## 2021-07-16 NOTE — PROGRESS NOTES
Pt taken off BIPAP due to pt's statement that \"It's not enough air\" placed pt on 5lpm nasal canula for approx. 2-3 minutes when pt requested to go back on BIPAP because it \"Gives me more air\" . Pt currently on trilogy BIPAP unit. IPAP increased to [id number] for pts's comfort.

## 2021-07-17 LAB
ANION GAP SERPL CALCULATED.3IONS-SCNC: 13 MMOL/L (ref 3–16)
BASOPHILS ABSOLUTE: 0 K/UL (ref 0–0.2)
BASOPHILS RELATIVE PERCENT: 0.1 %
BUN BLDV-MCNC: 45 MG/DL (ref 7–20)
CALCIUM SERPL-MCNC: 9.9 MG/DL (ref 8.3–10.6)
CHLORIDE BLD-SCNC: 103 MMOL/L (ref 99–110)
CO2: 24 MMOL/L (ref 21–32)
CREAT SERPL-MCNC: 1.7 MG/DL (ref 0.6–1.1)
EOSINOPHILS ABSOLUTE: 0 K/UL (ref 0–0.6)
EOSINOPHILS RELATIVE PERCENT: 0 %
GFR AFRICAN AMERICAN: 37
GFR NON-AFRICAN AMERICAN: 31
GLUCOSE BLD-MCNC: 201 MG/DL (ref 70–99)
HCT VFR BLD CALC: 30.7 % (ref 36–48)
HEMOGLOBIN: 9.8 G/DL (ref 12–16)
LYMPHOCYTES ABSOLUTE: 0.9 K/UL (ref 1–5.1)
LYMPHOCYTES RELATIVE PERCENT: 12.1 %
MCH RBC QN AUTO: 29.6 PG (ref 26–34)
MCHC RBC AUTO-ENTMCNC: 32.1 G/DL (ref 31–36)
MCV RBC AUTO: 92 FL (ref 80–100)
MONOCYTES ABSOLUTE: 0.4 K/UL (ref 0–1.3)
MONOCYTES RELATIVE PERCENT: 5 %
NEUTROPHILS ABSOLUTE: 6.2 K/UL (ref 1.7–7.7)
NEUTROPHILS RELATIVE PERCENT: 82.8 %
PDW BLD-RTO: 16.3 % (ref 12.4–15.4)
PLATELET # BLD: 262 K/UL (ref 135–450)
PMV BLD AUTO: 7.1 FL (ref 5–10.5)
POTASSIUM REFLEX MAGNESIUM: 5.2 MMOL/L (ref 3.5–5.1)
RBC # BLD: 3.33 M/UL (ref 4–5.2)
SARS-COV-2: NOT DETECTED
SODIUM BLD-SCNC: 140 MMOL/L (ref 136–145)
WBC # BLD: 7.5 K/UL (ref 4–11)

## 2021-07-17 PROCEDURE — 6360000002 HC RX W HCPCS: Performed by: INTERNAL MEDICINE

## 2021-07-17 PROCEDURE — 2580000003 HC RX 258: Performed by: INTERNAL MEDICINE

## 2021-07-17 PROCEDURE — 85025 COMPLETE CBC W/AUTO DIFF WBC: CPT

## 2021-07-17 PROCEDURE — 94761 N-INVAS EAR/PLS OXIMETRY MLT: CPT

## 2021-07-17 PROCEDURE — 6370000000 HC RX 637 (ALT 250 FOR IP): Performed by: INTERNAL MEDICINE

## 2021-07-17 PROCEDURE — 99233 SBSQ HOSP IP/OBS HIGH 50: CPT | Performed by: INTERNAL MEDICINE

## 2021-07-17 PROCEDURE — 2700000000 HC OXYGEN THERAPY PER DAY

## 2021-07-17 PROCEDURE — 94640 AIRWAY INHALATION TREATMENT: CPT

## 2021-07-17 PROCEDURE — 2060000000 HC ICU INTERMEDIATE R&B

## 2021-07-17 PROCEDURE — 96366 THER/PROPH/DIAG IV INF ADDON: CPT

## 2021-07-17 PROCEDURE — 96376 TX/PRO/DX INJ SAME DRUG ADON: CPT

## 2021-07-17 PROCEDURE — 96375 TX/PRO/DX INJ NEW DRUG ADDON: CPT

## 2021-07-17 PROCEDURE — G0378 HOSPITAL OBSERVATION PER HR: HCPCS

## 2021-07-17 PROCEDURE — 94660 CPAP INITIATION&MGMT: CPT

## 2021-07-17 PROCEDURE — 80048 BASIC METABOLIC PNL TOTAL CA: CPT

## 2021-07-17 RX ORDER — BUDESONIDE 0.5 MG/2ML
0.5 INHALANT ORAL 2 TIMES DAILY
Status: DISCONTINUED | OUTPATIENT
Start: 2021-07-17 | End: 2021-07-18 | Stop reason: HOSPADM

## 2021-07-17 RX ORDER — ARFORMOTEROL TARTRATE 15 UG/2ML
15 SOLUTION RESPIRATORY (INHALATION) 2 TIMES DAILY
Status: DISCONTINUED | OUTPATIENT
Start: 2021-07-17 | End: 2021-07-18 | Stop reason: HOSPADM

## 2021-07-17 RX ORDER — LORAZEPAM 0.5 MG/1
0.5 TABLET ORAL 2 TIMES DAILY PRN
Status: DISCONTINUED | OUTPATIENT
Start: 2021-07-17 | End: 2021-07-18 | Stop reason: HOSPADM

## 2021-07-17 RX ORDER — PREDNISONE 20 MG/1
40 TABLET ORAL DAILY
Status: DISCONTINUED | OUTPATIENT
Start: 2021-07-18 | End: 2021-07-18 | Stop reason: HOSPADM

## 2021-07-17 RX ADMIN — IPRATROPIUM BROMIDE AND ALBUTEROL 1 PUFF: 20; 100 SPRAY, METERED RESPIRATORY (INHALATION) at 13:35

## 2021-07-17 RX ADMIN — Medication 10 ML: at 21:48

## 2021-07-17 RX ADMIN — METOPROLOL TARTRATE 50 MG: 50 TABLET, FILM COATED ORAL at 21:48

## 2021-07-17 RX ADMIN — LETROZOLE 2.5 MG: 2.5 TABLET ORAL at 09:54

## 2021-07-17 RX ADMIN — METOPROLOL TARTRATE 50 MG: 50 TABLET, FILM COATED ORAL at 09:53

## 2021-07-17 RX ADMIN — HYDROXYZINE HYDROCHLORIDE 25 MG: 25 TABLET ORAL at 09:53

## 2021-07-17 RX ADMIN — APIXABAN 2.5 MG: 2.5 TABLET, FILM COATED ORAL at 09:53

## 2021-07-17 RX ADMIN — IPRATROPIUM BROMIDE AND ALBUTEROL 1 PUFF: 20; 100 SPRAY, METERED RESPIRATORY (INHALATION) at 23:55

## 2021-07-17 RX ADMIN — AZITHROMYCIN MONOHYDRATE 500 MG: 500 INJECTION, POWDER, LYOPHILIZED, FOR SOLUTION INTRAVENOUS at 00:21

## 2021-07-17 RX ADMIN — METHYLPREDNISOLONE SODIUM SUCCINATE 40 MG: 40 INJECTION, POWDER, FOR SOLUTION INTRAMUSCULAR; INTRAVENOUS at 09:59

## 2021-07-17 RX ADMIN — Medication 10 ML: at 09:54

## 2021-07-17 RX ADMIN — LETROZOLE 2.5 MG: 2.5 TABLET ORAL at 00:14

## 2021-07-17 RX ADMIN — RISPERIDONE 0.5 MG: 0.25 TABLET ORAL at 09:53

## 2021-07-17 RX ADMIN — HYDROXYZINE HYDROCHLORIDE 25 MG: 25 TABLET ORAL at 21:48

## 2021-07-17 RX ADMIN — METHYLPREDNISOLONE SODIUM SUCCINATE 40 MG: 40 INJECTION, POWDER, FOR SOLUTION INTRAMUSCULAR; INTRAVENOUS at 00:19

## 2021-07-17 RX ADMIN — BUDESONIDE 500 MCG: 0.5 SUSPENSION RESPIRATORY (INHALATION) at 23:48

## 2021-07-17 RX ADMIN — TRAZODONE HYDROCHLORIDE 25 MG: 50 TABLET ORAL at 21:48

## 2021-07-17 RX ADMIN — RISPERIDONE 0.5 MG: 0.25 TABLET ORAL at 21:48

## 2021-07-17 RX ADMIN — LORAZEPAM 0.5 MG: 0.5 TABLET ORAL at 00:13

## 2021-07-17 RX ADMIN — LORAZEPAM 0.5 MG: 0.5 TABLET ORAL at 15:54

## 2021-07-17 RX ADMIN — Medication 3 MG: at 21:49

## 2021-07-17 RX ADMIN — APIXABAN 2.5 MG: 2.5 TABLET, FILM COATED ORAL at 21:48

## 2021-07-17 RX ADMIN — ARFORMOTEROL TARTRATE 15 MCG: 15 SOLUTION RESPIRATORY (INHALATION) at 23:44

## 2021-07-17 RX ADMIN — IPRATROPIUM BROMIDE AND ALBUTEROL 1 PUFF: 20; 100 SPRAY, METERED RESPIRATORY (INHALATION) at 16:47

## 2021-07-17 ASSESSMENT — PAIN SCALES - GENERAL
PAINLEVEL_OUTOF10: 0

## 2021-07-17 NOTE — PROGRESS NOTES
Hospitalist Progress Note      PCP: Anu Cook MD    Date of Admission: 7/16/2021    Chief Complaint: SOB    Hospital Course: Admitted for acute hypoxic respiratory failure secondary to COPD exacerbation. Required BiPAP. On 3 L nasal cannula. Continue inhalers, breathing treatment, azithromycin and steroids. Subjective: Patient states that her breathing is better than yesterday but she is still getting short of breath with ambulation. Denies chest pain, nausea or vomiting. Medications:  Reviewed    Infusion Medications    sodium chloride       Scheduled Medications    apixaban  2.5 mg Oral BID    hydrOXYzine  25 mg Oral BID    letrozole  2.5 mg Oral Daily    melatonin  3 mg Oral Nightly    metoprolol tartrate  50 mg Oral BID    risperiDONE  0.5 mg Oral BID    traZODone  25 mg Oral Nightly    sodium chloride flush  5-40 mL Intravenous 2 times per day    methylPREDNISolone  40 mg Intravenous Q8H    azithromycin  500 mg Intravenous Q24H    albuterol-ipratropium  1 puff Inhalation Q4H WA    nicotine  1 patch Transdermal Daily     PRN Meds: albuterol, LORazepam, sodium chloride flush, sodium chloride, ondansetron **OR** ondansetron, polyethylene glycol, acetaminophen **OR** acetaminophen      Intake/Output Summary (Last 24 hours) at 7/17/2021 0835  Last data filed at 7/16/2021 2230  Gross per 24 hour   Intake 615 ml   Output 350 ml   Net 265 ml       Physical Exam Performed:    /79   Pulse 70   Temp 97.3 °F (36.3 °C) (Axillary)   Resp 18   Ht 5' 8\" (1.727 m)   Wt 262 lb (118.8 kg)   LMP 03/09/2014   SpO2 100%   BMI 39.84 kg/m²     General appearance:  Appears stated age and cooperative. Morbidly obese. Mild distress. On BiPAP  HEENT:  Normal cephalic, atraumatic without obvious deformity. Pupils equal, round, and reactive to light. Extra ocular muscles intact. Conjunctivae/corneas clear. Neck: Supple, with full range of motion. No jugular venous distention.  Trachea midline. Respiratory:  Normal respiratory effort. Decreased breath sound bilaterally. No wheezing noted  Cardiovascular:  Regular rate, normal rhythm with normal S1/S2 without murmurs, rubs or gallops. Abdomen: Soft, non-tender, non-distended with normal bowel sounds. Musculoskeletal:  No clubbing, cyanosis or edema bilaterally. Full range of motion without deformity. Skin: Skin color, texture, turgor normal.  No rashes or lesions. Neurologic:  Neurovascularly intact without any focal sensory/motor deficits. Cranial nerves: II-XII intact, grossly non-focal.  Psychiatric:  Alert and oriented, thought content appropriate, normal insight  Capillary Refill: Brisk,< 3 seconds   Peripheral Pulses: +2 palpable, equal bilaterally       Labs:   Recent Labs     07/16/21  0850 07/17/21  0525   WBC 7.6 7.5   HGB 10.3* 9.8*   HCT 31.2* 30.7*    262     Recent Labs     07/16/21  0850      K 4.2      CO2 22   BUN 34*   CREATININE 1.6*   CALCIUM 10.1     No results for input(s): AST, ALT, BILIDIR, BILITOT, ALKPHOS in the last 72 hours. No results for input(s): INR in the last 72 hours. Recent Labs     07/16/21  0850   TROPONINI <0.01       Urinalysis:      Lab Results   Component Value Date    NITRU POSITIVE 04/30/2021    WBCUA 6-9 04/30/2021    BACTERIA 3+ 04/30/2021    RBCUA >100 04/30/2021    BLOODU LARGE 04/30/2021    SPECGRAV 1.015 04/30/2021    GLUCOSEU 100 04/30/2021       Radiology:  CT CHEST WO CONTRAST   Final Result   Impression:       1. No acute cardiopulmonary abnormality. Specifically, no lung consolidation or opacity. 2. Moderate upper lobe predominant paraseptal emphysema. 3. Stable malpositioned right chest port with tip traveling retrograde into the right axillary vein. XR CHEST PORTABLE   Final Result      Right power port with tip within the subclavian vein as described present on previous exams dating back to CT March 5, 2020.  Typically the catheter should be located within the SVC. Streaky bilateral lower lobe atelectasis      No lobar consolidation      Limited evaluation due to underpenetration. Assessment/Plan:    Acute hypoxic respiratory failure secondary to COPD exacerbation:  Patient was in respiratory distress. Was tachycardic, tachypneic with increased work of breathing and oxygen saturation in 90s  Was placed on 4 L of oxygen and then transition to BiPAP due to work of breathing  Down to 3 L nasal cannula now  Monitor sats, wean oxygen as tolerated to keep sats more than 90%  As needed BiPAP  Pulmonology following     COPD exacerbation:  Received Solu-Medrol 125 mcg and azithromycin in the ED  Continue Solu-Medrol 40 mg 3 times daily  Continue azithromycin  Continue breathing treatment and inhaler     FSGS:  Status post nephrectomy  On prednisone 10 mg daily  Holding for now. Steroids as above  Holding valsartan  Monitor creatinine  We will consult nephrology if needed     History of PE:  Continue Eliquis     Hypertension:  Monitor blood pressure  Continue metoprolol     History of breast cancer:  Status post left mastectomy  Continue letrozole     DVT Prophylaxis: Eliquis  Diet: ADULT DIET; Regular;  Low Sodium (2 gm)  Code Status: Full Code    PT/OT Eval Status: Ordered    Dispo -pending respiratory improvement, pulmonology recs, PT OT evneto Mena MD

## 2021-07-17 NOTE — PROGRESS NOTES
The list of medications that the patient is currently taking is now complete.    The medications in the list have been verified with [facility] medication list.    Medications added:    albuterol sulfate HFA (VENTOLIN HFA) 108 (90 Base) MCG/ACT inhaler: Inhale 2 puffs as needed every 4 hours PRN shortness of breath/COPD symptoms       Thank you  Tequila Elkins, [facility] 7/16/2021, 8:41 PM

## 2021-07-17 NOTE — PLAN OF CARE
Problem: Falls - Risk of:  Goal: Will remain free from falls  Description: Will remain free from falls  Note: Free from falls at this time. All precautions remain in place. Dome light illuminated for fall risk. Call light and assistive devices within reach. Will monitor. Kevyn Dumas       Problem: Pain:  Goal: Patient's pain/discomfort is manageable  Description: Patient's pain/discomfort is manageable  Note: Free from pain at this time. Aware of availability of meds and knowledge of pain scale. Will monitor. Kevyn Dumas       Problem: Breathing Pattern - Ineffective:  Goal: Ability to achieve and maintain a regular respiratory rate will improve  Description: Ability to achieve and maintain a regular respiratory rate will improve  Note: Pt remains on Bipap for increased work of breathing. Pt continues to desaturate when taken off to eat and/or use bedside commode. Will continue vital signs closely. Medications administered per order.  Kevyn Dumas RN

## 2021-07-17 NOTE — PROGRESS NOTES
Pulmonary & Critical Care Medicine    Admit Date: 2021  PCP: Ankit Salinas MD    CC:  COPD  Events of Last 24 hours:   No major evens overnight. There is significant improvement in O2 requirement. At the time of evaluation she was off BiPAP, on 2 liters O2 with sats of 98%  She is lying flat. She is still complaining of SOB, however she feels better. There is no fever or chills. Vitals:  Tmax:  VITALS:  /69   Pulse 76   Temp 97.6 °F (36.4 °C) (Oral)   Resp 24   Ht 5' 8\" (1.727 m)   Wt 262 lb (118.8 kg)   LMP 2014   SpO2 95%   BMI 39.84 kg/m²   24HR INTAKE/OUTPUT:      Intake/Output Summary (Last 24 hours) at 2021 1803  Last data filed at 2021 2230  Gross per 24 hour   Intake 240 ml   Output --   Net 240 ml     CURRENT PULSE OXIMETRY:  SpO2: 95 %  24HR PULSE OXIMETRY RANGE:  SpO2  Av.1 %  Min: 95 %  Max: 100 %    EXAM:  General: No distress. Alert. Eyes: PERRL. No sclera icterus. No conjunctival injection. ENT: No discharge. Moist oral mucosa   Neck: Trachea midline. Neck is supple   Resp: No accessory muscle use. There is no wheezing or rales   CV: Regular rate. Regular rhythm. No mumur or rub. No edema. GI: Non-tender. Non-distended. Normal bowel sounds. Neuro: Awake. Speech is clear. Psych: No anxiety or agitation. Medications:    IV:   sodium chloride           Scheduled Meds:   apixaban  2.5 mg Oral BID    hydrOXYzine  25 mg Oral BID    letrozole  2.5 mg Oral Daily    melatonin  3 mg Oral Nightly    metoprolol tartrate  50 mg Oral BID    risperiDONE  0.5 mg Oral BID    traZODone  25 mg Oral Nightly    sodium chloride flush  5-40 mL Intravenous 2 times per day    methylPREDNISolone  40 mg Intravenous Q8H    azithromycin  500 mg Intravenous Q24H    albuterol-ipratropium  1 puff Inhalation Q4H WA    nicotine  1 patch Transdermal Daily         Diet: ADULT DIET; Regular;  Low Sodium (2 gm)     Results:  CBC:   Recent Labs

## 2021-07-18 VITALS
RESPIRATION RATE: 18 BRPM | HEART RATE: 86 BPM | HEIGHT: 68 IN | TEMPERATURE: 98.6 F | WEIGHT: 262 LBS | DIASTOLIC BLOOD PRESSURE: 85 MMHG | BODY MASS INDEX: 39.71 KG/M2 | OXYGEN SATURATION: 97 % | SYSTOLIC BLOOD PRESSURE: 134 MMHG

## 2021-07-18 LAB
ANION GAP SERPL CALCULATED.3IONS-SCNC: 7 MMOL/L (ref 3–16)
BASOPHILS ABSOLUTE: 0 K/UL (ref 0–0.2)
BASOPHILS RELATIVE PERCENT: 0.1 %
BUN BLDV-MCNC: 50 MG/DL (ref 7–20)
CALCIUM SERPL-MCNC: 9.6 MG/DL (ref 8.3–10.6)
CHLORIDE BLD-SCNC: 106 MMOL/L (ref 99–110)
CO2: 25 MMOL/L (ref 21–32)
CREAT SERPL-MCNC: 1.6 MG/DL (ref 0.6–1.1)
EKG ATRIAL RATE: 49 BPM
EKG DIAGNOSIS: NORMAL
EKG P AXIS: 35 DEGREES
EKG P-R INTERVAL: 164 MS
EKG Q-T INTERVAL: 462 MS
EKG QRS DURATION: 90 MS
EKG QTC CALCULATION (BAZETT): 417 MS
EKG R AXIS: 68 DEGREES
EKG T AXIS: 70 DEGREES
EKG VENTRICULAR RATE: 49 BPM
EOSINOPHILS ABSOLUTE: 0 K/UL (ref 0–0.6)
EOSINOPHILS RELATIVE PERCENT: 0 %
GFR AFRICAN AMERICAN: 40
GFR NON-AFRICAN AMERICAN: 33
GLUCOSE BLD-MCNC: 133 MG/DL (ref 70–99)
GLUCOSE BLD-MCNC: 143 MG/DL (ref 70–99)
GLUCOSE BLD-MCNC: 146 MG/DL (ref 70–99)
GLUCOSE BLD-MCNC: 149 MG/DL (ref 70–99)
GLUCOSE BLD-MCNC: 188 MG/DL (ref 70–99)
GLUCOSE BLD-MCNC: 208 MG/DL (ref 70–99)
HCT VFR BLD CALC: 30.4 % (ref 36–48)
HEMOGLOBIN: 9.6 G/DL (ref 12–16)
LYMPHOCYTES ABSOLUTE: 1.1 K/UL (ref 1–5.1)
LYMPHOCYTES RELATIVE PERCENT: 7.2 %
MCH RBC QN AUTO: 29.1 PG (ref 26–34)
MCHC RBC AUTO-ENTMCNC: 31.7 G/DL (ref 31–36)
MCV RBC AUTO: 92 FL (ref 80–100)
MONOCYTES ABSOLUTE: 1 K/UL (ref 0–1.3)
MONOCYTES RELATIVE PERCENT: 6.7 %
NEUTROPHILS ABSOLUTE: 13.2 K/UL (ref 1.7–7.7)
NEUTROPHILS RELATIVE PERCENT: 86 %
PDW BLD-RTO: 16.6 % (ref 12.4–15.4)
PERFORMED ON: ABNORMAL
PLATELET # BLD: 242 K/UL (ref 135–450)
PMV BLD AUTO: 7.3 FL (ref 5–10.5)
POTASSIUM REFLEX MAGNESIUM: 5.7 MMOL/L (ref 3.5–5.1)
POTASSIUM SERPL-SCNC: 5.2 MMOL/L (ref 3.5–5.1)
RBC # BLD: 3.3 M/UL (ref 4–5.2)
SODIUM BLD-SCNC: 138 MMOL/L (ref 136–145)
TROPONIN: <0.01 NG/ML
WBC # BLD: 15.3 K/UL (ref 4–11)

## 2021-07-18 PROCEDURE — 2500000003 HC RX 250 WO HCPCS: Performed by: INTERNAL MEDICINE

## 2021-07-18 PROCEDURE — 93005 ELECTROCARDIOGRAM TRACING: CPT | Performed by: INTERNAL MEDICINE

## 2021-07-18 PROCEDURE — 84484 ASSAY OF TROPONIN QUANT: CPT

## 2021-07-18 PROCEDURE — 2700000000 HC OXYGEN THERAPY PER DAY

## 2021-07-18 PROCEDURE — 6370000000 HC RX 637 (ALT 250 FOR IP): Performed by: INTERNAL MEDICINE

## 2021-07-18 PROCEDURE — 2580000003 HC RX 258: Performed by: INTERNAL MEDICINE

## 2021-07-18 PROCEDURE — G0378 HOSPITAL OBSERVATION PER HR: HCPCS

## 2021-07-18 PROCEDURE — 93010 ELECTROCARDIOGRAM REPORT: CPT | Performed by: INTERNAL MEDICINE

## 2021-07-18 PROCEDURE — 85025 COMPLETE CBC W/AUTO DIFF WBC: CPT

## 2021-07-18 PROCEDURE — 96366 THER/PROPH/DIAG IV INF ADDON: CPT

## 2021-07-18 PROCEDURE — 94660 CPAP INITIATION&MGMT: CPT

## 2021-07-18 PROCEDURE — 84132 ASSAY OF SERUM POTASSIUM: CPT

## 2021-07-18 PROCEDURE — 6360000002 HC RX W HCPCS: Performed by: INTERNAL MEDICINE

## 2021-07-18 PROCEDURE — 36415 COLL VENOUS BLD VENIPUNCTURE: CPT

## 2021-07-18 PROCEDURE — 94761 N-INVAS EAR/PLS OXIMETRY MLT: CPT

## 2021-07-18 PROCEDURE — 94640 AIRWAY INHALATION TREATMENT: CPT

## 2021-07-18 PROCEDURE — 80048 BASIC METABOLIC PNL TOTAL CA: CPT

## 2021-07-18 RX ORDER — DEXTROSE MONOHYDRATE 25 G/50ML
12.5 INJECTION, SOLUTION INTRAVENOUS PRN
Status: DISCONTINUED | OUTPATIENT
Start: 2021-07-18 | End: 2021-07-18 | Stop reason: HOSPADM

## 2021-07-18 RX ORDER — DEXTROSE MONOHYDRATE 50 MG/ML
100 INJECTION, SOLUTION INTRAVENOUS PRN
Status: DISCONTINUED | OUTPATIENT
Start: 2021-07-18 | End: 2021-07-18 | Stop reason: HOSPADM

## 2021-07-18 RX ORDER — PREDNISONE 10 MG/1
10 TABLET ORAL DAILY
Qty: 30 TABLET | Refills: 5 | Status: SHIPPED | OUTPATIENT
Start: 2021-07-21 | End: 2021-09-02 | Stop reason: SDUPTHER

## 2021-07-18 RX ORDER — HEPARIN SODIUM (PORCINE) LOCK FLUSH IV SOLN 100 UNIT/ML 100 UNIT/ML
100 SOLUTION INTRAVENOUS PRN
Status: DISCONTINUED | OUTPATIENT
Start: 2021-07-18 | End: 2021-07-18 | Stop reason: HOSPADM

## 2021-07-18 RX ORDER — PREDNISONE 20 MG/1
40 TABLET ORAL DAILY
Qty: 4 TABLET | Refills: 0 | Status: SHIPPED | OUTPATIENT
Start: 2021-07-19 | End: 2021-07-21

## 2021-07-18 RX ORDER — AZITHROMYCIN 500 MG/1
500 TABLET, FILM COATED ORAL DAILY
Qty: 2 TABLET | Refills: 0 | Status: SHIPPED | OUTPATIENT
Start: 2021-07-19 | End: 2021-07-21

## 2021-07-18 RX ORDER — NICOTINE 21 MG/24HR
1 PATCH, TRANSDERMAL 24 HOURS TRANSDERMAL DAILY
Qty: 30 PATCH | Refills: 3 | Status: ON HOLD | OUTPATIENT
Start: 2021-07-19 | End: 2021-11-30

## 2021-07-18 RX ORDER — DEXTROSE MONOHYDRATE 25 G/50ML
25 INJECTION, SOLUTION INTRAVENOUS ONCE
Status: COMPLETED | OUTPATIENT
Start: 2021-07-18 | End: 2021-07-18

## 2021-07-18 RX ORDER — NICOTINE POLACRILEX 4 MG
15 LOZENGE BUCCAL PRN
Status: DISCONTINUED | OUTPATIENT
Start: 2021-07-18 | End: 2021-07-18 | Stop reason: HOSPADM

## 2021-07-18 RX ADMIN — BUDESONIDE 500 MCG: 0.5 SUSPENSION RESPIRATORY (INHALATION) at 08:03

## 2021-07-18 RX ADMIN — HEPARIN SODIUM (PORCINE) LOCK FLUSH IV SOLN 100 UNIT/ML 100 UNITS: 100 SOLUTION at 14:37

## 2021-07-18 RX ADMIN — HYDROXYZINE HYDROCHLORIDE 25 MG: 25 TABLET ORAL at 09:16

## 2021-07-18 RX ADMIN — METOPROLOL TARTRATE 50 MG: 50 TABLET, FILM COATED ORAL at 09:16

## 2021-07-18 RX ADMIN — ARFORMOTEROL TARTRATE 15 MCG: 15 SOLUTION RESPIRATORY (INHALATION) at 08:03

## 2021-07-18 RX ADMIN — Medication 10 ML: at 09:17

## 2021-07-18 RX ADMIN — APIXABAN 2.5 MG: 2.5 TABLET, FILM COATED ORAL at 09:16

## 2021-07-18 RX ADMIN — LETROZOLE 2.5 MG: 2.5 TABLET ORAL at 09:21

## 2021-07-18 RX ADMIN — RISPERIDONE 0.5 MG: 0.25 TABLET ORAL at 09:16

## 2021-07-18 RX ADMIN — IPRATROPIUM BROMIDE AND ALBUTEROL 1 PUFF: 20; 100 SPRAY, METERED RESPIRATORY (INHALATION) at 08:04

## 2021-07-18 RX ADMIN — AZITHROMYCIN MONOHYDRATE 500 MG: 500 INJECTION, POWDER, LYOPHILIZED, FOR SOLUTION INTRAVENOUS at 00:11

## 2021-07-18 RX ADMIN — DEXTROSE MONOHYDRATE 25 G: 25 INJECTION, SOLUTION INTRAVENOUS at 10:08

## 2021-07-18 RX ADMIN — LORAZEPAM 0.5 MG: 0.5 TABLET ORAL at 06:32

## 2021-07-18 RX ADMIN — IPRATROPIUM BROMIDE AND ALBUTEROL 1 PUFF: 20; 100 SPRAY, METERED RESPIRATORY (INHALATION) at 12:43

## 2021-07-18 RX ADMIN — PREDNISONE 40 MG: 20 TABLET ORAL at 09:16

## 2021-07-18 RX ADMIN — IPRATROPIUM BROMIDE AND ALBUTEROL 1 PUFF: 20; 100 SPRAY, METERED RESPIRATORY (INHALATION) at 16:15

## 2021-07-18 RX ADMIN — SODIUM ZIRCONIUM CYCLOSILICATE 5 G: 5 POWDER, FOR SUSPENSION ORAL at 10:09

## 2021-07-18 RX ADMIN — INSULIN HUMAN 10 UNITS: 100 INJECTION, SOLUTION PARENTERAL at 10:08

## 2021-07-18 ASSESSMENT — PAIN SCALES - GENERAL
PAINLEVEL_OUTOF10: 0

## 2021-07-18 NOTE — DISCHARGE INSTR - COC
Continuity of Care Form    Patient Name: John Yusuf   :  1960  MRN:  [id number]    Admit date:  2021  Discharge date:  21    Code Status Order: Full Code   Advance Directives:      Admitting Physician:  Yee Silveira MD  PCP: Aristeo Vo MD    Discharging Nurse: [facility] Unit/Room#: 4542/5978-50  Discharging Unit Phone Number: [phone]    Emergency Contact:   Extended Emergency Contact Information  Primary Emergency Contact: devante españa  Home Phone: [phone]  Work Phone: [phone]  Mobile Phone: [phone]  Relation: Brother/Sister   needed? No  Secondary Emergency Contact: brad oneill  Home Phone: [phone]  Mobile Phone: [phone]  Relation: Child  Preferred language: English   needed?  Yes    Past Surgical History:  Past Surgical History:   Procedure Laterality Date    APPENDECTOMY      CHOLECYSTECTOMY      CT BIOPSY RENAL  2020    CT BIOPSY RENAL 2020 [facility] CT SCAN    MASTECTOMY Left 2019    LEFT MODIFIED RADICAL MASTECTOMY; EXPAREL INTERCOSTAL BLOCK performed by Kelly Downing MD at [address] [place] [address] Bypass Left 2019    COMPLEX CLOSURE OF LEFT BREAST, FULL THICKNESS SKIN GRAFT LEFT BREAST 4x3 performed by Julius Rubio MD at [place] [address] Left     TUBAL LIGATION      TUNNELED VENOUS PORT PLACEMENT  2019    still in    [address] [place] [address] LEFT  2020    US BREAST BIOPSY NEEDLE ADDITIONAL LEFT 2020 [place], MD [facility] ULTRASOUND       Immunization History:   Immunization History   Administered Date(s) Administered    Influenza Virus Vaccine 2014    Pneumococcal Polysaccharide ([id number]) 2014       Active Problems:  Patient Active Problem List   Diagnosis Code    Malignant neoplasm of upper-inner quadrant of left breast in female, estrogen receptor positive ([facility] C50.212, Z17.0    Breast wound, left, sequela S21.002S    Bilateral pulmonary embolism ([facility]) I26.99    Acute respiratory failure with hypoxia ([facility]) J96.01    Acute deep vein thrombosis (DVT) of lower extremity ([facility]) I82.409    Centrilobular emphysema ([facility]) J43.2    Hypoxemia R09.02    Pneumonia due to infectious organism J18.9    Nephrotic syndrome N04.9    CHANTELLE (acute kidney injury) ([facility] N17.9    Chest pain R07.9    Hematuria R31.9    COPD exacerbation ([facility]) J44.1       Isolation/Infection:   Isolation            C Diff Contact          Patient Infection Status       Infection Onset Added Last Indicated Last Indicated By Review Planned Expiration Resolved Resolved By    C-diff Rule Out 07/16/21 07/16/21 07/16/21 Clostridium difficile toxin/antigen (Ordered)        Resolved    COVID-19 Rule Out 07/16/21 07/16/21 07/16/21 COVID-19 (Ordered)   07/17/21 Rule-Out Test Resulted    COVID-19 Rule Out 09/22/20 09/22/20 09/22/20 COVID-19 (Ordered)   09/24/20 Rule-Out Test Resulted            Nurse Assessment:  Last Vital Signs: /79   Pulse 74   Temp 97.7 °F (36.5 °C) (Oral)   Resp 18   Ht 5' 8\" (1.727 m)   Wt 262 lb (118.8 kg)   LMP 03/09/2014   SpO2 98%   BMI 39.84 kg/m²     Last documented pain score (0-10 scale): Pain Level: 0  Last Weight:   Wt Readings from Last 1 Encounters:   07/16/21 262 lb (118.8 kg)     Mental Status:  oriented and alert    IV Access:  - Right chest port,not accessed. Nursing Mobility/ADLs:  Walking   Assisted  Transfer  Assisted  Bathing  Assisted  Dressing  Assisted  Toileting  Assisted  Feeding  Independent  Med Admin  Assisted  Med Delivery   whole    Wound Care Documentation and Therapy:  Wound 09/25/20 Back Lateral;Lower;Right (Active)   Number of days: 295        Elimination:  Continence:   · Bowel:  Yes  · Bladder: Yes  Urinary Catheter: None   Colostomy/Ileostomy/Ileal Conduit: No       Date of Last BM: ***    Intake/Output Summary (Last 24 hours) at 7/18/2021 1145  Last data filed at 7/18/2021 1040  Gross per 24 hour Intake 360 ml   Output 500 ml   Net -140 ml     I/O last 3 completed shifts: In: 120 [P.O.:120]  Out: -     Safety Concerns: At Risk for Falls    Impairments/Disabilities:      SOB and weakness    Nutrition Therapy:  Current Nutrition Therapy:   - Oral Diet:  Low Sodium (2gm)    Routes of Feeding: Oral  Liquids: Thin Liquids  Daily Fluid Restriction: no  Last Modified Barium Swallow with Video (Video Swallowing Test): not done    Treatments at the Time of Hospital Discharge:   Respiratory Treatments: see med rec  Oxygen Therapy:  is on oxygen at 3 L/min per nasal cannula. Ventilator:    - No ventilator support    Rehab Therapies: Physical Therapy and Occupational Therapy  Weight Bearing Status/Restrictions: No weight bearing restirctions  Other Medical Equipment (for information only, NOT a DME order):  wheelchair, walker, bedside commode and hospital bed  Other Treatments: ***    Patient's personal belongings (please select all that are sent with patient):  cell phone    RN SIGNATURE:  Electronically signed by Tarah Quintana RN on 7/18/21 at 2:19 PM EDT    CASE MANAGEMENT/SOCIAL WORK SECTION    Inpatient Status Date: ***    Readmission Risk Assessment Score:  Readmission Risk              Risk of Unplanned Readmission:  27           Discharging to Facility/ [address] [place] [address] Details  [address] [facility], [facility] [place] [address]       Phone: [phone]       Fax: [phone]          / signature: Electronically signed by Caio Alicia RN on 7/18/21 at 11:59 AM EDT    PHYSICIAN SECTION    Prognosis: Fair    Condition at Discharge: Stable    Rehab Potential (if transferring to Rehab): NA    Recommended Labs or Other Treatments After Discharge: Monitor blood pressure and BMP. Valsartan discontinued due to low normal blood pressure and hyperkalemia. May resume if needed. Follow up with nephrology. Complete antibiotic and prednisone 40 mg course.  Resume prednisone 10 mg daily on 07/21/21. Continue oxygen, wean as tolerated to keep sats >88%. Likely needs to be on oxygen for underlying COPD. Schedule an appointment with Pulmonology. Follow up with PCP. Physician Certification: I certify the above information and transfer of Lang Power  is necessary for the continuing treatment of the diagnosis listed and that she requires [place] for less 30 days.      Update Admission H&P: No change in H&P    PHYSICIAN SIGNATURE:  Electronically signed by Julianna Ni MD on 7/18/21 at 11:46 AM EDT

## 2021-07-18 NOTE — PROGRESS NOTES
4 Eyes Admission Assessment     I agree as the admission nurse that 2 RN's have performed a thorough Head to Toe Skin Assessment on the patient. ALL assessment sites listed below have been assessed on admission. Areas assessed by both nurses:   [x]   Head, Face, and Ears   [x]   Shoulders, Back, and Chest  [x]   Arms, Elbows, and Hands   [x]   Coccyx, Sacrum, and Ischium  [x]   Legs, Feet, and Heels        Does the Patient have Skin Breakdown?   No         Manan Prevention initiated:  No   Wound Care Orders initiated:  No      [facility] nurse consulted for Pressure Injury (Stage 3,4, Unstageable, DTI, NWPT, and Complex wounds) or Manan score 18 or lower:  No      Nurse 1 eSignature: Electronically signed by Padma Beatty RN on 7/18/21 at 3:00 AM EDT    **SHARE this note so that the co-signing nurse is able to place an eSignature**    Nurse 2 eSignature: Electronically signed by Ivette Izquierdo RN on 7/18/21 at 3:02 AM EDT

## 2021-07-18 NOTE — PLAN OF CARE
Pulse ox in the 90's on 3L of oxygen at rest, and down to 83% just to pivot to the commode. Audible wheezing with activity. Dr. Erickson Herd aware.

## 2021-07-18 NOTE — PROGRESS NOTES
Pt transferred to room 63. Pt AxOx4 and VSS. Pt oriented to room and to call light. Pt skin assessment complete. Pt instructed to call for any further needs. Pt verbalized understanding. Will continue to monitor.

## 2021-07-18 NOTE — CARE COORDINATION
Case Management Assessment            Discharge Note                    Date / Time of Note: 7/18/2021 12:06 PM                  Discharge Note Completed by: Veronika Mills RN    Patient Name: Yumiko Starkey   YOB: 1960  Diagnosis: COPD exacerbation ([facility] [J44.1]   Date / Time: 7/16/2021  8:24 AM    Current PCP: Moe Pineda MD  Clinic patient: No    Hospitalization in the last 30 days: No    Advance Directives:  Code Status: Full Code  [place] DNR form completed and on chart: Not Indicated    Financial:  Payor: Trena Greene / Plan: Sunday Thomas / Product Type: *No Product type* /      Pharmacy:    [facility] - [place], [address] [place] [address]  Phone: [phone] Fax: [facility] [address]  [phone] - F [phone]  [address] [place] [address]  Phone: [phone] Fax: [address] [phone] - F [phone]  [facility] [address]  Phone: [phone] Fax: [phone]    [id number] [address] [phone] - F [phone]  [facility] [address]  Phone: [phone] Fax: [phone]      Assistance purchasing medications?: Potential Assistance Purchasing Medications: No  Assistance provided by Case Management: None at this time    Does patient want to participate in local refill/ meds to beds program?: No    Meds To Beds General Rules:  1. Can ONLY be done Monday- Friday between 8:30am-5pm  2. Prescription(s) must be in pharmacy by 3pm to be filled same day  3. Copy of patient's insurance/ prescription drug card and patient face sheet must be sent along with the prescription(s)  4. Cost of Rx cannot be added to hospital bill.  If financial assistance is needed, please contact unit  or ;  or  CANNOT provide pharmacy voucher for patients co-pays  5. Patients can then  the prescription on their way out of the hospital at discharge, or pharmacy can deliver to the bedside if staff is available. (payment due at time of pick-up or delivery - cash, check, or card accepted)     Able to afford home medications/ co-pay costs: Yes    ADLS:  Current PT AM-PAC Score:   /24  Current OT AM-PAC Score:   /24      DISCHARGE Disposition: [facility] (LTC): [place] [address].  Phone: [phone]  Fax: [phone]    LOC at discharge: [address] Completed: Yes    Notification completed in HENS/PAS?:  Not Applicable    IMM Completed:       Transportation:  Transportation PLAN for discharge: EMS transportation   Mode of Transport: Ambulance stretcher - BLS  Reason for medical transport: Bed confined: Meets the following criteria 1) unable to get out of bed without assistance or ambulate, 2) unable to safely sit up in a wheelchair, 3) unable to maintain erect seating position in a chair for time needed for transport  Name of Transport Company: [employer]  Phone: [phone]  Time of Transport: 3p    Transport form completed: Yes        Additional CM Notes: Pt from [address] [place] [address], spoke with Bharathi Skaggs [phone] they can take pt back no precert. [facility] care to transport at [facility] [address] to call report to [phone] orders faxed to [phone]    The Plan for Transition of Care is related to the following treatment goals of COPD exacerbation ([place] [address] [J44.1]    The Patient and/or patient representative Shoaib and her family were provided with a choice of provider and agrees with the discharge plan Yes    Freedom of choice list was provided with basic dialogue that supports the patient's individualized plan of care/goals and shares the quality data associated with the providers.  Yes    Care Transitions patient: Hilda [employer], [facility]  Case Management Department  Ph: [phone]  Fax: [phone]

## 2021-07-18 NOTE — DISCHARGE SUMMARY
Hospital Medicine Discharge Summary    Patient ID: Jean-Paul Vang      Patient's PCP: Macey Lam MD    Admit Date: 7/16/2021     Discharge Date:   07/18/21    Admitting Physician: Marysol Pabon MD     Discharge Physician: Marysol Pabon MD     Discharge Diagnoses: Active Hospital Problems    Diagnosis Date Noted    COPD exacerbation ([facility] [J44.1] 07/16/2021       The patient was seen and examined on day of discharge and this discharge summary is in conjunction with any daily progress note from day of discharge. Hospital Course:   Patient was admitted and treated for following:    Acute hypoxic respiratory failure secondary to COPD exacerbation:  Patient was in respiratory distress.  Was tachycardic, tachypneic with increased work of breathing and oxygen saturation in 90s on admission. Was placed on 4 L of oxygen and then transitioned to BiPAP due to work of breathing. Pulmonology was consulted. Sats were monitored and patient was able to be transitioned from BiPAP to nasal cannula. Patient oxygen saturation remained stable on 3 L of nasal cannula. Patient to continue oxygen on discharge and likely needs it from her COPD perspective. Patient is being discharged in stable condition with recommendation to follow-up with pulmonologist.     COPD exacerbation:  Patient received Solu-Medrol 125 mcg and azithromycin in the ED. patient was continued on Solu-Medrol and azithromycin. Breathing treatment were continued. Patient to complete steroid and azithromycin course on discharge.     FSGS/upper kalemia:  Status post nephrectomy. On prednisone 10 mg.  Prednisone 10 mg was held due to administration of steroids as above. Valsartan was held. Creatinine was monitored. Valsartan discontinued on discharge due to low normal blood pressure and hyperkalemia. Patient was treated for hyperkalemia.   Patient to follow-up with nephrology on discharge.     History of PE:  Eliquis was continued     Hypertension:  Blood pressure was monitored and metoprolol was continued.      History of breast cancer:  Status post left mastectomy. Letrozole was continued    Physical Exam Performed:     /67   Pulse 77   Temp 98.2 °F (36.8 °C) (Oral)   Resp 18   Ht 5' 8\" (1.727 m)   Wt 262 lb (118.8 kg)   LMP 03/09/2014   SpO2 95%   BMI 39.84 kg/m²       General appearance:  No apparent distress, appears stated age and cooperative. Morbidly obese  HEENT:  Normal cephalic, atraumatic without obvious deformity. Pupils equal, round, and reactive to light. Extra ocular muscles intact. Conjunctivae/corneas clear. Neck: Supple, with full range of motion. No jugular venous distention. Trachea midline. Respiratory:  Normal respiratory effort. Decreased breath sounds bilaterally with no wheezing, rales or crackles  Cardiovascular:  Regular rate and rhythm with normal S1/S2 without murmurs, rubs or gallops. Abdomen: Soft, non-tender, non-distended with normal bowel sounds. Musculoskeletal:  No clubbing, cyanosis or edema bilaterally. Full range of motion without deformity. Skin: Skin color, texture, turgor normal.  No rashes or lesions. Neurologic:  Neurovascularly intact without any focal sensory/motor deficits. Cranial nerves: II-XII intact, grossly non-focal.  Psychiatric:  Alert and oriented, thought content appropriate, normal insight  Capillary Refill: Brisk,< 3 seconds   Peripheral Pulses: +2 palpable, equal bilaterally       Labs:  For convenience and continuity at follow-up the following most recent labs are provided:      CBC:    Lab Results   Component Value Date    WBC 15.3 07/18/2021    HGB 9.6 07/18/2021    HCT 30.4 07/18/2021     07/18/2021       Renal:    Lab Results   Component Value Date     07/18/2021    K 5.2 07/18/2021    K 5.7 07/18/2021     07/18/2021    CO2 25 07/18/2021    BUN 50 07/18/2021    CREATININE 1.6 07/18/2021    CALCIUM 9.6 07/18/2021    PHOS 3.1 discuss with provider. Details   ! ! predniSONE (DELTASONE) 10 MG tablet Take 1 tablet by mouth daily  Qty: 30 tablet, Refills: 5       !! - Potential duplicate medications found. Please discuss with provider. Details   albuterol sulfate HFA (VENTOLIN HFA) 108 (90 Base) MCG/ACT inhaler Inhale 2 puffs into the lungs every 4 hours as needed for Wheezing or Shortness of Breath (COPD)      valsartan (DIOVAN) 80 MG tablet Take 1 tablet by mouth daily  Qty: 60 tablet, Refills: 5      apixaban (ELIQUIS) 2.5 MG TABS tablet Take 1 tablet by mouth 2 times daily  Qty: 180 tablet, Refills: 1      mometasone-formoterol (DULERA) 200-5 MCG/ACT inhaler Inhale 2 puffs into the lungs every 12 hours      ipratropium-albuterol (DUONEB) 0.5-2.5 (3) MG/3ML SOLN nebulizer solution Inhale 1 vial into the lungs every 4 hours      risperiDONE (RISPERDAL) 0.5 MG tablet Take 0.5 mg by mouth 2 times daily      metoprolol tartrate (LOPRESSOR) 50 MG tablet Take 50 mg by mouth 2 times daily      temazepam (RESTORIL) 15 MG capsule Take 30 mg by mouth every other day.       hydrOXYzine (ATARAX) 50 MG tablet Take 25 mg by mouth 2 times daily       letrozole (FEMARA) 2.5 MG tablet Take 2.5 mg by mouth daily      Acetaminophen (TYLENOL) 325 MG CAPS Take by mouth      Melatonin 10 MG TABS Take 20 mg by mouth nightly      traZODone (DESYREL) 100 MG tablet Take 100 mg by mouth nightly             Time Spent on discharge is more than 30 minutes in the examination, evaluation, counseling and review of medications and discharge plan. Signed:    Ramy Rachel MD   7/18/2021      Thank you Ramakrishna Shah MD for the opportunity to be involved in this patient's care. If you have any questions or concerns please feel free to contact me at [phone].

## 2021-10-12 ENCOUNTER — HOSPITAL ENCOUNTER (OUTPATIENT)
Dept: MAMMOGRAPHY | Age: 61
Discharge: HOME | End: 2021-10-12
Payer: MEDICAID

## 2021-10-12 VITALS — BODY MASS INDEX: 40.81 KG/M2 | WEIGHT: 260 LBS | HEIGHT: 67 IN

## 2021-10-12 DIAGNOSIS — Z12.39 SCREENING BREAST EXAMINATION: ICD-10-CM

## 2021-10-12 PROCEDURE — 77063 BREAST TOMOSYNTHESIS BI: CPT

## 2021-10-13 ENCOUNTER — OFFICE VISIT (OUTPATIENT)
Dept: SURGERY | Age: 61
End: 2021-10-13
Payer: MEDICAID

## 2021-10-13 VITALS
HEART RATE: 75 BPM | TEMPERATURE: 97.3 F | DIASTOLIC BLOOD PRESSURE: 78 MMHG | SYSTOLIC BLOOD PRESSURE: 134 MMHG | BODY MASS INDEX: 42.38 KG/M2 | RESPIRATION RATE: 18 BRPM | WEIGHT: 270 LBS | HEIGHT: 67 IN | OXYGEN SATURATION: 95 %

## 2021-10-13 DIAGNOSIS — Z12.31 ENCOUNTER FOR SCREENING MAMMOGRAM FOR BREAST CANCER: ICD-10-CM

## 2021-10-13 DIAGNOSIS — M25.612 DECREASED RANGE OF MOTION OF LEFT SHOULDER: ICD-10-CM

## 2021-10-13 DIAGNOSIS — Z85.3 ENCOUNTER FOR FOLLOW-UP SURVEILLANCE OF BREAST CANCER: ICD-10-CM

## 2021-10-13 DIAGNOSIS — Z08 ENCOUNTER FOR FOLLOW-UP SURVEILLANCE OF BREAST CANCER: ICD-10-CM

## 2021-10-13 DIAGNOSIS — Z85.3 PERSONAL HISTORY OF BREAST CANCER: Primary | ICD-10-CM

## 2021-10-13 DIAGNOSIS — I89.0 LYMPHEDEMA OF LEFT UPPER EXTREMITY: ICD-10-CM

## 2021-10-13 PROCEDURE — G8427 DOCREV CUR MEDS BY ELIG CLIN: HCPCS | Performed by: SURGERY

## 2021-10-13 PROCEDURE — G8484 FLU IMMUNIZE NO ADMIN: HCPCS | Performed by: SURGERY

## 2021-10-13 PROCEDURE — G8417 CALC BMI ABV UP PARAM F/U: HCPCS | Performed by: SURGERY

## 2021-10-13 PROCEDURE — 99213 OFFICE O/P EST LOW 20 MIN: CPT | Performed by: SURGERY

## 2021-10-13 PROCEDURE — 3017F COLORECTAL CA SCREEN DOC REV: CPT | Performed by: SURGERY

## 2021-10-13 PROCEDURE — 4004F PT TOBACCO SCREEN RCVD TLK: CPT | Performed by: SURGERY

## 2021-10-13 ASSESSMENT — ENCOUNTER SYMPTOMS
ABDOMINAL PAIN: 0
SHORTNESS OF BREATH: 0
COUGH: 0

## 2021-10-14 NOTE — PROGRESS NOTES
CHIEF COMPLAINT:  Follow-up for left breast cancer    PCP: Rosmery Medeiros MD  Radiation Oncology: Braulio Baltazar MD  Medical Oncology: Sandeep Dunbar MD    Breast Cancer Treatment Summary:  Pathology:  [place], 2.9 cm left grade 3, ER+ OR+ HER2+ invasive micropapillary carcinoma, 7 of 18 lymph nodes with evidence of metastasis (focal extranodal extension, largest metastatic deposit 1.5 cm)  Surgery: left modified radical mastectomy by Dr. Modesto Damico on 2019, debridement of and FTSG to left breast by Dr. Madeleine Cruz on 2019  Radiation: left chest wall and regional timur radiation  Systemic Therapy: neoadjuvant chemotherapy with TCHP x 6 cycles, adjuvant Kadcyla (completed 2020), adjuvant Letrozole    Pertinent Family History: Her mother was diagnosed with breast cancer at age 79 and  at age 68. HISTORY OF PRESENT ILLNESS:  Estella Boeck is a 63 y.o. woman who is now 2 years status-post left modified radical mastectomy for a left invasive ductal carcinoma. She completed neoadjuvant chemotherapy with TCHPx  cycles with a partial clinical response but with minimal if any pathologic response. Following her surgical therapy, she was treated with adjuvant radiation therapy and adjuvant Kadcyla and endocrine therapy with Letrozole. She is tolerating endocrine therapy well. She returns today for her routine follow up visit and states that she is doing well. She denies any masses along her left chest wall or in her right breast.  She denies any change in the appearance of her breast or the skin of her breast.  She denies nipple discharge. She denies any masses in either axilla. She denies unintentional weight loss, bone pain, headaches and has no other systemic complaints. She lives in a nursing home, as she was previously homeless. She has a history of amphetamine abuse.     Please note that her past medical history, surgical history, medications, allergies, social history, and family history were all reviewed with her again today. REVIEW OF SYSTEMS:   Constitutional: Negative for unexpected weight change. Eyes: Negative for visual disturbance. Respiratory: Negative for cough and shortness of breath. Cardiovascular: Negative for chest pain. Gastrointestinal: Negative for abdominal pain. Musculoskeletal: Negative for arthralgias and myalgias. Neurological: Negative for headaches. Hematological: Negative for adenopathy. Psychiatric/Behavioral: Negative for dysphoric mood. The patient is not nervous/anxious. I personally reviewed and agree with the above ROS as documented by my medical assistant. PHYSICAL EXAMINATION:   Vitals: /78 (Site: Right Upper Arm, Position: Sitting, Cuff Size: Medium Adult)   Pulse 75   Temp 97.3 °F (36.3 °C)   Resp 18   Ht 5' 7\" (1.702 m)   Wt 270 lb (122.5 kg)   LMP 03/09/2014   SpO2 95%   BMI 42.29 kg/m²   General: Well-developed, well-nourished, in no apparent distress. Psychiatric: Alert and oriented x 3. Appropriate affect and behavior for today's visit. Musculoskeletal: Limited range of motion of the left shoulder. Lymphatic System:  No concerning cervical, supraclavicular or axillary lymphadenopathy. There is evidence of lymphedema of the left arm. Breast Exam: Bilateral breast examination reveals a ptotic breast on the right side and surgical absence of the left breast.     Left chest wall is without any masses, nodules, skin changes, or evidence of recurrence. There are no left axillary masses. Right Breast: Examination of the right breast in the upright and supine positions reveal no obvious masses, skin changes, dimpling, or retraction. The nipple and areola are without erosion, edema or ulceration. There is no obvious nipple discharge. There is no concerning axillary adenopathy. IMAGING: I personally reviewed the breast imaging performed from October 2021 and discussed this with the patient.   There is no mammographic evidence of malignancy in the right breast.  She was given a BI-RADS 2. Breast density is described as fibroglandular densities. IMPRESSION/RECOMMENDATION:    History of left breast cancer, [id number], Stage IIIA, IDC, ER+, MS+, HER2+    Francy Scott is a 63 y.o. woman who is now 2 years status-post left modified radical mastectomy for a left invasive ductal carcinoma. She completed neoadjuvant chemotherapy with TCHPx  cycles with a partial clinical response but with minimal if any pathologic response. Following her surgical therapy, she was treated with adjuvant radiation therapy and adjuvant Kadcyla and endocrine therapy with Letrozole. I reassured her that based on her clinical examination and most recent breast imaging that there is no evidence of any recurrent disease or new abnormalities. We reviewed the importance of continued endocrine therapy. Signs and symptoms of recurrence were reviewed. She verbalizes understanding that she should notify our office if she identifies any abnormalities on self examination as it may require further workup. She has developed lymphedema of the left arm. She states that she is awaiting a compression sleeve from the nursing home where she resides. I will place a referral to physical therapy for lymphedema management. I would like to see her back in 1 year at which time we will repeat her clinical exam and right screening mammogram.  She no longer requires routine mammograms on the left side. In the interim, I encouraged her to resume self examinations on a monthly basis and to alert me of any changes. I also encouraged her to eat healthy, stay active, and limit alcohol intake for risk reduction purposes. I answered all of her questions thoroughly, and she does seem pleased with this plan of approach. I encouraged her to contact me in the interim if any new questions or concerns arise.     Summary:  - f/u in 1 year with right screening mammogram  - continue endocrine therapy per medical oncology recommendations  - referral to PT/lymphedema therapy    Liu Camara MD

## 2021-10-15 ENCOUNTER — CLINICAL DOCUMENTATION (OUTPATIENT)
Dept: OTHER | Age: 61
End: 2021-10-15

## 2021-11-28 ENCOUNTER — APPOINTMENT (OUTPATIENT)
Dept: GENERAL RADIOLOGY | Age: 61
DRG: 720 | End: 2021-11-28
Payer: MEDICAID

## 2021-11-28 ENCOUNTER — HOSPITAL ENCOUNTER (INPATIENT)
Age: 61
LOS: 3 days | Discharge: NURSING, REHAB OR LONG-TERM CARE | DRG: 720 | End: 2021-12-01
Attending: STUDENT IN AN ORGANIZED HEALTH CARE EDUCATION/TRAINING PROGRAM | Admitting: STUDENT IN AN ORGANIZED HEALTH CARE EDUCATION/TRAINING PROGRAM
Payer: MEDICAID

## 2021-11-28 DIAGNOSIS — J96.01 ACUTE RESPIRATORY FAILURE WITH HYPOXIA (HCC): Primary | ICD-10-CM

## 2021-11-28 DIAGNOSIS — A41.9 SEPTIC SHOCK (HCC): ICD-10-CM

## 2021-11-28 DIAGNOSIS — R65.21 SEPTIC SHOCK (HCC): ICD-10-CM

## 2021-11-28 PROBLEM — J96.22 ACUTE ON CHRONIC RESPIRATORY FAILURE WITH HYPOXIA AND HYPERCAPNIA (HCC): Status: ACTIVE | Noted: 2019-08-31

## 2021-11-28 PROBLEM — E87.20 METABOLIC ACIDOSIS: Status: ACTIVE | Noted: 2021-11-28

## 2021-11-28 PROBLEM — N17.9 SEPSIS WITH ACUTE RENAL FAILURE AND SEPTIC SHOCK (HCC): Status: ACTIVE | Noted: 2021-11-28

## 2021-11-28 PROBLEM — R09.02 HYPOXIA: Status: ACTIVE | Noted: 2021-11-28

## 2021-11-28 PROBLEM — J96.21 ACUTE ON CHRONIC RESPIRATORY FAILURE WITH HYPOXIA AND HYPERCAPNIA (HCC): Status: ACTIVE | Noted: 2019-08-31

## 2021-11-28 LAB
ALBUMIN SERPL-MCNC: 3.4 G/DL (ref 3.4–5)
ALP BLD-CCNC: 107 U/L (ref 40–129)
ALT SERPL-CCNC: 30 U/L (ref 10–40)
ANION GAP SERPL CALCULATED.3IONS-SCNC: 16 MMOL/L (ref 3–16)
AST SERPL-CCNC: 32 U/L (ref 15–37)
BACTERIA: ABNORMAL /HPF
BASE EXCESS ARTERIAL: -5 (ref -3–3)
BASE EXCESS VENOUS: -4 MMOL/L (ref -2–3)
BASOPHILS ABSOLUTE: 0 K/UL (ref 0–0.2)
BASOPHILS RELATIVE PERCENT: 0.2 %
BILIRUB SERPL-MCNC: 0.4 MG/DL (ref 0–1)
BILIRUBIN DIRECT: <0.2 MG/DL (ref 0–0.3)
BILIRUBIN URINE: ABNORMAL
BILIRUBIN, INDIRECT: NORMAL MG/DL (ref 0–1)
BLOOD, URINE: ABNORMAL
BUN BLDV-MCNC: 40 MG/DL (ref 7–20)
CALCIUM SERPL-MCNC: 9.2 MG/DL (ref 8.3–10.6)
CARBOXYHEMOGLOBIN: 2.5 % (ref 0–1.5)
CHLORIDE BLD-SCNC: 104 MMOL/L (ref 99–110)
CLARITY: ABNORMAL
CO2: 20 MMOL/L (ref 21–32)
COLOR: YELLOW
CREAT SERPL-MCNC: 2.2 MG/DL (ref 0.6–1.1)
EOSINOPHILS ABSOLUTE: 0 K/UL (ref 0–0.6)
EOSINOPHILS RELATIVE PERCENT: 0.1 %
GFR AFRICAN AMERICAN: 28
GFR NON-AFRICAN AMERICAN: 23
GLUCOSE BLD-MCNC: 142 MG/DL (ref 70–99)
GLUCOSE BLD-MCNC: 274 MG/DL (ref 70–99)
GLUCOSE URINE: NEGATIVE MG/DL
HCO3 ARTERIAL: 21.8 MMOL/L (ref 21–29)
HCO3 VENOUS: 22.9 MMOL/L (ref 24–28)
HCT VFR BLD CALC: 37.6 % (ref 36–48)
HEMOGLOBIN, VEN, REDUCED: 14 %
HEMOGLOBIN: 11.7 G/DL (ref 12–16)
INR BLD: 1.3 (ref 0.88–1.12)
KETONES, URINE: NEGATIVE MG/DL
LACTIC ACID, SEPSIS: 1.9 MMOL/L (ref 0.4–1.9)
LACTIC ACID, SEPSIS: 4.6 MMOL/L (ref 0.4–1.9)
LEUKOCYTE ESTERASE, URINE: ABNORMAL
LYMPHOCYTES ABSOLUTE: 1 K/UL (ref 1–5.1)
LYMPHOCYTES RELATIVE PERCENT: 6 %
MCH RBC QN AUTO: 27.6 PG (ref 26–34)
MCHC RBC AUTO-ENTMCNC: 31.1 G/DL (ref 31–36)
MCV RBC AUTO: 88.9 FL (ref 80–100)
METHEMOGLOBIN VENOUS: 0 % (ref 0–1.5)
MICROSCOPIC EXAMINATION: YES
MONOCYTES ABSOLUTE: 0.9 K/UL (ref 0–1.3)
MONOCYTES RELATIVE PERCENT: 5.5 %
NEUTROPHILS ABSOLUTE: 14.8 K/UL (ref 1.7–7.7)
NEUTROPHILS RELATIVE PERCENT: 88.2 %
NITRITE, URINE: NEGATIVE
O2 SAT, ARTERIAL: 99 % (ref 93–100)
O2 SAT, VEN: 86 %
PCO2 ARTERIAL: 47.9 MM HG (ref 35–45)
PCO2, VEN: 48.2 MMHG (ref 41–51)
PDW BLD-RTO: 19.5 % (ref 12.4–15.4)
PERFORMED ON: ABNORMAL
PH ARTERIAL: 7.27 (ref 7.35–7.45)
PH UA: 6.5 (ref 5–8)
PH VENOUS: 7.29 (ref 7.35–7.45)
PLATELET # BLD: 190 K/UL (ref 135–450)
PMV BLD AUTO: 8.1 FL (ref 5–10.5)
PO2 ARTERIAL: 169.4 MM HG (ref 75–108)
PO2, VEN: 55.5 MMHG (ref 25–40)
POC SAMPLE TYPE: ABNORMAL
POTASSIUM SERPL-SCNC: 4.2 MMOL/L (ref 3.5–5.1)
PRO-BNP: 4893 PG/ML (ref 0–124)
PROTEIN UA: >=300 MG/DL
PROTHROMBIN TIME: 14.8 SEC (ref 9.9–12.7)
RAPID INFLUENZA  B AGN: NEGATIVE
RAPID INFLUENZA A AGN: NEGATIVE
RBC # BLD: 4.23 M/UL (ref 4–5.2)
RBC UA: ABNORMAL /HPF (ref 0–4)
SODIUM BLD-SCNC: 140 MMOL/L (ref 136–145)
SPECIFIC GRAVITY UA: 1.02 (ref 1–1.03)
TCO2 ARTERIAL: 23 MMOL/L
TCO2 CALC VENOUS: 24 MMOL/L
TOTAL PROTEIN: 6.9 G/DL (ref 6.4–8.2)
TROPONIN: 0.02 NG/ML
TROPONIN: 0.03 NG/ML
TROPONIN: 0.05 NG/ML
URINE REFLEX TO CULTURE: YES
URINE TYPE: ABNORMAL
UROBILINOGEN, URINE: 0.2 E.U./DL
WBC # BLD: 16.7 K/UL (ref 4–11)
WBC UA: >100 /HPF (ref 0–5)

## 2021-11-28 PROCEDURE — 71045 X-RAY EXAM CHEST 1 VIEW: CPT

## 2021-11-28 PROCEDURE — 2580000003 HC RX 258: Performed by: STUDENT IN AN ORGANIZED HEALTH CARE EDUCATION/TRAINING PROGRAM

## 2021-11-28 PROCEDURE — 85610 PROTHROMBIN TIME: CPT

## 2021-11-28 PROCEDURE — 6370000000 HC RX 637 (ALT 250 FOR IP): Performed by: STUDENT IN AN ORGANIZED HEALTH CARE EDUCATION/TRAINING PROGRAM

## 2021-11-28 PROCEDURE — 84484 ASSAY OF TROPONIN QUANT: CPT

## 2021-11-28 PROCEDURE — 87186 SC STD MICRODIL/AGAR DIL: CPT

## 2021-11-28 PROCEDURE — 94761 N-INVAS EAR/PLS OXIMETRY MLT: CPT

## 2021-11-28 PROCEDURE — 82803 BLOOD GASES ANY COMBINATION: CPT

## 2021-11-28 PROCEDURE — 83880 ASSAY OF NATRIURETIC PEPTIDE: CPT

## 2021-11-28 PROCEDURE — 80048 BASIC METABOLIC PNL TOTAL CA: CPT

## 2021-11-28 PROCEDURE — 83605 ASSAY OF LACTIC ACID: CPT

## 2021-11-28 PROCEDURE — 6360000002 HC RX W HCPCS: Performed by: STUDENT IN AN ORGANIZED HEALTH CARE EDUCATION/TRAINING PROGRAM

## 2021-11-28 PROCEDURE — 96375 TX/PRO/DX INJ NEW DRUG ADDON: CPT

## 2021-11-28 PROCEDURE — 96365 THER/PROPH/DIAG IV INF INIT: CPT

## 2021-11-28 PROCEDURE — 2000000000 HC ICU R&B

## 2021-11-28 PROCEDURE — 85025 COMPLETE CBC W/AUTO DIFF WBC: CPT

## 2021-11-28 PROCEDURE — 87077 CULTURE AEROBIC IDENTIFY: CPT

## 2021-11-28 PROCEDURE — 36556 INSERT NON-TUNNEL CV CATH: CPT

## 2021-11-28 PROCEDURE — 94664 DEMO&/EVAL PT USE INHALER: CPT

## 2021-11-28 PROCEDURE — 6360000002 HC RX W HCPCS: Performed by: INTERNAL MEDICINE

## 2021-11-28 PROCEDURE — U0003 INFECTIOUS AGENT DETECTION BY NUCLEIC ACID (DNA OR RNA); SEVERE ACUTE RESPIRATORY SYNDROME CORONAVIRUS 2 (SARS-COV-2) (CORONAVIRUS DISEASE [COVID-19]), AMPLIFIED PROBE TECHNIQUE, MAKING USE OF HIGH THROUGHPUT TECHNOLOGIES AS DESCRIBED BY CMS-2020-01-R: HCPCS

## 2021-11-28 PROCEDURE — 87086 URINE CULTURE/COLONY COUNT: CPT

## 2021-11-28 PROCEDURE — U0005 INFEC AGEN DETEC AMPLI PROBE: HCPCS

## 2021-11-28 PROCEDURE — 82947 ASSAY GLUCOSE BLOOD QUANT: CPT

## 2021-11-28 PROCEDURE — 80076 HEPATIC FUNCTION PANEL: CPT

## 2021-11-28 PROCEDURE — 99223 1ST HOSP IP/OBS HIGH 75: CPT | Performed by: INTERNAL MEDICINE

## 2021-11-28 PROCEDURE — 87040 BLOOD CULTURE FOR BACTERIA: CPT

## 2021-11-28 PROCEDURE — 94640 AIRWAY INHALATION TREATMENT: CPT

## 2021-11-28 PROCEDURE — 2700000000 HC OXYGEN THERAPY PER DAY

## 2021-11-28 PROCEDURE — 36620 INSERTION CATHETER ARTERY: CPT

## 2021-11-28 PROCEDURE — 02HV33Z INSERTION OF INFUSION DEVICE INTO SUPERIOR VENA CAVA, PERCUTANEOUS APPROACH: ICD-10-PCS | Performed by: STUDENT IN AN ORGANIZED HEALTH CARE EDUCATION/TRAINING PROGRAM

## 2021-11-28 PROCEDURE — 93005 ELECTROCARDIOGRAM TRACING: CPT | Performed by: STUDENT IN AN ORGANIZED HEALTH CARE EDUCATION/TRAINING PROGRAM

## 2021-11-28 PROCEDURE — 99285 EMERGENCY DEPT VISIT HI MDM: CPT

## 2021-11-28 PROCEDURE — 81001 URINALYSIS AUTO W/SCOPE: CPT

## 2021-11-28 PROCEDURE — 94660 CPAP INITIATION&MGMT: CPT

## 2021-11-28 PROCEDURE — 87804 INFLUENZA ASSAY W/OPTIC: CPT

## 2021-11-28 PROCEDURE — 2500000003 HC RX 250 WO HCPCS: Performed by: STUDENT IN AN ORGANIZED HEALTH CARE EDUCATION/TRAINING PROGRAM

## 2021-11-28 PROCEDURE — 51702 INSERT TEMP BLADDER CATH: CPT

## 2021-11-28 PROCEDURE — 36415 COLL VENOUS BLD VENIPUNCTURE: CPT

## 2021-11-28 RX ORDER — SODIUM CHLORIDE 9 MG/ML
25 INJECTION, SOLUTION INTRAVENOUS PRN
Status: DISCONTINUED | OUTPATIENT
Start: 2021-11-28 | End: 2021-12-01 | Stop reason: HOSPADM

## 2021-11-28 RX ORDER — ALBUTEROL SULFATE 2.5 MG/3ML
2.5 SOLUTION RESPIRATORY (INHALATION) EVERY 4 HOURS PRN
Status: DISCONTINUED | OUTPATIENT
Start: 2021-11-28 | End: 2021-12-01 | Stop reason: HOSPADM

## 2021-11-28 RX ORDER — LEVOFLOXACIN 5 MG/ML
750 INJECTION, SOLUTION INTRAVENOUS
Status: DISCONTINUED | OUTPATIENT
Start: 2021-11-30 | End: 2021-11-28

## 2021-11-28 RX ORDER — SODIUM CHLORIDE 0.9 % (FLUSH) 0.9 %
5-40 SYRINGE (ML) INJECTION PRN
Status: DISCONTINUED | OUTPATIENT
Start: 2021-11-28 | End: 2021-12-01 | Stop reason: HOSPADM

## 2021-11-28 RX ORDER — ACETAMINOPHEN 325 MG/1
650 TABLET ORAL EVERY 6 HOURS PRN
Status: DISCONTINUED | OUTPATIENT
Start: 2021-11-28 | End: 2021-12-01 | Stop reason: HOSPADM

## 2021-11-28 RX ORDER — IPRATROPIUM BROMIDE AND ALBUTEROL SULFATE 2.5; .5 MG/3ML; MG/3ML
1 SOLUTION RESPIRATORY (INHALATION)
Status: DISCONTINUED | OUTPATIENT
Start: 2021-11-28 | End: 2021-12-01 | Stop reason: HOSPADM

## 2021-11-28 RX ORDER — SODIUM CHLORIDE 0.9 % (FLUSH) 0.9 %
5-40 SYRINGE (ML) INJECTION EVERY 12 HOURS SCHEDULED
Status: DISCONTINUED | OUTPATIENT
Start: 2021-11-28 | End: 2021-12-01 | Stop reason: HOSPADM

## 2021-11-28 RX ORDER — ALBUTEROL SULFATE 2.5 MG/3ML
2.5 SOLUTION RESPIRATORY (INHALATION) EVERY 5 MIN PRN
Status: DISCONTINUED | OUTPATIENT
Start: 2021-11-28 | End: 2021-11-28

## 2021-11-28 RX ORDER — SODIUM CHLORIDE, SODIUM LACTATE, POTASSIUM CHLORIDE, AND CALCIUM CHLORIDE .6; .31; .03; .02 G/100ML; G/100ML; G/100ML; G/100ML
1000 INJECTION, SOLUTION INTRAVENOUS ONCE
Status: COMPLETED | OUTPATIENT
Start: 2021-11-28 | End: 2021-11-28

## 2021-11-28 RX ORDER — METHYLPREDNISOLONE SODIUM SUCCINATE 40 MG/ML
40 INJECTION, POWDER, LYOPHILIZED, FOR SOLUTION INTRAMUSCULAR; INTRAVENOUS DAILY
Status: DISCONTINUED | OUTPATIENT
Start: 2021-11-28 | End: 2021-11-30

## 2021-11-28 RX ORDER — ONDANSETRON 4 MG/1
4 TABLET, ORALLY DISINTEGRATING ORAL EVERY 8 HOURS PRN
Status: DISCONTINUED | OUTPATIENT
Start: 2021-11-28 | End: 2021-12-01 | Stop reason: HOSPADM

## 2021-11-28 RX ORDER — METHYLPREDNISOLONE SODIUM SUCCINATE 125 MG/2ML
60 INJECTION, POWDER, LYOPHILIZED, FOR SOLUTION INTRAMUSCULAR; INTRAVENOUS ONCE
Status: COMPLETED | OUTPATIENT
Start: 2021-11-28 | End: 2021-11-28

## 2021-11-28 RX ORDER — LEVOFLOXACIN 5 MG/ML
750 INJECTION, SOLUTION INTRAVENOUS EVERY 24 HOURS
Status: DISCONTINUED | OUTPATIENT
Start: 2021-11-28 | End: 2021-11-28

## 2021-11-28 RX ORDER — IPRATROPIUM BROMIDE AND ALBUTEROL SULFATE 2.5; .5 MG/3ML; MG/3ML
1 SOLUTION RESPIRATORY (INHALATION) ONCE
Status: COMPLETED | OUTPATIENT
Start: 2021-11-28 | End: 2021-11-28

## 2021-11-28 RX ORDER — ACETAMINOPHEN 650 MG/1
650 SUPPOSITORY RECTAL EVERY 6 HOURS PRN
Status: DISCONTINUED | OUTPATIENT
Start: 2021-11-28 | End: 2021-12-01 | Stop reason: HOSPADM

## 2021-11-28 RX ORDER — IPRATROPIUM BROMIDE AND ALBUTEROL SULFATE 2.5; .5 MG/3ML; MG/3ML
1 SOLUTION RESPIRATORY (INHALATION)
Status: DISCONTINUED | OUTPATIENT
Start: 2021-11-28 | End: 2021-11-28

## 2021-11-28 RX ORDER — PREDNISONE 1 MG/1
2.5 TABLET ORAL DAILY
Status: DISCONTINUED | OUTPATIENT
Start: 2021-11-28 | End: 2021-11-28

## 2021-11-28 RX ORDER — ONDANSETRON 2 MG/ML
4 INJECTION INTRAMUSCULAR; INTRAVENOUS EVERY 6 HOURS PRN
Status: DISCONTINUED | OUTPATIENT
Start: 2021-11-28 | End: 2021-12-01 | Stop reason: HOSPADM

## 2021-11-28 RX ORDER — LIDOCAINE HYDROCHLORIDE 10 MG/ML
5 INJECTION, SOLUTION EPIDURAL; INFILTRATION; INTRACAUDAL; PERINEURAL ONCE
Status: COMPLETED | OUTPATIENT
Start: 2021-11-28 | End: 2021-11-28

## 2021-11-28 RX ORDER — TRAZODONE HYDROCHLORIDE 100 MG/1
100 TABLET ORAL NIGHTLY
Status: DISCONTINUED | OUTPATIENT
Start: 2021-11-28 | End: 2021-12-01 | Stop reason: HOSPADM

## 2021-11-28 RX ORDER — SODIUM CHLORIDE 9 MG/ML
25 INJECTION, SOLUTION INTRAVENOUS PRN
Status: DISCONTINUED | OUTPATIENT
Start: 2021-11-28 | End: 2021-11-28 | Stop reason: SDUPTHER

## 2021-11-28 RX ORDER — SODIUM CHLORIDE, SODIUM LACTATE, POTASSIUM CHLORIDE, CALCIUM CHLORIDE 600; 310; 30; 20 MG/100ML; MG/100ML; MG/100ML; MG/100ML
1000 INJECTION, SOLUTION INTRAVENOUS ONCE
Status: COMPLETED | OUTPATIENT
Start: 2021-11-28 | End: 2021-11-28

## 2021-11-28 RX ORDER — POLYETHYLENE GLYCOL 3350 17 G/17G
17 POWDER, FOR SOLUTION ORAL DAILY PRN
Status: DISCONTINUED | OUTPATIENT
Start: 2021-11-28 | End: 2021-12-01 | Stop reason: HOSPADM

## 2021-11-28 RX ADMIN — NOREPINEPHRINE BITARTRATE 5 MCG/MIN: 1 INJECTION, SOLUTION, CONCENTRATE INTRAVENOUS at 13:47

## 2021-11-28 RX ADMIN — IPRATROPIUM BROMIDE AND ALBUTEROL SULFATE 1 AMPULE: .5; 2.5 SOLUTION RESPIRATORY (INHALATION) at 21:48

## 2021-11-28 RX ADMIN — SODIUM CHLORIDE, PRESERVATIVE FREE 5 ML: 5 INJECTION INTRAVENOUS at 21:24

## 2021-11-28 RX ADMIN — SODIUM CHLORIDE, SODIUM LACTATE, POTASSIUM CHLORIDE, AND CALCIUM CHLORIDE 1000 ML: .6; .31; .03; .02 INJECTION, SOLUTION INTRAVENOUS at 13:18

## 2021-11-28 RX ADMIN — SODIUM CHLORIDE, PRESERVATIVE FREE 10 ML: 5 INJECTION INTRAVENOUS at 21:16

## 2021-11-28 RX ADMIN — METHYLPREDNISOLONE SODIUM SUCCINATE 60 MG: 125 INJECTION, POWDER, FOR SOLUTION INTRAMUSCULAR; INTRAVENOUS at 12:32

## 2021-11-28 RX ADMIN — IPRATROPIUM BROMIDE AND ALBUTEROL SULFATE 1 AMPULE: .5; 3 SOLUTION RESPIRATORY (INHALATION) at 12:11

## 2021-11-28 RX ADMIN — METHYLPREDNISOLONE SODIUM SUCCINATE 40 MG: 40 INJECTION, POWDER, FOR SOLUTION INTRAMUSCULAR; INTRAVENOUS at 21:16

## 2021-11-28 RX ADMIN — SODIUM CHLORIDE, SODIUM LACTATE, POTASSIUM CHLORIDE, AND CALCIUM CHLORIDE 1000 ML: .6; .31; .03; .02 INJECTION, SOLUTION INTRAVENOUS at 13:17

## 2021-11-28 RX ADMIN — ALBUTEROL SULFATE 2.5 MG: 2.5 SOLUTION RESPIRATORY (INHALATION) at 12:36

## 2021-11-28 RX ADMIN — MEROPENEM 1000 MG: 1 INJECTION, POWDER, FOR SOLUTION INTRAVENOUS at 17:50

## 2021-11-28 RX ADMIN — SODIUM CHLORIDE, SODIUM LACTATE, POTASSIUM CHLORIDE, AND CALCIUM CHLORIDE 1000 ML: .6; .31; .03; .02 INJECTION, SOLUTION INTRAVENOUS at 14:53

## 2021-11-28 RX ADMIN — LIDOCAINE HYDROCHLORIDE 5 ML: 10 INJECTION, SOLUTION EPIDURAL; INFILTRATION; INTRACAUDAL; PERINEURAL at 13:19

## 2021-11-28 RX ADMIN — VANCOMYCIN HYDROCHLORIDE 1750 MG: 500 INJECTION, POWDER, LYOPHILIZED, FOR SOLUTION INTRAVENOUS at 14:54

## 2021-11-28 RX ADMIN — LEVOFLOXACIN 750 MG: 5 INJECTION, SOLUTION INTRAVENOUS at 12:42

## 2021-11-28 RX ADMIN — APIXABAN 2.5 MG: 5 TABLET, FILM COATED ORAL at 21:16

## 2021-11-28 ASSESSMENT — PAIN DESCRIPTION - ONSET: ONSET: ON-GOING

## 2021-11-28 ASSESSMENT — PAIN SCALES - GENERAL
PAINLEVEL_OUTOF10: 5
PAINLEVEL_OUTOF10: 5
PAINLEVEL_OUTOF10: 6

## 2021-11-28 ASSESSMENT — PAIN DESCRIPTION - LOCATION: LOCATION: OTHER (COMMENT)

## 2021-11-28 ASSESSMENT — PAIN DESCRIPTION - FREQUENCY: FREQUENCY: CONTINUOUS
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
## 2021-11-28 NOTE — ED NOTES
Bed: B14-14  Expected date:   Expected time:   Means of arrival:   Comments:  obi Pollard, [facility]  11/28/21 1139

## 2021-11-28 NOTE — ED NOTES
Central Line    Date/Time: 11/28/2021 3:27 PM  Performed by: Pancho Fox MD  Authorized by: Schuyler Jarquin MD     Consent:     Consent obtained:  Verbal    Consent given by:  Patient    Risks discussed:  Incorrect placement, pneumothorax, infection, bleeding, arterial puncture and nerve damage    Alternatives discussed:  No treatment  Pre-procedure details:     Hand hygiene: Hand hygiene performed prior to insertion      Sterile barrier technique: All elements of maximal sterile technique followed      Skin preparation:  ChloraPrep    Skin preparation agent: Skin preparation agent completely dried prior to procedure    Anesthesia (see MAR for exact dosages): Anesthesia method:  None  Procedure details:     Location:  L internal jugular    Procedural supplies:  Triple lumen    Catheter size:  6.5 Fr    Landmarks identified: yes      Ultrasound guidance: yes      Sterile ultrasound techniques: Sterile gel and sterile probe covers were used      Number of attempts:  1    Successful placement: yes    Post-procedure details:     Post-procedure:  Dressing applied and line sutured    Assessment:  Blood return through all ports, no pneumothorax on x-ray, free fluid flow and placement verified by x-ray    Patient tolerance of procedure:   Tolerated well, no immediate complications  Comments:      EBL: 3cc           Pancho Fox MD  Resident  11/28/21 4058
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
## 2021-11-28 NOTE — PROGRESS NOTES
4 Eyes Admission Assessment     I agree as the admission nurse that 2 RN's have performed a thorough Head to Toe Skin Assessment on the patient. ALL assessment sites listed below have been assessed on admission. Areas assessed by both nurses:   [x]   Head, Face, and Ears   [x]   Shoulders, Back, and Chest; Redness between shoulder blades; blanchable. [x]   Arms, Elbows, and Hands ; Tear R arm  [x]   Coccyx, Sacrum, and Ischium; Redness in gluteal cleft;   []   Legs, Feet, and Heels, cracked heels,         Does the Patient have Skin Breakdown?   Yes a wound was noted on the Admission Assessment and an LDA was Initiated documentation include the Adamaris-wound, Wound Assessment, Measurements, Dressing Treatment, Drainage, and Color\",         Manan Prevention initiated:  Yes   Wound Care Orders initiated:  No      [facility] nurse consulted for Pressure Injury (Stage 3,4, Unstageable, DTI, NWPT, and Complex wounds) or Manan score 18 or lower:  No      Nurse 1 eSignature: Electronically signed by Bolivar Estrada RN on 11/28/21 at 5:28 PM EST    **SHARE this note so that the co-signing nurse is able to place an eSignature**    Nurse 2 eSignature: {Esignature:[id number]}

## 2021-11-28 NOTE — ED NOTES
Blood culture drawn. Specimen labeled using patient name, date of birth, medical number as identifiers, and staff initials. Patient tolerated well and left on stretcher locked in lowest position, with side rails up and call light within reach.       Franklin Carmichael RN  11/28/21 0632

## 2021-11-28 NOTE — PROGRESS NOTES
Levofloxacin 750 mg every 24 hours ordered for patient. This medication is renally eliminated. Will change to Levofloxacin 750 mg every 48 hours per renal dose adjustment policy. Estimated Creatinine Clearance: 38 mL/min (A) (based on SCr of 2.2 mg/dL (H)). Pharmacy will continue to monitor renal function and adjust dose as necessary. Please call with any questions. Thanks! Ryan Kim, Pharm. D., BCPS  [phone]  11/28/2021 3:49 PM

## 2021-11-28 NOTE — RT PROTOCOL NOTE
RT Nebulizer Bronchodilator Protocol Note    There is a bronchodilator order in the chart from a provider indicating to follow the RT Bronchodilator Protocol and there is an Initiate RT Bronchodilator Protocol order as well (see protocol at bottom of note). CXR Findings:  XR CHEST PORTABLE    Result Date: 11/28/2021  1. Left jugular CVC, tip in SVC. No pneumothorax 2. Consolidation right lower lobe    XR CHEST PORTABLE    Result Date: 11/28/2021  Atelectatic changes right lung base      The findings from the last RT Protocol Assessment were as follows:  Smoking:    Respiratory Pattern:    Breath Sounds:    Cough: Indication for Bronchodilator Therapy:    Bronchodilator Assessment Score:      Aerosolized bronchodilator medication orders have been revised according to the RT Nebulizer Bronchodilator Protocol below. Respiratory Therapist to perform RT Therapy Protocol Assessment initially then follow the protocol. Repeat RT Therapy Protocol Assessment PRN for score 0-3 or on second treatment, BID, and PRN for scores above 3. No Indications - adjust the frequency to every 6 hours PRN wheezing or bronchospasm, if no treatments needed after 48 hours then discontinue using Per Protocol order mode. If indication present, adjust the RT bronchodilator orders based on the Bronchodilator Assessment Score as indicated below. If a patient is on this medication at home then do not decrease Frequency below that used at home. 0-3 - enter or revise RT bronchodilator order(s) to equivalent RT Bronchodilator order with Frequency of every 4 hours PRN for wheezing or increased work of breathing using Per Protocol order mode. 4-6 - enter or revise RT Bronchodilator order(s) to two equivalent RT bronchodilator orders with one order with BID Frequency and one order with Frequency of every 4 hours PRN wheezing or increased work of breathing using Per Protocol order mode.          7-10 - enter or revise RT

## 2021-11-29 LAB
ANION GAP SERPL CALCULATED.3IONS-SCNC: 13 MMOL/L (ref 3–16)
BASE EXCESS ARTERIAL: -3.2 MMOL/L (ref -3–3)
BASOPHILS ABSOLUTE: 0 K/UL (ref 0–0.2)
BASOPHILS RELATIVE PERCENT: 0.1 %
BUN BLDV-MCNC: 43 MG/DL (ref 7–20)
CALCIUM SERPL-MCNC: 9 MG/DL (ref 8.3–10.6)
CARBOXYHEMOGLOBIN ARTERIAL: 0.8 % (ref 0–1.5)
CHLORIDE BLD-SCNC: 101 MMOL/L (ref 99–110)
CO2: 20 MMOL/L (ref 21–32)
CREAT SERPL-MCNC: 1.8 MG/DL (ref 0.6–1.1)
EKG ATRIAL RATE: 85 BPM
EKG DIAGNOSIS: NORMAL
EKG P AXIS: 73 DEGREES
EKG P-R INTERVAL: 150 MS
EKG Q-T INTERVAL: 414 MS
EKG QRS DURATION: 88 MS
EKG QTC CALCULATION (BAZETT): 492 MS
EKG R AXIS: 84 DEGREES
EKG T AXIS: 67 DEGREES
EKG VENTRICULAR RATE: 85 BPM
EOSINOPHILS ABSOLUTE: 0 K/UL (ref 0–0.6)
EOSINOPHILS RELATIVE PERCENT: 0 %
GFR AFRICAN AMERICAN: 35
GFR NON-AFRICAN AMERICAN: 29
GLUCOSE BLD-MCNC: 222 MG/DL (ref 70–99)
GLUCOSE BLD-MCNC: 232 MG/DL (ref 70–99)
GLUCOSE BLD-MCNC: 243 MG/DL (ref 70–99)
GLUCOSE BLD-MCNC: 276 MG/DL (ref 70–99)
GLUCOSE BLD-MCNC: 276 MG/DL (ref 70–99)
HCO3 ARTERIAL: 25 MMOL/L (ref 21–29)
HCT VFR BLD CALC: 36.5 % (ref 36–48)
HEMOGLOBIN, ART, EXTENDED: 13.3 G/DL
HEMOGLOBIN: 11.4 G/DL (ref 12–16)
LYMPHOCYTES ABSOLUTE: 0.5 K/UL (ref 1–5.1)
LYMPHOCYTES RELATIVE PERCENT: 3 %
MCH RBC QN AUTO: 27.6 PG (ref 26–34)
MCHC RBC AUTO-ENTMCNC: 31.4 G/DL (ref 31–36)
MCV RBC AUTO: 88.1 FL (ref 80–100)
METHEMOGLOBIN ARTERIAL: <0 % (ref 0–1.4)
MONOCYTES ABSOLUTE: 0.4 K/UL (ref 0–1.3)
MONOCYTES RELATIVE PERCENT: 2.3 %
NEUTROPHILS ABSOLUTE: 14.8 K/UL (ref 1.7–7.7)
NEUTROPHILS RELATIVE PERCENT: 94.6 %
O2 SAT, ARTERIAL: 98 % (ref 93–100)
PCO2 ARTERIAL: 55.4 MMHG (ref 35–45)
PDW BLD-RTO: 19.6 % (ref 12.4–15.4)
PERFORMED ON: ABNORMAL
PH ARTERIAL: 7.26 (ref 7.35–7.45)
PLATELET # BLD: 170 K/UL (ref 135–450)
PMV BLD AUTO: 7.8 FL (ref 5–10.5)
PO2 ARTERIAL: 109 MMHG (ref 75–108)
POTASSIUM REFLEX MAGNESIUM: 5.4 MMOL/L (ref 3.5–5.1)
POTASSIUM SERPL-SCNC: 4.7 MMOL/L (ref 3.5–5.1)
RBC # BLD: 4.14 M/UL (ref 4–5.2)
SARS-COV-2: NOT DETECTED
SODIUM BLD-SCNC: 134 MMOL/L (ref 136–145)
SODIUM URINE: <20 MMOL/L
TCO2 ARTERIAL: 26 MMOL/L
WBC # BLD: 15.6 K/UL (ref 4–11)

## 2021-11-29 PROCEDURE — 82803 BLOOD GASES ANY COMBINATION: CPT

## 2021-11-29 PROCEDURE — 85025 COMPLETE CBC W/AUTO DIFF WBC: CPT

## 2021-11-29 PROCEDURE — 6360000002 HC RX W HCPCS

## 2021-11-29 PROCEDURE — 6360000002 HC RX W HCPCS: Performed by: STUDENT IN AN ORGANIZED HEALTH CARE EDUCATION/TRAINING PROGRAM

## 2021-11-29 PROCEDURE — 99291 CRITICAL CARE FIRST HOUR: CPT | Performed by: INTERNAL MEDICINE

## 2021-11-29 PROCEDURE — 2580000003 HC RX 258: Performed by: STUDENT IN AN ORGANIZED HEALTH CARE EDUCATION/TRAINING PROGRAM

## 2021-11-29 PROCEDURE — 2700000000 HC OXYGEN THERAPY PER DAY

## 2021-11-29 PROCEDURE — 6370000000 HC RX 637 (ALT 250 FOR IP): Performed by: STUDENT IN AN ORGANIZED HEALTH CARE EDUCATION/TRAINING PROGRAM

## 2021-11-29 PROCEDURE — 6360000002 HC RX W HCPCS: Performed by: INTERNAL MEDICINE

## 2021-11-29 PROCEDURE — 82570 ASSAY OF URINE CREATININE: CPT

## 2021-11-29 PROCEDURE — 6370000000 HC RX 637 (ALT 250 FOR IP)

## 2021-11-29 PROCEDURE — C9113 INJ PANTOPRAZOLE SODIUM, VIA: HCPCS

## 2021-11-29 PROCEDURE — 37799 UNLISTED PX VASCULAR SURGERY: CPT

## 2021-11-29 PROCEDURE — 2000000000 HC ICU R&B

## 2021-11-29 PROCEDURE — 84132 ASSAY OF SERUM POTASSIUM: CPT

## 2021-11-29 PROCEDURE — 94761 N-INVAS EAR/PLS OXIMETRY MLT: CPT

## 2021-11-29 PROCEDURE — 80048 BASIC METABOLIC PNL TOTAL CA: CPT

## 2021-11-29 PROCEDURE — 94660 CPAP INITIATION&MGMT: CPT

## 2021-11-29 PROCEDURE — 36415 COLL VENOUS BLD VENIPUNCTURE: CPT

## 2021-11-29 PROCEDURE — 84300 ASSAY OF URINE SODIUM: CPT

## 2021-11-29 PROCEDURE — 99223 1ST HOSP IP/OBS HIGH 75: CPT | Performed by: INTERNAL MEDICINE

## 2021-11-29 PROCEDURE — 94640 AIRWAY INHALATION TREATMENT: CPT

## 2021-11-29 RX ORDER — DEXTROSE MONOHYDRATE 25 G/50ML
12.5 INJECTION, SOLUTION INTRAVENOUS PRN
Status: DISCONTINUED | OUTPATIENT
Start: 2021-11-29 | End: 2021-12-01 | Stop reason: HOSPADM

## 2021-11-29 RX ORDER — LANOLIN ALCOHOL/MO/W.PET/CERES
3 CREAM (GRAM) TOPICAL NIGHTLY
Status: COMPLETED | OUTPATIENT
Start: 2021-11-29 | End: 2021-11-29

## 2021-11-29 RX ORDER — INSULIN LISPRO 100 [IU]/ML
0-6 INJECTION, SOLUTION INTRAVENOUS; SUBCUTANEOUS NIGHTLY
Status: DISCONTINUED | OUTPATIENT
Start: 2021-11-29 | End: 2021-12-01 | Stop reason: HOSPADM

## 2021-11-29 RX ORDER — DIPHENHYDRAMINE HCL 25 MG
25 TABLET ORAL NIGHTLY PRN
Status: COMPLETED | OUTPATIENT
Start: 2021-11-29 | End: 2021-11-30

## 2021-11-29 RX ORDER — INSULIN LISPRO 100 [IU]/ML
0-12 INJECTION, SOLUTION INTRAVENOUS; SUBCUTANEOUS
Status: DISCONTINUED | OUTPATIENT
Start: 2021-11-29 | End: 2021-12-01 | Stop reason: HOSPADM

## 2021-11-29 RX ORDER — DEXTROSE MONOHYDRATE 50 MG/ML
100 INJECTION, SOLUTION INTRAVENOUS PRN
Status: DISCONTINUED | OUTPATIENT
Start: 2021-11-29 | End: 2021-12-01 | Stop reason: HOSPADM

## 2021-11-29 RX ORDER — HYDROXYZINE HYDROCHLORIDE 25 MG/1
25 TABLET, FILM COATED ORAL ONCE
Status: COMPLETED | OUTPATIENT
Start: 2021-11-29 | End: 2021-11-29

## 2021-11-29 RX ORDER — FUROSEMIDE 10 MG/ML
40 INJECTION INTRAMUSCULAR; INTRAVENOUS ONCE
Status: COMPLETED | OUTPATIENT
Start: 2021-11-29 | End: 2021-11-29

## 2021-11-29 RX ORDER — NICOTINE POLACRILEX 4 MG
15 LOZENGE BUCCAL PRN
Status: DISCONTINUED | OUTPATIENT
Start: 2021-11-29 | End: 2021-12-01 | Stop reason: HOSPADM

## 2021-11-29 RX ORDER — INSULIN LISPRO 100 [IU]/ML
0-3 INJECTION, SOLUTION INTRAVENOUS; SUBCUTANEOUS NIGHTLY
Status: DISCONTINUED | OUTPATIENT
Start: 2021-11-29 | End: 2021-11-29

## 2021-11-29 RX ORDER — INSULIN LISPRO 100 [IU]/ML
0-6 INJECTION, SOLUTION INTRAVENOUS; SUBCUTANEOUS
Status: DISCONTINUED | OUTPATIENT
Start: 2021-11-29 | End: 2021-11-29

## 2021-11-29 RX ORDER — PANTOPRAZOLE SODIUM 40 MG/10ML
40 INJECTION, POWDER, LYOPHILIZED, FOR SOLUTION INTRAVENOUS DAILY
Status: DISCONTINUED | OUTPATIENT
Start: 2021-11-29 | End: 2021-12-01 | Stop reason: HOSPADM

## 2021-11-29 RX ADMIN — APIXABAN 2.5 MG: 5 TABLET, FILM COATED ORAL at 10:12

## 2021-11-29 RX ADMIN — IPRATROPIUM BROMIDE AND ALBUTEROL SULFATE 1 AMPULE: .5; 2.5 SOLUTION RESPIRATORY (INHALATION) at 15:22

## 2021-11-29 RX ADMIN — IPRATROPIUM BROMIDE AND ALBUTEROL SULFATE 1 AMPULE: .5; 2.5 SOLUTION RESPIRATORY (INHALATION) at 07:54

## 2021-11-29 RX ADMIN — IPRATROPIUM BROMIDE AND ALBUTEROL SULFATE 1 AMPULE: .5; 2.5 SOLUTION RESPIRATORY (INHALATION) at 19:51

## 2021-11-29 RX ADMIN — INSULIN LISPRO 3 UNITS: 100 INJECTION, SOLUTION INTRAVENOUS; SUBCUTANEOUS at 14:44

## 2021-11-29 RX ADMIN — ACETAMINOPHEN 650 MG: 325 TABLET ORAL at 20:53

## 2021-11-29 RX ADMIN — MEROPENEM 1000 MG: 1 INJECTION, POWDER, FOR SOLUTION INTRAVENOUS at 16:21

## 2021-11-29 RX ADMIN — PANTOPRAZOLE SODIUM 40 MG: 40 INJECTION, POWDER, FOR SOLUTION INTRAVENOUS at 10:12

## 2021-11-29 RX ADMIN — FUROSEMIDE 40 MG: 10 INJECTION, SOLUTION INTRAMUSCULAR; INTRAVENOUS at 11:48

## 2021-11-29 RX ADMIN — IPRATROPIUM BROMIDE AND ALBUTEROL SULFATE 1 AMPULE: .5; 2.5 SOLUTION RESPIRATORY (INHALATION) at 10:55

## 2021-11-29 RX ADMIN — INSULIN LISPRO 3 UNITS: 100 INJECTION, SOLUTION INTRAVENOUS; SUBCUTANEOUS at 20:49

## 2021-11-29 RX ADMIN — APIXABAN 2.5 MG: 5 TABLET, FILM COATED ORAL at 20:39

## 2021-11-29 RX ADMIN — SODIUM CHLORIDE, PRESERVATIVE FREE 10 ML: 5 INJECTION INTRAVENOUS at 23:13

## 2021-11-29 RX ADMIN — METHYLPREDNISOLONE SODIUM SUCCINATE 40 MG: 40 INJECTION, POWDER, FOR SOLUTION INTRAMUSCULAR; INTRAVENOUS at 10:13

## 2021-11-29 RX ADMIN — SODIUM CHLORIDE, PRESERVATIVE FREE 10 ML: 5 INJECTION INTRAVENOUS at 10:13

## 2021-11-29 RX ADMIN — HYDROXYZINE HYDROCHLORIDE 25 MG: 25 TABLET ORAL at 16:18

## 2021-11-29 RX ADMIN — Medication 3 MG: at 20:53

## 2021-11-29 RX ADMIN — MEROPENEM 1000 MG: 1 INJECTION, POWDER, FOR SOLUTION INTRAVENOUS at 04:33

## 2021-11-29 RX ADMIN — INSULIN LISPRO 4 UNITS: 100 INJECTION, SOLUTION INTRAVENOUS; SUBCUTANEOUS at 17:18

## 2021-11-29 RX ADMIN — TRAZODONE HYDROCHLORIDE 100 MG: 100 TABLET ORAL at 20:39

## 2021-11-29 RX ADMIN — IPRATROPIUM BROMIDE AND ALBUTEROL SULFATE 1 AMPULE: .5; 2.5 SOLUTION RESPIRATORY (INHALATION) at 00:45

## 2021-11-29 ASSESSMENT — PAIN DESCRIPTION - FREQUENCY: FREQUENCY: CONTINUOUS

## 2021-11-29 ASSESSMENT — PAIN SCALES - GENERAL
PAINLEVEL_OUTOF10: 0
PAINLEVEL_OUTOF10: 3
PAINLEVEL_OUTOF10: 0
PAINLEVEL_OUTOF10: 0

## 2021-11-29 ASSESSMENT — PAIN DESCRIPTION - ONSET: ONSET: ON-GOING

## 2021-11-29 ASSESSMENT — PAIN DESCRIPTION - LOCATION: LOCATION: NECK

## 2021-11-29 NOTE — PLAN OF CARE
Problem: Falls - Risk of:  Goal: Will remain free from falls  Description: Will remain free from falls  11/28/2021 1954 by Dominick Stern RN  Outcome: Ongoing     Problem: Falls - Risk of:  Goal: Absence of physical injury  Description: Absence of physical injury  11/28/2021 1954 by Dominick Stern RN  Outcome: Ongoing     Problem: Pain:  Goal: Pain level will decrease  Description: Pain level will decrease  Outcome: Ongoing     Problem: Breathing Pattern - Ineffective:  Goal: Ability to achieve and maintain a regular respiratory rate will improve  Description: Ability to achieve and maintain a regular respiratory rate will improve  Outcome: Ongoing

## 2021-11-29 NOTE — PROGRESS NOTES
Hospitalist Progress Note      PCP: Janes Pastor MD    Date of Admission: 11/28/2021    Subjective:   Reports lower abdominal pain.  + malaise. + nausea. + sob. No cp. Off pressors. Medications:  Reviewed    Infusion Medications    dextrose      norepinephrine Stopped (11/28/21 1540)    sodium chloride       Scheduled Medications    pantoprazole  40 mg IntraVENous Daily    insulin lispro  0-6 Units SubCUTAneous TID WC    insulin lispro  0-3 Units SubCUTAneous Nightly    hydrOXYzine  25 mg Oral Once    sodium chloride flush  5-40 mL IntraVENous 2 times per day    apixaban  2.5 mg Oral BID    traZODone  100 mg Oral Nightly    sodium chloride flush  5-40 mL IntraVENous 2 times per day    meropenem  1,000 mg IntraVENous Q12H    ipratropium-albuterol  1 ampule Inhalation Q4H WA    methylPREDNISolone  40 mg IntraVENous Daily     PRN Meds: glucose, dextrose, glucagon (rDNA), dextrose, sodium chloride flush, sodium chloride flush, sodium chloride, ondansetron **OR** ondansetron, polyethylene glycol, acetaminophen **OR** acetaminophen, perflutren lipid microspheres, albuterol      Intake/Output Summary (Last 24 hours) at 11/29/2021 1549  Last data filed at 11/29/2021 1526  Gross per 24 hour   Intake 200 ml   Output 2735 ml   Net -2535 ml       Physical Exam Performed:    BP (!) 149/93   Pulse 106   Temp 97.9 °F (36.6 °C) (Axillary)   Resp 16   Ht 5' 8\" (1.727 m)   Wt 270 lb (122.5 kg)   LMP 03/09/2014   SpO2 98%   BMI 41.05 kg/m²     General appearance: ill appearing/ lethargic  Respiratory:  Normal respiratory effort. Clear to auscultation, bilaterally without Rales/Wheezes/Rhonchi. Cardiovascular: Regular rate and rhythm   Abdomen: Soft, + suprapubic tenderness  Musculoskeletal: No clubbing, cyanosis or edema bilaterally. Skin: Skin color, texture, turgor normal.  No rashes or lesions. Neurologic:  Neurovascularly intact without any focal sensory/motor deficits.  Cranial nerves: II-XII intact, grossly non-focal.  Psychiatric: no anxiety, nml thought content    Labs:   Recent Labs     11/28/21  1213 11/29/21  0414   WBC 16.7* 15.6*   HGB 11.7* 11.4*   HCT 37.6 36.5    170     Recent Labs     11/28/21  1214 11/29/21  0414 11/29/21  1207    134*  --    K 4.2 5.4* 4.7    101  --    CO2 20* 20*  --    BUN 40* 43*  --    CREATININE 2.2* 1.8*  --    CALCIUM 9.2 9.0  --      Recent Labs     11/28/21  1214   AST 32   ALT 30   BILIDIR <0.2   BILITOT 0.4   ALKPHOS 107     Recent Labs     11/28/21  1214   INR 1.30*     Recent Labs     11/28/21  1214 11/28/21  1748 11/28/21  2227   TROPONINI 0.05* 0.03* 0.02*       Urinalysis:      Lab Results   Component Value Date    NITRU Negative 11/28/2021    WBCUA >100 11/28/2021    BACTERIA 1+ 11/28/2021    RBCUA 5-10 11/28/2021    BLOODU LARGE 11/28/2021    SPECGRAV 1.025 11/28/2021    GLUCOSEU Negative 11/28/2021     Assessment/Plan:    Active Hospital Problems    Diagnosis     Hypoxia [H59.55]     Metabolic acidosis [D83.6]     Sepsis with acute renal failure and septic shock (HCC) [A41.9, R65.21, N17.9]     CHANTELLE (acute kidney injury) ([facility] [address] [N17.9]     Acute on chronic respiratory failure with hypoxia and hypercapnia (HCC) [J96.21, J96.22]      -now off pressors/ cont iv abx for uti/ covid pcr pending.  - cont in the ICU for now.     DVT Prophylaxis: eliquis  Diet: Diet NPO  Code Status: Full Code    PT/OT Eval Status: Once clinically stable    Lexi Cheema MD

## 2021-11-29 NOTE — PLAN OF CARE
Problem: Falls - Risk of:  Goal: Will remain free from falls  Description: Will remain free from falls  11/29/2021 0836 by Andrew Oakes RN  Outcome: Ongoing  11/28/2021 1955 by Terrance Savage RN  Outcome: Ongoing  11/28/2021 1954 by Terrance Savage RN  Outcome: Ongoing  11/28/2021 1953 by Terrance Savage RN  Outcome: Ongoing  Goal: Absence of physical injury  Description: Absence of physical injury  11/29/2021 0836 by Andrew Oakes RN  Outcome: Ongoing  11/28/2021 1955 by Terrance Savage RN  Outcome: Ongoing  11/28/2021 1954 by Terrance Savage RN  Outcome: Ongoing  11/28/2021 1953 by Terrance Savage RN  Outcome: Ongoing     Problem: Pain:  Goal: Pain level will decrease  Description: Pain level will decrease  11/29/2021 0836 by Andrew Oakes RN  Outcome: Ongoing  11/28/2021 1955 by Terrance Savage RN  Outcome: Ongoing  Goal: Control of acute pain  Description: Control of acute pain  11/29/2021 0836 by Andrew Oakes RN  Outcome: Ongoing  11/28/2021 1955 by Terrance Savage RN  Outcome: Ongoing  Goal: Control of chronic pain  Description: Control of chronic pain  11/29/2021 0836 by Andrew Oakes RN  Outcome: Ongoing  11/28/2021 1955 by Terrance Savage RN  Outcome: Ongoing     Problem: Breathing Pattern - Ineffective:  Goal: Ability to achieve and maintain a regular respiratory rate will improve  Description: Ability to achieve and maintain a regular respiratory rate will improve  11/29/2021 0836 by Andrew Oakes RN  Outcome: Ongoing  11/28/2021 1955 by Terrance Savage RN  Outcome: Ongoing
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
## 2021-11-29 NOTE — CARE COORDINATION
Case Management Assessment           Initial Evaluation                Date / Time of Evaluation: 11/29/2021 2:20 PM                 Assessment Completed by: Dorothea Kenyon RN    Patient Name: Verneice Crigler     YOB: 1960  Diagnosis: Hypoxia [R09.02]  Acute respiratory failure with hypoxia ([facility] [address] [J96.01]  Septic shock ([facility] [address] [A41.9, R65.21]     Date / Time: 11/28/2021 11:33 AM    Patient Admission Status: Inpatient    If patient is discharged prior to next notation, then this note serves as note for discharge by case management. Current PCP: Norberto Curry MD  Clinic Patient: No    Chart Reviewed: Yes  Patient/ Family Interviewed: Yes    Initial assessment completed at bedside with: NA - call to legal guardian [employer] Vogel - left message    Hospitalization in the last 30 days: No    Emergency Contacts:  Extended Emergency Contact Information  Primary Emergency Contact: [address] [place] [address] Phone: [phone]  Work Phone: [phone]  Mobile Phone: [phone]  Relation: Brother/Sister   needed? No  Secondary Emergency Contact: brad oneill  [place] Phone: [phone]  Mobile Phone: [phone]  Relation: Child  Preferred language: English   needed?  Yes    Advance Directives:   Code Status: Full Code    Healthcare Power of : No  Agent: NA  Contact Number: NA    Copy present: No     In paper Chart: No    Scanned into EMR No    Financial  Payor: [facility] MEDICAID / Plan: Ezzie Racer / Product Type: *No Product type* /     Pre-cert required for SNF: No    Pharmacy    [facility] [address]  [address] [place] [address]  Phone: [phone] Fax: [facility] [address] [facility],[address] [facility] [address] [place] [address] [phone]  [address] [facility] [address]  Phone: [phone] Fax: 0 [facility] #[id number] - [address], [address] [facility] [address]  Phone: [phone] Fax: [phone]    [id number] - [place] - [address] [facility] [phone] Jose Manuel Thomas [phone]  [address] [facility] [address]  Phone: [phone] Fax: [phone]      Potential assistance Purchasing Medications:    Does Patient want to participate in local refill/ meds to beds program?:      Meds To Beds General Rules:  1. Can ONLY be done Monday- Friday between 8:30am-5pm  2. Prescription(s) must be in pharmacy by 3pm to be filled same day  3. Copy of patient's insurance/ prescription drug card and patient face sheet must be sent along with the prescription(s)  4. Cost of Rx cannot be added to hospital bill. If financial assistance is needed, please contact unit  or ;  or  CANNOT provide pharmacy voucher for patients co-pays  5. Patients can then  the prescription on their way out of the hospital at discharge, or pharmacy can deliver to the bedside if staff is available. (payment due at time of pick-up or delivery - cash, check, or card accepted)     Able to afford home medications/ co-pay costs: Yes    ADLS  Support Systems:      PT AM-PAC:   /24  OT AM-PAC:   /24      DISCHARGE PLAN:  Disposition: [facility] Kristina Sacks  Phone: [phone]  Fax: [phone]    Transportation PLAN for discharge: EMS transportation     Factors facilitating achievement of predicted outcomes: NA    Barriers to discharge: oxygen demand    Additional Case Management Notes: Patient is from [place] [address], spoke withKim, patient can return with no needs, no precert, currently being ruled out for Covid. Attempted to contact patient legal guardian Keely Fuentes with no answer, left message to return call.       The Plan for Transition of Care is related to the following treatment goals of Hypoxia [R09.02]  Acute respiratory failure with hypoxia ([facility] [address] [J96.01]  [facility] [A41.9, R65.21]    The Patient and/or patient representative Shoaib and her family were provided with a choice of provider and agrees with the discharge plan Not Indicated    Freedom of choice list was provided with basic dialogue that supports the patient's individualized plan of care/goals and shares the quality data associated with the providers.  Not Indicated    Care Transition patient: No    Dorothea Kenyon RN  [facility] [employer]  Case Management Department  Ph: [phone]

## 2021-11-29 NOTE — PROGRESS NOTES
Notified ICU Resident (Rigo) of redness of face being present on pt all day, but just noticing left eye swelling at this time.   Resident came to bedside to check pt out, said he would discuss with team.
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
## 2021-11-30 LAB
ANION GAP SERPL CALCULATED.3IONS-SCNC: 11 MMOL/L (ref 3–16)
BASOPHILS ABSOLUTE: 0 K/UL (ref 0–0.2)
BASOPHILS RELATIVE PERCENT: 0.2 %
BUN BLDV-MCNC: 47 MG/DL (ref 7–20)
CALCIUM SERPL-MCNC: 8.7 MG/DL (ref 8.3–10.6)
CHLORIDE BLD-SCNC: 99 MMOL/L (ref 99–110)
CO2: 21 MMOL/L (ref 21–32)
CREAT SERPL-MCNC: 1.6 MG/DL (ref 0.6–1.1)
CREATININE URINE: 54.8 MG/DL (ref 28–259)
CREATININE URINE: 59.4 MG/DL (ref 28–259)
EOSINOPHILS ABSOLUTE: 0 K/UL (ref 0–0.6)
EOSINOPHILS RELATIVE PERCENT: 0 %
GFR AFRICAN AMERICAN: 40
GFR NON-AFRICAN AMERICAN: 33
GLUCOSE BLD-MCNC: 195 MG/DL (ref 70–99)
GLUCOSE BLD-MCNC: 212 MG/DL (ref 70–99)
GLUCOSE BLD-MCNC: 273 MG/DL (ref 70–99)
GLUCOSE BLD-MCNC: 320 MG/DL (ref 70–99)
GLUCOSE BLD-MCNC: 338 MG/DL (ref 70–99)
HCT VFR BLD CALC: 35.2 % (ref 36–48)
HEMOGLOBIN: 11 G/DL (ref 12–16)
LV EF: 63 %
LVEF MODALITY: NORMAL
LYMPHOCYTES ABSOLUTE: 0.4 K/UL (ref 1–5.1)
LYMPHOCYTES RELATIVE PERCENT: 2.6 %
MAGNESIUM: 1.7 MG/DL (ref 1.8–2.4)
MCH RBC QN AUTO: 27.6 PG (ref 26–34)
MCHC RBC AUTO-ENTMCNC: 31.1 G/DL (ref 31–36)
MCV RBC AUTO: 88.6 FL (ref 80–100)
MONOCYTES ABSOLUTE: 0.7 K/UL (ref 0–1.3)
MONOCYTES RELATIVE PERCENT: 4.3 %
NEUTROPHILS ABSOLUTE: 14.3 K/UL (ref 1.7–7.7)
NEUTROPHILS RELATIVE PERCENT: 92.9 %
ORGANISM: ABNORMAL
PDW BLD-RTO: 19.9 % (ref 12.4–15.4)
PERFORMED ON: ABNORMAL
PLATELET # BLD: 161 K/UL (ref 135–450)
PMV BLD AUTO: 7.9 FL (ref 5–10.5)
POTASSIUM REFLEX MAGNESIUM: >10 MMOL/L (ref 3.5–5.1)
POTASSIUM SERPL-SCNC: 4.7 MMOL/L (ref 3.5–5.1)
PROTEIN PROTEIN: 76 MG/DL
PROTEIN/CREAT RATIO: 1.3 MG/DL
RBC # BLD: 3.97 M/UL (ref 4–5.2)
SODIUM BLD-SCNC: 131 MMOL/L (ref 136–145)
URINE CULTURE, ROUTINE: ABNORMAL
WBC # BLD: 15.4 K/UL (ref 4–11)

## 2021-11-30 PROCEDURE — 99233 SBSQ HOSP IP/OBS HIGH 50: CPT | Performed by: INTERNAL MEDICINE

## 2021-11-30 PROCEDURE — 37799 UNLISTED PX VASCULAR SURGERY: CPT

## 2021-11-30 PROCEDURE — 6360000002 HC RX W HCPCS

## 2021-11-30 PROCEDURE — 6370000000 HC RX 637 (ALT 250 FOR IP): Performed by: STUDENT IN AN ORGANIZED HEALTH CARE EDUCATION/TRAINING PROGRAM

## 2021-11-30 PROCEDURE — 84132 ASSAY OF SERUM POTASSIUM: CPT

## 2021-11-30 PROCEDURE — 1200000000 HC SEMI PRIVATE

## 2021-11-30 PROCEDURE — 85025 COMPLETE CBC W/AUTO DIFF WBC: CPT

## 2021-11-30 PROCEDURE — 2700000000 HC OXYGEN THERAPY PER DAY

## 2021-11-30 PROCEDURE — 84156 ASSAY OF PROTEIN URINE: CPT

## 2021-11-30 PROCEDURE — 94640 AIRWAY INHALATION TREATMENT: CPT

## 2021-11-30 PROCEDURE — 36415 COLL VENOUS BLD VENIPUNCTURE: CPT

## 2021-11-30 PROCEDURE — 82570 ASSAY OF URINE CREATININE: CPT

## 2021-11-30 PROCEDURE — 94761 N-INVAS EAR/PLS OXIMETRY MLT: CPT

## 2021-11-30 PROCEDURE — C9113 INJ PANTOPRAZOLE SODIUM, VIA: HCPCS

## 2021-11-30 PROCEDURE — 2580000003 HC RX 258: Performed by: STUDENT IN AN ORGANIZED HEALTH CARE EDUCATION/TRAINING PROGRAM

## 2021-11-30 PROCEDURE — 83036 HEMOGLOBIN GLYCOSYLATED A1C: CPT

## 2021-11-30 PROCEDURE — 2580000003 HC RX 258: Performed by: INTERNAL MEDICINE

## 2021-11-30 PROCEDURE — 80048 BASIC METABOLIC PNL TOTAL CA: CPT

## 2021-11-30 PROCEDURE — 6360000002 HC RX W HCPCS: Performed by: STUDENT IN AN ORGANIZED HEALTH CARE EDUCATION/TRAINING PROGRAM

## 2021-11-30 PROCEDURE — 6360000002 HC RX W HCPCS: Performed by: INTERNAL MEDICINE

## 2021-11-30 PROCEDURE — 83735 ASSAY OF MAGNESIUM: CPT

## 2021-11-30 PROCEDURE — 93306 TTE W/DOPPLER COMPLETE: CPT

## 2021-11-30 RX ORDER — CIPROFLOXACIN 250 MG/1
250 TABLET, FILM COATED ORAL EVERY 12 HOURS
Status: DISCONTINUED | OUTPATIENT
Start: 2021-11-30 | End: 2021-11-30

## 2021-11-30 RX ORDER — FUROSEMIDE 10 MG/ML
40 INJECTION INTRAMUSCULAR; INTRAVENOUS ONCE
Status: COMPLETED | OUTPATIENT
Start: 2021-11-30 | End: 2021-11-30

## 2021-11-30 RX ORDER — PREDNISONE 1 MG/1
2.5 TABLET ORAL DAILY
Status: DISCONTINUED | OUTPATIENT
Start: 2021-12-01 | End: 2021-12-01 | Stop reason: HOSPADM

## 2021-11-30 RX ORDER — HYDROXYZINE HYDROCHLORIDE 25 MG/1
25 TABLET, FILM COATED ORAL ONCE
Status: COMPLETED | OUTPATIENT
Start: 2021-11-30 | End: 2021-11-30

## 2021-11-30 RX ORDER — LIDOCAINE HYDROCHLORIDE 10 MG/ML
5 INJECTION, SOLUTION EPIDURAL; INFILTRATION; INTRACAUDAL; PERINEURAL ONCE
Status: DISCONTINUED | OUTPATIENT
Start: 2021-11-30 | End: 2021-11-30

## 2021-11-30 RX ORDER — MECOBALAMIN 5000 MCG
10 TABLET,DISINTEGRATING ORAL ONCE
Status: COMPLETED | OUTPATIENT
Start: 2021-11-30 | End: 2021-11-30

## 2021-11-30 RX ORDER — MAGNESIUM SULFATE IN WATER 40 MG/ML
2000 INJECTION, SOLUTION INTRAVENOUS ONCE
Status: COMPLETED | OUTPATIENT
Start: 2021-11-30 | End: 2021-11-30

## 2021-11-30 RX ORDER — CIPROFLOXACIN 2 MG/ML
400 INJECTION, SOLUTION INTRAVENOUS EVERY 12 HOURS
Status: DISCONTINUED | OUTPATIENT
Start: 2021-11-30 | End: 2021-11-30

## 2021-11-30 RX ORDER — SODIUM CHLORIDE 9 MG/ML
25 INJECTION, SOLUTION INTRAVENOUS PRN
Status: DISCONTINUED | OUTPATIENT
Start: 2021-11-30 | End: 2021-11-30

## 2021-11-30 RX ORDER — SODIUM CHLORIDE 0.9 % (FLUSH) 0.9 %
5-40 SYRINGE (ML) INJECTION EVERY 12 HOURS SCHEDULED
Status: DISCONTINUED | OUTPATIENT
Start: 2021-11-30 | End: 2021-11-30

## 2021-11-30 RX ORDER — SODIUM CHLORIDE 0.9 % (FLUSH) 0.9 %
5-40 SYRINGE (ML) INJECTION PRN
Status: DISCONTINUED | OUTPATIENT
Start: 2021-11-30 | End: 2021-11-30

## 2021-11-30 RX ADMIN — APIXABAN 2.5 MG: 5 TABLET, FILM COATED ORAL at 08:34

## 2021-11-30 RX ADMIN — APIXABAN 2.5 MG: 5 TABLET, FILM COATED ORAL at 20:19

## 2021-11-30 RX ADMIN — IPRATROPIUM BROMIDE AND ALBUTEROL SULFATE 1 AMPULE: .5; 2.5 SOLUTION RESPIRATORY (INHALATION) at 07:52

## 2021-11-30 RX ADMIN — SODIUM CHLORIDE, PRESERVATIVE FREE 10 ML: 5 INJECTION INTRAVENOUS at 20:20

## 2021-11-30 RX ADMIN — FUROSEMIDE 40 MG: 10 INJECTION, SOLUTION INTRAMUSCULAR; INTRAVENOUS at 13:36

## 2021-11-30 RX ADMIN — TRAZODONE HYDROCHLORIDE 100 MG: 100 TABLET ORAL at 20:19

## 2021-11-30 RX ADMIN — MEROPENEM 1000 MG: 1 INJECTION, POWDER, FOR SOLUTION INTRAVENOUS at 04:12

## 2021-11-30 RX ADMIN — IPRATROPIUM BROMIDE AND ALBUTEROL SULFATE 1 AMPULE: .5; 2.5 SOLUTION RESPIRATORY (INHALATION) at 12:14

## 2021-11-30 RX ADMIN — IPRATROPIUM BROMIDE AND ALBUTEROL SULFATE 1 AMPULE: .5; 2.5 SOLUTION RESPIRATORY (INHALATION) at 19:52

## 2021-11-30 RX ADMIN — INSULIN LISPRO 4 UNITS: 100 INJECTION, SOLUTION INTRAVENOUS; SUBCUTANEOUS at 20:28

## 2021-11-30 RX ADMIN — SODIUM CHLORIDE, PRESERVATIVE FREE 10 ML: 5 INJECTION INTRAVENOUS at 08:34

## 2021-11-30 RX ADMIN — DIPHENHYDRAMINE HYDROCHLORIDE 25 MG: 25 TABLET ORAL at 00:00

## 2021-11-30 RX ADMIN — INSULIN LISPRO 8 UNITS: 100 INJECTION, SOLUTION INTRAVENOUS; SUBCUTANEOUS at 17:52

## 2021-11-30 RX ADMIN — MAGNESIUM SULFATE HEPTAHYDRATE 2000 MG: 2 INJECTION, SOLUTION INTRAVENOUS at 09:12

## 2021-11-30 RX ADMIN — METHYLPREDNISOLONE SODIUM SUCCINATE 40 MG: 40 INJECTION, POWDER, FOR SOLUTION INTRAMUSCULAR; INTRAVENOUS at 08:34

## 2021-11-30 RX ADMIN — SODIUM CHLORIDE, PRESERVATIVE FREE 10 ML: 5 INJECTION INTRAVENOUS at 09:12

## 2021-11-30 RX ADMIN — Medication 10 MG: at 20:20

## 2021-11-30 RX ADMIN — CEFTRIAXONE SODIUM 2000 MG: 2 INJECTION, POWDER, FOR SOLUTION INTRAMUSCULAR; INTRAVENOUS at 13:37

## 2021-11-30 RX ADMIN — INSULIN LISPRO 2 UNITS: 100 INJECTION, SOLUTION INTRAVENOUS; SUBCUTANEOUS at 08:34

## 2021-11-30 RX ADMIN — HYDROXYZINE HYDROCHLORIDE 25 MG: 25 TABLET ORAL at 03:58

## 2021-11-30 RX ADMIN — INSULIN LISPRO 6 UNITS: 100 INJECTION, SOLUTION INTRAVENOUS; SUBCUTANEOUS at 13:32

## 2021-11-30 RX ADMIN — IPRATROPIUM BROMIDE AND ALBUTEROL SULFATE 1 AMPULE: .5; 2.5 SOLUTION RESPIRATORY (INHALATION) at 15:53

## 2021-11-30 RX ADMIN — ACETAMINOPHEN 650 MG: 325 TABLET ORAL at 21:52

## 2021-11-30 RX ADMIN — PANTOPRAZOLE SODIUM 40 MG: 40 INJECTION, POWDER, FOR SOLUTION INTRAVENOUS at 08:34

## 2021-11-30 ASSESSMENT — PAIN SCALES - GENERAL
PAINLEVEL_OUTOF10: 0
PAINLEVEL_OUTOF10: 3
PAINLEVEL_OUTOF10: 0

## 2021-11-30 ASSESSMENT — PAIN DESCRIPTION - LOCATION: LOCATION: NECK

## 2021-11-30 NOTE — PROGRESS NOTES
ICU TRANSFER NOTE    1:49 PM2021  Admit Date: 2021   Hospital Day: 3                                                   PCP   David Sheriff MD  : 1960                                                       CodeStatus:Full Code  MRN: [id number]                                                        Diet: ADULT DIET; Regular; 4 carb choices (60 gm/meal)     Cassie Meadows is a 64 yo F PMH COPD not on home O2, CHRISTA on CPAP at night, breast cancer s/p resection, bilateral PE on Eliquis, FSGS on prednisone, CKD3a, HTN who presented with SOB. Was found to have sepsis 2/2 Ecoli UTI, became hypotensive and required one day of Levophed in ICU. Was also treated for possible COPD exacerbation with BiPAP, IV solumedrol, and duonebs. Pt has been weaned off pressors and will be transferred to medical floors. Pt currently denies any dysuria, SOB, chest pain, cough, increased sputum. She is on 5L NC.       Vitals:    21 1210   BP:    Pulse:    Resp: 13   Temp:    SpO2: 100%     Scheduled Meds:   cefTRIAXone (ROCEPHIN) IV  2,000 mg IntraVENous Q24H    lidocaine 1 % injection  5 mL IntraDERmal Once    sodium chloride flush  5-40 mL IntraVENous 2 times per day    pantoprazole  40 mg IntraVENous Daily    insulin lispro  0-12 Units SubCUTAneous TID WC    insulin lispro  0-6 Units SubCUTAneous Nightly    sodium chloride flush  5-40 mL IntraVENous 2 times per day    apixaban  2.5 mg Oral BID    traZODone  100 mg Oral Nightly    sodium chloride flush  5-40 mL IntraVENous 2 times per day    ipratropium-albuterol  1 ampule Inhalation Q4H WA     Continuous Infusions:   sodium chloride      dextrose      sodium chloride       PRN Meds:sodium chloride flush, sodium chloride, glucose, dextrose, glucagon (rDNA), dextrose, sodium chloride flush, sodium chloride flush, sodium chloride, ondansetron **OR** ondansetron, polyethylene glycol, acetaminophen **OR** acetaminophen, perflutren lipid microspheres, albuterol      The objective and subjective findings as well as the ICU course of treatment have been reviewed with the ICU team. The treatment plan has been reviewed with the ICU team. The patient is being transferred to [place] [address] in stable condition. Acute hypoxic respiratory failure 2/2 metabolic acidosis, pulmonary edema   Patient complained of shortness of breath, history of COPD and CHRISTA, on admission patient was placed on BiPAP due to low SPO2 levels. Wheezing on auscultation. ABG showed metabolic acidosis with respiratory acidosis. Patient received 2 doses of COVID vaccine. - O2 requirement improving with Lasix  - Continue O2 supplementation, wean as tolerated   - DuoNebs  - CPAP at night     UTI  - Urinary culture grew E. Coli. - Merrem for 3 days, will switch to Rocephin for 2 more days  - BC NGTD     CHANTELLE on CKD3a likely 2/2 prerenal, improving  Patient presented with hypotension. Baseline creatinine 1.5 on admission 2.2 today 1.6  Follows with Dr. Katie Beard  - BMP  - monitor lytes replace as needed  - avoid Nephrotoxic meds     Hyperkalemia 2/2 likely CHANTELLE (resolving)  - monitor BMP        Chronic Issues:  Bilateral PE history - Continue Eliquis  HTN- Holding home meds metoprolol 50 mg BID due to hypotension  Breast cancer hx s/p resection - left modified radical mastectomy for a left invasive ductal carcinoma. She completed neoadjuvant chemotherapy with TCHPx cycles with a partial clinical response but with minimal if any pathologic response. Following her surgical therapy, she was treated with adjuvant radiation therapy and adjuvant Kadcyla and endocrine therapy with Letrozole. Focal segmental glomerulosclerosis history- On prednisone at home  L nephrectomy due to complicated kidney stones with abcess   COPD- Received IV solumedrol in ICU, will start back on prednisone 2.5 daily at home for FSGS. Continue DuoNebs  HFpEF - Echo 6/21: Eection fraction of 65%-70%.  Mild-moderate concentric left ventricular hypertrophy. Grade I diastolic dysfunction. ProBNP on admission 4K. Patient had some wheezing on physical exam. Last weight 260 lb.  Today 270 lb   - Will continue to monitor and consider scheduling Lasix   - daily weights, strict I's and       Enrique Alexander DO, PGY-1  11/30/21  1:49 PM
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
## 2021-11-30 NOTE — PROGRESS NOTES
Patient continues to be alert and oriented. Patient complains of not being able to sleep. ICU residents made aware and are trying multiple medication to address her concerns.

## 2021-11-30 NOTE — CONSULTS
List of Home Medications the patient is currently taking is complete. Home Medication list in EPIC updated to reflect changes noted below. Source of medications in list is [facility] [place] medications list.      Medications added:   None     Medications removed:   Melatonin   Nicotine patch   Trazodone    Medications adjusted:   Acetaminophen instructions added   Duoneb q4h adjusted to q6h   Prednisone 2.5 mg daily adjusted to 10 mg daily   Temazepam 30 mg every other day adjusted to 15 mg nightly        Medication Sig   predniSONE 2.5 MG tablet  Take 10 mg by mouth daily    sodium bicarbonate 650 MG tablet Take 1 tablet by mouth 2 times daily   albuterol sulfate HFA  inhaler Inhale 2 puffs into the lungs every 4 hours as needed    apixaban 2.5 MG TABS tablet Take 1 tablet by mouth 2 times daily   mometasone-formoterol 200-5 MCG/ACT inhaler Inhale 2 puffs into the lungs every 12 hours   ipratropium-albuterol nebulizer solution Inhale 1 vial into the lungs every 6 hours    risperiDONE 0.5 MG tablet Take 0.5 mg by mouth 2 times daily   metoprolol tartrate 50 MG tablet Take 50 mg by mouth 2 times daily   Acetaminophen 325 MG CAPS Take 1,000 mg by mouth every 6 hours as needed (pain)    temazepam 15 MG capsule Take 15 mg by mouth nightly. hydrOXYzine 50 MG tablet Take 25 mg by mouth 2 times daily    letrozole 2.5 MG tablet Take 2.5 mg by mouth daily       Please call with any questions.   Bettie Avelar PharmD, BCPS  Wireless: [id number]  Main pharmacy: [id number]  11/30/2021 1:58 PM

## 2021-11-30 NOTE — PROGRESS NOTES
Hospitalist Progress Note      PCP: Janes Pastor MD    Date of Admission: 11/28/2021    Subjective:   Getting bedside echo done. Still weak but no specific complaints. Medications:  Reviewed    Infusion Medications    dextrose      sodium chloride       Scheduled Medications    pantoprazole  40 mg IntraVENous Daily    insulin lispro  0-12 Units SubCUTAneous TID WC    insulin lispro  0-6 Units SubCUTAneous Nightly    sodium chloride flush  5-40 mL IntraVENous 2 times per day    apixaban  2.5 mg Oral BID    traZODone  100 mg Oral Nightly    sodium chloride flush  5-40 mL IntraVENous 2 times per day    meropenem  1,000 mg IntraVENous Q12H    ipratropium-albuterol  1 ampule Inhalation Q4H WA    methylPREDNISolone  40 mg IntraVENous Daily     PRN Meds: glucose, dextrose, glucagon (rDNA), dextrose, sodium chloride flush, sodium chloride flush, sodium chloride, ondansetron **OR** ondansetron, polyethylene glycol, acetaminophen **OR** acetaminophen, perflutren lipid microspheres, albuterol      Intake/Output Summary (Last 24 hours) at 11/30/2021 0818  Last data filed at 11/30/2021 0600  Gross per 24 hour   Intake 1150 ml   Output 3350 ml   Net -2200 ml       Physical Exam Performed:    BP (!) 134/96   Pulse 82   Temp 98 °F (36.7 °C) (Axillary)   Resp 19   Ht 5' 8\" (1.727 m)   Wt 270 lb (122.5 kg)   LMP 03/09/2014   SpO2 98%   BMI 41.05 kg/m²     General appearance: appears ill  Respiratory:  Normal respiratory effort. No resp distress  Cardiovascular: Regular rate and rhythm   Musculoskeletal: No clubbing, cyanosis or edema bilaterally. Skin: Skin color, texture, turgor normal.  No rashes or lesions. Neurologic:  Neurovascularly intact without any focal sensory/motor deficits.  Cranial nerves: II-XII intact, grossly non-focal.  Psychiatric: no anxiety, nml thought content    Labs:   Recent Labs     11/28/21  1213 11/29/21  0414 11/30/21  0406   WBC 16.7* 15.6* 15.4*   HGB 11.7* 11.4*

## 2021-11-30 NOTE — CARE COORDINATION
Case Management Assessment           Daily Note                 Date/ Time of Note: 11/30/2021 10:58 AM         Note completed by: Jody Sharif RN    Patient Name: Farooq Richards  YOB: 1960    Diagnosis:Hypoxia [R09.02]  Acute respiratory failure with hypoxia ([address] [J96.01]  Septic shock ([address] [A41.9, R65.21]  Patient Admission Status: Inpatient    Date of Admission:11/28/2021 11:33 AM Length of Stay: 2 GLOS: GMLOS: 4.8 (amlos)    Current Plan of Care: Wean O2, diuresis, monitor kidney function, bipap at HS  ________________________________________________________________________________________  PT AM-PAC:   / 24 per last evaluation on: tbd    OT AM-PAC:   / 24 per last evaluation on: tbd    DME Needs for discharge: tbd  ________________________________________________________________________________________  Discharge Plan: [address] [place] [address] ([facility]): [address] [place] [address]    Tentative discharge date: 12/01 vs 12/02    Current barriers to discharge: wean oxygen    Referrals completed: Other: [facility] Esparza    Resources/ information provided: Not indicated at this time  ________________________________________________________________________________________  Case Management Notes:  Patient is from [place] [address], spoke with Kavita Puente, patient can return with no needs, no precert, currently being ruled out for Covid. Attempted to contact patient legal guardian Sania Mcneil with no answer, left message to return call. Shoaib and her family were provided with choice of provider; she and her family are in agreement with the discharge plan.     Care Transition Patient: No    Jody Sharif RN  [facility]  Case Management Department  Ph: [phone]

## 2021-11-30 NOTE — PROGRESS NOTES
Spoke with her son Candice Gallego), updated about the patient clinical status. Constent for PICC line placement was obtained.

## 2021-11-30 NOTE — PLAN OF CARE
Problem: Falls - Risk of:  Goal: Will remain free from falls  Description: Will remain free from falls  11/30/2021 0951 by Nash Almendarez RN  Outcome: Ongoing  11/30/2021 0441 by Carolina Davidson RN  Outcome: Ongoing  Goal: Absence of physical injury  Description: Absence of physical injury  11/30/2021 0951 by Nash Almendarez RN  Outcome: Ongoing  11/30/2021 0441 by Carolina Davidson RN  Outcome: Ongoing     Problem: Pain:  Goal: Pain level will decrease  Description: Pain level will decrease  11/30/2021 0951 by Nash Almendarez RN  Outcome: Ongoing  11/30/2021 0441 by Carolina Davidson RN  Outcome: Ongoing  Goal: Control of acute pain  Description: Control of acute pain  11/30/2021 0951 by Nash Almendarez RN  Outcome: Ongoing  11/30/2021 0441 by Carolina Davidson RN  Outcome: Ongoing  Goal: Control of chronic pain  Description: Control of chronic pain  11/30/2021 0951 by Nash Almendarez RN  Outcome: Ongoing  11/30/2021 0441 by Carolina Davidson RN  Outcome: Ongoing     Problem: Breathing Pattern - Ineffective:  Goal: Ability to achieve and maintain a regular respiratory rate will improve  Description: Ability to achieve and maintain a regular respiratory rate will improve  11/30/2021 0951 by Nash Almendarez RN  Outcome: Ongoing  11/30/2021 0441 by Carolina Davidson RN  Outcome: Ongoing     Problem: Falls - Risk of:  Goal: Will remain free from falls  Description: Will remain free from falls  11/30/2021 0951 by Nash Almendarez RN  Outcome: Ongoing  11/30/2021 0441 by Carolina Davidson RN  Outcome: Ongoing  Goal: Absence of physical injury  Description: Absence of physical injury  11/30/2021 0951 by Nash Almendarez RN  Outcome: Ongoing  11/30/2021 0441 by Carolina Davidson RN  Outcome: Ongoing     Problem: Pain:  Goal: Pain level will decrease  Description: Pain level will decrease  11/30/2021 0951 by Nash Almendarez RN  Outcome: Ongoing  11/30/2021 0441 by Carolina Davidson RN  Outcome: Ongoing  Goal: Control of acute pain  Description:

## 2021-12-01 VITALS
OXYGEN SATURATION: 97 % | HEART RATE: 84 BPM | HEIGHT: 68 IN | RESPIRATION RATE: 13 BRPM | WEIGHT: 270 LBS | TEMPERATURE: 97.9 F | BODY MASS INDEX: 40.92 KG/M2 | SYSTOLIC BLOOD PRESSURE: 147 MMHG | DIASTOLIC BLOOD PRESSURE: 102 MMHG

## 2021-12-01 LAB
ANION GAP SERPL CALCULATED.3IONS-SCNC: 10 MMOL/L (ref 3–16)
BASOPHILS ABSOLUTE: 0 K/UL (ref 0–0.2)
BASOPHILS RELATIVE PERCENT: 0.1 %
BUN BLDV-MCNC: 49 MG/DL (ref 7–20)
CALCIUM SERPL-MCNC: 9 MG/DL (ref 8.3–10.6)
CHLORIDE BLD-SCNC: 102 MMOL/L (ref 99–110)
CO2: 28 MMOL/L (ref 21–32)
CREAT SERPL-MCNC: 1.3 MG/DL (ref 0.6–1.1)
EOSINOPHILS ABSOLUTE: 0 K/UL (ref 0–0.6)
EOSINOPHILS RELATIVE PERCENT: 0 %
ESTIMATED AVERAGE GLUCOSE: 165.7 MG/DL
GFR AFRICAN AMERICAN: 51
GFR NON-AFRICAN AMERICAN: 42
GLUCOSE BLD-MCNC: 161 MG/DL (ref 70–99)
GLUCOSE BLD-MCNC: 165 MG/DL (ref 70–99)
GLUCOSE BLD-MCNC: 229 MG/DL (ref 70–99)
GLUCOSE BLD-MCNC: 244 MG/DL (ref 70–99)
HBA1C MFR BLD: 7.4 %
HCT VFR BLD CALC: 35.6 % (ref 36–48)
HEMOGLOBIN: 11.3 G/DL (ref 12–16)
LYMPHOCYTES ABSOLUTE: 0.5 K/UL (ref 1–5.1)
LYMPHOCYTES RELATIVE PERCENT: 4.2 %
MAGNESIUM: 1.9 MG/DL (ref 1.8–2.4)
MCH RBC QN AUTO: 27.7 PG (ref 26–34)
MCHC RBC AUTO-ENTMCNC: 31.7 G/DL (ref 31–36)
MCV RBC AUTO: 87.2 FL (ref 80–100)
MONOCYTES ABSOLUTE: 0.5 K/UL (ref 0–1.3)
MONOCYTES RELATIVE PERCENT: 4.3 %
NEUTROPHILS ABSOLUTE: 11.1 K/UL (ref 1.7–7.7)
NEUTROPHILS RELATIVE PERCENT: 91.4 %
PDW BLD-RTO: 19.4 % (ref 12.4–15.4)
PERFORMED ON: ABNORMAL
PLATELET # BLD: 165 K/UL (ref 135–450)
PMV BLD AUTO: 7.9 FL (ref 5–10.5)
POTASSIUM REFLEX MAGNESIUM: 5 MMOL/L (ref 3.5–5.1)
RBC # BLD: 4.08 M/UL (ref 4–5.2)
SODIUM BLD-SCNC: 140 MMOL/L (ref 136–145)
WBC # BLD: 12.2 K/UL (ref 4–11)

## 2021-12-01 PROCEDURE — 36415 COLL VENOUS BLD VENIPUNCTURE: CPT

## 2021-12-01 PROCEDURE — 83735 ASSAY OF MAGNESIUM: CPT

## 2021-12-01 PROCEDURE — 2700000000 HC OXYGEN THERAPY PER DAY

## 2021-12-01 PROCEDURE — 94761 N-INVAS EAR/PLS OXIMETRY MLT: CPT

## 2021-12-01 PROCEDURE — 2580000003 HC RX 258: Performed by: INTERNAL MEDICINE

## 2021-12-01 PROCEDURE — 6370000000 HC RX 637 (ALT 250 FOR IP): Performed by: STUDENT IN AN ORGANIZED HEALTH CARE EDUCATION/TRAINING PROGRAM

## 2021-12-01 PROCEDURE — 99233 SBSQ HOSP IP/OBS HIGH 50: CPT | Performed by: INTERNAL MEDICINE

## 2021-12-01 PROCEDURE — 2500000003 HC RX 250 WO HCPCS: Performed by: INTERNAL MEDICINE

## 2021-12-01 PROCEDURE — 6370000000 HC RX 637 (ALT 250 FOR IP): Performed by: INTERNAL MEDICINE

## 2021-12-01 PROCEDURE — 6360000002 HC RX W HCPCS: Performed by: INTERNAL MEDICINE

## 2021-12-01 PROCEDURE — 6360000002 HC RX W HCPCS

## 2021-12-01 PROCEDURE — C9113 INJ PANTOPRAZOLE SODIUM, VIA: HCPCS

## 2021-12-01 PROCEDURE — 85025 COMPLETE CBC W/AUTO DIFF WBC: CPT

## 2021-12-01 PROCEDURE — 6360000002 HC RX W HCPCS: Performed by: STUDENT IN AN ORGANIZED HEALTH CARE EDUCATION/TRAINING PROGRAM

## 2021-12-01 PROCEDURE — 94640 AIRWAY INHALATION TREATMENT: CPT

## 2021-12-01 PROCEDURE — 2580000003 HC RX 258: Performed by: STUDENT IN AN ORGANIZED HEALTH CARE EDUCATION/TRAINING PROGRAM

## 2021-12-01 PROCEDURE — 80048 BASIC METABOLIC PNL TOTAL CA: CPT

## 2021-12-01 RX ORDER — MAGNESIUM SULFATE IN WATER 40 MG/ML
2000 INJECTION, SOLUTION INTRAVENOUS ONCE
Status: COMPLETED | OUTPATIENT
Start: 2021-12-01 | End: 2021-12-01

## 2021-12-01 RX ORDER — PANTOPRAZOLE SODIUM 40 MG/10ML
40 INJECTION, POWDER, LYOPHILIZED, FOR SOLUTION INTRAVENOUS DAILY
Qty: 30 EACH | Refills: 0 | Status: ON HOLD | OUTPATIENT
Start: 2021-12-01 | End: 2022-04-04

## 2021-12-01 RX ORDER — CEFDINIR 300 MG/1
300 CAPSULE ORAL 2 TIMES DAILY
Qty: 14 CAPSULE | Refills: 0 | Status: SHIPPED | OUTPATIENT
Start: 2021-12-01 | End: 2021-12-08

## 2021-12-01 RX ORDER — METOPROLOL TARTRATE 5 MG/5ML
2.5 INJECTION INTRAVENOUS ONCE
Status: COMPLETED | OUTPATIENT
Start: 2021-12-01 | End: 2021-12-01

## 2021-12-01 RX ORDER — METOPROLOL TARTRATE 50 MG/1
50 TABLET, FILM COATED ORAL 2 TIMES DAILY
Status: DISCONTINUED | OUTPATIENT
Start: 2021-12-01 | End: 2021-12-01 | Stop reason: HOSPADM

## 2021-12-01 RX ADMIN — METOPROLOL TARTRATE 50 MG: 50 TABLET, FILM COATED ORAL at 10:06

## 2021-12-01 RX ADMIN — PANTOPRAZOLE SODIUM 40 MG: 40 INJECTION, POWDER, FOR SOLUTION INTRAVENOUS at 10:07

## 2021-12-01 RX ADMIN — INSULIN LISPRO 2 UNITS: 100 INJECTION, SOLUTION INTRAVENOUS; SUBCUTANEOUS at 12:23

## 2021-12-01 RX ADMIN — SODIUM CHLORIDE, PRESERVATIVE FREE 10 ML: 5 INJECTION INTRAVENOUS at 10:06

## 2021-12-01 RX ADMIN — IPRATROPIUM BROMIDE AND ALBUTEROL SULFATE 1 AMPULE: .5; 2.5 SOLUTION RESPIRATORY (INHALATION) at 09:02

## 2021-12-01 RX ADMIN — APIXABAN 2.5 MG: 5 TABLET, FILM COATED ORAL at 10:06

## 2021-12-01 RX ADMIN — METOPROLOL TARTRATE 2.5 MG: 5 INJECTION INTRAVENOUS at 10:07

## 2021-12-01 RX ADMIN — PREDNISONE 2.5 MG: 5 TABLET ORAL at 10:06

## 2021-12-01 RX ADMIN — INSULIN LISPRO 2 UNITS: 100 INJECTION, SOLUTION INTRAVENOUS; SUBCUTANEOUS at 10:00

## 2021-12-01 RX ADMIN — IPRATROPIUM BROMIDE AND ALBUTEROL SULFATE 1 AMPULE: .5; 2.5 SOLUTION RESPIRATORY (INHALATION) at 12:13

## 2021-12-01 RX ADMIN — MAGNESIUM SULFATE HEPTAHYDRATE 2000 MG: 2 INJECTION, SOLUTION INTRAVENOUS at 07:05

## 2021-12-01 RX ADMIN — CEFTRIAXONE SODIUM 2000 MG: 2 INJECTION, POWDER, FOR SOLUTION INTRAMUSCULAR; INTRAVENOUS at 12:16

## 2021-12-01 ASSESSMENT — PAIN SCALES - GENERAL
PAINLEVEL_OUTOF10: 0

## 2021-12-01 NOTE — PROGRESS NOTES
Attempted to call report to [facility] and was transferred to patient's unit but was unable to reach anybody or leave voice mail because mailbox was full. Will attempt again shortly.

## 2021-12-01 NOTE — PLAN OF CARE
Problem: Falls - Risk of:  Goal: Will remain free from falls  Description: Will remain free from falls  12/1/2021 1140 by Toñito Morse RN  Outcome: Ongoing  Note: Patient will remain free of falls throughout the duration of the shift. Bed locked in lowest position. Non skid socks on. Bed and chair alarm activated. Call light and belongings within reach. Patient calls out approprietly. Room door open at all times. Patient rounded on frequently. 3/4 side rails up. Will continue to monitor. Problem: Pain:  Goal: Control of acute pain  Description: Control of acute pain  12/1/2021 1140 by Toñito Morse RN  Outcome: Ongoing  Note: Patient's pain will remain at a manageable level throughout the duration of the shift. Patient understands prescribed pain medication regimen for its uses and side effects. Patient understands how to rate pain appropriately and when to call out if experiencing breakthrough pain. Patient understands how to utilize non pharmacological pain management techniques. Will continue to monitor. Problem: Discharge Planning:  Goal: Discharged to appropriate level of care  Description: Discharged to appropriate level of care  Outcome: Ongoing  Note: Plan to discharge back to facility today.

## 2021-12-01 NOTE — DISCHARGE SUMMARY
INTERNAL MEDICINE DEPARTMENT AT [address] [place] [address]  DISCHARGE SUMMARY    Patient ID: Ed Burton                                             Discharge Date: 12/1/2021   Patient's PCP: Natalee Turpin MD                                          Discharge Physician: Pedro Luis Moore DO   Admit Date: 11/28/2021   Admitting Physician: Dana Saucedo MD    PROBLEMS DURING HOSPITALIZATION:  Present on Admission:   Hypoxia   CHANTELLE (acute kidney injury) ([address]   Metabolic acidosis   Acute on chronic respiratory failure with hypoxia and hypercapnia ([address]   Sepsis with acute renal failure and septic shock (HCC)    HPI: Lavinia Daniel is a 62 yo F PMH COPD, CHRISTA, breast cancer s/p resection, bilateral PE on Eliquis, FSGS on prednisone, CKD, HTN who presented with SOB. Was found to have sepsis 2/2 Ecoli UTI, became hypotensive and required one day of Levophed in ICU. She was treated with 3 days of Merrem followed by 1 day of Rocephin. Was also treated for acute hypoxic respiratory failure which was thought to be related to sepsis and fluid overload. She was diuresed with IV lasix and responded well. Pt has been weaned off pressors and BP is now stable. Pt currently denies any dysuria, SOB, chest pain, cough, increased sputum. She is saturating well on 5L NC. She denies any current complaints and will be discharged in stable condition today to complete antibiotic course with 7 days of cefdinir for the UTI. Physical Exam:  BP (!) 150/104   Pulse 83   Temp 97.9 °F (36.6 °C) (Oral)   Resp 14   Ht 5' 8\" (1.727 m)   Wt 270 lb (122.5 kg)   LMP 03/09/2014   SpO2 95%   BMI 41.05 kg/m²   Constitutional:       Appearance: Normal appearance. HENT:      Head: Normocephalic. Mouth/Throat:      Mouth: Mucous membranes are moist.   Eyes:      Extraocular Movements: Extraocular movements intact. Cardiovascular:      Rate and Rhythm: Normal rate and regular rhythm. Pulses: Normal pulses.       Heart sounds: Normal heart sounds. Pulmonary:      Lungs: mild wheezing  Abdominal:      General: Abdomen is flat. Bowel sounds are normal.      Palpations: Abdomen is soft. Musculoskeletal:         General: Normal range of motion. Cervical back: Normal range of motion. Neurological:      General: No focal deficit present. Mental Status: She is alert and oriented to person, place, and time. Mental status is at baseline. Psychiatric:         Mood and Affect: Mood normal.         Behavior: Behavior normal.     RAD:   XR CHEST PORTABLE   Final Result   1. Left jugular CVC, tip in SVC. No pneumothorax   2.  Consolidation right lower lobe       XR CHEST PORTABLE   Final Result   Atelectatic changes right lung base       Consults: nephrology  Significant Diagnostic Studies:  CXR  Treatments: Levophed, BiPAP, merrem, rocephin, lasix, duoneb  Disposition: long term care facility  Discharged Condition: Stable  Follow Up: Primary Care Physician in one week    DISCHARGE MEDICATION:     Medication List      START taking these medications    cefdinir 300 MG capsule  Commonly known as: OMNICEF  Take 1 capsule by mouth 2 times daily for 7 days     pantoprazole 40 MG injection  Commonly known as: PROTONIX  Infuse 40 mg intravenously daily        CHANGE how you take these medications    predniSONE 2.5 MG tablet  Commonly known as: DELTASONE  Take 1 tablet by mouth daily  What changed: how much to take        CONTINUE taking these medications    apixaban 2.5 MG Tabs tablet  Commonly known as: Eliquis  Take 1 tablet by mouth 2 times daily     Dulera 200-5 MCG/ACT inhaler  Generic drug: mometasone-formoterol     hydrOXYzine 50 MG tablet  Commonly known as: ATARAX     ipratropium-albuterol 0.5-2.5 (3) MG/3ML Soln nebulizer solution  Commonly known as: DUONEB     letrozole 2.5 MG tablet  Commonly known as: FEMARA     metoprolol tartrate 50 MG tablet  Commonly known as: LOPRESSOR     risperiDONE 0.5 MG tablet  Commonly known as: RISPERDAL sodium bicarbonate 650 MG tablet  Take 1 tablet by mouth 2 times daily     temazepam 15 MG capsule  Commonly known as: RESTORIL     Tylenol 325 MG Caps  Generic drug: Acetaminophen     Ventolin  (90 Base) MCG/ACT inhaler  Generic drug: albuterol sulfate HFA           Where to Get Your Medications      [facility] [address] [place] [address]    Phone: [phone]   · cefdinir 300 MG capsule  · pantoprazole 40 MG injection       Activity: activity as tolerated  Diet: renal diet  Wound Care: none needed    Time Spent on discharge is more than 30 minutes    Signed:  Ladarius Denton DO,  PGY-1  12/1/2021

## 2021-12-01 NOTE — PROGRESS NOTES
Patient is alert and oriented x4, mildly hypertensive this AM. Remains on 5L per nasal cannula, tolerating well. Denies dyspnea at rest, chest pain, nausea or vomiting. Denies pain. She is tolerating PO intake and is urinating without difficulty. States she feels close back to her baseline. Plan to discharge back to facility this afternoon.

## 2021-12-01 NOTE — PROGRESS NOTES
Patient complains of lack of sleep. [facility] residents made aware and melatonin was prescribed. Lights are dimmed and quiet environment provided to promote sleep.
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
## 2021-12-01 NOTE — PLAN OF CARE
Problem: Falls - Risk of:  Goal: Will remain free from falls  Description: Will remain free from falls  Outcome: Ongoing  Goal: Absence of physical injury  Description: Absence of physical injury  Outcome: Ongoing     Patient is at an increased risk for falls, see Perez Fall Score. Fall precautions in place per protocol. Fall cloth at foot of bed, fall band around wrist and nonskid footwear in place. Patient instructed to call for assistance by using nurse call light before attempting to get out of bed. Bed is locked and in lowest position with side rails up 3/4. Bed alarm engaged. Room door left open. Belongings and call light within reach. Hourly visual safety checks in place. Problem: Pain:  Goal: Pain level will decrease  Description: Pain level will decrease  Outcome: Ongoing  Goal: Control of acute pain  Description: Control of acute pain  Outcome: Ongoing  Goal: Control of chronic pain  Description: Control of chronic pain  Outcome: Ongoing     Pain assessed q4 hrs and PRN using the 0-10 pain scale. Pain goal reviewed with patient. PRN pain medications given as needed and comfort provided non-pharmacologically (i.e. Repositioning). Patient instructed to report pain to RN.     Problem: Breathing Pattern - Ineffective:  Goal: Ability to achieve and maintain a regular respiratory rate will improve  Description: Ability to achieve and maintain a regular respiratory rate will improve  Outcome: Ongoing

## 2021-12-01 NOTE — DISCHARGE INSTR - COC
Continuity of Care Form    Patient Name: Marcy Reich   :  1960  MRN:  [id number]    Admit date:  2021  Discharge date:  ***    Code Status Order: Full Code   Advance Directives:      Admitting Physician:  Phani Marks MD  PCP: Kiki Alas MD    Discharging Nurse: [facility] Unit/Room#: 2000/0419-28  Discharging Unit Phone Number: ***    Emergency Contact:   Extended Emergency Contact Information  Primary Emergency Contact: devante españa  Home Phone: [phone]  Work Phone: [phone]  Mobile Phone: [phone]  Relation: Brother/Sister   needed? No  Secondary Emergency Contact: brad oneill  Home Phone: [phone]  Mobile Phone: [phone]  Relation: Child  Preferred language: English   needed?  Yes    Past Surgical History:  Past Surgical History:   Procedure Laterality Date    APPENDECTOMY      BREAST SURGERY      CHOLECYSTECTOMY      CT BIOPSY RENAL  2020    CT BIOPSY RENAL 2020 [facility] CT SCAN    MASTECTOMY Left 2019    LEFT MODIFIED RADICAL MASTECTOMY; EXPAREL INTERCOSTAL BLOCK performed by Venkata Duff MD at [address] Left 2019    COMPLEX CLOSURE OF LEFT BREAST, FULL THICKNESS SKIN GRAFT LEFT BREAST 4x3 performed by Karli Rome MD at [address] Left     TUBAL LIGATION      TUNNELED VENOUS PORT PLACEMENT  2019    still in     [address] [facility] LEFT  2020    US BREAST BIOPSY NEEDLE ADDITIONAL LEFT 2020 Guillaume Cody MD [facility] MOB ULTRASOUND       Immunization History:   Immunization History   Administered Date(s) Administered    Influenza Virus Vaccine 2014    Pneumococcal Polysaccharide (Bplavhdep14) 2014       Active Problems:  Patient Active Problem List   Diagnosis Code    Malignant neoplasm of upper-inner quadrant of left breast in female, estrogen receptor positive ([facility] C50.212, Z17.0    Breast wound, left, sequela S21.002S    Bilateral pulmonary embolism (HCC) I26.99    Acute on chronic respiratory failure with hypoxia and hypercapnia ([facility]) J96.21, J96.22    Acute deep vein thrombosis (DVT) of lower extremity ([facility]) I82.409    Centrilobular emphysema ([facility]) J43.2    Hypoxemia R09.02    Pneumonia due to infectious organism J18.9    Nephrotic syndrome N04.9    CHANTELLE (acute kidney injury) ([facility] N17.9    Chest pain R07.9    Hematuria R31.9    COPD exacerbation ([facility]) J44.1    Hypoxia S88.84    Metabolic acidosis H51.5    Sepsis with acute renal failure and septic shock ([facility]) A41.9, R65.21, N17.9       Isolation/Infection:   Isolation            No Isolation          Patient Infection Status       Infection Onset Added Last Indicated Last Indicated By Review Planned Expiration Resolved Resolved By    None active    Resolved    COVID-19 (Rule Out) 11/28/21 11/28/21 11/28/21 COVID-19 (Ordered)   11/29/21 Rule-Out Test Resulted    C-diff Rule Out 07/16/21 07/16/21 07/16/21 Clostridium difficile toxin/antigen (Ordered)   11/29/21 Bartley Lennox Post, RN    Order from previous admission     COVID-19 (Rule Out) 07/16/21 07/16/21 07/16/21 COVID-19 (Ordered)   07/17/21 Rule-Out Test Resulted    COVID-19 (Rule Out) 09/22/20 09/22/20 09/22/20 COVID-19 (Ordered)   09/24/20 Rule-Out Test Resulted            Nurse Assessment:  Last Vital Signs: BP (!) 150/104   Pulse 83   Temp 98.4 °F (36.9 °C) (Oral)   Resp 13   Ht 5' 8\" (1.727 m)   Wt 270 lb (122.5 kg)   LMP 03/09/2014   SpO2 97%   BMI 41.05 kg/m²     Last documented pain score (0-10 scale): Pain Level: 0  Last Weight:   Wt Readings from Last 1 Encounters:   11/28/21 270 lb (122.5 kg)     Mental Status:  oriented and alert    IV Access:  - None    Nursing Mobility/ADLs:  Walking   Assisted  Transfer  Assisted  Bathing  Assisted  Dressing  Assisted  Toileting  Assisted  Feeding  Assisted  Med Admin  Assisted  Med Delivery   whole    Wound Care Documentation and Therapy:        Elimination:  Continence:    Bowel: Yes  Bladder: Yes  Urinary Catheter: None   Colostomy/Ileostomy/Ileal Conduit: No       Date of Last BM: 11/28    Intake/Output Summary (Last 24 hours) at 12/1/2021 0907  Last data filed at 12/1/2021 0600  Gross per 24 hour   Intake 917.93 ml   Output 3600 ml   Net -2682.07 ml     I/O last 3 completed shifts: In: 917.9 [P.O.:720; IV Piggyback:197.9]  Out: 3800 [Urine:3800]    Safety Concerns:     None    Impairments/Disabilities:      None    Nutrition Therapy:  Current Nutrition Therapy:   - Oral Diet:  Carb Control 4 carbs/meal (1800kcals/day)    Routes of Feeding: Oral  Liquids: Thin Liquids  Daily Fluid Restriction: no  Last Modified Barium Swallow with Video (Video Swallowing Test): not done    Treatments at the Time of Hospital Discharge:   Respiratory Treatments: ipratropium-albuterol nebulizer   Oxygen Therapy:  is on oxygen at 5 L/min per nasal cannula.   Ventilator:    - No ventilator support    Rehab Therapies: Physical Therapy and Occupational Therapy  Weight Bearing Status/Restrictions: No weight bearing restirctions  Other Medical Equipment (for information only, NOT a DME order):  walker  Other Treatments: ***    Patient's personal belongings (please select all that are sent with patient):  None    RN SIGNATURE:  Electronically signed by Carly Lang RN on 12/1/21 at 11:46 AM EST    CASE MANAGEMENT/SOCIAL WORK SECTION    Inpatient Status Date: 11/28/2021    Readmission Risk Assessment Score:  Readmission Risk              Risk of Unplanned Readmission:  32           Discharging to Facility/ Agency   Name: [address] [place] [address]:[address] [place] [address], [address] [facility]  [phone]  [phone]      / signature: Electronically signed by Caitlin Coffman RN on 12/1/21 at 9:08 AM EST    PHYSICIAN SECTION    Prognosis: {Prognosis:1937383097}    Condition at Discharge: [address] Patient Condition:245582586}    Rehab Potential (if transferring to Rehab): {Prognosis:6437224025}    Recommended Labs or Other Treatments After Discharge: ***    Physician Certification: I certify the above information and transfer of Viann Gitelman  is necessary for the continuing treatment of the diagnosis listed and that she requires {Admit to Appropriate Level of Care:13020} for {GREATER/LESS:288754347} 30 days.      Update Admission H&P: {CHP DME Changes in JUW:686939865}    PHYSICIAN SIGNATURE:  {Esignature:479170974}

## 2021-12-01 NOTE — CARE COORDINATION
Case Management Assessment            Discharge Note                    Date / Time of Note: 12/1/2021 8:57 AM                  Discharge Note Completed by: Radha Cam RN    Patient Name: Lilia Cortes   YOB: 1960  Diagnosis: Hypoxia [R09.02]  Acute respiratory failure with hypoxia ([facility] [J96.01]  Septic shock ([facility] [A41.9, R65.21]   Date / Time: 11/28/2021 11:33 AM    Current PCP: Pedro Gilbert MD  Clinic patient: No    Hospitalization in the last 30 days: No    Advance Directives:  Code Status: Full Code  [place] DNR form completed and on chart: Not Indicated    Financial:  Payor: Palka Chance / Plan: Ashley Seymour / Product Type: *No Product type* /      [facility]    [facility] [address] [address]  [address] [place] [address]  Phone: [phone] Fax: [facility] [address]  [phone] - F [phone]  [address] [place] [address]  Phone: [phone] Fax: [address] [place], [place] [phone] - f [phone]  [facility] [address]  Phone: [phone] Fax: [phone]    [facility]/pharmacy #[id number]- [address] [phone] [facility] [phone]  [facility] [address]  Phone: [phone] Fax: [phone]      Assistance purchasing medications?:    Assistance provided by Case Management: None at this time    Does patient want to participate in local refill/ meds to beds program?:      Meds To Beds General Rules:  1. Can ONLY be done Monday- Friday between 8:30am-5pm  2. Prescription(s) must be in pharmacy by 3pm to be filled same day  3. Copy of patient's insurance/ prescription drug card and patient face sheet must be sent along with the prescription(s)  4. Cost of Rx cannot be added to hospital bill. If financial assistance is needed, please contact unit  or ;  or  CANNOT provide pharmacy voucher for patients co-pays  5. Patients can then  the prescription on their way out of the hospital at discharge, or pharmacy can deliver to the bedside if staff is available. (payment due at time of pick-up or delivery - cash, check, or card accepted)     Able to afford home medications/ co-pay costs: Yes    ADLS:  Current PT AM-PAC Score:   /24  Current OT AM-PAC Score:   /24      DISCHARGE Disposition: [facility] (LTC): [facility]  Phone: [phone]  Fax: [phone]   Nurse to call report to [phone]    LOC at discharge: [address] [place] [address] Ave Completed: Yes    Notification completed in HENS/PAS?:  Not Applicable    IMM Completed:   Yes, Case management has presented and reviewed IMM letter #2 to the patient and/or family/ POA. Patient and/or family/POA verbalized understanding of their medicare rights and appeal process if needed. Patient and/or family/POA has signed, initialed and placed today's date (12/01/2021) and time (1000) on IMM letter #2 on the the appropriate lines. Patient and/or family/POA, copy of letter offered and they are aware that this original copy of IMM letter #2 is available prior to discharge from the paper chart on the unit. Electronic documentation has been entered into epic for IMM letter #2 and original paper copy has been added to the paper chart at the nurses station. Transportation:  Transportation PLAN for discharge: EMS transportation   Mode of Transport: Ambulance stretcher - BLS  Reason for medical transport:  Morbid Obesity causing patient to unsafely ambulate without assistance and requires ambulance transport due to max assist x 2  Name of Transport Company: [employer]  Phone: [phone]  Time of Transport: 1300    Transport form completed: Not Indicated    Home Care:  Home Care ordered at

## 2021-12-01 NOTE — PROGRESS NOTES
--  49*   CREATININE 1.8*  --  1.6*  --  1.3*   GLUCOSE 232*  --  212*  --  244*    < > = values in this interval not displayed. Ca/Mg/Phos:   Recent Labs     11/29/21  0414 11/30/21  0406 11/30/21  0614 12/01/21  0509   CALCIUM 9.0 8.7  --  9.0   MG  --   --  1.70* 1.90     Hepatic:   Recent Labs     11/28/21  1214   AST 32   ALT 30   BILITOT 0.4   ALKPHOS 107     Troponin:   Recent Labs     11/28/21  1214 11/28/21  1748 11/28/21  2227   TROPONINI 0.05* 0.03* 0.02*     BNP: No results for input(s): BNP in the last 72 hours. Lipids: No results for input(s): CHOL, TRIG, HDL, LDLCALC, LABVLDL in the last 72 hours.   ABGs:   Recent Labs     11/29/21  0903   PHART 7.257*   PO2ART 109.0*   RTW9ZOK 55.4*     INR:   Recent Labs     11/28/21  1214   INR 1.30*     UA:  Recent Labs     11/28/21  1414   COLORU Yellow   CLARITYU CLOUDY*   GLUCOSEU Negative   BILIRUBINUR SMALL*   KETUA Negative   SPECGRAV 1.025   BLOODU LARGE*   PHUR 6.5   PROTEINU >=300*   UROBILINOGEN 0.2   NITRU Negative   LEUKOCYTESUR LARGE*   LABMICR YES   URINETYPE NotGiven      Urine Microscopic:   Recent Labs     11/28/21  1414   BACTERIA 1+*   WBCUA >100*   RBCUA 5-10*     Urine Culture:   Recent Labs     11/28/21  1408   LABURIN >100,000 CFU/ml     Urine Chemistry:   Recent Labs     11/29/21  0903 11/30/21  0900   LABCREA 54.8 59.4   PROTEINUR  --  76.00*   NAUR <20  --        Objective:   Vitals: BP (!) 150/104   Pulse 83   Temp 97.9 °F (36.6 °C) (Oral)   Resp 14   Ht 5' 8\" (1.727 m)   Wt 270 lb (122.5 kg)   LMP 03/09/2014   SpO2 95%   BMI 41.05 kg/m²    Wt Readings from Last 3 Encounters:   11/28/21 270 lb (122.5 kg)   10/26/21 275 lb 9.6 oz (125 kg)   10/13/21 270 lb (122.5 kg)      24HR INTAKE/OUTPUT:      Intake/Output Summary (Last 24 hours) at 12/1/2021 1046  Last data filed at 12/1/2021 0600  Gross per 24 hour   Intake 917.93 ml   Output 3350 ml   Net -2432.07 ml     Constitutional:  Alert, awake, no apparent distress  NECK: supple, no JVD  Cardiovascular:  S1, S2 without m/r/g  Respiratory:  CTA B without w/r/r  Abdomen: +bs, soft, nt  Ext: - LE edema    Assessment and Plan:       IMAGING:  XR CHEST PORTABLE   Final Result   1. Left jugular CVC, tip in SVC. No pneumothorax   2. Consolidation right lower lobe      XR CHEST PORTABLE   Final Result   Atelectatic changes right lung base          Assessment/Plan   1. CHANTELLE on CKD 3 (resolved)     2. FSGS      3. Anemia     4. Acid- base/ Electrolyte imbalance      5. UTI      Plan   - Patient is being discharge today.  She is being send with Cefdinir for her UTI  - cont steroids - will need Po steroids at discharge for her FSGS   - COPD management   Recommend to dose adjust all medications  based on renal functions  Maintain SBP> 90 mmHg   Daily weights   AVOID NSAIDs  Avoid Nephrotoxins  Monitor Intake/Output  Call if significant decrease in urine output       Drew Rai MD PGY-2    This patient will be staffed with Dr. Karel Quiroz  [facility] [address] [place], [place] [phone]  [phone]       Agree with plan above   Cont steroids for FSGS management     Rachel Eaton MD

## 2021-12-02 LAB — BLOOD CULTURE, ROUTINE: NORMAL

## 2022-03-13 NOTE — PROGRESS NOTES
Spoke with patient about home CPAP/BiPAP, states she has a CPAP at her previous facility but does not wear it. Recommended wearing one of our units at night but pt refusing at this time. improved

## 2022-03-18 ENCOUNTER — TELEPHONE (OUTPATIENT)
Dept: SURGERY | Age: 62
End: 2022-03-18

## 2022-03-18 NOTE — TELEPHONE ENCOUNTER
- Called patient left message on voice mail to reschedule appointment on 10/19/22 and-mammogram.  - Appointment has been canceled. - Due to  leaving the [email or web address]. -   -  If you would like to stay with [facility], please contact us at [phone] to reschedule with a different provider. Dr. Trisha Badillo new number is [phone].   - Thank you

## 2022-04-01 ENCOUNTER — APPOINTMENT (OUTPATIENT)
Dept: GENERAL RADIOLOGY | Age: 62
DRG: 720 | End: 2022-04-01
Payer: MEDICAID

## 2022-04-01 ENCOUNTER — HOSPITAL ENCOUNTER (INPATIENT)
Age: 62
LOS: 4 days | Discharge: NURSING, REHAB OR LONG-TERM CARE | DRG: 720 | End: 2022-04-05
Attending: EMERGENCY MEDICINE | Admitting: INTERNAL MEDICINE
Payer: MEDICAID

## 2022-04-01 DIAGNOSIS — J18.9 HCAP (HEALTHCARE-ASSOCIATED PNEUMONIA): Primary | ICD-10-CM

## 2022-04-01 LAB
ANION GAP SERPL CALCULATED.3IONS-SCNC: 16 MMOL/L (ref 3–16)
BACTERIA: ABNORMAL /HPF
BASE EXCESS VENOUS: -1.1 MMOL/L (ref -2–3)
BASOPHILS ABSOLUTE: 0 K/UL (ref 0–0.2)
BASOPHILS RELATIVE PERCENT: 0.4 %
BILIRUBIN URINE: NEGATIVE
BLOOD, URINE: ABNORMAL
BUN BLDV-MCNC: 32 MG/DL (ref 7–20)
CALCIUM SERPL-MCNC: 8.7 MG/DL (ref 8.3–10.6)
CARBOXYHEMOGLOBIN: 3 % (ref 0–1.5)
CHLORIDE BLD-SCNC: 106 MMOL/L (ref 99–110)
CLARITY: CLEAR
CO2: 21 MMOL/L (ref 21–32)
COLOR: YELLOW
CREAT SERPL-MCNC: 1.4 MG/DL (ref 0.6–1.1)
EKG ATRIAL RATE: 107 BPM
EKG DIAGNOSIS: NORMAL
EKG P AXIS: 61 DEGREES
EKG P-R INTERVAL: 134 MS
EKG Q-T INTERVAL: 340 MS
EKG QRS DURATION: 86 MS
EKG QTC CALCULATION (BAZETT): 453 MS
EKG R AXIS: 46 DEGREES
EKG T AXIS: 70 DEGREES
EKG VENTRICULAR RATE: 107 BPM
EOSINOPHILS ABSOLUTE: 0 K/UL (ref 0–0.6)
EOSINOPHILS RELATIVE PERCENT: 0.6 %
EPITHELIAL CELLS, UA: ABNORMAL /HPF (ref 0–5)
GFR AFRICAN AMERICAN: 46
GFR NON-AFRICAN AMERICAN: 38
GLUCOSE BLD-MCNC: 116 MG/DL (ref 70–99)
GLUCOSE URINE: NEGATIVE MG/DL
HCO3 VENOUS: 25.8 MMOL/L (ref 24–28)
HCT VFR BLD CALC: 38.8 % (ref 36–48)
HEMOGLOBIN, VEN, REDUCED: 49.8 %
HEMOGLOBIN: 12.4 G/DL (ref 12–16)
INFLUENZA A: NOT DETECTED
INFLUENZA B: NOT DETECTED
KETONES, URINE: NEGATIVE MG/DL
LACTIC ACID: 1.3 MMOL/L (ref 0.4–2)
LACTIC ACID: 2.3 MMOL/L (ref 0.4–2)
LEUKOCYTE ESTERASE, URINE: ABNORMAL
LYMPHOCYTES ABSOLUTE: 0.5 K/UL (ref 1–5.1)
LYMPHOCYTES RELATIVE PERCENT: 6.2 %
MAGNESIUM: 1 MG/DL (ref 1.8–2.4)
MCH RBC QN AUTO: 28.4 PG (ref 26–34)
MCHC RBC AUTO-ENTMCNC: 31.9 G/DL (ref 31–36)
MCV RBC AUTO: 89.1 FL (ref 80–100)
METHEMOGLOBIN VENOUS: 0.5 % (ref 0–1.5)
MICROSCOPIC EXAMINATION: YES
MONOCYTES ABSOLUTE: 0.3 K/UL (ref 0–1.3)
MONOCYTES RELATIVE PERCENT: 3.2 %
NEUTROPHILS ABSOLUTE: 7.4 K/UL (ref 1.7–7.7)
NEUTROPHILS RELATIVE PERCENT: 89.6 %
NITRITE, URINE: POSITIVE
O2 SAT, VEN: 48 %
PCO2, VEN: 51.2 MMHG (ref 41–51)
PDW BLD-RTO: 17.1 % (ref 12.4–15.4)
PH UA: 6 (ref 5–8)
PH VENOUS: 7.31 (ref 7.35–7.45)
PHOSPHORUS: 2.5 MG/DL (ref 2.5–4.9)
PLATELET # BLD: 179 K/UL (ref 135–450)
PMV BLD AUTO: 7.8 FL (ref 5–10.5)
PO2, VEN: <30 MMHG (ref 25–40)
POTASSIUM SERPL-SCNC: 4.4 MMOL/L (ref 3.5–5.1)
PRO-BNP: 1201 PG/ML (ref 0–124)
PROCALCITONIN: 0.3 NG/ML (ref 0–0.15)
PROTEIN UA: 100 MG/DL
RBC # BLD: 4.36 M/UL (ref 4–5.2)
RBC UA: ABNORMAL /HPF (ref 0–4)
SARS-COV-2 RNA, RT PCR: NOT DETECTED
SODIUM BLD-SCNC: 143 MMOL/L (ref 136–145)
SPECIFIC GRAVITY UA: 1.02 (ref 1–1.03)
TCO2 CALC VENOUS: 27 MMOL/L
TROPONIN: <0.01 NG/ML
URINE TYPE: ABNORMAL
UROBILINOGEN, URINE: 0.2 E.U./DL
WBC # BLD: 8.2 K/UL (ref 4–11)
WBC UA: ABNORMAL /HPF (ref 0–5)

## 2022-04-01 PROCEDURE — 6370000000 HC RX 637 (ALT 250 FOR IP): Performed by: INTERNAL MEDICINE

## 2022-04-01 PROCEDURE — 2700000000 HC OXYGEN THERAPY PER DAY

## 2022-04-01 PROCEDURE — 84100 ASSAY OF PHOSPHORUS: CPT

## 2022-04-01 PROCEDURE — 71045 X-RAY EXAM CHEST 1 VIEW: CPT

## 2022-04-01 PROCEDURE — 2580000003 HC RX 258: Performed by: EMERGENCY MEDICINE

## 2022-04-01 PROCEDURE — 94640 AIRWAY INHALATION TREATMENT: CPT

## 2022-04-01 PROCEDURE — 96365 THER/PROPH/DIAG IV INF INIT: CPT

## 2022-04-01 PROCEDURE — 84145 PROCALCITONIN (PCT): CPT

## 2022-04-01 PROCEDURE — 94761 N-INVAS EAR/PLS OXIMETRY MLT: CPT

## 2022-04-01 PROCEDURE — 83605 ASSAY OF LACTIC ACID: CPT

## 2022-04-01 PROCEDURE — 80048 BASIC METABOLIC PNL TOTAL CA: CPT

## 2022-04-01 PROCEDURE — 99223 1ST HOSP IP/OBS HIGH 75: CPT | Performed by: INTERNAL MEDICINE

## 2022-04-01 PROCEDURE — 93005 ELECTROCARDIOGRAM TRACING: CPT | Performed by: EMERGENCY MEDICINE

## 2022-04-01 PROCEDURE — 83880 ASSAY OF NATRIURETIC PEPTIDE: CPT

## 2022-04-01 PROCEDURE — 83735 ASSAY OF MAGNESIUM: CPT

## 2022-04-01 PROCEDURE — 6360000002 HC RX W HCPCS: Performed by: EMERGENCY MEDICINE

## 2022-04-01 PROCEDURE — 82803 BLOOD GASES ANY COMBINATION: CPT

## 2022-04-01 PROCEDURE — 6360000002 HC RX W HCPCS: Performed by: INTERNAL MEDICINE

## 2022-04-01 PROCEDURE — 2580000003 HC RX 258: Performed by: INTERNAL MEDICINE

## 2022-04-01 PROCEDURE — 99285 EMERGENCY DEPT VISIT HI MDM: CPT

## 2022-04-01 PROCEDURE — 84484 ASSAY OF TROPONIN QUANT: CPT

## 2022-04-01 PROCEDURE — 6370000000 HC RX 637 (ALT 250 FOR IP): Performed by: EMERGENCY MEDICINE

## 2022-04-01 PROCEDURE — 36415 COLL VENOUS BLD VENIPUNCTURE: CPT

## 2022-04-01 PROCEDURE — 85025 COMPLETE CBC W/AUTO DIFF WBC: CPT

## 2022-04-01 PROCEDURE — 87040 BLOOD CULTURE FOR BACTERIA: CPT

## 2022-04-01 PROCEDURE — 96361 HYDRATE IV INFUSION ADD-ON: CPT

## 2022-04-01 PROCEDURE — 2060000000 HC ICU INTERMEDIATE R&B

## 2022-04-01 PROCEDURE — 81001 URINALYSIS AUTO W/SCOPE: CPT

## 2022-04-01 PROCEDURE — 87636 SARSCOV2 & INF A&B AMP PRB: CPT

## 2022-04-01 RX ORDER — MECOBALAMIN 5000 MCG
5 TABLET,DISINTEGRATING ORAL NIGHTLY
Status: DISCONTINUED | OUTPATIENT
Start: 2022-04-02 | End: 2022-04-01

## 2022-04-01 RX ORDER — ONDANSETRON 2 MG/ML
4 INJECTION INTRAMUSCULAR; INTRAVENOUS EVERY 6 HOURS PRN
Status: DISCONTINUED | OUTPATIENT
Start: 2022-04-01 | End: 2022-04-05 | Stop reason: HOSPADM

## 2022-04-01 RX ORDER — ALBUTEROL SULFATE 2.5 MG/3ML
2.5 SOLUTION RESPIRATORY (INHALATION) ONCE
Status: COMPLETED | OUTPATIENT
Start: 2022-04-01 | End: 2022-04-01

## 2022-04-01 RX ORDER — SODIUM CHLORIDE 9 MG/ML
INJECTION, SOLUTION INTRAVENOUS PRN
Status: DISCONTINUED | OUTPATIENT
Start: 2022-04-01 | End: 2022-04-05 | Stop reason: HOSPADM

## 2022-04-01 RX ORDER — SODIUM CHLORIDE, SODIUM LACTATE, POTASSIUM CHLORIDE, AND CALCIUM CHLORIDE .6; .31; .03; .02 G/100ML; G/100ML; G/100ML; G/100ML
1000 INJECTION, SOLUTION INTRAVENOUS ONCE
Status: COMPLETED | OUTPATIENT
Start: 2022-04-01 | End: 2022-04-01

## 2022-04-01 RX ORDER — PREDNISONE 20 MG/1
40 TABLET ORAL DAILY
Status: DISCONTINUED | OUTPATIENT
Start: 2022-04-01 | End: 2022-04-04

## 2022-04-01 RX ORDER — LETROZOLE 2.5 MG/1
2.5 TABLET, FILM COATED ORAL DAILY
Status: DISCONTINUED | OUTPATIENT
Start: 2022-04-01 | End: 2022-04-05 | Stop reason: HOSPADM

## 2022-04-01 RX ORDER — ACETAMINOPHEN 500 MG
1000 TABLET ORAL ONCE
Status: COMPLETED | OUTPATIENT
Start: 2022-04-01 | End: 2022-04-01

## 2022-04-01 RX ORDER — POLYETHYLENE GLYCOL 3350 17 G/17G
17 POWDER, FOR SOLUTION ORAL DAILY PRN
Status: DISCONTINUED | OUTPATIENT
Start: 2022-04-01 | End: 2022-04-05 | Stop reason: HOSPADM

## 2022-04-01 RX ORDER — ONDANSETRON 4 MG/1
4 TABLET, ORALLY DISINTEGRATING ORAL EVERY 8 HOURS PRN
Status: DISCONTINUED | OUTPATIENT
Start: 2022-04-01 | End: 2022-04-05 | Stop reason: HOSPADM

## 2022-04-01 RX ORDER — MAGNESIUM SULFATE IN WATER 40 MG/ML
2000 INJECTION, SOLUTION INTRAVENOUS ONCE
Status: COMPLETED | OUTPATIENT
Start: 2022-04-01 | End: 2022-04-02

## 2022-04-01 RX ORDER — SODIUM CHLORIDE 0.9 % (FLUSH) 0.9 %
5-40 SYRINGE (ML) INJECTION PRN
Status: DISCONTINUED | OUTPATIENT
Start: 2022-04-01 | End: 2022-04-05 | Stop reason: HOSPADM

## 2022-04-01 RX ORDER — SODIUM CHLORIDE 0.9 % (FLUSH) 0.9 %
5-40 SYRINGE (ML) INJECTION EVERY 12 HOURS SCHEDULED
Status: DISCONTINUED | OUTPATIENT
Start: 2022-04-01 | End: 2022-04-05 | Stop reason: HOSPADM

## 2022-04-01 RX ORDER — MECOBALAMIN 5000 MCG
15 TABLET,DISINTEGRATING ORAL NIGHTLY
Status: DISCONTINUED | OUTPATIENT
Start: 2022-04-02 | End: 2022-04-05 | Stop reason: HOSPADM

## 2022-04-01 RX ORDER — IPRATROPIUM BROMIDE AND ALBUTEROL SULFATE 2.5; .5 MG/3ML; MG/3ML
1 SOLUTION RESPIRATORY (INHALATION)
Status: DISCONTINUED | OUTPATIENT
Start: 2022-04-01 | End: 2022-04-02

## 2022-04-01 RX ORDER — ACETAMINOPHEN 650 MG/1
650 SUPPOSITORY RECTAL EVERY 6 HOURS PRN
Status: DISCONTINUED | OUTPATIENT
Start: 2022-04-01 | End: 2022-04-05 | Stop reason: HOSPADM

## 2022-04-01 RX ORDER — PREDNISONE 10 MG/1
10 TABLET ORAL DAILY
Status: DISCONTINUED | OUTPATIENT
Start: 2022-04-01 | End: 2022-04-01

## 2022-04-01 RX ORDER — ACETAMINOPHEN 325 MG/1
650 TABLET ORAL EVERY 6 HOURS PRN
Status: DISCONTINUED | OUTPATIENT
Start: 2022-04-01 | End: 2022-04-05 | Stop reason: HOSPADM

## 2022-04-01 RX ORDER — IPRATROPIUM BROMIDE AND ALBUTEROL SULFATE 2.5; .5 MG/3ML; MG/3ML
1 SOLUTION RESPIRATORY (INHALATION) ONCE
Status: COMPLETED | OUTPATIENT
Start: 2022-04-01 | End: 2022-04-01

## 2022-04-01 RX ORDER — SODIUM CHLORIDE 9 MG/ML
INJECTION, SOLUTION INTRAVENOUS CONTINUOUS
Status: ACTIVE | OUTPATIENT
Start: 2022-04-01 | End: 2022-04-01

## 2022-04-01 RX ORDER — HYDROXYZINE HYDROCHLORIDE 25 MG/1
25 TABLET, FILM COATED ORAL ONCE
Status: COMPLETED | OUTPATIENT
Start: 2022-04-01 | End: 2022-04-01

## 2022-04-01 RX ORDER — ALBUTEROL SULFATE 2.5 MG/3ML
2.5 SOLUTION RESPIRATORY (INHALATION) EVERY 4 HOURS PRN
Status: DISCONTINUED | OUTPATIENT
Start: 2022-04-01 | End: 2022-04-05 | Stop reason: HOSPADM

## 2022-04-01 RX ORDER — SODIUM CHLORIDE, SODIUM LACTATE, POTASSIUM CHLORIDE, AND CALCIUM CHLORIDE .6; .31; .03; .02 G/100ML; G/100ML; G/100ML; G/100ML
1000 INJECTION, SOLUTION INTRAVENOUS ONCE
Status: DISCONTINUED | OUTPATIENT
Start: 2022-04-01 | End: 2022-04-05 | Stop reason: HOSPADM

## 2022-04-01 RX ADMIN — CEFEPIME HYDROCHLORIDE 2000 MG: 2 INJECTION, POWDER, FOR SOLUTION INTRAVENOUS at 04:11

## 2022-04-01 RX ADMIN — SODIUM CHLORIDE, POTASSIUM CHLORIDE, SODIUM LACTATE AND CALCIUM CHLORIDE 1000 ML: 600; 310; 30; 20 INJECTION, SOLUTION INTRAVENOUS at 03:05

## 2022-04-01 RX ADMIN — LETROZOLE 2.5 MG: 2.5 TABLET ORAL at 08:14

## 2022-04-01 RX ADMIN — APIXABAN 2.5 MG: 2.5 TABLET, FILM COATED ORAL at 20:29

## 2022-04-01 RX ADMIN — PREDNISONE 40 MG: 20 TABLET ORAL at 12:39

## 2022-04-01 RX ADMIN — HYDROXYZINE HYDROCHLORIDE 25 MG: 25 TABLET, FILM COATED ORAL at 21:33

## 2022-04-01 RX ADMIN — CEFEPIME HYDROCHLORIDE 2000 MG: 2 INJECTION, POWDER, FOR SOLUTION INTRAVENOUS at 20:28

## 2022-04-01 RX ADMIN — ALBUTEROL SULFATE 2.5 MG: 2.5 SOLUTION RESPIRATORY (INHALATION) at 02:23

## 2022-04-01 RX ADMIN — VANCOMYCIN HYDROCHLORIDE 1750 MG: 10 INJECTION, POWDER, LYOPHILIZED, FOR SOLUTION INTRAVENOUS at 04:44

## 2022-04-01 RX ADMIN — APIXABAN 2.5 MG: 2.5 TABLET, FILM COATED ORAL at 08:15

## 2022-04-01 RX ADMIN — SODIUM CHLORIDE, PRESERVATIVE FREE 10 ML: 5 INJECTION INTRAVENOUS at 08:14

## 2022-04-01 RX ADMIN — ACETAMINOPHEN 1000 MG: 500 TABLET ORAL at 03:04

## 2022-04-01 RX ADMIN — SODIUM CHLORIDE: 9 INJECTION, SOLUTION INTRAVENOUS at 06:49

## 2022-04-01 RX ADMIN — IPRATROPIUM BROMIDE AND ALBUTEROL SULFATE 1 AMPULE: 2.5; .5 SOLUTION RESPIRATORY (INHALATION) at 12:36

## 2022-04-01 RX ADMIN — IPRATROPIUM BROMIDE AND ALBUTEROL SULFATE 1 AMPULE: 2.5; .5 SOLUTION RESPIRATORY (INHALATION) at 08:29

## 2022-04-01 RX ADMIN — IPRATROPIUM BROMIDE AND ALBUTEROL SULFATE 1 AMPULE: 2.5; .5 SOLUTION RESPIRATORY (INHALATION) at 15:28

## 2022-04-01 RX ADMIN — SODIUM CHLORIDE, PRESERVATIVE FREE 10 ML: 5 INJECTION INTRAVENOUS at 20:29

## 2022-04-01 RX ADMIN — IPRATROPIUM BROMIDE AND ALBUTEROL SULFATE 1 AMPULE: 2.5; .5 SOLUTION RESPIRATORY (INHALATION) at 21:55

## 2022-04-01 RX ADMIN — ALBUTEROL SULFATE 2.5 MG: 2.5 SOLUTION RESPIRATORY (INHALATION) at 02:39

## 2022-04-01 RX ADMIN — IPRATROPIUM BROMIDE AND ALBUTEROL SULFATE 1 AMPULE: .5; 2.5 SOLUTION RESPIRATORY (INHALATION) at 02:23

## 2022-04-01 RX ADMIN — CEFEPIME HYDROCHLORIDE 2000 MG: 2 INJECTION, POWDER, FOR SOLUTION INTRAVENOUS at 12:40

## 2022-04-01 ASSESSMENT — PAIN SCALES - GENERAL
PAINLEVEL_OUTOF10: 0

## 2022-04-01 ASSESSMENT — ENCOUNTER SYMPTOMS
GASTROINTESTINAL NEGATIVE: 1
COUGH: 1
SHORTNESS OF BREATH: 1
EYES NEGATIVE: 1
TROUBLE SWALLOWING: 0
SORE THROAT: 0

## 2022-04-01 NOTE — CONSULTS
Clinical Pharmacy Progress Note    Admit date: 4/1/2022     Subjective/Objective:  Pt presented to ED with SOB, dysuria, fatigue and found to have Pneumonia (HAP). Pharmacy is consulted to dose Vancomycin x1 dose in ED per Dr. Kojo Cottrell. Ht = 172.7 in  Wt = 122.5 kg    Assessment/Plan:  · Will order Vancomycin 1750 mg IV x1 (~15mg/kg), for administration in ED per ER pharmacy consult. · If Vancomycin is to continue on admission and pharmacy is to manage dosing, please re-consult with admission orders.     Thanks--  Darrell Sotelo, PharmD, BCPS
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
## 2022-04-01 NOTE — PROGRESS NOTES
HOSPITAL MEDICINE  - PROGRESS NOTE    Admit Date: 4/1/2022         Interval History:     63 y.o. female COPD ON 5L home O2 and chr Pred,   breast cancer on femara  -who presents from SNF  with  SOB, productive cough, fatigue and  urinary frequency. -febrile to 100.4 °F with borderline blood pressure and tachycardia in the ED  -has not been on oxygen for several days at the nursing home sec to equipmt malfunction   .  Subjective:   occ cough    Objective:   Afebrile    Diet: ADULT DIET; Regular      Data:   Scheduled Meds: Reviewed  Continuous Infusions:   sodium chloride      sodium chloride 75 mL/hr at 04/01/22 0649       Intake/Output Summary (Last 24 hours) at 4/1/2022 1021  Last data filed at 4/1/2022 0809  Gross per 24 hour   Intake 140 ml   Output --   Net 140 ml     CBC:   Recent Labs     04/01/22  0207   WBC 8.2   HGB 12.4        BMP:  Recent Labs     04/01/22  0207      K 4.4      CO2 21   BUN 32*   CREATININE 1.4*   GLUCOSE 116*     ABGs:   Lab Results   Component Value Date    PHART 7.257 11/29/2021    PO2ART 109.0 11/29/2021    VHF5EHU 55.4 11/29/2021           Objective:   Vitals: /64   Pulse 83   Temp 98.7 °F (37.1 °C) (Oral)   Resp 20   Ht 5' 8\" (1.727 m)   Wt 270 lb (122.5 kg)   LMP 03/09/2014   SpO2 95%   BMI 41.05 kg/m²   General appearance: alert, appears stated age and cooperative  Skin: Skin color, texture, turgor normal.   HEENT: Head: Normocephalic, no lesions, without obvious abnormality.   Neck: supple  Lungs: clear to auscultation bilaterally  Heart: regular rate and rhythm, S1, S2 normal, no murmur, click, rub or gallop  Abdomen: soft, non-tender; bowel sounds normal; no masses,  no organomegaly  Extremities: no edema  Neurologic: Mental status: Alert, oriented, thought content appropriate      Assessment & Plan:        #Sepsis secondary to Healthcare associated pneumonia-bacterial.  I  Flu,COVID-19 PCR negative  Ct Vanc and cefepime pending MRSA nasal swab result  Procalc elevated at 0.3  Pulm toilet  Repeat lactate as mildly elevated  On 5mg home Pred chronically-will increase  To 40mg    #Chronic respiratory failure secondary to COPD,   -at baseline oxygen requirement of  5L currently  Pred burst    #Grade 2 diastolic dysfunction? acute exac  Elevated BNP  Low BP  Will repeat echo    #Moderate pulmonary hypertension  -on lasix    #History of PE and on chronic  -on Eliquis    #Morbid obesity with BMI of 41  -dietary and lifestyle modifications recommended.     #History of breast cancer and on oral chemotherapy       -PT/OT  -Social service consult       Disposition:2-3 days    Orquidea Arambula MD

## 2022-04-01 NOTE — PROGRESS NOTES
Occupational /Physcial Therapy  Sign off DC   Per conversation with pt and CM pt is LTC at [address] [place] [address] to return. Expressing no needs or concern for dc.      Lavinia German, MOT, OTR/L  Katya Figueroa, PT, DPT

## 2022-04-01 NOTE — CARE COORDINATION
Case Management Assessment           Initial Evaluation                Date / Time of Evaluation: 4/1/2022 2:44 PM                 Assessment Completed by: Yanira Saucedo RN    Patient Name: Yumiko Starkey     YOB: 1960  Diagnosis: HCAP (healthcare-associated pneumonia) [J18.9]     Date / Time: 4/1/2022  1:35 AM    Patient Admission Status: Inpatient    If patient is discharged prior to next notation, then this note serves as note for discharge by case management. Current PCP: Moe Pineda MD  Clinic Patient: No    Chart Reviewed: Yes  Patient/ Family Interviewed: Yes    Initial assessment completed at bedside with: patient     Hospitalization in the last 30 days: No    Emergency Contacts:  Extended Emergency Contact Information  Primary Emergency Contact: devante españa  Home Phone: [phone]  Work Phone: [phone]  Mobile Phone: [phone]  Relation: Brother/Sister   needed? No  Secondary Emergency Contact: brad oneill  Home Phone: [phone]  Mobile Phone: [phone]  Relation: Child  Preferred language: English   needed?  Yes    Advance Directives:   Code Status: Full Code        Financial  Payor: [facility] MEDICAID / Plan: Sunday Thomas / Product Type: *No Product type* /     Pre-cert required for SNF: Yes    Pharmacy    [facility] [address] [address]  [address] [address]  Phone: [phone] Fax: [facility] [address] [facility]  [address] [address]  Phone: [phone] Fax: [address] [phone] Chaitanya Fraire [phone]  [facility] [address]  Phone: [phone] Fax: [phone]    [address] [place] [address] Ozzie Liliya DAY [facility] [phone] - F [phone]  [address] [facility] [address]  Phone: [phone] Fax: [phone]      Potential assistance Purchasing Medications:    Does Patient want to participate in local refill/ meds to beds program?:      Meds To Beds General Rules:  1. Can ONLY be done Monday- Friday between 8:30am-5pm  2. Prescription(s) must be in pharmacy by 3pm to be filled same day  3. Copy of patient's insurance/ prescription drug card and patient face sheet must be sent along with the prescription(s)  4. Cost of Rx cannot be added to hospital bill. If financial assistance is needed, please contact unit  or ;  or  CANNOT provide pharmacy voucher for patients co-pays  5. Patients can then  the prescription on their way out of the hospital at discharge, or pharmacy can deliver to the bedside if staff is available. (payment due at time of pick-up or delivery - cash, check, or card accepted)     Able to afford home medications/ co-pay costs: Yes    ADLS  Support Systems:      PT AM-PAC:   /24  OT AM-PAC:   /24    HOUSING  Home Environment: LTC [facility].  Steps: 0    Plans to RETURN to current housing: Yes  Barriers to RETURNING to current housing: none    Home Care Information  Currently ACTIVE with [address]: No  Home Care Agency: Not Applicable          Durable Medical Equipment  DME Provider: n/a  Equipment: defer    Home Oxygen and [address] prior to admission: Yes  [address]: Not Applicable          DISCHARGE PLAN:  Disposition: Linda (LTC): Al Mann  Phone: [phone]  Fax: [phone]    Transportation PLAN for discharge: EMS transportation     Factors facilitating achievement of predicted outcomes: Family support    Barriers to discharge: Medical complications    Additional Case Management Notes:   Patient is from [facility], plans to return at discharge.   CM spoke with Shahrzad Savage at the facility who states pt can return at discharge, no pre-cert. Will need transport. The Plan for Transition of Care is related to the following treatment goals of HCAP (healthcare-associated pneumonia) [J18.9]    The Patient and/or patient representative Shoaib and her family were provided with a choice of provider and agrees with the discharge plan Yes    Freedom of choice list was provided with basic dialogue that supports the patient's individualized plan of care/goals and shares the quality data associated with the providers.  Yes    Care Transition patient: No    Debbie Webster RN  [facility] [employer]  Case Management Department  Ph: [phone]   Fax: [phone]

## 2022-04-01 NOTE — PROGRESS NOTES
Found on 2lpm 97% and has a previous home oxygen setting of 5lpm. Per policy, patient placed on 5lpm 100%. Notified RN of policy. Will continue to monitor.

## 2022-04-01 NOTE — PROGRESS NOTES
Pharmacy Note - Renal Dosing and Extended Infusion Beta-Lactam Adjustment    Cefepime 2000 mg every 12 hours ordered for patient. Per [facility] Renal Dose Adjustment Policy and Extended Infusion Beta-Lactam Policy, order will be changed to 2000 mg every 8 hours. Estimated Creatinine Clearance: Estimated Creatinine Clearance: 60 mL/min (A) (based on SCr of 1.4 mg/dL (H)). Dialysis Status, CHANTELLE, CKD:   BMI: Body mass index is 41.05 kg/m². Rationale for Adjustment: Agent is renally eliminated and demonstrates time-dependent effect on bacterial eradication. Extended-infusion dosing strategy aims to enhance microbiologic and clinical efficacy. Pharmacy will continue to monitor renal function, cultures and sensitivities (where available) and adjust dose as necessary. Please call with any questions.     Tamara De Luna, PharmD, BCPS

## 2022-04-01 NOTE — PROGRESS NOTES
Clinical Pharmacy Progress Note    IV Vancomycin - Management by Pharmacy    Consult Date(s): 4/1/22  Consulting Provider(s): Dr. Gio Piña / Plan    Sepsis 2/2 Pneumonia (HAP) - Vancomycin   Concurrent Antimicrobials: Cefepime   Day of Vanc Therapy: #1    Current Dosing Method: Bayesian-Guided AUC Dosing   Therapeutic Goal: 400-600 mg/L*hr   Current Dose / Frequency: 1000 mg IV every 18 hours   Plan / Rationale:   o Renal function stable with Scr 1.4 mg/dL (baseline hard to determine, ~1.3 mg/dL?)  o Patient given a 1 time LD of 1750mg IV (~14 mg/kg) this AM in the ED.  o Current dose of 1000 mg q18 hours predicts an AUC of 576 mg/L*hr and trough level of 17.4mg/L.  o Will continue current dose for now and check a level tomorrow @ 0600 to check fit to model. o MRSA Nares Ordered.  Will continue to monitor clinical condition and make adjustments to regimen as appropriate. Thank you for consulting Pharmacy! James Campbell, PharmD  PGY-1 Pharmacy Resident  [id number]  4/1/2022 7:50 AM        Interval update: Patient currently on 5L O2 via NC and hypotensive with MAP of 64. Subjective/Objective: Ms. Torito Stein is a 63 y.o. female with a PMHx significant for COPD, PE, asthma, HTN, who lives in a SNF  admitted for SOB, productive cough, fatigue and urinary symptoms. In ED she was febrile to 100.4*F, hypotensive and tachycardiac. She has been diagnosed with HCAP and pharmacy is to dose vancomycin for HCAP and Sepsis. Height:   Ht Readings from Last 1 Encounters:   04/01/22 5' 8\" (1.727 m)     Weight:   Wt Readings from Last 1 Encounters:   04/01/22 270 lb (122.5 kg)       Current & Prior Antimicrobial Regimen(s):   Cefepime 2000 mg every 8 hours EI   Vancomycin  o 1750 mg IV x Once (4/1/22)  o 1000 mg IV q18hr (4/1/22 - Current)    Level(s) / Doses:    Date Time Dose Level / Type of Level Interpretation   04/01/22 0600 1000 mg Q18 hours Random = ?            Note: Serum levels collected for AUC-based dosing may be high if collected in close proximity to the dose administered. This is not necessarily indicative of toxicity. Cultures & Sensitivities:    Date Site Micro Susceptibility / Result   4/1/22 Bloodx2 pending    4/1/22 Urine Sent    4/1/22 Flu A/B Negative    4/1/22 COVID-19 Negative    4/1/22 MRSA Nares Ordered      Labs / Ancillary Data:    Estimated Creatinine Clearance: 60 mL/min (A) (based on SCr of 1.4 mg/dL (H)).     Recent Labs     04/01/22  0207   CREATININE 1.4*   BUN 32*   WBC 8.2       Additional Lab Values / Findings of Note:    Procalcitonin:   Recent Labs     04/01/22  0207   PROCAL 0.30*

## 2022-04-01 NOTE — PROGRESS NOTES
4 Eyes Admission Assessment     I agree as the admission nurse that 2 RN's have performed a thorough Head to Toe Skin Assessment on the patient. ALL assessment sites listed below have been assessed on admission. Areas assessed by both nurses:   [x]   Head, Face, and Ears   [x]   Shoulders, Back, and Chest  [x]   Arms, Elbows, and Hands   [x]   Coccyx, Sacrum, and Ischium  [x]   Legs, Feet, and Heels        Does the Patient have Skin Breakdown?   Yes a wound was noted on the Admission Assessment and an LDA was Initiated documentation include the Adamaris-wound, Wound Assessment, Measurements, Dressing Treatment, Drainage, and Color\",         Manan Prevention initiated:  Yes   Wound Care Orders initiated:  No      WOC nurse consulted for Pressure Injury (Stage 3,4, Unstageable, DTI, NWPT, and Complex wounds) or Manan score 18 or lower:  No      Nurse 1 eSignature: Electronically signed by Alyssa Montalvo RN on 4/1/22 at 9:48 PM EDT    **SHARE this note so that the co-signing nurse is able to place an eSignature**    Nurse 2 eSignature: Electronically signed by Katrin Schroeder RN on 4/1/22 at 6:11 AM EDT

## 2022-04-01 NOTE — PROGRESS NOTES
Speech Language Pathology    Received order for swallowing screen (vs. Evaluation). Reviewed chart. Spoke with RN and patient. Currently on baseline regular texture diet and thin liquids. Per RN pt tolerating diet, no issues noted, and patient denies any current difficulty swallowing. If new concerns/signs of aspiration arise, please place order for bedside swallow evaluation. Will sign-off at this time. Jimmie Gavel, Sandi Curling, [id number]  Pg.  # [id number]

## 2022-04-01 NOTE — PROGRESS NOTES
Clinical Pharmacy Progress Note    IV Vancomycin - Management by Pharmacy    Consult Date(s): 4/1/22  Consulting Provider(s): Dr. Amanda Boateng / Plan    Sepsis 2/2 Pneumonia (HAP - Vancomycin   Concurrent Antimicrobials: Cefepime   Day of Vanc Therapy: #1    Current Dosing Method: Bayesian-Guided AUC Dosing   Therapeutic Goal: 400-600 mg/L*hr   Current Dose / Frequency: 1000 mg IV every 18 hours   Plan / Rationale:   o Per ED consult, patient received vanc 1750 mg IVx1  o With the above regimen, the expected AUC and trough are 576 mg/L.hr and 17.4 mg/L respectively.  Will continue to monitor clinical condition and make adjustments to regimen as appropriate. Thank you for consulting Pharmacy! Kimmy Ruby, PharmD, BCPS      Interval update:     Subjective/Objective: Ms. Nora Be is a 63 y.o. female with a PMHx significant for COPD, PE, asthma, HTN,  admitted for Sepsis 2/2 Pneumonia (HAP) . Pharmacy has been consulted to dose vancomycin. Height:   Ht Readings from Last 1 Encounters:   04/01/22 5' 8\" (1.727 m)     Weight:   Wt Readings from Last 1 Encounters:   04/01/22 270 lb (122.5 kg)       Current & Prior Antimicrobial Regimen(s):   Cefepime 2000 mg every 8 hours   Vancomycin 1750 mg IVx1; then 1000 mg IV every 18 hours    Level(s) / Doses:    Date Time Dose Level / Type of Level Interpretation                 Note: Serum levels collected for AUC-based dosing may be high if collected in close proximity to the dose administered. This is not necessarily indicative of toxicity. Cultures & Sensitivities:    Date Site Micro Susceptibility / Result   4/1/22 Bloodx2 pending            Labs / Ancillary Data:    Estimated Creatinine Clearance: 60 mL/min (A) (based on SCr of 1.4 mg/dL (H)).     Recent Labs     04/01/22 0207   CREATININE 1.4*   BUN 32*   WBC 8.2       Additional Lab Values / Findings of Note:    Procalcitonin:   Recent Labs     04/01/22 0207   PROCAL 0.30*

## 2022-04-01 NOTE — PLAN OF CARE
Problem: Falls - Risk of:  Goal: Will remain free from falls  Description: Will remain free from falls  Outcome: Ongoing    Bed in lowest position with all wheels locked. Bed alarm in use. Non skid footwear in use. Belongings and call light in reach. Problem: Pain:  Goal: Pain level will decrease  Description: Pain level will decrease  Outcome: Ongoing    Pt denies pain upon shift assessment. Will continue to monitor.

## 2022-04-02 LAB
ANION GAP SERPL CALCULATED.3IONS-SCNC: 9 MMOL/L (ref 3–16)
BASOPHILS ABSOLUTE: 0 K/UL (ref 0–0.2)
BASOPHILS RELATIVE PERCENT: 0.2 %
BUN BLDV-MCNC: 28 MG/DL (ref 7–20)
CALCIUM SERPL-MCNC: 8.9 MG/DL (ref 8.3–10.6)
CHLORIDE BLD-SCNC: 104 MMOL/L (ref 99–110)
CO2: 24 MMOL/L (ref 21–32)
CREAT SERPL-MCNC: 1.4 MG/DL (ref 0.6–1.1)
CREATININE URINE: 14.2 MG/DL (ref 28–259)
EOSINOPHILS ABSOLUTE: 0 K/UL (ref 0–0.6)
EOSINOPHILS RELATIVE PERCENT: 0 %
GFR AFRICAN AMERICAN: 46
GFR NON-AFRICAN AMERICAN: 38
GLUCOSE BLD-MCNC: 162 MG/DL (ref 70–99)
GLUCOSE BLD-MCNC: 266 MG/DL (ref 70–99)
GLUCOSE BLD-MCNC: 268 MG/DL (ref 70–99)
GLUCOSE BLD-MCNC: 369 MG/DL (ref 70–99)
HCT VFR BLD CALC: 35.8 % (ref 36–48)
HEMOGLOBIN: 11.6 G/DL (ref 12–16)
LYMPHOCYTES ABSOLUTE: 0.7 K/UL (ref 1–5.1)
LYMPHOCYTES RELATIVE PERCENT: 7.2 %
MCH RBC QN AUTO: 28.9 PG (ref 26–34)
MCHC RBC AUTO-ENTMCNC: 32.3 G/DL (ref 31–36)
MCV RBC AUTO: 89.5 FL (ref 80–100)
MONOCYTES ABSOLUTE: 0.4 K/UL (ref 0–1.3)
MONOCYTES RELATIVE PERCENT: 3.5 %
NEUTROPHILS ABSOLUTE: 9 K/UL (ref 1.7–7.7)
NEUTROPHILS RELATIVE PERCENT: 89.1 %
PDW BLD-RTO: 16.9 % (ref 12.4–15.4)
PERFORMED ON: ABNORMAL
PLATELET # BLD: 178 K/UL (ref 135–450)
PMV BLD AUTO: 7.8 FL (ref 5–10.5)
POTASSIUM REFLEX MAGNESIUM: 5.1 MMOL/L (ref 3.5–5.1)
PRO-BNP: 1775 PG/ML (ref 0–124)
PROTEIN PROTEIN: 42 MG/DL
PROTEIN/CREAT RATIO: 3 MG/DL
RBC # BLD: 4 M/UL (ref 4–5.2)
SODIUM BLD-SCNC: 137 MMOL/L (ref 136–145)
VANCOMYCIN RANDOM: 21.3 UG/ML
WBC # BLD: 10.1 K/UL (ref 4–11)

## 2022-04-02 PROCEDURE — 83036 HEMOGLOBIN GLYCOSYLATED A1C: CPT

## 2022-04-02 PROCEDURE — 84156 ASSAY OF PROTEIN URINE: CPT

## 2022-04-02 PROCEDURE — 87040 BLOOD CULTURE FOR BACTERIA: CPT

## 2022-04-02 PROCEDURE — 99233 SBSQ HOSP IP/OBS HIGH 50: CPT | Performed by: INTERNAL MEDICINE

## 2022-04-02 PROCEDURE — 85025 COMPLETE CBC W/AUTO DIFF WBC: CPT

## 2022-04-02 PROCEDURE — 80202 ASSAY OF VANCOMYCIN: CPT

## 2022-04-02 PROCEDURE — 6370000000 HC RX 637 (ALT 250 FOR IP): Performed by: INTERNAL MEDICINE

## 2022-04-02 PROCEDURE — 94640 AIRWAY INHALATION TREATMENT: CPT

## 2022-04-02 PROCEDURE — 83880 ASSAY OF NATRIURETIC PEPTIDE: CPT

## 2022-04-02 PROCEDURE — 82570 ASSAY OF URINE CREATININE: CPT

## 2022-04-02 PROCEDURE — 2580000003 HC RX 258: Performed by: INTERNAL MEDICINE

## 2022-04-02 PROCEDURE — 6360000002 HC RX W HCPCS: Performed by: INTERNAL MEDICINE

## 2022-04-02 PROCEDURE — 2060000000 HC ICU INTERMEDIATE R&B

## 2022-04-02 PROCEDURE — 94761 N-INVAS EAR/PLS OXIMETRY MLT: CPT

## 2022-04-02 PROCEDURE — 87641 MR-STAPH DNA AMP PROBE: CPT

## 2022-04-02 PROCEDURE — 80048 BASIC METABOLIC PNL TOTAL CA: CPT

## 2022-04-02 PROCEDURE — 2700000000 HC OXYGEN THERAPY PER DAY

## 2022-04-02 PROCEDURE — 36415 COLL VENOUS BLD VENIPUNCTURE: CPT

## 2022-04-02 RX ORDER — TRAZODONE HYDROCHLORIDE 50 MG/1
25 TABLET ORAL NIGHTLY
Status: DISCONTINUED | OUTPATIENT
Start: 2022-04-02 | End: 2022-04-05 | Stop reason: HOSPADM

## 2022-04-02 RX ORDER — INSULIN LISPRO 100 [IU]/ML
0-3 INJECTION, SOLUTION INTRAVENOUS; SUBCUTANEOUS NIGHTLY
Status: DISCONTINUED | OUTPATIENT
Start: 2022-04-02 | End: 2022-04-05 | Stop reason: HOSPADM

## 2022-04-02 RX ORDER — FUROSEMIDE 10 MG/ML
20 INJECTION INTRAMUSCULAR; INTRAVENOUS ONCE
Status: COMPLETED | OUTPATIENT
Start: 2022-04-02 | End: 2022-04-02

## 2022-04-02 RX ORDER — HYDRALAZINE HYDROCHLORIDE 20 MG/ML
10 INJECTION INTRAMUSCULAR; INTRAVENOUS ONCE
Status: COMPLETED | OUTPATIENT
Start: 2022-04-02 | End: 2022-04-02

## 2022-04-02 RX ORDER — IPRATROPIUM BROMIDE AND ALBUTEROL SULFATE 2.5; .5 MG/3ML; MG/3ML
1 SOLUTION RESPIRATORY (INHALATION) EVERY 4 HOURS PRN
Status: DISCONTINUED | OUTPATIENT
Start: 2022-04-02 | End: 2022-04-05 | Stop reason: HOSPADM

## 2022-04-02 RX ORDER — NICOTINE POLACRILEX 4 MG
15 LOZENGE BUCCAL PRN
Status: DISCONTINUED | OUTPATIENT
Start: 2022-04-02 | End: 2022-04-02

## 2022-04-02 RX ORDER — DEXTROSE MONOHYDRATE 50 MG/ML
100 INJECTION, SOLUTION INTRAVENOUS PRN
Status: DISCONTINUED | OUTPATIENT
Start: 2022-04-02 | End: 2022-04-05 | Stop reason: HOSPADM

## 2022-04-02 RX ORDER — DEXTROSE MONOHYDRATE 25 G/50ML
12.5 INJECTION, SOLUTION INTRAVENOUS PRN
Status: DISCONTINUED | OUTPATIENT
Start: 2022-04-02 | End: 2022-04-02

## 2022-04-02 RX ORDER — INSULIN LISPRO 100 [IU]/ML
0-6 INJECTION, SOLUTION INTRAVENOUS; SUBCUTANEOUS
Status: DISCONTINUED | OUTPATIENT
Start: 2022-04-02 | End: 2022-04-05 | Stop reason: HOSPADM

## 2022-04-02 RX ADMIN — HYDRALAZINE HYDROCHLORIDE 10 MG: 20 INJECTION, SOLUTION INTRAMUSCULAR; INTRAVENOUS at 00:22

## 2022-04-02 RX ADMIN — VANCOMYCIN HYDROCHLORIDE 1000 MG: 10 INJECTION, POWDER, LYOPHILIZED, FOR SOLUTION INTRAVENOUS at 00:35

## 2022-04-02 RX ADMIN — Medication 15 MG: at 21:11

## 2022-04-02 RX ADMIN — SODIUM CHLORIDE, PRESERVATIVE FREE 10 ML: 5 INJECTION INTRAVENOUS at 11:29

## 2022-04-02 RX ADMIN — INSULIN GLARGINE 10 UNITS: 100 INJECTION, SOLUTION SUBCUTANEOUS at 21:39

## 2022-04-02 RX ADMIN — PREDNISONE 40 MG: 20 TABLET ORAL at 11:28

## 2022-04-02 RX ADMIN — TRAZODONE HYDROCHLORIDE 25 MG: 50 TABLET, FILM COATED ORAL at 21:12

## 2022-04-02 RX ADMIN — SODIUM CHLORIDE, PRESERVATIVE FREE 10 ML: 5 INJECTION INTRAVENOUS at 21:21

## 2022-04-02 RX ADMIN — CEFEPIME HYDROCHLORIDE 2000 MG: 2 INJECTION, POWDER, FOR SOLUTION INTRAVENOUS at 13:55

## 2022-04-02 RX ADMIN — Medication 15 MG: at 00:26

## 2022-04-02 RX ADMIN — LETROZOLE 2.5 MG: 2.5 TABLET ORAL at 11:29

## 2022-04-02 RX ADMIN — TRAZODONE HYDROCHLORIDE 25 MG: 50 TABLET, FILM COATED ORAL at 00:26

## 2022-04-02 RX ADMIN — INSULIN LISPRO 3 UNITS: 100 INJECTION, SOLUTION INTRAVENOUS; SUBCUTANEOUS at 18:30

## 2022-04-02 RX ADMIN — FUROSEMIDE 20 MG: 10 INJECTION, SOLUTION INTRAMUSCULAR; INTRAVENOUS at 13:05

## 2022-04-02 RX ADMIN — METOPROLOL TARTRATE 25 MG: 25 TABLET, FILM COATED ORAL at 21:12

## 2022-04-02 RX ADMIN — INSULIN LISPRO 3 UNITS: 100 INJECTION, SOLUTION INTRAVENOUS; SUBCUTANEOUS at 21:22

## 2022-04-02 RX ADMIN — INSULIN LISPRO 1 UNITS: 100 INJECTION, SOLUTION INTRAVENOUS; SUBCUTANEOUS at 13:48

## 2022-04-02 RX ADMIN — CEFEPIME HYDROCHLORIDE 2000 MG: 2 INJECTION, POWDER, FOR SOLUTION INTRAVENOUS at 20:06

## 2022-04-02 RX ADMIN — CEFEPIME HYDROCHLORIDE 2000 MG: 2 INJECTION, POWDER, FOR SOLUTION INTRAVENOUS at 04:15

## 2022-04-02 RX ADMIN — APIXABAN 2.5 MG: 2.5 TABLET, FILM COATED ORAL at 21:12

## 2022-04-02 RX ADMIN — METOPROLOL TARTRATE 25 MG: 25 TABLET, FILM COATED ORAL at 11:28

## 2022-04-02 RX ADMIN — MAGNESIUM SULFATE HEPTAHYDRATE 2000 MG: 2 INJECTION, SOLUTION INTRAVENOUS at 11:44

## 2022-04-02 RX ADMIN — APIXABAN 2.5 MG: 2.5 TABLET, FILM COATED ORAL at 11:28

## 2022-04-02 RX ADMIN — IPRATROPIUM BROMIDE AND ALBUTEROL SULFATE 1 AMPULE: 2.5; .5 SOLUTION RESPIRATORY (INHALATION) at 09:09

## 2022-04-02 ASSESSMENT — PAIN SCALES - GENERAL
PAINLEVEL_OUTOF10: 0

## 2022-04-02 NOTE — RT PROTOCOL NOTE
RT Inhaler-Nebulizer Bronchodilator Protocol Note    There is a bronchodilator order in the chart from a provider indicating to follow the RT Bronchodilator Protocol and there is an Initiate RT Inhaler-Nebulizer Bronchodilator Protocol order as well (see protocol at bottom of note). CXR Findings:  XR CHEST PORTABLE    Result Date: 4/1/2022    Slight bibasilar opacities. The findings from the last RT Protocol Assessment were as follows:   History Pulmonary Disease: Chronic pulmonary disease  Respiratory Pattern: Regular pattern and RR 12-20 bpm  Breath Sounds: Clear breath sounds  Cough: Strong, spontaneous, non-productive  Indication for Bronchodilator Therapy: On home bronchodilators  Bronchodilator Assessment Score: 2    Aerosolized bronchodilator medication orders have been revised according to the RT Inhaler-Nebulizer Bronchodilator Protocol below. Respiratory Therapist to perform RT Therapy Protocol Assessment initially then follow the protocol. Repeat RT Therapy Protocol Assessment PRN for score 0-3 or on second treatment, BID, and PRN for scores above 3. No Indications - adjust the frequency to every 6 hours PRN wheezing or bronchospasm, if no treatments needed after 48 hours then discontinue using Per Protocol order mode. If indication present, adjust the RT bronchodilator orders based on the Bronchodilator Assessment Score as indicated below. Use Inhaler orders unless patient has one or more of the following: on home nebulizer, not able to hold breath for 10 seconds, is not alert and oriented, cannot activate and use MDI correctly, or respiratory rate 25 breaths per minute or more, then use the equivalent nebulizer order(s) with same Frequency and PRN reasons based on the score. If a patient is on this medication at home then do not decrease Frequency below that used at home.     0-3 - enter or revise RT bronchodilator order(s) to equivalent RT Bronchodilator order with Frequency of every 4 hours PRN for wheezing or increased work of breathing using Per Protocol order mode. 4-6 - enter or revise RT Bronchodilator order(s) to two equivalent RT bronchodilator orders with one order with BID Frequency and one order with Frequency of every 4 hours PRN wheezing or increased work of breathing using Per Protocol order mode. 7-10 - enter or revise RT Bronchodilator order(s) to two equivalent RT bronchodilator orders with one order with TID Frequency and one order with Frequency of every 4 hours PRN wheezing or increased work of breathing using Per Protocol order mode. 11-13 - enter or revise RT Bronchodilator order(s) to one equivalent RT bronchodilator order with QID Frequency and an Albuterol order with Frequency of every 4 hours PRN wheezing or increased work of breathing using Per Protocol order mode. Greater than 13 - enter or revise RT Bronchodilator order(s) to one equivalent RT bronchodilator order with every 4 hours Frequency and an Albuterol order with Frequency of every 2 hours PRN wheezing or increased work of breathing using Per Protocol order mode. RT to enter RT Home Evaluation for COPD & MDI Assessment order using Per Protocol order mode.     Electronically signed by Мария Rivera RCP on 4/2/2022 at 9:14 AM

## 2022-04-02 NOTE — PROGRESS NOTES
Clinical Pharmacy Progress Note    IV Vancomycin - Management by Pharmacy    Consult Date(s): 4/1/22  Consulting Provider(s): Dr. Alexander Holter / Plan    Sepsis 2/2 Pneumonia (HAP) - Vancomycin   Concurrent Antimicrobials: Cefepime   Day of Vanc Therapy: #2   Current Dosing Method: Bayesian-Guided AUC Dosing   Therapeutic Goal: 400-600 mg/L*hr   Current Dose / Frequency: 1000 mg IV every 18 hours   Plan / Rationale:   o Random level this AM = 21.3mcg/mL, drawn just ~5 hr after 2nd dose given. Calculated AUC on 1000mg IV q18h = 555 with trough 17. 6.  o As dosing is a bit on the aggressive side, will adjust dose slightly to 1250mg IV q24h. Regimen predicts an AUC = 526 and trough 15. 2.   o Will plan to check repeat level in ~48 hr to further evaluate dose.  Will continue to monitor clinical condition and make adjustments to regimen as appropriate. Please call with questions--  Thanks--  Violeta Richardson, PharmD, BCPS, BCGP  [id number] ([facility] [place])   4/2/2022 7:49 AM      Interval update:  Weaned off O2. Subjective/Objective:  Ms. Kiki Kapoor is a 63 y.o. female with a PMHx significant for COPD, PE, asthma, HTN, who lives in a SNF and is now admitted for SOB, productive cough, fatigue and urinary symptoms. In ED she was febrile to 100.4*F, hypotensive and tachycardiac. She has been diagnosed with HCAP and pharmacy is to dose vancomycin for HCAP and Sepsis.     Height:   Ht Readings from Last 1 Encounters:   04/01/22 5' 8\" (1.727 m)     Weight:   Wt Readings from Last 1 Encounters:   04/01/22 270 lb (122.5 kg)       Current & Prior Antimicrobial Regimen(s):   Cefepime 2000 mg every 8 hours EI   Vancomycin  o 1750 mg IV x1 (4/1/22)  o 1000 mg IV q18hr (4/1/22 - 4/2/22)  o 1250mg IV q24hr (4/2-current)    Level(s) / Doses:    Date Time Dose Level / Type of Level Interpretation   04/01/22 0600 1000 mg q18h Random = 21.3 mcg/mL Calculated AUC = 555 and trough = 17.6          Note: Serum levels collected for AUC-based dosing may be high if collected in close proximity to the dose administered. This is not necessarily indicative of toxicity. Cultures & Sensitivities:    Date Site Micro Susceptibility / Result   4/1/22 Blood x2 No growth to date    4/1/22 Urine Sent    4/1/22 Flu A/B Negative    4/1/22 COVID-19 Negative    4/1/22 MRSA Nares Ordered      Labs / Ancillary Data:    Estimated Creatinine Clearance: 60 mL/min (A) (based on SCr of 1.4 mg/dL (H)).     Recent Labs     04/01/22  0207 04/02/22  0534   CREATININE 1.4* 1.4*   BUN 32* 28*   WBC 8.2 10.1       Additional Lab Values / Findings of Note:    Recent Labs     04/01/22 0207   PROCAL 0.30*

## 2022-04-02 NOTE — PLAN OF CARE
Problem: Falls - Risk of:  Goal: Will remain free from falls  Description: Will remain free from falls  Outcome: Ongoing   Note: Bed alarm is on and call light is within reach. Problem: Skin Integrity:  Goal: Will show no infection signs and symptoms  Description: Will show no infection signs and symptoms  Outcome: Ongoing   Note: Patient skin is monitored for moisture and any signs of infection. Problem: Pain:  Goal: Pain level will decrease  Description: Pain level will decrease  Outcome: Ongoing   Note: Patient denies being in pain at this time, continue to monitor.
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
## 2022-04-03 ENCOUNTER — APPOINTMENT (OUTPATIENT)
Dept: GENERAL RADIOLOGY | Age: 62
DRG: 720 | End: 2022-04-03
Payer: MEDICAID

## 2022-04-03 LAB
ALBUMIN SERPL-MCNC: 3.1 G/DL (ref 3.4–5)
ANION GAP SERPL CALCULATED.3IONS-SCNC: 10 MMOL/L (ref 3–16)
BASOPHILS ABSOLUTE: 0 K/UL (ref 0–0.2)
BASOPHILS RELATIVE PERCENT: 0.1 %
BUN BLDV-MCNC: 37 MG/DL (ref 7–20)
CALCIUM SERPL-MCNC: 9.7 MG/DL (ref 8.3–10.6)
CHLORIDE BLD-SCNC: 104 MMOL/L (ref 99–110)
CO2: 24 MMOL/L (ref 21–32)
CREAT SERPL-MCNC: 1.5 MG/DL (ref 0.6–1.1)
EOSINOPHILS ABSOLUTE: 0 K/UL (ref 0–0.6)
EOSINOPHILS RELATIVE PERCENT: 0.1 %
ESTIMATED AVERAGE GLUCOSE: 145.6 MG/DL
GFR AFRICAN AMERICAN: 43
GFR NON-AFRICAN AMERICAN: 35
GLUCOSE BLD-MCNC: 144 MG/DL (ref 70–99)
GLUCOSE BLD-MCNC: 164 MG/DL (ref 70–99)
GLUCOSE BLD-MCNC: 175 MG/DL (ref 70–99)
GLUCOSE BLD-MCNC: 355 MG/DL (ref 70–99)
GLUCOSE BLD-MCNC: 392 MG/DL (ref 70–99)
HBA1C MFR BLD: 6.7 %
HCT VFR BLD CALC: 38.7 % (ref 36–48)
HEMOGLOBIN: 12 G/DL (ref 12–16)
LYMPHOCYTES ABSOLUTE: 1.3 K/UL (ref 1–5.1)
LYMPHOCYTES RELATIVE PERCENT: 11.1 %
MAGNESIUM: 1.7 MG/DL (ref 1.8–2.4)
MCH RBC QN AUTO: 28.5 PG (ref 26–34)
MCHC RBC AUTO-ENTMCNC: 31.1 G/DL (ref 31–36)
MCV RBC AUTO: 91.9 FL (ref 80–100)
MONOCYTES ABSOLUTE: 0.7 K/UL (ref 0–1.3)
MONOCYTES RELATIVE PERCENT: 5.7 %
MRSA SCREEN RT-PCR: NORMAL
NEUTROPHILS ABSOLUTE: 9.7 K/UL (ref 1.7–7.7)
NEUTROPHILS RELATIVE PERCENT: 83 %
PDW BLD-RTO: 16.8 % (ref 12.4–15.4)
PERFORMED ON: ABNORMAL
PHOSPHORUS: 3.7 MG/DL (ref 2.5–4.9)
PLATELET # BLD: 199 K/UL (ref 135–450)
PMV BLD AUTO: 8.2 FL (ref 5–10.5)
POTASSIUM SERPL-SCNC: 5.1 MMOL/L (ref 3.5–5.1)
RBC # BLD: 4.21 M/UL (ref 4–5.2)
SODIUM BLD-SCNC: 138 MMOL/L (ref 136–145)
WBC # BLD: 11.7 K/UL (ref 4–11)

## 2022-04-03 PROCEDURE — 71045 X-RAY EXAM CHEST 1 VIEW: CPT

## 2022-04-03 PROCEDURE — 80069 RENAL FUNCTION PANEL: CPT

## 2022-04-03 PROCEDURE — 85025 COMPLETE CBC W/AUTO DIFF WBC: CPT

## 2022-04-03 PROCEDURE — 2580000003 HC RX 258: Performed by: INTERNAL MEDICINE

## 2022-04-03 PROCEDURE — 6370000000 HC RX 637 (ALT 250 FOR IP): Performed by: INTERNAL MEDICINE

## 2022-04-03 PROCEDURE — 99233 SBSQ HOSP IP/OBS HIGH 50: CPT | Performed by: INTERNAL MEDICINE

## 2022-04-03 PROCEDURE — 2060000000 HC ICU INTERMEDIATE R&B

## 2022-04-03 PROCEDURE — 2700000000 HC OXYGEN THERAPY PER DAY

## 2022-04-03 PROCEDURE — 6360000002 HC RX W HCPCS: Performed by: INTERNAL MEDICINE

## 2022-04-03 PROCEDURE — 36415 COLL VENOUS BLD VENIPUNCTURE: CPT

## 2022-04-03 PROCEDURE — 83735 ASSAY OF MAGNESIUM: CPT

## 2022-04-03 PROCEDURE — 94761 N-INVAS EAR/PLS OXIMETRY MLT: CPT

## 2022-04-03 RX ORDER — TORSEMIDE 20 MG/1
20 TABLET ORAL DAILY
Status: DISCONTINUED | OUTPATIENT
Start: 2022-04-03 | End: 2022-04-05

## 2022-04-03 RX ORDER — HYDRALAZINE HYDROCHLORIDE 20 MG/ML
5 INJECTION INTRAMUSCULAR; INTRAVENOUS ONCE
Status: COMPLETED | OUTPATIENT
Start: 2022-04-03 | End: 2022-04-03

## 2022-04-03 RX ADMIN — SODIUM CHLORIDE 25 ML: 9 INJECTION, SOLUTION INTRAVENOUS at 20:29

## 2022-04-03 RX ADMIN — INSULIN LISPRO 1 UNITS: 100 INJECTION, SOLUTION INTRAVENOUS; SUBCUTANEOUS at 09:48

## 2022-04-03 RX ADMIN — INSULIN LISPRO 1 UNITS: 100 INJECTION, SOLUTION INTRAVENOUS; SUBCUTANEOUS at 13:31

## 2022-04-03 RX ADMIN — TORSEMIDE 20 MG: 20 TABLET ORAL at 17:26

## 2022-04-03 RX ADMIN — Medication 15 MG: at 21:11

## 2022-04-03 RX ADMIN — METOPROLOL TARTRATE 25 MG: 25 TABLET, FILM COATED ORAL at 20:21

## 2022-04-03 RX ADMIN — HYDRALAZINE HYDROCHLORIDE 5 MG: 20 INJECTION INTRAMUSCULAR; INTRAVENOUS at 00:42

## 2022-04-03 RX ADMIN — LETROZOLE 2.5 MG: 2.5 TABLET ORAL at 09:52

## 2022-04-03 RX ADMIN — TRAZODONE HYDROCHLORIDE 25 MG: 50 TABLET, FILM COATED ORAL at 21:11

## 2022-04-03 RX ADMIN — CEFEPIME HYDROCHLORIDE 2000 MG: 2 INJECTION, POWDER, FOR SOLUTION INTRAVENOUS at 20:30

## 2022-04-03 RX ADMIN — METOPROLOL TARTRATE 25 MG: 25 TABLET, FILM COATED ORAL at 09:51

## 2022-04-03 RX ADMIN — APIXABAN 2.5 MG: 2.5 TABLET, FILM COATED ORAL at 09:53

## 2022-04-03 RX ADMIN — SODIUM CHLORIDE, PRESERVATIVE FREE 10 ML: 5 INJECTION INTRAVENOUS at 09:52

## 2022-04-03 RX ADMIN — APIXABAN 2.5 MG: 2.5 TABLET, FILM COATED ORAL at 20:20

## 2022-04-03 RX ADMIN — SODIUM CHLORIDE, PRESERVATIVE FREE 10 ML: 5 INJECTION INTRAVENOUS at 20:26

## 2022-04-03 RX ADMIN — INSULIN GLARGINE 10 UNITS: 100 INJECTION, SOLUTION SUBCUTANEOUS at 21:13

## 2022-04-03 RX ADMIN — ACETAMINOPHEN 650 MG: 325 TABLET ORAL at 21:10

## 2022-04-03 RX ADMIN — CEFEPIME HYDROCHLORIDE 2000 MG: 2 INJECTION, POWDER, FOR SOLUTION INTRAVENOUS at 04:15

## 2022-04-03 RX ADMIN — INSULIN LISPRO 3 UNITS: 100 INJECTION, SOLUTION INTRAVENOUS; SUBCUTANEOUS at 21:13

## 2022-04-03 RX ADMIN — PREDNISONE 40 MG: 20 TABLET ORAL at 09:51

## 2022-04-03 RX ADMIN — CEFEPIME HYDROCHLORIDE 2000 MG: 2 INJECTION, POWDER, FOR SOLUTION INTRAVENOUS at 13:31

## 2022-04-03 RX ADMIN — INSULIN LISPRO 5 UNITS: 100 INJECTION, SOLUTION INTRAVENOUS; SUBCUTANEOUS at 17:26

## 2022-04-03 ASSESSMENT — PAIN DESCRIPTION - PAIN TYPE: TYPE: ACUTE PAIN

## 2022-04-03 ASSESSMENT — PAIN DESCRIPTION - PROGRESSION: CLINICAL_PROGRESSION: NOT CHANGED

## 2022-04-03 ASSESSMENT — PAIN SCALES - GENERAL
PAINLEVEL_OUTOF10: 0
PAINLEVEL_OUTOF10: 5
PAINLEVEL_OUTOF10: 5
PAINLEVEL_OUTOF10: 0

## 2022-04-03 ASSESSMENT — PAIN DESCRIPTION - FREQUENCY: FREQUENCY: INTERMITTENT

## 2022-04-03 ASSESSMENT — PAIN DESCRIPTION - ONSET: ONSET: GRADUAL

## 2022-04-03 ASSESSMENT — PAIN DESCRIPTION - LOCATION: LOCATION: KNEE

## 2022-04-03 ASSESSMENT — PAIN - FUNCTIONAL ASSESSMENT: PAIN_FUNCTIONAL_ASSESSMENT: ACTIVITIES ARE NOT PREVENTED

## 2022-04-03 ASSESSMENT — PAIN DESCRIPTION - DESCRIPTORS: DESCRIPTORS: ACHING

## 2022-04-03 ASSESSMENT — PAIN DESCRIPTION - ORIENTATION: ORIENTATION: RIGHT;LEFT

## 2022-04-03 NOTE — CONSULTS
Clinical Pharmacy Progress Note    Vancomycin has been discontinued. Pharmacy will sign off. Please re-consult pharmacy if vancomycin dosing is wanted in the future. Thanks for consulting pharmacy!   [facility] PharmD  Pharmacy Resident   Please call with questions [phone]  4/3/2022 10:20 AM

## 2022-04-03 NOTE — PROGRESS NOTES
Attempted to assist patient to chair but pt stated twice she doesn't want to and will let this nurse know when she's ready. Will monitor.

## 2022-04-03 NOTE — PLAN OF CARE
Problem: Falls - Risk of:  Goal: Will remain free from falls  Description: Will remain free from falls  Outcome: Ongoing  Note: bed alarm is on and call light is within reach. Problem: Skin Integrity:  Goal: Will show no infection signs and symptoms  Description: Will show no infection signs and symptoms  Outcome: Ongoing   Note: Patient is monitored for any signs or symptoms of infection during each shift. Problem: Pain:  Goal: Pain level will decrease  Description: Pain level will decrease  Outcome: Ongoing  Note: Patient denies having any pain at this time.
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
## 2022-04-04 LAB
ALBUMIN SERPL-MCNC: 3.2 G/DL (ref 3.4–5)
ANION GAP SERPL CALCULATED.3IONS-SCNC: 7 MMOL/L (ref 3–16)
BASOPHILS ABSOLUTE: 0 K/UL (ref 0–0.2)
BASOPHILS RELATIVE PERCENT: 0.3 %
BUN BLDV-MCNC: 50 MG/DL (ref 7–20)
CALCIUM SERPL-MCNC: 10.2 MG/DL (ref 8.3–10.6)
CHLORIDE BLD-SCNC: 100 MMOL/L (ref 99–110)
CO2: 28 MMOL/L (ref 21–32)
CREAT SERPL-MCNC: 1.5 MG/DL (ref 0.6–1.1)
EOSINOPHILS ABSOLUTE: 0 K/UL (ref 0–0.6)
EOSINOPHILS RELATIVE PERCENT: 0.1 %
GFR AFRICAN AMERICAN: 43
GFR NON-AFRICAN AMERICAN: 35
GLUCOSE BLD-MCNC: 112 MG/DL (ref 70–99)
GLUCOSE BLD-MCNC: 116 MG/DL (ref 70–99)
GLUCOSE BLD-MCNC: 180 MG/DL (ref 70–99)
GLUCOSE BLD-MCNC: 208 MG/DL (ref 70–99)
HCT VFR BLD CALC: 36.6 % (ref 36–48)
HEMOGLOBIN: 11.5 G/DL (ref 12–16)
LYMPHOCYTES ABSOLUTE: 1.6 K/UL (ref 1–5.1)
LYMPHOCYTES RELATIVE PERCENT: 15.5 %
MAGNESIUM: 1.6 MG/DL (ref 1.8–2.4)
MCH RBC QN AUTO: 28.2 PG (ref 26–34)
MCHC RBC AUTO-ENTMCNC: 31.5 G/DL (ref 31–36)
MCV RBC AUTO: 89.8 FL (ref 80–100)
MONOCYTES ABSOLUTE: 0.8 K/UL (ref 0–1.3)
MONOCYTES RELATIVE PERCENT: 7.8 %
NEUTROPHILS ABSOLUTE: 7.8 K/UL (ref 1.7–7.7)
NEUTROPHILS RELATIVE PERCENT: 76.3 %
PDW BLD-RTO: 16.8 % (ref 12.4–15.4)
PERFORMED ON: ABNORMAL
PHOSPHORUS: 4.3 MG/DL (ref 2.5–4.9)
PLATELET # BLD: 201 K/UL (ref 135–450)
PMV BLD AUTO: 7.8 FL (ref 5–10.5)
POTASSIUM SERPL-SCNC: 4.5 MMOL/L (ref 3.5–5.1)
RBC # BLD: 4.08 M/UL (ref 4–5.2)
SODIUM BLD-SCNC: 135 MMOL/L (ref 136–145)
WBC # BLD: 10.2 K/UL (ref 4–11)

## 2022-04-04 PROCEDURE — 80069 RENAL FUNCTION PANEL: CPT

## 2022-04-04 PROCEDURE — 6360000002 HC RX W HCPCS: Performed by: INTERNAL MEDICINE

## 2022-04-04 PROCEDURE — 94761 N-INVAS EAR/PLS OXIMETRY MLT: CPT

## 2022-04-04 PROCEDURE — 99233 SBSQ HOSP IP/OBS HIGH 50: CPT | Performed by: INTERNAL MEDICINE

## 2022-04-04 PROCEDURE — 6370000000 HC RX 637 (ALT 250 FOR IP): Performed by: INTERNAL MEDICINE

## 2022-04-04 PROCEDURE — 2580000003 HC RX 258: Performed by: INTERNAL MEDICINE

## 2022-04-04 PROCEDURE — 85025 COMPLETE CBC W/AUTO DIFF WBC: CPT

## 2022-04-04 PROCEDURE — 2700000000 HC OXYGEN THERAPY PER DAY

## 2022-04-04 PROCEDURE — 2060000000 HC ICU INTERMEDIATE R&B

## 2022-04-04 PROCEDURE — 83735 ASSAY OF MAGNESIUM: CPT

## 2022-04-04 RX ORDER — LOPERAMIDE HYDROCHLORIDE 2 MG/1
2 CAPSULE ORAL EVERY 8 HOURS PRN
COMMUNITY

## 2022-04-04 RX ORDER — ONDANSETRON 4 MG/1
4 TABLET, FILM COATED ORAL EVERY 6 HOURS PRN
COMMUNITY

## 2022-04-04 RX ORDER — TRAZODONE HYDROCHLORIDE 50 MG/1
25 TABLET ORAL NIGHTLY
COMMUNITY

## 2022-04-04 RX ORDER — MAGNESIUM SULFATE IN WATER 40 MG/ML
2000 INJECTION, SOLUTION INTRAVENOUS ONCE
Status: COMPLETED | OUTPATIENT
Start: 2022-04-04 | End: 2022-04-05

## 2022-04-04 RX ORDER — INSULIN LISPRO 100 [IU]/ML
3 INJECTION, SOLUTION INTRAVENOUS; SUBCUTANEOUS
Status: DISCONTINUED | OUTPATIENT
Start: 2022-04-04 | End: 2022-04-05 | Stop reason: HOSPADM

## 2022-04-04 RX ORDER — ACETAMINOPHEN 500 MG
500 TABLET ORAL EVERY 6 HOURS
COMMUNITY

## 2022-04-04 RX ORDER — LOSARTAN POTASSIUM 25 MG/1
25 TABLET ORAL DAILY
Status: DISCONTINUED | OUTPATIENT
Start: 2022-04-05 | End: 2022-04-05 | Stop reason: HOSPADM

## 2022-04-04 RX ORDER — ALOGLIPTIN 12.5 MG/1
6.25 TABLET, FILM COATED ORAL DAILY
Status: DISCONTINUED | OUTPATIENT
Start: 2022-04-04 | End: 2022-04-05 | Stop reason: HOSPADM

## 2022-04-04 RX ORDER — PANTOPRAZOLE SODIUM 40 MG/1
40 TABLET, DELAYED RELEASE ORAL DAILY
COMMUNITY

## 2022-04-04 RX ADMIN — METOPROLOL TARTRATE 25 MG: 25 TABLET, FILM COATED ORAL at 10:19

## 2022-04-04 RX ADMIN — SODIUM CHLORIDE 25 ML: 9 INJECTION, SOLUTION INTRAVENOUS at 06:39

## 2022-04-04 RX ADMIN — SODIUM CHLORIDE, PRESERVATIVE FREE 10 ML: 5 INJECTION INTRAVENOUS at 10:00

## 2022-04-04 RX ADMIN — LETROZOLE 2.5 MG: 2.5 TABLET ORAL at 10:00

## 2022-04-04 RX ADMIN — CEFEPIME 2000 MG: 2 INJECTION, POWDER, FOR SOLUTION INTRAMUSCULAR; INTRAVENOUS at 19:18

## 2022-04-04 RX ADMIN — ACETAMINOPHEN 650 MG: 325 TABLET ORAL at 10:21

## 2022-04-04 RX ADMIN — INSULIN GLARGINE 10 UNITS: 100 INJECTION, SOLUTION SUBCUTANEOUS at 20:52

## 2022-04-04 RX ADMIN — APIXABAN 2.5 MG: 2.5 TABLET, FILM COATED ORAL at 20:45

## 2022-04-04 RX ADMIN — Medication 15 MG: at 20:46

## 2022-04-04 RX ADMIN — MAGNESIUM SULFATE HEPTAHYDRATE 2000 MG: 2 INJECTION, SOLUTION INTRAVENOUS at 23:56

## 2022-04-04 RX ADMIN — CEFEPIME HYDROCHLORIDE 2000 MG: 2 INJECTION, POWDER, FOR SOLUTION INTRAVENOUS at 06:40

## 2022-04-04 RX ADMIN — METOPROLOL TARTRATE 25 MG: 25 TABLET, FILM COATED ORAL at 20:45

## 2022-04-04 RX ADMIN — TORSEMIDE 20 MG: 20 TABLET ORAL at 10:21

## 2022-04-04 RX ADMIN — INSULIN LISPRO 1 UNITS: 100 INJECTION, SOLUTION INTRAVENOUS; SUBCUTANEOUS at 20:50

## 2022-04-04 RX ADMIN — TRAZODONE HYDROCHLORIDE 25 MG: 50 TABLET, FILM COATED ORAL at 20:47

## 2022-04-04 RX ADMIN — ACETAMINOPHEN 650 MG: 325 TABLET ORAL at 23:39

## 2022-04-04 RX ADMIN — APIXABAN 2.5 MG: 2.5 TABLET, FILM COATED ORAL at 10:21

## 2022-04-04 RX ADMIN — ALOGLIPTIN 6.25 MG: 12.5 TABLET, FILM COATED ORAL at 10:19

## 2022-04-04 ASSESSMENT — PAIN DESCRIPTION - ORIENTATION
ORIENTATION: RIGHT;LEFT
ORIENTATION: RIGHT;LEFT

## 2022-04-04 ASSESSMENT — PAIN DESCRIPTION - LOCATION
LOCATION: KNEE
LOCATION: KNEE

## 2022-04-04 ASSESSMENT — PAIN DESCRIPTION - PAIN TYPE
TYPE: CHRONIC PAIN
TYPE: CHRONIC PAIN

## 2022-04-04 ASSESSMENT — PAIN DESCRIPTION - ONSET
ONSET: GRADUAL
ONSET: GRADUAL

## 2022-04-04 ASSESSMENT — PAIN SCALES - GENERAL
PAINLEVEL_OUTOF10: 0
PAINLEVEL_OUTOF10: 2
PAINLEVEL_OUTOF10: 3
PAINLEVEL_OUTOF10: 4
PAINLEVEL_OUTOF10: 0
PAINLEVEL_OUTOF10: 0

## 2022-04-04 ASSESSMENT — PAIN - FUNCTIONAL ASSESSMENT: PAIN_FUNCTIONAL_ASSESSMENT: ACTIVITIES ARE NOT PREVENTED

## 2022-04-04 ASSESSMENT — PAIN DESCRIPTION - FREQUENCY
FREQUENCY: INTERMITTENT
FREQUENCY: INTERMITTENT

## 2022-04-04 ASSESSMENT — PAIN DESCRIPTION - DESCRIPTORS
DESCRIPTORS: ACHING
DESCRIPTORS: ACHING

## 2022-04-04 ASSESSMENT — PAIN DESCRIPTION - PROGRESSION: CLINICAL_PROGRESSION: NOT CHANGED

## 2022-04-04 NOTE — PROGRESS NOTES
Clinical Pharmacy Progress Note  Medication History     Admit Date: 4/1/2022    List of of current medications patient is taking is complete. Home Medication list in EPIC updated to reflect changes noted below. Source of information: Medication list from [facility]; list dated 4/1/22    Changes made to medication list:   Medications removed: (include reason, ex: therapy completed, inactive medication)   Dulera, Duonebs - not on med list from facility   Medications added:    Loperamide   Mometasone furoate - dose not listed on NH paperwork   Trazodone   Scheduled acetaminophen   Ondansetron   Medication doses adjusted:    Pantoprazole - pt takes tablets, not IV injection    Please call with any questions.   Abby Hamm, PharmD, BCPS  Wireless: [phone]   4/4/2022 10:55 AM

## 2022-04-04 NOTE — PROGRESS NOTES
Physician Progress Note      PATIENT:               Judson Moody  CSN #:                  [id number]  :                       1960  ADMIT DATE:       2022 1:35 AM  DISCH DATE:  RESPONDING  PROVIDER #:        Esau Roberts MD          QUERY TEXT:    Pt admitted with Sepsis and has CHF documented. If possible, please document   in progress notes and discharge summary further specificity regarding the   acuity of CHF:    The medical record reflects the following:  Risk Factors: IVF's with a hx of Diastolic CHF  Clinical Indicators: H&P: \"Gentle IV hydration given diastolic CHF. \"     ProBNP 1,201    ProBNP 1,775  4/3 PN: Grade 2 diastolic dysfunction? acute   exac Elevated BNP gave lasix yesterday- volume status currently-diff with body   habitus BNP markedly elevated- repeat echo pending awaitv Nephro input on vol   styatus  Nephro 4/3 PN: \"torsemide 20 daily - Low K diet - add ARB once K <   4.8 - Monitor renal function  Treatment: Lasix 20 mg IV given on . Nephrology consult: Add Torsemide 20   mg daily and ARB, lab monitoring, Echo  Options provided:  -- Acute on Chronic Diastolic CHF/HFpEF  -- Chronic Diastolic CHF/HFpEF  -- Other - I will add my own diagnosis  -- Disagree - Not applicable / Not valid  -- Disagree - Clinically unable to determine / Unknown  -- Refer to Clinical Documentation Reviewer    PROVIDER RESPONSE TEXT:    This patient is in acute on chronic diastolic CHF/HFpEF.     Query created by: Rome Ruiz on 2022 1:32 PM      Electronically signed by:  Esau Roberts MD 2022 2:22 PM

## 2022-04-04 NOTE — PLAN OF CARE
Pt A/O, denies dizziness, and VSS. Meds given per eMAR. Pt dyspneic on exertion. Pt complained of pain in knees and was given PRN tylenol. Pt is on 2L O2 via NC. Pt is resting with no other complaints at this time. Call light within reach.       Problem: Falls - Risk of:  Goal: Will remain free from falls  Description: Will remain free from falls  4/4/2022 0448 by Galen Su RN  Outcome: Ongoing  4/3/2022 1937 by Dolly Downing RN  Outcome: Ongoing  Goal: Absence of physical injury  Description: Absence of physical injury  4/4/2022 0448 by Galen Su RN  Outcome: Ongoing  4/3/2022 1937 by Dolly Downing RN  Outcome: Ongoing     Problem: Skin Integrity:  Goal: Will show no infection signs and symptoms  Description: Will show no infection signs and symptoms  4/4/2022 0448 by Galen Su RN  Outcome: Ongoing  4/3/2022 1937 by Dolly Downing RN  Outcome: Ongoing  Goal: Absence of new skin breakdown  Description: Absence of new skin breakdown  4/4/2022 0448 by Galen Su RN  Outcome: Ongoing  4/3/2022 1937 by Dolly Downing RN  Outcome: Ongoing     Problem: Pain:  Goal: Pain level will decrease  Description: Pain level will decrease  4/4/2022 0448 by Galen Su RN  Outcome: Ongoing  4/3/2022 1937 by Dolly Downing RN  Outcome: Ongoing  Goal: Control of acute pain  Description: Control of acute pain  Outcome: Ongoing  Goal: Control of chronic pain  Description: Control of chronic pain  Outcome: Ongoing

## 2022-04-04 NOTE — CARE COORDINATION
Patient is from LTC at [facility], plans to return at discharge. Patient on IV abx and 2L O2. Will return to [facility], no pre-cert needed. Will need transport.      Paola Ramirez, RN, BSN,   4th Floor Progressive Care Unit  [phone]

## 2022-04-04 NOTE — PROGRESS NOTES
Pharmacy Note - Extended Infusion Beta-Lactam Adjustment    Cefepime ordered for treatment of sepsis 2/2 HAP. Per [facility] Extended Infusion Beta-Lactam Policy, Cefepime will be changed to 2000 mg IV q12h. Estimated Creatinine Clearance: Estimated Creatinine Clearance: 56 mL/min (A) (based on SCr of 1.5 mg/dL (H)). Dialysis Status, CHANTELLE, CKD: CHANTELLE  BMI: Body mass index is 41.05 kg/m². Rationale for Adjustment: Agent is renally eliminated and demonstrates time-dependent effect on bacterial eradication. Extended-infusion dosing strategy aims to enhance microbiologic and clinical efficacy. Based on indication and renal function, will adjust dose as above. Pharmacy will continue to monitor renal function and adjust dose as necessary. Please call with any questions.     Geannie Cogan, PharmD, BCPS  Wireless: [phone]   4/4/2022 10:01 AM

## 2022-04-04 NOTE — PROGRESS NOTES
This RN called pt's POC Jonathan Dimas for updates but there was no answer and unable to leave voicemail.

## 2022-04-05 VITALS
SYSTOLIC BLOOD PRESSURE: 134 MMHG | WEIGHT: 270 LBS | BODY MASS INDEX: 40.92 KG/M2 | HEIGHT: 68 IN | TEMPERATURE: 98.4 F | OXYGEN SATURATION: 94 % | DIASTOLIC BLOOD PRESSURE: 86 MMHG | HEART RATE: 78 BPM | RESPIRATION RATE: 18 BRPM

## 2022-04-05 LAB
ALBUMIN SERPL-MCNC: 3.2 G/DL (ref 3.4–5)
ANION GAP SERPL CALCULATED.3IONS-SCNC: 13 MMOL/L (ref 3–16)
BASOPHILS ABSOLUTE: 0 K/UL (ref 0–0.2)
BASOPHILS RELATIVE PERCENT: 0.4 %
BLOOD CULTURE, ROUTINE: NORMAL
BUN BLDV-MCNC: 58 MG/DL (ref 7–20)
CALCIUM SERPL-MCNC: 9.8 MG/DL (ref 8.3–10.6)
CHLORIDE BLD-SCNC: 103 MMOL/L (ref 99–110)
CO2: 25 MMOL/L (ref 21–32)
CREAT SERPL-MCNC: 1.8 MG/DL (ref 0.6–1.1)
CULTURE, BLOOD 2: NORMAL
EOSINOPHILS ABSOLUTE: 0.1 K/UL (ref 0–0.6)
EOSINOPHILS RELATIVE PERCENT: 1.6 %
GFR AFRICAN AMERICAN: 35
GFR NON-AFRICAN AMERICAN: 29
GLUCOSE BLD-MCNC: 179 MG/DL (ref 70–99)
GLUCOSE BLD-MCNC: 254 MG/DL (ref 70–99)
GLUCOSE BLD-MCNC: 82 MG/DL (ref 70–99)
GLUCOSE BLD-MCNC: 91 MG/DL (ref 70–99)
HCT VFR BLD CALC: 38.8 % (ref 36–48)
HEMOGLOBIN: 12.4 G/DL (ref 12–16)
LYMPHOCYTES ABSOLUTE: 1.7 K/UL (ref 1–5.1)
LYMPHOCYTES RELATIVE PERCENT: 22.6 %
MAGNESIUM: 2.2 MG/DL (ref 1.8–2.4)
MCH RBC QN AUTO: 28.7 PG (ref 26–34)
MCHC RBC AUTO-ENTMCNC: 32 G/DL (ref 31–36)
MCV RBC AUTO: 89.7 FL (ref 80–100)
MONOCYTES ABSOLUTE: 0.7 K/UL (ref 0–1.3)
MONOCYTES RELATIVE PERCENT: 9.6 %
NEUTROPHILS ABSOLUTE: 4.9 K/UL (ref 1.7–7.7)
NEUTROPHILS RELATIVE PERCENT: 65.8 %
PDW BLD-RTO: 16.2 % (ref 12.4–15.4)
PERFORMED ON: ABNORMAL
PERFORMED ON: ABNORMAL
PERFORMED ON: NORMAL
PHOSPHORUS: 5.6 MG/DL (ref 2.5–4.9)
PLATELET # BLD: 223 K/UL (ref 135–450)
PMV BLD AUTO: 7.7 FL (ref 5–10.5)
POTASSIUM SERPL-SCNC: 4.1 MMOL/L (ref 3.5–5.1)
RBC # BLD: 4.32 M/UL (ref 4–5.2)
SODIUM BLD-SCNC: 141 MMOL/L (ref 136–145)
WBC # BLD: 7.5 K/UL (ref 4–11)

## 2022-04-05 PROCEDURE — 6370000000 HC RX 637 (ALT 250 FOR IP): Performed by: INTERNAL MEDICINE

## 2022-04-05 PROCEDURE — 6360000002 HC RX W HCPCS: Performed by: INTERNAL MEDICINE

## 2022-04-05 PROCEDURE — 83735 ASSAY OF MAGNESIUM: CPT

## 2022-04-05 PROCEDURE — 99233 SBSQ HOSP IP/OBS HIGH 50: CPT | Performed by: INTERNAL MEDICINE

## 2022-04-05 PROCEDURE — 2580000003 HC RX 258: Performed by: INTERNAL MEDICINE

## 2022-04-05 PROCEDURE — 80069 RENAL FUNCTION PANEL: CPT

## 2022-04-05 PROCEDURE — 85025 COMPLETE CBC W/AUTO DIFF WBC: CPT

## 2022-04-05 RX ORDER — TORSEMIDE 10 MG/1
10 TABLET ORAL DAILY
Status: DISCONTINUED | OUTPATIENT
Start: 2022-04-06 | End: 2022-04-05 | Stop reason: HOSPADM

## 2022-04-05 RX ORDER — HEPARIN SODIUM (PORCINE) LOCK FLUSH IV SOLN 100 UNIT/ML 100 UNIT/ML
500 SOLUTION INTRAVENOUS PRN
Status: DISCONTINUED | OUTPATIENT
Start: 2022-04-05 | End: 2022-04-05 | Stop reason: HOSPADM

## 2022-04-05 RX ORDER — PREDNISONE 10 MG/1
TABLET ORAL
Qty: 12 TABLET | Refills: 0 | Status: ON HOLD
Start: 2022-04-05 | End: 2022-04-19 | Stop reason: HOSPADM

## 2022-04-05 RX ORDER — TORSEMIDE 10 MG/1
10 TABLET ORAL DAILY
Qty: 30 TABLET | Refills: 3
Start: 2022-04-06 | End: 2022-06-28 | Stop reason: ALTCHOICE

## 2022-04-05 RX ORDER — IPRATROPIUM BROMIDE AND ALBUTEROL SULFATE 2.5; .5 MG/3ML; MG/3ML
1 SOLUTION RESPIRATORY (INHALATION) EVERY 6 HOURS
Qty: 360 ML | Refills: 1
Start: 2022-04-05

## 2022-04-05 RX ORDER — ALOGLIPTIN 6.25 MG/1
6.25 TABLET, FILM COATED ORAL DAILY
Qty: 60 TABLET | Refills: 1
Start: 2022-04-06

## 2022-04-05 RX ORDER — LOSARTAN POTASSIUM 25 MG/1
25 TABLET ORAL DAILY
Qty: 30 TABLET | Refills: 3
Start: 2022-04-06 | End: 2022-10-19 | Stop reason: SINTOL

## 2022-04-05 RX ADMIN — INSULIN LISPRO 3 UNITS: 100 INJECTION, SOLUTION INTRAVENOUS; SUBCUTANEOUS at 14:02

## 2022-04-05 RX ADMIN — ACETAMINOPHEN 650 MG: 325 TABLET ORAL at 14:12

## 2022-04-05 RX ADMIN — APIXABAN 2.5 MG: 2.5 TABLET, FILM COATED ORAL at 09:21

## 2022-04-05 RX ADMIN — PREDNISONE 30 MG: 20 TABLET ORAL at 09:21

## 2022-04-05 RX ADMIN — LOSARTAN POTASSIUM 25 MG: 25 TABLET, FILM COATED ORAL at 09:21

## 2022-04-05 RX ADMIN — CEFEPIME 2000 MG: 2 INJECTION, POWDER, FOR SOLUTION INTRAMUSCULAR; INTRAVENOUS at 05:46

## 2022-04-05 RX ADMIN — LETROZOLE 2.5 MG: 2.5 TABLET ORAL at 09:21

## 2022-04-05 RX ADMIN — SODIUM CHLORIDE, PRESERVATIVE FREE 10 ML: 5 INJECTION INTRAVENOUS at 09:20

## 2022-04-05 RX ADMIN — TORSEMIDE 20 MG: 20 TABLET ORAL at 09:22

## 2022-04-05 RX ADMIN — INSULIN LISPRO 4 UNITS: 100 INJECTION, SOLUTION INTRAVENOUS; SUBCUTANEOUS at 14:03

## 2022-04-05 RX ADMIN — SODIUM CHLORIDE: 9 INJECTION, SOLUTION INTRAVENOUS at 05:45

## 2022-04-05 RX ADMIN — ALOGLIPTIN 6.25 MG: 12.5 TABLET, FILM COATED ORAL at 09:22

## 2022-04-05 RX ADMIN — METOPROLOL TARTRATE 25 MG: 25 TABLET, FILM COATED ORAL at 09:22

## 2022-04-05 RX ADMIN — INSULIN LISPRO 3 UNITS: 100 INJECTION, SOLUTION INTRAVENOUS; SUBCUTANEOUS at 17:28

## 2022-04-05 RX ADMIN — HEPARIN SODIUM (PORCINE) LOCK FLUSH IV SOLN 100 UNIT/ML 500 UNITS: 100 SOLUTION at 17:57

## 2022-04-05 ASSESSMENT — PAIN SCALES - GENERAL
PAINLEVEL_OUTOF10: 4
PAINLEVEL_OUTOF10: 4
PAINLEVEL_OUTOF10: 0
PAINLEVEL_OUTOF10: 0
PAINLEVEL_OUTOF10: 4
PAINLEVEL_OUTOF10: 0

## 2022-04-05 ASSESSMENT — PAIN DESCRIPTION - FREQUENCY
FREQUENCY: INTERMITTENT

## 2022-04-05 ASSESSMENT — PAIN - FUNCTIONAL ASSESSMENT
PAIN_FUNCTIONAL_ASSESSMENT: ACTIVITIES ARE NOT PREVENTED

## 2022-04-05 ASSESSMENT — PAIN DESCRIPTION - ORIENTATION
ORIENTATION: RIGHT;LEFT

## 2022-04-05 ASSESSMENT — PAIN DESCRIPTION - LOCATION
LOCATION: KNEE

## 2022-04-05 ASSESSMENT — PAIN DESCRIPTION - ONSET
ONSET: GRADUAL

## 2022-04-05 ASSESSMENT — PAIN DESCRIPTION - DESCRIPTORS
DESCRIPTORS: ACHING;DISCOMFORT

## 2022-04-05 ASSESSMENT — PAIN DESCRIPTION - PAIN TYPE
TYPE: ACUTE PAIN
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
## 2022-04-05 NOTE — DISCHARGE SUMMARY
Patient ID: Lowell Porter      Patient's PCP: Joy Osborn MD    Admit Date: 4/1/2022   Discharge Date: 4/5/2022  Admitting Physician: Chica Brady MD  Discharge Physician: Kuldip Ramirez MD ,MD    Discharge Diagnoses:   #Sepsis secondary to Healthcare associated pneumonia-bacterial.    Flu,COVID-19 PCR negative,   s/p Cefepime x 5 days   Procalc elevated at 0.3  On 2.5mg home Pred chronically  Negative blood cx     #Chronic respiratory failure secondary to COPD,   -at baseline oxygen requirement of  5L currently  Pred  Burst and taper back to home dose of 2.5MG daily     #Grade 2 diastolic dysfunction? acute exac-Echo with normal EF in 11/2021  Elevated BNP   volume status currently-diff with body habitus-  BNP markedly elevated  Started on torsemide by Nephrology after IV lasix  Monitor renal function as outpt     HTN,BP elevated   Restarted metoprolol at low dose and titrate to home dose as outpt as BP was lopw initially     #Moderate pulmonary hypertension  -on torsemide     CKD stage  2/3 ,has solitary kidney post nephrectomy  Cr baseline 1.4  Follows with Dr Gigi Luque        #History of PE and on chronic  -on Eliquis     Hyperglycemia-on high dose Pred  HBA1C 6.7-New diagnosis of DM2,mild  Started on DPP4 antagonist at discharge. Accuchecks ordered for SNF to monitor Glucose     #Morbid obesity with BMI of 41  -dietary and lifestyle modifications recommended.     #History of breast cancer  -[facility]:  61 y.o. female COPD ON 5L home O2 and chr Pred,   breast cancer on femara  -who presents from SNF  with  SOB, productive cough, fatigue and  urinary frequency. -febrile to 100.4 °F with borderline blood pressure and tachycardia in the ED  -has not been on oxygen for several days at the nursing home sec to equipmt malfunction    She was admitted and treated as above prior to discharge to SNF.     Physical Exam:  BP (!) 134/91   Pulse 77   Temp 98 °F (36.7 °C) (Oral)   Resp changed:   · medication strength  · how much to take  · how to take this  · when to take this  · additional instructions        CONTINUE taking these medications    apixaban 2.5 MG Tabs tablet  Commonly known as: Eliquis  Take 1 tablet by mouth 2 times daily     hydrOXYzine 50 MG tablet  Commonly known as: ATARAX     ipratropium-albuterol 0.5-2.5 (3) MG/3ML Soln nebulizer solution  Commonly known as: DUONEB  Inhale 3 mLs into the lungs every 6 hours     letrozole 2.5 MG tablet  Commonly known as: FEMARA     loperamide 2 MG capsule  Commonly known as: IMODIUM     MOMETASONE FUROATE IN     ondansetron 4 MG tablet  Commonly known as: ZOFRAN     pantoprazole 40 MG tablet  Commonly known as: PROTONIX     risperiDONE 0.5 MG tablet  Commonly known as: RISPERDAL     temazepam 15 MG capsule  Commonly known as: RESTORIL     traZODone 50 MG tablet  Commonly known as: DESYREL     Ventolin  (90 Base) MCG/ACT inhaler  Generic drug: albuterol sulfate HFA        STOP taking these medications    sodium bicarbonate 650 MG tablet           Where to Get Your Medications      Information about where to get these medications is not yet available    Ask your nurse or doctor about these medications  · alogliptin 6.25 MG Tabs tablet  · ipratropium-albuterol 0.5-2.5 (3) MG/3ML Soln nebulizer solution  · losartan 25 MG tablet  · metoprolol tartrate 25 MG tablet  · predniSONE 10 MG tablet  · torsemide 10 MG tablet        Activity: activity as tolerated  Diet: renal dietWound Care: none needed  Time Spent on discharge is more than 30 minutes discussing plan of care and discharge medications with patient and nursing staff    Signed:  MD NABIL  Hospitalist Service     4/5/2022

## 2022-04-05 NOTE — PROGRESS NOTES
RN gave report to [place] at [facility] 89 year old female with history of CML on Gleevec, CAD, SSS s/p PPM who presented to our ER after transfer from [facility] after multiple falls at home this week, found to have hemorrhagic shock with multiple rib fractures, flank hematoma, and age-indeterminate compression fractures of multiple vertebra. Family wishes to pursue non-aggressive measures, DNR/DNI.

## 2022-04-05 NOTE — PLAN OF CARE
Problem: Airway Clearance - Ineffective:  Goal: Ability to maintain a clear airway will improve  Description: Ability to maintain a clear airway will improve  Outcome: Ongoing  Note: Pt airway maintain during the shift. Pt O2 stat > 90 on 2L NC. RR between 16-20. Pt has SOB on exertion. Denies chest pain, n/v/d. Will continue to monitor. Problem: Airway Clearance - Ineffective:  Goal: Clear lung sounds  Description: Clear lung sounds  Outcome: Ongoing     Problem: Gas Exchange - Impaired:  Goal: Levels of oxygenation will improve  Description: Levels of oxygenation will improve  Outcome: Ongoing    Problem: Falls - Risk of:  Goal: Will remain free from falls  Description: Will remain free from falls  4/5/2022 0434 by Tree Brunner RN  Outcome: Met This Shift  Note: Pt remains free from falls during the shift. Pt has fall risk measures in place. Pt fall risk bracelet on, fall risk sign, and non-slip socks on. Pt bed is in low position, bed alarm on, bed wheels locked, call light within reach, bedside table within reach, chair wheels locked, chair alarm on, and 3/4 bed rail. Standby assist for transferring. Pt is encouraged to use call light for assistant. Will continue to monitor. Problem: Skin Integrity:  Goal: Absence of new skin breakdown  Description: Absence of new skin breakdown  4/5/2022 0434 by Tree Brunner RN  Outcome: Ongoing  Note: Pt will maintain skin integrity upon discharge. Pt skin has been inspected per unit protocol, has pillow support, foot of bed elevated/feet elevated off bed, feet up while in chair. Will continue to monitor. Problem: Pain:  Goal: Pain level will decrease  Description: Pain level will decrease  Outcome: Met This Shift  Note: Pt  chronic bilat knee pain level decrease during this shift. Pt is given tylenol prn as ordered and offered non-pharm interventions including rest and reposition. Will continue to monitor.
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
## 2022-04-05 NOTE — PLAN OF CARE
Problem: Falls - Risk of:  Goal: Will remain free from falls  Description: Will remain free from falls  4/5/2022 1430 by CINTHYA JEAN  Outcome: Ongoing  4/5/2022 0434 by Jamee Davis RN  Outcome: Met This Shift  4/5/2022 0431 by Jamee Davis RN  Outcome: Met This Shift  Note: Pt remains free from falls during the shift. Pt has fall risk measures in place. Pt fall risk bracelet on, fall risk sign, and non-slip socks on. Pt bed is in low position, bed alarm on, bed wheels locked, call light within reach, bedside table within reach, chair wheels locked, chair alarm on, and 3/4 bed rail. Standby assist for transferring. Pt is encouraged to use call light for assistant. Will continue to monitor. Goal: Absence of physical injury  Description: Absence of physical injury  4/5/2022 1430 by CINTHYA JEAN  Outcome: Ongoing  Note: RN educated and instructed patient on call light use for personal use and safety; bed in low position; bed alarm applied; call light and overhead table in reach  4/5/2022 0431 by Jamee Davis RN  Outcome: Ongoing     Problem: Skin Integrity:  Goal: Will show no infection signs and symptoms  Description: Will show no infection signs and symptoms  4/5/2022 1430 by Devi Gasca  Outcome: Ongoing  4/5/2022 0431 by Jamee Davis RN  Outcome: Met This Shift  Goal: Absence of new skin breakdown  Description: Absence of new skin breakdown  4/5/2022 1430 by Devi Gasca  Outcome: Ongoing  Note: Offloaded pressure from buttocks; keep skin dry from incontinent stool and urine  4/5/2022 0434 by Jamee Davis RN  Outcome: Ongoing  Note: Pt will maintain skin integrity upon discharge. Pt skin has been inspected per unit protocol, has pillow support, foot of bed elevated/feet elevated off bed, feet up while in chair. Will continue to monitor.     4/5/2022 0431 by Jamee Davis RN  Outcome: Met This Shift     Problem: Pain:  Goal: Pain level will decrease  Description: Pain level will decrease  4/5/2022 1430 by Edvin Sullivan  Outcome: Ongoing  4/5/2022 0434 by Sammi Simon RN  Outcome: Met This Shift  Note: Pt  chronic bilat knee pain level decrease during this shift. Pt is given tylenol prn as ordered and offered non-pharm interventions including rest and reposition. Will continue to monitor. Goal: Control of acute pain  Description: Control of acute pain  Outcome: Ongoing  Goal: Control of chronic pain  Description: Control of chronic pain  Outcome: Ongoing     Problem: Discharge Planning:  Goal: Discharged to appropriate level of care  Description: Discharged to appropriate level of care  4/5/2022 1430 by Edvin Sullivan  Outcome: Ongoing  4/5/2022 0434 by Sammi Simon RN  Outcome: Ongoing  Note: Case management and care team following pt d/c planning. Pt plan to d/c back to LTC at [facility]. Pt will need transport on d/c. Will continue to monitor. Problem: Airway Clearance - Ineffective:  Goal: Clear lung sounds  Description: Clear lung sounds  4/5/2022 1430 by CINTHYA JEAN  Outcome: Ongoing  4/5/2022 0434 by Sammi Simon RN  Outcome: Ongoing  Goal: Ability to maintain a clear airway will improve  Description: Ability to maintain a clear airway will improve  4/5/2022 1430 by CINTHYA JEAN  Outcome: Ongoing  4/5/2022 0434 by Sammi Simon RN  Outcome: Ongoing  Note: Pt airway maintain during the shift. Pt O2 stat > 90 on 2L NC. RR between 16-20. Pt has SOB on exertion. Denies chest pain, n/v/d. Will continue to monitor.      Problem: Gas Exchange - Impaired:  Goal: Levels of oxygenation will improve  Description: Levels of oxygenation will improve  4/5/2022 1430 by Edvin Sullivan  Outcome: Ongoing  4/5/2022 0434 by Sammi Simon RN  Outcome: Ongoing

## 2022-04-05 NOTE — DISCHARGE INSTR - COC
Continuity of Care Form    Patient Name: Dirk Morel   :  1960  MRN:  [id number]    Admit date:  2022  Discharge date:  2022    Code Status Order: Full Code   Advance Directives:      Admitting Physician:  Belkys Tucker MD  PCP: Kike Mukherjee MD    Discharging Nurse: [facility] Unit/Room#: 9934/9298-12  Discharging Unit Phone Number: [phone]    Emergency Contact:   Extended Emergency Contact Information  Primary Emergency Contact: devante españa  Home Phone: [phone]  Work Phone: [phone]  Mobile Phone: [phone]  Relation: Brother/Sister   needed? No  Secondary Emergency Contact: brad oneill  Home Phone: [phone]  Mobile Phone: [phone]  Relation: Child  Preferred language: English   needed?  Yes    Past Surgical History:  Past Surgical History:   Procedure Laterality Date    APPENDECTOMY      BREAST SURGERY      CHOLECYSTECTOMY      CT BIOPSY RENAL  2020    CT BIOPSY RENAL 2020 [facility] CT SCAN    MASTECTOMY Left 2019    LEFT MODIFIED RADICAL MASTECTOMY; EXPAREL INTERCOSTAL BLOCK performed by Franchesca Sol MD at [address] Left 2019    COMPLEX CLOSURE OF LEFT BREAST, FULL THICKNESS SKIN GRAFT LEFT BREAST 4x3 performed by Adrian Clay MD at [address] Left     TUBAL LIGATION      TUNNELED VENOUS PORT PLACEMENT  2019    still in     [address] [facility] LEFT  2020    US BREAST BIOPSY NEEDLE ADDITIONAL LEFT 2020 Lisandro Gordon MD [facility] ULTRASOUND       Immunization History:   Immunization History   Administered Date(s) Administered    Influenza Virus Vaccine 2014    Pneumococcal Polysaccharide (Omucouvww96) 2014       Active Problems:  Patient Active Problem List   Diagnosis Code    Malignant neoplasm of upper-inner quadrant of left breast in female, estrogen receptor positive ([facility] C50.212, Z17.0    Breast wound, left, sequela S21.002S    Bilateral pulmonary embolism (HCC) I26.99    Acute on chronic respiratory failure with hypoxia and hypercapnia (HCC) J96.21, J96.22    Acute deep vein thrombosis (DVT) of lower extremity (HCC) I82.409    Centrilobular emphysema (HCC) J43.2    Hypoxemia R09.02    Pneumonia due to infectious organism J18.9    Nephrotic syndrome N04.9    CHANTELLE (acute kidney injury) ([facility] N17.9    Chest pain R07.9    Hematuria R31.9    COPD exacerbation ([facility]) J44.1    Hypoxia I92.89    Metabolic acidosis Z85.1    Sepsis with acute renal failure and septic shock ([facility]) A41.9, R65.21, N17.9    HCAP (healthcare-associated pneumonia) J18.9       Isolation/Infection:   Isolation            No Isolation          Patient Infection Status       Infection Onset Added Last Indicated Last Indicated By Review Planned Expiration Resolved Resolved By    None active    Resolved    COVID-19 (Rule Out) 04/01/22 04/01/22 04/01/22 COVID-19 & Influenza Combo (Ordered)   04/01/22 Rule-Out Test Resulted    COVID-19 (Rule Out) 11/28/21 11/28/21 11/28/21 COVID-19 (Ordered)   11/29/21 Rule-Out Test Resulted    C-diff Rule Out 07/16/21 07/16/21 07/16/21 Clostridium difficile toxin/antigen (Ordered)   11/29/21 Bethena Leyden Post, RN    Order from previous admission     COVID-19 (Rule Out) 07/16/21 07/16/21 07/16/21 COVID-19 (Ordered)   07/17/21 Rule-Out Test Resulted    COVID-19 (Rule Out) 09/22/20 09/22/20 09/22/20 COVID-19 (Ordered)   09/24/20 Rule-Out Test Resulted            Nurse Assessment:  Last Vital Signs: BP (!) 134/91   Pulse 77   Temp 98 °F (36.7 °C) (Oral)   Resp 18   Ht 5' 8\" (1.727 m)   Wt 270 lb (122.5 kg)   LMP 03/09/2014   SpO2 94%   BMI 41.05 kg/m²     Last documented pain score (0-10 scale): Pain Level: 4  Last Weight:   Wt Readings from Last 1 Encounters:   04/01/22 270 lb (122.5 kg)     Mental Status:  oriented and alert    IV Access:  - None    Nursing Mobility/ADLs:  Walking   Assisted  Transfer  Assisted  Bathing Assisted  Dressing  Assisted  Toileting  Assisted  Feeding  Independent  Med Admin  Assisted  Med Delivery   whole    Wound Care Documentation and Therapy:        Elimination:  Continence: Bowel: Yes  Bladder: Yes  Urinary Catheter: None   Colostomy/Ileostomy/Ileal Conduit: No       Date of Last BM: ***    Intake/Output Summary (Last 24 hours) at 4/5/2022 1707  Last data filed at 4/5/2022 1330  Gross per 24 hour   Intake 1280 ml   Output 2200 ml   Net -920 ml     I/O last 3 completed shifts: In: 1280 [P.O.:1280]  Out: 2150 [Urine:2150]    Safety Concerns: At Risk for Falls    Impairments/Disabilities:      None    Nutrition Therapy:  Current Nutrition Therapy:   - Oral Diet:  Cardiac    Routes of Feeding: Oral  Liquids: Thin Liquids  Daily Fluid Restriction: no  Last Modified Barium Swallow with Video (Video Swallowing Test): not done    Treatments at the Time of Hospital Discharge:   Respiratory Treatments: ***  Oxygen Therapy:  is on oxygen at 2 L/min per nasal cannula.   Ventilator:    - No ventilator support    Rehab Therapies: Physical Therapy and Occupational Therapy  Weight Bearing Status/Restrictions: No weight bearing restrictions  Other Medical Equipment (for information only, NOT a DME order):  walker  Other Treatments: ***    Patient's personal belongings (please select all that are sent with patient):  None    RN SIGNATURE:  Electronically signed by Dominick Nickerson RN on 4/5/22 at 6:07 PM EDT    CASE MANAGEMENT/SOCIAL WORK SECTION    Inpatient Status Date: 4/1/22    Readmission Risk Assessment Score:  Readmission Risk              Risk of Unplanned Readmission:  34           Discharging to Facility/ Agency   Name: [facility]  [facility], [address] [place] [address]    Dialysis Facility (if applicable) NA  Name:  Address:  Dialysis Schedule:  Phone:  Fax:    / signature: Kelly Wagner RN      PHYSICIAN SECTION    Prognosis: Fair    Condition at Discharge: Stable    Rehab Potential (if transferring to Rehab): Fair    Recommended Labs or Other Treatments After Discharge:  monitor kidney function,  Blood glucose checks qac and hs    Physician Certification: I certify the above information and transfer of Rebeca Nichols  is necessary for the continuing treatment of the diagnosis listed and that she requires [place] for greater 30 days.      Update Admission H&P: No change in H&P    PHYSICIAN SIGNATURE:  Electronically signed by Denis Krabbe, MD on 4/5/22 at 5:47 PM EDT

## 2022-04-05 NOTE — DISCHARGE INSTR - DIET

## 2022-04-05 NOTE — CARE COORDINATION
Case Management Assessment            Discharge Note                    Date / Time of Note: 4/5/2022 5:26 PM                  Discharge Note Completed by: Stu Ca RN    Patient Name: Geneva Kapadia   YOB: 1960  Diagnosis: HCAP (healthcare-associated pneumonia) [J18.9]   Date / Time: 4/1/2022  1:35 AM    Current PCP: Judge Mindi MD  Clinic patient: No    Hospitalization in the last 30 days: No    Advance Directives:  Code Status: Full Code  [place] DNR form completed and on chart: Not Indicated    Financial:  Payor: Harsha Ou / Plan: [address] / Product Type: *No Product type* /      Pharmacy:    [facility] [address] [address]  Phone: [phone] Fax: [facility] [address] [phone] - F [phone]  [facility] [address]  Phone: [phone] Fax: [address] [place] [address] [phone] - F [phone]  [facility] [address]  Phone: [phone] Fax: [phone]    [facility]/pharmacy #[id number]- [address], [place] - [address] [place] [address] [phone] - f [phone]  [facility] [address]  Phone: [phone] Fax: [phone]      Assistance purchasing medications?:    Assistance provided by Case Management: None at this time    Does patient want to participate in local refill/ meds to beds program?:      Meds To Beds General Rules:  1. Can ONLY be done Monday- Friday between 8:30am-5pm  2. Prescription(s) must be in pharmacy by 3pm to be filled same day  3. Copy of patient's insurance/ prescription drug card and patient face sheet must be sent along with the prescription(s)  4. Cost of Rx cannot be added to hospital bill.  If financial assistance is needed, please contact unit  or ;  or  CANNOT provide pharmacy voucher for patients co-pays  5. Patients can then  the prescription on their way out of the hospital at discharge, or pharmacy can deliver to the bedside if staff is available. (payment due at time of pick-up or delivery - cash, check, or card accepted)     Able to afford home medications/ co-pay costs: Yes    ADLS:  Current PT AM-PAC Score:   /24  Current OT AM-PAC Score:   /24      DISCHARGE Disposition: [facility] ([facility]): [facility]  Phone: [phone]    Fax: [phone]    LOC at discharge: [address] Completed: Yes    Notification completed in HENS/PAS?:  Not Applicable    Transportation:  Transportation PLAN for discharge: EMS transportation   Mode of Transport: Ambulance stretcher - BLS  Reason for medical transport: Bed confined: Meets the following criteria 1) unable to get out of bed without assistance or ambulate, 2) unable to safely sit up in a wheelchair, 3) unable to maintain erect seating position in a chair for time needed for transport  Name of Transport Company: [address]  Phone: [phone]  Time of Transport: 6 PM    Transport form completed: Yes    Additional CM Notes: Received call from PCU charge nurse, Jermaine Franks, with a request from Dr. Freedom Grace to arrange discharge back to [address] [facility] this evening for Ms Bassam Pedraza. Called [facility] and spoke with Naheed who indicated that the admission person, Neftali Garvin, was gone for the day but that Ms Bassam Pedraza could return and she would inform her nurse. Called Pt's daughter/POA, Monika Deng, who is agreeable to pt's return this evening. She was concerned that pt would miss dinner at [place]. Charge nurse and pt's nurse informed and will try to get pts dinner before she leaves. Transport arranged, AVS faxed to facility. Nurse report to be called to [phone].     The Plan for Transition of Care is related to the following treatment goals of HCAP (healthcare-associated

## 2022-04-06 LAB — BLOOD CULTURE, ROUTINE: NORMAL

## 2022-04-15 ENCOUNTER — HOSPITAL ENCOUNTER (INPATIENT)
Age: 62
LOS: 4 days | Discharge: NURSING, REHAB OR LONG-TERM CARE | DRG: 720 | End: 2022-04-19
Attending: EMERGENCY MEDICINE | Admitting: INTERNAL MEDICINE
Payer: MEDICAID

## 2022-04-15 ENCOUNTER — APPOINTMENT (OUTPATIENT)
Dept: GENERAL RADIOLOGY | Age: 62
DRG: 720 | End: 2022-04-15
Payer: MEDICAID

## 2022-04-15 ENCOUNTER — TELEPHONE (OUTPATIENT)
Dept: OTHER | Facility: CLINIC | Age: 62
End: 2022-04-15

## 2022-04-15 ENCOUNTER — APPOINTMENT (OUTPATIENT)
Dept: VASCULAR LAB | Age: 62
DRG: 720 | End: 2022-04-15
Payer: MEDICAID

## 2022-04-15 DIAGNOSIS — R65.20 SEVERE SEPSIS (HCC): Primary | ICD-10-CM

## 2022-04-15 DIAGNOSIS — J18.9 HCAP (HEALTHCARE-ASSOCIATED PNEUMONIA): ICD-10-CM

## 2022-04-15 DIAGNOSIS — A41.9 SEVERE SEPSIS (HCC): Primary | ICD-10-CM

## 2022-04-15 DIAGNOSIS — J96.21 ACUTE ON CHRONIC RESPIRATORY FAILURE WITH HYPOXIA (HCC): ICD-10-CM

## 2022-04-15 LAB
A/G RATIO: 1.1 (ref 1.1–2.2)
ALBUMIN SERPL-MCNC: 3.4 G/DL (ref 3.4–5)
ALP BLD-CCNC: 138 U/L (ref 40–129)
ALT SERPL-CCNC: 37 U/L (ref 10–40)
ANION GAP SERPL CALCULATED.3IONS-SCNC: 16 MMOL/L (ref 3–16)
AST SERPL-CCNC: 32 U/L (ref 15–37)
BACTERIA: ABNORMAL /HPF
BASE EXCESS VENOUS: -0.3 MMOL/L (ref -2–3)
BASOPHILS ABSOLUTE: 0.1 K/UL (ref 0–0.2)
BASOPHILS RELATIVE PERCENT: 0.4 %
BILIRUB SERPL-MCNC: 0.5 MG/DL (ref 0–1)
BILIRUBIN URINE: NEGATIVE
BLOOD, URINE: ABNORMAL
BUN BLDV-MCNC: 71 MG/DL (ref 7–20)
CALCIUM SERPL-MCNC: 9.4 MG/DL (ref 8.3–10.6)
CARBOXYHEMOGLOBIN: 3.2 % (ref 0–1.5)
CHLORIDE BLD-SCNC: 98 MMOL/L (ref 99–110)
CLARITY: CLEAR
CO2: 21 MMOL/L (ref 21–32)
COLOR: YELLOW
CREAT SERPL-MCNC: 2.1 MG/DL (ref 0.6–1.1)
EKG ATRIAL RATE: 111 BPM
EKG DIAGNOSIS: NORMAL
EKG P AXIS: 66 DEGREES
EKG P-R INTERVAL: 124 MS
EKG Q-T INTERVAL: 346 MS
EKG QRS DURATION: 90 MS
EKG QTC CALCULATION (BAZETT): 470 MS
EKG R AXIS: 61 DEGREES
EKG T AXIS: 69 DEGREES
EKG VENTRICULAR RATE: 111 BPM
EOSINOPHILS ABSOLUTE: 0 K/UL (ref 0–0.6)
EOSINOPHILS RELATIVE PERCENT: 0.3 %
EPITHELIAL CELLS, UA: ABNORMAL /HPF (ref 0–5)
GFR AFRICAN AMERICAN: 29
GFR NON-AFRICAN AMERICAN: 24
GLUCOSE BLD-MCNC: 119 MG/DL (ref 70–99)
GLUCOSE BLD-MCNC: 124 MG/DL (ref 70–99)
GLUCOSE URINE: NEGATIVE MG/DL
HCO3 VENOUS: 25.1 MMOL/L (ref 24–28)
HCT VFR BLD CALC: 37.9 % (ref 36–48)
HEMOGLOBIN, VEN, REDUCED: 37.3 %
HEMOGLOBIN: 11.9 G/DL (ref 12–16)
KETONES, URINE: NEGATIVE MG/DL
LACTIC ACID, SEPSIS: 2.1 MMOL/L (ref 0.4–1.9)
LACTIC ACID, SEPSIS: 2.9 MMOL/L (ref 0.4–1.9)
LACTIC ACID: 3.3 MMOL/L (ref 0.4–2)
LEUKOCYTE ESTERASE, URINE: ABNORMAL
LYMPHOCYTES ABSOLUTE: 1.2 K/UL (ref 1–5.1)
LYMPHOCYTES RELATIVE PERCENT: 7.3 %
MCH RBC QN AUTO: 27.9 PG (ref 26–34)
MCHC RBC AUTO-ENTMCNC: 31.4 G/DL (ref 31–36)
MCV RBC AUTO: 88.9 FL (ref 80–100)
METHEMOGLOBIN VENOUS: 0.6 % (ref 0–1.5)
MICROSCOPIC EXAMINATION: YES
MONOCYTES ABSOLUTE: 1 K/UL (ref 0–1.3)
MONOCYTES RELATIVE PERCENT: 5.9 %
NEUTROPHILS ABSOLUTE: 14.2 K/UL (ref 1.7–7.7)
NEUTROPHILS RELATIVE PERCENT: 86.1 %
NITRITE, URINE: NEGATIVE
O2 SAT, VEN: 61 %
PCO2, VEN: 42.7 MMHG (ref 41–51)
PDW BLD-RTO: 17.1 % (ref 12.4–15.4)
PERFORMED ON: ABNORMAL
PH UA: 6 (ref 5–8)
PH VENOUS: 7.38 (ref 7.35–7.45)
PLATELET # BLD: 238 K/UL (ref 135–450)
PMV BLD AUTO: 7.9 FL (ref 5–10.5)
PO2, VEN: 32.9 MMHG (ref 25–40)
POTASSIUM REFLEX MAGNESIUM: 4.9 MMOL/L (ref 3.5–5.1)
PRO-BNP: 344 PG/ML (ref 0–124)
PROCALCITONIN: 3.66 NG/ML (ref 0–0.15)
PROTEIN UA: 30 MG/DL
RAPID INFLUENZA  B AGN: NEGATIVE
RAPID INFLUENZA A AGN: NEGATIVE
RBC # BLD: 4.27 M/UL (ref 4–5.2)
RBC UA: ABNORMAL /HPF (ref 0–4)
SARS-COV-2, NAAT: NOT DETECTED
SODIUM BLD-SCNC: 135 MMOL/L (ref 136–145)
SPECIFIC GRAVITY UA: 1.01 (ref 1–1.03)
TCO2 CALC VENOUS: 26 MMOL/L
TOTAL PROTEIN: 6.4 G/DL (ref 6.4–8.2)
TROPONIN: 0.02 NG/ML
TROPONIN: 0.03 NG/ML
URINE REFLEX TO CULTURE: YES
URINE TYPE: ABNORMAL
UROBILINOGEN, URINE: 0.2 E.U./DL
WBC # BLD: 16.5 K/UL (ref 4–11)
WBC UA: ABNORMAL /HPF (ref 0–5)

## 2022-04-15 PROCEDURE — 2060000000 HC ICU INTERMEDIATE R&B

## 2022-04-15 PROCEDURE — 2700000000 HC OXYGEN THERAPY PER DAY

## 2022-04-15 PROCEDURE — 6360000002 HC RX W HCPCS: Performed by: INTERNAL MEDICINE

## 2022-04-15 PROCEDURE — 84156 ASSAY OF PROTEIN URINE: CPT

## 2022-04-15 PROCEDURE — 96365 THER/PROPH/DIAG IV INF INIT: CPT

## 2022-04-15 PROCEDURE — 96374 THER/PROPH/DIAG INJ IV PUSH: CPT

## 2022-04-15 PROCEDURE — 84484 ASSAY OF TROPONIN QUANT: CPT

## 2022-04-15 PROCEDURE — 82570 ASSAY OF URINE CREATININE: CPT

## 2022-04-15 PROCEDURE — 6370000000 HC RX 637 (ALT 250 FOR IP): Performed by: INTERNAL MEDICINE

## 2022-04-15 PROCEDURE — 87804 INFLUENZA ASSAY W/OPTIC: CPT

## 2022-04-15 PROCEDURE — 80053 COMPREHEN METABOLIC PANEL: CPT

## 2022-04-15 PROCEDURE — 96375 TX/PRO/DX INJ NEW DRUG ADDON: CPT

## 2022-04-15 PROCEDURE — 83880 ASSAY OF NATRIURETIC PEPTIDE: CPT

## 2022-04-15 PROCEDURE — 85025 COMPLETE CBC W/AUTO DIFF WBC: CPT

## 2022-04-15 PROCEDURE — 71046 X-RAY EXAM CHEST 2 VIEWS: CPT

## 2022-04-15 PROCEDURE — 36415 COLL VENOUS BLD VENIPUNCTURE: CPT

## 2022-04-15 PROCEDURE — 94761 N-INVAS EAR/PLS OXIMETRY MLT: CPT

## 2022-04-15 PROCEDURE — 81001 URINALYSIS AUTO W/SCOPE: CPT

## 2022-04-15 PROCEDURE — 87040 BLOOD CULTURE FOR BACTERIA: CPT

## 2022-04-15 PROCEDURE — 87186 SC STD MICRODIL/AGAR DIL: CPT

## 2022-04-15 PROCEDURE — 82803 BLOOD GASES ANY COMBINATION: CPT

## 2022-04-15 PROCEDURE — 6360000002 HC RX W HCPCS

## 2022-04-15 PROCEDURE — 84145 PROCALCITONIN (PCT): CPT

## 2022-04-15 PROCEDURE — 87086 URINE CULTURE/COLONY COUNT: CPT

## 2022-04-15 PROCEDURE — 99285 EMERGENCY DEPT VISIT HI MDM: CPT

## 2022-04-15 PROCEDURE — 2580000003 HC RX 258: Performed by: INTERNAL MEDICINE

## 2022-04-15 PROCEDURE — 96366 THER/PROPH/DIAG IV INF ADDON: CPT

## 2022-04-15 PROCEDURE — 87635 SARS-COV-2 COVID-19 AMP PRB: CPT

## 2022-04-15 PROCEDURE — 99223 1ST HOSP IP/OBS HIGH 75: CPT | Performed by: INTERNAL MEDICINE

## 2022-04-15 PROCEDURE — 2580000003 HC RX 258

## 2022-04-15 PROCEDURE — 6370000000 HC RX 637 (ALT 250 FOR IP)

## 2022-04-15 PROCEDURE — 93970 EXTREMITY STUDY: CPT

## 2022-04-15 PROCEDURE — 96367 TX/PROPH/DG ADDL SEQ IV INF: CPT

## 2022-04-15 PROCEDURE — 94640 AIRWAY INHALATION TREATMENT: CPT

## 2022-04-15 PROCEDURE — 83605 ASSAY OF LACTIC ACID: CPT

## 2022-04-15 PROCEDURE — 93005 ELECTROCARDIOGRAM TRACING: CPT

## 2022-04-15 PROCEDURE — 87077 CULTURE AEROBIC IDENTIFY: CPT

## 2022-04-15 RX ORDER — IPRATROPIUM BROMIDE AND ALBUTEROL SULFATE 2.5; .5 MG/3ML; MG/3ML
1 SOLUTION RESPIRATORY (INHALATION) ONCE
Status: COMPLETED | OUTPATIENT
Start: 2022-04-15 | End: 2022-04-15

## 2022-04-15 RX ORDER — HYDROXYZINE HYDROCHLORIDE 25 MG/1
25 TABLET, FILM COATED ORAL 2 TIMES DAILY
Status: DISCONTINUED | OUTPATIENT
Start: 2022-04-15 | End: 2022-04-19 | Stop reason: HOSPADM

## 2022-04-15 RX ORDER — LETROZOLE 2.5 MG/1
2.5 TABLET, FILM COATED ORAL DAILY
Status: DISCONTINUED | OUTPATIENT
Start: 2022-04-15 | End: 2022-04-19 | Stop reason: HOSPADM

## 2022-04-15 RX ORDER — SODIUM CHLORIDE 0.9 % (FLUSH) 0.9 %
5-40 SYRINGE (ML) INJECTION EVERY 12 HOURS SCHEDULED
Status: DISCONTINUED | OUTPATIENT
Start: 2022-04-15 | End: 2022-04-19 | Stop reason: HOSPADM

## 2022-04-15 RX ORDER — NICOTINE 21 MG/24HR
1 PATCH, TRANSDERMAL 24 HOURS TRANSDERMAL DAILY
Status: DISCONTINUED | OUTPATIENT
Start: 2022-04-15 | End: 2022-04-19 | Stop reason: HOSPADM

## 2022-04-15 RX ORDER — SODIUM CHLORIDE 9 MG/ML
INJECTION, SOLUTION INTRAVENOUS PRN
Status: DISCONTINUED | OUTPATIENT
Start: 2022-04-15 | End: 2022-04-19 | Stop reason: HOSPADM

## 2022-04-15 RX ORDER — LORAZEPAM 1 MG/1
1 TABLET ORAL ONCE
Status: COMPLETED | OUTPATIENT
Start: 2022-04-15 | End: 2022-04-15

## 2022-04-15 RX ORDER — SODIUM CHLORIDE, SODIUM LACTATE, POTASSIUM CHLORIDE, AND CALCIUM CHLORIDE .6; .31; .03; .02 G/100ML; G/100ML; G/100ML; G/100ML
30 INJECTION, SOLUTION INTRAVENOUS ONCE
Status: COMPLETED | OUTPATIENT
Start: 2022-04-15 | End: 2022-04-15

## 2022-04-15 RX ORDER — ONDANSETRON 2 MG/ML
4 INJECTION INTRAMUSCULAR; INTRAVENOUS ONCE
Status: COMPLETED | OUTPATIENT
Start: 2022-04-15 | End: 2022-04-15

## 2022-04-15 RX ORDER — RISPERIDONE 0.25 MG/1
0.5 TABLET, FILM COATED ORAL 2 TIMES DAILY
Status: DISCONTINUED | OUTPATIENT
Start: 2022-04-15 | End: 2022-04-19 | Stop reason: HOSPADM

## 2022-04-15 RX ORDER — POLYETHYLENE GLYCOL 3350 17 G/17G
17 POWDER, FOR SOLUTION ORAL DAILY PRN
Status: DISCONTINUED | OUTPATIENT
Start: 2022-04-15 | End: 2022-04-19 | Stop reason: HOSPADM

## 2022-04-15 RX ORDER — IPRATROPIUM BROMIDE AND ALBUTEROL SULFATE 2.5; .5 MG/3ML; MG/3ML
1 SOLUTION RESPIRATORY (INHALATION) 4 TIMES DAILY
Status: DISCONTINUED | OUTPATIENT
Start: 2022-04-15 | End: 2022-04-18

## 2022-04-15 RX ORDER — LORAZEPAM 0.5 MG/1
0.5 TABLET ORAL NIGHTLY PRN
Status: DISCONTINUED | OUTPATIENT
Start: 2022-04-15 | End: 2022-04-19 | Stop reason: HOSPADM

## 2022-04-15 RX ORDER — ACETAMINOPHEN 650 MG/1
650 SUPPOSITORY RECTAL EVERY 6 HOURS PRN
Status: DISCONTINUED | OUTPATIENT
Start: 2022-04-15 | End: 2022-04-19 | Stop reason: HOSPADM

## 2022-04-15 RX ORDER — METHYLPREDNISOLONE SODIUM SUCCINATE 125 MG/2ML
125 INJECTION, POWDER, LYOPHILIZED, FOR SOLUTION INTRAMUSCULAR; INTRAVENOUS ONCE
Status: COMPLETED | OUTPATIENT
Start: 2022-04-15 | End: 2022-04-15

## 2022-04-15 RX ORDER — TRAZODONE HYDROCHLORIDE 50 MG/1
25 TABLET ORAL NIGHTLY
Status: DISCONTINUED | OUTPATIENT
Start: 2022-04-15 | End: 2022-04-19 | Stop reason: HOSPADM

## 2022-04-15 RX ORDER — SODIUM CHLORIDE 9 MG/ML
INJECTION, SOLUTION INTRAVENOUS CONTINUOUS
Status: DISCONTINUED | OUTPATIENT
Start: 2022-04-15 | End: 2022-04-17

## 2022-04-15 RX ORDER — PANTOPRAZOLE SODIUM 40 MG/1
40 TABLET, DELAYED RELEASE ORAL
Status: DISCONTINUED | OUTPATIENT
Start: 2022-04-15 | End: 2022-04-19 | Stop reason: HOSPADM

## 2022-04-15 RX ORDER — LOPERAMIDE HYDROCHLORIDE 2 MG/1
2 CAPSULE ORAL EVERY 8 HOURS PRN
Status: DISCONTINUED | OUTPATIENT
Start: 2022-04-15 | End: 2022-04-19 | Stop reason: HOSPADM

## 2022-04-15 RX ORDER — ONDANSETRON 4 MG/1
4 TABLET, ORALLY DISINTEGRATING ORAL EVERY 8 HOURS PRN
Status: DISCONTINUED | OUTPATIENT
Start: 2022-04-15 | End: 2022-04-19 | Stop reason: HOSPADM

## 2022-04-15 RX ORDER — ONDANSETRON 2 MG/ML
4 INJECTION INTRAMUSCULAR; INTRAVENOUS EVERY 6 HOURS PRN
Status: DISCONTINUED | OUTPATIENT
Start: 2022-04-15 | End: 2022-04-19 | Stop reason: HOSPADM

## 2022-04-15 RX ORDER — METHYLPREDNISOLONE SODIUM SUCCINATE 40 MG/ML
40 INJECTION, POWDER, LYOPHILIZED, FOR SOLUTION INTRAMUSCULAR; INTRAVENOUS EVERY 12 HOURS
Status: DISCONTINUED | OUTPATIENT
Start: 2022-04-15 | End: 2022-04-16

## 2022-04-15 RX ORDER — ACETAMINOPHEN 325 MG/1
650 TABLET ORAL EVERY 6 HOURS PRN
Status: DISCONTINUED | OUTPATIENT
Start: 2022-04-15 | End: 2022-04-19 | Stop reason: HOSPADM

## 2022-04-15 RX ORDER — SODIUM CHLORIDE 0.9 % (FLUSH) 0.9 %
5-40 SYRINGE (ML) INJECTION PRN
Status: DISCONTINUED | OUTPATIENT
Start: 2022-04-15 | End: 2022-04-19 | Stop reason: HOSPADM

## 2022-04-15 RX ADMIN — ACETAMINOPHEN 650 MG: 325 TABLET ORAL at 13:39

## 2022-04-15 RX ADMIN — CEFEPIME HYDROCHLORIDE 1000 MG: 1 INJECTION, POWDER, FOR SOLUTION INTRAMUSCULAR; INTRAVENOUS at 10:03

## 2022-04-15 RX ADMIN — RISPERIDONE 0.5 MG: 0.25 TABLET ORAL at 20:53

## 2022-04-15 RX ADMIN — CEFEPIME HYDROCHLORIDE 2000 MG: 2 INJECTION, POWDER, FOR SOLUTION INTRAVENOUS at 16:45

## 2022-04-15 RX ADMIN — METHYLPREDNISOLONE SODIUM SUCCINATE 125 MG: 125 INJECTION, POWDER, FOR SOLUTION INTRAMUSCULAR; INTRAVENOUS at 10:05

## 2022-04-15 RX ADMIN — SODIUM CHLORIDE: 9 INJECTION, SOLUTION INTRAVENOUS at 16:43

## 2022-04-15 RX ADMIN — LETROZOLE 2.5 MG: 2.5 TABLET ORAL at 17:38

## 2022-04-15 RX ADMIN — APIXABAN 2.5 MG: 2.5 TABLET, FILM COATED ORAL at 20:52

## 2022-04-15 RX ADMIN — LORAZEPAM 1 MG: 1 TABLET ORAL at 10:12

## 2022-04-15 RX ADMIN — ACETAMINOPHEN 650 MG: 325 TABLET ORAL at 20:53

## 2022-04-15 RX ADMIN — ONDANSETRON 4 MG: 2 INJECTION INTRAMUSCULAR; INTRAVENOUS at 10:05

## 2022-04-15 RX ADMIN — SODIUM CHLORIDE: 9 INJECTION, SOLUTION INTRAVENOUS at 21:00

## 2022-04-15 RX ADMIN — SODIUM CHLORIDE, POTASSIUM CHLORIDE, SODIUM LACTATE AND CALCIUM CHLORIDE 1000 ML: 600; 310; 30; 20 INJECTION, SOLUTION INTRAVENOUS at 10:00

## 2022-04-15 RX ADMIN — IPRATROPIUM BROMIDE AND ALBUTEROL SULFATE 1 AMPULE: 2.5; .5 SOLUTION RESPIRATORY (INHALATION) at 15:07

## 2022-04-15 RX ADMIN — HYDROXYZINE HYDROCHLORIDE 25 MG: 25 TABLET ORAL at 20:52

## 2022-04-15 RX ADMIN — IPRATROPIUM BROMIDE AND ALBUTEROL SULFATE 1 AMPULE: 2.5; .5 SOLUTION RESPIRATORY (INHALATION) at 09:28

## 2022-04-15 RX ADMIN — PANTOPRAZOLE SODIUM 40 MG: 40 TABLET, DELAYED RELEASE ORAL at 17:38

## 2022-04-15 RX ADMIN — IPRATROPIUM BROMIDE AND ALBUTEROL SULFATE 1 AMPULE: 2.5; .5 SOLUTION RESPIRATORY (INHALATION) at 20:46

## 2022-04-15 RX ADMIN — Medication 2 PUFF: at 20:47

## 2022-04-15 RX ADMIN — TRAZODONE HYDROCHLORIDE 25 MG: 50 TABLET ORAL at 20:52

## 2022-04-15 RX ADMIN — METHYLPREDNISOLONE SODIUM SUCCINATE 40 MG: 40 INJECTION, POWDER, FOR SOLUTION INTRAMUSCULAR; INTRAVENOUS at 20:53

## 2022-04-15 RX ADMIN — SODIUM CHLORIDE, PRESERVATIVE FREE 10 ML: 5 INJECTION INTRAVENOUS at 20:53

## 2022-04-15 RX ADMIN — VANCOMYCIN HYDROCHLORIDE 2000 MG: 10 INJECTION, POWDER, LYOPHILIZED, FOR SOLUTION INTRAVENOUS at 10:56

## 2022-04-15 ASSESSMENT — PAIN SCALES - GENERAL
PAINLEVEL_OUTOF10: 0
PAINLEVEL_OUTOF10: 3
PAINLEVEL_OUTOF10: 5

## 2022-04-15 ASSESSMENT — ENCOUNTER SYMPTOMS
BACK PAIN: 0
CHEST TIGHTNESS: 0
VOMITING: 0
COUGH: 0
EYE DISCHARGE: 0
EYE ITCHING: 0
ABDOMINAL PAIN: 0
EYE PAIN: 0
SHORTNESS OF BREATH: 1
WHEEZING: 1
CONSTIPATION: 0
DIARRHEA: 0
SORE THROAT: 0
RHINORRHEA: 0

## 2022-04-15 NOTE — ED PROVIDER NOTES
ED Attending Attestation Note     Date of evaluation: 4/15/2022    This patient was seen by the advance practice provider. I have seen and examined the patient, agree with the workup, evaluation, management and diagnosis. The care plan has been discussed. I have reviewed the ECG and concur with the HORTENCIA's interpretation. My assessment reveals expiratory wheeze through both lungs, mild increased work of breathing, increased O2 requirement.      Sofia Arora MD  04/15/22 7317

## 2022-04-15 NOTE — CONSULTS
CC:  Shortness of breath    Francy Yeung is seen at the request of Dr. Pamela Pratt for respiratory problems and question of pneumonia. She has a known history of COPD, with emphysema and chronic bronchitis. She was recently discharged from this hospital (4/5/2022) after treatment for shortness of breath which was apparently multifactorial.  She had findings suggesting exacerbation of diastolic heart failure as well as COPD problems. It is not clear that she had pneumonia at that time, based on x-ray review. She awakened from sleep this morning at about 2 AM, feeling very short of breath. She was brought to the emergency room, and improved with administration of nebulized bronchodilators, high-dose steroid, continued oxygen. She states she is feeling better now, although not at her baseline. She denies any cough or chest pain. She has not had fevers or chills. She has been maintained on oxygen at 2 to 5 L chronically. She continues to smoke 1 pack/day. She is being treated with [facility] inhaler twice daily, states she does not have a as needed inhaler, and medication is administered without use of a spacer. Past medical history: COPD, as noted above. Breast cancer, on active treatment. Diastolic heart failure. CKD. Social history: Patient lives in an [facility]. She continues to smoke 1 pack/day. Does not drink alcohol. Review of systems: 10 point review is negative except as noted above    Physical examination reveals an obese woman sitting up in bed, in no acute distress. Pulse 98, regular. /75. Respirations 18. O2 saturation 94%, O2 at 4 L/min. Temperature not recorded  HEENT: Eyes ears nose normal.  Oral mucous membranes moist.  Most teeth are absent. Mallampati score 4  Neck: No lymphadenopathy or thyromegaly. Chest: Obese. Right Port-A-Cath present. Left breast surgically absent. Breath sounds distant, with faint end expiratory high-pitched wheezes bilaterally.   Few left basilar crackles  Cardiovascular: Radial pulses 2+ bilaterally. Heart sounds are diminished. No murmurs appreciated. Abdomen: Morbidly obese  Extremities: No cyanosis or edema. Chest radiography is reviewed, dating back to July 2021. There is paucity of lung markings in upper lung zones, right greater than left, consistent with emphysema seen on CT scan. There are increased lung markings in lung bases, which are seen on most chest radiographs. This is consistent with findings of chronic bronchitis and atelectasis. It is not consistent with pneumonia. WBC 16.5. Procalcitonin pending  Creatinine 2.1. Sodium 135, potassium 4.9, chloride 98, CO2 21  Venous pH 7.37    A&P: Worsening shortness of breath is consistent with an exacerbation of COPD, consistent with findings on today's examination. She has improved somewhat with bronchodilators and a bump in her steroid dosing. Note that she has been on chronic steroids, at least low-dose. Checking procalcitonin level. I am not sure that she requires antibiotic therapy. Her most recent MRSA nasal probe was negative. She reports not having a spacer tube to use with a metered-dose inhaler for her regular ICS/LABA medication. She also reports not having availability of as needed inhaler. This should be corrected while she is here. Although she has not expressed an interest in quitting smoking, she is not smoking while in the hospital.  We will give her a nicotine patch for at least her stay here. Further discussion about smoking cessation may be helpful to her.

## 2022-04-15 NOTE — PROGRESS NOTES
04/15/22 1516   RT Protocol   History Pulmonary Disease 2   Respiratory pattern 0   Breath sounds 4   Cough 0   Indications for Bronchodilator Therapy Decreased or absent breath sounds   Bronchodilator Assessment Score 6
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
## 2022-04-15 NOTE — PROGRESS NOTES
Clinical Pharmacy Progress Note    Vancomycin - Management by Pharmacy    Consult Date(s): 4/15/22  Consulting Provider(s): NERY Hi     Assessment / Plan     Pneumonia (HAP)- Vancomycin  Concurrent Antimicrobials: cefepime   Day of Vanc Therapy: 1  Current Dosing Method: Intermittent Dosing by Levels  Therapeutic Goal: 10-15 mg/L  Current Dose / Frequency: 2000 mg given once 4/15 @ 1056  Plan / Rationale: Patient in CHANTELLE, Random level due 4/16 @ 0600  Will continue to monitor clinical condition and make adjustments to regimen as appropriate. Thank you for consulting Pharmacy! Daniel Cisse, PharmD  Ext [phone]    Subjective/Objective: Ms. Shimon Murray is a 63 y.o. female admitted for Sepsis. Pharmacy has been consulted to dose vancomycin. Height:   Ht Readings from Last 1 Encounters:   04/15/22 5' 8\" (1.727 m)     Weight:   Wt Readings from Last 1 Encounters:   04/15/22 263 lb 9.6 oz (119.6 kg)       Current & Prior Antimicrobial Regimen(s):  Cefepime    Level(s) / Doses:    Date Time Dose Level / Type of Level Interpretation                 Note: Serum levels collected for AUC-based dosing may be high if collected in close proximity to the dose administered. This is not necessarily indicative of toxicity. Cultures & Sensitivities:    Date Site Micro Susceptibility / Result                 Labs / Ancillary Data:    Estimated Creatinine Clearance: 39 mL/min (A) (based on SCr of 2.1 mg/dL (H)). Recent Labs     04/15/22  0941   CREATININE 2.1*   BUN 71*   WBC 16.5*       Additional Lab Values / Findings of Note:    Procalcitonin: No results for input(s): PROCAL in the last 72 hours.

## 2022-04-15 NOTE — TELEPHONE ENCOUNTER
Writer contacted Dr. Peraza Mantador, ED provider to inform of 30 day readmission risk. ED provider informed writer of readmission.
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
## 2022-04-15 NOTE — CONSULTS
Clinical Pharmacy Progress Note    Admit date: 4/15/2022   . Pharmacy is consulted to dose Vancomycin x1 dose in ED per SPENSER Delacruz for treatment of HAP. Height:  5' 8\" (172.7 cm)  Weight:  263 lb 9.6 oz (119.6 kg)      Assessment/Plan:  · Will order Vancomycin 2000mg x1 for administration in ED per ER pharmacy consult. Dose is ~17mg/kg based on actual body weight (lower than usual loading dose given h/o CKD). · If Vancomycin is to continue on admission and pharmacy is to manage dosing, please re-consult with admission orders.     Please call with questions--  Thanks--  Maggie Vázquez, PharmD, [address]  [id number] ([facility])   4/15/2022 9:57 AM

## 2022-04-16 ENCOUNTER — APPOINTMENT (OUTPATIENT)
Dept: CT IMAGING | Age: 62
DRG: 720 | End: 2022-04-16
Payer: MEDICAID

## 2022-04-16 PROBLEM — R65.20 SEVERE SEPSIS (HCC): Status: ACTIVE | Noted: 2022-04-15

## 2022-04-16 LAB
ALBUMIN SERPL-MCNC: 3.3 G/DL (ref 3.4–5)
ANION GAP SERPL CALCULATED.3IONS-SCNC: 12 MMOL/L (ref 3–16)
BASOPHILS ABSOLUTE: 0 K/UL (ref 0–0.2)
BASOPHILS RELATIVE PERCENT: 0.1 %
BUN BLDV-MCNC: 57 MG/DL (ref 7–20)
CALCIUM SERPL-MCNC: 9.4 MG/DL (ref 8.3–10.6)
CHLORIDE BLD-SCNC: 99 MMOL/L (ref 99–110)
CO2: 23 MMOL/L (ref 21–32)
CREAT SERPL-MCNC: 1.9 MG/DL (ref 0.6–1.1)
CREATININE URINE: 20.2 MG/DL (ref 28–259)
EOSINOPHILS ABSOLUTE: 0 K/UL (ref 0–0.6)
EOSINOPHILS RELATIVE PERCENT: 0 %
GFR AFRICAN AMERICAN: 33
GFR NON-AFRICAN AMERICAN: 27
GLUCOSE BLD-MCNC: 270 MG/DL (ref 70–99)
GLUCOSE BLD-MCNC: 313 MG/DL (ref 70–99)
GLUCOSE BLD-MCNC: 346 MG/DL (ref 70–99)
GLUCOSE BLD-MCNC: 392 MG/DL (ref 70–99)
GLUCOSE BLD-MCNC: 445 MG/DL (ref 70–99)
HCT VFR BLD CALC: 37.4 % (ref 36–48)
HEMOGLOBIN: 11.9 G/DL (ref 12–16)
LACTIC ACID: 5 MMOL/L (ref 0.4–2)
LYMPHOCYTES ABSOLUTE: 0.6 K/UL (ref 1–5.1)
LYMPHOCYTES RELATIVE PERCENT: 4.2 %
MAGNESIUM: 1.5 MG/DL (ref 1.8–2.4)
MCH RBC QN AUTO: 28.5 PG (ref 26–34)
MCHC RBC AUTO-ENTMCNC: 31.8 G/DL (ref 31–36)
MCV RBC AUTO: 89.6 FL (ref 80–100)
MONOCYTES ABSOLUTE: 0.2 K/UL (ref 0–1.3)
MONOCYTES RELATIVE PERCENT: 1.7 %
NEUTROPHILS ABSOLUTE: 13.3 K/UL (ref 1.7–7.7)
NEUTROPHILS RELATIVE PERCENT: 94 %
PDW BLD-RTO: 17.2 % (ref 12.4–15.4)
PERFORMED ON: ABNORMAL
PHOSPHORUS: 3.4 MG/DL (ref 2.5–4.9)
PLATELET # BLD: 225 K/UL (ref 135–450)
PMV BLD AUTO: 7.9 FL (ref 5–10.5)
POTASSIUM SERPL-SCNC: 4.8 MMOL/L (ref 3.5–5.1)
PROTEIN PROTEIN: 28 MG/DL
PROTEIN/CREAT RATIO: 1.4 MG/DL
RBC # BLD: 4.17 M/UL (ref 4–5.2)
SODIUM BLD-SCNC: 134 MMOL/L (ref 136–145)
VANCOMYCIN RANDOM: 12 UG/ML
WBC # BLD: 14.1 K/UL (ref 4–11)

## 2022-04-16 PROCEDURE — 6370000000 HC RX 637 (ALT 250 FOR IP): Performed by: INTERNAL MEDICINE

## 2022-04-16 PROCEDURE — 83735 ASSAY OF MAGNESIUM: CPT

## 2022-04-16 PROCEDURE — 83605 ASSAY OF LACTIC ACID: CPT

## 2022-04-16 PROCEDURE — 94640 AIRWAY INHALATION TREATMENT: CPT

## 2022-04-16 PROCEDURE — 2060000000 HC ICU INTERMEDIATE R&B

## 2022-04-16 PROCEDURE — 2580000003 HC RX 258: Performed by: INTERNAL MEDICINE

## 2022-04-16 PROCEDURE — 87641 MR-STAPH DNA AMP PROBE: CPT

## 2022-04-16 PROCEDURE — 80069 RENAL FUNCTION PANEL: CPT

## 2022-04-16 PROCEDURE — 6360000002 HC RX W HCPCS: Performed by: INTERNAL MEDICINE

## 2022-04-16 PROCEDURE — 2700000000 HC OXYGEN THERAPY PER DAY

## 2022-04-16 PROCEDURE — 92526 ORAL FUNCTION THERAPY: CPT

## 2022-04-16 PROCEDURE — 71250 CT THORAX DX C-: CPT

## 2022-04-16 PROCEDURE — 99233 SBSQ HOSP IP/OBS HIGH 50: CPT | Performed by: INTERNAL MEDICINE

## 2022-04-16 PROCEDURE — 94761 N-INVAS EAR/PLS OXIMETRY MLT: CPT

## 2022-04-16 PROCEDURE — 83036 HEMOGLOBIN GLYCOSYLATED A1C: CPT

## 2022-04-16 PROCEDURE — 80202 ASSAY OF VANCOMYCIN: CPT

## 2022-04-16 PROCEDURE — 92610 EVALUATE SWALLOWING FUNCTION: CPT

## 2022-04-16 PROCEDURE — 6360000004 HC RX CONTRAST MEDICATION: Performed by: INTERNAL MEDICINE

## 2022-04-16 PROCEDURE — 85025 COMPLETE CBC W/AUTO DIFF WBC: CPT

## 2022-04-16 RX ORDER — MAGNESIUM SULFATE IN WATER 40 MG/ML
2000 INJECTION, SOLUTION INTRAVENOUS ONCE
Status: COMPLETED | OUTPATIENT
Start: 2022-04-16 | End: 2022-04-16

## 2022-04-16 RX ORDER — DIPHENHYDRAMINE HYDROCHLORIDE 50 MG/ML
25 INJECTION INTRAMUSCULAR; INTRAVENOUS ONCE
Status: COMPLETED | OUTPATIENT
Start: 2022-04-16 | End: 2022-04-16

## 2022-04-16 RX ORDER — 0.9 % SODIUM CHLORIDE 0.9 %
500 INTRAVENOUS SOLUTION INTRAVENOUS ONCE
Status: COMPLETED | OUTPATIENT
Start: 2022-04-16 | End: 2022-04-16

## 2022-04-16 RX ORDER — DEXTROSE MONOHYDRATE 50 MG/ML
100 INJECTION, SOLUTION INTRAVENOUS PRN
Status: DISCONTINUED | OUTPATIENT
Start: 2022-04-16 | End: 2022-04-19 | Stop reason: HOSPADM

## 2022-04-16 RX ORDER — DIPHENHYDRAMINE HYDROCHLORIDE 50 MG/ML
25 INJECTION INTRAMUSCULAR; INTRAVENOUS EVERY 6 HOURS PRN
Status: DISCONTINUED | OUTPATIENT
Start: 2022-04-16 | End: 2022-04-19 | Stop reason: HOSPADM

## 2022-04-16 RX ORDER — NICOTINE POLACRILEX 4 MG
15 LOZENGE BUCCAL PRN
Status: DISCONTINUED | OUTPATIENT
Start: 2022-04-16 | End: 2022-04-19 | Stop reason: HOSPADM

## 2022-04-16 RX ORDER — LABETALOL HYDROCHLORIDE 5 MG/ML
10 INJECTION, SOLUTION INTRAVENOUS EVERY 4 HOURS PRN
Status: DISCONTINUED | OUTPATIENT
Start: 2022-04-16 | End: 2022-04-19 | Stop reason: HOSPADM

## 2022-04-16 RX ORDER — INSULIN LISPRO 100 [IU]/ML
0-18 INJECTION, SOLUTION INTRAVENOUS; SUBCUTANEOUS
Status: DISCONTINUED | OUTPATIENT
Start: 2022-04-16 | End: 2022-04-17

## 2022-04-16 RX ORDER — INSULIN LISPRO 100 [IU]/ML
0-9 INJECTION, SOLUTION INTRAVENOUS; SUBCUTANEOUS NIGHTLY
Status: DISCONTINUED | OUTPATIENT
Start: 2022-04-16 | End: 2022-04-17

## 2022-04-16 RX ORDER — LEVOFLOXACIN 5 MG/ML
750 INJECTION, SOLUTION INTRAVENOUS
Status: DISCONTINUED | OUTPATIENT
Start: 2022-04-16 | End: 2022-04-17

## 2022-04-16 RX ORDER — PREDNISONE 20 MG/1
40 TABLET ORAL DAILY
Status: DISCONTINUED | OUTPATIENT
Start: 2022-04-17 | End: 2022-04-19 | Stop reason: HOSPADM

## 2022-04-16 RX ADMIN — RISPERIDONE 0.5 MG: 0.25 TABLET ORAL at 20:28

## 2022-04-16 RX ADMIN — RISPERIDONE 0.5 MG: 0.25 TABLET ORAL at 09:09

## 2022-04-16 RX ADMIN — LORAZEPAM 0.5 MG: 0.5 TABLET ORAL at 22:11

## 2022-04-16 RX ADMIN — PANTOPRAZOLE SODIUM 40 MG: 40 TABLET, DELAYED RELEASE ORAL at 05:33

## 2022-04-16 RX ADMIN — LEVOFLOXACIN 750 MG: 5 INJECTION, SOLUTION INTRAVENOUS at 18:13

## 2022-04-16 RX ADMIN — INSULIN LISPRO 18 UNITS: 100 INJECTION, SOLUTION INTRAVENOUS; SUBCUTANEOUS at 16:57

## 2022-04-16 RX ADMIN — IOHEXOL 50 ML: 240 INJECTION, SOLUTION INTRATHECAL; INTRAVASCULAR; INTRAVENOUS; ORAL at 17:37

## 2022-04-16 RX ADMIN — DIPHENHYDRAMINE HYDROCHLORIDE 25 MG: 50 INJECTION INTRAMUSCULAR; INTRAVENOUS at 18:09

## 2022-04-16 RX ADMIN — SODIUM CHLORIDE, PRESERVATIVE FREE 10 ML: 5 INJECTION INTRAVENOUS at 09:10

## 2022-04-16 RX ADMIN — APIXABAN 2.5 MG: 2.5 TABLET, FILM COATED ORAL at 20:28

## 2022-04-16 RX ADMIN — METOPROLOL TARTRATE 25 MG: 25 TABLET, FILM COATED ORAL at 13:14

## 2022-04-16 RX ADMIN — TRAZODONE HYDROCHLORIDE 25 MG: 50 TABLET ORAL at 20:28

## 2022-04-16 RX ADMIN — CEFEPIME HYDROCHLORIDE 2000 MG: 2 INJECTION, POWDER, FOR SOLUTION INTRAVENOUS at 03:47

## 2022-04-16 RX ADMIN — APIXABAN 2.5 MG: 2.5 TABLET, FILM COATED ORAL at 09:09

## 2022-04-16 RX ADMIN — IPRATROPIUM BROMIDE AND ALBUTEROL SULFATE 1 AMPULE: 2.5; .5 SOLUTION RESPIRATORY (INHALATION) at 09:07

## 2022-04-16 RX ADMIN — METHYLPREDNISOLONE SODIUM SUCCINATE 40 MG: 40 INJECTION, POWDER, FOR SOLUTION INTRAMUSCULAR; INTRAVENOUS at 09:10

## 2022-04-16 RX ADMIN — SODIUM CHLORIDE 500 ML: 900 INJECTION, SOLUTION INTRAVENOUS at 16:58

## 2022-04-16 RX ADMIN — LORAZEPAM 0.5 MG: 0.5 TABLET ORAL at 00:41

## 2022-04-16 RX ADMIN — DIPHENHYDRAMINE HYDROCHLORIDE 25 MG: 50 INJECTION INTRAMUSCULAR; INTRAVENOUS at 09:10

## 2022-04-16 RX ADMIN — MAGNESIUM SULFATE HEPTAHYDRATE 2000 MG: 2 INJECTION, SOLUTION INTRAVENOUS at 11:18

## 2022-04-16 RX ADMIN — IPRATROPIUM BROMIDE AND ALBUTEROL SULFATE 1 AMPULE: 2.5; .5 SOLUTION RESPIRATORY (INHALATION) at 17:08

## 2022-04-16 RX ADMIN — INSULIN GLARGINE 18 UNITS: 100 INJECTION, SOLUTION SUBCUTANEOUS at 10:51

## 2022-04-16 RX ADMIN — LETROZOLE 2.5 MG: 2.5 TABLET ORAL at 09:11

## 2022-04-16 RX ADMIN — ACETAMINOPHEN 650 MG: 325 TABLET ORAL at 03:45

## 2022-04-16 RX ADMIN — HYDROXYZINE HYDROCHLORIDE 25 MG: 25 TABLET ORAL at 09:09

## 2022-04-16 RX ADMIN — IPRATROPIUM BROMIDE AND ALBUTEROL SULFATE 1 AMPULE: 2.5; .5 SOLUTION RESPIRATORY (INHALATION) at 20:10

## 2022-04-16 RX ADMIN — Medication 2 PUFF: at 09:10

## 2022-04-16 RX ADMIN — METOPROLOL TARTRATE 25 MG: 25 TABLET, FILM COATED ORAL at 20:28

## 2022-04-16 RX ADMIN — HYDROXYZINE HYDROCHLORIDE 25 MG: 25 TABLET ORAL at 20:28

## 2022-04-16 RX ADMIN — INSULIN LISPRO 6 UNITS: 100 INJECTION, SOLUTION INTRAVENOUS; SUBCUTANEOUS at 20:33

## 2022-04-16 RX ADMIN — INSULIN LISPRO 12 UNITS: 100 INJECTION, SOLUTION INTRAVENOUS; SUBCUTANEOUS at 13:16

## 2022-04-16 RX ADMIN — SODIUM CHLORIDE, PRESERVATIVE FREE 10 ML: 5 INJECTION INTRAVENOUS at 21:34

## 2022-04-16 RX ADMIN — Medication 2 PUFF: at 20:10

## 2022-04-16 RX ADMIN — IPRATROPIUM BROMIDE AND ALBUTEROL SULFATE 1 AMPULE: 2.5; .5 SOLUTION RESPIRATORY (INHALATION) at 13:05

## 2022-04-16 RX ADMIN — VANCOMYCIN HYDROCHLORIDE 1250 MG: 10 INJECTION, POWDER, LYOPHILIZED, FOR SOLUTION INTRAVENOUS at 11:14

## 2022-04-16 ASSESSMENT — PAIN SCALES - GENERAL
PAINLEVEL_OUTOF10: 3
PAINLEVEL_OUTOF10: 0

## 2022-04-16 ASSESSMENT — PAIN DESCRIPTION - LOCATION: LOCATION: KNEE

## 2022-04-16 ASSESSMENT — PAIN DESCRIPTION - PROGRESSION: CLINICAL_PROGRESSION: GRADUALLY WORSENING

## 2022-04-16 ASSESSMENT — PAIN DESCRIPTION - ONSET: ONSET: GRADUAL

## 2022-04-16 ASSESSMENT — PAIN DESCRIPTION - FREQUENCY: FREQUENCY: INTERMITTENT

## 2022-04-16 ASSESSMENT — PAIN DESCRIPTION - ORIENTATION: ORIENTATION: LEFT;RIGHT

## 2022-04-16 ASSESSMENT — PAIN - FUNCTIONAL ASSESSMENT: PAIN_FUNCTIONAL_ASSESSMENT: PREVENTS OR INTERFERES SOME ACTIVE ACTIVITIES AND ADLS

## 2022-04-16 ASSESSMENT — PAIN DESCRIPTION - PAIN TYPE: TYPE: CHRONIC PAIN

## 2022-04-16 ASSESSMENT — PAIN DESCRIPTION - DESCRIPTORS: DESCRIPTORS: ACHING

## 2022-04-16 NOTE — PLAN OF CARE
Problem: Falls - Risk of:  Goal: Will remain free from falls  Description: Will remain free from falls  Outcome: Ongoing    Pt educated on fall precautions and prevention. Ambulating standby assist. Non skid footwear in place, bed alarm active, call light within reach, hourly rounding.    Goal: Absence of physical injury  Description: Absence of physical injury  Outcome: Ongoing

## 2022-04-16 NOTE — PROGRESS NOTES
Hospitalist Progress Note      PCP: Haley Gregg MD    Date of Admission: 4/15/2022    Chief Complaint on Admission: shortness of breath    Pt Seen/Examined and Chart Reviewed. Admitting dx sepsis, PNA    SUBJECTIVE/OBJECTIVE:   The patient is a 63 y.o. female with COPD with chronic hypoxic respiratory failure on home O2 at 2 liters at baseline, h/o PE on eliquis, CKD 3, diast CHF, who presented to [facility] with worsening shortness of breath from her nursing facility. Patient was recently admitted with HCAP. Patient met sepsis criteria in ER, was given IVF and started on cefepime and vanco.     Today she feels better, less dyspnea, but not back to baseline. Reports intermittent coughing with meals, chocking. Denies any chest pain or abd pain. She kept in touch with her family. Addendum:  D/w nephrology, who has seen the patient before this admission. Facial swelling now seems to be more of allergic reaction.  Will change cefepime to levaquin and add to allergy list.     Allergies  Pcn [penicillins] and Hydralazine    Medications      Scheduled Meds:   insulin lispro  0-18 Units SubCUTAneous TID WC    insulin lispro  0-9 Units SubCUTAneous Nightly    magnesium sulfate  2,000 mg IntraVENous Once    insulin glargine  0.15 Units/kg SubCUTAneous Daily    metoprolol tartrate  25 mg Oral BID    cefepime  2,000 mg IntraVENous Q12H    ipratropium-albuterol  1 ampule Inhalation 4x daily    methylPREDNISolone  40 mg IntraVENous Q12H    pantoprazole  40 mg Oral QAM AC    apixaban  2.5 mg Oral BID    hydrOXYzine  25 mg Oral BID    letrozole  2.5 mg Oral Daily    risperiDONE  0.5 mg Oral BID    traZODone  25 mg Oral Nightly    sodium chloride flush  5-40 mL IntraVENous 2 times per day    nicotine  1 patch TransDERmal Daily    mometasone-formoterol  2 puff Inhalation BID       Infusions:   dextrose      sodium chloride 100 mL/hr at 04/15/22 2100    sodium chloride         PRN Meds:  glucose, dextrose bolus (hypoglycemia), glucagon (rDNA), dextrose, loperamide, LORazepam, sodium chloride flush, sodium chloride, ondansetron **OR** ondansetron, polyethylene glycol, acetaminophen **OR** acetaminophen    Vitals    TEMPERATURE:  Current - Temp:  (charted wrong patient ); Max - Temp  Av °F (36.7 °C)  Min: 97.9 °F (36.6 °C)  Max: 98.4 °F (36.9 °C)  RESPIRATIONS RANGE: Resp  Av  Min: 18  Max: 31  PULSE RANGE: Pulse  Av.6  Min: 68  Max: 109  BLOOD PRESSURE RANGE:  Systolic (60LNK), NKY:349 , Min:125 , UQD:486   ; Diastolic (45CPZ), DIC:73, Min:66, Max:97    PULSE OXIMETRY RANGE: SpO2  Av.7 %  Min: 92 %  Max: 99 %  24HR INTAKE/OUTPUT:      Intake/Output Summary (Last 24 hours) at 2022 1117  Last data filed at 2022 1057  Gross per 24 hour   Intake 840 ml   Output 3200 ml   Net -2360 ml       Exam:      General appearance: No apparent distress, appears stated age and cooperative. HEENT periorbital edema with plephora  Lungs: diminished without active wheezing, basilar rhonchi present  Heart: Regular rate and rhythm with Normal S1/S2 without  murmurs, rubs or gallops, point of maximum impulse non-displaced  Abdomen: Soft, non-tender or non-distended without rigidity or guarding and positive bowel sounds all four quadrants. Extremities: No clubbing, cyanosis, or edema bilaterally. Skin: Skin color, texture, turgor normal.    Neurologic: Alert and oriented X 3,  grossly non-focal.  Mental status: Alert, oriented, thought content appropriate. Data    Recent Labs     04/15/22  0941 22  0546   WBC 16.5* 14.1*   HGB 11.9* 11.9*   HCT 37.9 37.4    225      Recent Labs     04/15/22  0941 22  0546   * 134*   K 4.9 4.8   CL 98* 99   CO2 21 23   PHOS  --  3.4   BUN 71* 57*   CREATININE 2.1* 1.9*     Recent Labs     04/15/22  0941   AST 32   ALT 37   BILITOT 0.5   ALKPHOS 138*     No results for input(s): INR in the last 72 hours.   Recent Labs 04/15/22  0941 04/15/22  1136   TROPONINI 0.03* 0.02*       Consults:     PHARMACY TO DOSE VANCOMYCIN  IP CONSULT TO HOSPITALIST  PHARMACY TO DOSE VANCOMYCIN  IP CONSULT TO NEPHROLOGY  IP CONSULT TO PULMONOLOGY    Active Hospital Problems    Diagnosis Date Noted    Sepsis ([facility] [address] [A41.9] 04/15/2022    Chronic hypoxemic respiratory failure (HCC) [J96.11]     Tobacco abuse [Z72.0]          ASSESSMENT AND PLAN      Acute on chronic hypoxic respiratory failure, COPD exacerbation with likely HCAP:  Influenza and SARS-CoV-2 were ruled out in ER. venous doppler negative  Blood cx NGTD. MRSA nares negative from 4/2. procal elevated  Pulmonology consulted  Cont with cefepime (doubt facial appearance indicates allergic reaction- likely swelling from nephrotic syndrome and plethora from steroids). Will continue with close monitoring. Cont with steroids, bronchodilators     Sepsis:  Likely due to pneumonia, however x-ray is not very impressive  Resuscitated with fluids per protocol in ER  Lactic acid is uptrending despite IVF, antibiotics and normal BP. O2 sats. Will obtain CT chest/A/P to look for the source  LFT nl yesterday- will recheck    CHANTELLE on CKD 3:  Has FSGS with heavy proteinuria in solitary kidney  Baseline creatinine 1.4, admitted with creatinine 2.1  Continue fluids  Nephrology following     History of breast cancer:  Continue Femara     Chronic diastolic CHF:  Hold diuretics today, resume once stable hemodynamically    DM type 2 with steroid induced hyperglycemia:  Will start weight based insulin and ISS       DVT Prophylaxis: eliquis  Diet: ADULT DIET; Regular; 4 carb choices (60 gm/meal);  Low Sodium (2 gm)  Code Status: Full Code    PT/OT Katrin Yusuf MD

## 2022-04-16 NOTE — PROGRESS NOTES
Q48H    ipratropium-albuterol  1 ampule Inhalation 4x daily    pantoprazole  40 mg Oral QAM AC    apixaban  2.5 mg Oral BID    hydrOXYzine  25 mg Oral BID    letrozole  2.5 mg Oral Daily    risperiDONE  0.5 mg Oral BID    traZODone  25 mg Oral Nightly    sodium chloride flush  5-40 mL IntraVENous 2 times per day    nicotine  1 patch TransDERmal Daily    mometasone-formoterol  2 puff Inhalation BID         Diet: ADULT DIET; Regular; 4 carb choices (60 gm/meal); Low Sodium (2 gm)     Results:  CBC:   Recent Labs     04/15/22  0941 04/16/22  0546   WBC 16.5* 14.1*   HGB 11.9* 11.9*   HCT 37.9 37.4   MCV 88.9 89.6    225     BMP:   Recent Labs     04/15/22  0941 04/16/22  0546   * 134*   K 4.9 4.8   CL 98* 99   CO2 21 23   PHOS  --  3.4   BUN 71* 57*   CREATININE 2.1* 1.9*     LIVER PROFILE:   Recent Labs     04/15/22  0941   AST 32   ALT 37   BILITOT 0.5   ALKPHOS 138*     PT/INR: No results for input(s): PROTIME, INR in the last 72 hours. APTT: No results for input(s): APTT in the last 72 hours. UA:  Recent Labs     04/15/22  1317   COLORU Yellow   PHUR 6.0   WBCUA 21-50*   RBCUA None seen   BACTERIA 1+*   CLARITYU Clear   SPECGRAV 1.010   LEUKOCYTESUR LARGE*   UROBILINOGEN 0.2   BILIRUBINUR Negative   BLOODU MODERATE*   GLUCOSEU Negative         Assessment/Plan:  63 y.o. female with     COPD exacerbation - improving, however lactic acidosis is worse. Her BP is within normal.  There is no abdominal pain, extremities are warm without pain or tenderness.     Smoker   CHANTELLE - improving     Continue levaquin and prednisone   Continue Dulera   F/u on CT  Serial lactic acid       Ryder Childress MD

## 2022-04-16 NOTE — PLAN OF CARE
Patient resting in Bed, call light within reach, bed alarm on. Patient only requiring 2L of oxygen at this time, which is baseline. Patient has had stable blood pressure and is receiving IVF to maintain fluid volume. Denies needs at this time.    Problem: Falls - Risk of:  Goal: Will remain free from falls  Description: Will remain free from falls  Outcome: Ongoing  Goal: Absence of physical injury  Description: Absence of physical injury  Outcome: Ongoing     Problem: OXYGENATION/RESPIRATORY FUNCTION  Goal: Patient will maintain patent airway  Outcome: Ongoing  Goal: Patient will achieve/maintain normal respiratory rate/effort  Description: Respiratory rate and effort will be within normal limits for the patient  Outcome: Ongoing     Problem: HEMODYNAMIC STATUS  Goal: Patient has stable vital signs and fluid balance  Outcome: Ongoing     Problem: FLUID AND ELECTROLYTE IMBALANCE  Goal: Fluid and electrolyte balance are achieved/maintained  Outcome: Ongoing     Problem: ACTIVITY INTOLERANCE/IMPAIRED MOBILITY  Goal: Mobility/activity is maintained at optimum level for patient  Outcome: Ongoing     Problem: Pain:  Goal: Pain level will decrease  Description: Pain level will decrease  Outcome: Ongoing  Goal: Control of acute pain  Description: Control of acute pain  Outcome: Ongoing  Goal: Control of chronic pain  Description: Control of chronic pain  Outcome: Ongoing

## 2022-04-16 NOTE — CONSULTS
Clinical Pharmacy Progress Note    Vancomycin has been discontinued. Pharmacy will sign off. Please re-consult pharmacy if vancomycin dosing is wanted in the future. Please call with questions.   William Lynne PharmD, BCPS  Wireless: [phone]   4/16/2022 11:19 AM
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
## 2022-04-16 NOTE — PROGRESS NOTES
IV x1 today          Note: Serum levels collected for AUC-based dosing may be high if collected in close proximity to the dose administered. This is not necessarily indicative of toxicity. Cultures & Sensitivities:    Date Site Micro Susceptibility / Result   4/15 Blood x2     4/15 COVID Negative     4/15 Flu Negative     4/15 Urine      4/16 MRSA PCR             Labs / Ancillary Data:    Estimated Creatinine Clearance: 43 mL/min (A) (based on SCr of 1.9 mg/dL (H)).     Recent Labs     04/15/22  0941 04/16/22  0546   CREATININE 2.1* 1.9*   BUN 71* 57*   WBC 16.5* 14.1*       Additional Lab Values / Findings of Note:    Procalcitonin:   Recent Labs     04/15/22  1136   PROCAL 3.66*

## 2022-04-17 LAB
ALBUMIN SERPL-MCNC: 3.3 G/DL (ref 3.4–5)
ANION GAP SERPL CALCULATED.3IONS-SCNC: 10 MMOL/L (ref 3–16)
BASOPHILS ABSOLUTE: 0 K/UL (ref 0–0.2)
BASOPHILS RELATIVE PERCENT: 0 %
BUN BLDV-MCNC: 44 MG/DL (ref 7–20)
CALCIUM SERPL-MCNC: 9.3 MG/DL (ref 8.3–10.6)
CHLORIDE BLD-SCNC: 105 MMOL/L (ref 99–110)
CO2: 25 MMOL/L (ref 21–32)
CREAT SERPL-MCNC: 1.4 MG/DL (ref 0.6–1.1)
EOSINOPHILS ABSOLUTE: 0 K/UL (ref 0–0.6)
EOSINOPHILS RELATIVE PERCENT: 0 %
ESTIMATED AVERAGE GLUCOSE: 159.9 MG/DL
GFR AFRICAN AMERICAN: 46
GFR NON-AFRICAN AMERICAN: 38
GLUCOSE BLD-MCNC: 175 MG/DL (ref 70–99)
GLUCOSE BLD-MCNC: 212 MG/DL (ref 70–99)
GLUCOSE BLD-MCNC: 256 MG/DL (ref 70–99)
GLUCOSE BLD-MCNC: 271 MG/DL (ref 70–99)
GLUCOSE BLD-MCNC: 271 MG/DL (ref 70–99)
HBA1C MFR BLD: 7.2 %
HCT VFR BLD CALC: 34.7 % (ref 36–48)
HEMOGLOBIN: 11 G/DL (ref 12–16)
LACTIC ACID: 1.7 MMOL/L (ref 0.4–2)
LYMPHOCYTES ABSOLUTE: 0.6 K/UL (ref 1–5.1)
LYMPHOCYTES RELATIVE PERCENT: 4.2 %
MCH RBC QN AUTO: 28.3 PG (ref 26–34)
MCHC RBC AUTO-ENTMCNC: 31.7 G/DL (ref 31–36)
MCV RBC AUTO: 89.4 FL (ref 80–100)
MONOCYTES ABSOLUTE: 0.8 K/UL (ref 0–1.3)
MONOCYTES RELATIVE PERCENT: 5.2 %
MRSA SCREEN RT-PCR: NORMAL
NEUTROPHILS ABSOLUTE: 14.1 K/UL (ref 1.7–7.7)
NEUTROPHILS RELATIVE PERCENT: 90.6 %
ORGANISM: ABNORMAL
PDW BLD-RTO: 17.1 % (ref 12.4–15.4)
PERFORMED ON: ABNORMAL
PHOSPHORUS: 3.4 MG/DL (ref 2.5–4.9)
PLATELET # BLD: 228 K/UL (ref 135–450)
PMV BLD AUTO: 7.7 FL (ref 5–10.5)
POTASSIUM SERPL-SCNC: 5.6 MMOL/L (ref 3.5–5.1)
PROCALCITONIN: 1.49 NG/ML (ref 0–0.15)
RBC # BLD: 3.88 M/UL (ref 4–5.2)
SODIUM BLD-SCNC: 140 MMOL/L (ref 136–145)
URINE CULTURE, ROUTINE: ABNORMAL
WBC # BLD: 15.5 K/UL (ref 4–11)

## 2022-04-17 PROCEDURE — 2580000003 HC RX 258: Performed by: INTERNAL MEDICINE

## 2022-04-17 PROCEDURE — 80069 RENAL FUNCTION PANEL: CPT

## 2022-04-17 PROCEDURE — 6370000000 HC RX 637 (ALT 250 FOR IP): Performed by: INTERNAL MEDICINE

## 2022-04-17 PROCEDURE — 2700000000 HC OXYGEN THERAPY PER DAY

## 2022-04-17 PROCEDURE — 99233 SBSQ HOSP IP/OBS HIGH 50: CPT | Performed by: INTERNAL MEDICINE

## 2022-04-17 PROCEDURE — 84145 PROCALCITONIN (PCT): CPT

## 2022-04-17 PROCEDURE — 2060000000 HC ICU INTERMEDIATE R&B

## 2022-04-17 PROCEDURE — 85025 COMPLETE CBC W/AUTO DIFF WBC: CPT

## 2022-04-17 PROCEDURE — 94640 AIRWAY INHALATION TREATMENT: CPT

## 2022-04-17 PROCEDURE — 36591 DRAW BLOOD OFF VENOUS DEVICE: CPT

## 2022-04-17 PROCEDURE — 6360000002 HC RX W HCPCS: Performed by: INTERNAL MEDICINE

## 2022-04-17 PROCEDURE — 94761 N-INVAS EAR/PLS OXIMETRY MLT: CPT

## 2022-04-17 PROCEDURE — 83605 ASSAY OF LACTIC ACID: CPT

## 2022-04-17 RX ORDER — INSULIN LISPRO 100 [IU]/ML
8 INJECTION, SOLUTION INTRAVENOUS; SUBCUTANEOUS
Status: DISCONTINUED | OUTPATIENT
Start: 2022-04-17 | End: 2022-04-19 | Stop reason: HOSPADM

## 2022-04-17 RX ORDER — INSULIN LISPRO 100 [IU]/ML
0-12 INJECTION, SOLUTION INTRAVENOUS; SUBCUTANEOUS
Status: DISCONTINUED | OUTPATIENT
Start: 2022-04-17 | End: 2022-04-19 | Stop reason: HOSPADM

## 2022-04-17 RX ORDER — INSULIN LISPRO 100 [IU]/ML
0-6 INJECTION, SOLUTION INTRAVENOUS; SUBCUTANEOUS NIGHTLY
Status: DISCONTINUED | OUTPATIENT
Start: 2022-04-17 | End: 2022-04-19 | Stop reason: HOSPADM

## 2022-04-17 RX ADMIN — TRAZODONE HYDROCHLORIDE 25 MG: 50 TABLET ORAL at 20:32

## 2022-04-17 RX ADMIN — IPRATROPIUM BROMIDE AND ALBUTEROL SULFATE 1 AMPULE: 2.5; .5 SOLUTION RESPIRATORY (INHALATION) at 12:09

## 2022-04-17 RX ADMIN — PANTOPRAZOLE SODIUM 40 MG: 40 TABLET, DELAYED RELEASE ORAL at 06:46

## 2022-04-17 RX ADMIN — RISPERIDONE 0.5 MG: 0.25 TABLET ORAL at 20:32

## 2022-04-17 RX ADMIN — IPRATROPIUM BROMIDE AND ALBUTEROL SULFATE 1 AMPULE: 2.5; .5 SOLUTION RESPIRATORY (INHALATION) at 20:10

## 2022-04-17 RX ADMIN — LORAZEPAM 0.5 MG: 0.5 TABLET ORAL at 20:32

## 2022-04-17 RX ADMIN — RISPERIDONE 0.5 MG: 0.25 TABLET ORAL at 08:50

## 2022-04-17 RX ADMIN — PREDNISONE 40 MG: 20 TABLET ORAL at 08:51

## 2022-04-17 RX ADMIN — HYDROXYZINE HYDROCHLORIDE 25 MG: 25 TABLET ORAL at 20:33

## 2022-04-17 RX ADMIN — APIXABAN 2.5 MG: 2.5 TABLET, FILM COATED ORAL at 08:50

## 2022-04-17 RX ADMIN — Medication 2 PUFF: at 20:10

## 2022-04-17 RX ADMIN — INSULIN LISPRO 8 UNITS: 100 INJECTION, SOLUTION INTRAVENOUS; SUBCUTANEOUS at 17:22

## 2022-04-17 RX ADMIN — MEROPENEM 1000 MG: 1 INJECTION, POWDER, FOR SOLUTION INTRAVENOUS at 17:15

## 2022-04-17 RX ADMIN — SODIUM CHLORIDE, PRESERVATIVE FREE 10 ML: 5 INJECTION INTRAVENOUS at 08:52

## 2022-04-17 RX ADMIN — IPRATROPIUM BROMIDE AND ALBUTEROL SULFATE 1 AMPULE: 2.5; .5 SOLUTION RESPIRATORY (INHALATION) at 08:37

## 2022-04-17 RX ADMIN — HYDROXYZINE HYDROCHLORIDE 25 MG: 25 TABLET ORAL at 08:51

## 2022-04-17 RX ADMIN — Medication 2 PUFF: at 08:37

## 2022-04-17 RX ADMIN — INSULIN LISPRO 8 UNITS: 100 INJECTION, SOLUTION INTRAVENOUS; SUBCUTANEOUS at 09:06

## 2022-04-17 RX ADMIN — INSULIN LISPRO 4 UNITS: 100 INJECTION, SOLUTION INTRAVENOUS; SUBCUTANEOUS at 12:50

## 2022-04-17 RX ADMIN — MEROPENEM 1000 MG: 1 INJECTION, POWDER, FOR SOLUTION INTRAVENOUS at 09:51

## 2022-04-17 RX ADMIN — SODIUM ZIRCONIUM CYCLOSILICATE 10 G: 10 POWDER, FOR SUSPENSION ORAL at 12:49

## 2022-04-17 RX ADMIN — INSULIN LISPRO 6 UNITS: 100 INJECTION, SOLUTION INTRAVENOUS; SUBCUTANEOUS at 17:21

## 2022-04-17 RX ADMIN — METOPROLOL TARTRATE 25 MG: 25 TABLET, FILM COATED ORAL at 08:50

## 2022-04-17 RX ADMIN — IPRATROPIUM BROMIDE AND ALBUTEROL SULFATE 1 AMPULE: 2.5; .5 SOLUTION RESPIRATORY (INHALATION) at 16:47

## 2022-04-17 RX ADMIN — SODIUM CHLORIDE, PRESERVATIVE FREE 10 ML: 5 INJECTION INTRAVENOUS at 20:47

## 2022-04-17 RX ADMIN — METOPROLOL TARTRATE 25 MG: 25 TABLET, FILM COATED ORAL at 20:32

## 2022-04-17 RX ADMIN — LETROZOLE 2.5 MG: 2.5 TABLET ORAL at 08:58

## 2022-04-17 RX ADMIN — INSULIN LISPRO 3 UNITS: 100 INJECTION, SOLUTION INTRAVENOUS; SUBCUTANEOUS at 20:33

## 2022-04-17 RX ADMIN — APIXABAN 2.5 MG: 2.5 TABLET, FILM COATED ORAL at 20:46

## 2022-04-17 ASSESSMENT — PAIN SCALES - GENERAL
PAINLEVEL_OUTOF10: 0

## 2022-04-17 NOTE — PLAN OF CARE
Problem: Falls - Risk of:  Goal: Will remain free from falls  Description: Will remain free from falls  4/17/2022 0756 by Onesimo Florez RN  Outcome: Ongoing  Note: Pt is a Fall Risk. See Carry Cambric Fall Risk Score. Pt bed in low position and side rails up. Call light and belongings in reach. Pt encouraged to call for assistance. Will continue with hourly rounds for PO intake, pain needs, toileting, and repositioning as needed.      4/16/2022 2316 by Stephanie Steen RN  Outcome: Ongoing  Goal: Absence of physical injury  Description: Absence of physical injury  4/16/2022 2316 by Stephanie Steen RN  Outcome: Ongoing     Problem: OXYGENATION/RESPIRATORY FUNCTION  Goal: Patient will maintain patent airway  4/16/2022 2316 by Stephanie Steen RN  Outcome: Ongoing  Goal: Patient will achieve/maintain normal respiratory rate/effort  Description: Respiratory rate and effort will be within normal limits for the patient  4/16/2022 2316 by Stephanie Steen RN  Outcome: Ongoing     Problem: HEMODYNAMIC STATUS  Goal: Patient has stable vital signs and fluid balance  4/16/2022 2316 by Stehpanie Steen RN  Outcome: Ongoing     Problem: FLUID AND ELECTROLYTE IMBALANCE  Goal: Fluid and electrolyte balance are achieved/maintained  4/16/2022 2316 by Stephanie Steen RN  Outcome: Ongoing     Problem: ACTIVITY INTOLERANCE/IMPAIRED MOBILITY  Goal: Mobility/activity is maintained at optimum level for patient  4/16/2022 2316 by Stephanie Steen RN  Outcome: Ongoing     Problem: Pain:  Goal: Pain level will decrease  Description: Pain level will decrease  4/16/2022 2316 by Stephanie Steen RN  Outcome: Ongoing  Goal: Control of acute pain  Description: Control of acute pain  4/16/2022 2316 by Stephanie Steen RN  Outcome: Ongoing  Goal: Control of chronic pain  Description: Control of chronic pain  4/16/2022 2316 by Stephanie Steen RN  Outcome: Ongoing

## 2022-04-17 NOTE — PLAN OF CARE
Problem: Falls - Risk of:  Goal: Will remain free from falls  Description: Will remain free from falls  4/16/2022 2316 by Weston Crow RN  Outcome: Ongoing  4/16/2022 1552 by Kalee Willams RN  Outcome: Ongoing   Call for assistance out of bed      Problem: ACTIVITY INTOLERANCE/IMPAIRED MOBILITY  Goal: Mobility/activity is maintained at optimum level for patient  4/16/2022 2316 by Weston Crow RN  Outcome: Ongoing  4/16/2022 1552 by Kalee Willams RN  Outcome: Ongoing   Patient up to bathroom as tolerated

## 2022-04-17 NOTE — PROGRESS NOTES
Pulmonary & Critical Care Medicine    Admit Date: 4/15/2022  PCP: Zach Junior MD    CC:  COPD exacerbation  Events of Last 24 hours:   She has wheezing   No significant cough at this time. No fever or chills. Vitals:  Tmax:  VITALS:  BP (!) 141/89   Pulse 88   Temp 98.9 °F (37.2 °C) (Oral)   Resp 18   Ht 5' 8\" (1.727 m)   Wt 259 lb 0.7 oz (117.5 kg)   LMP 2014   SpO2 95%   BMI 39.39 kg/m²   24HR INTAKE/OUTPUT:      Intake/Output Summary (Last 24 hours) at 2022 1442  Last data filed at 2022 0620  Gross per 24 hour   Intake 280 ml   Output 3150 ml   Net -2870 ml     CURRENT PULSE OXIMETRY:  SpO2: 95 %  24HR PULSE OXIMETRY RANGE:  SpO2  Av.6 %  Min: 94 %  Max: 97 %    EXAM:  General: No distress. Alert. Eyes: PERRL. No sclera icterus. No conjunctival injection. ENT: No discharge. Moist oral mucosa   Neck: Trachea midline. Neck is supple   Resp: No accessory muscle use. Bilateral wheezing   CV: Regular rate. Regular rhythm. No mumur or rub. No edema. GI: Non-tender. Non-distended. Normal bowel sounds. Neuro: Awake. Speech is clear. Psych:  No anxiety or agitation.      Medications:    IV:   dextrose      sodium chloride           Scheduled Meds:   insulin glargine  0.25 Units/kg SubCUTAneous Daily    insulin lispro  8 Units SubCUTAneous TID WC    insulin lispro  0-12 Units SubCUTAneous TID WC    insulin lispro  0-6 Units SubCUTAneous Nightly    meropenem  1,000 mg IntraVENous Q8H    metoprolol tartrate  25 mg Oral BID    predniSONE  40 mg Oral Daily    ipratropium-albuterol  1 ampule Inhalation 4x daily    pantoprazole  40 mg Oral QAM AC    apixaban  2.5 mg Oral BID    hydrOXYzine  25 mg Oral BID    letrozole  2.5 mg Oral Daily    risperiDONE  0.5 mg Oral BID    traZODone  25 mg Oral Nightly    sodium chloride flush  5-40 mL IntraVENous 2 times per day    nicotine  1 patch TransDERmal Daily    mometasone-formoterol  2 puff Inhalation BID Diet: ADULT DIET; Regular; 4 carb choices (60 gm/meal); Low Sodium (2 gm); Low Potassium (Less than 3000 mg/day)     Results:  CBC:   Recent Labs     04/15/22  0941 04/16/22  0546 04/17/22  0458   WBC 16.5* 14.1* 15.5*   HGB 11.9* 11.9* 11.0*   HCT 37.9 37.4 34.7*   MCV 88.9 89.6 89.4    225 228     BMP:   Recent Labs     04/15/22  0941 04/16/22  0546 04/17/22  0450   * 134* 140   K 4.9 4.8 5.6*   CL 98* 99 105   CO2 21 23 25   PHOS  --  3.4 3.4   BUN 71* 57* 44*   CREATININE 2.1* 1.9* 1.4*     LIVER PROFILE:   Recent Labs     04/15/22  0941   AST 32   ALT 37   BILITOT 0.5   ALKPHOS 138*     PT/INR: No results for input(s): PROTIME, INR in the last 72 hours. APTT: No results for input(s): APTT in the last 72 hours. UA:  Recent Labs     04/15/22  1317   COLORU Yellow   PHUR 6.0   WBCUA 21-50*   RBCUA None seen   BACTERIA 1+*   CLARITYU Clear   SPECGRAV 1.010   LEUKOCYTESUR LARGE*   UROBILINOGEN 0.2   BILIRUBINUR Negative   BLOODU MODERATE*   GLUCOSEU Negative     CT chest and abdomen/pelvis   CHEST:       1.  Emphysema. Mild atelectasis in the right lung.       ABDOMEN/PELVIS:       1.  No acute abnormality in the abdomen and pelvis. 2.  Punctate right nephrolithiasis. 3.  Prior left nephrectomy. 4.  Uterine fibroids. Assessment/Plan:  63 y.o. female with     COPD exacerbation - continue to have wheezing however breathing is not labored. She is currently on 2 liters O2 with sats in mid 90s. Lactic acidemia: resolved   Smoker. She usually smokes 1 PPD. CHANTELLE - improving   CT scan reviewed: pneumonia was ruled out. Prednisone 40mg daily for total of 5 days   Continue Dulera and breathing treatments  Counseled to quit smoking. She is willing to use patches on discharge.         Ryder Childress MD

## 2022-04-17 NOTE — PROGRESS NOTES
Hospitalist Progress Note      PCP: Judge Mindi MD    Date of Admission: 4/15/2022    Chief Complaint on Admission: shortness of breath    Pt Seen/Examined and Chart Reviewed. Admitting dx sepsis, PNA    SUBJECTIVE/OBJECTIVE:   The patient is a 63 y.o. female with COPD with chronic hypoxic respiratory failure on home O2 at 2 liters at baseline, h/o PE on eliquis, CKD 3, diast CHF, who presented to [facility] with worsening shortness of breath from her nursing facility. Patient was recently admitted with HCAP. Patient met sepsis criteria in ER, was given IVF and started on cefepime and vanco.   Patient developed facial swelling with erythema after cefepime infusion, she was given Benadryl with improvement in symptoms. Due to persistently elevated and rising lactic acid patient underwent pan CT which was nonacute. Today she is feeling better, however has intermittent wheezing, cough and shortness of breath. Lactic acid normalized. Urine culture is growing ESBL.      Allergies  Pcn [penicillins], Cefepime, and Hydralazine    Medications      Scheduled Meds:   insulin glargine  0.25 Units/kg SubCUTAneous Daily    insulin lispro  8 Units SubCUTAneous TID WC    insulin lispro  0-12 Units SubCUTAneous TID WC    insulin lispro  0-6 Units SubCUTAneous Nightly    meropenem  1,000 mg IntraVENous Q8H    sodium zirconium cyclosilicate  10 g Oral Once    metoprolol tartrate  25 mg Oral BID    predniSONE  40 mg Oral Daily    ipratropium-albuterol  1 ampule Inhalation 4x daily    pantoprazole  40 mg Oral QAM AC    apixaban  2.5 mg Oral BID    hydrOXYzine  25 mg Oral BID    letrozole  2.5 mg Oral Daily    risperiDONE  0.5 mg Oral BID    traZODone  25 mg Oral Nightly    sodium chloride flush  5-40 mL IntraVENous 2 times per day    nicotine  1 patch TransDERmal Daily    mometasone-formoterol  2 puff Inhalation BID       Infusions:   dextrose      sodium chloride         PRN Meds:  glucose, dextrose bolus (hypoglycemia), glucagon (rDNA), dextrose, labetalol, diphenhydrAMINE, loperamide, LORazepam, sodium chloride flush, sodium chloride, ondansetron **OR** ondansetron, polyethylene glycol, acetaminophen **OR** acetaminophen    Vitals    TEMPERATURE:  Current - Temp: 98.9 °F (37.2 °C); Max - Temp  Av.2 °F (36.8 °C)  Min: 97.5 °F (36.4 °C)  Max: 98.9 °F (37.2 °C)  RESPIRATIONS RANGE: Resp  Av.7  Min: 16  Max: 24  PULSE RANGE: Pulse  Av.1  Min: 89  Max: 102  BLOOD PRESSURE RANGE:  Systolic (75DAJ), XQL:624 , Min:147 , BLACK:266   ; Diastolic (58PZM), UQ, Min:83, Max:93    PULSE OXIMETRY RANGE: SpO2  Av.3 %  Min: 94 %  Max: 100 %  24HR INTAKE/OUTPUT:      Intake/Output Summary (Last 24 hours) at 2022 1213  Last data filed at 2022 0858  Gross per 24 hour   Intake 280 ml   Output 3150 ml   Net -2870 ml       Exam:      General appearance: No apparent distress, appears stated age and cooperative. HEENT periorbital edema with plephora, appears stable  Lungs: diminished without active wheezing  Heart: Regular rate and rhythm with Normal S1/S2 without  murmurs, rubs or gallops, point of maximum impulse non-displaced  Abdomen: Soft, non-tender or non-distended without rigidity or guarding and positive bowel sounds all four quadrants. Extremities: No clubbing, cyanosis, or edema bilaterally. Skin: Skin color, texture, turgor normal.    Neurologic: Alert and oriented X 3,  grossly non-focal.  Mental status: Alert, oriented, thought content appropriate.         Data    Recent Labs     04/15/22  0941 22  0546 22  0458   WBC 16.5* 14.1* 15.5*   HGB 11.9* 11.9* 11.0*   HCT 37.9 37.4 34.7*    225 228      Recent Labs     04/15/22  0941 22  0546 22  0450   * 134* 140   K 4.9 4.8 5.6*   CL 98* 99 105   CO2 21 23 25   PHOS  --  3.4 3.4   BUN 71* 57* 44*   CREATININE 2.1* 1.9* 1.4*     Recent Labs     04/15/22  0941   AST 32   ALT 37   BILITOT 0.5 ALKPHOS 138*     No results for input(s): INR in the last 72 hours. Recent Labs     04/15/22  0941 04/15/22  1136   TROPONINI 0.03* 0.02*       Consults:     PHARMACY TO DOSE VANCOMYCIN  IP CONSULT TO HOSPITALIST  PHARMACY TO DOSE VANCOMYCIN  IP CONSULT TO NEPHROLOGY  IP CONSULT TO PULMONOLOGY  IP CONSULT TO INFECTIOUS DISEASES    Active Hospital Problems    Diagnosis Date Noted    FSGS (focal segmental glomerulosclerosis) [N05.1]     Acute on chronic respiratory failure with hypoxia (HCC) [J96.21]     Severe sepsis ([address] [A41.9, R65.20] 04/15/2022    Chronic hypoxemic respiratory failure (HCC) [J96.11]     Tobacco abuse [Z72.0]          ASSESSMENT AND PLAN      Acute on chronic hypoxic respiratory failure, COPD exacerbation:  Influenza and SARS-CoV-2 were ruled out in ER. venous doppler negative  Blood cx NGTD. MRSA nares negative from 4/2. procal elevated, likely due to sepsis. Pulmonology consulted  Cont with steroids, bronchodilators     Sepsis due to ESBL UTI:  Likely due to pneumonia, however x-ray is not very impressive  Resuscitated with fluids per protocol in ER  Lactic acid normalized  Started on meropenem and infectious disease was consulted  Need to monitor tolerance of meropenem closely    CHANTELLE on CKD 3:  Has FSGS with heavy proteinuria in solitary kidney  Baseline creatinine 1.4, admitted with creatinine 2.1  Finished IV fluids. Tolerating p.o.. Nephrology following     History of breast cancer:  Continue Femara     Chronic diastolic CHF:  Hold diuretics today, resume once stable hemodynamically    DM type 2 with steroid induced hyperglycemia:  Adjust basal insulin and add prandial coverage  Not on insulin at home       DVT Prophylaxis: eliquis  Diet: ADULT DIET; Regular; 4 carb choices (60 gm/meal); Low Sodium (2 gm);  Low Potassium (Less than 3000 mg/day)  Code Status: Full Code    PT/OT Eval Status:pending    Dispo - inpt until sepsis resolves and tolerating antibiotic, cleared by nephrology and infectious disease    Tamika Finn MD

## 2022-04-17 NOTE — PROGRESS NOTES
Pharmacy Note - Extended Infusion Beta-Lactam Adjustment    Meropenem ordered for treatment of ESBL E. Coli UTI . Per [facility] Extended Infusion Beta-Lactam Policy, Meropenem will be changed to 1000mg IV x1 loading dose over 30 minutes, followed by 1000 mg IV EI q8h. Estimated Creatinine Clearance: Estimated Creatinine Clearance: 58 mL/min (A) (based on SCr of 1.4 mg/dL (H)). Dialysis Status, CHANTELLE, CKD: n/a  BMI: Body mass index is 39.39 kg/m². Rationale for Adjustment: Agent is renally eliminated and demonstrates time-dependent effect on bacterial eradication. Extended-infusion dosing strategy aims to enhance microbiologic and clinical efficacy. Pharmacy will continue to monitor renal function, cultures and sensitivities (where available) and adjust dose as necessary. Please call with any questions.     Reggie Finney PharmD, BCPS  Wireless: [id number]   4/17/2022 9:03 AM

## 2022-04-17 NOTE — PROGRESS NOTES
never used smokeless tobacco. She reports that she does not drink alcohol and does not use drugs.     Allergies:  Pcn [penicillins], Cefepime, and Hydralazine    Current Medications:    insulin glargine (LANTUS;BASAGLAR) injection pen 29 Units, Daily  insulin lispro (1 Unit Dial) 8 Units, TID WC  insulin lispro (1 Unit Dial) 0-12 Units, TID WC  insulin lispro (1 Unit Dial) 0-6 Units, Nightly  meropenem (MERREM) 1,000 mg in sodium chloride 0.9 % 100 mL IVPB (mini-bag), Q8H  sodium zirconium cyclosilicate (LOKELMA) oral suspension 10 g, Once  glucose (GLUTOSE) 40 % oral gel 15 g, PRN  dextrose bolus (hypoglycemia) 10% 125 mL, PRN  glucagon (rDNA) injection 1 mg, PRN  dextrose 5 % solution, PRN  metoprolol tartrate (LOPRESSOR) tablet 25 mg, BID  predniSONE (DELTASONE) tablet 40 mg, Daily  labetalol (NORMODYNE;TRANDATE) injection 10 mg, Q4H PRN  diphenhydrAMINE (BENADRYL) injection 25 mg, Q6H PRN  ipratropium-albuterol (DUONEB) nebulizer solution 1 ampule, 4x daily  pantoprazole (PROTONIX) tablet 40 mg, QAM AC  apixaban (ELIQUIS) tablet 2.5 mg, BID  hydrOXYzine (ATARAX) tablet 25 mg, BID  letrozole (FEMARA) tablet 2.5 mg, Daily  loperamide (IMODIUM) capsule 2 mg, Q8H PRN  risperiDONE (RISPERDAL) tablet 0.5 mg, BID  LORazepam (ATIVAN) tablet 0.5 mg, Nightly PRN  traZODone (DESYREL) tablet 25 mg, Nightly  sodium chloride flush 0.9 % injection 5-40 mL, 2 times per day  sodium chloride flush 0.9 % injection 5-40 mL, PRN  0.9 % sodium chloride infusion, PRN  ondansetron (ZOFRAN-ODT) disintegrating tablet 4 mg, Q8H PRN   Or  ondansetron (ZOFRAN) injection 4 mg, Q6H PRN  polyethylene glycol (GLYCOLAX) packet 17 g, Daily PRN  acetaminophen (TYLENOL) tablet 650 mg, Q6H PRN   Or  acetaminophen (TYLENOL) suppository 650 mg, Q6H PRN  nicotine (NICODERM CQ) 21 MG/24HR 1 patch, Daily  mometasone-formoterol (DULERA) 200-5 MCG/ACT inhaler 2 puff, BID            Physical exam:     Vitals:  BP (!) 153/89   Pulse 89   Temp 98.9 °F (37.2 °C) (Oral)   Resp 16   Ht 5' 8\" (1.727 m)   Wt 259 lb 0.7 oz (117.5 kg)   LMP 03/09/2014   SpO2 95%   BMI 39.39 kg/m²   Constitutional:  OAA X3 NAD  Skin: no rash, turgor wnl  Heent:  eomi, mmm  Neck: no bruits or jvd noted  Cardiovascular:  S1, S2 without m/r/g  Respiratory: CTA B without w/r/r  Abdomen:  +bs, soft, nt, nd  Ext: + lower extremity edema  Psychiatric: mood and affect appropriate  Musculoskeletal:  Rom, muscular strength intact    Data:   Labs:  CBC:   Recent Labs     04/15/22  0941 04/16/22  0546 04/17/22  0458   WBC 16.5* 14.1* 15.5*   HGB 11.9* 11.9* 11.0*    225 228     BMP:    Recent Labs     04/15/22  0941 04/16/22  0546 04/17/22  0450   * 134* 140   K 4.9 4.8 5.6*   CL 98* 99 105   CO2 21 23 25   BUN 71* 57* 44*   CREATININE 2.1* 1.9* 1.4*   GLUCOSE 119* 392* 271*     Ca/Mg/Phos:   Recent Labs     04/15/22  0941 04/16/22  0546 04/17/22  0450   CALCIUM 9.4 9.4 9.3   MG  --  1.50*  --    PHOS  --  3.4 3.4     Hepatic:   Recent Labs     04/15/22  0941   AST 32   ALT 37   BILITOT 0.5   ALKPHOS 138*     Troponin:   Recent Labs     04/15/22  0941 04/15/22  1136   TROPONINI 0.03* 0.02*     BNP: No results for input(s): BNP in the last 72 hours. Lipids: No results for input(s): CHOL, TRIG, HDL, LDLCALC, LABVLDL in the last 72 hours. ABGs: No results for input(s): PHART, PO2ART, UFJ0YVA in the last 72 hours. INR: No results for input(s): INR in the last 72 hours.   UA:  Recent Labs     04/15/22  1317   COLORU Yellow   CLARITYU Clear   GLUCOSEU Negative   BILIRUBINUR Negative   KETUA Negative   SPECGRAV 1.010   BLOODU MODERATE*   PHUR 6.0   PROTEINU 30*   UROBILINOGEN 0.2   NITRU Negative   LEUKOCYTESUR LARGE*   LABMICR YES   URINETYPE NotGiven      Urine Microscopic:   Recent Labs     04/15/22  1317   BACTERIA 1+*   WBCUA 21-50*   RBCUA None seen   EPIU 2-5     Urine Culture:   Recent Labs     04/15/22  1317   LABURIN 25,000 CFU/ml  CONTACT PRECAUTIONS INDICATED  Carbapenem antibiotics are the best option for infections caused  by ESBL producing organisms. Other antibiotic classes are  likely to result in treatment failure, even for organisms  demonstrating in vitro susceptibility. *     Urine Chemistry:   Recent Labs     04/15/22  2105   LABCREA 20.2*   PROTEINUR 28.00*             IMAGING:  CT CHEST ABDOMEN PELVIS WO CONTRAST   Final Result      CHEST:      1. Emphysema. Mild atelectasis in the right lung. ABDOMEN/PELVIS:      1.  No acute abnormality in the abdomen and pelvis. 2.  Punctate right nephrolithiasis. 3.  Prior left nephrectomy. 4.  Uterine fibroids. VL Extremity Venous Bilateral   Final Result      XR CHEST (2 VW)   Final Result      No significant interval change in bibasilar mild airspace disease, nonspecific, may relate to atelectasis or scarring. Assessment/Plan   1. CHANTELLE     2. HTN    3. Anemia    4. Acid- base/ Electrolyte imbalance     5.  FSGS    Plan   - no IVF   - Ur studies - UPC - 1.3 gm  - abx   - Pulm consult  - labs in am   - cont steroids                 Thank you for allowing us to participate in care of Harsha Melgoza MD  Feel free to contact me   [facility] [id number]  Office : [phone]  Fax :[phone]

## 2022-04-18 ENCOUNTER — APPOINTMENT (OUTPATIENT)
Dept: GENERAL RADIOLOGY | Age: 62
DRG: 720 | End: 2022-04-18
Payer: MEDICAID

## 2022-04-18 LAB
GLUCOSE BLD-MCNC: 110 MG/DL (ref 70–99)
GLUCOSE BLD-MCNC: 179 MG/DL (ref 70–99)
GLUCOSE BLD-MCNC: 187 MG/DL (ref 70–99)
GLUCOSE BLD-MCNC: 221 MG/DL (ref 70–99)
PERFORMED ON: ABNORMAL

## 2022-04-18 PROCEDURE — 2580000003 HC RX 258: Performed by: INTERNAL MEDICINE

## 2022-04-18 PROCEDURE — 94640 AIRWAY INHALATION TREATMENT: CPT

## 2022-04-18 PROCEDURE — 6360000002 HC RX W HCPCS: Performed by: INTERNAL MEDICINE

## 2022-04-18 PROCEDURE — 97162 PT EVAL MOD COMPLEX 30 MIN: CPT

## 2022-04-18 PROCEDURE — 92526 ORAL FUNCTION THERAPY: CPT

## 2022-04-18 PROCEDURE — 99233 SBSQ HOSP IP/OBS HIGH 50: CPT | Performed by: INTERNAL MEDICINE

## 2022-04-18 PROCEDURE — 99254 IP/OBS CNSLTJ NEW/EST MOD 60: CPT | Performed by: INTERNAL MEDICINE

## 2022-04-18 PROCEDURE — 6370000000 HC RX 637 (ALT 250 FOR IP): Performed by: INTERNAL MEDICINE

## 2022-04-18 PROCEDURE — 94761 N-INVAS EAR/PLS OXIMETRY MLT: CPT

## 2022-04-18 PROCEDURE — 2700000000 HC OXYGEN THERAPY PER DAY

## 2022-04-18 PROCEDURE — 74230 X-RAY XM SWLNG FUNCJ C+: CPT

## 2022-04-18 PROCEDURE — 97535 SELF CARE MNGMENT TRAINING: CPT

## 2022-04-18 PROCEDURE — 2060000000 HC ICU INTERMEDIATE R&B

## 2022-04-18 PROCEDURE — 97116 GAIT TRAINING THERAPY: CPT

## 2022-04-18 PROCEDURE — 92611 MOTION FLUOROSCOPY/SWALLOW: CPT

## 2022-04-18 PROCEDURE — 97166 OT EVAL MOD COMPLEX 45 MIN: CPT

## 2022-04-18 PROCEDURE — 2500000003 HC RX 250 WO HCPCS: Performed by: INTERNAL MEDICINE

## 2022-04-18 RX ORDER — IPRATROPIUM BROMIDE AND ALBUTEROL SULFATE 2.5; .5 MG/3ML; MG/3ML
1 SOLUTION RESPIRATORY (INHALATION) 2 TIMES DAILY
Status: DISCONTINUED | OUTPATIENT
Start: 2022-04-18 | End: 2022-04-19 | Stop reason: HOSPADM

## 2022-04-18 RX ADMIN — TRAZODONE HYDROCHLORIDE 25 MG: 50 TABLET ORAL at 20:51

## 2022-04-18 RX ADMIN — INSULIN LISPRO 2 UNITS: 100 INJECTION, SOLUTION INTRAVENOUS; SUBCUTANEOUS at 18:38

## 2022-04-18 RX ADMIN — MEROPENEM 1000 MG: 1 INJECTION, POWDER, FOR SOLUTION INTRAVENOUS at 08:44

## 2022-04-18 RX ADMIN — IPRATROPIUM BROMIDE AND ALBUTEROL SULFATE 1 AMPULE: 2.5; .5 SOLUTION RESPIRATORY (INHALATION) at 21:05

## 2022-04-18 RX ADMIN — INSULIN LISPRO 4 UNITS: 100 INJECTION, SOLUTION INTRAVENOUS; SUBCUTANEOUS at 14:06

## 2022-04-18 RX ADMIN — SODIUM CHLORIDE, PRESERVATIVE FREE 10 ML: 5 INJECTION INTRAVENOUS at 08:37

## 2022-04-18 RX ADMIN — APIXABAN 2.5 MG: 2.5 TABLET, FILM COATED ORAL at 20:51

## 2022-04-18 RX ADMIN — LABETALOL HYDROCHLORIDE 10 MG: 5 INJECTION, SOLUTION INTRAVENOUS at 21:43

## 2022-04-18 RX ADMIN — RISPERIDONE 0.5 MG: 0.25 TABLET ORAL at 20:51

## 2022-04-18 RX ADMIN — INSULIN LISPRO 8 UNITS: 100 INJECTION, SOLUTION INTRAVENOUS; SUBCUTANEOUS at 08:50

## 2022-04-18 RX ADMIN — RISPERIDONE 0.5 MG: 0.25 TABLET ORAL at 08:37

## 2022-04-18 RX ADMIN — Medication 2 PUFF: at 08:47

## 2022-04-18 RX ADMIN — PANTOPRAZOLE SODIUM 40 MG: 40 TABLET, DELAYED RELEASE ORAL at 05:41

## 2022-04-18 RX ADMIN — HYDROXYZINE HYDROCHLORIDE 25 MG: 25 TABLET ORAL at 08:36

## 2022-04-18 RX ADMIN — IPRATROPIUM BROMIDE AND ALBUTEROL SULFATE 1 AMPULE: 2.5; .5 SOLUTION RESPIRATORY (INHALATION) at 08:47

## 2022-04-18 RX ADMIN — PREDNISONE 40 MG: 20 TABLET ORAL at 08:36

## 2022-04-18 RX ADMIN — HYDROXYZINE HYDROCHLORIDE 25 MG: 25 TABLET ORAL at 20:50

## 2022-04-18 RX ADMIN — APIXABAN 2.5 MG: 2.5 TABLET, FILM COATED ORAL at 08:36

## 2022-04-18 RX ADMIN — MEROPENEM 1000 MG: 1 INJECTION, POWDER, FOR SOLUTION INTRAVENOUS at 01:10

## 2022-04-18 RX ADMIN — IPRATROPIUM BROMIDE AND ALBUTEROL SULFATE 1 AMPULE: 2.5; .5 SOLUTION RESPIRATORY (INHALATION) at 12:09

## 2022-04-18 RX ADMIN — SODIUM CHLORIDE, PRESERVATIVE FREE 10 ML: 5 INJECTION INTRAVENOUS at 21:08

## 2022-04-18 RX ADMIN — LORAZEPAM 0.5 MG: 0.5 TABLET ORAL at 22:36

## 2022-04-18 RX ADMIN — METOPROLOL TARTRATE 25 MG: 25 TABLET, FILM COATED ORAL at 08:36

## 2022-04-18 RX ADMIN — Medication 2 PUFF: at 21:07

## 2022-04-18 RX ADMIN — INSULIN LISPRO 8 UNITS: 100 INJECTION, SOLUTION INTRAVENOUS; SUBCUTANEOUS at 14:06

## 2022-04-18 RX ADMIN — INSULIN LISPRO 1 UNITS: 100 INJECTION, SOLUTION INTRAVENOUS; SUBCUTANEOUS at 20:52

## 2022-04-18 RX ADMIN — METOPROLOL TARTRATE 25 MG: 25 TABLET, FILM COATED ORAL at 20:50

## 2022-04-18 RX ADMIN — LETROZOLE 2.5 MG: 2.5 TABLET ORAL at 08:37

## 2022-04-18 RX ADMIN — INSULIN LISPRO 8 UNITS: 100 INJECTION, SOLUTION INTRAVENOUS; SUBCUTANEOUS at 18:38

## 2022-04-18 RX ADMIN — MEROPENEM 1000 MG: 1 INJECTION, POWDER, FOR SOLUTION INTRAVENOUS at 17:32

## 2022-04-18 ASSESSMENT — PAIN SCALES - GENERAL
PAINLEVEL_OUTOF10: 0

## 2022-04-18 NOTE — PROGRESS NOTES
Hospitalist Progress Note      PCP: Jose E Hawk MD    Date of Admission: 4/15/2022    Chief Complaint on Admission: shortness of breath    Pt Seen/Examined and Chart Reviewed. Admitting dx sepsis, PNA    SUBJECTIVE/OBJECTIVE:   The patient is a 63 y.o. female with COPD with chronic hypoxic respiratory failure on home O2 at 2 liters at baseline, h/o PE on eliquis, CKD 3, diast CHF, who presented to [facility] with worsening shortness of breath from her nursing facility. Patient was recently admitted with HCAP. Patient met sepsis criteria in ER, was given IVF and started on cefepime and vanco.   Patient developed facial swelling with erythema after cefepime infusion, she was given Benadryl with improvement in symptoms. Due to persistently elevated and rising lactic acid patient underwent pan CT which was nonacute. Urine culture positive for ESBL. Subjective  Patient seen and examined today. Still having some shortness of breath but feeling a lot better. Denies any nausea, vomiting, fever chills. No acute event reported overnight.     Allergies  Pcn [penicillins], Cefepime, and Hydralazine    Medications      Scheduled Meds:   insulin glargine  0.25 Units/kg SubCUTAneous Daily    insulin lispro  8 Units SubCUTAneous TID WC    insulin lispro  0-12 Units SubCUTAneous TID WC    insulin lispro  0-6 Units SubCUTAneous Nightly    meropenem  1,000 mg IntraVENous Q8H    metoprolol tartrate  25 mg Oral BID    predniSONE  40 mg Oral Daily    ipratropium-albuterol  1 ampule Inhalation 4x daily    pantoprazole  40 mg Oral QAM AC    apixaban  2.5 mg Oral BID    hydrOXYzine  25 mg Oral BID    letrozole  2.5 mg Oral Daily    risperiDONE  0.5 mg Oral BID    traZODone  25 mg Oral Nightly    sodium chloride flush  5-40 mL IntraVENous 2 times per day    nicotine  1 patch TransDERmal Daily    mometasone-formoterol  2 puff Inhalation BID       Infusions:   dextrose      sodium chloride         PRN Meds:  glucose, dextrose bolus (hypoglycemia), glucagon (rDNA), dextrose, labetalol, diphenhydrAMINE, loperamide, LORazepam, sodium chloride flush, sodium chloride, ondansetron **OR** ondansetron, polyethylene glycol, acetaminophen **OR** acetaminophen    Vitals    TEMPERATURE:  Current - Temp: 98 °F (36.7 °C); Max - Temp  Av.2 °F (36.8 °C)  Min: 97.7 °F (36.5 °C)  Max: 98.9 °F (37.2 °C)  RESPIRATIONS RANGE: Resp  Av.4  Min: 13  Max: 19  PULSE RANGE: Pulse  Av.5  Min: 71  Max: 90  BLOOD PRESSURE RANGE:  Systolic (46ZWM), QPH:770 , Min:156 , LIT:052   ; Diastolic (21KHI), JZJ:03, Min:62, Max:104    PULSE OXIMETRY RANGE: SpO2  Av.6 %  Min: 94 %  Max: 97 %  24HR INTAKE/OUTPUT:      Intake/Output Summary (Last 24 hours) at 2022 1400  Last data filed at 2022 0930  Gross per 24 hour   Intake 510 ml   Output 900 ml   Net -390 ml       Exam:      General appearance: NAD, appears stated age and cooperative. HEENT periorbital edema with plephora, appears stable  Lungs: Breathing comfortably, prolonged expiratory phase with wheezes. Heart: Regular rate and rhythm with Normal S1/S2 without  murmurs, rubs or gallops, point of maximum impulse non-displaced  Abdomen: Soft, non-tender or non-distended without rigidity or guarding and positive bowel sounds all four quadrants. Extremities: No clubbing, cyanosis, or edema bilaterally. Skin: Skin color, texture, turgor normal.    Neurologic: Alert and oriented X 3,  grossly non-focal.  Mental status: Alert, oriented, thought content appropriate. Data    Recent Labs     22  0546 22  0458   WBC 14.1* 15.5*   HGB 11.9* 11.0*   HCT 37.4 34.7*    228      Recent Labs     22  0546 22  0450   * 140   K 4.8 5.6*   CL 99 105   CO2 23 25   PHOS 3.4 3.4   BUN 57* 44*   CREATININE 1.9* 1.4*     No results for input(s): AST, ALT, ALB, BILIDIR, BILITOT, ALKPHOS in the last 72 hours.   No results for input(s): INR in the last 72 hours. No results for input(s): CKTOTAL, CKMB, CKMBINDEX, TROPONINI in the last 72 hours. Consults:     PHARMACY TO DOSE VANCOMYCIN  IP CONSULT TO HOSPITALIST  PHARMACY TO DOSE VANCOMYCIN  IP CONSULT TO NEPHROLOGY  IP CONSULT TO PULMONOLOGY  IP CONSULT TO INFECTIOUS DISEASES  IP CONSULT TO [facility] [id number] Problems    Diagnosis Date Noted    FSGS (focal segmental glomerulosclerosis) [N05.1]     Acute on chronic respiratory failure with hypoxia ([facility]) [J96.21]     Severe sepsis ([facility] [A41.9, R65.20] 04/15/2022    Chronic hypoxemic respiratory failure (HCC) [J96.11]     Tobacco abuse [Z72.0]          ASSESSMENT AND PLAN      Acute on chronic hypoxic respiratory failure, COPD exacerbation:  Influenza and SARS-CoV-2 were ruled out in ER. venous doppler negative  Blood cx NGTD. MRSA nares negative from 4/2. procal elevated, likely due to sepsis. Pulmonology consulted  Cont with steroids, bronchodilators     Sepsis due to ESBL UTI:  Likely due to pneumonia, however x-ray is not very impressive  Resuscitated with fluids per protocol in ER  Lactic acid normalized  Continue meropenem. Pending final recs from ID. Need to monitor tolerance of meropenem closely    CHANTELLE on CKD 3:  Has FSGS with heavy proteinuria in solitary kidney  Baseline creatinine 1.4, admitted with creatinine 2.1  Finished IV fluids. Tolerating p.o.. Nephrology following     History of breast cancer:  Continue Femara     Chronic diastolic CHF:  Hold diuretics today, resume once stable hemodynamically    DM type 2 with steroid induced hyperglycemia:  Adjust basal insulin and add prandial coverage  Not on insulin at home       DVT Prophylaxis: eliquis  Diet: ADULT DIET; Regular; 4 carb choices (60 gm/meal); Low Sodium (2 gm);  Low Potassium (Less than 3000 mg/day)  Code Status: Full Code    PT/OT Eval Status:pending    Dispo -pending ID eval.    Leena Barahona MD

## 2022-04-18 NOTE — PLAN OF CARE
Problem: Falls - Risk of:  Goal: Will remain free from falls  Description: Will remain free from falls  4/18/2022 1056 by Girish Owen RN  Outcome: Ongoing  Note: Standard fall precautions are in place. Call light and frequently used items are within pt's reach. Non-skid footwear is on. Frequent checks are made. Bed alarm is on. Problem: OXYGENATION/RESPIRATORY FUNCTION  Goal: Patient will maintain patent airway  4/18/2022 1056 by Girish Owen RN  Outcome: Ongoing  Note: Airway remains patent. Respirations are regular and easy. VENTURA was reported by patient, however not observed at this time. Problem: Pain:  Goal: Pain level will decrease  Description: Pain level will decrease  4/18/2022 1056 by Girish Owen RN  Outcome: Ongoing  Note: Assess level of comfort with interactions. Administer pain medication as needed. Assess the effectiveness of medication given.
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
## 2022-04-18 NOTE — PROGRESS NOTES
Speech Language Pathology  Facility/Department: [place] [address] [facility]  Dysphagia Daily Treatment Note    NAME: Jesus Lambert  : 1960  MRN: [id number]    Patient Diagnosis(es):   Patient Active Problem List    Diagnosis Date Noted    FSGS (focal segmental glomerulosclerosis)     Acute on chronic respiratory failure with hypoxia ([address]     Severe sepsis ([address] 04/15/2022    Chronic hypoxemic respiratory failure ([address]     Tobacco abuse     HCAP (healthcare-associated pneumonia) 2022    Hypoxia     Metabolic acidosis     Sepsis with acute renal failure and septic shock ([address] 2021    COPD exacerbation ([address] 2021    Hematuria 2021    Chest pain 2020    CHANTELLE (acute kidney injury) ([address] 2020    Nephrotic syndrome 2020    Centrilobular emphysema ([address] 2019    Hypoxemia 2019    Pneumonia due to infectious organism 2019    Acute on chronic respiratory failure with hypoxia and hypercapnia ([address] 2019    Acute deep vein thrombosis (DVT) of lower extremity ([address] 2019    Bilateral pulmonary embolism ([address] 2019    Breast wound, left, sequela 2019    Malignant neoplasm of upper-inner quadrant of left breast in female, estrogen receptor positive ([address] 2019     Allergies: Allergies   Allergen Reactions    Pcn [Penicillins] Anaphylaxis    Cefepime Other (See Comments)     Facial swelling and erythema    Hydralazine      Onset Date: 04/15/2022    Recent Chest Xray: (04/15/2022)  Impression       No significant interval change in bibasilar mild airspace disease, nonspecific, may relate to atelectasis or scarring. Recent CT Chest: (2022)on        CHEST:       1.  Emphysema. Mild atelectasis in the right lung. Previous MBS (date)    Chart reviewed.     Medical Diagnosis: HCAP (healthcare-associated pneumonia) [J18.9]  Sepsis ([address] [A41.9]  Severe sepsis ([address] [A41.9, R65.20]  Acute on chronic respiratory failure with hypoxia ([facility]) [J96.21]   Treatment Diagnosis: Dysphagia    BSE Impression (04/16/2022)-  Dysphagia Impression : Mild oral stage dysphagia 2/2 reduced dentition. With pt seated up in bed, assessed tolerance thins via tsp/cup (deferred straws d/t pt preference), 3 oz sequential screen, soft solid. Pt with positive oral acceptance, timely oral prep, positive swallow, good oral clearance with no residue, no overt signs of aspiration, with pt passing 3 oz swallow screem (good indicator of pharyngeal function). No complaint globus sensation. Of note, during assessment, RR fluctuated from low 20s to high 30s (RR >25 associated w/ increased risk of aspiration). Discussed regular texture diet vs soft/bite-sized; pt prefers to remain on regular and independently select softer items. Thus recommend continue regular texture solids and thin liquids. Dysphagia Diagnosis: Mild oral stage dysphagia    MBS results - planned for today    Pain: none indicated by any means    Current Diet : ADULT DIET; Regular; 4 carb choices (60 gm/meal); Low Sodium (2 gm); Low Potassium (Less than 3000 mg/day)   Recommended Form of Meds: PO  Compensatory Swallowing Strategies: Upright as possible for all oral intake,Eat/Feed slowly,Small bites/sips     Treatment:  Pt seen bedside to address the following goals:  Short-term Goals  Timeframe for Short-term Goals: 1-2 wks or LOS  Goal 1: Patient will tolerate least restrictive diet without overt signs of aspiration or associated decline in respiratory status. 4/18: Per chart, review, pt has been tolerating diet without any issues noted. Received pt awake, alert, pleasant, resting in bed on 2L O2 via nc. Still on regular texture diet, independently selecting softer items. Assessed tolerance thins via cup and soft solid; positive oral acceptance, slowed but adequate mastication, positive swallow movement, good oral clearance, no overt signs of aspiration or penetration. Discussed swallow function with. Pt voiced desire to have modified barium swallow study completed to further rule out possibility of aspiration, given her ongoing pneumonias. Planned to complete today. Goal 2: Patient/caregiver will demonstrate understanding of swallowing concerns/recommendations. 4/18: Educated pt to purpose of visit, swallow function, aspiration, modified barium swallow study, possible outcomes and options (e.g. recommendations for diet modification, compensatory strategies). Pt indicated good comprehension. Cont goal    Patient/Family/Caregiver Education:  Educated pt to purpose of visit, swallow function, recommendations    Compensatory Strategies:  Compensatory Swallowing Strategies: Upright as possible for all oral intake,Eat/Feed slowly,Small bites/sips      Plan:  Continue dysphagia treatment with goals per plan of care. Diet recommendations:    - continue regular texture diet and thin liquids   - mbs today per pt preference to further rule out aspiration  DC recommendation: TBD  Treatment: 15  D/W nursing, Versa Likes  Needs met prior to leaving room, call button in reach. Adelina Enriquez, SCHUYLER [phone]  Pg.  # [phone]    If patient is discharged prior to next treatment, this note will serve as the discharge summary

## 2022-04-18 NOTE — CARE COORDINATION
Case Management Assessment           Initial Evaluation                Date / Time of Evaluation: 4/18/2022 12:48 PM                 Assessment Completed by: Emely Manzo RN    Patient Name: Marylu Pearce     YOB: 1960  Diagnosis: HCAP (healthcare-associated pneumonia) [J18.9]  Sepsis ([facility] [address] [A41.9]  Severe sepsis ([facility] [address] [A41.9, R65.20]  Acute on chronic respiratory failure with hypoxia ([facility] [address] [J96.21]     Date / Time: 4/15/2022  9:05 AM    Patient Admission Status: Inpatient    If patient is discharged prior to next notation, then this note serves as note for discharge by case management. Current PCP: Danny Galvez MD    Chart Reviewed: Yes  Patient/ Family Interviewed: Yes    Emergency Contacts:  Extended Emergency Contact Information  Primary Emergency Contact: devante españa  Home Phone: [phone]  Work Phone: [phone]  Mobile Phone: [phone]  Relation: Brother/Sister   needed? No  Secondary Emergency Contact: maibrad  Home Phone: [phone]  Mobile Phone: [phone]  Relation: Child  Preferred language: English   needed?  Yes    Advance Directives:   Code Status: Full Code    Financial  Payor: [facility] MEDICAID / Plan: Rafael Stahll / Product Type: *No Product type* /     [address] [place] [address]  Phone: [phone] Fax: [phone]    ADLS Max assist for all ADL's  Support Systems:  LTC facility staff and family    PT AM-PAC:   /24  OT AM-PAC: 19 /24    HOUSING  Home Environment: care facility    Plans to RETURN to current housing: Yes    DISCHARGE PLAN:  Disposition: [facility] (LTC): [place] [facility].  Phone: [phone]    Fax: [phone]     Transportation PLAN for discharge: EMS transportation     The Plan for Transition of Care is related to the following treatment goals of HCAP (healthcare-associated pneumonia) [J18.9]  Sepsis ([facility] [A41.9]  Severe sepsis ([facility] [A41.9, R65.20]  Acute on chronic respiratory failure with hypoxia ([facility] [J96.21]    The Patient and/or patient representative Shoaib and her family were provided with a choice of provider and agrees with the discharge plan Yes    Freedom of choice list was provided with basic dialogue that supports the patient's individualized plan of care/goals and shares the quality data associated with the providers.  Yes      Kain Miller RN  [address]  Case Management Department  [phone]

## 2022-04-18 NOTE — PLAN OF CARE
Problem: Falls - Risk of:  Goal: Will remain free from falls  Description: Will remain free from falls  Outcome: Ongoing   Call for assistance out of bed    Problem: ACTIVITY INTOLERANCE/IMPAIRED MOBILITY  Goal: Mobility/activity is maintained at optimum level for patient  Outcome: Ongoing   Continuing walking to the restroom as tolerated
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
## 2022-04-18 NOTE — CARE COORDINATION
04/18/22 1000   Readmission Assessment   Number of Days since last admission? 8-30 days   Previous Disposition Long Term Care   Who is being Faviola Console   (chart review)   What was the patient's/caregiver's perception as to why they think they needed to return back to the hospital?   (shortness of breath. HCAP)   Did you visit your Primary Care Physician after you left the hospital, before you returned this time? Yes   Did you see a specialist, such as Cardiac, Pulmonary, Orthopedic Physician, etc. after you left the hospital? No   Who advised the patient to return to the hospital? Physician   Does the patient report anything that got in the way of taking their medications? No   In our efforts to provide the best possible care to you and others like you, can you think of anything that we could have done to help you after you left the hospital the first time, so that you might not have needed to return so soon?  Discharge instructions that are concise, clear, and non contradictory
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
## 2022-04-19 VITALS
WEIGHT: 246.91 LBS | RESPIRATION RATE: 14 BRPM | TEMPERATURE: 98.4 F | DIASTOLIC BLOOD PRESSURE: 89 MMHG | OXYGEN SATURATION: 92 % | HEART RATE: 76 BPM | SYSTOLIC BLOOD PRESSURE: 144 MMHG | HEIGHT: 68 IN | BODY MASS INDEX: 37.42 KG/M2

## 2022-04-19 LAB
ALBUMIN SERPL-MCNC: 3.2 G/DL (ref 3.4–5)
ANION GAP SERPL CALCULATED.3IONS-SCNC: 7 MMOL/L (ref 3–16)
BASOPHILS ABSOLUTE: 0 K/UL (ref 0–0.2)
BASOPHILS RELATIVE PERCENT: 0.1 %
BLOOD CULTURE, ROUTINE: NORMAL
BUN BLDV-MCNC: 43 MG/DL (ref 7–20)
CALCIUM SERPL-MCNC: 9.5 MG/DL (ref 8.3–10.6)
CHLORIDE BLD-SCNC: 106 MMOL/L (ref 99–110)
CO2: 29 MMOL/L (ref 21–32)
CREAT SERPL-MCNC: 1.2 MG/DL (ref 0.6–1.1)
CULTURE, BLOOD 2: NORMAL
EOSINOPHILS ABSOLUTE: 0 K/UL (ref 0–0.6)
EOSINOPHILS RELATIVE PERCENT: 0.1 %
GFR AFRICAN AMERICAN: 55
GFR NON-AFRICAN AMERICAN: 46
GLUCOSE BLD-MCNC: 101 MG/DL (ref 70–99)
GLUCOSE BLD-MCNC: 142 MG/DL (ref 70–99)
GLUCOSE BLD-MCNC: 154 MG/DL (ref 70–99)
GLUCOSE BLD-MCNC: 85 MG/DL (ref 70–99)
HCT VFR BLD CALC: 36.1 % (ref 36–48)
HEMOGLOBIN: 11.4 G/DL (ref 12–16)
LYMPHOCYTES ABSOLUTE: 1.2 K/UL (ref 1–5.1)
LYMPHOCYTES RELATIVE PERCENT: 13.1 %
MCH RBC QN AUTO: 28.3 PG (ref 26–34)
MCHC RBC AUTO-ENTMCNC: 31.7 G/DL (ref 31–36)
MCV RBC AUTO: 89.4 FL (ref 80–100)
MONOCYTES ABSOLUTE: 0.6 K/UL (ref 0–1.3)
MONOCYTES RELATIVE PERCENT: 6 %
NEUTROPHILS ABSOLUTE: 7.5 K/UL (ref 1.7–7.7)
NEUTROPHILS RELATIVE PERCENT: 80.7 %
PDW BLD-RTO: 16.9 % (ref 12.4–15.4)
PERFORMED ON: ABNORMAL
PERFORMED ON: ABNORMAL
PERFORMED ON: NORMAL
PHOSPHORUS: 4.4 MG/DL (ref 2.5–4.9)
PLATELET # BLD: 188 K/UL (ref 135–450)
PMV BLD AUTO: 7.8 FL (ref 5–10.5)
POTASSIUM SERPL-SCNC: 4.6 MMOL/L (ref 3.5–5.1)
RBC # BLD: 4.03 M/UL (ref 4–5.2)
SODIUM BLD-SCNC: 142 MMOL/L (ref 136–145)
WBC # BLD: 9.3 K/UL (ref 4–11)

## 2022-04-19 PROCEDURE — 94761 N-INVAS EAR/PLS OXIMETRY MLT: CPT

## 2022-04-19 PROCEDURE — 94640 AIRWAY INHALATION TREATMENT: CPT

## 2022-04-19 PROCEDURE — 2700000000 HC OXYGEN THERAPY PER DAY

## 2022-04-19 PROCEDURE — 6370000000 HC RX 637 (ALT 250 FOR IP): Performed by: INTERNAL MEDICINE

## 2022-04-19 PROCEDURE — 2500000003 HC RX 250 WO HCPCS: Performed by: INTERNAL MEDICINE

## 2022-04-19 PROCEDURE — 6360000002 HC RX W HCPCS: Performed by: INTERNAL MEDICINE

## 2022-04-19 PROCEDURE — 99233 SBSQ HOSP IP/OBS HIGH 50: CPT | Performed by: INTERNAL MEDICINE

## 2022-04-19 PROCEDURE — 99232 SBSQ HOSP IP/OBS MODERATE 35: CPT | Performed by: INTERNAL MEDICINE

## 2022-04-19 PROCEDURE — 85025 COMPLETE CBC W/AUTO DIFF WBC: CPT

## 2022-04-19 PROCEDURE — 2580000003 HC RX 258: Performed by: INTERNAL MEDICINE

## 2022-04-19 PROCEDURE — 80069 RENAL FUNCTION PANEL: CPT

## 2022-04-19 RX ORDER — AMLODIPINE BESYLATE 5 MG/1
5 TABLET ORAL DAILY
Status: DISCONTINUED | OUTPATIENT
Start: 2022-04-19 | End: 2022-04-19 | Stop reason: HOSPADM

## 2022-04-19 RX ORDER — HEPARIN SODIUM (PORCINE) LOCK FLUSH IV SOLN 100 UNIT/ML 100 UNIT/ML
500 SOLUTION INTRAVENOUS PRN
Status: DISCONTINUED | OUTPATIENT
Start: 2022-04-19 | End: 2022-04-19 | Stop reason: HOSPADM

## 2022-04-19 RX ORDER — PREDNISONE 20 MG/1
40 TABLET ORAL DAILY
Qty: 2 TABLET | Refills: 0 | Status: SHIPPED | OUTPATIENT
Start: 2022-04-20 | End: 2022-04-21

## 2022-04-19 RX ORDER — AMLODIPINE BESYLATE 5 MG/1
5 TABLET ORAL DAILY
Qty: 30 TABLET | Refills: 3 | Status: SHIPPED | OUTPATIENT
Start: 2022-04-20 | End: 2022-06-28 | Stop reason: ALTCHOICE

## 2022-04-19 RX ORDER — NICOTINE 21 MG/24HR
1 PATCH, TRANSDERMAL 24 HOURS TRANSDERMAL DAILY
Qty: 30 PATCH | Refills: 3 | Status: SHIPPED | OUTPATIENT
Start: 2022-04-20

## 2022-04-19 RX ORDER — LOSARTAN POTASSIUM 25 MG/1
25 TABLET ORAL DAILY
Status: DISCONTINUED | OUTPATIENT
Start: 2022-04-19 | End: 2022-04-19 | Stop reason: HOSPADM

## 2022-04-19 RX ADMIN — SODIUM CHLORIDE, PRESERVATIVE FREE 10 ML: 5 INJECTION INTRAVENOUS at 09:25

## 2022-04-19 RX ADMIN — RISPERIDONE 0.5 MG: 0.25 TABLET ORAL at 09:20

## 2022-04-19 RX ADMIN — LETROZOLE 2.5 MG: 2.5 TABLET ORAL at 09:40

## 2022-04-19 RX ADMIN — INSULIN LISPRO 2 UNITS: 100 INJECTION, SOLUTION INTRAVENOUS; SUBCUTANEOUS at 12:03

## 2022-04-19 RX ADMIN — HYDROXYZINE HYDROCHLORIDE 25 MG: 25 TABLET ORAL at 09:20

## 2022-04-19 RX ADMIN — LABETALOL HYDROCHLORIDE 10 MG: 5 INJECTION, SOLUTION INTRAVENOUS at 03:18

## 2022-04-19 RX ADMIN — IPRATROPIUM BROMIDE AND ALBUTEROL SULFATE 1 AMPULE: 2.5; .5 SOLUTION RESPIRATORY (INHALATION) at 08:19

## 2022-04-19 RX ADMIN — MEROPENEM 1000 MG: 1 INJECTION, POWDER, FOR SOLUTION INTRAVENOUS at 09:46

## 2022-04-19 RX ADMIN — ACETAMINOPHEN 650 MG: 325 TABLET ORAL at 09:20

## 2022-04-19 RX ADMIN — AMLODIPINE BESYLATE 5 MG: 5 TABLET ORAL at 12:03

## 2022-04-19 RX ADMIN — PANTOPRAZOLE SODIUM 40 MG: 40 TABLET, DELAYED RELEASE ORAL at 05:57

## 2022-04-19 RX ADMIN — SODIUM CHLORIDE 25 ML: 9 INJECTION, SOLUTION INTRAVENOUS at 09:45

## 2022-04-19 RX ADMIN — HEPARIN SODIUM (PORCINE) LOCK FLUSH IV SOLN 100 UNIT/ML 500 UNITS: 100 SOLUTION at 19:16

## 2022-04-19 RX ADMIN — METOPROLOL TARTRATE 25 MG: 25 TABLET, FILM COATED ORAL at 09:20

## 2022-04-19 RX ADMIN — INSULIN LISPRO 8 UNITS: 100 INJECTION, SOLUTION INTRAVENOUS; SUBCUTANEOUS at 09:30

## 2022-04-19 RX ADMIN — MEROPENEM 1000 MG: 1 INJECTION, POWDER, FOR SOLUTION INTRAVENOUS at 01:28

## 2022-04-19 RX ADMIN — INSULIN LISPRO 2 UNITS: 100 INJECTION, SOLUTION INTRAVENOUS; SUBCUTANEOUS at 18:30

## 2022-04-19 RX ADMIN — LOSARTAN POTASSIUM 25 MG: 25 TABLET, FILM COATED ORAL at 12:03

## 2022-04-19 RX ADMIN — PREDNISONE 40 MG: 20 TABLET ORAL at 09:20

## 2022-04-19 RX ADMIN — INSULIN LISPRO 8 UNITS: 100 INJECTION, SOLUTION INTRAVENOUS; SUBCUTANEOUS at 18:31

## 2022-04-19 RX ADMIN — INSULIN LISPRO 8 UNITS: 100 INJECTION, SOLUTION INTRAVENOUS; SUBCUTANEOUS at 12:04

## 2022-04-19 RX ADMIN — Medication 2 PUFF: at 08:21

## 2022-04-19 RX ADMIN — APIXABAN 2.5 MG: 2.5 TABLET, FILM COATED ORAL at 09:20

## 2022-04-19 RX ADMIN — LABETALOL HYDROCHLORIDE 10 MG: 5 INJECTION, SOLUTION INTRAVENOUS at 09:22

## 2022-04-19 ASSESSMENT — PAIN DESCRIPTION - PROGRESSION
CLINICAL_PROGRESSION: NOT CHANGED

## 2022-04-19 ASSESSMENT — PAIN SCALES - GENERAL
PAINLEVEL_OUTOF10: 2
PAINLEVEL_OUTOF10: 2
PAINLEVEL_OUTOF10: 0
PAINLEVEL_OUTOF10: 2
PAINLEVEL_OUTOF10: 3
PAINLEVEL_OUTOF10: 3

## 2022-04-19 ASSESSMENT — PAIN - FUNCTIONAL ASSESSMENT
PAIN_FUNCTIONAL_ASSESSMENT: PREVENTS OR INTERFERES SOME ACTIVE ACTIVITIES AND ADLS

## 2022-04-19 ASSESSMENT — PAIN DESCRIPTION - ORIENTATION
ORIENTATION: OTHER (COMMENT)

## 2022-04-19 ASSESSMENT — PAIN DESCRIPTION - PAIN TYPE
TYPE: CHRONIC PAIN

## 2022-04-19 ASSESSMENT — PAIN DESCRIPTION - DESCRIPTORS
DESCRIPTORS: ACHING;SORE

## 2022-04-19 ASSESSMENT — PAIN SCALES - WONG BAKER: WONGBAKER_NUMERICALRESPONSE: 0

## 2022-04-19 ASSESSMENT — PAIN DESCRIPTION - ONSET
ONSET: ON-GOING

## 2022-04-19 ASSESSMENT — PAIN DESCRIPTION - LOCATION
LOCATION: OTHER (COMMENT)

## 2022-04-19 ASSESSMENT — PAIN DESCRIPTION - FREQUENCY
FREQUENCY: CONTINUOUS

## 2022-04-19 NOTE — CARE COORDINATION
Case Management Assessment            Discharge Note                    Date / Time of Note: 4/19/2022 1:44 PM                  Discharge Note Completed by: Jayna Yen RN     Pt will discharge back to [facility] today via [facility] [address] @ 1900. Confirmed a bed hold with the facility. Pt's daughter/ legal guardian, Mae Gamboa, was updated on discharge time. She verbalized understanding.      RN CALL REPORT -[phone] Ask for the nurse for room 505B    Patient Name: John Yusuf   YOB: 1960  Diagnosis: HCAP (healthcare-associated pneumonia) [J18.9]  Sepsis ([facility] [address] [A41.9]  Severe sepsis ([facility] [address] [A41.9, R65.20]  Acute on chronic respiratory failure with hypoxia ([facility] [address] [J96.21]   Date / Time: 4/15/2022  9:05 AM    Current PCP: Aristeo Vo MD    Advance Directives:  Code Status: Full Code    Financial:  Payor: Refugio Farah / Plan: Katarina Cesar / Product Type: *No Product type* /      Pharmacy:    [facility] [address] [facility] [address] [address]  Phone: [phone] Fax: [facility] [address]  [phone] - F [phone]  [address] [facility] [address]  Phone: [phone] Fax: [phone] [address], [address] [facility] [phone] - F [phone]  [facility] [address]  Phone: [phone] Fax: [phone]    [id number] [address] [phone] [facility] Sender [phone]  [facility] [address]  Phone: [phone] Fax: [phone]    ADLS:  Current PT AM-PAC Score: 20 /24  Current OT AM-PAC Score: 19 /24      DISCHARGE Disposition: [facility] [address] [facility], [address]  Phone [phone]    LOC at discharge: [address] Completed: Yes    IMM Completed:   Yes, Case management has presented and reviewed IMM letter #2 to the patient and/or family/ POA. Patient and/or family/POA verbalized understanding of their medicare rights and appeal process if needed. Patient and/or family/POA has signed, initialed and placed today's date (4/19/2022) and time ([phone]) on IMM letter #2 on the the appropriate lines. Patient and/or family/POA, copy of letter offered and they are aware that this original copy of IMM letter #2 is available prior to discharge from the paper chart on the unit. Electronic documentation has been entered into epic for IMM letter #2 and original paper copy has been added to the paper chart at the nurses station. Transportation:  Transportation PLAN for discharge: EMS transportation   Mode of Transport: Ambulance stretcher - [facility]  Name of [address]: [facility]  Phone: [phone]  Time of Transport: 1900    Transport form completed: Yes    The Patient and/or patient representative Shoaib and her family were provided with a choice of provider and agrees with the discharge plan Yes    Freedom of choice list was provided with basic dialogue that supports the patient's individualized plan of care/goals and shares the quality data associated with the providers.  Yes      Marika Samson RN  [facility]  Case Management Department  [phone]

## 2022-04-19 NOTE — PROGRESS NOTES
ID Follow-up NOTE    CC:   + urine cult with E coli ESBL  Antibiotics: Meropenem    Admit Date: 4/15/2022  Hospital Day: 5    Subjective:     Patient reports resp status good, not 'quite' at baseline  No urinary sx      Objective:     Patient Vitals for the past 8 hrs:   BP Temp Temp src Pulse Resp SpO2 Weight   04/19/22 0838 (!) 163/101 97.9 °F (36.6 °C) Oral 69 16 98 % --   04/19/22 0821 -- -- -- -- 16 96 % --   04/19/22 0630 -- -- -- -- -- -- 246 lb 14.6 oz (112 kg)   04/19/22 0611 (!) 169/104 -- -- -- -- -- --   04/19/22 0600 -- -- -- -- -- -- 246 lb (111.6 kg)   04/19/22 0317 (!) 185/107 98.4 °F (36.9 °C) Oral 96 16 97 % --     I/O last 3 completed shifts: In: 852 [P.O.:870]  Out: 900 [Urine:900]  I/O this shift:  In: 10 [I.V.:10]  Out: -     EXAM:  GENERAL: No apparent distress.   22L  HEENT: Membranes moist, no oral lesion  NECK:  Supple, no lymphadenopathy  LUNGS: Distant breath sounds, expiratory wheeze jett with forced expiration  CARDIAC: RRR, no murmur appreciated  ABD:  Obese,+ BS, soft / NT, no SP tenderness, no CVAT   EXT:  No rash, no edema, no lesions  NEURO: No focal neurologic findings  PSYCH: Orientation, sensorium, mood normal  LINES:  Peripheral iv       Data Review:  Lab Results   Component Value Date    WBC 9.3 04/19/2022    HGB 11.4 (L) 04/19/2022    HCT 36.1 04/19/2022    MCV 89.4 04/19/2022     04/19/2022     Lab Results   Component Value Date    CREATININE 1.2 (H) 04/19/2022    BUN 43 (H) 04/19/2022     04/19/2022    K 4.6 04/19/2022     04/19/2022    CO2 29 04/19/2022       Hepatic Function Panel:   Lab Results   Component Value Date    ALKPHOS 138 04/15/2022    ALT 37 04/15/2022    AST 32 04/15/2022    PROT 6.4 04/15/2022    BILITOT 0.5 04/15/2022    BILIDIR <0.2 11/28/2021    IBILI see below 11/28/2021    LABALBU 3.2 04/19/2022       Micro:  4/15 Urine cult - 25k E coli ESBL  4/15 BC no growth to date  4/15 SARS CoV-2 NAAT neg  4/16 Nasal MRSA probe neg     Imaging: 4/15 LE Doppler U/S   There is no evidence of deep or superficial venous thrombosis of the    bilateral lower extremities.    Calf veins were not well visualized in all segments     4/16 C/A/P CT  CHEST:   1.  Emphysema. Mild atelectasis in the right lung. ABDOMEN/PELVIS:   1.  No acute abnormality in the abdomen and pelvis. 2.  Punctate right nephrolithiasis. 3.  Prior left nephrectomy. 4.  Uterine fibroids. Scheduled Meds:   ipratropium-albuterol  1 ampule Inhalation BID    insulin glargine  0.25 Units/kg SubCUTAneous Daily    insulin lispro  8 Units SubCUTAneous TID WC    insulin lispro  0-12 Units SubCUTAneous TID WC    insulin lispro  0-6 Units SubCUTAneous Nightly    meropenem  1,000 mg IntraVENous Q8H    metoprolol tartrate  25 mg Oral BID    predniSONE  40 mg Oral Daily    pantoprazole  40 mg Oral QAM AC    apixaban  2.5 mg Oral BID    hydrOXYzine  25 mg Oral BID    letrozole  2.5 mg Oral Daily    risperiDONE  0.5 mg Oral BID    traZODone  25 mg Oral Nightly    sodium chloride flush  5-40 mL IntraVENous 2 times per day    nicotine  1 patch TransDERmal Daily    mometasone-formoterol  2 puff Inhalation BID       Continuous Infusions:   dextrose      sodium chloride 25 mL (04/19/22 0945)       PRN Meds:  glucose, dextrose bolus (hypoglycemia), glucagon (rDNA), dextrose, labetalol, diphenhydrAMINE, loperamide, LORazepam, sodium chloride flush, sodium chloride, ondansetron **OR** ondansetron, polyethylene glycol, acetaminophen **OR** acetaminophen      Assessment:     Hx CHF, COPD, HTN, nephrolithiasis, 1 kidney, CKD (hx FSGS per renal note), breast ca, hx PE (on eliquis)  Lives at a nursing facility, on chronic O2, smokes cigarettes     COPD exacerbation  Leukocytosis, resolved   Urine with ESBL E coli, likely bacteriuria    Plan:     Cont Meropenem d#3  Complete short course / few days - get 3 days and be discharged today if other issues ok    Medical Decision Making:   The following items were considered in medical decision making:  Discussion of patient care with other providers  Reviewed clinical lab tests  Reviewed radiology tests  Reviewed other diagnostic tests/interventions  Microbiology cultures and other micro tests reviewed      Discussed with pt, RN  Lisa Fry MD

## 2022-04-19 NOTE — PROGRESS NOTES
murmur, click, rub or gallop  Abdomen: soft, non-tender; bowel sounds normal; no masses,  no organomegaly  Extremities: extremities normal, atraumatic, no cyanosis or edema  Lymphatic: No significant lymph node enlargement papable  Neurologic: Mental status: Alert, oriented, thought content appropriate      Assessment & Plan:      Acute on chronic hypoxic respiratory failure, COPD exacerbation:  Influenza and SARS-CoV-2 were ruled out in ER.  - venous doppler negative  Blood cx NGTD. MRSA nares negative from 4/2. procal elevated, likely due to sepsis. 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

## 2022-04-19 NOTE — PLAN OF CARE
Problem: Falls - Risk of:  Goal: Will remain free from falls  Description: Will remain free from falls  Outcome: Ongoing  Pt is a Fall Risk. See Carry Cambric Fall Risk Score. Pt bed in low position and side rails up. Call light and belongings in reach. Pt encouraged to call for assistance. Will continue with hourly rounds for PO intake, pain needs, toileting, and repositioning as needed. Problem: OXYGENATION/RESPIRATORY FUNCTION  Goal: Patient will achieve/maintain normal respiratory rate/effort  Description: Respiratory rate and effort will be within normal limits for the patient  Outcome: Met This Shift   Patient is on her home dose of O2 at 2L and sating between %. No issues with SOB. Problem: ACTIVITY INTOLERANCE/IMPAIRED MOBILITY  Goal: Mobility/activity is maintained at optimum level for patient  Outcome: Met This Shift  Patient has been going to the bathroom and back with no problems.

## 2022-04-19 NOTE — DISCHARGE INSTR - COC
Continuity of Care Form    Patient Name: Alka Armstrong   :  1960  MRN:  [id number]    Admit date:  4/15/2022  Discharge date:  ***    Code Status Order: Full Code   Advance Directives:      Admitting Physician:  No admitting provider for patient encounter. PCP: Julián Willams MD    Discharging Nurse: [facility] Unit/Room#: 0334/8668-46  Discharging Unit Phone Number: ***    Emergency Contact:   Extended Emergency Contact Information  Primary Emergency Contact: devante españa  Home Phone: [phone]  Work Phone: [phone]  Mobile Phone: [phone]  Relation: Brother/Sister   needed? No  Secondary Emergency Contact: brad oneill  Home Phone: [phone]  Mobile Phone: [phone]  Relation: Child  Preferred language: English   needed?  Yes    Past Surgical History:  Past Surgical History:   Procedure Laterality Date    APPENDECTOMY      BREAST SURGERY      CHOLECYSTECTOMY      CT BIOPSY RENAL  2020    CT BIOPSY RENAL 2020 [address] SCAN    MASTECTOMY Left 2019    LEFT MODIFIED RADICAL MASTECTOMY; EXPAREL INTERCOSTAL BLOCK performed by Tom De Los Santos MD at [address] Left 2019    COMPLEX CLOSURE OF LEFT BREAST, FULL THICKNESS SKIN GRAFT LEFT BREAST 4x3 performed by Kathi Singh MD at [address] Left     TUBAL LIGATION      TUNNELED VENOUS PORT PLACEMENT  2019    still in     [facility] LEFT  2020    US BREAST BIOPSY NEEDLE ADDITIONAL LEFT 2020 [address] [facility] ULTRASOUND       Immunization History:   Immunization History   Administered Date(s) Administered    Influenza Virus Vaccine 2014    Pneumococcal Polysaccharide (Hfrseqdgw21) 2014       Active Problems:  Patient Active Problem List   Diagnosis Code    Malignant neoplasm of upper-inner quadrant of left breast in female, estrogen receptor positive ([facility] [address] C50.212, Z17.0    Breast wound, left, sequela S21.002S    Bilateral pulmonary embolism ([facility]) I26.99    Acute on chronic respiratory failure with hypoxia and hypercapnia ([facility]) J96.21, J96.22    Acute deep vein thrombosis (DVT) of lower extremity ([facility]) I82.409    Centrilobular emphysema ([facility]) J43.2    Hypoxemia R09.02    Pneumonia due to infectious organism J18.9    Nephrotic syndrome N04.9    CHANTELLE (acute kidney injury) ([facility] [address] N17.9    Chest pain R07.9    Hematuria R31.9    COPD exacerbation ([facility]) J44.1    Hypoxia K89.71    Metabolic acidosis K68.8    Sepsis with acute renal failure and septic shock ([facility]) A41.9, R65.21, N17.9    HCAP (healthcare-associated pneumonia) J18.9    Severe sepsis ([facility] [address] A41.9, R65.20    Chronic hypoxemic respiratory failure ([facility]) J96.11    Tobacco abuse Z72.0    FSGS (focal segmental glomerulosclerosis) N05.1    Acute on chronic respiratory failure with hypoxia ([facility]) J96.21       Isolation/Infection:   Isolation            Contact          Patient Infection Status       Infection Onset Added Last Indicated Last Indicated By Review Planned Expiration Resolved Resolved By    ESBL (Extended Spectrum Beta Lactamase) 04/15/22 04/17/22 04/15/22 Culture, Urine        Resolved    COVID-19 (Rule Out) 04/15/22 04/15/22 04/15/22 COVID-19, Rapid (Ordered)   04/15/22 Rule-Out Test Resulted    COVID-19 (Rule Out) 04/01/22 04/01/22 04/01/22 COVID-19 & Influenza Combo (Ordered)   04/01/22 Rule-Out Test Resulted    COVID-19 (Rule Out) 11/28/21 11/28/21 11/28/21 COVID-19 (Ordered)   11/29/21 Rule-Out Test Resulted    C-diff Rule Out 07/16/21 07/16/21 07/16/21 Clostridium difficile toxin/antigen (Ordered)   11/29/21 Anthony Mackenzie RN    Order from previous admission     COVID-19 (Rule Out) 07/16/21 07/16/21 07/16/21 COVID-19 (Ordered)   07/17/21 Rule-Out Test Resulted    COVID-19 (Rule Out) 09/22/20 09/22/20 09/22/20 COVID-19 (Ordered)   09/24/20 Rule-Out Test Resulted            Nurse Assessment:  Last Vital Signs: BP (!) 135/93   Pulse 69   Temp 98.3 °F (36.8 °C) (Oral)   Resp 16   Ht 5' 8\" (1.727 m)   Wt 246 lb 14.6 oz (112 kg)   LMP 03/09/2014   SpO2 98%   BMI 37.54 kg/m²     Last documented pain score (0-10 scale): Pain Level: 2  Last Weight:   Wt Readings from Last 1 Encounters:   04/19/22 246 lb 14.6 oz (112 kg)     Mental Status:  oriented, alert, coherent, logical, thought processes intact, and able to concentrate and follow conversation    IV Access:  - None    Nursing Mobility/ADLs:  Walking   Independent  Transfer  Independent  Bathing  Independent  Dressing  Independent  [address]  Independent  Med Delivery   whole    Wound Care Documentation and Therapy:        Elimination:  Continence: Bowel: Yes  Bladder: Yes  Urinary Catheter: None   Colostomy/Ileostomy/Ileal Conduit: No       Date of Last BM: 4/19/2022    Intake/Output Summary (Last 24 hours) at 4/19/2022 1317  Last data filed at 4/19/2022 0925  Gross per 24 hour   Intake 370 ml   Output --   Net 370 ml     I/O last 3 completed shifts: In: 576 [P.O.:870]  Out: 900 [Urine:900]    Safety Concerns:     None    Impairments/Disabilities:      None    Nutrition Therapy:  Current Nutrition Therapy:   - Oral Diet:  Carb Control 4 carbs/meal (1800kcals/day)    Routes of Feeding: Oral  Liquids: Thin Liquids  Daily Fluid Restriction: no  Last Modified Barium Swallow with Video (Video Swallowing Test): not done    Treatments at the Time of Hospital Discharge:   Respiratory Treatments: Duoneb 1 ampule BID, Dulera 2 puffs BID  Oxygen Therapy:   Has home O2 already set up for use at night.   Ventilator:    - No ventilator support    Rehab Therapies: None  Weight Bearing Status/Restrictions: No weight bearing restrictions  Other Medical Equipment (for information only, NOT a DME order):  ***  Other Treatments: None    Patient's personal belongings (please select all that are sent with patient):  Cell phone and clothing     RN SIGNATURE:  Electronically signed by Lydia Mora RN on 4/19/22 at 7:02 PM EDT    CASE MANAGEMENT/SOCIAL WORK SECTION    Inpatient Status Date: ***    Readmission Risk Assessment Score:  Readmission Risk              Risk of Unplanned Readmission:  37           Discharging to Facility/ Agency   Name:   Address:  Phone:  Fax:    Dialysis Facility (if applicable)   Name:  Address:  Dialysis Schedule:  Phone:  Fax:    / signature: {Esignature:[id number]}    PHYSICIAN SECTION    Prognosis: Fair    Condition at Discharge: Stable    Rehab Potential (if transferring to Rehab): Good    Recommended Labs or Other Treatments After Discharge: MONITOR RENAL PANEL    Physician Certification: I certify the above information and transfer of Ricky Van  is necessary for the continuing treatment of the diagnosis listed and that she requires [place] for greater 30 days.      Update Admission H&P: No change in H&P    PHYSICIAN SIGNATURE:  Electronically signed by Apurva Bonilla MD on 4/19/22 at 1:18 PM EDT
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
## 2022-04-20 NOTE — DISCHARGE SUMMARY
Patient ID: Vasile Palafox      Patient's PCP: Jose E Hawk MD    Admit Date: 4/15/2022   Discharge Date: 4/19/2022  Admitting Physician: No admitting provider for patient encounter. Discharge Physician: Des Meyer MD ,MD  Discharge Diagnoses:   Acute on chronic hypoxic respiratory failure, COPD exacerbation:  Influenza and SARS-CoV-2 were ruled out in ER.  - venous doppler negative  Blood cx NGTD. MRSA nares negative from 4/2. procal elevated, likely due to sepsis. 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 female with COPD with chronic hypoxic respiratory failure on home O2 at 2 liters at baseline, h/o PE on eliquis, CKD 3, diast CHF, who presented with worsening shortness of breath from her nursing facility. - was recently admitted with HCAP.  -In the ED,Pt developed facial swelling with erythema after cefepime infusion, - was given Benadryl with improvement in symptoms. .   Urine culture positive for ESBL  She was admitted and treated as above prior to discharge . Physical Exam:  BP (!) 144/89   Pulse 76   Temp 98.4 °F (36.9 °C) (Oral)   Resp 14   Ht 5' 8\" (1.727 m)   Wt 246 lb 14.6 oz (112 kg)   LMP 03/09/2014   SpO2 92%   BMI 37.54 kg/m²   General appearance: alert, appears stated age and cooperative  Head: Normocephalic, without obvious abnormality, atraumatic  Eyes: conjunctivae/corneas clear.  PERRL, EOM's intact.  l  Neck: supple  Lungs: clear to auscultation bilaterally  Heart: regular rate and rhythm, S1, S2 normal, no murmur,   Abdomen: soft, non-tender; bowel sounds normal; no masses,  no organomegaly  Extremities: extremities normal, atraumatic, no cyanosis or edema  Neurologic: Grossly normal    Consults: nephrology  Significant Diagnostic Studies:  chest x-ray  Treatments: IV hydration  Disposition: home  Discharged Condition: Stable  Follow Up: Primary Care Physician in one week  Discharge Medications:     Medication List      START taking these medications    amLODIPine 5 MG tablet  Commonly known as: NORVASC  Take 1 tablet by mouth daily  Start taking on: April 20, 2022     mometasone-formoterol 200-5 MCG/ACT inhaler  Commonly known as: DULERA  Inhale 2 puffs into the lungs 2 times daily     nicotine 21 MG/24HR  Commonly known as: NICODERM CQ  Place 1 patch onto the skin daily  Start taking on: April 20, 2022        CHANGE how you take these medications    predniSONE 20 MG tablet  Commonly known as: DELTASONE  Take 2 tablets by mouth daily for 1 day  Start taking on: April 20, 2022  What changed:   · medication strength  · how much to take  · how to take this  · when to take this  · additional instructions        CONTINUE taking these medications    acetaminophen 500 MG tablet  Commonly known as: TYLENOL     alogliptin 6.25 MG Tabs tablet  Commonly known as: NESINA  Take 1 tablet by mouth daily     apixaban 2.5 MG Tabs tablet  Commonly known as: Eliquis  Take 1 tablet by mouth 2 times daily     hydrOXYzine 50 MG tablet  Commonly known as: ATARAX     ipratropium-albuterol 0.5-2.5 (3) MG/3ML Soln nebulizer solution  Commonly known as: DUONEB  Inhale 3 mLs into the lungs every 6 hours     letrozole 2.5 MG tablet  Commonly known as: FEMARA     loperamide 2 MG capsule  Commonly known as: IMODIUM     losartan 25 MG tablet  Commonly known as: COZAAR  Take 1 tablet by mouth daily     metoprolol tartrate 25 MG tablet  Commonly known as:  LOPRESSOR  Take 1 tablet by mouth 2 times daily     ondansetron 4 MG tablet  Commonly known as: ZOFRAN     pantoprazole 40 MG tablet  Commonly known as: PROTONIX     risperiDONE 0.5 MG tablet  Commonly known as: RISPERDAL     temazepam 15 MG capsule  Commonly known as: RESTORIL     torsemide 10 MG tablet  Commonly known as: DEMADEX  Take 1 tablet by mouth daily     traZODone 50 MG tablet  Commonly known as: DESYREL     Ventolin  (90 Base) MCG/ACT inhaler  Generic drug: albuterol sulfate HFA        STOP taking these medications    MOMETASONE FUROATE IN           Where to Get Your Medications      These medications were sent to [address] [place] [id number]    Phone: [phone]   · amLODIPine 5 MG tablet  · mometasone-formoterol 200-5 MCG/ACT inhaler  · nicotine 21 MG/24HR     You can get these medications from any pharmacy    Bring a paper prescription for each of these medications  · predniSONE 20 MG tablet        Activity: activity as tolerated  Diet: diabetic dietWound Care: none needed  Time Spent on discharge is more than 30 minutes discussing plan of care and discharge medications with patient and nursing staff    Signed:  MD NABIL  Hospitalist Service     4/19/2022

## 2022-04-20 NOTE — PLAN OF CARE
No falls noted thus far this shift, bed in lowest position, alarm on, non-skid socks on, call light within reach, hourly checks, safety maintained, will continue to monitor.      Problem: Falls - Risk of:  Goal: Will remain free from falls  Description: Will remain free from falls  Outcome: Met This Shift  Goal: Absence of physical injury  Description: Absence of physical injury  Outcome: Met This Shift     Problem: OXYGENATION/RESPIRATORY FUNCTION  Goal: Patient will maintain patent airway  Outcome: Met This Shift  Goal: Patient will achieve/maintain normal respiratory rate/effort  Description: Respiratory rate and effort will be within normal limits for the patient  Outcome: Met This Shift     Problem: HEMODYNAMIC STATUS  Goal: Patient has stable vital signs and fluid balance  Outcome: Met This Shift     Problem: FLUID AND ELECTROLYTE IMBALANCE  Goal: Fluid and electrolyte balance are achieved/maintained  Outcome: Met This Shift     Problem: ACTIVITY INTOLERANCE/IMPAIRED MOBILITY  Goal: Mobility/activity is maintained at optimum level for patient  Outcome: Met This Shift     Problem: Pain:  Goal: Pain level will decrease  Description: Pain level will decrease  Outcome: Met This Shift  Goal: Control of acute pain  Description: Control of acute pain  Outcome: Met This Shift  Goal: Control of chronic pain  Description: Control of chronic pain  Outcome: Met This Shift

## 2022-10-13 ENCOUNTER — HOSPITAL ENCOUNTER (OUTPATIENT)
Dept: MAMMOGRAPHY | Age: 62
Discharge: HOME | End: 2022-10-13
Payer: MEDICAID

## 2022-10-13 VITALS — WEIGHT: 241 LBS | BODY MASS INDEX: 36.53 KG/M2 | HEIGHT: 68 IN

## 2022-10-13 DIAGNOSIS — Z12.31 VISIT FOR SCREENING MAMMOGRAM: ICD-10-CM

## 2022-10-13 PROCEDURE — 77063 BREAST TOMOSYNTHESIS BI: CPT

## 2022-11-18 ENCOUNTER — OFFICE VISIT (OUTPATIENT)
Dept: SURGERY | Age: 62
End: 2022-11-18
Payer: MEDICAID

## 2022-11-18 ENCOUNTER — HOSPITAL ENCOUNTER (OUTPATIENT)
Dept: ULTRASOUND IMAGING | Age: 62
Discharge: HOME | End: 2022-11-18
Payer: MEDICAID

## 2022-11-18 VITALS
HEIGHT: 68 IN | RESPIRATION RATE: 18 BRPM | DIASTOLIC BLOOD PRESSURE: 64 MMHG | BODY MASS INDEX: 36.64 KG/M2 | OXYGEN SATURATION: 97 % | SYSTOLIC BLOOD PRESSURE: 100 MMHG | HEART RATE: 88 BPM

## 2022-11-18 DIAGNOSIS — Z85.3 PERSONAL HISTORY OF BREAST CANCER: ICD-10-CM

## 2022-11-18 DIAGNOSIS — Z90.12 S/P LEFT MASTECTOMY: ICD-10-CM

## 2022-11-18 DIAGNOSIS — N63.21 MASS OF UPPER OUTER QUADRANT OF LEFT BREAST: ICD-10-CM

## 2022-11-18 DIAGNOSIS — N63.21 MASS OF UPPER OUTER QUADRANT OF LEFT BREAST: Primary | ICD-10-CM

## 2022-11-18 PROCEDURE — 76642 ULTRASOUND BREAST LIMITED: CPT

## 2022-11-18 PROCEDURE — 99213 OFFICE O/P EST LOW 20 MIN: CPT | Performed by: SURGERY
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
## 2022-12-14 ENCOUNTER — HOSPITAL ENCOUNTER (OUTPATIENT)
Dept: GENERAL RADIOLOGY | Age: 62
Discharge: HOME | End: 2022-12-14
Payer: MEDICAID

## 2022-12-14 DIAGNOSIS — Z79.811 USE OF AROMATASE INHIBITORS: ICD-10-CM

## 2022-12-14 PROCEDURE — 77080 DXA BONE DENSITY AXIAL: CPT

## 2023-10-23 NOTE — PROGRESS NOTES
Speech Language Pathology  Facility/Department: [place] [address] [facility]   CLINICAL BEDSIDE SWALLOW EVALUATION  & Treatment Note     NAME: Danny Dee  : 1960  MRN: [id number]    ADMISSION DATE: 4/15/2022  ADMITTING DIAGNOSIS: has Malignant neoplasm of upper-inner quadrant of left breast in female, estrogen receptor positive ([address] Breast wound, left, sequela; Bilateral pulmonary embolism ([address] Acute on chronic respiratory failure with hypoxia and hypercapnia ([address] Acute deep vein thrombosis (DVT) of lower extremity ([address] Centrilobular emphysema ([address] Hypoxemia; Pneumonia due to infectious organism; Nephrotic syndrome; CHANTELLE (acute kidney injury) ([address] Chest pain; Hematuria; COPD exacerbation ([address] Hypoxia; Metabolic acidosis; Sepsis with acute renal failure and septic shock ([address] HCAP (healthcare-associated pneumonia); Sepsis ([address] Chronic hypoxemic respiratory failure ([address] and Tobacco abuse on their problem list.  ONSET DATE: 04/15/2022    Recent Chest Xray: (04/15/2022)  Impression       No significant interval change in bibasilar mild airspace disease, nonspecific, may relate to atelectasis or scarring. Date of Eval: 2022  Evaluating Therapist: JULIA Gonzalez    Current Diet level:  Current Diet : Regular  Current Liquid Diet : Thin      Primary Complaint  Patient Complaint: Difficulty with some solids.     Pain:  Pain Assessment  Pain Assessment: 0-10  Pain Level: 0  Patient's Stated Pain Goal: No pain  Pain Type: Chronic pain  Pain Location: Knee  Pain Orientation: Left,Right  Pain Descriptors: Aching  Pain Frequency: Intermittent  Pain Onset: Gradual  Clinical Progression: Gradually worsening  Functional Pain Assessment: Prevents or interferes some active activities and ADLs  Non-Pharmaceutical Pain Intervention(s): Rest  Response to Pain Intervention: Patient Satisfied    Reason for Referral  Danny Dee was referred for a bedside swallow evaluation to assess the efficiency 23-Oct-2023 10:32 of her swallow function, identify signs and symptoms of aspiration and make recommendations regarding safe dietary consistencies, effective compensatory strategies, and safe eating environment. Impression  Dysphagia Diagnosis: Mild oral stage dysphagia  Dysphagia Impression : Mild oral stage dysphagia 2/2 reduced dentition. With pt seated up in bed, assessed tolerance thins via tsp/cup (deferred straws d/t pt preference), 3 oz sequential screen, soft solid. Pt with positive oral acceptance, timely oral prep, positive swallow, good oral clearance with no residue, no overt signs of aspiration, with pt passing 3 oz swallow screem (good indicator of pharyngeal function). No complaint globus sensation. Of note, during assessment, RR fluctuated from low 20s to high 30s (RR >25 associated w/ increased risk of aspiration, however pt not showing signs). Discussed regular texture diet vs soft/bite-sized; pt prefers to remain on regular and independently select softer items. Thus recommend continue regular texture solids and thin liquids. Dysphagia Outcome Severity Scale: Level 5: Mild dysphagia- Distant supervision. May need one diet consistency restricted     Treatment Plan  Requires SLP Intervention: Yes  Duration/Frequency of Treatment: 1-3x/wk for LOS  D/C Recommendations: To be determined    Recommended Diet and Intervention  Diet Solids Recommendation: Regular  Liquid Consistency Recommendation: Thin  Recommended Form of Meds: PO  Recommendations: Dysphagia treatment  Therapeutic Interventions: Diet tolerance monitoring;Patient/Family education    Compensatory Swallowing Strategies  Compensatory Swallowing Strategies: Upright as possible for all oral intake;Eat/Feed slowly; Small bites/sips    Treatment/Goals  Short-term Goals  Timeframe for Short-term Goals: 1-2 wks or LOS  Goal 1: Patient will tolerate least restrictive diet without overt signs of aspiration or associated decline in respiratory status.   Goal 2: Patient/caregiver will demonstrate understanding of swallowing concerns/recommendations. 4/16: Patient was educated regarding dysphagia, general swallow anatomy/function, aspiration concerns/signs, diet options, safe swallow strategies (positioning, rate of intake, bolus size), and oral hygiene completion/rationale. Pt stated comprehension, suspect needs reinforcement. Cont goal    General  Chart Reviewed: Yes  Comments: Per admitting H&P (04/15/2022): 'The patient is a 63 y.o. female with medical history as below, notable for COPD with chronic hypoxic respiratory failure on home O2 at 2 liters at baseline, h/o PE on eliquis, CKD 3, diast CHF, who presents to [facility] with worsening shortness of breath from her nursing facility. Patient reports acute worsening breathing overnight. She was recently treated at [facility] 4/1- 4/5 for sepsis and HCAP with IV cefepime as well as COPD exacerbation. She completed course of antibiotics as inpatient. Was discharged on diuretics for diastolic CHF. Patient states that since discharge she was tired but overall her breathing was good until last night. Patient states the decompensation was very sudden without prior cough, congestion or chest pain. It is unclear if she is reliable historian. She is compliant with her Eliquis due to prior history of DVT/PE. In the ER patient was hypotensive, tachycardic, and hypoxic on arrival.  Lactic acid was elevated at 2.9, creatinine elevated to 2.1 with baseline 1.4. Physical exam was significant for diffuse wheezing. Patient was given fluids per sepsis protocol, vancomycin, cefepime, Solu-Medrol and breathing treatment. No visitors are bedside, called and left voicemail with legal guardian. '  Subjective  Subjective: Patient awake, alert, pleasant, seen seated up in bed, on 2L O2 via nc. Behavior/Cognition: Alert; Cooperative  Respiratory Status: O2 via nasual cannula  O2 Device: Nasal cannula  Liters of Oxygen: 2 L  Communication Observation: Functional  Follows Directions: Complex  Dentition: Some missing teeth (only 2 bottom teeth left)  Patient Positioning: Upright in bed  Baseline Vocal Quality: Normal  Volitional Cough: Strong  Prior Dysphagia History: None found in chart review. Consistencies Administered: Reg solid; Thin;Thin - cup; Thin - teaspoon; Ice Chips    Vision/Hearing  Vision  Vision: Within Functional Limits  Hearing  Hearing: Within functional limits    Oral Motor Deficits  Oral/Motor  Oral Motor: Within functional limits    Prognosis  Prognosis  Prognosis for safe diet advancement: good  Individuals consulted  Consulted and agree with results and recommendations: Patient    Education  Patient Education: Educated pt to purpose of visit, recommendations. Patient Education Response: Verbalizes understanding;Needs reinforcement  Safety Devices in place: Yes  Type of devices: All fall risk precautions in place; Left in bed;Bed alarm in place;Call light within reach       Therapy Time  SLP Individual Minutes  Time In: 1200  Time Out: 8762  Minutes: 24     SLP Total Treatment Time  Timed Code Treatment Minutes: 0 Minutes  Total Treatment Time: 24    Plan  Diet Recommendations: thin liquids, regular solids (pt to independently select softer items)     Discharge Plan:  TBD  Discussed with RNVera. Needs within reach. Electronically Signed by:  SHIV Enriquez [address]  Speech-Language Pathologist  Pager #[phone]    This document will serve as a discharge summary if pt discharges before next treatment. No

## 2024-02-27 ENCOUNTER — HOSPITAL ENCOUNTER (OUTPATIENT)
Dept: MAMMOGRAPHY | Age: 64
Discharge: HOME | End: 2024-02-27
Payer: MEDICAID

## 2024-02-27 VITALS — WEIGHT: 237 LBS | BODY MASS INDEX: 35.92 KG/M2 | HEIGHT: 68 IN

## 2024-02-27 DIAGNOSIS — Z12.31 VISIT FOR SCREENING MAMMOGRAM: ICD-10-CM

## 2024-02-27 DIAGNOSIS — Z85.3 PERSONAL HISTORY OF BREAST CANCER: ICD-10-CM

## 2024-02-27 PROCEDURE — 77063 BREAST TOMOSYNTHESIS BI: CPT

## 2024-05-16 ENCOUNTER — TELEPHONE (OUTPATIENT)
Dept: PULMONOLOGY | Age: 64
End: 2024-05-16

## 2024-05-16 NOTE — TELEPHONE ENCOUNTER
Ref by [facility], Dr Kris Kan for pulm nodule.  Could you please look her chart and see how urgent this appt is? Thanks.

## 2024-05-30 NOTE — TELEPHONE ENCOUNTER
The written report is with the referral.  I sent a request to [facility] asking if they could push images into Pacs or send a copy on CD.

## 2024-05-31 NOTE — PROGRESS NOTES

## 2024-06-03 ENCOUNTER — HOSPITAL ENCOUNTER (OUTPATIENT)
Dept: CT IMAGING | Age: 64
Discharge: HOME | End: 2024-06-03
Attending: UROLOGY
Payer: MEDICAID

## 2024-06-03 DIAGNOSIS — R91.1 PULMONARY NODULE/LESION, SOLITARY: ICD-10-CM

## 2024-06-03 DIAGNOSIS — C67.2 MALIGNANT NEOPLASM OF LATERAL WALL OF BLADDER (HCC): ICD-10-CM

## 2024-06-03 PROCEDURE — 71250 CT THORAX DX C-: CPT

## 2024-06-04 NOTE — PROGRESS NOTES
Left message with Batsheva, director of nursing @ [facility] to confirm that she received h&p form and anesthesia orders for upcoming surgery. Requested for Batsheva to call me back to go over instructions for surgery and to confirm transportation for surgery.    high school

## 2024-06-12 ENCOUNTER — ANESTHESIA EVENT (OUTPATIENT)
Dept: OPERATING ROOM | Age: 64
End: 2024-06-12
Payer: MEDICAID

## 2024-06-13 ENCOUNTER — HOSPITAL ENCOUNTER (OUTPATIENT)
Age: 64
Setting detail: OUTPATIENT SURGERY
Discharge: HOME | End: 2024-06-13
Attending: UROLOGY | Admitting: UROLOGY
Payer: MEDICAID

## 2024-06-13 ENCOUNTER — ANESTHESIA (OUTPATIENT)
Dept: OPERATING ROOM | Age: 64
End: 2024-06-13
Payer: MEDICAID

## 2024-06-13 VITALS
WEIGHT: 225 LBS | HEART RATE: 77 BPM | RESPIRATION RATE: 16 BRPM | OXYGEN SATURATION: 98 % | SYSTOLIC BLOOD PRESSURE: 138 MMHG | TEMPERATURE: 97.4 F | DIASTOLIC BLOOD PRESSURE: 98 MMHG | HEIGHT: 68 IN | BODY MASS INDEX: 34.1 KG/M2

## 2024-06-13 DIAGNOSIS — R31.0 GROSS HEMATURIA: ICD-10-CM

## 2024-06-13 PROCEDURE — 2709999900 HC NON-CHARGEABLE SUPPLY: Performed by: UROLOGY

## 2024-06-13 PROCEDURE — 6360000002 HC RX W HCPCS

## 2024-06-13 PROCEDURE — 88305 TISSUE EXAM BY PATHOLOGIST: CPT

## 2024-06-13 PROCEDURE — 2580000003 HC RX 258: Performed by: UROLOGY

## 2024-06-13 PROCEDURE — 6360000002 HC RX W HCPCS: Performed by: UROLOGY

## 2024-06-13 PROCEDURE — 3600000014 HC SURGERY LEVEL 4 ADDTL 15MIN: Performed by: UROLOGY

## 2024-06-13 PROCEDURE — 2500000003 HC RX 250 WO HCPCS

## 2024-06-13 PROCEDURE — 7100000010 HC PHASE II RECOVERY - FIRST 15 MIN: Performed by: UROLOGY

## 2024-06-13 PROCEDURE — 3700000001 HC ADD 15 MINUTES (ANESTHESIA): Performed by: UROLOGY

## 2024-06-13 PROCEDURE — 2580000003 HC RX 258

## 2024-06-13 PROCEDURE — 3600000004 HC SURGERY LEVEL 4 BASE: Performed by: UROLOGY

## 2024-06-13 PROCEDURE — 3700000000 HC ANESTHESIA ATTENDED CARE: Performed by: UROLOGY

## 2024-06-13 PROCEDURE — 7100000011 HC PHASE II RECOVERY - ADDTL 15 MIN: Performed by: UROLOGY

## 2024-06-13 RX ORDER — LIDOCAINE HYDROCHLORIDE 20 MG/ML
INJECTION, SOLUTION EPIDURAL; INFILTRATION; INTRACAUDAL; PERINEURAL PRN
Status: DISCONTINUED | OUTPATIENT
Start: 2024-06-13 | End: 2024-06-13 | Stop reason: SDUPTHER

## 2024-06-13 RX ORDER — SODIUM CHLORIDE 0.9 % (FLUSH) 0.9 %
5-40 SYRINGE (ML) INJECTION EVERY 12 HOURS SCHEDULED
Status: DISCONTINUED | OUTPATIENT
Start: 2024-06-13 | End: 2024-06-13 | Stop reason: HOSPADM

## 2024-06-13 RX ORDER — SODIUM CHLORIDE, SODIUM LACTATE, POTASSIUM CHLORIDE, CALCIUM CHLORIDE 600; 310; 30; 20 MG/100ML; MG/100ML; MG/100ML; MG/100ML
INJECTION, SOLUTION INTRAVENOUS CONTINUOUS PRN
Status: DISCONTINUED | OUTPATIENT
Start: 2024-06-13 | End: 2024-06-13 | Stop reason: SDUPTHER

## 2024-06-13 RX ORDER — SODIUM CHLORIDE 0.9 % (FLUSH) 0.9 %
5-40 SYRINGE (ML) INJECTION PRN
Status: CANCELLED | OUTPATIENT
Start: 2024-06-13

## 2024-06-13 RX ORDER — LEVOFLOXACIN 5 MG/ML
500 INJECTION, SOLUTION INTRAVENOUS ONCE
Status: COMPLETED | OUTPATIENT
Start: 2024-06-13 | End: 2024-06-13

## 2024-06-13 RX ORDER — OXYCODONE HYDROCHLORIDE 5 MG/1
10 TABLET ORAL PRN
Status: CANCELLED | OUTPATIENT
Start: 2024-06-13 | End: 2024-06-13

## 2024-06-13 RX ORDER — SODIUM CHLORIDE 9 MG/ML
INJECTION, SOLUTION INTRAVENOUS PRN
Status: DISCONTINUED | OUTPATIENT
Start: 2024-06-13 | End: 2024-06-13 | Stop reason: HOSPADM

## 2024-06-13 RX ORDER — DIPHENHYDRAMINE HYDROCHLORIDE 50 MG/ML
12.5 INJECTION INTRAMUSCULAR; INTRAVENOUS
Status: CANCELLED | OUTPATIENT
Start: 2024-06-13 | End: 2024-06-14

## 2024-06-13 RX ORDER — MEPERIDINE HYDROCHLORIDE 25 MG/ML
12.5 INJECTION INTRAMUSCULAR; INTRAVENOUS; SUBCUTANEOUS EVERY 5 MIN PRN
Status: CANCELLED | OUTPATIENT
Start: 2024-06-13

## 2024-06-13 RX ORDER — OXYCODONE HYDROCHLORIDE 5 MG/1
5 TABLET ORAL PRN
Status: CANCELLED | OUTPATIENT
Start: 2024-06-13 | End: 2024-06-13

## 2024-06-13 RX ORDER — FENTANYL CITRATE 50 UG/ML
INJECTION, SOLUTION INTRAMUSCULAR; INTRAVENOUS PRN
Status: DISCONTINUED | OUTPATIENT
Start: 2024-06-13 | End: 2024-06-13 | Stop reason: SDUPTHER

## 2024-06-13 RX ORDER — SODIUM CHLORIDE, SODIUM LACTATE, POTASSIUM CHLORIDE, CALCIUM CHLORIDE 600; 310; 30; 20 MG/100ML; MG/100ML; MG/100ML; MG/100ML
INJECTION, SOLUTION INTRAVENOUS CONTINUOUS
Status: DISCONTINUED | OUTPATIENT
Start: 2024-06-13 | End: 2024-06-13 | Stop reason: HOSPADM

## 2024-06-13 RX ORDER — SODIUM CHLORIDE 0.9 % (FLUSH) 0.9 %
5-40 SYRINGE (ML) INJECTION PRN
Status: DISCONTINUED | OUTPATIENT
Start: 2024-06-13 | End: 2024-06-13 | Stop reason: HOSPADM

## 2024-06-13 RX ORDER — PROPOFOL 10 MG/ML
INJECTION, EMULSION INTRAVENOUS CONTINUOUS PRN
Status: DISCONTINUED | OUTPATIENT
Start: 2024-06-13 | End: 2024-06-13 | Stop reason: SDUPTHER

## 2024-06-13 RX ORDER — SODIUM CHLORIDE 9 MG/ML
INJECTION, SOLUTION INTRAVENOUS PRN
Status: CANCELLED | OUTPATIENT
Start: 2024-06-13

## 2024-06-13 RX ORDER — LABETALOL HYDROCHLORIDE 5 MG/ML
10 INJECTION, SOLUTION INTRAVENOUS
Status: CANCELLED | OUTPATIENT
Start: 2024-06-13

## 2024-06-13 RX ORDER — LIDOCAINE HYDROCHLORIDE 10 MG/ML
0.3 INJECTION, SOLUTION EPIDURAL; INFILTRATION; INTRACAUDAL; PERINEURAL
Status: DISCONTINUED | OUTPATIENT
Start: 2024-06-13 | End: 2024-06-13 | Stop reason: HOSPADM

## 2024-06-13 RX ORDER — NALOXONE HYDROCHLORIDE 0.4 MG/ML
INJECTION, SOLUTION INTRAMUSCULAR; INTRAVENOUS; SUBCUTANEOUS PRN
Status: CANCELLED | OUTPATIENT
Start: 2024-06-13

## 2024-06-13 RX ORDER — ONDANSETRON 2 MG/ML
4 INJECTION INTRAMUSCULAR; INTRAVENOUS
Status: CANCELLED | OUTPATIENT
Start: 2024-06-13 | End: 2024-06-14

## 2024-06-13 RX ORDER — SODIUM CHLORIDE 0.9 % (FLUSH) 0.9 %
5-40 SYRINGE (ML) INJECTION EVERY 12 HOURS SCHEDULED
Status: CANCELLED | OUTPATIENT
Start: 2024-06-13

## 2024-06-13 RX ADMIN — PROPOFOL 125 MCG/KG/MIN: 10 INJECTION, EMULSION INTRAVENOUS at 08:53

## 2024-06-13 RX ADMIN — FENTANYL CITRATE 25 MCG: 50 INJECTION INTRAMUSCULAR; INTRAVENOUS at 08:58

## 2024-06-13 RX ADMIN — LIDOCAINE HYDROCHLORIDE 100 MG: 20 INJECTION, SOLUTION EPIDURAL; INFILTRATION; INTRACAUDAL; PERINEURAL at 08:53

## 2024-06-13 RX ADMIN — LEVOFLOXACIN 500 MG: 5 INJECTION, SOLUTION INTRAVENOUS at 08:57

## 2024-06-13 RX ADMIN — SODIUM CHLORIDE, POTASSIUM CHLORIDE, SODIUM LACTATE AND CALCIUM CHLORIDE: 600; 310; 30; 20 INJECTION, SOLUTION INTRAVENOUS at 08:53

## 2024-06-13 ASSESSMENT — LIFESTYLE VARIABLES: SMOKING_STATUS: 1

## 2024-06-13 ASSESSMENT — PAIN - FUNCTIONAL ASSESSMENT
PAIN_FUNCTIONAL_ASSESSMENT: 0-10
PAIN_FUNCTIONAL_ASSESSMENT: 0-10

## 2024-06-13 ASSESSMENT — PAIN SCALES - GENERAL: PAINLEVEL_OUTOF10: 0

## 2024-06-13 ASSESSMENT — PAIN DESCRIPTION - DESCRIPTORS: DESCRIPTORS: OTHER (COMMENT)

## 2024-06-13 NOTE — ANESTHESIA POSTPROCEDURE EVALUATION
Department of Anesthesiology  Postprocedure Note    Patient: Shoaib Rothman  MRN: [id number]  YOB: 1960  Date of evaluation: 6/13/2024    Procedure Summary       Date: 06/13/24 Room / Location: [address]    Anesthesia Start: 0853 Anesthesia Stop: 0926    Procedure: RIGID CYSTOSCOPY POSSIBLE BLADDER BIOPSY (Bladder) Diagnosis:       Gross hematuria      (Gross hematuria [R31.0])    Surgeons: Kris Kan MD Responsible Provider: Walt Louis MD    Anesthesia Type: MAC ASA Status: 4            Anesthesia Type: No value filed.    Carole Phase I: Carole Score: 10    Carole Phase II: Carole Score: 9    Anesthesia Post Evaluation    Patient location during evaluation: PACU  Patient participation: complete - patient participated  Level of consciousness: awake and alert  Airway patency: patent  Nausea & Vomiting: no nausea and no vomiting  Cardiovascular status: blood pressure returned to baseline  Respiratory status: acceptable  Hydration status: euvolemic  Comments: VSS on transfer to phase 2 recovery.  No anesthetic complications.  Pain management: adequate    No notable events documented.

## 2024-06-13 NOTE — CONSULTS
normocephalic  Psych: normal mood and affect, alert and appropriately answers questions  : bladder stable, no cai catheter;        Labs:     No results found for: \"PSA\"  No results found for: \"PSA\"  No results for input(s): \"WBC\", \"HGB\", \"HCT\", \"MCV\", \"PLT\" in the last 720 hours.  Lab Results   Component Value Date    LABA1C 7.2 04/16/2022    LABA1C 6.7 04/02/2022    LABA1C 7.4 11/30/2021     Lab Results   Component Value Date    CREATININE 1.2 (H) 04/19/2022     Lab Results   Component Value Date     04/19/2022    K 4.6 04/19/2022     04/19/2022    CO2 29 04/19/2022    BUN 43 (H) 04/19/2022    CREATININE 1.2 (H) 04/19/2022    GLUCOSE 101 (H) 04/19/2022    CALCIUM 9.5 04/19/2022    BILITOT 0.5 04/15/2022    ALKPHOS 138 (H) 04/15/2022    AST 32 04/15/2022    ALT 37 04/15/2022    LABGLOM 46 (A) 04/19/2022    GFRAA 55 (A) 04/19/2022    AGRATIO 1.1 04/15/2022    GLOB 3.5 07/02/2019     Lab Results   Component Value Date    CREATININE 1.2 (H) 04/19/2022    CREATININE 1.4 (H) 04/17/2022    CREATININE 1.9 (H) 04/16/2022       Lab Results   Component Value Date/Time    COLORU Yellow 04/15/2022 01:17 PM    NITRU Negative 04/15/2022 01:17 PM    GLUCOSEU Negative 04/15/2022 01:17 PM    KETUA Negative 04/15/2022 01:17 PM    UROBILINOGEN 0.2 04/15/2022 01:17 PM    BILIRUBINUR Negative 04/15/2022 01:17 PM         Impression:  Hist of papillary tumor low malignant potential  Hist Tobacco abuse  CKD 3 - monitor creatinine trend ; htn control    Plan:  Cysto poss bx    This procedure has been fully reviewed with the patient and written informed consent has been obtained.        Kris Kan MD  Office: [phone] or [phone]

## 2024-06-13 NOTE — ANESTHESIA PRE PROCEDURE
Department of Anesthesiology  Preprocedure Note       Name:  Shoaib Rothman   Age:  63 y.o.  :  1960                                          MRN:  [id number]         Date:  2024      Surgeon: Surgeon(s):  Kris Kan MD    Procedure: Procedure(s):  RIGID CYSTOSCOPY POSSIBLE BLADDER BIOPSY    Medications prior to admission:   Prior to Admission medications    Medication Sig Start Date End Date Taking? Authorizing Provider   carvedilol (COREG) 6.25 MG tablet Take 1 tablet by mouth in the morning and at bedtime 23   Chago Mckeon MD   TRADJENTA 5 MG tablet Take 1 tablet by mouth daily 24   Chago Mckeon MD   rOPINIRole (REQUIP) 0.25 MG tablet Take 1 tablet by mouth 23   Chago Mckeon MD   losartan (COZAAR) 50 MG tablet Take 1 tablet by mouth daily 24   Lm Lockett MD   diphenhydrAMINE (BENADRYL) 12.5 MG chewable tablet Take 1 tablet by mouth 4 times daily as needed for Allergies Every 6 hours as needed    Chago Mckeon MD   melatonin 3 MG TABS tablet Take 1 tablet by mouth daily 2 at night    Chago Mckeon MD   sertraline (ZOLOFT) 50 MG tablet Take 1 tablet by mouth daily    Chago Mckeon MD   mometasone-formoterol (DULERA) 200-5 MCG/ACT inhaler Inhale 2 puffs into the lungs 2 times daily 22   Waleska Calzada MD   nicotine (NICODERM CQ) 21 MG/24HR Place 1 patch onto the skin daily 22   Waleska Calzada MD   ipratropium-albuterol (DUONEB) 0.5-2.5 (3) MG/3ML SOLN nebulizer solution Inhale 3 mLs into the lungs every 6 hours 22   Waleska Calzada MD   loperamide (IMODIUM) 2 MG capsule Take 1 capsule by mouth every 8 hours as needed for Diarrhea    Chago Mckeon MD   pantoprazole (PROTONIX) 40 MG tablet Take 1 tablet by mouth daily    Chago Mckeon MD   traZODone (DESYREL) 50 MG tablet Take 0.5 tablets by mouth nightly    Chago Mckeon MD   acetaminophen (TYLENOL) 500 MG tablet Take 1 tablet

## 2024-06-13 NOTE — PROGRESS NOTES
Called [facility] and spoke with Liliane CACERES regarding gave phone report.Shoaib Rothman. Patient's vital signs stable, a/o x4. Has been able to drink fluids without nausea, ambulate to rest room and void without problem and denies pain.

## 2024-06-13 NOTE — OP NOTE
emptied via the cystoscope sheath and the patient was awakened from anesthesia, extubated and brought to the PACU in stable condition. There were no apparent complications.        Follow up:  -pending bx result  -likely cysto 2 yrs if bx neg

## 2024-07-05 ENCOUNTER — HOSPITAL ENCOUNTER (OUTPATIENT)
Dept: ULTRASOUND IMAGING | Age: 64
Discharge: HOME | End: 2024-07-05
Attending: INTERNAL MEDICINE
Payer: MEDICAID

## 2024-07-05 DIAGNOSIS — N18.30 STAGE 3 CHRONIC KIDNEY DISEASE, UNSPECIFIED WHETHER STAGE 3A OR 3B CKD (HCC): ICD-10-CM

## 2024-07-05 PROCEDURE — 76770 US EXAM ABDO BACK WALL COMP: CPT

## 2024-11-15 ENCOUNTER — TELEPHONE (OUTPATIENT)
Dept: PULMONOLOGY | Age: 64
End: 2024-11-15

## 2024-11-15 DIAGNOSIS — J44.9 CHRONIC OBSTRUCTIVE PULMONARY DISEASE, UNSPECIFIED COPD TYPE (HCC): Primary | ICD-10-CM

## 2024-12-04 ENCOUNTER — APPOINTMENT (OUTPATIENT)
Dept: CT IMAGING | Age: 64
DRG: 137 | End: 2024-12-04
Payer: MEDICAID

## 2024-12-04 ENCOUNTER — APPOINTMENT (OUTPATIENT)
Dept: GENERAL RADIOLOGY | Age: 64
DRG: 137 | End: 2024-12-04
Payer: MEDICAID

## 2024-12-04 ENCOUNTER — HOSPITAL ENCOUNTER (INPATIENT)
Age: 64
LOS: 3 days | Discharge: HOME | DRG: 137 | End: 2024-12-07
Attending: EMERGENCY MEDICINE | Admitting: INTERNAL MEDICINE
Payer: MEDICAID

## 2024-12-04 DIAGNOSIS — N05.1 FSGS (FOCAL SEGMENTAL GLOMERULOSCLEROSIS): ICD-10-CM

## 2024-12-04 DIAGNOSIS — J44.9 CHRONIC OBSTRUCTIVE PULMONARY DISEASE, UNSPECIFIED COPD TYPE (HCC): ICD-10-CM

## 2024-12-04 DIAGNOSIS — N17.9 AKI (ACUTE KIDNEY INJURY) (HCC): ICD-10-CM

## 2024-12-04 DIAGNOSIS — R79.89 ELEVATED BRAIN NATRIURETIC PEPTIDE (BNP) LEVEL: ICD-10-CM

## 2024-12-04 DIAGNOSIS — J96.01 ACUTE HYPOXIC RESPIRATORY FAILURE: Primary | ICD-10-CM

## 2024-12-04 DIAGNOSIS — J96.21 ACUTE ON CHRONIC RESPIRATORY FAILURE WITH HYPOXIA: ICD-10-CM

## 2024-12-04 LAB
ANION GAP SERPL CALCULATED.3IONS-SCNC: 12 MMOL/L (ref 3–16)
BASE EXCESS BLDV CALC-SCNC: 1.1 MMOL/L (ref -2–3)
BASOPHILS # BLD: 0.1 K/UL (ref 0–0.2)
BASOPHILS NFR BLD: 0.7 %
BUN SERPL-MCNC: 39 MG/DL (ref 7–20)
CALCIUM SERPL-MCNC: 9.2 MG/DL (ref 8.3–10.6)
CHLORIDE SERPL-SCNC: 102 MMOL/L (ref 99–110)
CO2 BLDV-SCNC: 29 MMOL/L
CO2 SERPL-SCNC: 23 MMOL/L (ref 21–32)
COHGB MFR BLDV: 3 % (ref 0–1.5)
CREAT SERPL-MCNC: 2 MG/DL (ref 0.6–1.2)
DEPRECATED RDW RBC AUTO: 15.2 % (ref 12.4–15.4)
DO-HGB MFR BLDV: 42.8 %
EOSINOPHIL # BLD: 0.1 K/UL (ref 0–0.6)
EOSINOPHIL NFR BLD: 1.3 %
GFR SERPLBLD CREATININE-BSD FMLA CKD-EPI: 28 ML/MIN/{1.73_M2}
GLUCOSE SERPL-MCNC: 107 MG/DL (ref 70–99)
HCO3 BLDV-SCNC: 27.6 MMOL/L (ref 24–28)
HCT VFR BLD AUTO: 38.5 % (ref 36–48)
HGB BLD-MCNC: 12.5 G/DL (ref 12–16)
LYMPHOCYTES # BLD: 1.7 K/UL (ref 1–5.1)
LYMPHOCYTES NFR BLD: 23.8 %
MCH RBC QN AUTO: 30.5 PG (ref 26–34)
MCHC RBC AUTO-ENTMCNC: 32.4 G/DL (ref 31–36)
MCV RBC AUTO: 94.2 FL (ref 80–100)
METHGB MFR BLDV: 0.2 % (ref 0–1.5)
MONOCYTES # BLD: 0.6 K/UL (ref 0–1.3)
MONOCYTES NFR BLD: 8.3 %
NEUTROPHILS # BLD: 4.8 K/UL (ref 1.7–7.7)
NEUTROPHILS NFR BLD: 65.9 %
NT-PROBNP SERPL-MCNC: 443 PG/ML (ref 0–124)
PCO2 BLDV: 50.9 MMHG (ref 41–51)
PH BLDV: 7.34 [PH] (ref 7.35–7.45)
PLATELET # BLD AUTO: 266 K/UL (ref 135–450)
PMV BLD AUTO: 7.4 FL (ref 5–10.5)
PO2 BLDV: 31 MMHG (ref 25–40)
POTASSIUM SERPL-SCNC: 4.1 MMOL/L (ref 3.5–5.1)
RBC # BLD AUTO: 4.09 M/UL (ref 4–5.2)
SAO2 % BLDV: 56 %
SODIUM SERPL-SCNC: 137 MMOL/L (ref 136–145)
WBC # BLD AUTO: 7.2 K/UL (ref 4–11)

## 2024-12-04 PROCEDURE — 6360000002 HC RX W HCPCS

## 2024-12-04 PROCEDURE — 82803 BLOOD GASES ANY COMBINATION: CPT

## 2024-12-04 PROCEDURE — 96365 THER/PROPH/DIAG IV INF INIT: CPT

## 2024-12-04 PROCEDURE — 94640 AIRWAY INHALATION TREATMENT: CPT

## 2024-12-04 PROCEDURE — 71046 X-RAY EXAM CHEST 2 VIEWS: CPT

## 2024-12-04 PROCEDURE — 94761 N-INVAS EAR/PLS OXIMETRY MLT: CPT

## 2024-12-04 PROCEDURE — 1200000000 HC SEMI PRIVATE

## 2024-12-04 PROCEDURE — 6370000000 HC RX 637 (ALT 250 FOR IP)

## 2024-12-04 PROCEDURE — 2700000000 HC OXYGEN THERAPY PER DAY

## 2024-12-04 PROCEDURE — 71250 CT THORAX DX C-: CPT

## 2024-12-04 PROCEDURE — 85025 COMPLETE CBC W/AUTO DIFF WBC: CPT

## 2024-12-04 PROCEDURE — 80048 BASIC METABOLIC PNL TOTAL CA: CPT

## 2024-12-04 PROCEDURE — 83880 ASSAY OF NATRIURETIC PEPTIDE: CPT

## 2024-12-04 PROCEDURE — 99285 EMERGENCY DEPT VISIT HI MDM: CPT

## 2024-12-04 PROCEDURE — 93005 ELECTROCARDIOGRAM TRACING: CPT

## 2024-12-04 RX ORDER — IPRATROPIUM BROMIDE AND ALBUTEROL SULFATE 2.5; .5 MG/3ML; MG/3ML
1 SOLUTION RESPIRATORY (INHALATION)
Status: DISCONTINUED | OUTPATIENT
Start: 2024-12-05 | End: 2024-12-06

## 2024-12-04 RX ORDER — LEVOFLOXACIN 5 MG/ML
750 INJECTION, SOLUTION INTRAVENOUS ONCE
Status: COMPLETED | OUTPATIENT
Start: 2024-12-04 | End: 2024-12-05

## 2024-12-04 RX ORDER — IPRATROPIUM BROMIDE AND ALBUTEROL SULFATE 2.5; .5 MG/3ML; MG/3ML
SOLUTION RESPIRATORY (INHALATION)
Status: COMPLETED
Start: 2024-12-04 | End: 2024-12-04

## 2024-12-04 RX ADMIN — IPRATROPIUM BROMIDE AND ALBUTEROL SULFATE 1 DOSE: 2.5; .5 SOLUTION RESPIRATORY (INHALATION) at 21:11

## 2024-12-04 RX ADMIN — LEVOFLOXACIN 750 MG: 750 INJECTION, SOLUTION INTRAVENOUS at 22:54

## 2024-12-04 ASSESSMENT — LIFESTYLE VARIABLES
HOW MANY STANDARD DRINKS CONTAINING ALCOHOL DO YOU HAVE ON A TYPICAL DAY: PATIENT DOES NOT DRINK
HOW OFTEN DO YOU HAVE A DRINK CONTAINING ALCOHOL: NEVER

## 2024-12-04 NOTE — ED TRIAGE NOTES
Pt co SOB and cough for about a month. Pt has had 2 round of doxycycline and a steroid pack without relief. Pt reports negative covid test. Pt has tried breathing tx as well. Pt on 4L NC currently and o2 sat 89-91%. Pt hx COPD. Pt typically only wears 3.5L oxygen at night or a cpap but has artis wearing oxygen all the time since symptoms started.
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
## 2024-12-05 LAB
ALBUMIN SERPL-MCNC: 3.6 G/DL (ref 3.4–5)
ANION GAP SERPL CALCULATED.3IONS-SCNC: 11 MMOL/L (ref 3–16)
BACTERIA URNS QL MICRO: ABNORMAL /HPF
BASOPHILS # BLD: 0.1 K/UL (ref 0–0.2)
BASOPHILS NFR BLD: 0.8 %
BILIRUB UR QL STRIP.AUTO: NEGATIVE
BUN SERPL-MCNC: 36 MG/DL (ref 7–20)
CALCIUM SERPL-MCNC: 9.2 MG/DL (ref 8.3–10.6)
CHLORIDE SERPL-SCNC: 104 MMOL/L (ref 99–110)
CLARITY UR: CLEAR
CO2 SERPL-SCNC: 25 MMOL/L (ref 21–32)
COLOR UR: YELLOW
CREAT SERPL-MCNC: 2 MG/DL (ref 0.6–1.2)
DEPRECATED RDW RBC AUTO: 15.1 % (ref 12.4–15.4)
EKG ATRIAL RATE: 86 BPM
EKG DIAGNOSIS: NORMAL
EKG P AXIS: 76 DEGREES
EKG P-R INTERVAL: 170 MS
EKG Q-T INTERVAL: 400 MS
EKG QRS DURATION: 98 MS
EKG QTC CALCULATION (BAZETT): 478 MS
EKG R AXIS: 59 DEGREES
EKG T AXIS: 68 DEGREES
EKG VENTRICULAR RATE: 86 BPM
EOSINOPHIL # BLD: 0.1 K/UL (ref 0–0.6)
EOSINOPHIL NFR BLD: 1.2 %
EPI CELLS #/AREA URNS HPF: ABNORMAL /HPF (ref 0–5)
GFR SERPLBLD CREATININE-BSD FMLA CKD-EPI: 28 ML/MIN/{1.73_M2}
GLUCOSE BLD-MCNC: 102 MG/DL (ref 70–99)
GLUCOSE BLD-MCNC: 131 MG/DL (ref 70–99)
GLUCOSE BLD-MCNC: 192 MG/DL (ref 70–99)
GLUCOSE BLD-MCNC: 95 MG/DL (ref 70–99)
GLUCOSE BLD-MCNC: 99 MG/DL (ref 70–99)
GLUCOSE SERPL-MCNC: 119 MG/DL (ref 70–99)
GLUCOSE UR STRIP.AUTO-MCNC: NEGATIVE MG/DL
HCT VFR BLD AUTO: 37.3 % (ref 36–48)
HGB BLD-MCNC: 12 G/DL (ref 12–16)
HGB UR QL STRIP.AUTO: ABNORMAL
KETONES UR STRIP.AUTO-MCNC: NEGATIVE MG/DL
LEUKOCYTE ESTERASE UR QL STRIP.AUTO: ABNORMAL
LYMPHOCYTES # BLD: 1.6 K/UL (ref 1–5.1)
LYMPHOCYTES NFR BLD: 21.8 %
MAGNESIUM SERPL-MCNC: 1.51 MG/DL (ref 1.8–2.4)
MCH RBC QN AUTO: 30.1 PG (ref 26–34)
MCHC RBC AUTO-ENTMCNC: 32.2 G/DL (ref 31–36)
MCV RBC AUTO: 93.6 FL (ref 80–100)
MONOCYTES # BLD: 0.4 K/UL (ref 0–1.3)
MONOCYTES NFR BLD: 5.9 %
NEUTROPHILS # BLD: 5.1 K/UL (ref 1.7–7.7)
NEUTROPHILS NFR BLD: 70.3 %
NITRITE UR QL STRIP.AUTO: NEGATIVE
PERFORMED ON: ABNORMAL
PERFORMED ON: NORMAL
PERFORMED ON: NORMAL
PH UR STRIP.AUTO: 6.5 [PH] (ref 5–8)
PHOSPHATE SERPL-MCNC: 3.3 MG/DL (ref 2.5–4.9)
PLATELET # BLD AUTO: 261 K/UL (ref 135–450)
PMV BLD AUTO: 7.7 FL (ref 5–10.5)
POTASSIUM SERPL-SCNC: 4.3 MMOL/L (ref 3.5–5.1)
PROCALCITONIN SERPL IA-MCNC: 0.1 NG/ML (ref 0–0.15)
PROT UR STRIP.AUTO-MCNC: >=300 MG/DL
RBC # BLD AUTO: 3.98 M/UL (ref 4–5.2)
RBC #/AREA URNS HPF: ABNORMAL /HPF (ref 0–4)
SODIUM SERPL-SCNC: 140 MMOL/L (ref 136–145)
SP GR UR STRIP.AUTO: 1.02 (ref 1–1.03)
UA COMPLETE W REFLEX CULTURE PNL UR: YES
UA DIPSTICK W REFLEX MICRO PNL UR: YES
URN SPEC COLLECT METH UR: ABNORMAL
UROBILINOGEN UR STRIP-ACNC: 0.2 E.U./DL
WBC # BLD AUTO: 7.3 K/UL (ref 4–11)
WBC #/AREA URNS HPF: ABNORMAL /HPF (ref 0–5)

## 2024-12-05 PROCEDURE — 6370000000 HC RX 637 (ALT 250 FOR IP)

## 2024-12-05 PROCEDURE — 84145 PROCALCITONIN (PCT): CPT

## 2024-12-05 PROCEDURE — 87088 URINE BACTERIA CULTURE: CPT

## 2024-12-05 PROCEDURE — 80069 RENAL FUNCTION PANEL: CPT

## 2024-12-05 PROCEDURE — 83735 ASSAY OF MAGNESIUM: CPT

## 2024-12-05 PROCEDURE — 0202U NFCT DS 22 TRGT SARS-COV-2: CPT

## 2024-12-05 PROCEDURE — 87186 SC STD MICRODIL/AGAR DIL: CPT

## 2024-12-05 PROCEDURE — 87205 SMEAR GRAM STAIN: CPT

## 2024-12-05 PROCEDURE — 81001 URINALYSIS AUTO W/SCOPE: CPT

## 2024-12-05 PROCEDURE — 94640 AIRWAY INHALATION TREATMENT: CPT

## 2024-12-05 PROCEDURE — 87070 CULTURE OTHR SPECIMN AEROBIC: CPT

## 2024-12-05 PROCEDURE — 2580000003 HC RX 258

## 2024-12-05 PROCEDURE — 85025 COMPLETE CBC W/AUTO DIFF WBC: CPT

## 2024-12-05 PROCEDURE — 87449 NOS EACH ORGANISM AG IA: CPT

## 2024-12-05 PROCEDURE — 94761 N-INVAS EAR/PLS OXIMETRY MLT: CPT

## 2024-12-05 PROCEDURE — 1200000000 HC SEMI PRIVATE

## 2024-12-05 PROCEDURE — 87633 RESP VIRUS 12-25 TARGETS: CPT

## 2024-12-05 PROCEDURE — 87086 URINE CULTURE/COLONY COUNT: CPT

## 2024-12-05 PROCEDURE — 2700000000 HC OXYGEN THERAPY PER DAY

## 2024-12-05 PROCEDURE — 94660 CPAP INITIATION&MGMT: CPT

## 2024-12-05 PROCEDURE — 99223 1ST HOSP IP/OBS HIGH 75: CPT | Performed by: INTERNAL MEDICINE

## 2024-12-05 PROCEDURE — 5A09357 ASSISTANCE WITH RESPIRATORY VENTILATION, LESS THAN 24 CONSECUTIVE HOURS, CONTINUOUS POSITIVE AIRWAY PRESSURE: ICD-10-PCS

## 2024-12-05 RX ORDER — TRAZODONE HYDROCHLORIDE 50 MG/1
25 TABLET, FILM COATED ORAL NIGHTLY
Status: DISCONTINUED | OUTPATIENT
Start: 2024-12-05 | End: 2024-12-07 | Stop reason: HOSPADM

## 2024-12-05 RX ORDER — LEVOFLOXACIN 5 MG/ML
750 INJECTION, SOLUTION INTRAVENOUS
Status: DISCONTINUED | OUTPATIENT
Start: 2024-12-06 | End: 2024-12-07 | Stop reason: HOSPADM

## 2024-12-05 RX ORDER — TRAZODONE HYDROCHLORIDE 50 MG/1
25 TABLET, FILM COATED ORAL ONCE
Status: COMPLETED | OUTPATIENT
Start: 2024-12-05 | End: 2024-12-05

## 2024-12-05 RX ORDER — CARVEDILOL 6.25 MG/1
6.25 TABLET ORAL 2 TIMES DAILY
Status: DISCONTINUED | OUTPATIENT
Start: 2024-12-05 | End: 2024-12-07 | Stop reason: HOSPADM

## 2024-12-05 RX ORDER — ONDANSETRON 4 MG/1
4 TABLET, ORALLY DISINTEGRATING ORAL EVERY 8 HOURS PRN
Status: DISCONTINUED | OUTPATIENT
Start: 2024-12-05 | End: 2024-12-07 | Stop reason: HOSPADM

## 2024-12-05 RX ORDER — ACETAMINOPHEN 650 MG/1
650 SUPPOSITORY RECTAL EVERY 6 HOURS PRN
Status: DISCONTINUED | OUTPATIENT
Start: 2024-12-05 | End: 2024-12-07 | Stop reason: HOSPADM

## 2024-12-05 RX ORDER — HYDROXYZINE HYDROCHLORIDE 25 MG/1
25 TABLET, FILM COATED ORAL 2 TIMES DAILY
Status: DISCONTINUED | OUTPATIENT
Start: 2024-12-05 | End: 2024-12-07 | Stop reason: HOSPADM

## 2024-12-05 RX ORDER — SODIUM CHLORIDE 9 MG/ML
INJECTION, SOLUTION INTRAVENOUS PRN
Status: DISCONTINUED | OUTPATIENT
Start: 2024-12-05 | End: 2024-12-07 | Stop reason: HOSPADM

## 2024-12-05 RX ORDER — NICOTINE 21 MG/24HR
1 PATCH, TRANSDERMAL 24 HOURS TRANSDERMAL DAILY
Status: DISCONTINUED | OUTPATIENT
Start: 2024-12-05 | End: 2024-12-05

## 2024-12-05 RX ORDER — GLUCAGON 1 MG/ML
1 KIT INJECTION PRN
Status: DISCONTINUED | OUTPATIENT
Start: 2024-12-05 | End: 2024-12-07 | Stop reason: HOSPADM

## 2024-12-05 RX ORDER — POLYETHYLENE GLYCOL 3350 17 G/17G
17 POWDER, FOR SOLUTION ORAL DAILY PRN
Status: DISCONTINUED | OUTPATIENT
Start: 2024-12-05 | End: 2024-12-07 | Stop reason: HOSPADM

## 2024-12-05 RX ORDER — SODIUM CHLORIDE 0.9 % (FLUSH) 0.9 %
5-40 SYRINGE (ML) INJECTION EVERY 12 HOURS SCHEDULED
Status: DISCONTINUED | OUTPATIENT
Start: 2024-12-05 | End: 2024-12-07 | Stop reason: HOSPADM

## 2024-12-05 RX ORDER — DIPHENHYDRAMINE HCL 25 MG
25 TABLET ORAL ONCE
Status: COMPLETED | OUTPATIENT
Start: 2024-12-05 | End: 2024-12-05

## 2024-12-05 RX ORDER — AMLODIPINE BESYLATE 10 MG/1
10 TABLET ORAL DAILY
Status: DISCONTINUED | OUTPATIENT
Start: 2024-12-05 | End: 2024-12-07 | Stop reason: HOSPADM

## 2024-12-05 RX ORDER — ONDANSETRON 2 MG/ML
4 INJECTION INTRAMUSCULAR; INTRAVENOUS EVERY 6 HOURS PRN
Status: DISCONTINUED | OUTPATIENT
Start: 2024-12-05 | End: 2024-12-07 | Stop reason: HOSPADM

## 2024-12-05 RX ORDER — GLUCAGON 1 MG/ML
1 KIT INJECTION PRN
Status: DISCONTINUED | OUTPATIENT
Start: 2024-12-05 | End: 2024-12-05

## 2024-12-05 RX ORDER — PREDNISONE 20 MG/1
40 TABLET ORAL DAILY
Status: DISCONTINUED | OUTPATIENT
Start: 2024-12-05 | End: 2024-12-07 | Stop reason: HOSPADM

## 2024-12-05 RX ORDER — LABETALOL HYDROCHLORIDE 5 MG/ML
10 INJECTION, SOLUTION INTRAVENOUS
Status: ACTIVE | OUTPATIENT
Start: 2024-12-05 | End: 2024-12-06

## 2024-12-05 RX ORDER — ROPINIROLE 0.25 MG/1
0.25 TABLET, FILM COATED ORAL ONCE
Status: COMPLETED | OUTPATIENT
Start: 2024-12-05 | End: 2024-12-05

## 2024-12-05 RX ORDER — SODIUM BICARBONATE 650 MG/1
650 TABLET ORAL 2 TIMES DAILY
Status: DISCONTINUED | OUTPATIENT
Start: 2024-12-05 | End: 2024-12-07 | Stop reason: HOSPADM

## 2024-12-05 RX ORDER — HYDROXYZINE HYDROCHLORIDE 25 MG/1
25 TABLET, FILM COATED ORAL ONCE
Status: DISCONTINUED | OUTPATIENT
Start: 2024-12-05 | End: 2024-12-05

## 2024-12-05 RX ORDER — TEMAZEPAM 15 MG/1
15 CAPSULE ORAL NIGHTLY
Status: DISCONTINUED | OUTPATIENT
Start: 2024-12-05 | End: 2024-12-07 | Stop reason: HOSPADM

## 2024-12-05 RX ORDER — DEXTROSE MONOHYDRATE 100 MG/ML
INJECTION, SOLUTION INTRAVENOUS CONTINUOUS PRN
Status: DISCONTINUED | OUTPATIENT
Start: 2024-12-05 | End: 2024-12-07 | Stop reason: HOSPADM

## 2024-12-05 RX ORDER — DEXTROSE MONOHYDRATE 100 MG/ML
INJECTION, SOLUTION INTRAVENOUS CONTINUOUS PRN
Status: DISCONTINUED | OUTPATIENT
Start: 2024-12-05 | End: 2024-12-05 | Stop reason: SDUPTHER

## 2024-12-05 RX ORDER — LETROZOLE 2.5 MG/1
2.5 TABLET, FILM COATED ORAL DAILY
Status: DISCONTINUED | OUTPATIENT
Start: 2024-12-05 | End: 2024-12-07 | Stop reason: HOSPADM

## 2024-12-05 RX ORDER — LOSARTAN POTASSIUM 50 MG/1
50 TABLET ORAL DAILY
Status: DISCONTINUED | OUTPATIENT
Start: 2024-12-05 | End: 2024-12-05

## 2024-12-05 RX ORDER — TEMAZEPAM 15 MG/1
15 CAPSULE ORAL ONCE
Status: COMPLETED | OUTPATIENT
Start: 2024-12-05 | End: 2024-12-05

## 2024-12-05 RX ORDER — ACETAMINOPHEN 325 MG/1
650 TABLET ORAL EVERY 6 HOURS PRN
Status: DISCONTINUED | OUTPATIENT
Start: 2024-12-05 | End: 2024-12-07 | Stop reason: HOSPADM

## 2024-12-05 RX ORDER — MINERAL OIL/HYDROPHIL PETROLAT
OINTMENT (GRAM) TOPICAL EVERY 6 HOURS PRN
COMMUNITY

## 2024-12-05 RX ORDER — INSULIN LISPRO 100 [IU]/ML
0-4 INJECTION, SOLUTION INTRAVENOUS; SUBCUTANEOUS
Status: DISCONTINUED | OUTPATIENT
Start: 2024-12-05 | End: 2024-12-07 | Stop reason: HOSPADM

## 2024-12-05 RX ORDER — SODIUM CHLORIDE 0.9 % (FLUSH) 0.9 %
5-40 SYRINGE (ML) INJECTION PRN
Status: DISCONTINUED | OUTPATIENT
Start: 2024-12-05 | End: 2024-12-07 | Stop reason: HOSPADM

## 2024-12-05 RX ORDER — ROPINIROLE 0.25 MG/1
0.25 TABLET, FILM COATED ORAL NIGHTLY
Status: DISCONTINUED | OUTPATIENT
Start: 2024-12-05 | End: 2024-12-07 | Stop reason: HOSPADM

## 2024-12-05 RX ORDER — NICOTINE 21 MG/24HR
1 PATCH, TRANSDERMAL 24 HOURS TRANSDERMAL DAILY
Status: DISCONTINUED | OUTPATIENT
Start: 2024-12-05 | End: 2024-12-07 | Stop reason: HOSPADM

## 2024-12-05 RX ORDER — PANTOPRAZOLE SODIUM 40 MG/1
40 TABLET, DELAYED RELEASE ORAL DAILY
Status: DISCONTINUED | OUTPATIENT
Start: 2024-12-05 | End: 2024-12-05

## 2024-12-05 RX ADMIN — SODIUM BICARBONATE 650 MG: 650 TABLET ORAL at 20:34

## 2024-12-05 RX ADMIN — Medication 6 MG: at 20:33

## 2024-12-05 RX ADMIN — SODIUM CHLORIDE, PRESERVATIVE FREE 10 ML: 5 INJECTION INTRAVENOUS at 20:33

## 2024-12-05 RX ADMIN — IPRATROPIUM BROMIDE AND ALBUTEROL SULFATE 1 DOSE: 2.5; .5 SOLUTION RESPIRATORY (INHALATION) at 21:25

## 2024-12-05 RX ADMIN — PREDNISONE 40 MG: 20 TABLET ORAL at 15:49

## 2024-12-05 RX ADMIN — CARVEDILOL 6.25 MG: 6.25 TABLET, FILM COATED ORAL at 20:35

## 2024-12-05 RX ADMIN — LETROZOLE 2.5 MG: 2.5 TABLET ORAL at 09:00

## 2024-12-05 RX ADMIN — AMLODIPINE BESYLATE 10 MG: 10 TABLET ORAL at 09:00

## 2024-12-05 RX ADMIN — INSULIN LISPRO 1 UNITS: 100 INJECTION, SOLUTION INTRAVENOUS; SUBCUTANEOUS at 20:32

## 2024-12-05 RX ADMIN — IPRATROPIUM BROMIDE AND ALBUTEROL SULFATE 1 DOSE: 2.5; .5 SOLUTION RESPIRATORY (INHALATION) at 11:22

## 2024-12-05 RX ADMIN — TEMAZEPAM 15 MG: 15 CAPSULE ORAL at 20:35

## 2024-12-05 RX ADMIN — ROPINIROLE HYDROCHLORIDE 0.25 MG: 0.25 TABLET, FILM COATED ORAL at 20:34

## 2024-12-05 RX ADMIN — SODIUM BICARBONATE 650 MG: 650 TABLET ORAL at 08:59

## 2024-12-05 RX ADMIN — TRAZODONE HYDROCHLORIDE 25 MG: 50 TABLET ORAL at 20:33

## 2024-12-05 RX ADMIN — Medication 6 MG: at 03:58

## 2024-12-05 RX ADMIN — HYDROXYZINE HYDROCHLORIDE 25 MG: 25 TABLET ORAL at 20:34

## 2024-12-05 RX ADMIN — APIXABAN 2.5 MG: 2.5 TABLET, FILM COATED ORAL at 20:34

## 2024-12-05 RX ADMIN — HYDROXYZINE HYDROCHLORIDE 25 MG: 25 TABLET ORAL at 09:00

## 2024-12-05 RX ADMIN — APIXABAN 2.5 MG: 2.5 TABLET, FILM COATED ORAL at 09:00

## 2024-12-05 RX ADMIN — IPRATROPIUM BROMIDE AND ALBUTEROL SULFATE 1 DOSE: 2.5; .5 SOLUTION RESPIRATORY (INHALATION) at 14:52

## 2024-12-05 RX ADMIN — CARVEDILOL 6.25 MG: 6.25 TABLET, FILM COATED ORAL at 09:00

## 2024-12-05 RX ADMIN — SERTRALINE 50 MG: 50 TABLET, FILM COATED ORAL at 08:59

## 2024-12-05 RX ADMIN — TRAZODONE HYDROCHLORIDE 25 MG: 50 TABLET ORAL at 03:59

## 2024-12-05 RX ADMIN — SODIUM CHLORIDE, PRESERVATIVE FREE 10 ML: 5 INJECTION INTRAVENOUS at 09:00

## 2024-12-05 RX ADMIN — ACETAMINOPHEN 650 MG: 325 TABLET ORAL at 17:40

## 2024-12-05 RX ADMIN — TEMAZEPAM 15 MG: 15 CAPSULE ORAL at 03:58

## 2024-12-05 RX ADMIN — ROPINIROLE HYDROCHLORIDE 0.25 MG: 0.25 TABLET, FILM COATED ORAL at 03:58

## 2024-12-05 RX ADMIN — DIPHENHYDRAMINE HYDROCHLORIDE 25 MG: 25 TABLET ORAL at 04:08

## 2024-12-05 RX ADMIN — IPRATROPIUM BROMIDE AND ALBUTEROL SULFATE 1 DOSE: 2.5; .5 SOLUTION RESPIRATORY (INHALATION) at 08:11

## 2024-12-05 ASSESSMENT — ENCOUNTER SYMPTOMS
VOMITING: 0
NAUSEA: 0
ABDOMINAL DISTENTION: 0
COUGH: 1
DIARRHEA: 0
ABDOMINAL PAIN: 0
CONSTIPATION: 0
SHORTNESS OF BREATH: 1

## 2024-12-05 ASSESSMENT — PAIN DESCRIPTION - LOCATION: LOCATION: KNEE

## 2024-12-05 ASSESSMENT — LIFESTYLE VARIABLES: HOW OFTEN DO YOU HAVE A DRINK CONTAINING ALCOHOL: NEVER

## 2024-12-05 ASSESSMENT — PAIN DESCRIPTION - DESCRIPTORS: DESCRIPTORS: ACHING

## 2024-12-05 ASSESSMENT — PAIN DESCRIPTION - ORIENTATION: ORIENTATION: LEFT;RIGHT

## 2024-12-05 NOTE — PLAN OF CARE
D: arrived from ED per W/c to 6318 around 0220. VENTURA on 4lpnc. Stated uses o2 @ 3.5l PRN and HS with cpap, but has needed it ATC. Also, was tx 2x's with PO antibiotics and steroids, but hasn't helped. Stated smokes 12-15 cigarettes per day and was requesting Nicotine patch which was ordered and placed. Very anxious about receiving sleeping medications which MD was notified, meds were ordered and given see MAR. Called ECF to get med listed faxed, but paperwork that was faxed didn't include medications. Clinical pharmacist, Lulu, was notified. Was able to tolerate using cpap with 3.5l bleed for few hrs, but didn't get much rest d/t Lab and dietary  waking her up. Refused am labs requesting PAC to be accessed for blood draws.   A: Cont to monitor during hourly rounds    Problem: Chronic Conditions and Co-morbidities  Goal: Patient's chronic conditions and co-morbidity symptoms are monitored and maintained or improved  Outcome: Progressing

## 2024-12-05 NOTE — PLAN OF CARE
[facility] Hospitalist brief note  Consult received.  Case reviewed with ER physician-pneumonia    Full note to follow.    Lamar Mondragon MD    Thanks  IKE TOWNSEND MD

## 2024-12-05 NOTE — PLAN OF CARE
Problem: Chronic Conditions and Co-morbidities  Goal: Patient's chronic conditions and co-morbidity symptoms are monitored and maintained or improved  12/5/2024 1851 by Deana Kumar RN  Outcome: Progressing  Flowsheets (Taken 12/5/2024 1851)  Care Plan - Patient's Chronic Conditions and Co-Morbidity Symptoms are Monitored and Maintained or Improved:   Monitor and assess patient's chronic conditions and comorbid symptoms for stability, deterioration, or improvement   Collaborate with multidisciplinary team to address chronic and comorbid conditions and prevent exacerbation or deterioration   Update acute care plan with appropriate goals if chronic or comorbid symptoms are exacerbated and prevent overall improvement and discharge     Problem: Discharge Planning  Goal: Discharge to home or other facility with appropriate resources  Outcome: Progressing  Flowsheets (Taken 12/5/2024 1851)  Discharge to home or other facility with appropriate resources:   Identify barriers to discharge with patient and caregiver   Refer to discharge planning if patient needs post-hospital services based on physician order or complex needs related to functional status, cognitive ability or social support system   Arrange for needed discharge resources and transportation as appropriate   Identify discharge learning needs (meds, wound care, etc)

## 2024-12-05 NOTE — PROGRESS NOTES
Internal Medicine Progress Note    Patient Name: Shoaib Rothman   Patient : 1960   Date: 2024   Admit Date: 2024     CC: Cold Symptoms (Arrived via EMS complaint of cold symptoms x 3 days, COPD, on 3L @ home. )       Interval History     No acute events overnight, VSS. Patient is saturating well on 4 L NC. Denies worsening chest pain, shortness of breath, cough or sputum production. Reports feeling better compared to admission. States she would like to be evaluated by pulmonology as she has had symptoms for the last month and has not gotten better despite two rounds of doxycyline.     ROS   Review of Systems   Respiratory:  Positive for cough and shortness of breath.    Cardiovascular:  Negative for chest pain.      Objective     I/Os:  I/O last 3 completed shifts:  In: 390 [P.O.:240; I.V.:150]  Out: 100 [Urine:100]      Vital Signs:  Patient Vitals for the past 8 hrs:   BP Temp Temp src Pulse Resp SpO2   24 1122 -- -- -- -- 18 94 %   24 0814 -- -- -- -- 14 96 %   24 0757 122/83 98.4 °F (36.9 °C) Oral 85 22 92 %       Physical Exam:  Physical Exam  Constitutional:       Appearance: Normal appearance.   HENT:      Head: Normocephalic and atraumatic.      Mouth/Throat:      Mouth: Mucous membranes are moist.   Eyes:      Extraocular Movements: Extraocular movements intact.      Pupils: Pupils are equal, round, and reactive to light.   Cardiovascular:      Rate and Rhythm: Normal rate and regular rhythm.   Pulmonary:      Effort: Pulmonary effort is normal.      Breath sounds: Wheezing present.      Comments: On 4 L NC  Abdominal:      General: Abdomen is flat.      Palpations: Abdomen is soft.   Musculoskeletal:      Right lower leg: No edema.      Left lower leg: No edema.   Skin:     General: Skin is warm.      Capillary Refill: Capillary refill takes less than 2 seconds.   Neurological:      Mental Status: She is alert and oriented to person, place, and time.  Resident  Contact via [employer]

## 2024-12-05 NOTE — ED PROVIDER NOTES
ED Attending Attestation Note     Date of evaluation: 12/4/2024    This patient was seen by the resident.  I have seen and examined the patient, agree with the workup, evaluation, management and diagnosis. The care plan has been discussed.  I have reviewed the ECG and concur with the resident's interpretation.  My assessment reveals patient with COPD here with cough, concern for PNA despite recently completing 2 courses of abx.  Tachypneic on exam with wheezing.  Has CHANTELLE and RLL consolidation.  Will cover with abx and admit for HCAP.     Miguel Ambrosio MD  12/04/24 2122

## 2024-12-05 NOTE — ED NOTES
RT called nurse to room to say patient has been coughing up pink frothy suputum. MD notified     Evelyn Mejía, RN  12/04/24 2118

## 2024-12-05 NOTE — RT PROTOCOL NOTE
RT Nebulizer Bronchodilator Protocol Note    There is a bronchodilator order in the chart from a provider indicating to follow the RT Bronchodilator Protocol and there is an “Initiate RT Bronchodilator Protocol” order as well (see protocol at bottom of note).    CXR Findings:  No results found.    The findings from the last RT Protocol Assessment were as follows:  Smoking: Chronic pulmonary disease  Respiratory Pattern: Regular pattern and RR 12-20 bpm  Breath Sounds: Severe inspiratory and expiratory wheezing or severely diminished  Cough: Strong, spontaneous, non-productive  Indication for Bronchodilator Therapy:    Bronchodilator Assessment Score: 10  Pt benefits from Q4WA tx  Aerosolized bronchodilator medication orders have been revised according to the RT Nebulizer Bronchodilator Protocol below.    Respiratory Therapist to perform RT Therapy Protocol Assessment initially then follow the protocol.  Repeat RT Therapy Protocol Assessment PRN for score 0-3 or on second treatment, BID, and PRN for scores above 3.    No Indications - adjust the frequency to every 6 hours PRN wheezing or bronchospasm, if no treatments needed after 48 hours then discontinue using Per Protocol order mode.     If indication present, adjust the RT bronchodilator orders based on the Bronchodilator Assessment Score as indicated below.  If a patient is on this medication at home then do not decrease Frequency below that used at home.    0-3 - enter or revise RT bronchodilator order(s) to equivalent RT Bronchodilator order with Frequency of every 4 hours PRN for wheezing or increased work of breathing using Per Protocol order mode.       4-6 - enter or revise RT Bronchodilator order(s) to two equivalent RT bronchodilator orders with one order with BID Frequency and one order with Frequency of every 4 hours PRN wheezing or increased work of breathing using Per Protocol order mode.         7-10 - enter or revise RT Bronchodilator order(s) to  two equivalent RT bronchodilator orders with one order with TID Frequency and one order with Frequency of every 4 hours PRN wheezing or increased work of breathing using Per Protocol order mode.       11-13 - enter or revise RT Bronchodilator order(s) to one equivalent RT bronchodilator order with QID Frequency and an Albuterol order with Frequency of every 4 hours PRN wheezing or increased work of breathing using Per Protocol order mode.      Greater than 13 - enter or revise RT Bronchodilator order(s) to one equivalent RT bronchodilator order with every 4 hours Frequency and an Albuterol order with Frequency of every 2 hours PRN wheezing or increased work of breathing using Per Protocol order mode.     RT to enter RT Home Evaluation for COPD & MDI Assessment order using Per Protocol order mode.    Electronically signed by Fuad Stubbs RCP on 12/5/2024 at 8:17 AM

## 2024-12-05 NOTE — ED PROVIDER NOTES
[facility]  EMERGENCY DEPARTMENT ENCOUNTER          EM RESIDENT NOTE     Date of evaluation: 12/4/2024    Chief Complaint     Cold Symptoms (Arrived via EMS complaint of cold symptoms x 3 days, COPD, on 3L @ home. )    History of Present Illness     Shoaib Rothman is a 64 y.o. female with a history of HFpEF, COPD who presents for shortness of breath.   Lives in SNF. Reports dyspnea for approximately 1 month. Feels like she has had pneumonia x (nausea, cold chills, poor appetite, labored breathing) spoke to her facility doctor and has been prescribed doxycycle and medrol x2 without improvement in her symptoms (first one month ago, last 1.5-2 weeks). Has had two cxr and was told had RLL pna.Throughout this time has been doing breathing treatments. Sputum has been thick and pink, not like mucous, for approximately 1-1/2 to 2 weeks.. That type of sputum x1 week. Normally only wears o2 at night. Has had significant exertional dyspnea, has been wearing oxygen with exertion. Put on during the day now. Has had subjective fevers. Has had some swelling in legs but no more than usual for her.    One kidney     Last echo 11/2021   Summary   Normal left ventricle size. There is mild concentric left ventricular   hypertrophy. Overall left ventricular systolic function appears normal with   an ejection fraction of 60-65%. No regional wall motion abnormalities are   noted. Diastolic filling parameters suggest grade II diastolic dysfunction.   Moderate tricuspid regurgitation.   Estimated pulmonary artery systolic pressure is 62 mmHg assuming a right   atrial pressure of 8 mmHg.   The right atrium is dilated.     MEDICAL DECISION MAKING / ASSESSMENT / PLAN     INITIAL VITALS: BP: (!) 157/107, Temp: 98 °F (36.7 °C), Pulse: 93, Respirations: 22, SpO2: 91 %    Shoaib Rothman is a 64 y.o. female with history as noted above who presents for 1 month of dyspnea.  Appeared comfortable, conversational, and was in no acute  daily      TRADJENTA 5 MG tablet Take 1 tablet by mouth daily      nicotine (NICODERM CQ) 21 MG/24HR Place 1 patch onto the skin daily  Qty: 30 patch, Refills: 3      loperamide (IMODIUM) 2 MG capsule Take 1 capsule by mouth every 8 hours as needed for Diarrhea      ondansetron (ZOFRAN) 4 MG tablet Take 1 tablet by mouth every 6 hours as needed for Nausea or Vomiting           Allergies     She is allergic to pcn [penicillins], cefepime, and hydralazine.    Physical Exam     INITIAL VITALS: BP: (!) 157/107, Temp: 98 °F (36.7 °C), Pulse: 93, Respirations: 22, SpO2: 91 %     General: Well appearing. Found sitting up in bed, resting comfortably, answering questions appropriately and in complete sentences.   Eyes: Pupils reactive. EOMI.   ENT: Tolerating oral secretions. Moist mucous membranes.  Pulmonary: Tachypnea with speaking.  Good aeration to the bases with normal JOSE G ratio with scattered expiratory wheeze  Cardiac: Regular rate and rhythm. Strong radial pulses.   Abdomen: Soft. Non-distended. Non-tender.   Extremities: 1+ symmetric pitting edema in BLE, normal muscle bulk and tone.   Skin: Warm, dry, no rash on exposed areas.  Neuro: Alert and oriented x4. Speech clear and fluent. Moving all four extremities equally.         Etta Navarro MD  Resident  12/05/24 7011

## 2024-12-05 NOTE — CONSULTS
[facility]   PULMONARY AND CRITICAL CARE MEDICINE    PULMONARY CONSULT NOTE      Reason for Consult: \"Pt with right lung opacity and pna sx for a month, breast cancer hx and still smoking\"   Requesting Physician: Dr Vanessa Russo    SUBJECTIVE:     CHIEF COMPLAINT / HPI:    The patient is a 64 y.o. female with significant past medical history of breast cancer s/p mastectomy 2019 and chemo, CHF, asthma/COPD (on home O2 2L), PE (on Eliquis), and continued long-time smoking that was admitted to the hospital for evaluation of SOB and cough.  Pulmonary medicine consulted for persistent consolidation.    The patient states that symptoms started about a month ago.  She states she feels that her symptoms are consistent with a pneumonia, complaining of cough productive of intermittently pink/fleshy sputum, shortness of breath, and intermittent subjective fevers and chills.  She told her nursing home that she feels she is getting pneumonia, they tried treating her with doxycycline, followed by a higher dose of doxycycline along with a Medrol Dosepak, which did not relieve her symptoms.  So she requested to be brought to the hospital.  Currently, she feels that she is feeling better than she has in a month.  Still complaining of a bit of a cough.  On 4 L up from her baseline of 2.    She notes that she also has a history of a precancerous lesion in her bladder, which was removed, and has required annual follow-up with biopsy.  She states that upon repeat imaging, they \"found something in my lung\", and she is wondering what that is.    Patient has been compliant with her inhalers. She also made an appointment with Dr. Mejia to reestablish pulmonology care.  She is supposed to get a PFT study done prior to that visit.     Relevant Social History  Tobacco: Current PPD, at least 50 pack years  Substances: Denies   Occupational: Retired, lives in nursing home  Pets: Denies      Past Medical History:     93.3 %  Min: 91 %  Max: 96 %   - O2 Flow Rate (L/min): 4 L/min     ASSESSMENT:  AECOPD  Hx PE on Eliquis   RML infiltrates    PLAN:  Provide O2 supplementation to keep SpO2 between 88-92% if needed.    Continue antibiotics and steroids  Will evaluate possibility of bronch/BAL tomorrow  Keep NPO after MN     The note was completed using EMR and Dragon dictation system. Every effort was made to ensure accuracy; however, inadvertent computerized transcription errors may be present.     Fernando Sánchez \"Rubén\" Nicole Horan MD  Pulmonary and Critical Care Medicine

## 2024-12-05 NOTE — PROGRESS NOTES
4 Eyes Skin Assessment     NAME:  Shoaib Rothman  YOB: 1960  MEDICAL RECORD NUMBER:  [id number]    The patient is being assessed for  Admission    I agree that at least one RN has performed a thorough Head to Toe Skin Assessment on the patient. ALL assessment sites listed below have been assessed.      Areas assessed by both nurses:    Head, Face, Ears, Shoulders, Back, Chest, Arms, Elbows, Hands, Sacrum. Buttock, Coccyx, Ischium, Legs. Feet and Heels, and Under Medical Devices    Does the Patient have a Wound? Yes wound(s) were present on assessment. LDA wound assessment was Initiated and completed by RN IAD excoriated buttocks             Manan Prevention initiated by RN: Yes  Wound Care Orders initiated by RN: Yes    Pressure Injury (Stage 3,4, Unstageable, DTI, NWPT, and Complex wounds) if present, place Wound referral order by RN under : No    New Ostomies, if present place, Ostomy referral order under : No     Nurse 1 eSignature: Electronically signed by Kay Zhou RN on 12/5/24 at 8:53 AM EST    **SHARE this note so that the co-signing nurse can place an eSignature**    Nurse 2 eSignature: Electronically signed by Jalen Haro RN on 12/5/24 at 11:35 AM EST

## 2024-12-05 NOTE — PROGRESS NOTES
Clinical Pharmacy Progress Note  Medication History     List of of current medications patient is taking is complete. Home Medication list in EPIC updated to reflect changes noted below.    Source of information: medication list from [facility]     Changes made to medication list:   Medications removed:   Nicotine gum    Medications added:   Aquaphor ointment q6h prn dry skin    Medication doses adjusted / updated:   Hydroxyzine - takes 25mg po qhs  Melatonin - takes 6mg po qhs  Acetaminophen - takes 500mg po TID    Other notes:   Pt was started on Levofloxacin and Prednisone taper on 12/4/24  Levofloxacin 250mg po daily x 7 days  Prednisone 40mg po daily x5 days, then 30mg po daily x5 days, then 20mg po daily x5 days, then 10mg po daily x5 days.  Unclear when pt had last doses of PRN meds - marked as \"unknown\" last dose times, but pt has active orders for them at NH.    Please call with questions--  Thanks--  Lulu Westbrook, KimberliD, BCPS, BCGP  [phone] ([facility])   12/5/2024 10:51 AM      Current Outpatient Medications   Medication Instructions    acetaminophen (TYLENOL) 500 mg, Oral, 3 times daily    albuterol sulfate HFA (PROVENTIL;VENTOLIN;PROAIR) 108 (90 Base) MCG/ACT inhaler 2 puffs, Inhalation, EVERY 4 HOURS PRN    amLODIPine (NORVASC) 10 mg, Oral, DAILY    apixaban (ELIQUIS) 2.5 mg, Oral, 2 TIMES DAILY    carvedilol (COREG) 6.25 MG tablet 1 tablet, Oral, 2 TIMES DAILY WITH MEALS, Hold for BP < 100/60 or HR < 55    diphenhydrAMINE (BENADRYL) 12.5 mg, Oral, 4 TIMES DAILY PRN, Every 6 hours as needed    hydrOXYzine HCl (ATARAX) 25 mg, Oral, EVERY BEDTIME    ipratropium-albuterol (DUONEB) 0.5-2.5 (3) MG/3ML SOLN nebulizer solution 3 mLs, Inhalation, EVERY 6 HOURS    letrozole (FEMARA) 2.5 mg, Oral, DAILY    loperamide (IMODIUM) 2 mg, Oral, EVERY 8 HOURS PRN    losartan (COZAAR) 50 mg, Oral, DAILY    melatonin 6 mg, Oral, Nightly    mineral oil-hydrophilic petrolatum (AQUAPHOR) ointment  Topical, EVERY 6 HOURS PRN, Apply topically as needed.    Misc. Devices (CPAP MACHINE) MISC Does not apply, Nightly    mometasone-formoterol (DULERA) 200-5 MCG/ACT inhaler 2 puffs, Inhalation, 2 TIMES DAILY RESP    ondansetron (ZOFRAN) 4 mg, Oral, EVERY 6 HOURS PRN    pantoprazole (PROTONIX) 40 mg, Oral, DAILY    rOPINIRole (REQUIP) 0.25 MG tablet 1 tablet, Oral, NIGHTLY    sertraline (ZOLOFT) 50 mg, Oral, DAILY    sodium bicarbonate 650 mg, Oral, 2 TIMES DAILY    sodium zirconium cyclosilicate (LOKELMA) 10 g, Oral, DAILY    temazepam (RESTORIL) 15 mg, Oral, NIGHTLY    TRADJENTA 5 MG tablet 1 tablet, Oral, DAILY    traZODone (DESYREL) 25 mg, Oral, NIGHTLY

## 2024-12-05 NOTE — ED NOTES
Patient Name: Shoaib Rothman  : 1960 64 y.o.  MRN: [id number]  ED Room #: A04/A04-04     Chief complaint:   Chief Complaint   Patient presents with    Cold Symptoms     Arrived via EMS complaint of cold symptoms x 3 days, COPD, on 3L @ home.      Hospital Problem/Diagnosis:   Hospital Problems             Last Modified POA    * (Principal) Pneumonia due to infectious organism 2024 Yes         O2 Flow Rate:O2 Device: Nasal cannula O2 Flow Rate (L/min): 4 L/min (if applicable)  Cardiac Rhythm:   (if applicable)  Active LDA's:   Peripheral IV 24 Right Antecubital (Active)   Site Assessment Clean, dry & intact 24   Line Status Blood return noted 24            How does patient ambulate? Front Wheeled Walker    2. How does patient take pills? Unknown, no oral medications were given in the Emergency Department    3. Is patient alert? Alert    4. Is patient oriented? To Person, To Place, To Time, To Situation, and Follows Commands    5.   Patient arrived from:  home  Facility Name: ___________________________________________    6. If patient is disoriented or from a Skill Nursing Facility has family been notified of admission? No    7. Patient belongings? Belongings: Cell Phone, Wallet, and Clothing    Disposition of belongings? Kept with Patient     8. Any specific patient or family belongings/needs/dynamics?   a.     9. Miscellaneous comments/pending orders?  a.       If there are any additional questions please reach out to the Emergency Department.      Evelyn Mejía, RN  [phone]
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
## 2024-12-05 NOTE — CONSULTS
IR CONSULT NOTE:     IR consulted for malpositioned R chest port. Catheter tip projecting into R subclavian vein on CXR from 12/4/2024. Positioning consistent on multiple imaging studies dating back to 2019. Ideally port tip would terminate in SVC, however, if patient is asymptomatic and port has been working, it is reasonable to continue using at this time. Port exchange could be done if there are concerns for other issues with the device or problems with flushing/aspiration.

## 2024-12-06 ENCOUNTER — ANESTHESIA EVENT (OUTPATIENT)
Dept: ENDOSCOPY | Age: 64
End: 2024-12-06
Payer: MEDICAID

## 2024-12-06 ENCOUNTER — ANESTHESIA (OUTPATIENT)
Dept: ENDOSCOPY | Age: 64
End: 2024-12-06
Payer: MEDICAID

## 2024-12-06 LAB
ALBUMIN SERPL-MCNC: 3.3 G/DL (ref 3.4–5)
ANION GAP SERPL CALCULATED.3IONS-SCNC: 11 MMOL/L (ref 3–16)
BASOPHILS # BLD: 0 K/UL (ref 0–0.2)
BASOPHILS NFR BLD: 0.2 %
BUN SERPL-MCNC: 45 MG/DL (ref 7–20)
CALCIUM SERPL-MCNC: 9 MG/DL (ref 8.3–10.6)
CHLORIDE SERPL-SCNC: 106 MMOL/L (ref 99–110)
CO2 SERPL-SCNC: 22 MMOL/L (ref 21–32)
CREAT SERPL-MCNC: 1.8 MG/DL (ref 0.6–1.2)
CRP SERPL-MCNC: 23.5 MG/L (ref 0–5.1)
DEPRECATED RDW RBC AUTO: 14.9 % (ref 12.4–15.4)
EOSINOPHIL # BLD: 0 K/UL (ref 0–0.6)
EOSINOPHIL NFR BLD: 0.1 %
GFR SERPLBLD CREATININE-BSD FMLA CKD-EPI: 31 ML/MIN/{1.73_M2}
GLUCOSE BLD-MCNC: 117 MG/DL (ref 70–99)
GLUCOSE BLD-MCNC: 128 MG/DL (ref 70–99)
GLUCOSE BLD-MCNC: 185 MG/DL (ref 70–99)
GLUCOSE BLD-MCNC: 224 MG/DL (ref 70–99)
GLUCOSE SERPL-MCNC: 113 MG/DL (ref 70–99)
HCT VFR BLD AUTO: 35.3 % (ref 36–48)
HGB BLD-MCNC: 11.4 G/DL (ref 12–16)
LEGIONELLA AG UR QL: NORMAL
LYMPHOCYTES # BLD: 1.2 K/UL (ref 1–5.1)
LYMPHOCYTES NFR BLD: 17.3 %
MAGNESIUM SERPL-MCNC: 1.65 MG/DL (ref 1.8–2.4)
MCH RBC QN AUTO: 30.1 PG (ref 26–34)
MCHC RBC AUTO-ENTMCNC: 32.2 G/DL (ref 31–36)
MCV RBC AUTO: 93.4 FL (ref 80–100)
MONOCYTES # BLD: 0.4 K/UL (ref 0–1.3)
MONOCYTES NFR BLD: 5.8 %
NEUTROPHILS # BLD: 5.3 K/UL (ref 1.7–7.7)
NEUTROPHILS NFR BLD: 76.6 %
ORGANISM: ABNORMAL
PERFORMED ON: ABNORMAL
PHOSPHATE SERPL-MCNC: 3.9 MG/DL (ref 2.5–4.9)
PLATELET # BLD AUTO: 275 K/UL (ref 135–450)
PMV BLD AUTO: 7.9 FL (ref 5–10.5)
POTASSIUM SERPL-SCNC: 4.4 MMOL/L (ref 3.5–5.1)
RBC # BLD AUTO: 3.78 M/UL (ref 4–5.2)
REASON FOR REJECTION: NORMAL
REJECTED TEST: NORMAL
REPORT: NORMAL
REPORT: NORMAL
RESP PATH DNA+RNA PNL L RESP NAA+NON-PRB: NORMAL
RESP PATH DNA+RNA PNL NPH NAA+NON-PROBE: ABNORMAL
S PNEUM AG UR QL: NORMAL
SODIUM SERPL-SCNC: 139 MMOL/L (ref 136–145)
WBC # BLD AUTO: 6.9 K/UL (ref 4–11)

## 2024-12-06 PROCEDURE — 6370000000 HC RX 637 (ALT 250 FOR IP): Performed by: INTERNAL MEDICINE

## 2024-12-06 PROCEDURE — 6360000002 HC RX W HCPCS: Performed by: INTERNAL MEDICINE

## 2024-12-06 PROCEDURE — 0202U NFCT DS 22 TRGT SARS-COV-2: CPT

## 2024-12-06 PROCEDURE — 31645 BRNCHSC W/THER ASPIR 1ST: CPT | Performed by: INTERNAL MEDICINE

## 2024-12-06 PROCEDURE — 6370000000 HC RX 637 (ALT 250 FOR IP)

## 2024-12-06 PROCEDURE — 3700000001 HC ADD 15 MINUTES (ANESTHESIA): Performed by: INTERNAL MEDICINE

## 2024-12-06 PROCEDURE — 86140 C-REACTIVE PROTEIN: CPT

## 2024-12-06 PROCEDURE — 2700000000 HC OXYGEN THERAPY PER DAY

## 2024-12-06 PROCEDURE — 85025 COMPLETE CBC W/AUTO DIFF WBC: CPT

## 2024-12-06 PROCEDURE — 3700000000 HC ANESTHESIA ATTENDED CARE: Performed by: INTERNAL MEDICINE

## 2024-12-06 PROCEDURE — 87206 SMEAR FLUORESCENT/ACID STAI: CPT

## 2024-12-06 PROCEDURE — 6360000002 HC RX W HCPCS

## 2024-12-06 PROCEDURE — 87205 SMEAR GRAM STAIN: CPT

## 2024-12-06 PROCEDURE — 94640 AIRWAY INHALATION TREATMENT: CPT

## 2024-12-06 PROCEDURE — 2580000003 HC RX 258

## 2024-12-06 PROCEDURE — 80069 RENAL FUNCTION PANEL: CPT

## 2024-12-06 PROCEDURE — 87015 SPECIMEN INFECT AGNT CONCNTJ: CPT

## 2024-12-06 PROCEDURE — 94660 CPAP INITIATION&MGMT: CPT

## 2024-12-06 PROCEDURE — 3609027000 HC BRONCHOSCOPY: Performed by: INTERNAL MEDICINE

## 2024-12-06 PROCEDURE — 87633 RESP VIRUS 12-25 TARGETS: CPT

## 2024-12-06 PROCEDURE — 31624 DX BRONCHOSCOPE/LAVAGE: CPT | Performed by: INTERNAL MEDICINE

## 2024-12-06 PROCEDURE — 87102 FUNGUS ISOLATION CULTURE: CPT

## 2024-12-06 PROCEDURE — 94761 N-INVAS EAR/PLS OXIMETRY MLT: CPT

## 2024-12-06 PROCEDURE — 0BC48ZZ EXTIRPATION OF MATTER FROM RIGHT UPPER LOBE BRONCHUS, VIA NATURAL OR ARTIFICIAL OPENING ENDOSCOPIC: ICD-10-PCS | Performed by: INTERNAL MEDICINE

## 2024-12-06 PROCEDURE — 7100000001 HC PACU RECOVERY - ADDTL 15 MIN: Performed by: INTERNAL MEDICINE

## 2024-12-06 PROCEDURE — 99233 SBSQ HOSP IP/OBS HIGH 50: CPT | Performed by: INTERNAL MEDICINE

## 2024-12-06 PROCEDURE — 87070 CULTURE OTHR SPECIMN AEROBIC: CPT

## 2024-12-06 PROCEDURE — 94667 MNPJ CHEST WALL 1ST: CPT

## 2024-12-06 PROCEDURE — 7100000000 HC PACU RECOVERY - FIRST 15 MIN: Performed by: INTERNAL MEDICINE

## 2024-12-06 PROCEDURE — 1200000000 HC SEMI PRIVATE

## 2024-12-06 PROCEDURE — 83735 ASSAY OF MAGNESIUM: CPT

## 2024-12-06 PROCEDURE — C1601 HC ENDOSCOPE, PULM, IMAGING/ILLUMINATION DEVICE: HCPCS | Performed by: INTERNAL MEDICINE

## 2024-12-06 PROCEDURE — 0BC38ZZ EXTIRPATION OF MATTER FROM RIGHT MAIN BRONCHUS, VIA NATURAL OR ARTIFICIAL OPENING ENDOSCOPIC: ICD-10-PCS | Performed by: INTERNAL MEDICINE

## 2024-12-06 PROCEDURE — 0BC88ZZ EXTIRPATION OF MATTER FROM LEFT UPPER LOBE BRONCHUS, VIA NATURAL OR ARTIFICIAL OPENING ENDOSCOPIC: ICD-10-PCS | Performed by: INTERNAL MEDICINE

## 2024-12-06 PROCEDURE — 87116 MYCOBACTERIA CULTURE: CPT

## 2024-12-06 RX ORDER — ALBUTEROL SULFATE 0.83 MG/ML
2.5 SOLUTION RESPIRATORY (INHALATION) EVERY 4 HOURS PRN
Status: DISCONTINUED | OUTPATIENT
Start: 2024-12-06 | End: 2024-12-07 | Stop reason: HOSPADM

## 2024-12-06 RX ORDER — HYDROMORPHONE HYDROCHLORIDE 1 MG/ML
0.5 INJECTION, SOLUTION INTRAMUSCULAR; INTRAVENOUS; SUBCUTANEOUS EVERY 5 MIN PRN
Status: DISCONTINUED | OUTPATIENT
Start: 2024-12-06 | End: 2024-12-06 | Stop reason: HOSPADM

## 2024-12-06 RX ORDER — LIDOCAINE HYDROCHLORIDE 20 MG/ML
INJECTION, SOLUTION EPIDURAL; INFILTRATION; INTRACAUDAL; PERINEURAL
Status: DISCONTINUED | OUTPATIENT
Start: 2024-12-06 | End: 2024-12-06 | Stop reason: SDUPTHER

## 2024-12-06 RX ORDER — METOCLOPRAMIDE HYDROCHLORIDE 5 MG/ML
10 INJECTION INTRAMUSCULAR; INTRAVENOUS
Status: DISCONTINUED | OUTPATIENT
Start: 2024-12-06 | End: 2024-12-06 | Stop reason: HOSPADM

## 2024-12-06 RX ORDER — SUCCINYLCHOLINE/SOD CL,ISO/PF 100 MG/5ML
SYRINGE (ML) INTRAVENOUS
Status: DISCONTINUED | OUTPATIENT
Start: 2024-12-06 | End: 2024-12-06 | Stop reason: SDUPTHER

## 2024-12-06 RX ORDER — ACETYLCYSTEINE 200 MG/ML
600 SOLUTION ORAL; RESPIRATORY (INHALATION)
Status: DISCONTINUED | OUTPATIENT
Start: 2024-12-06 | End: 2024-12-07 | Stop reason: HOSPADM

## 2024-12-06 RX ORDER — MORPHINE SULFATE 10 MG/ML
2 INJECTION, SOLUTION INTRAMUSCULAR; INTRAVENOUS ONCE
Status: COMPLETED | OUTPATIENT
Start: 2024-12-06 | End: 2024-12-06

## 2024-12-06 RX ORDER — MAGNESIUM SULFATE IN WATER 40 MG/ML
4000 INJECTION, SOLUTION INTRAVENOUS ONCE
Status: COMPLETED | OUTPATIENT
Start: 2024-12-06 | End: 2024-12-06

## 2024-12-06 RX ORDER — HYDROMORPHONE HYDROCHLORIDE 1 MG/ML
0.5 INJECTION, SOLUTION INTRAMUSCULAR; INTRAVENOUS; SUBCUTANEOUS EVERY 10 MIN PRN
Status: DISCONTINUED | OUTPATIENT
Start: 2024-12-06 | End: 2024-12-06 | Stop reason: HOSPADM

## 2024-12-06 RX ORDER — OXYCODONE HYDROCHLORIDE 5 MG/1
5 TABLET ORAL ONCE
Status: COMPLETED | OUTPATIENT
Start: 2024-12-06 | End: 2024-12-06

## 2024-12-06 RX ORDER — SODIUM CHLORIDE 9 MG/ML
INJECTION, SOLUTION INTRAVENOUS
Status: DISCONTINUED | OUTPATIENT
Start: 2024-12-06 | End: 2024-12-06 | Stop reason: SDUPTHER

## 2024-12-06 RX ORDER — DEXAMETHASONE SODIUM PHOSPHATE 4 MG/ML
INJECTION, SOLUTION INTRA-ARTICULAR; INTRALESIONAL; INTRAMUSCULAR; INTRAVENOUS; SOFT TISSUE
Status: DISCONTINUED | OUTPATIENT
Start: 2024-12-06 | End: 2024-12-06 | Stop reason: SDUPTHER

## 2024-12-06 RX ORDER — PROPOFOL 10 MG/ML
INJECTION, EMULSION INTRAVENOUS
Status: DISCONTINUED | OUTPATIENT
Start: 2024-12-06 | End: 2024-12-06 | Stop reason: SDUPTHER

## 2024-12-06 RX ORDER — LABETALOL HYDROCHLORIDE 5 MG/ML
10 INJECTION, SOLUTION INTRAVENOUS
Status: DISCONTINUED | OUTPATIENT
Start: 2024-12-06 | End: 2024-12-06 | Stop reason: HOSPADM

## 2024-12-06 RX ORDER — SODIUM CHLORIDE 9 MG/ML
INJECTION, SOLUTION INTRAVENOUS PRN
Status: DISCONTINUED | OUTPATIENT
Start: 2024-12-06 | End: 2024-12-06 | Stop reason: HOSPADM

## 2024-12-06 RX ORDER — SODIUM CHLORIDE 0.9 % (FLUSH) 0.9 %
5-40 SYRINGE (ML) INJECTION EVERY 12 HOURS SCHEDULED
Status: DISCONTINUED | OUTPATIENT
Start: 2024-12-06 | End: 2024-12-06 | Stop reason: HOSPADM

## 2024-12-06 RX ORDER — IPRATROPIUM BROMIDE AND ALBUTEROL SULFATE 2.5; .5 MG/3ML; MG/3ML
1 SOLUTION RESPIRATORY (INHALATION) ONCE
Status: DISCONTINUED | OUTPATIENT
Start: 2024-12-06 | End: 2024-12-07

## 2024-12-06 RX ORDER — SODIUM CHLORIDE 0.9 % (FLUSH) 0.9 %
5-40 SYRINGE (ML) INJECTION PRN
Status: DISCONTINUED | OUTPATIENT
Start: 2024-12-06 | End: 2024-12-06 | Stop reason: HOSPADM

## 2024-12-06 RX ORDER — IPRATROPIUM BROMIDE AND ALBUTEROL SULFATE 2.5; .5 MG/3ML; MG/3ML
1 SOLUTION RESPIRATORY (INHALATION) 3 TIMES DAILY
Status: DISCONTINUED | OUTPATIENT
Start: 2024-12-06 | End: 2024-12-07 | Stop reason: HOSPADM

## 2024-12-06 RX ORDER — MEPERIDINE HYDROCHLORIDE 25 MG/ML
12.5 INJECTION INTRAMUSCULAR; INTRAVENOUS; SUBCUTANEOUS EVERY 5 MIN PRN
Status: DISCONTINUED | OUTPATIENT
Start: 2024-12-06 | End: 2024-12-06 | Stop reason: HOSPADM

## 2024-12-06 RX ORDER — DIPHENHYDRAMINE HYDROCHLORIDE 50 MG/ML
12.5 INJECTION INTRAMUSCULAR; INTRAVENOUS
Status: DISCONTINUED | OUTPATIENT
Start: 2024-12-06 | End: 2024-12-06 | Stop reason: HOSPADM

## 2024-12-06 RX ORDER — HYDRALAZINE HYDROCHLORIDE 20 MG/ML
10 INJECTION INTRAMUSCULAR; INTRAVENOUS
Status: DISCONTINUED | OUTPATIENT
Start: 2024-12-06 | End: 2024-12-06 | Stop reason: HOSPADM

## 2024-12-06 RX ORDER — ALBUTEROL SULFATE 90 UG/1
INHALANT RESPIRATORY (INHALATION)
Status: DISCONTINUED | OUTPATIENT
Start: 2024-12-06 | End: 2024-12-06 | Stop reason: SDUPTHER

## 2024-12-06 RX ORDER — ONDANSETRON 2 MG/ML
INJECTION INTRAMUSCULAR; INTRAVENOUS
Status: DISCONTINUED | OUTPATIENT
Start: 2024-12-06 | End: 2024-12-06 | Stop reason: SDUPTHER

## 2024-12-06 RX ORDER — NALOXONE HYDROCHLORIDE 0.4 MG/ML
INJECTION, SOLUTION INTRAMUSCULAR; INTRAVENOUS; SUBCUTANEOUS PRN
Status: DISCONTINUED | OUTPATIENT
Start: 2024-12-06 | End: 2024-12-06 | Stop reason: HOSPADM

## 2024-12-06 RX ADMIN — TEMAZEPAM 15 MG: 15 CAPSULE ORAL at 19:50

## 2024-12-06 RX ADMIN — CARVEDILOL 6.25 MG: 6.25 TABLET, FILM COATED ORAL at 19:48

## 2024-12-06 RX ADMIN — SERTRALINE 50 MG: 50 TABLET, FILM COATED ORAL at 08:52

## 2024-12-06 RX ADMIN — LIDOCAINE HYDROCHLORIDE 100 MG: 20 INJECTION, SOLUTION EPIDURAL; INFILTRATION; INTRACAUDAL; PERINEURAL at 13:30

## 2024-12-06 RX ADMIN — Medication 6 MG: at 19:49

## 2024-12-06 RX ADMIN — SODIUM CHLORIDE: 9 INJECTION, SOLUTION INTRAVENOUS at 13:09

## 2024-12-06 RX ADMIN — Medication 120 MG: at 13:13

## 2024-12-06 RX ADMIN — TRAZODONE HYDROCHLORIDE 25 MG: 50 TABLET ORAL at 19:48

## 2024-12-06 RX ADMIN — ACETAMINOPHEN 650 MG: 325 TABLET ORAL at 09:24

## 2024-12-06 RX ADMIN — HYDROXYZINE HYDROCHLORIDE 25 MG: 25 TABLET ORAL at 19:49

## 2024-12-06 RX ADMIN — HYDROXYZINE HYDROCHLORIDE 25 MG: 25 TABLET ORAL at 08:52

## 2024-12-06 RX ADMIN — SODIUM BICARBONATE 650 MG: 650 TABLET ORAL at 19:48

## 2024-12-06 RX ADMIN — ALBUTEROL SULFATE 2.5 MG: 2.5 SOLUTION RESPIRATORY (INHALATION) at 15:06

## 2024-12-06 RX ADMIN — IPRATROPIUM BROMIDE AND ALBUTEROL SULFATE 1 DOSE: 2.5; .5 SOLUTION RESPIRATORY (INHALATION) at 20:52

## 2024-12-06 RX ADMIN — LEVOFLOXACIN 750 MG: 750 INJECTION, SOLUTION INTRAVENOUS at 23:41

## 2024-12-06 RX ADMIN — OXYCODONE 5 MG: 5 TABLET ORAL at 18:13

## 2024-12-06 RX ADMIN — PREDNISONE 40 MG: 20 TABLET ORAL at 08:52

## 2024-12-06 RX ADMIN — INSULIN LISPRO 1 UNITS: 100 INJECTION, SOLUTION INTRAVENOUS; SUBCUTANEOUS at 18:13

## 2024-12-06 RX ADMIN — LIDOCAINE HYDROCHLORIDE 100 MG: 20 INJECTION, SOLUTION EPIDURAL; INFILTRATION; INTRACAUDAL; PERINEURAL at 13:12

## 2024-12-06 RX ADMIN — CARVEDILOL 6.25 MG: 6.25 TABLET, FILM COATED ORAL at 08:52

## 2024-12-06 RX ADMIN — ALBUTEROL SULFATE 4 PUFF: 90 AEROSOL, METERED RESPIRATORY (INHALATION) at 13:50

## 2024-12-06 RX ADMIN — SODIUM CHLORIDE, PRESERVATIVE FREE 10 ML: 5 INJECTION INTRAVENOUS at 19:50

## 2024-12-06 RX ADMIN — IPRATROPIUM BROMIDE AND ALBUTEROL SULFATE 1 DOSE: 2.5; .5 SOLUTION RESPIRATORY (INHALATION) at 09:04

## 2024-12-06 RX ADMIN — DEXAMETHASONE SODIUM PHOSPHATE 4 MG: 4 INJECTION INTRA-ARTICULAR; INTRALESIONAL; INTRAMUSCULAR; INTRAVENOUS; SOFT TISSUE at 13:20

## 2024-12-06 RX ADMIN — AMLODIPINE BESYLATE 10 MG: 10 TABLET ORAL at 08:52

## 2024-12-06 RX ADMIN — PHENYLEPHRINE HYDROCHLORIDE 100 MCG: 10 INJECTION, SOLUTION INTRAMUSCULAR; INTRAVENOUS; SUBCUTANEOUS at 13:40

## 2024-12-06 RX ADMIN — ACETYLCYSTEINE 600 MG: 200 SOLUTION ORAL; RESPIRATORY (INHALATION) at 20:52

## 2024-12-06 RX ADMIN — MORPHINE SULFATE 2 MG: 10 INJECTION, SOLUTION INTRAMUSCULAR; INTRAVENOUS at 14:33

## 2024-12-06 RX ADMIN — SODIUM CHLORIDE, PRESERVATIVE FREE 10 ML: 5 INJECTION INTRAVENOUS at 08:52

## 2024-12-06 RX ADMIN — ROPINIROLE HYDROCHLORIDE 0.25 MG: 0.25 TABLET, FILM COATED ORAL at 19:50

## 2024-12-06 RX ADMIN — SODIUM BICARBONATE 650 MG: 650 TABLET ORAL at 08:52

## 2024-12-06 RX ADMIN — ALBUTEROL SULFATE 4 PUFF: 90 AEROSOL, METERED RESPIRATORY (INHALATION) at 13:46

## 2024-12-06 RX ADMIN — MAGNESIUM SULFATE HEPTAHYDRATE 4000 MG: 40 INJECTION, SOLUTION INTRAVENOUS at 09:13

## 2024-12-06 RX ADMIN — PROPOFOL 50 MG: 10 INJECTION, EMULSION INTRAVENOUS at 13:30

## 2024-12-06 RX ADMIN — ONDANSETRON 4 MG: 2 INJECTION INTRAMUSCULAR; INTRAVENOUS at 13:20

## 2024-12-06 RX ADMIN — PROPOFOL 120 MG: 10 INJECTION, EMULSION INTRAVENOUS at 13:12

## 2024-12-06 RX ADMIN — LETROZOLE 2.5 MG: 2.5 TABLET ORAL at 09:24

## 2024-12-06 RX ADMIN — INSULIN LISPRO 1 UNITS: 100 INJECTION, SOLUTION INTRAVENOUS; SUBCUTANEOUS at 21:35

## 2024-12-06 ASSESSMENT — ENCOUNTER SYMPTOMS
SHORTNESS OF BREATH: 1
COUGH: 1

## 2024-12-06 ASSESSMENT — PAIN SCALES - GENERAL
PAINLEVEL_OUTOF10: 2
PAINLEVEL_OUTOF10: 0
PAINLEVEL_OUTOF10: 8

## 2024-12-06 ASSESSMENT — PAIN DESCRIPTION - LOCATION
LOCATION: KNEE
LOCATION: CHEST
LOCATION: BACK

## 2024-12-06 ASSESSMENT — PAIN DESCRIPTION - DESCRIPTORS
DESCRIPTORS: ACHING;DISCOMFORT
DESCRIPTORS: DISCOMFORT

## 2024-12-06 ASSESSMENT — PAIN DESCRIPTION - ORIENTATION: ORIENTATION: RIGHT;LEFT

## 2024-12-06 ASSESSMENT — PAIN - FUNCTIONAL ASSESSMENT: PAIN_FUNCTIONAL_ASSESSMENT: NONE - DENIES PAIN

## 2024-12-06 NOTE — PLAN OF CARE
Problem: Safety - Adult  Goal: Free from fall injury  Outcome: Progressing    Assessed for mobility, patient able to ambulate from bed to bathroom without any problem.     Problem: Respiratory - Adult  Goal: Achieves optimal ventilation and oxygenation  Outcome: Progressing    Denies shortness of breath, O2 95-96% on 4LPM O2 via nasal cannula. Will continue to monitor.

## 2024-12-06 NOTE — PROGRESS NOTES
12.5 12.0 11.4*   HCT 38.5 37.3 35.3*   MCV 94.2 93.6 93.4    261 275     BMP:   Recent Labs     12/04/24  1907 12/05/24  1538 12/06/24  0730    140 139   K 4.1 4.3 4.4    104 106   CO2 23 25 22   PHOS  --  3.3 3.9   BUN 39* 36* 45*   CREATININE 2.0* 2.0* 1.8*     No results for input(s): \"PHART\", \"MAD0ZYI\", \"PO2ART\" in the last 72 hours.  LIVER PROFILE: No results for input(s): \"AST\", \"ALT\", \"LIPASE\", \"AMYLASE\", \"BILIDIR\", \"BILITOT\", \"ALKPHOS\" in the last 72 hours.    Invalid input(s): \"ALB\"    CXR REVIEWED BY ME AND SHOWED:  CT CHEST WO CONTRAST   Final Result   1.   There is some mixed patchy groundglass alveolar infiltrate and consolidation identified within the right middle lobe. Correlate for pneumonia.   2. Moderate centrilobular emphysema.   3. Status post left mastectomy.   4. Coronary artery calcification.      Electronically signed by Carlo Galvez MD      XR CHEST (2 VW)   Final Result      Abnormal course of the right sided port which appears stable from the prior CT chest. Right basal consolidation could represent atelectasis or infiltrate. Cardiomegaly.      Electronically signed by Ronald Daniels           ASSESSMENT:  COPD, OHS -in exacerbation  Hx PE (on Eliquis)  Persistent infiltrate with RML Syndrome              -Could be scarring from prior infarct in 2019 including nearly occlusive RLL embolism. This scarring can be seen as far back as 2021   -Airspace disease consistent with pneumonia  Hx breast cancer        PLAN:   -F/u procal and respiratory studies from bronch  -Continue abx for now, narrow as indicated   -Continue steroids for COPD exacerbation and bronchospasm  -Continue nebs  -Incentive spirometry, up to chair, ambulate, supplement O2 for goal >88%  -Bronchoscopy for RML and to further evaluate airways    Patient seen and examined with attending pulmonologist, Dr. Nicole Russo, DO  PGY-3, Internal Medicine  12/06/24  4:35 PM         PULMONARY AND  Medicine

## 2024-12-06 NOTE — ANESTHESIA POSTPROCEDURE EVALUATION
Department of Anesthesiology  Postprocedure Note    Patient: Shoaib Rothman  MRN: [id number]  YOB: 1960  Date of evaluation: 12/6/2024    Procedure Summary       Date: 12/06/24 Room / Location: [facility] ENDO 01 / [facility]    Anesthesia Start: 1309 Anesthesia Stop: 1409    Procedure: BRONCHOSCOPY Diagnosis:       Pneumonia due to infectious organism, unspecified laterality, unspecified part of lung      (Pneumonia due to infectious organism, unspecified laterality, unspecified part of lung [J18.9])    Surgeons: Fernando Dhillon MD Responsible Provider: Armando Arriola MD    Anesthesia Type: General ASA Status: 3            Anesthesia Type: General    Carole Phase I: Carole Score: 9    Carole Phase II:      Anesthesia Post Evaluation    Patient location during evaluation: PACU  Patient participation: complete - patient participated  Level of consciousness: awake and alert  Pain score: 0  Airway patency: patent  Nausea & Vomiting: no nausea and no vomiting  Cardiovascular status: hemodynamically stable  Respiratory status: acceptable  Hydration status: euvolemic  Pain management: adequate    No notable events documented.

## 2024-12-06 NOTE — PROGRESS NOTES
PACU Transfer Note    Vitals:    12/06/24 1545   BP: (!) 145/87   Pulse: 83   Resp: 15   Temp: 97 °F (36.1 °C)   SpO2: 97%       In: 1200 [P.O.:500; I.V.:700]  Out: 1000 [Urine:1000]    Pain assessment:  BP in range, taken to room per RN and transpoter,   Pain Level: 0    Report given to Receiving unit RN.    12/6/2024 3:55 PM

## 2024-12-06 NOTE — ANESTHESIA PRE PROCEDURE
Department of Anesthesiology  Preprocedure Note       Name:  Shoaib Rothman   Age:  64 y.o.  :  1960                                          MRN:  [id number]         Date:  2024      Surgeon: Surgeon(s):  Fernando Dhillon MD    Procedure: Procedure(s):  BRONCHOSCOPY    Medications prior to admission:   Prior to Admission medications    Medication Sig Start Date End Date Taking? Authorizing Provider   Misc. Devices (CPAP MACHINE) MISC by Does not apply route at bedtime   Yes ProviderChago MD   amLODIPine (NORVASC) 10 MG tablet Take 1 tablet by mouth daily 24  Yes Kacey Joe APRN - CNP   sodium zirconium cyclosilicate (LOKELMA) 10 g PACK oral suspension Take 1 packet by mouth daily 24  Yes Lm Lockett MD   sodium bicarbonate 650 MG tablet Take 1 tablet by mouth 2 times daily 24 Yes Lm Lockett MD   carvedilol (COREG) 6.25 MG tablet Take 1 tablet by mouth 2 times daily (with meals) Hold for BP < 100/60 or HR < 55 23  Yes ProviderChago MD   TRADJENTA 5 MG tablet Take 1 tablet by mouth daily 24  Yes ProviderChago MD   rOPINIRole (REQUIP) 0.25 MG tablet Take 1 tablet by mouth nightly 23  Yes Provider, MD Chago   losartan (COZAAR) 50 MG tablet Take 1 tablet by mouth daily 24  Yes Lm Lockett MD   melatonin 3 MG TABS tablet Take 2 tablets by mouth at bedtime   Yes Provider, MD Chago   sertraline (ZOLOFT) 50 MG tablet Take 1 tablet by mouth daily   Yes Provider, MD Chago   mometasone-formoterol (DULERA) 200-5 MCG/ACT inhaler Inhale 2 puffs into the lungs 2 times daily 22  Yes Waleska Calzada MD   pantoprazole (PROTONIX) 40 MG tablet Take 1 tablet by mouth daily   Yes ProviderChago MD   traZODone (DESYREL) 50 MG tablet Take 0.5 tablets by mouth nightly   Yes ProviderChago MD   acetaminophen (TYLENOL) 500 MG tablet Take 1 tablet by mouth in the morning, at noon, and at bedtime

## 2024-12-06 NOTE — PROGRESS NOTES
Pt arrived from OR, s/p BRONCHOSCOPY , report received from CRNA and ENDO RN, pt arrived on NRB mask at 10 L, sat 100%.  RR labored, pt sob, using accessory muscles.  Coughing on arrival

## 2024-12-06 NOTE — PROGRESS NOTES
Internal Medicine Progress Note    Patient Name: Shoaib Rothman   Patient : 1960   Date: 2024   Admit Date: 2024     CC: Cold Symptoms (Arrived via EMS complaint of cold symptoms x 3 days, COPD, on 3L @ home. )       Interval History     No acute events overnight, VSS. Saturating well on 4L NC. Reports no worsening shortness of breath or cough. Reports symptoms helped with nebulizer. Patient denies dysuria, urinary urgency or abdominal pain.    Started on prednisone yesterday. Possible bronch/BAL today with pulmonology.    CRP 23.5, pulmonary infectious workup unremarkable so far, molecular COVID pending. Urine culture grew E.Coli <50,000    ROS   Review of Systems   Respiratory:  Positive for cough and shortness of breath.    Cardiovascular:  Negative for chest pain.      Objective     I/Os:  I/O last 3 completed shifts:  In: 1160 [P.O.:1000; I.V.:160]  Out: 1500 [Urine:1500]      Vital Signs:  Patient Vitals for the past 8 hrs:   BP Temp Temp src Pulse Resp SpO2   24 0630 (!) 144/88 98.3 °F (36.8 °C) Oral 78 19 97 %       Physical Exam  Constitutional:       Appearance: Normal appearance.   HENT:      Head: Normocephalic and atraumatic.      Mouth/Throat:      Mouth: Mucous membranes are moist.   Eyes:      Extraocular Movements: Extraocular movements intact.      Pupils: Pupils are equal, round, and reactive to light.   Cardiovascular:      Rate and Rhythm: Normal rate and regular rhythm.   Pulmonary:      Effort: Pulmonary effort is normal.      Breath sounds: Wheezing present.      Comments: On 4 L NC  Abdominal:      General: Abdomen is flat.      Palpations: Abdomen is soft.   Musculoskeletal:      Right lower leg: No edema.      Left lower leg: No edema.   Skin:     General: Skin is warm.      Capillary Refill: Capillary refill takes less than 2 seconds.   Neurological:      Mental Status: She is alert and oriented to person, place, and time.       Medications:   magnesium  12/06/2024  01:08, by TOLU    Strep Pneumoniae Antigen [[id number]] Collected: 12/05/24 0500    Order Status: Sent Specimen: Urine, clean catch Updated: 12/05/24 0940    Legionella antigen, urine [[id number]] Collected: 12/05/24 0500    Order Status: Sent Specimen: Urine Updated: 12/05/24 0940    Culture, Urine [[id number]]  (Abnormal) Collected: 12/05/24 0500    Order Status: Completed Specimen: Urine, clean catch Updated: 12/06/24 0723     Organism Escherichia coli     Urine Culture, Routine --     50,000 CFU/ml  Sensitivity to follow      Narrative:      ORDER#: [id number]                          ORDERED BY: ANDERSON KHAN  SOURCE: Urine Clean Catch                  COLLECTED:  12/05/24 05:00  ANTIBIOTICS AT [facility].:                      RECEIVED :  12/05/24 16:47    Pneumonia Panel, Molecular [[id number]]     Order Status: No result Specimen: Sputum                Radiology:  CT CHEST WO CONTRAST   Final Result   1.   There is some mixed patchy groundglass alveolar infiltrate and consolidation identified within the right middle lobe. Correlate for pneumonia.   2. Moderate centrilobular emphysema.   3. Status post left mastectomy.   4. Coronary artery calcification.      Electronically signed by Carlo Galvez MD      XR CHEST (2 VW)   Final Result      Abnormal course of the right sided port which appears stable from the prior CT chest. Right basal consolidation could represent atelectasis or infiltrate. Cardiomegaly.      Electronically signed by Ronald Daniels            Assessment & Plan   64 y.o. female who presented from nursing facility to the ED on 12/4/24 with worsening shortness of breath     Possible nosocomial Pneumonia  COPD exacerbation  Pt complains of increased cough and shortness of breath for a month. Was given Doxycycline x 2 at assisted living facility with no resolution of her symptoms. CT showed consolidations in right middle lobe. Currently requiring 4 L NC, at baseline does not require  pneumonia  Disposition  - Preadmission: nursing facility  - Current: 6S  - Upon discharge: TBD      Will discuss with attending physician Rosmery Estrada MD.     Treva Hoff MD, PGY-1  Internal Medicine Resident  Contact via [employer]

## 2024-12-06 NOTE — CARE COORDINATION
Case Management Assessment  Initial Evaluation    Date/Time of Evaluation: 12/6/2024 8:35 AM  Assessment Completed by: Camila Dunlap    If patient is discharged prior to next notation, then this note serves as note for discharge by case management.    Patient Name: Shoaib Rothman                   YOB: 1960  Diagnosis: CHANTELLE (acute kidney injury) (HCC) [N17.9]  Elevated brain natriuretic peptide (BNP) level [R79.89]  Chronic obstructive pulmonary disease, unspecified COPD type (HCC) [J44.9]  Pneumonia due to infectious organism [J18.9]  Acute hypoxic respiratory failure [J96.01]                   Date / Time: 12/4/2024  7:59 PM    Patient Admission Status: Inpatient   Readmission Risk (Low < 19, Mod (19-27), High > 27): Readmission Risk Score: 14.4    Current PCP: Lamar Mondragon MD  PCP verified by CM? Yes    Chart Reviewed: Yes      History Provided by: Patient  Patient Orientation: Alert and Oriented    Patient Cognition: Alert    Hospitalization in the last 30 days (Readmission):  No    If yes, Readmission Assessment in CM Navigator will be completed.    Advance Directives:      Code Status: Full Code   Patient's Primary Decision Maker is: Legal Next of Kin      Discharge Planning:    Patient lives with: Family Members Type of Home: Long-Term Care  Primary Care Giver: Self  Patient Support Systems include: Family Members   Current Financial resources: Medicaid  Current community resources: None  Current services prior to admission: None            Current DME:              Type of Home Care services:  None    ADLS  Prior functional level: Independent in ADLs/IADLs  Current functional level: Independent in ADLs/IADLs    PT AM-PAC:   /24  OT AM-PAC:   /24    Family can provide assistance at DC: Yes  Would you like Case Management to discuss the discharge plan with any other family members/significant others, and if so, who? No  Plans to Return to Present Housing: Yes  Other Identified  [J96.01]    IF APPLICABLE: The Patient and/or patient representative Shoaib and her family were provided with a choice of provider and agrees with the discharge plan. Freedom of choice list with basic dialogue that supports the patient's individualized plan of care/goals and shares the quality data associated with the providers was provided to: Patient   Patient Representative Name:       The Patient and/or Patient Representative Agree with the Discharge Plan? Yes    Camila Dunlap  Case Management Department  Ph: [phone] Fax: [phone]

## 2024-12-06 NOTE — RT PROTOCOL NOTE
RT Nebulizer Bronchodilator Protocol Note    There is a bronchodilator order in the chart from a provider indicating to follow the RT Bronchodilator Protocol and there is an “Initiate RT Bronchodilator Protocol” order as well (see protocol at bottom of note).    CXR Findings:  No results found.    The findings from the last RT Protocol Assessment were as follows:  Smoking: Chronic pulmonary disease  Respiratory Pattern: Regular pattern and RR 12-20 bpm  Breath Sounds: Severe inspiratory and expiratory wheezing or severely diminished  Cough: Strong, spontaneous, non-productive  Indication for Bronchodilator Therapy:    Bronchodilator Assessment Score: 10    Pt will be made TID per protocol    Aerosolized bronchodilator medication orders have been revised according to the RT Nebulizer Bronchodilator Protocol below.    Respiratory Therapist to perform RT Therapy Protocol Assessment initially then follow the protocol.  Repeat RT Therapy Protocol Assessment PRN for score 0-3 or on second treatment, BID, and PRN for scores above 3.    No Indications - adjust the frequency to every 6 hours PRN wheezing or bronchospasm, if no treatments needed after 48 hours then discontinue using Per Protocol order mode.     If indication present, adjust the RT bronchodilator orders based on the Bronchodilator Assessment Score as indicated below.  If a patient is on this medication at home then do not decrease Frequency below that used at home.    0-3 - enter or revise RT bronchodilator order(s) to equivalent RT Bronchodilator order with Frequency of every 4 hours PRN for wheezing or increased work of breathing using Per Protocol order mode.       4-6 - enter or revise RT Bronchodilator order(s) to two equivalent RT bronchodilator orders with one order with BID Frequency and one order with Frequency of every 4 hours PRN wheezing or increased work of breathing using Per Protocol order mode.         7-10 - enter or revise RT Bronchodilator

## 2024-12-06 NOTE — PROCEDURES
BRONCHOSCOPIC PROCEDURE NOTE:   Procedure(s) performed:  71954 - Dx bronchoscope/BAL  87229 - Bronchoscopy w/Therapeutic aspiration - initial    Preprocedure diagnosis: RML PNA  Post procedure diagnosis: Same and mucus plugging.     DESCRIPTION OF PROCEDURE:   - Consent:  Informed consent was obtained from the patient  after explaining the risks and benefits. A time out was taken.    - Sedation:  Bronchoscopy performed under GA, please refer to anesthesiology notes for details.     - Procedure details:  Bronchoscope was inserted into the main airway via the ETT.  Vocal cords were note assessed due to ETT presence. Lidocaine was was not used. The bronchial trees were examined to the subsegmental level.   Extensive amount of secretions with obstructive and non-obstructive mucus plugging were noted at the bertrand, RMB, RUL takeoff, RML, RLL, NEGRO takeoff, lingula, and LLL, therapeutic aspiration was performed at the mentioned segments, 30 ml NS was used to aid secretion management.   The bronchoscope was advanced and wedged against the RML, a total of 120 ml of NS were instilled and a total of 10 ml were obtained in return. BAL was quite complicated due to highly collapsible airways, once wedge was released the rest of the saline obtained back was sent as part of bronchial wash     - Findings:  Airway mucosa congested and somewhat erythematous, EDAC noted, very thick and tenacious secretions were noted throughout the bilateral airways.   Endobronchial lesions: Not visualized  Lymphadenopathy: Not evaluated            - Samples:  BAL and bronchial wash    Sent for Microbiology and Cytology    The patient tolerated the procedure well. No immediate complication    EBL: 0 ml     Fernando Sánchez \"Rubén\" Nicole Horan MD  Pulmonary and Critical Care Medicine

## 2024-12-06 NOTE — PLAN OF CARE
Problem: Chronic Conditions and Co-morbidities  Goal: Patient's chronic conditions and co-morbidity symptoms are monitored and maintained or improved  Outcome: Progressing  Flowsheets  Taken 12/6/2024 0401 by Daryl Dickinson RN  Care Plan - Patient's Chronic Conditions and Co-Morbidity Symptoms are Monitored and Maintained or Improved:   Monitor and assess patient's chronic conditions and comorbid symptoms for stability, deterioration, or improvement   Collaborate with multidisciplinary team to address chronic and comorbid conditions and prevent exacerbation or deterioration  Taken 12/6/2024 0052 by Daryl Dickinson RN  Care Plan - Patient's Chronic Conditions and Co-Morbidity Symptoms are Monitored and Maintained or Improved:   Monitor and assess patient's chronic conditions and comorbid symptoms for stability, deterioration, or improvement   Collaborate with multidisciplinary team to address chronic and comorbid conditions and prevent exacerbation or deterioration     Problem: Discharge Planning  Goal: Discharge to home or other facility with appropriate resources  Outcome: Progressing     Problem: Safety - Adult  Goal: Free from fall injury  12/6/2024 1217 by Marine Benedict RN  Outcome: Progressing  12/6/2024 0103 by Daryl Dickinson RN  Outcome: Progressing     Problem: ABCDS Injury Assessment  Goal: Absence of physical injury  Outcome: Progressing     Problem: Respiratory - Adult  Goal: Achieves optimal ventilation and oxygenation  12/6/2024 1217 by Marine Benedict RN  Outcome: Progressing  12/6/2024 0103 by Daryl Dickinson RN  Outcome: Progressing

## 2024-12-06 NOTE — PROGRESS NOTES
Attempting to wean O2 , sat 93 % on high flow O2 on 10L. Dr Rivera called ok to maintain sats 88-92%, ok ti place pt on her bi-pap in pacu until we are able to wean O2.  Dr. Arriola called, aware of status. RT also notified, Deshaun will get pt bipap from room.  IS instructed, pt inspired 1500 ml X 8

## 2024-12-07 VITALS
TEMPERATURE: 98.7 F | HEART RATE: 80 BPM | HEIGHT: 69 IN | SYSTOLIC BLOOD PRESSURE: 137 MMHG | DIASTOLIC BLOOD PRESSURE: 81 MMHG | WEIGHT: 229.5 LBS | OXYGEN SATURATION: 92 % | BODY MASS INDEX: 33.99 KG/M2 | RESPIRATION RATE: 20 BRPM

## 2024-12-07 LAB
ALBUMIN SERPL-MCNC: 3.5 G/DL (ref 3.4–5)
ANION GAP SERPL CALCULATED.3IONS-SCNC: 11 MMOL/L (ref 3–16)
BACTERIA SPEC RESP CULT: NORMAL
BASOPHILS # BLD: 0 K/UL (ref 0–0.2)
BASOPHILS NFR BLD: 0.1 %
BUN SERPL-MCNC: 51 MG/DL (ref 7–20)
CALCIUM SERPL-MCNC: 9 MG/DL (ref 8.3–10.6)
CHLORIDE SERPL-SCNC: 104 MMOL/L (ref 99–110)
CO2 SERPL-SCNC: 24 MMOL/L (ref 21–32)
CREAT SERPL-MCNC: 2.1 MG/DL (ref 0.6–1.2)
DEPRECATED RDW RBC AUTO: 15.2 % (ref 12.4–15.4)
EOSINOPHIL # BLD: 0 K/UL (ref 0–0.6)
EOSINOPHIL NFR BLD: 0 %
GFR SERPLBLD CREATININE-BSD FMLA CKD-EPI: 26 ML/MIN/{1.73_M2}
GLUCOSE BLD-MCNC: 111 MG/DL (ref 70–99)
GLUCOSE BLD-MCNC: 159 MG/DL (ref 70–99)
GLUCOSE BLD-MCNC: 216 MG/DL (ref 70–99)
GLUCOSE SERPL-MCNC: 95 MG/DL (ref 70–99)
GRAM STN SPEC: NORMAL
HCT VFR BLD AUTO: 34.1 % (ref 36–48)
HGB BLD-MCNC: 11.1 G/DL (ref 12–16)
LOEFFLER MB STN SPEC: NORMAL
LOEFFLER MB STN SPEC: NORMAL
LYMPHOCYTES # BLD: 1.2 K/UL (ref 1–5.1)
LYMPHOCYTES NFR BLD: 11.3 %
MAGNESIUM SERPL-MCNC: 2.57 MG/DL (ref 1.8–2.4)
MCH RBC QN AUTO: 30.7 PG (ref 26–34)
MCHC RBC AUTO-ENTMCNC: 32.6 G/DL (ref 31–36)
MCV RBC AUTO: 94.2 FL (ref 80–100)
MONOCYTES # BLD: 0.6 K/UL (ref 0–1.3)
MONOCYTES NFR BLD: 5.8 %
NEUTROPHILS # BLD: 8.8 K/UL (ref 1.7–7.7)
NEUTROPHILS NFR BLD: 82.8 %
ORGANISM: ABNORMAL
PERFORMED ON: ABNORMAL
PHOSPHATE SERPL-MCNC: 4.6 MG/DL (ref 2.5–4.9)
PLATELET # BLD AUTO: 291 K/UL (ref 135–450)
PMV BLD AUTO: 7.3 FL (ref 5–10.5)
POTASSIUM SERPL-SCNC: 5.1 MMOL/L (ref 3.5–5.1)
RBC # BLD AUTO: 3.62 M/UL (ref 4–5.2)
REPORT: NORMAL
REPORT: NORMAL
RESP PATH DNA+RNA PNL L RESP NAA+NON-PRB: ABNORMAL
RESP PATH DNA+RNA PNL L RESP NAA+NON-PRB: ABNORMAL
RESP PATH DNA+RNA PNL L RESP NAA+NON-PRB: NORMAL
SODIUM SERPL-SCNC: 139 MMOL/L (ref 136–145)
WBC # BLD AUTO: 10.6 K/UL (ref 4–11)

## 2024-12-07 PROCEDURE — 83735 ASSAY OF MAGNESIUM: CPT

## 2024-12-07 PROCEDURE — 6370000000 HC RX 637 (ALT 250 FOR IP): Performed by: INTERNAL MEDICINE

## 2024-12-07 PROCEDURE — 94761 N-INVAS EAR/PLS OXIMETRY MLT: CPT

## 2024-12-07 PROCEDURE — 6370000000 HC RX 637 (ALT 250 FOR IP)

## 2024-12-07 PROCEDURE — 2580000003 HC RX 258

## 2024-12-07 PROCEDURE — 99223 1ST HOSP IP/OBS HIGH 75: CPT | Performed by: INTERNAL MEDICINE

## 2024-12-07 PROCEDURE — 80069 RENAL FUNCTION PANEL: CPT

## 2024-12-07 PROCEDURE — 94640 AIRWAY INHALATION TREATMENT: CPT

## 2024-12-07 PROCEDURE — 6360000002 HC RX W HCPCS: Performed by: INTERNAL MEDICINE

## 2024-12-07 PROCEDURE — 2700000000 HC OXYGEN THERAPY PER DAY

## 2024-12-07 PROCEDURE — 94668 MNPJ CHEST WALL SBSQ: CPT

## 2024-12-07 PROCEDURE — 85025 COMPLETE CBC W/AUTO DIFF WBC: CPT

## 2024-12-07 RX ORDER — LEVOFLOXACIN 750 MG/1
750 TABLET, FILM COATED ORAL DAILY
Qty: 5 TABLET | Refills: 0 | Status: SHIPPED | OUTPATIENT
Start: 2024-12-07 | End: 2024-12-12

## 2024-12-07 RX ORDER — PREDNISONE 20 MG/1
40 TABLET ORAL DAILY
Qty: 4 TABLET | Refills: 0 | Status: SHIPPED | OUTPATIENT
Start: 2024-12-08 | End: 2024-12-10

## 2024-12-07 RX ORDER — NICOTINE 21 MG/24HR
1 PATCH, TRANSDERMAL 24 HOURS TRANSDERMAL DAILY
Qty: 30 PATCH | Refills: 3 | Status: SHIPPED | OUTPATIENT
Start: 2024-12-07

## 2024-12-07 RX ORDER — IPRATROPIUM BROMIDE AND ALBUTEROL SULFATE 2.5; .5 MG/3ML; MG/3ML
1 SOLUTION RESPIRATORY (INHALATION) EVERY 6 HOURS
Qty: 360 ML | Refills: 1 | Status: SHIPPED | OUTPATIENT
Start: 2024-12-07

## 2024-12-07 RX ADMIN — IPRATROPIUM BROMIDE AND ALBUTEROL SULFATE 1 DOSE: 2.5; .5 SOLUTION RESPIRATORY (INHALATION) at 13:09

## 2024-12-07 RX ADMIN — IPRATROPIUM BROMIDE AND ALBUTEROL SULFATE 1 DOSE: 2.5; .5 SOLUTION RESPIRATORY (INHALATION) at 08:40

## 2024-12-07 RX ADMIN — ACETYLCYSTEINE 600 MG: 200 SOLUTION ORAL; RESPIRATORY (INHALATION) at 08:41

## 2024-12-07 RX ADMIN — HYDROXYZINE HYDROCHLORIDE 25 MG: 25 TABLET ORAL at 09:39

## 2024-12-07 RX ADMIN — CARVEDILOL 6.25 MG: 6.25 TABLET, FILM COATED ORAL at 09:39

## 2024-12-07 RX ADMIN — SERTRALINE 50 MG: 50 TABLET, FILM COATED ORAL at 09:38

## 2024-12-07 RX ADMIN — SODIUM BICARBONATE 650 MG: 650 TABLET ORAL at 09:38

## 2024-12-07 RX ADMIN — PREDNISONE 40 MG: 20 TABLET ORAL at 09:38

## 2024-12-07 RX ADMIN — AMLODIPINE BESYLATE 10 MG: 10 TABLET ORAL at 09:39

## 2024-12-07 RX ADMIN — LETROZOLE 2.5 MG: 2.5 TABLET ORAL at 09:54

## 2024-12-07 RX ADMIN — SODIUM CHLORIDE, PRESERVATIVE FREE 10 ML: 5 INJECTION INTRAVENOUS at 09:40

## 2024-12-07 ASSESSMENT — ENCOUNTER SYMPTOMS
SHORTNESS OF BREATH: 0
COUGH: 0

## 2024-12-07 NOTE — CONSULTS
Nephrology Consult Note                                                                                                                                                                                                                                                                                                                                                               Office : [phone]     Fax :[phone]    Patient's Name: Shoaib Rothman  9:10 AM  12/7/2024    Reason for Consult:  CHANTELLE on CKD  Requesting Physician:  Lamar Mondragon MD  Chief Complaint:    Chief Complaint   Patient presents with    Cold Symptoms     Arrived via EMS complaint of cold symptoms x 3 days, COPD, on 3L @ home.        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 Holding ARB  - Check urine studies  - Avoid nephrotoxins  - OK to discharge from a renal standpoint. Will arrange close outpatient follow-up     # HTN  - BP's acceptable. ARB on hold   - Continue home meds     # Anemia of chronic disease  -  Hgb stable  - Monitor     # Acid- base/ Electrolyte imbalance   - On home sodium bicarb  - Low K diet. Resume home Lokelma at discharge   - Monitor     # DM2  - Insulin management per primary     # Pneumonia  - Pulmonary on board  - S/p bronchoscopy on 12/6  - Cont management per primary       History of Present Ilness:    Shoaib Rothman is a 64 y.o. female with a PMH of CKD 3, DM2, PE, COPD, and HTN, who presented to the ER on 12/4 with cold-like symptoms and increased shortness of breath. In the ER, labs were significant for Cr of 2.0 and imaging with right basilar consolidation. We are consulted for CHANTELLE on CKD.     Interval hx:    Resting in bed  Feeling much better since her bronch yesterday  On 3L supplemental O2  Appetite is good  No N/V/D  BP's acceptable - some softer BP's yesterday       Past Medical History:   Diagnosis Date    Asthma      12/05/24  1538 12/06/24  0730 12/07/24  0720   WBC 7.3 6.9 10.6   HGB 12.0 11.4* 11.1*    275 291     BMP:    Recent Labs     12/04/24 1907 12/05/24  1538 12/06/24  0730    140 139   K 4.1 4.3 4.4    104 106   CO2 23 25 22   BUN 39* 36* 45*   CREATININE 2.0* 2.0* 1.8*   GLUCOSE 107* 119* 113*     Ca/Mg/Phos:   Recent Labs     12/04/24 1907 12/05/24  1538 12/06/24  0730   CALCIUM 9.2 9.2 9.0   MG  --  1.51* 1.65*   PHOS  --  3.3 3.9     Hepatic: No results for input(s): \"AST\", \"ALT\", \"BILITOT\", \"ALKPHOS\" in the last 72 hours.    Invalid input(s): \"ALB\"  Troponin: No results for input(s): \"TROPONINI\" in the last 72 hours.  BNP: No results for input(s): \"BNP\" in the last 72 hours.  Lipids: No results for input(s): \"CHOL\", \"TRIG\", \"HDL\" in the last 72 hours.    Invalid input(s): \"LDLCALC\", \"LABVLDL\"  ABGs: No results for input(s): \"PHART\", \"PO2ART\", \"EDU4YRX\" in the last 72 hours.  INR: No results for input(s): \"INR\" in the last 72 hours.  UA:  Recent Labs     12/05/24  0500   COLORU Yellow   CLARITYU Clear   GLUCOSEU Negative   BILIRUBINUR Negative   KETUA Negative   BLOODU TRACE-INTACT*   PHUR 6.5   PROTEINU >=300*   UROBILINOGEN 0.2   NITRU Negative   LEUKOCYTESUR TRACE*   URINETYPE NotGiven      Urine Microscopic:   Recent Labs     12/05/24  0500   BACTERIA Rare*   WBCUA 21-50*   RBCUA None seen     Urine Culture:   Recent Labs     12/05/24  0500   LABURIN 50,000 CFU/ml  Additional Sensitivity to follow  CONTACT PRECAUTIONS INDICATED  Carbapenem antibiotics are the best option for infections caused  by ESBL producing organisms.  Other antibiotic classes are  likely to result in treatment failure, even for organisms  demonstrating in vitro susceptibility.  *     Urine Chemistry: No results for input(s): \"CLUR\", \"LABCREA\", \"PROTEINUR\", \"NAUR\" in the last 72 hours.      IMAGING:  CT CHEST WO CONTRAST   Final Result   1.   There is some mixed patchy groundglass alveolar infiltrate and consolidation

## 2024-12-07 NOTE — CARE COORDINATION
Case Management Assessment            Discharge Note                    Date / Time of Note: 12/7/2024 4:03 PM                  Discharge Note Completed by: Stacey Blackburn RN    Patient Name: Shoaib Rothman   YOB: 1960  Diagnosis: CHANTELLE (acute kidney injury) (HCC) [N17.9]  Elevated brain natriuretic peptide (BNP) level [R79.89]  Chronic obstructive pulmonary disease, unspecified COPD type (HCC) [J44.9]  Pneumonia due to infectious organism [J18.9]  Acute hypoxic respiratory failure [J96.01]   Date / Time: 12/4/2024  7:59 PM    Current PCP: Lamar Mondragon MD  Clinic patient: No    Hospitalization in the last 30 days: No       Advance Directives:  Code Status: Full Code  [place] DNR form completed and on chart: Not Indicated    Financial:  Payor: MEDICAID [place] / Plan: MEDICAID [facility] DEPT OF JOB / Product Type: *No Product type* /      Pharmacy:    [facility] [address] [facility] -  [phone] - F [phone]  [facility] [address]  Phone: [phone] Fax: [phone]    [place] [address] [facility] [phone] - F [phone]  [address] [place] [address]  Phone: [phone] Fax: [phone]    [id number] - [place], [place] - [address] - P [phone] - F [phone]  [address]  [facility] [address]  Phone: [phone] Fax: [phone]    [id number] [place] - [address] - P [phone] - F [phone]  [address] [place] [address]  Phone: [phone] Fax: [phone]      Assistance purchasing medications?: Potential Assistance Purchasing Medications: No  Assistance provided by Case Management: None at this time    Does patient want to participate in local refill/ meds to beds program?:      Meds To Beds General Rules:  1. Can ONLY be done Monday- Friday between 8:30am-5pm  2. Prescription(s) must be in  pharmacy by 3pm to be filled same day  3.Copy of patient's insurance/ prescription drug card and patient face sheet must be sent along with the prescription(s)  4. Cost of Rx cannot be added to hospital bill. If financial assistance is needed, please contact unit  or ;  or  CANNOT provide pharmacy voucher for patients co-pays  5. Patients can then  the prescription on their way out of the hospital at discharge, or pharmacy can deliver to the bedside if staff is available. (payment due at time of pick-up or delivery - cash, check, or card accepted)     Able to afford home medications/ co-pay costs: Yes    ADLS:  Current PT AM-PAC Score:   /24  Current OT AM-PAC Score:   /24    DISCHARGE Disposition: [facility]   LOC at discharge: Long Term Care  JHOAN Completed: Yes    Notification completed in HENS/PAS?:  Not Applicable    IMM Completed:   No         Transportation:  Transportation PLAN for discharge: EMS transportation   Mode of Transport: Ambulance stretcher - BLS  Reason for medical transport: Other: O2  Name of Transport Company: [employer]  Phone: [phone]  Time of Transport: 1815    Transport form completed: Yes        Additional CM Notes: To [facility] 1815, They will pull AVS from [employer].     COVID Result:    Lab Results   Component Value Date/Time    COVID19 Not Detected 04/15/2022 09:58 AM    COVID19 NOT DETECTED 04/01/2022 02:07 AM    COVID19 NOT DETECTED 09/22/2020 07:00 PM       The Plan for Transition of Care is related to the following treatment goals of CHANTELLE (acute kidney injury) (HCC) [N17.9]  Elevated brain natriuretic peptide (BNP) level [R79.89]  Chronic obstructive pulmonary disease, unspecified COPD type (HCC) [J44.9]  Pneumonia due to infectious organism [J18.9]  Acute hypoxic respiratory failure [J96.01]    The Patient and/or patient representative Shoaib and her family were provided with a choice of  provider and agrees with the discharge plan Yes    Freedom of choice list was provided with basic dialogue that supports the patient's individualized plan of care/goals and shares the quality data associated with the providers. Yes    Care Transitions patient: No    Stacey Blackburn RN  [facility]  Case Management Department  Ph: [phone]

## 2024-12-07 NOTE — DISCHARGE INSTRUCTIONS
Please stop taking your losartan. Follow up with Dr. Lockett to discuss when it is appropriate to restart this medication.  Please get your lab work done (renal function panel) on Monday, 12/9/24 and follow up with Dr. Lockett as soon as possible.   Please take your antibiotic levofloxacin 750 mg daily for 5 more days   Please take your steroid, prednisone 40 mg daily for 2 more days  Get a repeat CT scan of your chest in 4-6 weeks and follow up at your pulmonology appointment with Dr. Mejia scheduled for 1/13/25

## 2024-12-07 NOTE — DISCHARGE SUMMARY
INTERNAL MEDICINE DEPARTMENT AT THE [facility]  DISCHARGE SUMMARY    Patient ID: Shoaib Rothman                                             Discharge Date: 12/7/24   Patient's PCP: Lamar Mondragon MD                                          Discharge Physician: Rosmery Polanco MD  Admit Date: 12/4/2024   Admitting Physician: Brett Juraez MD    PROBLEMS DURING HOSPITALIZATION:  - Viral infection with superimposed MSSA bacterial RML pneumonia   - COPD exacerbation 2/2 pneumonia   - CHANTELLE on CKD Stage 3  - Bacteruria, chronic colonization     Brief HPI:    Ms. Shoaib Rothman is a 64 y.o. female with a MHx significant for, T2DM, chronic kidney disease stage 3b, history of pulmonary embolism (on eliquis), COPD, hypertension, who presented to the ED on 12/4/2024 with shortness of breath.    Patient reports shortness of breath, cough for the last month and increased oxygen requirement. Was prescribed doxycyline twice and medrol dosepak once at her facility for shortness of breath. Symptoms persisted and patient was brought to the hospital. Upon arrival, patient required 3 L NC (baseline 2 L at night) and reported increased SOB, cough and pink sputum.     Labs significant for Cr 2.0 (baseline 1.2). CXR showed right basilar consolidation.      The following issues were addressed during hospitalization:    Viral infection with superimposed MSSA bacterial RML pneumonia   COPD exacerbation 2/2 pneumonia   Patient presented with increased oxygen requirement, shortness of breath and cough. Found to have patchy ground glass alveolar infiltrate and consolidation win the right middle lobe on imagining. Previous imaging demonstrated partial atelectasis in right middle lobe. Pulmonology performed bronchoscopy on 12/6 which demonstrated congested and erythematous airway mucosa, EDAC and thick secretions. Infectious work up revealed MSSA on molecular pneumonia panel and coronavirus NL63 on PCR.    Patient received duonebs,  prednisone 40 mg daily for 5 days, levofloxacin 7 day course, duo nebs, bronchoscopy   Disposition: long term care facility  Discharged Condition: Stable   Follow Up Appointments:   Primary care provider within a week.  Pulmonology 1/14/25  Nephrology as soon as possible    DISCHARGE MEDICATION:     Medication List        START taking these medications      levoFLOXacin 750 MG tablet  Commonly known as: LEVAQUIN  Take 1 tablet by mouth daily for 5 days     predniSONE 20 MG tablet  Commonly known as: DELTASONE  Take 2 tablets by mouth daily for 2 doses  Start taking on: December 8, 2024            CONTINUE taking these medications      acetaminophen 500 MG tablet  Commonly known as: TYLENOL     albuterol sulfate  (90 Base) MCG/ACT inhaler  Commonly known as: PROVENTIL;VENTOLIN;PROAIR     amLODIPine 10 MG tablet  Commonly known as: NORVASC  Take 1 tablet by mouth daily     apixaban 2.5 MG Tabs tablet  Commonly known as: Eliquis  Take 1 tablet by mouth 2 times daily     carvedilol 6.25 MG tablet  Commonly known as: COREG     CPAP Machine Misc     diphenhydrAMINE 12.5 MG chewable tablet  Commonly known as: BENADRYL     hydrOXYzine HCl 25 MG tablet  Commonly known as: ATARAX     ipratropium 0.5 mg-albuterol 2.5 mg 0.5-2.5 (3) MG/3ML Soln nebulizer solution  Commonly known as: DUONEB  Inhale 3 mLs into the lungs every 6 hours     letrozole 2.5 MG tablet  Commonly known as: FEMARA     loperamide 2 MG capsule  Commonly known as: IMODIUM     melatonin 3 MG Tabs tablet     mineral oil-hydrophilic petrolatum ointment     mometasone-formoterol 200-5 MCG/ACT inhaler  Commonly known as: DULERA  Inhale 2 puffs into the lungs 2 times daily     nicotine 21 MG/24HR  Commonly known as: NICODERM CQ  Place 1 patch onto the skin daily     ondansetron 4 MG tablet  Commonly known as: ZOFRAN     pantoprazole 40 MG tablet  Commonly known as: PROTONIX     rOPINIRole 0.25 MG tablet  Commonly known as: REQUIP     sertraline 50 MG

## 2024-12-07 NOTE — DISCHARGE INSTR - COC
Continuity of Care Form    Patient Name: Shoaib Rothman   :  1960  MRN:  [id number]    Admit date:  2024  Discharge date:  2024    Code Status Order: Full Code   Advance Directives:   Advance Care Flowsheet Documentation             Admitting Physician:  Brett Juarez MD  PCP: Lamar Mondragon MD    Discharging Nurse: [facility] Unit[facility] 6327/6327-01  Discharging Unit Phone Number: [phone]    Emergency Contact:   Extended Emergency Contact Information  Primary Emergency Contact: devante españa  Home Phone: [phone]  Work Phone: [phone]  Mobile Phone: [phone]  Relation: Brother/Sister   needed? No    Past Surgical History:  Past Surgical History:   Procedure Laterality Date    APPENDECTOMY      BREAST SURGERY      BRONCHOSCOPY N/A 2024    BRONCHOSCOPY performed by Fernando Dhillon MD at [facility] ENDOSCOPY    CHOLECYSTECTOMY      CT BIOPSY RENAL  2020    CT BIOPSY RENAL 2020 [facility] CT SCAN    CYSTOSCOPY N/A 2024    RIGID CYSTOSCOPY, BLADDER BIOPSY performed by Kris Kan MD at [facility] OR    MASTECTOMY Left 2019    LEFT MODIFIED RADICAL MASTECTOMY; EXPAREL INTERCOSTAL BLOCK performed by Rebecca Montesinos MD at [facility] OR    SKIN GRAFT Left 2019    COMPLEX CLOSURE OF LEFT BREAST, FULL THICKNESS SKIN GRAFT LEFT BREAST 4x3 performed by Moise Carlin MD at [facility] OR    TOTAL NEPHRECTOMY Left     TUBAL LIGATION      TUNNELED VENOUS PORT PLACEMENT  2019    still in      BREAST BIOPSY NEEDLE ADDITIONAL LEFT  2020    US BREAST BIOPSY NEEDLE ADDITIONAL LEFT 2020 Steven J Perlman, MD [facility] MOB ULTRASOUND       Immunization History:   Immunization History   Administered Date(s) Administered    COVID-19, MODERNA BLUE border, Primary or Immunocompromised, (age 12y+), IM, 100 mcg/0.5mL 12/10/2021    Influenza Virus Vaccine 2014    Pneumococcal, PPSV23, PNEUMOVAX 23, (age 2y+), SC/IM, 0.5mL 2014  Independent  Feeding  Independent  Med Admin  Assisted  Med Delivery   whole    Wound Care Documentation and Therapy:        Elimination:  Continence:   Bowel: Yes  Bladder: Yes  Urinary Catheter: None   Colostomy/Ileostomy/Ileal Conduit: No       Date of Last BM:     Intake/Output Summary (Last 24 hours) at 2024 1253  Last data filed at 2024 0830  Gross per 24 hour   Intake 1610 ml   Output 1800 ml   Net -190 ml     I/O last 3 completed shifts:  In:  [P.O.:1300; I.V.:720]  Out:  [Urine:1900]    Safety Concerns:     None    Impairments/Disabilities:      {[facility] Impairments/Disabilities:[id number]}    Nutrition Therapy:  Current Nutrition Therapy:   - Oral Diet:  General    Routes of Feeding: Oral  Liquids: Thin Liquids  Daily Fluid Restriction: no  Last Modified Barium Swallow with Video (Video Swallowing Test): not done    Treatments at the Time of Hospital Discharge:   Respiratory Treatments:     Oxygen Therapy:  is on oxygen at 3 L/min per nasal cannula.  Ventilator:    - No ventilator support    Rehab Therapies: ***  Weight Bearing Status/Restrictions: {[facility] Weight Bearin}  Other Medical Equipment (for information only, NOT a DME order):  ***  Other Treatments: ***    Patient's personal belongings (please select all that are sent with patient):  {[facility] DME Belongings:[id number]}    RN SIGNATURE:  Electronically signed by Cynthia Boston RN on 24 at 4:13 PM EST    CASE MANAGEMENT/SOCIAL WORK SECTION    Inpatient Status Date: ***    Readmission Risk Assessment Score:  Readmission Risk              Risk of Unplanned Readmission:  23           Discharging to Facility/ Agency   [facility]    / signature: {Esignature:[id number]}    PHYSICIAN SECTION    Prognosis: Fair    Condition at Discharge: Stable    Rehab Potential (if transferring to Rehab): Fair    Recommended Labs or Other Treatments After Discharge:   Losartan was held. Follow up with nephrologist,

## 2024-12-07 NOTE — CARE COORDINATION
To [facility] at 6:15 pm today by [facility] with 3 L O2.   Nurse report: [phone]  Fax: they will pull AVS from [employer]  Electronically signed by Stacey Blackburn RN on 12/7/2024 at 4:10 PM

## 2024-12-07 NOTE — PLAN OF CARE
Problem: Chronic Conditions and Co-morbidities  Goal: Patient's chronic conditions and co-morbidity symptoms are monitored and maintained or improved  Outcome: Adequate for Discharge     Problem: Discharge Planning  Goal: Discharge to home or other facility with appropriate resources  Outcome: Adequate for Discharge     Problem: Safety - Adult  Goal: Free from fall injury  Outcome: Adequate for Discharge     Problem: ABCDS Injury Assessment  Goal: Absence of physical injury  Outcome: Adequate for Discharge     Problem: Respiratory - Adult  Goal: Achieves optimal ventilation and oxygenation  Outcome: Adequate for Discharge     Problem: Pain  Goal: Verbalizes/displays adequate comfort level or baseline comfort level  Outcome: Adequate for Discharge

## 2024-12-07 NOTE — PROGRESS NOTES
Internal Medicine Progress Note    Patient Name: Shoaib Rothman   Patient : 1960   Date: 2024   Admit Date: 2024     CC: Cold Symptoms (Arrived via EMS complaint of cold symptoms x 3 days, COPD, on 3L @ home. )       Interval History     Bronchoscopy yesterday, patient tolerated procedure well. This morning, patient is on 3 L NC, reports feeling much better today, decreased shortness of breath. No cough, fever or chills.  VSS.    Pneumonia panel: Staph aureus DNA detected     ROS   Review of Systems   Respiratory:  Negative for cough and shortness of breath.    Cardiovascular:  Negative for chest pain.      Objective     I/Os:  I/O last 3 completed shifts:  In:  [P.O.:1300; I.V.:720]  Out:  [Urine:1900]      Vital Signs:  Patient Vitals for the past 8 hrs:   BP Temp Temp src Pulse Resp SpO2   24 0937 135/80 97.9 °F (36.6 °C) Oral 75 20 95 %   24 0841 -- -- -- 86 16 98 %   24 0526 -- -- -- 84 18 98 %   24 0457 (!) 153/82 97.8 °F (36.6 °C) Oral 80 18 95 %       Physical Exam  Constitutional:       Appearance: Normal appearance.   HENT:      Head: Normocephalic and atraumatic.      Mouth/Throat:      Mouth: Mucous membranes are moist.   Eyes:      Extraocular Movements: Extraocular movements intact.      Pupils: Pupils are equal, round, and reactive to light.   Cardiovascular:      Rate and Rhythm: Normal rate and regular rhythm.   Pulmonary:      Effort: Pulmonary effort is normal.      Breath sounds: Wheezing present.      Comments: On 3 L NC  Abdominal:      General: Abdomen is flat.      Palpations: Abdomen is soft.   Musculoskeletal:      Right lower leg: No edema.      Left lower leg: No edema.   Skin:     General: Skin is warm.      Capillary Refill: Capillary refill takes less than 2 seconds.   Neurological:      Mental Status: She is alert and oriented to person, place, and time.       Medications:   ipratropium 0.5 mg-albuterol 2.5 mg  1 Dose                   Pneumonia Panel, Molecular [[id number]]     Order Status: No result Specimen: Sputum                Radiology:  CT CHEST WO CONTRAST   Final Result   1.   There is some mixed patchy groundglass alveolar infiltrate and consolidation identified within the right middle lobe. Correlate for pneumonia.   2. Moderate centrilobular emphysema.   3. Status post left mastectomy.   4. Coronary artery calcification.      Electronically signed by Carlo Galvez MD      XR CHEST (2 VW)   Final Result      Abnormal course of the right sided port which appears stable from the prior CT chest. Right basal consolidation could represent atelectasis or infiltrate. Cardiomegaly.      Electronically signed by Ronald Daniels            Assessment & Plan   64 y.o. female who presented from nursing facility to the ED on 12/4/24 with worsening shortness of breath     Possible nosocomial Pneumonia  COPD exacerbation  Pt complains of increased cough and shortness of breath for a month. Was given Doxycycline x 2 at assisted living facility with no resolution of her symptoms. CT showed consolidations in right middle lobe. Currently requiring 4 L NC, at baseline does not require oxygen during the day. Increased oxygen requirement likely due to a combination of pneumonia in addition to worsening COPD. Was hospitalized in 2022 for pneumonia as well. With history persistent infiltrate will get pulmonology on board while patient is admitted.   - Levofloxacin day 3  - Pulmonology consult, appreciate recs for COPD exacerbation   - prednisone 40 mg daily day 3/5  - possible bronch/BAL 12/6: demonstrated erythematous congested mucosa, EDAC, thick secretions, no lesions  - Resp panel: coronavirus NL63, inconclusive   - Molecular pneumonia panel: staph aureus DNA detected   - Respiratory culture 3+ gram pos cocci, 2+ gram neg rods  - Legionella urine,strep pneumo antigen-presump neg  - procal 0.21, CRP 23.5     Acute Kidney Injury on  The patient is a 89y Male complaining of weakness.

## 2024-12-08 LAB
ACID FAST STN SPEC QL: NORMAL
ACID FAST STN SPEC QL: NORMAL
BACTERIA SPEC RESP CULT: NORMAL
BACTERIA SPEC RESP CULT: NORMAL
BACTERIA UR CULT: ABNORMAL
GRAM STN SPEC: NORMAL
GRAM STN SPEC: NORMAL
ORGANISM: ABNORMAL

## 2024-12-09 NOTE — PROGRESS NOTES
Physician Progress Note      PATIENT:               MALISSA DEE  CSN #:                  [id number]  :                       1960  ADMIT DATE:       2024 7:59 PM  DISCH DATE:        2024 6:57 PM  RESPONDING  PROVIDER #:        Rosmery Polanco MD          QUERY TEXT:    Pt admitted with AECOPD and pneumonia and has Acute respiratory failure   documented in the ED. Noted in the ED \"on home O2 2L On 4 L up from her   baseline of 2.\"  If possible, please document in progress notes and discharge summary further   specificity regarding the type and acuity of respiratory failure:    The medical record reflects the following:    Risk Factors: AECOPD, Pneumonia  Clinical Indicators: ED:  on home O2 2L--  On 4 L up from her baseline of 2.    Noted in the DC: Discharged after reporting improvement in her shortness of   breath and decreased oxygen requirement (up to 5 L NC while on the floor, down   to 3L NC) and with instructions to get repeat CT in 4-6 weeks and follow-up   with pulmonology. Pulmonology performed bronchoscopy on  which   demonstrated congested and erythematous airway mucosa, EDAC and thick   secretions. Infectious work up revealed MSSA on molecular pneumonia panel and   coronavirus NL63 on PCR. 89% sat noted on 5L  Treatment: 2-5LNC O2-10 HF non rebreather mask, Bronchoscopy BAL, Pulmonology   consult, IV antibiotics, instructions to get repeat CT in 4-6 weeks and   follow-up with pulmonology.  Options provided:  -- Acute respiratory failure with hypoxia  -- Chronic respiratory failure with hypoxia  -- Acute on chronic respiratory failure with hypoxia  -- Other - I will add my own diagnosis  -- Disagree - Not applicable / Not valid  -- Disagree - Clinically unable to determine / Unknown  -- Refer to Clinical Documentation Reviewer    PROVIDER RESPONSE TEXT:    This patient is in acute respiratory failure with hypoxia.    Query created by: Irena Power on 2024 8:19

## 2024-12-13 ENCOUNTER — TELEPHONE (OUTPATIENT)
Dept: PULMONOLOGY | Age: 64
End: 2024-12-13

## 2024-12-13 NOTE — TELEPHONE ENCOUNTER
Sending this to Dr Rivera and Dr Mejia.    Ms Rothman called today asking about results of test ran while she was in  the hospital.  She states that Dr Mejia ordered these test but she actually was seen by Dr Rivera and he ordered them.  She does have a new pt appt with Dr Mejia because she was ref by Dr Park for COPD, her appt is on 01/13/25.  Can you call her with these results and does she need to be seen before her appt on the 13.  Please advise

## 2024-12-16 LAB
FUNGUS SPEC CULT: NORMAL
FUNGUS SPEC CULT: NORMAL
LOEFFLER MB STN SPEC: NORMAL
LOEFFLER MB STN SPEC: NORMAL

## 2024-12-17 LAB
ACID FAST STN SPEC QL: NORMAL
ACID FAST STN SPEC QL: NORMAL
MYCOBACTERIUM SPEC CULT: NORMAL
MYCOBACTERIUM SPEC CULT: NORMAL

## 2024-12-19 ENCOUNTER — HOSPITAL ENCOUNTER (OUTPATIENT)
Dept: GENERAL RADIOLOGY | Age: 64
Discharge: HOME | End: 2024-12-19
Payer: MEDICAID

## 2024-12-19 DIAGNOSIS — Z79.811 USE OF AROMATASE INHIBITORS: ICD-10-CM

## 2024-12-19 PROCEDURE — 77080 DXA BONE DENSITY AXIAL: CPT

## 2024-12-23 ENCOUNTER — TELEPHONE (OUTPATIENT)
Dept: PULMONOLOGY | Age: 64
End: 2024-12-23

## 2024-12-23 NOTE — TELEPHONE ENCOUNTER
This pt called again and is very agitated that she has not heard her results yet.  I have sent this message to both Dr Mejia and Dr Rivera.  She has a f/u appt with Dr Mejia but Dr Rivera did the procedure.  Could one of you please call this pt with her results of her Bronch?  Thanks

## 2025-01-08 ENCOUNTER — HOSPITAL ENCOUNTER (INPATIENT)
Age: 65
LOS: 13 days | Discharge: NURSING, REHAB OR LONG-TERM CARE | DRG: 121 | End: 2025-01-21
Attending: EMERGENCY MEDICINE | Admitting: INTERNAL MEDICINE
Payer: MEDICAID

## 2025-01-08 ENCOUNTER — APPOINTMENT (OUTPATIENT)
Dept: GENERAL RADIOLOGY | Age: 65
DRG: 121 | End: 2025-01-08
Payer: MEDICAID

## 2025-01-08 DIAGNOSIS — J44.1 COPD WITH ACUTE EXACERBATION (HCC): Primary | ICD-10-CM

## 2025-01-08 DIAGNOSIS — J18.9 PNEUMONIA OF RIGHT LOWER LOBE DUE TO INFECTIOUS ORGANISM: ICD-10-CM

## 2025-01-08 DIAGNOSIS — T17.998A MUCUS PLUG IN RESPIRATORY TRACT: ICD-10-CM

## 2025-01-08 DIAGNOSIS — R06.02 SHORTNESS OF BREATH: ICD-10-CM

## 2025-01-08 LAB
ANION GAP SERPL CALCULATED.3IONS-SCNC: 10 MMOL/L (ref 3–16)
BASE EXCESS BLDV CALC-SCNC: 1.9 MMOL/L (ref -2–3)
BASOPHILS # BLD: 0 K/UL (ref 0–0.2)
BASOPHILS NFR BLD: 0.3 %
BUN SERPL-MCNC: 28 MG/DL (ref 7–20)
CALCIUM SERPL-MCNC: 8.5 MG/DL (ref 8.3–10.6)
CHLORIDE SERPL-SCNC: 107 MMOL/L (ref 99–110)
CO2 BLDV-SCNC: 30 MMOL/L
CO2 SERPL-SCNC: 26 MMOL/L (ref 21–32)
COHGB MFR BLDV: 2.4 % (ref 0–1.5)
CREAT SERPL-MCNC: 1.6 MG/DL (ref 0.6–1.2)
DEPRECATED RDW RBC AUTO: 15.1 % (ref 12.4–15.4)
DO-HGB MFR BLDV: 14.9 %
EKG ATRIAL RATE: 98 BPM
EKG DIAGNOSIS: NORMAL
EKG P AXIS: 84 DEGREES
EKG P-R INTERVAL: 180 MS
EKG Q-T INTERVAL: 390 MS
EKG QRS DURATION: 96 MS
EKG QTC CALCULATION (BAZETT): 497 MS
EKG R AXIS: 68 DEGREES
EKG T AXIS: 78 DEGREES
EKG VENTRICULAR RATE: 98 BPM
EOSINOPHIL # BLD: 0.1 K/UL (ref 0–0.6)
EOSINOPHIL NFR BLD: 1.1 %
FLUAV RNA RESP QL NAA+PROBE: NOT DETECTED
FLUBV RNA RESP QL NAA+PROBE: NOT DETECTED
GFR SERPLBLD CREATININE-BSD FMLA CKD-EPI: 36 ML/MIN/{1.73_M2}
GLUCOSE SERPL-MCNC: 100 MG/DL (ref 70–99)
HCO3 BLDV-SCNC: 28.1 MMOL/L (ref 24–28)
HCT VFR BLD AUTO: 32.8 % (ref 36–48)
HGB BLD-MCNC: 10.6 G/DL (ref 12–16)
LACTATE BLDV-SCNC: 0.5 MMOL/L (ref 0.4–2)
LYMPHOCYTES # BLD: 1.2 K/UL (ref 1–5.1)
LYMPHOCYTES NFR BLD: 15.3 %
MCH RBC QN AUTO: 30.1 PG (ref 26–34)
MCHC RBC AUTO-ENTMCNC: 32.4 G/DL (ref 31–36)
MCV RBC AUTO: 92.7 FL (ref 80–100)
METHGB MFR BLDV: 0.6 % (ref 0–1.5)
MONOCYTES # BLD: 0.6 K/UL (ref 0–1.3)
MONOCYTES NFR BLD: 7.2 %
NEUTROPHILS # BLD: 6.2 K/UL (ref 1.7–7.7)
NEUTROPHILS NFR BLD: 76.1 %
NT-PROBNP SERPL-MCNC: 449 PG/ML (ref 0–124)
PCO2 BLDV: 50.8 MMHG (ref 41–51)
PH BLDV: 7.35 [PH] (ref 7.35–7.45)
PLATELET # BLD AUTO: 307 K/UL (ref 135–450)
PMV BLD AUTO: 7 FL (ref 5–10.5)
PO2 BLDV: 50.8 MMHG (ref 25–40)
POTASSIUM SERPL-SCNC: 3.6 MMOL/L (ref 3.5–5.1)
PROCALCITONIN SERPL IA-MCNC: 0.09 NG/ML (ref 0–0.15)
RBC # BLD AUTO: 3.53 M/UL (ref 4–5.2)
SAO2 % BLDV: 85 %
SARS-COV-2 RNA RESP QL NAA+PROBE: NOT DETECTED
SODIUM SERPL-SCNC: 143 MMOL/L (ref 136–145)
TROPONIN, HIGH SENSITIVITY: 28 NG/L (ref 0–14)
TROPONIN, HIGH SENSITIVITY: 29 NG/L (ref 0–14)
WBC # BLD AUTO: 8.1 K/UL (ref 4–11)

## 2025-01-08 PROCEDURE — 99291 CRITICAL CARE FIRST HOUR: CPT

## 2025-01-08 PROCEDURE — 93005 ELECTROCARDIOGRAM TRACING: CPT | Performed by: EMERGENCY MEDICINE

## 2025-01-08 PROCEDURE — 36415 COLL VENOUS BLD VENIPUNCTURE: CPT

## 2025-01-08 PROCEDURE — 83605 ASSAY OF LACTIC ACID: CPT

## 2025-01-08 PROCEDURE — 2700000000 HC OXYGEN THERAPY PER DAY

## 2025-01-08 PROCEDURE — 6370000000 HC RX 637 (ALT 250 FOR IP): Performed by: EMERGENCY MEDICINE

## 2025-01-08 PROCEDURE — 87636 SARSCOV2 & INF A&B AMP PRB: CPT

## 2025-01-08 PROCEDURE — 84484 ASSAY OF TROPONIN QUANT: CPT

## 2025-01-08 PROCEDURE — 2060000000 HC ICU INTERMEDIATE R&B

## 2025-01-08 PROCEDURE — 94640 AIRWAY INHALATION TREATMENT: CPT

## 2025-01-08 PROCEDURE — 71045 X-RAY EXAM CHEST 1 VIEW: CPT

## 2025-01-08 PROCEDURE — 94761 N-INVAS EAR/PLS OXIMETRY MLT: CPT

## 2025-01-08 PROCEDURE — 6370000000 HC RX 637 (ALT 250 FOR IP)

## 2025-01-08 PROCEDURE — 83880 ASSAY OF NATRIURETIC PEPTIDE: CPT

## 2025-01-08 PROCEDURE — 85025 COMPLETE CBC W/AUTO DIFF WBC: CPT

## 2025-01-08 PROCEDURE — 80048 BASIC METABOLIC PNL TOTAL CA: CPT

## 2025-01-08 PROCEDURE — 6360000002 HC RX W HCPCS: Performed by: EMERGENCY MEDICINE

## 2025-01-08 PROCEDURE — 94660 CPAP INITIATION&MGMT: CPT

## 2025-01-08 PROCEDURE — 82803 BLOOD GASES ANY COMBINATION: CPT

## 2025-01-08 PROCEDURE — 99285 EMERGENCY DEPT VISIT HI MDM: CPT

## 2025-01-08 PROCEDURE — 84145 PROCALCITONIN (PCT): CPT

## 2025-01-08 RX ORDER — ENOXAPARIN SODIUM 100 MG/ML
30 INJECTION SUBCUTANEOUS 2 TIMES DAILY
Status: DISCONTINUED | OUTPATIENT
Start: 2025-01-09 | End: 2025-01-08

## 2025-01-08 RX ORDER — ONDANSETRON 4 MG/1
4 TABLET, ORALLY DISINTEGRATING ORAL EVERY 8 HOURS PRN
Status: DISCONTINUED | OUTPATIENT
Start: 2025-01-08 | End: 2025-01-21 | Stop reason: HOSPADM

## 2025-01-08 RX ORDER — PREDNISONE 20 MG/1
60 TABLET ORAL ONCE
Status: COMPLETED | OUTPATIENT
Start: 2025-01-08 | End: 2025-01-08

## 2025-01-08 RX ORDER — SODIUM CHLORIDE 9 MG/ML
INJECTION, SOLUTION INTRAVENOUS PRN
Status: DISCONTINUED | OUTPATIENT
Start: 2025-01-08 | End: 2025-01-21 | Stop reason: HOSPADM

## 2025-01-08 RX ORDER — TRAZODONE HYDROCHLORIDE 50 MG/1
25 TABLET, FILM COATED ORAL NIGHTLY
Status: DISCONTINUED | OUTPATIENT
Start: 2025-01-08 | End: 2025-01-21 | Stop reason: HOSPADM

## 2025-01-08 RX ORDER — PANTOPRAZOLE SODIUM 40 MG/1
40 TABLET, DELAYED RELEASE ORAL DAILY
Status: DISCONTINUED | OUTPATIENT
Start: 2025-01-09 | End: 2025-01-21 | Stop reason: HOSPADM

## 2025-01-08 RX ORDER — IPRATROPIUM BROMIDE AND ALBUTEROL SULFATE 2.5; .5 MG/3ML; MG/3ML
1 SOLUTION RESPIRATORY (INHALATION) ONCE
Status: COMPLETED | OUTPATIENT
Start: 2025-01-08 | End: 2025-01-08

## 2025-01-08 RX ORDER — LETROZOLE 2.5 MG/1
2.5 TABLET, FILM COATED ORAL DAILY
Status: DISCONTINUED | OUTPATIENT
Start: 2025-01-09 | End: 2025-01-21 | Stop reason: HOSPADM

## 2025-01-08 RX ORDER — ROPINIROLE 0.25 MG/1
0.25 TABLET, FILM COATED ORAL NIGHTLY
Status: DISCONTINUED | OUTPATIENT
Start: 2025-01-08 | End: 2025-01-21 | Stop reason: HOSPADM

## 2025-01-08 RX ORDER — CARVEDILOL 6.25 MG/1
6.25 TABLET ORAL 2 TIMES DAILY WITH MEALS
Status: DISCONTINUED | OUTPATIENT
Start: 2025-01-09 | End: 2025-01-21 | Stop reason: HOSPADM

## 2025-01-08 RX ORDER — LEVOFLOXACIN 500 MG/1
500 TABLET, FILM COATED ORAL ONCE
Status: DISCONTINUED | OUTPATIENT
Start: 2025-01-08 | End: 2025-01-09

## 2025-01-08 RX ORDER — ALBUTEROL SULFATE 0.83 MG/ML
2.5 SOLUTION RESPIRATORY (INHALATION) ONCE
Status: COMPLETED | OUTPATIENT
Start: 2025-01-08 | End: 2025-01-08

## 2025-01-08 RX ORDER — IPRATROPIUM BROMIDE AND ALBUTEROL SULFATE 2.5; .5 MG/3ML; MG/3ML
1 SOLUTION RESPIRATORY (INHALATION)
Status: DISCONTINUED | OUTPATIENT
Start: 2025-01-09 | End: 2025-01-13

## 2025-01-08 RX ORDER — SODIUM CHLORIDE 0.9 % (FLUSH) 0.9 %
5-40 SYRINGE (ML) INJECTION PRN
Status: DISCONTINUED | OUTPATIENT
Start: 2025-01-08 | End: 2025-01-21 | Stop reason: HOSPADM

## 2025-01-08 RX ORDER — AZITHROMYCIN 250 MG/1
500 TABLET, FILM COATED ORAL ONCE
Status: COMPLETED | OUTPATIENT
Start: 2025-01-08 | End: 2025-01-08

## 2025-01-08 RX ORDER — ACETAMINOPHEN 325 MG/1
650 TABLET ORAL EVERY 6 HOURS PRN
Status: DISCONTINUED | OUTPATIENT
Start: 2025-01-08 | End: 2025-01-12

## 2025-01-08 RX ORDER — POLYETHYLENE GLYCOL 3350 17 G/17G
17 POWDER, FOR SOLUTION ORAL DAILY PRN
Status: DISCONTINUED | OUTPATIENT
Start: 2025-01-08 | End: 2025-01-21 | Stop reason: HOSPADM

## 2025-01-08 RX ORDER — ACETAMINOPHEN 650 MG/1
650 SUPPOSITORY RECTAL EVERY 6 HOURS PRN
Status: DISCONTINUED | OUTPATIENT
Start: 2025-01-08 | End: 2025-01-12

## 2025-01-08 RX ORDER — ONDANSETRON 2 MG/ML
4 INJECTION INTRAMUSCULAR; INTRAVENOUS EVERY 6 HOURS PRN
Status: DISCONTINUED | OUTPATIENT
Start: 2025-01-08 | End: 2025-01-21 | Stop reason: HOSPADM

## 2025-01-08 RX ORDER — TEMAZEPAM 15 MG/1
15 CAPSULE ORAL NIGHTLY
Status: DISCONTINUED | OUTPATIENT
Start: 2025-01-08 | End: 2025-01-21 | Stop reason: HOSPADM

## 2025-01-08 RX ORDER — AMLODIPINE BESYLATE 10 MG/1
10 TABLET ORAL DAILY
Status: DISCONTINUED | OUTPATIENT
Start: 2025-01-09 | End: 2025-01-21 | Stop reason: HOSPADM

## 2025-01-08 RX ORDER — SODIUM CHLORIDE 0.9 % (FLUSH) 0.9 %
5-40 SYRINGE (ML) INJECTION EVERY 12 HOURS SCHEDULED
Status: DISCONTINUED | OUTPATIENT
Start: 2025-01-08 | End: 2025-01-21 | Stop reason: HOSPADM

## 2025-01-08 RX ORDER — HYDROXYZINE HYDROCHLORIDE 25 MG/1
25 TABLET, FILM COATED ORAL
Status: DISCONTINUED | OUTPATIENT
Start: 2025-01-08 | End: 2025-01-21 | Stop reason: HOSPADM

## 2025-01-08 RX ORDER — PREDNISONE 20 MG/1
40 TABLET ORAL EVERY 24 HOURS
Status: COMPLETED | OUTPATIENT
Start: 2025-01-09 | End: 2025-01-12

## 2025-01-08 RX ADMIN — IPRATROPIUM BROMIDE AND ALBUTEROL SULFATE 1 DOSE: .5; 3 SOLUTION RESPIRATORY (INHALATION) at 18:38

## 2025-01-08 RX ADMIN — APIXABAN 2.5 MG: 2.5 TABLET, FILM COATED ORAL at 23:50

## 2025-01-08 RX ADMIN — TEMAZEPAM 15 MG: 15 CAPSULE ORAL at 23:50

## 2025-01-08 RX ADMIN — HYDROXYZINE HYDROCHLORIDE 25 MG: 25 TABLET ORAL at 23:50

## 2025-01-08 RX ADMIN — Medication 6 MG: at 23:51

## 2025-01-08 RX ADMIN — ALBUTEROL SULFATE 2.5 MG: 2.5 SOLUTION RESPIRATORY (INHALATION) at 18:38

## 2025-01-08 RX ADMIN — AZITHROMYCIN DIHYDRATE 500 MG: 250 TABLET ORAL at 18:24

## 2025-01-08 RX ADMIN — TRAZODONE HYDROCHLORIDE 25 MG: 50 TABLET ORAL at 23:49

## 2025-01-08 RX ADMIN — PREDNISONE 60 MG: 20 TABLET ORAL at 18:23

## 2025-01-08 RX ADMIN — ROPINIROLE HYDROCHLORIDE 0.25 MG: 0.25 TABLET, FILM COATED ORAL at 23:50

## 2025-01-08 ASSESSMENT — PAIN - FUNCTIONAL ASSESSMENT: PAIN_FUNCTIONAL_ASSESSMENT: NONE - DENIES PAIN

## 2025-01-08 NOTE — ED PROVIDER NOTES
smoking history. She has never used smokeless tobacco. She reports that she does not drink alcohol and does not use drugs.    Medications     Previous Medications    ACETAMINOPHEN (TYLENOL) 500 MG TABLET    Take 1 tablet by mouth in the morning, at noon, and at bedtime    ALBUTEROL SULFATE HFA (PROVENTIL;VENTOLIN;PROAIR) 108 (90 BASE) MCG/ACT INHALER    Inhale 2 puffs into the lungs every 4 hours as needed for Wheezing or Shortness of Breath (COPD)    AMLODIPINE (NORVASC) 10 MG TABLET    Take 1 tablet by mouth daily    APIXABAN (ELIQUIS) 2.5 MG TABS TABLET    Take 1 tablet by mouth 2 times daily    CARVEDILOL (COREG) 6.25 MG TABLET    Take 1 tablet by mouth 2 times daily (with meals) Hold for BP < 100/60 or HR < 55    DIPHENHYDRAMINE (BENADRYL) 12.5 MG CHEWABLE TABLET    Take 1 tablet by mouth 4 times daily as needed for Allergies Every 6 hours as needed    HYDROXYZINE HCL (ATARAX) 25 MG TABLET    Take 1 tablet by mouth nightly    IPRATROPIUM 0.5 MG-ALBUTEROL 2.5 MG (DUONEB) 0.5-2.5 (3) MG/3ML SOLN NEBULIZER SOLUTION    Inhale 3 mLs into the lungs every 6 hours    LETROZOLE (FEMARA) 2.5 MG TABLET    Take 1 tablet by mouth daily    LOPERAMIDE (IMODIUM) 2 MG CAPSULE    Take 1 capsule by mouth every 8 hours as needed for Diarrhea    MELATONIN 3 MG TABS TABLET    Take 2 tablets by mouth at bedtime    MINERAL OIL-HYDROPHILIC PETROLATUM (AQUAPHOR) OINTMENT    Apply topically every 6 hours as needed for Dry Skin Apply topically as needed.    MISC. DEVICES (CPAP MACHINE) MISC    by Does not apply route at bedtime    MOMETASONE-FORMOTEROL (DULERA) 200-5 MCG/ACT INHALER    Inhale 2 puffs into the lungs 2 times daily    NICOTINE (NICODERM CQ) 21 MG/24HR    Place 1 patch onto the skin daily    ONDANSETRON (ZOFRAN) 4 MG TABLET    Take 1 tablet by mouth every 6 hours as needed for Nausea or Vomiting    PANTOPRAZOLE (PROTONIX) 40 MG TABLET    Take 1 tablet by mouth daily    ROPINIROLE (REQUIP) 0.25 MG TABLET    Take 1 tablet by

## 2025-01-09 ENCOUNTER — APPOINTMENT (OUTPATIENT)
Dept: CT IMAGING | Age: 65
DRG: 121 | End: 2025-01-09
Payer: MEDICAID

## 2025-01-09 ENCOUNTER — APPOINTMENT (OUTPATIENT)
Dept: NUCLEAR MEDICINE | Age: 65
DRG: 121 | End: 2025-01-09
Payer: MEDICAID

## 2025-01-09 PROBLEM — N18.30 STAGE 3 CHRONIC KIDNEY DISEASE (HCC): Status: ACTIVE | Noted: 2025-01-09

## 2025-01-09 LAB
ALBUMIN SERPL-MCNC: 3.1 G/DL (ref 3.4–5)
ANION GAP SERPL CALCULATED.3IONS-SCNC: 11 MMOL/L (ref 3–16)
BASOPHILS # BLD: 0 K/UL (ref 0–0.2)
BASOPHILS NFR BLD: 0.6 %
BUN SERPL-MCNC: 30 MG/DL (ref 7–20)
CALCIUM SERPL-MCNC: 8.3 MG/DL (ref 8.3–10.6)
CHLORIDE SERPL-SCNC: 106 MMOL/L (ref 99–110)
CO2 SERPL-SCNC: 25 MMOL/L (ref 21–32)
CREAT SERPL-MCNC: 1.7 MG/DL (ref 0.6–1.2)
DEPRECATED RDW RBC AUTO: 15.4 % (ref 12.4–15.4)
EOSINOPHIL # BLD: 0 K/UL (ref 0–0.6)
EOSINOPHIL NFR BLD: 0 %
GFR SERPLBLD CREATININE-BSD FMLA CKD-EPI: 34 ML/MIN/{1.73_M2}
GLUCOSE BLD-MCNC: 209 MG/DL (ref 70–99)
GLUCOSE SERPL-MCNC: 139 MG/DL (ref 70–99)
HCT VFR BLD AUTO: 31.9 % (ref 36–48)
HGB BLD-MCNC: 10.4 G/DL (ref 12–16)
LYMPHOCYTES # BLD: 0.8 K/UL (ref 1–5.1)
LYMPHOCYTES NFR BLD: 12.4 %
MAGNESIUM SERPL-MCNC: 1.27 MG/DL (ref 1.8–2.4)
MCH RBC QN AUTO: 30.5 PG (ref 26–34)
MCHC RBC AUTO-ENTMCNC: 32.6 G/DL (ref 31–36)
MCV RBC AUTO: 93.6 FL (ref 80–100)
MONOCYTES # BLD: 0.3 K/UL (ref 0–1.3)
MONOCYTES NFR BLD: 5 %
NEUTROPHILS # BLD: 5.1 K/UL (ref 1.7–7.7)
NEUTROPHILS NFR BLD: 82 %
PERFORMED ON: ABNORMAL
PHOSPHATE SERPL-MCNC: 2.7 MG/DL (ref 2.5–4.9)
PLATELET # BLD AUTO: 326 K/UL (ref 135–450)
PMV BLD AUTO: 7.6 FL (ref 5–10.5)
POTASSIUM SERPL-SCNC: 3.7 MMOL/L (ref 3.5–5.1)
RBC # BLD AUTO: 3.41 M/UL (ref 4–5.2)
SODIUM SERPL-SCNC: 142 MMOL/L (ref 136–145)
WBC # BLD AUTO: 6.2 K/UL (ref 4–11)

## 2025-01-09 PROCEDURE — 94761 N-INVAS EAR/PLS OXIMETRY MLT: CPT

## 2025-01-09 PROCEDURE — 2700000000 HC OXYGEN THERAPY PER DAY

## 2025-01-09 PROCEDURE — 6370000000 HC RX 637 (ALT 250 FOR IP)

## 2025-01-09 PROCEDURE — 71250 CT THORAX DX C-: CPT

## 2025-01-09 PROCEDURE — 87449 NOS EACH ORGANISM AG IA: CPT

## 2025-01-09 PROCEDURE — 99223 1ST HOSP IP/OBS HIGH 75: CPT | Performed by: INTERNAL MEDICINE

## 2025-01-09 PROCEDURE — 2580000003 HC RX 258: Performed by: INTERNAL MEDICINE

## 2025-01-09 PROCEDURE — 78580 LUNG PERFUSION IMAGING: CPT

## 2025-01-09 PROCEDURE — 94640 AIRWAY INHALATION TREATMENT: CPT

## 2025-01-09 PROCEDURE — 2580000003 HC RX 258

## 2025-01-09 PROCEDURE — 84156 ASSAY OF PROTEIN URINE: CPT

## 2025-01-09 PROCEDURE — 2060000000 HC ICU INTERMEDIATE R&B

## 2025-01-09 PROCEDURE — 6360000002 HC RX W HCPCS

## 2025-01-09 PROCEDURE — 82570 ASSAY OF URINE CREATININE: CPT

## 2025-01-09 PROCEDURE — 2500000003 HC RX 250 WO HCPCS

## 2025-01-09 PROCEDURE — 85025 COMPLETE CBC W/AUTO DIFF WBC: CPT

## 2025-01-09 PROCEDURE — 87641 MR-STAPH DNA AMP PROBE: CPT

## 2025-01-09 PROCEDURE — 94669 MECHANICAL CHEST WALL OSCILL: CPT

## 2025-01-09 PROCEDURE — 80069 RENAL FUNCTION PANEL: CPT

## 2025-01-09 PROCEDURE — 83735 ASSAY OF MAGNESIUM: CPT

## 2025-01-09 RX ORDER — GLUCAGON 1 MG/ML
1 KIT INJECTION PRN
Status: DISCONTINUED | OUTPATIENT
Start: 2025-01-09 | End: 2025-01-21 | Stop reason: HOSPADM

## 2025-01-09 RX ORDER — LEVOFLOXACIN 500 MG/1
500 TABLET, FILM COATED ORAL ONCE
Status: COMPLETED | OUTPATIENT
Start: 2025-01-09 | End: 2025-01-09

## 2025-01-09 RX ORDER — LEVOFLOXACIN 500 MG/1
250 TABLET, FILM COATED ORAL DAILY
Status: DISCONTINUED | OUTPATIENT
Start: 2025-01-10 | End: 2025-01-10

## 2025-01-09 RX ORDER — ALBUTEROL SULFATE 0.83 MG/ML
2.5 SOLUTION RESPIRATORY (INHALATION) 2 TIMES DAILY
Status: DISCONTINUED | OUTPATIENT
Start: 2025-01-09 | End: 2025-01-10

## 2025-01-09 RX ORDER — MAGNESIUM SULFATE IN WATER 40 MG/ML
4000 INJECTION, SOLUTION INTRAVENOUS ONCE
Status: COMPLETED | OUTPATIENT
Start: 2025-01-09 | End: 2025-01-09

## 2025-01-09 RX ORDER — SODIUM CHLORIDE FOR INHALATION 3 %
4 VIAL, NEBULIZER (ML) INHALATION 2 TIMES DAILY
Status: DISCONTINUED | OUTPATIENT
Start: 2025-01-09 | End: 2025-01-19

## 2025-01-09 RX ORDER — LEVOFLOXACIN 500 MG/1
500 TABLET, FILM COATED ORAL DAILY
Status: DISCONTINUED | OUTPATIENT
Start: 2025-01-09 | End: 2025-01-09 | Stop reason: DRUGHIGH

## 2025-01-09 RX ORDER — DEXTROSE MONOHYDRATE 100 MG/ML
INJECTION, SOLUTION INTRAVENOUS CONTINUOUS PRN
Status: DISCONTINUED | OUTPATIENT
Start: 2025-01-09 | End: 2025-01-21 | Stop reason: HOSPADM

## 2025-01-09 RX ORDER — LORAZEPAM 0.5 MG/1
0.5 TABLET ORAL ONCE
Status: COMPLETED | OUTPATIENT
Start: 2025-01-09 | End: 2025-01-09

## 2025-01-09 RX ADMIN — SERTRALINE HYDROCHLORIDE 50 MG: 50 TABLET ORAL at 07:45

## 2025-01-09 RX ADMIN — Medication 6 MG: at 20:29

## 2025-01-09 RX ADMIN — LORAZEPAM 0.5 MG: 0.5 TABLET ORAL at 03:03

## 2025-01-09 RX ADMIN — AMLODIPINE BESYLATE 10 MG: 10 TABLET ORAL at 07:46

## 2025-01-09 RX ADMIN — ROPINIROLE HYDROCHLORIDE 0.25 MG: 0.25 TABLET, FILM COATED ORAL at 20:53

## 2025-01-09 RX ADMIN — HYDROXYZINE HYDROCHLORIDE 25 MG: 25 TABLET ORAL at 20:29

## 2025-01-09 RX ADMIN — IPRATROPIUM BROMIDE AND ALBUTEROL SULFATE 1 DOSE: 2.5; .5 SOLUTION RESPIRATORY (INHALATION) at 15:30

## 2025-01-09 RX ADMIN — CARVEDILOL 6.25 MG: 6.25 TABLET, FILM COATED ORAL at 16:35

## 2025-01-09 RX ADMIN — SODIUM CHLORIDE, PRESERVATIVE FREE 10 ML: 5 INJECTION INTRAVENOUS at 07:46

## 2025-01-09 RX ADMIN — IPRATROPIUM BROMIDE AND ALBUTEROL SULFATE 1 DOSE: 2.5; .5 SOLUTION RESPIRATORY (INHALATION) at 04:38

## 2025-01-09 RX ADMIN — TEMAZEPAM 15 MG: 15 CAPSULE ORAL at 23:12

## 2025-01-09 RX ADMIN — ARFORMOTEROL TARTRATE: 15 SOLUTION RESPIRATORY (INHALATION) at 07:58

## 2025-01-09 RX ADMIN — SODIUM CHLORIDE 25 ML: 9 INJECTION, SOLUTION INTRAVENOUS at 07:52

## 2025-01-09 RX ADMIN — IPRATROPIUM BROMIDE AND ALBUTEROL SULFATE 1 DOSE: 2.5; .5 SOLUTION RESPIRATORY (INHALATION) at 00:55

## 2025-01-09 RX ADMIN — IPRATROPIUM BROMIDE AND ALBUTEROL SULFATE 1 DOSE: 2.5; .5 SOLUTION RESPIRATORY (INHALATION) at 11:28

## 2025-01-09 RX ADMIN — ARFORMOTEROL TARTRATE: 15 SOLUTION RESPIRATORY (INHALATION) at 20:39

## 2025-01-09 RX ADMIN — SODIUM CHLORIDE 30 MG/ML INHALATION SOLUTION 4 ML: 30 SOLUTION INHALANT at 20:39

## 2025-01-09 RX ADMIN — LEVOFLOXACIN 500 MG: 500 TABLET, FILM COATED ORAL at 15:56

## 2025-01-09 RX ADMIN — MAGNESIUM SULFATE HEPTAHYDRATE 4000 MG: 40 INJECTION, SOLUTION INTRAVENOUS at 07:55

## 2025-01-09 RX ADMIN — SODIUM CHLORIDE, PRESERVATIVE FREE 10 ML: 5 INJECTION INTRAVENOUS at 20:30

## 2025-01-09 RX ADMIN — PREDNISONE 40 MG: 20 TABLET ORAL at 15:56

## 2025-01-09 RX ADMIN — IPRATROPIUM BROMIDE AND ALBUTEROL SULFATE 1 DOSE: 2.5; .5 SOLUTION RESPIRATORY (INHALATION) at 20:39

## 2025-01-09 RX ADMIN — APIXABAN 2.5 MG: 2.5 TABLET, FILM COATED ORAL at 07:45

## 2025-01-09 RX ADMIN — APIXABAN 2.5 MG: 2.5 TABLET, FILM COATED ORAL at 20:29

## 2025-01-09 RX ADMIN — LETROZOLE 2.5 MG: 2.5 TABLET ORAL at 07:46

## 2025-01-09 RX ADMIN — PANTOPRAZOLE SODIUM 40 MG: 40 TABLET, DELAYED RELEASE ORAL at 07:45

## 2025-01-09 RX ADMIN — POTASSIUM BICARBONATE 20 MEQ: 782 TABLET, EFFERVESCENT ORAL at 09:29

## 2025-01-09 RX ADMIN — IPRATROPIUM BROMIDE AND ALBUTEROL SULFATE 1 DOSE: 2.5; .5 SOLUTION RESPIRATORY (INHALATION) at 07:58

## 2025-01-09 RX ADMIN — SODIUM CHLORIDE 25 ML: 9 INJECTION, SOLUTION INTRAVENOUS at 04:44

## 2025-01-09 RX ADMIN — DOXYCYCLINE 100 MG: 100 INJECTION, POWDER, LYOPHILIZED, FOR SOLUTION INTRAVENOUS at 04:48

## 2025-01-09 RX ADMIN — ACETAMINOPHEN 650 MG: 325 TABLET ORAL at 20:53

## 2025-01-09 RX ADMIN — Medication 5.02 MILLICURIE: at 17:55

## 2025-01-09 RX ADMIN — ACETAMINOPHEN 650 MG: 325 TABLET ORAL at 07:55

## 2025-01-09 RX ADMIN — CARVEDILOL 6.25 MG: 6.25 TABLET, FILM COATED ORAL at 07:45

## 2025-01-09 RX ADMIN — TRAZODONE HYDROCHLORIDE 25 MG: 50 TABLET ORAL at 20:29

## 2025-01-09 RX ADMIN — SODIUM CHLORIDE, PRESERVATIVE FREE 10 ML: 5 INJECTION INTRAVENOUS at 00:36

## 2025-01-09 ASSESSMENT — PAIN DESCRIPTION - LOCATION: LOCATION: KNEE

## 2025-01-09 ASSESSMENT — PAIN DESCRIPTION - FREQUENCY: FREQUENCY: CONTINUOUS

## 2025-01-09 ASSESSMENT — PAIN - FUNCTIONAL ASSESSMENT: PAIN_FUNCTIONAL_ASSESSMENT: ACTIVITIES ARE NOT PREVENTED

## 2025-01-09 ASSESSMENT — PAIN DESCRIPTION - ORIENTATION: ORIENTATION: RIGHT;LEFT

## 2025-01-09 ASSESSMENT — PAIN DESCRIPTION - ONSET: ONSET: ON-GOING

## 2025-01-09 ASSESSMENT — PAIN SCALES - GENERAL
PAINLEVEL_OUTOF10: 3
PAINLEVEL_OUTOF10: 0

## 2025-01-09 ASSESSMENT — PAIN DESCRIPTION - DESCRIPTORS: DESCRIPTORS: ACHING

## 2025-01-09 ASSESSMENT — PAIN DESCRIPTION - PAIN TYPE: TYPE: CHRONIC PAIN

## 2025-01-09 NOTE — H&P
Internal Medicine H&P    Date:   2025   Patient:  Shoaib Rothman   :   1960     CC:  Shortness of Breath (Patient to the ED for shortness of breath for about two days. Patient took breathing treatment with no help  currently 91% on 15L non rebreather)       Source of HPI: patient, chart review    Subjective     HPI:  Ms. Shoaib Rothman is a 64 y.o. female with a history of COPD, sleep apnea on CPAP, insomnia, type 2 diabetes, mood disorder, DVT on AC, breast cancer on hormone therapy who presents with worsening shortness of breath.    Patient presents with several days of worsening shortness of breath with associated cough and productive thick white sputum.  She uses oxygen chronically at home, baseline oxygen requirement ~2L per patient.  No recent illness or sick contacts.  She denies chest pain, recent fevers, nausea, vomiting.    Past medical history significant for admission in  for acute exacerbation of COPD secondary to pneumonia.  Underwent bronchoscopy during this admission, which demonstrated congested and erythematous airway mucosa, excessive dynamic airway collapse, thick secretions.  Molecular pneumonia panel positive for MSSA and coronavirus on PCR.  She was treated with 7 total days of levofloxacin and 5-day course of prednisone.    Additional history significant for history of ESBL UTIs with chronic colonization.  Patient denies urinary symptoms at this time.  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  She also has a history of bilateral PE as well as DVTs in 2019, for which she is on Eliquis therapy.  History of sleep apnea, for which she uses a CPAP daily.  She also has a history of breast cancer, for which she is currently on daily letrozole.    She smokes 1/2 pack/day.  She has not drink alcohol.  She does not use recreational drugs.  Lives in nursing
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
## 2025-01-09 NOTE — ED NOTES
Patient Name: Shoaib Rothman  : 1960 64 y.o.  MRN: [id number]  ED Room #: A11/A11-11     Chief complaint:   Chief Complaint   Patient presents with    Shortness of Breath     Patient to the ED for shortness of breath for about two days. Patient took breathing treatment with no help  currently 91% on 15L [facility] Problem/Diagnosis:       O2 Flow Rate:O2 Device: PAP (positive airway pressure)   (if applicable)  Cardiac Rhythm:   (if applicable)  Active LDA's:           How does patient ambulate? Front Wheeled Walker    2. How does patient take pills? Whole with Water    3. Is patient alert? Alert    4. Is patient oriented? To Person, To Place, To Time, To Situation, and Follows Commands    5.   Patient arrived from:  nursing home  Facility Name: ___________________________________________    6. If patient is disoriented or from a Skill Nursing Facility has family been notified of admission? No    7. Patient belongings? Belongings: Cell Phone, Wallet, and Clothing    Disposition of belongings? Kept with Patient     8. Any specific patient or family belongings/needs/dynamics?   a.     9. Miscellaneous comments/pending orders?  a.       If there are any additional questions please reach out to the Emergency Department.      Evelyn Mejía RN  25

## 2025-01-09 NOTE — CARE COORDINATION
Case Management Assessment  Initial Evaluation    Date/Time of Evaluation: 1/9/2025 3:59 PM  Assessment Completed by: Neto Fulton RN    If patient is discharged prior to next notation, then this note serves as note for discharge by case management.    Patient Name: Shoaib Rothman                   YOB: 1960  Diagnosis: COPD exacerbation (HCC) [J44.1]  COPD with acute exacerbation (HCC) [J44.1]  Pneumonia of right lower lobe due to infectious organism [J18.9]                   Date / Time: 1/8/2025  6:04 PM    Patient Admission Status: Inpatient   Readmission Risk (Low < 19, Mod (19-27), High > 27): Readmission Risk Score: 20    Current PCP: Lamar Mondragon MD  PCP verified by CM? Yes    Chart Reviewed: Yes      History Provided by: Medical Record  Patient Orientation: Alert and Oriented    Patient Cognition: Alert    Hospitalization in the last 30 days (Readmission):  No    If yes, Readmission Assessment in  Navigator will be completed.    Advance Directives:      Code Status: Full Code   Patient's Primary Decision Maker is: Legal Next of Kin      Discharge Planning:    Patient lives with: Alone Type of Home: Long-Term Care  Primary Care Giver: Other (Comment) (LTC at [facility])  Patient Support Systems include: Family Members   Current Financial resources:    Current community resources:    Current services prior to admission: C-pap            Current DME:              Type of Home Care services:  None    ADLS  Prior functional level: Assistance with the following:, Bathing, Dressing, Toileting, Cooking, Housework, Shopping, Mobility  Current functional level: Assistance with the following:, Mobility, Shopping, Housework, Cooking, Toileting, Dressing, Bathing    PT AM-PAC:   /24  OT AM-PAC:   /24    Family can provide assistance at DC: No  Would you like Case Management to discuss the discharge plan with any other family members/significant others, and if so, who? No  Plans to

## 2025-01-10 ENCOUNTER — APPOINTMENT (OUTPATIENT)
Dept: GENERAL RADIOLOGY | Age: 65
DRG: 121 | End: 2025-01-10
Payer: MEDICAID

## 2025-01-10 PROBLEM — R80.9 PROTEINURIA: Status: ACTIVE | Noted: 2025-01-10

## 2025-01-10 LAB
ALBUMIN SERPL-MCNC: 3.2 G/DL (ref 3.4–5)
ANION GAP SERPL CALCULATED.3IONS-SCNC: 10 MMOL/L (ref 3–16)
BASOPHILS # BLD: 0 K/UL (ref 0–0.2)
BASOPHILS NFR BLD: 0.6 %
BUN SERPL-MCNC: 37 MG/DL (ref 7–20)
CALCIUM SERPL-MCNC: 8.7 MG/DL (ref 8.3–10.6)
CHLORIDE SERPL-SCNC: 108 MMOL/L (ref 99–110)
CO2 SERPL-SCNC: 25 MMOL/L (ref 21–32)
CREAT SERPL-MCNC: 1.6 MG/DL (ref 0.6–1.2)
CREAT UR-MCNC: 29.2 MG/DL (ref 28–259)
CRP SERPL-MCNC: 35.5 MG/L (ref 0–5.1)
DEPRECATED RDW RBC AUTO: 15.2 % (ref 12.4–15.4)
EOSINOPHIL # BLD: 0 K/UL (ref 0–0.6)
EOSINOPHIL NFR BLD: 0 %
ERYTHROCYTE [SEDIMENTATION RATE] IN BLOOD BY WESTERGREN METHOD: 63 MM/HR (ref 0–30)
GFR SERPLBLD CREATININE-BSD FMLA CKD-EPI: 36 ML/MIN/{1.73_M2}
GLUCOSE SERPL-MCNC: 170 MG/DL (ref 70–99)
HCT VFR BLD AUTO: 34.9 % (ref 36–48)
HGB BLD-MCNC: 11.2 G/DL (ref 12–16)
LEGIONELLA AG UR QL: NORMAL
LYMPHOCYTES # BLD: 1 K/UL (ref 1–5.1)
LYMPHOCYTES NFR BLD: 12.3 %
MAGNESIUM SERPL-MCNC: 2.06 MG/DL (ref 1.8–2.4)
MCH RBC QN AUTO: 30 PG (ref 26–34)
MCHC RBC AUTO-ENTMCNC: 31.9 G/DL (ref 31–36)
MCV RBC AUTO: 94 FL (ref 80–100)
MONOCYTES # BLD: 0.4 K/UL (ref 0–1.3)
MONOCYTES NFR BLD: 4.8 %
MRSA DNA SPEC QL NAA+PROBE: NORMAL
NEUTROPHILS # BLD: 6.9 K/UL (ref 1.7–7.7)
NEUTROPHILS NFR BLD: 82.3 %
PHOSPHATE SERPL-MCNC: 3.4 MG/DL (ref 2.5–4.9)
PLATELET # BLD AUTO: 366 K/UL (ref 135–450)
PMV BLD AUTO: 7.6 FL (ref 5–10.5)
POTASSIUM SERPL-SCNC: 4.2 MMOL/L (ref 3.5–5.1)
PROT UR-MCNC: 237 MG/DL
PROT/CREAT UR-RTO: 8.1 MG/DL
RBC # BLD AUTO: 3.71 M/UL (ref 4–5.2)
REPORT: NORMAL
RESP PATH DNA+RNA PNL L RESP NAA+NON-PRB: NORMAL
S PNEUM AG UR QL: NORMAL
SODIUM SERPL-SCNC: 143 MMOL/L (ref 136–145)
WBC # BLD AUTO: 8.4 K/UL (ref 4–11)

## 2025-01-10 PROCEDURE — 6370000000 HC RX 637 (ALT 250 FOR IP)

## 2025-01-10 PROCEDURE — 99233 SBSQ HOSP IP/OBS HIGH 50: CPT | Performed by: INTERNAL MEDICINE

## 2025-01-10 PROCEDURE — 2580000003 HC RX 258: Performed by: INTERNAL MEDICINE

## 2025-01-10 PROCEDURE — 85652 RBC SED RATE AUTOMATED: CPT

## 2025-01-10 PROCEDURE — 83735 ASSAY OF MAGNESIUM: CPT

## 2025-01-10 PROCEDURE — 71045 X-RAY EXAM CHEST 1 VIEW: CPT

## 2025-01-10 PROCEDURE — 99233 SBSQ HOSP IP/OBS HIGH 50: CPT | Performed by: HOSPITALIST

## 2025-01-10 PROCEDURE — 94640 AIRWAY INHALATION TREATMENT: CPT

## 2025-01-10 PROCEDURE — 94669 MECHANICAL CHEST WALL OSCILL: CPT

## 2025-01-10 PROCEDURE — 2060000000 HC ICU INTERMEDIATE R&B

## 2025-01-10 PROCEDURE — 94761 N-INVAS EAR/PLS OXIMETRY MLT: CPT

## 2025-01-10 PROCEDURE — 2500000003 HC RX 250 WO HCPCS

## 2025-01-10 PROCEDURE — 85025 COMPLETE CBC W/AUTO DIFF WBC: CPT

## 2025-01-10 PROCEDURE — 87633 RESP VIRUS 12-25 TARGETS: CPT

## 2025-01-10 PROCEDURE — 2700000000 HC OXYGEN THERAPY PER DAY

## 2025-01-10 PROCEDURE — 86140 C-REACTIVE PROTEIN: CPT

## 2025-01-10 PROCEDURE — 6360000002 HC RX W HCPCS

## 2025-01-10 PROCEDURE — 80069 RENAL FUNCTION PANEL: CPT

## 2025-01-10 RX ORDER — LEVOFLOXACIN 750 MG/1
750 TABLET, FILM COATED ORAL EVERY OTHER DAY
Status: COMPLETED | OUTPATIENT
Start: 2025-01-11 | End: 2025-01-13

## 2025-01-10 RX ORDER — ALBUTEROL SULFATE 0.83 MG/ML
2.5 SOLUTION RESPIRATORY (INHALATION) EVERY 4 HOURS PRN
Status: DISCONTINUED | OUTPATIENT
Start: 2025-01-10 | End: 2025-01-21 | Stop reason: HOSPADM

## 2025-01-10 RX ADMIN — TRAZODONE HYDROCHLORIDE 25 MG: 50 TABLET ORAL at 20:34

## 2025-01-10 RX ADMIN — ARFORMOTEROL TARTRATE: 15 SOLUTION RESPIRATORY (INHALATION) at 20:33

## 2025-01-10 RX ADMIN — TEMAZEPAM 15 MG: 15 CAPSULE ORAL at 23:37

## 2025-01-10 RX ADMIN — IPRATROPIUM BROMIDE AND ALBUTEROL SULFATE 1 DOSE: 2.5; .5 SOLUTION RESPIRATORY (INHALATION) at 15:31

## 2025-01-10 RX ADMIN — IPRATROPIUM BROMIDE AND ALBUTEROL SULFATE 1 DOSE: 2.5; .5 SOLUTION RESPIRATORY (INHALATION) at 11:58

## 2025-01-10 RX ADMIN — Medication 6 MG: at 23:37

## 2025-01-10 RX ADMIN — APIXABAN 2.5 MG: 2.5 TABLET, FILM COATED ORAL at 08:45

## 2025-01-10 RX ADMIN — APIXABAN 2.5 MG: 2.5 TABLET, FILM COATED ORAL at 20:35

## 2025-01-10 RX ADMIN — IPRATROPIUM BROMIDE AND ALBUTEROL SULFATE 1 DOSE: 2.5; .5 SOLUTION RESPIRATORY (INHALATION) at 04:15

## 2025-01-10 RX ADMIN — IPRATROPIUM BROMIDE AND ALBUTEROL SULFATE 1 DOSE: 2.5; .5 SOLUTION RESPIRATORY (INHALATION) at 08:13

## 2025-01-10 RX ADMIN — LEVOFLOXACIN 250 MG: 500 TABLET, FILM COATED ORAL at 08:45

## 2025-01-10 RX ADMIN — LETROZOLE 2.5 MG: 2.5 TABLET ORAL at 09:45

## 2025-01-10 RX ADMIN — HYDROXYZINE HYDROCHLORIDE 25 MG: 25 TABLET ORAL at 20:35

## 2025-01-10 RX ADMIN — PREDNISONE 40 MG: 20 TABLET ORAL at 16:27

## 2025-01-10 RX ADMIN — SODIUM CHLORIDE 30 MG/ML INHALATION SOLUTION 4 ML: 30 SOLUTION INHALANT at 08:14

## 2025-01-10 RX ADMIN — ACETAMINOPHEN 650 MG: 325 TABLET ORAL at 20:36

## 2025-01-10 RX ADMIN — IPRATROPIUM BROMIDE AND ALBUTEROL SULFATE 1 DOSE: 2.5; .5 SOLUTION RESPIRATORY (INHALATION) at 20:33

## 2025-01-10 RX ADMIN — IPRATROPIUM BROMIDE AND ALBUTEROL SULFATE 1 DOSE: 2.5; .5 SOLUTION RESPIRATORY (INHALATION) at 00:52

## 2025-01-10 RX ADMIN — CARVEDILOL 6.25 MG: 6.25 TABLET, FILM COATED ORAL at 16:27

## 2025-01-10 RX ADMIN — SODIUM CHLORIDE 30 MG/ML INHALATION SOLUTION 4 ML: 30 SOLUTION INHALANT at 20:33

## 2025-01-10 RX ADMIN — ROPINIROLE HYDROCHLORIDE 0.25 MG: 0.25 TABLET, FILM COATED ORAL at 20:34

## 2025-01-10 RX ADMIN — SERTRALINE HYDROCHLORIDE 50 MG: 50 TABLET ORAL at 08:45

## 2025-01-10 RX ADMIN — SODIUM CHLORIDE, PRESERVATIVE FREE 10 ML: 5 INJECTION INTRAVENOUS at 20:35

## 2025-01-10 RX ADMIN — AMLODIPINE BESYLATE 10 MG: 10 TABLET ORAL at 08:45

## 2025-01-10 RX ADMIN — CARVEDILOL 6.25 MG: 6.25 TABLET, FILM COATED ORAL at 08:44

## 2025-01-10 RX ADMIN — ARFORMOTEROL TARTRATE: 15 SOLUTION RESPIRATORY (INHALATION) at 08:13

## 2025-01-10 RX ADMIN — PANTOPRAZOLE SODIUM 40 MG: 40 TABLET, DELAYED RELEASE ORAL at 08:45

## 2025-01-10 RX ADMIN — ACETAMINOPHEN 650 MG: 325 TABLET ORAL at 14:51

## 2025-01-10 RX ADMIN — SODIUM CHLORIDE, PRESERVATIVE FREE 10 ML: 5 INJECTION INTRAVENOUS at 08:45

## 2025-01-10 ASSESSMENT — PAIN SCALES - GENERAL
PAINLEVEL_OUTOF10: 0
PAINLEVEL_OUTOF10: 3
PAINLEVEL_OUTOF10: 0
PAINLEVEL_OUTOF10: 0
PAINLEVEL_OUTOF10: 5
PAINLEVEL_OUTOF10: 2
PAINLEVEL_OUTOF10: 0
PAINLEVEL_OUTOF10: 7

## 2025-01-10 ASSESSMENT — PAIN DESCRIPTION - DESCRIPTORS
DESCRIPTORS: ACHING
DESCRIPTORS: ACHING

## 2025-01-10 ASSESSMENT — PAIN DESCRIPTION - ORIENTATION
ORIENTATION: RIGHT;LEFT
ORIENTATION: LEFT;RIGHT

## 2025-01-10 ASSESSMENT — PAIN DESCRIPTION - ONSET
ONSET: ON-GOING
ONSET: ON-GOING

## 2025-01-10 ASSESSMENT — PAIN DESCRIPTION - FREQUENCY
FREQUENCY: CONTINUOUS
FREQUENCY: CONTINUOUS

## 2025-01-10 ASSESSMENT — PAIN DESCRIPTION - LOCATION: LOCATION: KNEE

## 2025-01-10 ASSESSMENT — PAIN DESCRIPTION - PAIN TYPE
TYPE: CHRONIC PAIN
TYPE: CHRONIC PAIN

## 2025-01-11 LAB
ALBUMIN SERPL-MCNC: 3.4 G/DL (ref 3.4–5)
ANION GAP SERPL CALCULATED.3IONS-SCNC: 9 MMOL/L (ref 3–16)
BASOPHILS # BLD: 0.1 K/UL (ref 0–0.2)
BASOPHILS NFR BLD: 0.6 %
BUN SERPL-MCNC: 41 MG/DL (ref 7–20)
CALCIUM SERPL-MCNC: 9.2 MG/DL (ref 8.3–10.6)
CHLORIDE SERPL-SCNC: 104 MMOL/L (ref 99–110)
CO2 SERPL-SCNC: 27 MMOL/L (ref 21–32)
CREAT SERPL-MCNC: 1.5 MG/DL (ref 0.6–1.2)
DEPRECATED RDW RBC AUTO: 15.5 % (ref 12.4–15.4)
EOSINOPHIL # BLD: 0 K/UL (ref 0–0.6)
EOSINOPHIL NFR BLD: 0 %
GFR SERPLBLD CREATININE-BSD FMLA CKD-EPI: 39 ML/MIN/{1.73_M2}
GLUCOSE BLD-MCNC: 166 MG/DL (ref 70–99)
GLUCOSE SERPL-MCNC: 147 MG/DL (ref 70–99)
HCT VFR BLD AUTO: 37.7 % (ref 36–48)
HGB BLD-MCNC: 11.8 G/DL (ref 12–16)
LYMPHOCYTES # BLD: 1.3 K/UL (ref 1–5.1)
LYMPHOCYTES NFR BLD: 13.8 %
MAGNESIUM SERPL-MCNC: 1.52 MG/DL (ref 1.8–2.4)
MCH RBC QN AUTO: 29.6 PG (ref 26–34)
MCHC RBC AUTO-ENTMCNC: 31.3 G/DL (ref 31–36)
MCV RBC AUTO: 94.5 FL (ref 80–100)
MONOCYTES # BLD: 0.4 K/UL (ref 0–1.3)
MONOCYTES NFR BLD: 4.4 %
NEUTROPHILS # BLD: 7.5 K/UL (ref 1.7–7.7)
NEUTROPHILS NFR BLD: 81.2 %
PERFORMED ON: ABNORMAL
PHOSPHATE SERPL-MCNC: 3.1 MG/DL (ref 2.5–4.9)
PLATELET # BLD AUTO: 387 K/UL (ref 135–450)
PMV BLD AUTO: 7.5 FL (ref 5–10.5)
POTASSIUM SERPL-SCNC: 4.2 MMOL/L (ref 3.5–5.1)
RBC # BLD AUTO: 3.98 M/UL (ref 4–5.2)
SODIUM SERPL-SCNC: 140 MMOL/L (ref 136–145)
WBC # BLD AUTO: 9.3 K/UL (ref 4–11)

## 2025-01-11 PROCEDURE — 83735 ASSAY OF MAGNESIUM: CPT

## 2025-01-11 PROCEDURE — 2700000000 HC OXYGEN THERAPY PER DAY

## 2025-01-11 PROCEDURE — 94669 MECHANICAL CHEST WALL OSCILL: CPT

## 2025-01-11 PROCEDURE — 6370000000 HC RX 637 (ALT 250 FOR IP)

## 2025-01-11 PROCEDURE — 6360000002 HC RX W HCPCS

## 2025-01-11 PROCEDURE — 85025 COMPLETE CBC W/AUTO DIFF WBC: CPT

## 2025-01-11 PROCEDURE — 94640 AIRWAY INHALATION TREATMENT: CPT

## 2025-01-11 PROCEDURE — 80069 RENAL FUNCTION PANEL: CPT

## 2025-01-11 PROCEDURE — 94761 N-INVAS EAR/PLS OXIMETRY MLT: CPT

## 2025-01-11 PROCEDURE — 2500000003 HC RX 250 WO HCPCS

## 2025-01-11 PROCEDURE — 2060000000 HC ICU INTERMEDIATE R&B

## 2025-01-11 PROCEDURE — 99232 SBSQ HOSP IP/OBS MODERATE 35: CPT | Performed by: HOSPITALIST

## 2025-01-11 RX ORDER — INSULIN LISPRO 100 [IU]/ML
0-4 INJECTION, SOLUTION INTRAVENOUS; SUBCUTANEOUS
Status: DISCONTINUED | OUTPATIENT
Start: 2025-01-11 | End: 2025-01-15

## 2025-01-11 RX ORDER — BENZOCAINE/MENTHOL 6 MG-10 MG
LOZENGE MUCOUS MEMBRANE 2 TIMES DAILY
Status: DISCONTINUED | OUTPATIENT
Start: 2025-01-11 | End: 2025-01-21 | Stop reason: HOSPADM

## 2025-01-11 RX ORDER — NICOTINE 21 MG/24HR
1 PATCH, TRANSDERMAL 24 HOURS TRANSDERMAL DAILY
Status: DISCONTINUED | OUTPATIENT
Start: 2025-01-11 | End: 2025-01-21 | Stop reason: HOSPADM

## 2025-01-11 RX ADMIN — IPRATROPIUM BROMIDE AND ALBUTEROL SULFATE 1 DOSE: 2.5; .5 SOLUTION RESPIRATORY (INHALATION) at 14:21

## 2025-01-11 RX ADMIN — PANTOPRAZOLE SODIUM 40 MG: 40 TABLET, DELAYED RELEASE ORAL at 08:03

## 2025-01-11 RX ADMIN — ARFORMOTEROL TARTRATE: 15 SOLUTION RESPIRATORY (INHALATION) at 08:00

## 2025-01-11 RX ADMIN — TEMAZEPAM 15 MG: 15 CAPSULE ORAL at 23:30

## 2025-01-11 RX ADMIN — ROPINIROLE HYDROCHLORIDE 0.25 MG: 0.25 TABLET, FILM COATED ORAL at 21:32

## 2025-01-11 RX ADMIN — IPRATROPIUM BROMIDE AND ALBUTEROL SULFATE 1 DOSE: 2.5; .5 SOLUTION RESPIRATORY (INHALATION) at 16:49

## 2025-01-11 RX ADMIN — ARFORMOTEROL TARTRATE: 15 SOLUTION RESPIRATORY (INHALATION) at 19:43

## 2025-01-11 RX ADMIN — CARVEDILOL 6.25 MG: 6.25 TABLET, FILM COATED ORAL at 16:10

## 2025-01-11 RX ADMIN — TRAZODONE HYDROCHLORIDE 25 MG: 50 TABLET ORAL at 21:22

## 2025-01-11 RX ADMIN — SODIUM CHLORIDE 30 MG/ML INHALATION SOLUTION 4 ML: 30 SOLUTION INHALANT at 19:43

## 2025-01-11 RX ADMIN — HYDROXYZINE HYDROCHLORIDE 25 MG: 25 TABLET ORAL at 21:23

## 2025-01-11 RX ADMIN — SERTRALINE HYDROCHLORIDE 50 MG: 50 TABLET ORAL at 08:02

## 2025-01-11 RX ADMIN — IPRATROPIUM BROMIDE AND ALBUTEROL SULFATE 1 DOSE: 2.5; .5 SOLUTION RESPIRATORY (INHALATION) at 19:43

## 2025-01-11 RX ADMIN — IPRATROPIUM BROMIDE AND ALBUTEROL SULFATE 1 DOSE: 2.5; .5 SOLUTION RESPIRATORY (INHALATION) at 00:31

## 2025-01-11 RX ADMIN — SODIUM CHLORIDE 30 MG/ML INHALATION SOLUTION 4 ML: 30 SOLUTION INHALANT at 12:00

## 2025-01-11 RX ADMIN — APIXABAN 2.5 MG: 2.5 TABLET, FILM COATED ORAL at 08:02

## 2025-01-11 RX ADMIN — CARVEDILOL 6.25 MG: 6.25 TABLET, FILM COATED ORAL at 08:02

## 2025-01-11 RX ADMIN — LEVOFLOXACIN 750 MG: 750 TABLET, FILM COATED ORAL at 08:03

## 2025-01-11 RX ADMIN — SODIUM CHLORIDE, PRESERVATIVE FREE 10 ML: 5 INJECTION INTRAVENOUS at 08:04

## 2025-01-11 RX ADMIN — APIXABAN 2.5 MG: 2.5 TABLET, FILM COATED ORAL at 21:23

## 2025-01-11 RX ADMIN — IPRATROPIUM BROMIDE AND ALBUTEROL SULFATE 1 DOSE: 2.5; .5 SOLUTION RESPIRATORY (INHALATION) at 05:03

## 2025-01-11 RX ADMIN — ACETAMINOPHEN 650 MG: 325 TABLET ORAL at 11:29

## 2025-01-11 RX ADMIN — PREDNISONE 40 MG: 20 TABLET ORAL at 16:10

## 2025-01-11 RX ADMIN — Medication 6 MG: at 23:29

## 2025-01-11 RX ADMIN — LETROZOLE 2.5 MG: 2.5 TABLET ORAL at 08:03

## 2025-01-11 RX ADMIN — HYDROCORTISONE: 1 CREAM TOPICAL at 11:23

## 2025-01-11 RX ADMIN — ACETAMINOPHEN 650 MG: 325 TABLET ORAL at 23:34

## 2025-01-11 RX ADMIN — SODIUM CHLORIDE, PRESERVATIVE FREE 10 ML: 5 INJECTION INTRAVENOUS at 21:24

## 2025-01-11 RX ADMIN — IPRATROPIUM BROMIDE AND ALBUTEROL SULFATE 1 DOSE: 2.5; .5 SOLUTION RESPIRATORY (INHALATION) at 08:00

## 2025-01-11 RX ADMIN — AMLODIPINE BESYLATE 10 MG: 10 TABLET ORAL at 08:02

## 2025-01-11 ASSESSMENT — PAIN DESCRIPTION - LOCATION
LOCATION: KNEE;BACK
LOCATION: HEAD

## 2025-01-11 ASSESSMENT — PAIN DESCRIPTION - PAIN TYPE: TYPE: ACUTE PAIN

## 2025-01-11 ASSESSMENT — PAIN DESCRIPTION - DESCRIPTORS
DESCRIPTORS: ACHING
DESCRIPTORS: ACHING

## 2025-01-11 ASSESSMENT — PAIN SCALES - GENERAL
PAINLEVEL_OUTOF10: 0
PAINLEVEL_OUTOF10: 6
PAINLEVEL_OUTOF10: 0
PAINLEVEL_OUTOF10: 3
PAINLEVEL_OUTOF10: 0

## 2025-01-11 ASSESSMENT — PAIN DESCRIPTION - ONSET: ONSET: ON-GOING

## 2025-01-11 ASSESSMENT — PAIN DESCRIPTION - FREQUENCY: FREQUENCY: INTERMITTENT

## 2025-01-11 ASSESSMENT — PAIN DESCRIPTION - ORIENTATION
ORIENTATION: RIGHT;LEFT;LOWER;MID
ORIENTATION: RIGHT;LEFT

## 2025-01-12 PROBLEM — T17.998A MUCUS PLUG IN RESPIRATORY TRACT: Status: ACTIVE | Noted: 2025-01-12

## 2025-01-12 LAB
ALBUMIN SERPL-MCNC: 3.2 G/DL (ref 3.4–5)
ANION GAP SERPL CALCULATED.3IONS-SCNC: 12 MMOL/L (ref 3–16)
BASOPHILS # BLD: 0 K/UL (ref 0–0.2)
BASOPHILS NFR BLD: 0.2 %
BUN SERPL-MCNC: 50 MG/DL (ref 7–20)
CALCIUM SERPL-MCNC: 8.7 MG/DL (ref 8.3–10.6)
CHLORIDE SERPL-SCNC: 104 MMOL/L (ref 99–110)
CO2 SERPL-SCNC: 23 MMOL/L (ref 21–32)
CREAT SERPL-MCNC: 1.8 MG/DL (ref 0.6–1.2)
DEPRECATED RDW RBC AUTO: 14.9 % (ref 12.4–15.4)
EOSINOPHIL # BLD: 0 K/UL (ref 0–0.6)
EOSINOPHIL NFR BLD: 0 %
GFR SERPLBLD CREATININE-BSD FMLA CKD-EPI: 31 ML/MIN/{1.73_M2}
GLUCOSE BLD-MCNC: 108 MG/DL (ref 70–99)
GLUCOSE BLD-MCNC: 113 MG/DL (ref 70–99)
GLUCOSE BLD-MCNC: 211 MG/DL (ref 70–99)
GLUCOSE BLD-MCNC: 89 MG/DL (ref 70–99)
GLUCOSE SERPL-MCNC: 156 MG/DL (ref 70–99)
HCT VFR BLD AUTO: 36.8 % (ref 36–48)
HGB BLD-MCNC: 12 G/DL (ref 12–16)
LYMPHOCYTES # BLD: 1.1 K/UL (ref 1–5.1)
LYMPHOCYTES NFR BLD: 11.9 %
MAGNESIUM SERPL-MCNC: 1.58 MG/DL (ref 1.8–2.4)
MCH RBC QN AUTO: 30.5 PG (ref 26–34)
MCHC RBC AUTO-ENTMCNC: 32.6 G/DL (ref 31–36)
MCV RBC AUTO: 93.7 FL (ref 80–100)
MONOCYTES # BLD: 0.5 K/UL (ref 0–1.3)
MONOCYTES NFR BLD: 5 %
NEUTROPHILS # BLD: 7.8 K/UL (ref 1.7–7.7)
NEUTROPHILS NFR BLD: 82.9 %
PERFORMED ON: ABNORMAL
PERFORMED ON: NORMAL
PHOSPHATE SERPL-MCNC: 4.1 MG/DL (ref 2.5–4.9)
PLATELET # BLD AUTO: 396 K/UL (ref 135–450)
PMV BLD AUTO: 6.9 FL (ref 5–10.5)
POTASSIUM SERPL-SCNC: 4.8 MMOL/L (ref 3.5–5.1)
RBC # BLD AUTO: 3.93 M/UL (ref 4–5.2)
SODIUM SERPL-SCNC: 139 MMOL/L (ref 136–145)
WBC # BLD AUTO: 9.4 K/UL (ref 4–11)

## 2025-01-12 PROCEDURE — 6370000000 HC RX 637 (ALT 250 FOR IP)

## 2025-01-12 PROCEDURE — 99232 SBSQ HOSP IP/OBS MODERATE 35: CPT | Performed by: HOSPITALIST

## 2025-01-12 PROCEDURE — 2060000000 HC ICU INTERMEDIATE R&B

## 2025-01-12 PROCEDURE — 2580000003 HC RX 258: Performed by: INTERNAL MEDICINE

## 2025-01-12 PROCEDURE — 94669 MECHANICAL CHEST WALL OSCILL: CPT

## 2025-01-12 PROCEDURE — 83735 ASSAY OF MAGNESIUM: CPT

## 2025-01-12 PROCEDURE — 94640 AIRWAY INHALATION TREATMENT: CPT

## 2025-01-12 PROCEDURE — 36591 DRAW BLOOD OFF VENOUS DEVICE: CPT

## 2025-01-12 PROCEDURE — 6360000002 HC RX W HCPCS

## 2025-01-12 PROCEDURE — 85025 COMPLETE CBC W/AUTO DIFF WBC: CPT

## 2025-01-12 PROCEDURE — 80069 RENAL FUNCTION PANEL: CPT

## 2025-01-12 PROCEDURE — 94761 N-INVAS EAR/PLS OXIMETRY MLT: CPT

## 2025-01-12 PROCEDURE — 2500000003 HC RX 250 WO HCPCS

## 2025-01-12 PROCEDURE — 2700000000 HC OXYGEN THERAPY PER DAY

## 2025-01-12 PROCEDURE — 99233 SBSQ HOSP IP/OBS HIGH 50: CPT | Performed by: INTERNAL MEDICINE

## 2025-01-12 RX ORDER — 0.9 % SODIUM CHLORIDE 0.9 %
500 INTRAVENOUS SOLUTION INTRAVENOUS ONCE
Status: DISCONTINUED | OUTPATIENT
Start: 2025-01-12 | End: 2025-01-12

## 2025-01-12 RX ORDER — ACETAMINOPHEN 325 MG/1
650 TABLET ORAL EVERY 6 HOURS PRN
Status: DISCONTINUED | OUTPATIENT
Start: 2025-01-12 | End: 2025-01-21 | Stop reason: HOSPADM

## 2025-01-12 RX ORDER — ACETAMINOPHEN 325 MG/1
325 TABLET ORAL ONCE
Status: COMPLETED | OUTPATIENT
Start: 2025-01-12 | End: 2025-01-12

## 2025-01-12 RX ORDER — MAGNESIUM SULFATE IN WATER 40 MG/ML
4000 INJECTION, SOLUTION INTRAVENOUS ONCE
Status: COMPLETED | OUTPATIENT
Start: 2025-01-12 | End: 2025-01-12

## 2025-01-12 RX ORDER — LIDOCAINE 4 G/G
1 PATCH TOPICAL DAILY
Status: DISCONTINUED | OUTPATIENT
Start: 2025-01-12 | End: 2025-01-21 | Stop reason: HOSPADM

## 2025-01-12 RX ORDER — ACETAMINOPHEN 650 MG/1
650 SUPPOSITORY RECTAL EVERY 6 HOURS PRN
Status: DISCONTINUED | OUTPATIENT
Start: 2025-01-12 | End: 2025-01-21 | Stop reason: HOSPADM

## 2025-01-12 RX ADMIN — TRAZODONE HYDROCHLORIDE 25 MG: 50 TABLET ORAL at 21:32

## 2025-01-12 RX ADMIN — ROPINIROLE HYDROCHLORIDE 0.25 MG: 0.25 TABLET, FILM COATED ORAL at 21:30

## 2025-01-12 RX ADMIN — CARVEDILOL 6.25 MG: 6.25 TABLET, FILM COATED ORAL at 16:15

## 2025-01-12 RX ADMIN — Medication 6 MG: at 23:36

## 2025-01-12 RX ADMIN — SALINE NASAL SPRAY 1 SPRAY: 1.5 SOLUTION NASAL at 21:29

## 2025-01-12 RX ADMIN — SODIUM CHLORIDE, PRESERVATIVE FREE 10 ML: 5 INJECTION INTRAVENOUS at 21:32

## 2025-01-12 RX ADMIN — ACETAMINOPHEN 325 MG: 325 TABLET ORAL at 20:17

## 2025-01-12 RX ADMIN — SODIUM CHLORIDE 30 MG/ML INHALATION SOLUTION 4 ML: 30 SOLUTION INHALANT at 19:45

## 2025-01-12 RX ADMIN — IPRATROPIUM BROMIDE AND ALBUTEROL SULFATE 1 DOSE: 2.5; .5 SOLUTION RESPIRATORY (INHALATION) at 13:06

## 2025-01-12 RX ADMIN — TEMAZEPAM 15 MG: 15 CAPSULE ORAL at 23:36

## 2025-01-12 RX ADMIN — SODIUM CHLORIDE, PRESERVATIVE FREE 10 ML: 5 INJECTION INTRAVENOUS at 07:51

## 2025-01-12 RX ADMIN — AMLODIPINE BESYLATE 10 MG: 10 TABLET ORAL at 07:51

## 2025-01-12 RX ADMIN — IPRATROPIUM BROMIDE AND ALBUTEROL SULFATE 1 DOSE: 2.5; .5 SOLUTION RESPIRATORY (INHALATION) at 19:44

## 2025-01-12 RX ADMIN — IPRATROPIUM BROMIDE AND ALBUTEROL SULFATE 1 DOSE: 2.5; .5 SOLUTION RESPIRATORY (INHALATION) at 16:17

## 2025-01-12 RX ADMIN — IPRATROPIUM BROMIDE AND ALBUTEROL SULFATE 1 DOSE: 2.5; .5 SOLUTION RESPIRATORY (INHALATION) at 04:59

## 2025-01-12 RX ADMIN — ARFORMOTEROL TARTRATE: 15 SOLUTION RESPIRATORY (INHALATION) at 19:44

## 2025-01-12 RX ADMIN — SODIUM CHLORIDE 30 MG/ML INHALATION SOLUTION 4 ML: 30 SOLUTION INHALANT at 08:04

## 2025-01-12 RX ADMIN — PANTOPRAZOLE SODIUM 40 MG: 40 TABLET, DELAYED RELEASE ORAL at 07:51

## 2025-01-12 RX ADMIN — HYDROCORTISONE: 1 CREAM TOPICAL at 07:52

## 2025-01-12 RX ADMIN — PREDNISONE 40 MG: 20 TABLET ORAL at 16:15

## 2025-01-12 RX ADMIN — IPRATROPIUM BROMIDE AND ALBUTEROL SULFATE 1 DOSE: 2.5; .5 SOLUTION RESPIRATORY (INHALATION) at 00:51

## 2025-01-12 RX ADMIN — SERTRALINE HYDROCHLORIDE 50 MG: 50 TABLET ORAL at 07:51

## 2025-01-12 RX ADMIN — HYDROCORTISONE: 1 CREAM TOPICAL at 21:37

## 2025-01-12 RX ADMIN — IPRATROPIUM BROMIDE AND ALBUTEROL SULFATE 1 DOSE: 2.5; .5 SOLUTION RESPIRATORY (INHALATION) at 08:04

## 2025-01-12 RX ADMIN — HYDROXYZINE HYDROCHLORIDE 25 MG: 25 TABLET ORAL at 21:32

## 2025-01-12 RX ADMIN — MAGNESIUM SULFATE HEPTAHYDRATE 4000 MG: 40 INJECTION, SOLUTION INTRAVENOUS at 14:44

## 2025-01-12 RX ADMIN — ACETAMINOPHEN 650 MG: 325 TABLET ORAL at 17:51

## 2025-01-12 RX ADMIN — INSULIN LISPRO 1 UNITS: 100 INJECTION, SOLUTION INTRAVENOUS; SUBCUTANEOUS at 21:31

## 2025-01-12 RX ADMIN — APIXABAN 2.5 MG: 2.5 TABLET, FILM COATED ORAL at 07:51

## 2025-01-12 RX ADMIN — ARFORMOTEROL TARTRATE: 15 SOLUTION RESPIRATORY (INHALATION) at 08:04

## 2025-01-12 RX ADMIN — LETROZOLE 2.5 MG: 2.5 TABLET ORAL at 07:51

## 2025-01-12 RX ADMIN — ACETAMINOPHEN 650 MG: 325 TABLET ORAL at 23:40

## 2025-01-12 RX ADMIN — CARVEDILOL 6.25 MG: 6.25 TABLET, FILM COATED ORAL at 07:51

## 2025-01-12 ASSESSMENT — PAIN SCALES - GENERAL
PAINLEVEL_OUTOF10: 5
PAINLEVEL_OUTOF10: 3
PAINLEVEL_OUTOF10: 0
PAINLEVEL_OUTOF10: 4
PAINLEVEL_OUTOF10: 0
PAINLEVEL_OUTOF10: 0
PAINLEVEL_OUTOF10: 5
PAINLEVEL_OUTOF10: 7

## 2025-01-12 ASSESSMENT — PAIN DESCRIPTION - DESCRIPTORS
DESCRIPTORS: ACHING

## 2025-01-12 ASSESSMENT — PAIN DESCRIPTION - ORIENTATION
ORIENTATION: RIGHT;LEFT

## 2025-01-12 ASSESSMENT — PAIN DESCRIPTION - PAIN TYPE
TYPE: CHRONIC PAIN

## 2025-01-12 ASSESSMENT — PAIN - FUNCTIONAL ASSESSMENT
PAIN_FUNCTIONAL_ASSESSMENT: ACTIVITIES ARE NOT PREVENTED

## 2025-01-12 ASSESSMENT — PAIN DESCRIPTION - LOCATION
LOCATION: KNEE
LOCATION: OTHER (COMMENT)
LOCATION: KNEE

## 2025-01-12 ASSESSMENT — PAIN DESCRIPTION - FREQUENCY
FREQUENCY: CONTINUOUS
FREQUENCY: CONTINUOUS

## 2025-01-12 ASSESSMENT — PAIN DESCRIPTION - ONSET
ONSET: ON-GOING
ONSET: ON-GOING

## 2025-01-13 ENCOUNTER — ANESTHESIA EVENT (OUTPATIENT)
Dept: ENDOSCOPY | Age: 65
End: 2025-01-13
Payer: MEDICAID

## 2025-01-13 ENCOUNTER — APPOINTMENT (OUTPATIENT)
Dept: GENERAL RADIOLOGY | Age: 65
DRG: 121 | End: 2025-01-13
Payer: MEDICAID

## 2025-01-13 ENCOUNTER — ANESTHESIA (OUTPATIENT)
Dept: ENDOSCOPY | Age: 65
End: 2025-01-13
Payer: MEDICAID

## 2025-01-13 ENCOUNTER — APPOINTMENT (OUTPATIENT)
Dept: ENDOSCOPY | Age: 65
DRG: 121 | End: 2025-01-13
Attending: INTERNAL MEDICINE
Payer: MEDICAID

## 2025-01-13 LAB
ALBUMIN SERPL-MCNC: 3.2 G/DL (ref 3.4–5)
ANION GAP SERPL CALCULATED.3IONS-SCNC: 9 MMOL/L (ref 3–16)
BASOPHILS # BLD: 0 K/UL (ref 0–0.2)
BASOPHILS NFR BLD: 0.4 %
BUN SERPL-MCNC: 53 MG/DL (ref 7–20)
CALCIUM SERPL-MCNC: 8.8 MG/DL (ref 8.3–10.6)
CHLORIDE SERPL-SCNC: 106 MMOL/L (ref 99–110)
CO2 SERPL-SCNC: 26 MMOL/L (ref 21–32)
CREAT SERPL-MCNC: 1.6 MG/DL (ref 0.6–1.2)
DEPRECATED RDW RBC AUTO: 15.1 % (ref 12.4–15.4)
EOSINOPHIL # BLD: 0 K/UL (ref 0–0.6)
EOSINOPHIL NFR BLD: 0 %
GFR SERPLBLD CREATININE-BSD FMLA CKD-EPI: 36 ML/MIN/{1.73_M2}
GLUCOSE BLD-MCNC: 112 MG/DL (ref 70–99)
GLUCOSE BLD-MCNC: 154 MG/DL (ref 70–99)
GLUCOSE BLD-MCNC: 76 MG/DL (ref 70–99)
GLUCOSE BLD-MCNC: 99 MG/DL (ref 70–99)
GLUCOSE SERPL-MCNC: 177 MG/DL (ref 70–99)
HCT VFR BLD AUTO: 38.3 % (ref 36–48)
HGB BLD-MCNC: 12.2 G/DL (ref 12–16)
LYMPHOCYTES # BLD: 1.2 K/UL (ref 1–5.1)
LYMPHOCYTES NFR BLD: 11.8 %
MAGNESIUM SERPL-MCNC: 2.29 MG/DL (ref 1.8–2.4)
MCH RBC QN AUTO: 29.8 PG (ref 26–34)
MCHC RBC AUTO-ENTMCNC: 31.9 G/DL (ref 31–36)
MCV RBC AUTO: 93.6 FL (ref 80–100)
MONOCYTES # BLD: 0.5 K/UL (ref 0–1.3)
MONOCYTES NFR BLD: 5.2 %
NEUTROPHILS # BLD: 8.4 K/UL (ref 1.7–7.7)
NEUTROPHILS NFR BLD: 82.6 %
PERFORMED ON: ABNORMAL
PERFORMED ON: ABNORMAL
PERFORMED ON: NORMAL
PERFORMED ON: NORMAL
PHOSPHATE SERPL-MCNC: 4.8 MG/DL (ref 2.5–4.9)
PLATELET # BLD AUTO: 400 K/UL (ref 135–450)
PMV BLD AUTO: 7 FL (ref 5–10.5)
POTASSIUM SERPL-SCNC: 4.8 MMOL/L (ref 3.5–5.1)
RBC # BLD AUTO: 4.09 M/UL (ref 4–5.2)
SODIUM SERPL-SCNC: 141 MMOL/L (ref 136–145)
WBC # BLD AUTO: 10.1 K/UL (ref 4–11)

## 2025-01-13 PROCEDURE — 6370000000 HC RX 637 (ALT 250 FOR IP)

## 2025-01-13 PROCEDURE — 88342 IMHCHEM/IMCYTCHM 1ST ANTB: CPT

## 2025-01-13 PROCEDURE — 88305 TISSUE EXAM BY PATHOLOGIST: CPT

## 2025-01-13 PROCEDURE — 88112 CYTOPATH CELL ENHANCE TECH: CPT

## 2025-01-13 PROCEDURE — 0B9F8ZX DRAINAGE OF RIGHT LOWER LUNG LOBE, VIA NATURAL OR ARTIFICIAL OPENING ENDOSCOPIC, DIAGNOSTIC: ICD-10-PCS | Performed by: INTERNAL MEDICINE

## 2025-01-13 PROCEDURE — 87541 LEGION PNEUMO DNA AMP PROB: CPT

## 2025-01-13 PROCEDURE — 88341 IMHCHEM/IMCYTCHM EA ADD ANTB: CPT

## 2025-01-13 PROCEDURE — 87102 FUNGUS ISOLATION CULTURE: CPT

## 2025-01-13 PROCEDURE — 2500000003 HC RX 250 WO HCPCS: Performed by: NURSE ANESTHETIST, CERTIFIED REGISTERED

## 2025-01-13 PROCEDURE — 89050 BODY FLUID CELL COUNT: CPT

## 2025-01-13 PROCEDURE — 99233 SBSQ HOSP IP/OBS HIGH 50: CPT | Performed by: INTERNAL MEDICINE

## 2025-01-13 PROCEDURE — 2580000003 HC RX 258

## 2025-01-13 PROCEDURE — 0BCK8ZZ EXTIRPATION OF MATTER FROM RIGHT LUNG, VIA NATURAL OR ARTIFICIAL OPENING ENDOSCOPIC: ICD-10-PCS | Performed by: INTERNAL MEDICINE

## 2025-01-13 PROCEDURE — 87581 M.PNEUMON DNA AMP PROBE: CPT

## 2025-01-13 PROCEDURE — 83735 ASSAY OF MAGNESIUM: CPT

## 2025-01-13 PROCEDURE — 85025 COMPLETE CBC W/AUTO DIFF WBC: CPT

## 2025-01-13 PROCEDURE — 7100000001 HC PACU RECOVERY - ADDTL 15 MIN: Performed by: INTERNAL MEDICINE

## 2025-01-13 PROCEDURE — 87070 CULTURE OTHR SPECIMN AEROBIC: CPT

## 2025-01-13 PROCEDURE — 93005 ELECTROCARDIOGRAM TRACING: CPT

## 2025-01-13 PROCEDURE — 87206 SMEAR FLUORESCENT/ACID STAI: CPT

## 2025-01-13 PROCEDURE — 94669 MECHANICAL CHEST WALL OSCILL: CPT

## 2025-01-13 PROCEDURE — 88312 SPECIAL STAINS GROUP 1: CPT

## 2025-01-13 PROCEDURE — 31645 BRNCHSC W/THER ASPIR 1ST: CPT | Performed by: INTERNAL MEDICINE

## 2025-01-13 PROCEDURE — 94761 N-INVAS EAR/PLS OXIMETRY MLT: CPT

## 2025-01-13 PROCEDURE — 2709999900 HC NON-CHARGEABLE SUPPLY: Performed by: INTERNAL MEDICINE

## 2025-01-13 PROCEDURE — 87254 VIRUS INOCULATION SHELL VIA: CPT

## 2025-01-13 PROCEDURE — 2060000000 HC ICU INTERMEDIATE R&B

## 2025-01-13 PROCEDURE — 80069 RENAL FUNCTION PANEL: CPT

## 2025-01-13 PROCEDURE — 3700000000 HC ANESTHESIA ATTENDED CARE: Performed by: INTERNAL MEDICINE

## 2025-01-13 PROCEDURE — 87305 ASPERGILLUS AG IA: CPT

## 2025-01-13 PROCEDURE — 87486 CHLMYD PNEUM DNA AMP PROBE: CPT

## 2025-01-13 PROCEDURE — 87281 PNEUMOCYSTIS CARINII AG IF: CPT

## 2025-01-13 PROCEDURE — 2700000000 HC OXYGEN THERAPY PER DAY

## 2025-01-13 PROCEDURE — 6360000002 HC RX W HCPCS: Performed by: NURSE ANESTHETIST, CERTIFIED REGISTERED

## 2025-01-13 PROCEDURE — 3609010800 HC BRONCHOSCOPY ALVEOLAR LAVAGE: Performed by: INTERNAL MEDICINE

## 2025-01-13 PROCEDURE — 87633 RESP VIRUS 12-25 TARGETS: CPT

## 2025-01-13 PROCEDURE — 36415 COLL VENOUS BLD VENIPUNCTURE: CPT

## 2025-01-13 PROCEDURE — 87798 DETECT AGENT NOS DNA AMP: CPT

## 2025-01-13 PROCEDURE — 6360000002 HC RX W HCPCS

## 2025-01-13 PROCEDURE — 94640 AIRWAY INHALATION TREATMENT: CPT

## 2025-01-13 PROCEDURE — 2500000003 HC RX 250 WO HCPCS

## 2025-01-13 PROCEDURE — 7100000000 HC PACU RECOVERY - FIRST 15 MIN: Performed by: INTERNAL MEDICINE

## 2025-01-13 PROCEDURE — 87529 HSV DNA AMP PROBE: CPT

## 2025-01-13 PROCEDURE — 87116 MYCOBACTERIA CULTURE: CPT

## 2025-01-13 PROCEDURE — 87015 SPECIMEN INFECT AGNT CONCNTJ: CPT

## 2025-01-13 PROCEDURE — 31624 DX BRONCHOSCOPE/LAVAGE: CPT | Performed by: INTERNAL MEDICINE

## 2025-01-13 PROCEDURE — 87205 SMEAR GRAM STAIN: CPT

## 2025-01-13 PROCEDURE — 2580000003 HC RX 258: Performed by: INTERNAL MEDICINE

## 2025-01-13 PROCEDURE — 87449 NOS EACH ORGANISM AG IA: CPT

## 2025-01-13 PROCEDURE — 71045 X-RAY EXAM CHEST 1 VIEW: CPT

## 2025-01-13 PROCEDURE — 3700000001 HC ADD 15 MINUTES (ANESTHESIA): Performed by: INTERNAL MEDICINE

## 2025-01-13 RX ORDER — PROPOFOL 10 MG/ML
INJECTION, EMULSION INTRAVENOUS
Status: DISCONTINUED | OUTPATIENT
Start: 2025-01-13 | End: 2025-01-13 | Stop reason: SDUPTHER

## 2025-01-13 RX ORDER — LIDOCAINE HYDROCHLORIDE 20 MG/ML
INJECTION, SOLUTION INTRAVENOUS
Status: DISCONTINUED | OUTPATIENT
Start: 2025-01-13 | End: 2025-01-13 | Stop reason: SDUPTHER

## 2025-01-13 RX ORDER — ROCURONIUM BROMIDE 10 MG/ML
INJECTION, SOLUTION INTRAVENOUS
Status: DISCONTINUED | OUTPATIENT
Start: 2025-01-13 | End: 2025-01-13 | Stop reason: SDUPTHER

## 2025-01-13 RX ORDER — IPRATROPIUM BROMIDE AND ALBUTEROL SULFATE 2.5; .5 MG/3ML; MG/3ML
1 SOLUTION RESPIRATORY (INHALATION)
Status: DISCONTINUED | OUTPATIENT
Start: 2025-01-14 | End: 2025-01-21 | Stop reason: HOSPADM

## 2025-01-13 RX ADMIN — PHENYLEPHRINE HYDROCHLORIDE 100 MCG: 10 INJECTION, SOLUTION INTRAMUSCULAR; INTRAVENOUS; SUBCUTANEOUS at 12:24

## 2025-01-13 RX ADMIN — SODIUM CHLORIDE, PRESERVATIVE FREE 10 ML: 5 INJECTION INTRAVENOUS at 08:41

## 2025-01-13 RX ADMIN — PHENYLEPHRINE HYDROCHLORIDE 200 MCG: 10 INJECTION, SOLUTION INTRAMUSCULAR; INTRAVENOUS; SUBCUTANEOUS at 12:14

## 2025-01-13 RX ADMIN — IPRATROPIUM BROMIDE AND ALBUTEROL SULFATE 1 DOSE: 2.5; .5 SOLUTION RESPIRATORY (INHALATION) at 17:05

## 2025-01-13 RX ADMIN — PHENYLEPHRINE HYDROCHLORIDE 200 MCG: 10 INJECTION, SOLUTION INTRAMUSCULAR; INTRAVENOUS; SUBCUTANEOUS at 12:17

## 2025-01-13 RX ADMIN — ROCURONIUM BROMIDE 50 MG: 10 INJECTION, SOLUTION INTRAVENOUS at 12:03

## 2025-01-13 RX ADMIN — HYDROCORTISONE: 1 CREAM TOPICAL at 20:39

## 2025-01-13 RX ADMIN — ARFORMOTEROL TARTRATE: 15 SOLUTION RESPIRATORY (INHALATION) at 20:00

## 2025-01-13 RX ADMIN — CARVEDILOL 6.25 MG: 6.25 TABLET, FILM COATED ORAL at 08:39

## 2025-01-13 RX ADMIN — SODIUM CHLORIDE, PRESERVATIVE FREE 10 ML: 5 INJECTION INTRAVENOUS at 20:39

## 2025-01-13 RX ADMIN — TRAZODONE HYDROCHLORIDE 25 MG: 50 TABLET ORAL at 20:38

## 2025-01-13 RX ADMIN — PHENYLEPHRINE HYDROCHLORIDE 100 MCG: 10 INJECTION, SOLUTION INTRAMUSCULAR; INTRAVENOUS; SUBCUTANEOUS at 12:08

## 2025-01-13 RX ADMIN — TEMAZEPAM 15 MG: 15 CAPSULE ORAL at 23:48

## 2025-01-13 RX ADMIN — AMLODIPINE BESYLATE 10 MG: 10 TABLET ORAL at 08:39

## 2025-01-13 RX ADMIN — ARFORMOTEROL TARTRATE: 15 SOLUTION RESPIRATORY (INHALATION) at 09:04

## 2025-01-13 RX ADMIN — LETROZOLE 2.5 MG: 2.5 TABLET ORAL at 08:44

## 2025-01-13 RX ADMIN — LIDOCAINE HYDROCHLORIDE 100 MG: 20 INJECTION, SOLUTION INTRAVENOUS at 12:03

## 2025-01-13 RX ADMIN — PHENYLEPHRINE HYDROCHLORIDE 200 MCG: 10 INJECTION, SOLUTION INTRAMUSCULAR; INTRAVENOUS; SUBCUTANEOUS at 12:20

## 2025-01-13 RX ADMIN — LEVOFLOXACIN 750 MG: 750 TABLET, FILM COATED ORAL at 08:37

## 2025-01-13 RX ADMIN — APIXABAN 2.5 MG: 2.5 TABLET, FILM COATED ORAL at 20:39

## 2025-01-13 RX ADMIN — SERTRALINE HYDROCHLORIDE 50 MG: 50 TABLET ORAL at 08:37

## 2025-01-13 RX ADMIN — PANTOPRAZOLE SODIUM 40 MG: 40 TABLET, DELAYED RELEASE ORAL at 08:37

## 2025-01-13 RX ADMIN — ACETAMINOPHEN 650 MG: 325 TABLET ORAL at 17:04

## 2025-01-13 RX ADMIN — IPRATROPIUM BROMIDE AND ALBUTEROL SULFATE 1 DOSE: 2.5; .5 SOLUTION RESPIRATORY (INHALATION) at 00:26

## 2025-01-13 RX ADMIN — IPRATROPIUM BROMIDE AND ALBUTEROL SULFATE 1 DOSE: 2.5; .5 SOLUTION RESPIRATORY (INHALATION) at 20:01

## 2025-01-13 RX ADMIN — SODIUM CHLORIDE 30 MG/ML INHALATION SOLUTION 4 ML: 30 SOLUTION INHALANT at 20:01

## 2025-01-13 RX ADMIN — PROPOFOL 130 MG: 10 INJECTION, EMULSION INTRAVENOUS at 12:03

## 2025-01-13 RX ADMIN — ROPINIROLE HYDROCHLORIDE 0.25 MG: 0.25 TABLET, FILM COATED ORAL at 20:38

## 2025-01-13 RX ADMIN — Medication 6 MG: at 23:48

## 2025-01-13 RX ADMIN — IPRATROPIUM BROMIDE AND ALBUTEROL SULFATE 1 DOSE: 2.5; .5 SOLUTION RESPIRATORY (INHALATION) at 04:13

## 2025-01-13 RX ADMIN — SODIUM CHLORIDE 30 MG/ML INHALATION SOLUTION 4 ML: 30 SOLUTION INHALANT at 09:04

## 2025-01-13 RX ADMIN — PHENYLEPHRINE HYDROCHLORIDE 200 MCG: 10 INJECTION, SOLUTION INTRAMUSCULAR; INTRAVENOUS; SUBCUTANEOUS at 12:36

## 2025-01-13 RX ADMIN — SUGAMMADEX 200 MG: 100 INJECTION, SOLUTION INTRAVENOUS at 12:51

## 2025-01-13 RX ADMIN — PHENYLEPHRINE HYDROCHLORIDE 200 MCG: 10 INJECTION, SOLUTION INTRAMUSCULAR; INTRAVENOUS; SUBCUTANEOUS at 12:39

## 2025-01-13 RX ADMIN — PROPOFOL 100 MCG/KG/MIN: 10 INJECTION, EMULSION INTRAVENOUS at 12:09

## 2025-01-13 RX ADMIN — PHENYLEPHRINE HYDROCHLORIDE 200 MCG: 10 INJECTION, SOLUTION INTRAMUSCULAR; INTRAVENOUS; SUBCUTANEOUS at 12:32

## 2025-01-13 RX ADMIN — PHENYLEPHRINE HYDROCHLORIDE 200 MCG: 10 INJECTION, SOLUTION INTRAMUSCULAR; INTRAVENOUS; SUBCUTANEOUS at 12:11

## 2025-01-13 RX ADMIN — IPRATROPIUM BROMIDE AND ALBUTEROL SULFATE 1 DOSE: 2.5; .5 SOLUTION RESPIRATORY (INHALATION) at 09:04

## 2025-01-13 RX ADMIN — IPRATROPIUM BROMIDE AND ALBUTEROL SULFATE 1 DOSE: 2.5; .5 SOLUTION RESPIRATORY (INHALATION) at 13:20

## 2025-01-13 RX ADMIN — HYDROXYZINE HYDROCHLORIDE 25 MG: 25 TABLET ORAL at 20:39

## 2025-01-13 RX ADMIN — SODIUM CHLORIDE: 9 INJECTION, SOLUTION INTRAVENOUS at 11:24

## 2025-01-13 RX ADMIN — CARVEDILOL 6.25 MG: 6.25 TABLET, FILM COATED ORAL at 17:02

## 2025-01-13 ASSESSMENT — PAIN DESCRIPTION - DESCRIPTORS
DESCRIPTORS: ACHING
DESCRIPTORS: THROBBING;PRESSURE

## 2025-01-13 ASSESSMENT — PAIN SCALES - GENERAL
PAINLEVEL_OUTOF10: 0
PAINLEVEL_OUTOF10: 0
PAINLEVEL_OUTOF10: 8
PAINLEVEL_OUTOF10: 1
PAINLEVEL_OUTOF10: 3
PAINLEVEL_OUTOF10: 4
PAINLEVEL_OUTOF10: 0

## 2025-01-13 ASSESSMENT — PAIN DESCRIPTION - LOCATION
LOCATION: KNEE
LOCATION: BACK

## 2025-01-13 ASSESSMENT — PAIN DESCRIPTION - ONSET
ONSET: ON-GOING
ONSET: GRADUAL

## 2025-01-13 ASSESSMENT — PAIN DESCRIPTION - ORIENTATION
ORIENTATION: RIGHT;LEFT
ORIENTATION: UPPER

## 2025-01-13 ASSESSMENT — PAIN DESCRIPTION - PAIN TYPE
TYPE: ACUTE PAIN
TYPE: CHRONIC PAIN

## 2025-01-13 ASSESSMENT — PAIN DESCRIPTION - FREQUENCY
FREQUENCY: CONTINUOUS
FREQUENCY: INTERMITTENT

## 2025-01-13 NOTE — ANESTHESIA POSTPROCEDURE EVALUATION
Department of Anesthesiology  Postprocedure Note    Patient: Shoaib Rothman  MRN: [id number]  YOB: 1960  Date of evaluation: 1/13/2025    Procedure Summary       Date: 01/13/25 Room / Location: [place] [id number] / [facility]    Anesthesia Start: 1200 Anesthesia Stop:     Procedure: BRONCHOSCOPY ALVEOLAR LAVAGE / BX Diagnosis:       Mucus plug in respiratory tract      (Mucus plug in respiratory tract [T17.998A])    Surgeons: Guille Mabry MD Responsible Provider: Porfirio Benavides MD    Anesthesia Type: general ASA Status: 3            Anesthesia Type: No value filed.    Carole Phase I: Carole Score: 8    Carole Phase II:      Anesthesia Post Evaluation    Patient location during evaluation: PACU  Patient participation: complete - patient participated  Level of consciousness: awake  Pain score: 2  Airway patency: patent  Cardiovascular status: blood pressure returned to baseline  Respiratory status: acceptable  Hydration status: euvolemic  Pain management: adequate    No notable events documented.

## 2025-01-13 NOTE — CARE COORDINATION
CM continues to follow for DCP- plan to return to LTC at [facility] once medically cleared. Pt has been weaned to 8L O2- on 2L at baseline. Went for bronchoscopy today. CM will continue to follow and set up BLS transport when pt is ready to return to LTC.    Thank you  Cat Abelardo CACERES, BSN, CM  PCU   [phone]

## 2025-01-13 NOTE — PROCEDURES
PROCEDURE NOTE  Date: 1/13/2025   Name: Shoaib Rothman  YOB: 1960    Procedures      PROCEDURE:  BRONCHOSCOPY WITH BRONCHOALVEOLAR LAVAGE right lower lobe and therapeutic aspiration of mucous plugs     The risks and benefits as well as alternatives to the procedure have been discussed with the patient. The patient understands and agrees to proceed.     Preprocedure diagnosis: Right lower lobe pneumonia, mucous plugs    Post procedure diagnosis: Right lower lobe pneumonia, mucous plug    DESCRIPTION OF PROCEDURE: A time out was taken.     Type of sedation used: Per Anesthesia    The scope was passed with ease via a 0.5 endotracheal tube.  A complete airway inspection was performed.  No endobronchial lesions were identified. There were thick, purulent secretions throughout the right lower lobe and right upper lobe airways.  Therapeutic aspiration was performed and sent for surgical pathology.   A Bronchoalveolar lavage was obtained from the right lower lobe with 160 ml instilled and 30 ml recovered. BAL was sent for cell count and differential, bacterial culture and Gram stain, viral culture, AFB smear and culture, HSV culture, VSV culture, Legionella culture, CMV culture, Coccidioides antigen PCR, nocardia culture and Gram stain, chlamydia pneumonia PCR, pneumocystis jiroveci PCR, mycoplasma pneumonia PCR, pneumonia molecular panel, BAL galactomannan, and cytology with pneumocystis jiroveci            EBL 0    The patient tolerated the procedure well. Recovery will be per endoscopy protocol.    Guille Mabry MD  Pulmonary and Critical Care Medicine

## 2025-01-13 NOTE — ANESTHESIA PRE PROCEDURE
GI/Hepatic/Renal ROS            Endo/Other: Negative Endo/Other ROS                    Abdominal:             Vascular: negative vascular ROS.         Other Findings:           Anesthesia Plan      general     ASA 3     (64-year-old female presents for BRONCHOSCOPY.  Plan general anesthesia with ASA standard monitors.  Questions answered.  Patient agreeable with anesthetic plan.  )  Induction: intravenous.      Anesthetic plan and risks discussed with patient.      Plan discussed with CRNA.    Attending anesthesiologist reviewed and agrees with Preprocedure content              DAREK Martinez - CRNA   1/13/2025

## 2025-01-14 ENCOUNTER — APPOINTMENT (OUTPATIENT)
Age: 65
DRG: 121 | End: 2025-01-14
Payer: MEDICAID

## 2025-01-14 ENCOUNTER — APPOINTMENT (OUTPATIENT)
Dept: CT IMAGING | Age: 65
DRG: 121 | End: 2025-01-14
Payer: MEDICAID

## 2025-01-14 LAB
ACID FAST STN SPEC QL: NORMAL
ACID FAST STN SPEC QL: NORMAL
ALBUMIN SERPL-MCNC: 3 G/DL (ref 3.4–5)
ANION GAP SERPL CALCULATED.3IONS-SCNC: 6 MMOL/L (ref 3–16)
BASOPHILS # BLD: 0 K/UL (ref 0–0.2)
BASOPHILS NFR BLD: 0.3 %
BUN SERPL-MCNC: 54 MG/DL (ref 7–20)
CALCIUM SERPL-MCNC: 8.4 MG/DL (ref 8.3–10.6)
CHLORIDE SERPL-SCNC: 107 MMOL/L (ref 99–110)
CO2 SERPL-SCNC: 28 MMOL/L (ref 21–32)
CREAT SERPL-MCNC: 2.1 MG/DL (ref 0.6–1.2)
DEPRECATED RDW RBC AUTO: 15.4 % (ref 12.4–15.4)
EKG ATRIAL RATE: 84 BPM
EKG DIAGNOSIS: NORMAL
EKG P AXIS: 65 DEGREES
EKG P-R INTERVAL: 164 MS
EKG Q-T INTERVAL: 374 MS
EKG QRS DURATION: 102 MS
EKG QTC CALCULATION (BAZETT): 441 MS
EKG R AXIS: 52 DEGREES
EKG T AXIS: 56 DEGREES
EKG VENTRICULAR RATE: 84 BPM
EOSINOPHIL # BLD: 0 K/UL (ref 0–0.6)
EOSINOPHIL NFR BLD: 0.3 %
GFR SERPLBLD CREATININE-BSD FMLA CKD-EPI: 26 ML/MIN/{1.73_M2}
GLUCOSE BLD-MCNC: 102 MG/DL (ref 70–99)
GLUCOSE BLD-MCNC: 151 MG/DL (ref 70–99)
GLUCOSE BLD-MCNC: 168 MG/DL (ref 70–99)
GLUCOSE BLD-MCNC: 99 MG/DL (ref 70–99)
GLUCOSE SERPL-MCNC: 140 MG/DL (ref 70–99)
HCT VFR BLD AUTO: 35 % (ref 36–48)
HGB BLD-MCNC: 10.9 G/DL (ref 12–16)
LYMPHOCYTES # BLD: 1.6 K/UL (ref 1–5.1)
LYMPHOCYTES NFR BLD: 14 %
MAGNESIUM SERPL-MCNC: 2.02 MG/DL (ref 1.8–2.4)
MCH RBC QN AUTO: 29.2 PG (ref 26–34)
MCHC RBC AUTO-ENTMCNC: 31.1 G/DL (ref 31–36)
MCV RBC AUTO: 94 FL (ref 80–100)
MONOCYTES # BLD: 0.7 K/UL (ref 0–1.3)
MONOCYTES NFR BLD: 6.3 %
MYCOBACTERIUM SPEC CULT: NORMAL
MYCOBACTERIUM SPEC CULT: NORMAL
NEUTROPHILS # BLD: 9.3 K/UL (ref 1.7–7.7)
NEUTROPHILS NFR BLD: 79.1 %
PERFORMED ON: ABNORMAL
PERFORMED ON: NORMAL
PHOSPHATE SERPL-MCNC: 4.6 MG/DL (ref 2.5–4.9)
PLATELET # BLD AUTO: 357 K/UL (ref 135–450)
PMV BLD AUTO: 7.4 FL (ref 5–10.5)
POTASSIUM SERPL-SCNC: 5 MMOL/L (ref 3.5–5.1)
RBC # BLD AUTO: 3.72 M/UL (ref 4–5.2)
SODIUM SERPL-SCNC: 141 MMOL/L (ref 136–145)
WBC # BLD AUTO: 11.7 K/UL (ref 4–11)

## 2025-01-14 PROCEDURE — 6360000002 HC RX W HCPCS

## 2025-01-14 PROCEDURE — 99233 SBSQ HOSP IP/OBS HIGH 50: CPT | Performed by: INTERNAL MEDICINE

## 2025-01-14 PROCEDURE — 6370000000 HC RX 637 (ALT 250 FOR IP)

## 2025-01-14 PROCEDURE — 94761 N-INVAS EAR/PLS OXIMETRY MLT: CPT

## 2025-01-14 PROCEDURE — 2060000000 HC ICU INTERMEDIATE R&B

## 2025-01-14 PROCEDURE — 85025 COMPLETE CBC W/AUTO DIFF WBC: CPT

## 2025-01-14 PROCEDURE — 2500000003 HC RX 250 WO HCPCS

## 2025-01-14 PROCEDURE — 2700000000 HC OXYGEN THERAPY PER DAY

## 2025-01-14 PROCEDURE — 94669 MECHANICAL CHEST WALL OSCILL: CPT

## 2025-01-14 PROCEDURE — 6370000000 HC RX 637 (ALT 250 FOR IP): Performed by: STUDENT IN AN ORGANIZED HEALTH CARE EDUCATION/TRAINING PROGRAM

## 2025-01-14 PROCEDURE — 71250 CT THORAX DX C-: CPT

## 2025-01-14 PROCEDURE — 6370000000 HC RX 637 (ALT 250 FOR IP): Performed by: INTERNAL MEDICINE

## 2025-01-14 PROCEDURE — 94640 AIRWAY INHALATION TREATMENT: CPT

## 2025-01-14 PROCEDURE — 83735 ASSAY OF MAGNESIUM: CPT

## 2025-01-14 PROCEDURE — 93010 ELECTROCARDIOGRAM REPORT: CPT | Performed by: INTERNAL MEDICINE

## 2025-01-14 PROCEDURE — 80069 RENAL FUNCTION PANEL: CPT

## 2025-01-14 RX ORDER — OXYCODONE HYDROCHLORIDE 5 MG/1
5 TABLET ORAL ONCE
Status: COMPLETED | OUTPATIENT
Start: 2025-01-14 | End: 2025-01-14

## 2025-01-14 RX ADMIN — APIXABAN 2.5 MG: 2.5 TABLET, FILM COATED ORAL at 19:58

## 2025-01-14 RX ADMIN — ACETAMINOPHEN 650 MG: 325 TABLET ORAL at 16:55

## 2025-01-14 RX ADMIN — SODIUM CHLORIDE, PRESERVATIVE FREE 10 ML: 5 INJECTION INTRAVENOUS at 07:47

## 2025-01-14 RX ADMIN — ARFORMOTEROL TARTRATE: 15 SOLUTION RESPIRATORY (INHALATION) at 20:47

## 2025-01-14 RX ADMIN — ACETAMINOPHEN 650 MG: 325 TABLET ORAL at 04:32

## 2025-01-14 RX ADMIN — SODIUM CHLORIDE 30 MG/ML INHALATION SOLUTION 4 ML: 30 SOLUTION INHALANT at 08:06

## 2025-01-14 RX ADMIN — SODIUM CHLORIDE, PRESERVATIVE FREE 10 ML: 5 INJECTION INTRAVENOUS at 19:57

## 2025-01-14 RX ADMIN — TRAZODONE HYDROCHLORIDE 25 MG: 50 TABLET ORAL at 19:57

## 2025-01-14 RX ADMIN — LETROZOLE 2.5 MG: 2.5 TABLET ORAL at 07:44

## 2025-01-14 RX ADMIN — IPRATROPIUM BROMIDE AND ALBUTEROL SULFATE 1 DOSE: 2.5; .5 SOLUTION RESPIRATORY (INHALATION) at 20:47

## 2025-01-14 RX ADMIN — SERTRALINE HYDROCHLORIDE 50 MG: 50 TABLET ORAL at 07:44

## 2025-01-14 RX ADMIN — PANTOPRAZOLE SODIUM 40 MG: 40 TABLET, DELAYED RELEASE ORAL at 07:44

## 2025-01-14 RX ADMIN — HYDROCORTISONE: 1 CREAM TOPICAL at 19:58

## 2025-01-14 RX ADMIN — HYDROCORTISONE: 1 CREAM TOPICAL at 07:47

## 2025-01-14 RX ADMIN — ACETAMINOPHEN 650 MG: 325 TABLET ORAL at 21:46

## 2025-01-14 RX ADMIN — ROPINIROLE HYDROCHLORIDE 0.25 MG: 0.25 TABLET, FILM COATED ORAL at 19:57

## 2025-01-14 RX ADMIN — Medication 6 MG: at 19:57

## 2025-01-14 RX ADMIN — IPRATROPIUM BROMIDE AND ALBUTEROL SULFATE 1 DOSE: 2.5; .5 SOLUTION RESPIRATORY (INHALATION) at 08:05

## 2025-01-14 RX ADMIN — CARVEDILOL 6.25 MG: 6.25 TABLET, FILM COATED ORAL at 07:44

## 2025-01-14 RX ADMIN — TEMAZEPAM 15 MG: 15 CAPSULE ORAL at 21:35

## 2025-01-14 RX ADMIN — IPRATROPIUM BROMIDE AND ALBUTEROL SULFATE 1 DOSE: 2.5; .5 SOLUTION RESPIRATORY (INHALATION) at 15:29

## 2025-01-14 RX ADMIN — CARVEDILOL 6.25 MG: 6.25 TABLET, FILM COATED ORAL at 16:55

## 2025-01-14 RX ADMIN — AMLODIPINE BESYLATE 10 MG: 10 TABLET ORAL at 07:44

## 2025-01-14 RX ADMIN — ARFORMOTEROL TARTRATE: 15 SOLUTION RESPIRATORY (INHALATION) at 08:06

## 2025-01-14 RX ADMIN — SODIUM CHLORIDE 30 MG/ML INHALATION SOLUTION 4 ML: 30 SOLUTION INHALANT at 20:48

## 2025-01-14 RX ADMIN — APIXABAN 2.5 MG: 2.5 TABLET, FILM COATED ORAL at 08:35

## 2025-01-14 RX ADMIN — OXYCODONE 5 MG: 5 TABLET ORAL at 00:29

## 2025-01-14 RX ADMIN — HYDROXYZINE HYDROCHLORIDE 25 MG: 25 TABLET ORAL at 19:58

## 2025-01-14 ASSESSMENT — PAIN DESCRIPTION - LOCATION
LOCATION: BACK
LOCATION: KNEE
LOCATION: KNEE;BACK
LOCATION: KNEE

## 2025-01-14 ASSESSMENT — PAIN SCALES - GENERAL
PAINLEVEL_OUTOF10: 4
PAINLEVEL_OUTOF10: 4
PAINLEVEL_OUTOF10: 0
PAINLEVEL_OUTOF10: 3
PAINLEVEL_OUTOF10: 8
PAINLEVEL_OUTOF10: 2
PAINLEVEL_OUTOF10: 0
PAINLEVEL_OUTOF10: 3
PAINLEVEL_OUTOF10: 0
PAINLEVEL_OUTOF10: 0
PAINLEVEL_OUTOF10: 3
PAINLEVEL_OUTOF10: 0
PAINLEVEL_OUTOF10: 0

## 2025-01-14 ASSESSMENT — PAIN DESCRIPTION - ONSET
ONSET: ON-GOING
ONSET: ON-GOING
ONSET: GRADUAL

## 2025-01-14 ASSESSMENT — PAIN DESCRIPTION - DESCRIPTORS
DESCRIPTORS: ACHING
DESCRIPTORS: PRESSURE;THROBBING

## 2025-01-14 ASSESSMENT — PAIN DESCRIPTION - ORIENTATION
ORIENTATION: RIGHT;LEFT
ORIENTATION: UPPER;LEFT;RIGHT
ORIENTATION: RIGHT;LEFT
ORIENTATION: UPPER

## 2025-01-14 ASSESSMENT — PAIN DESCRIPTION - FREQUENCY
FREQUENCY: CONTINUOUS
FREQUENCY: CONTINUOUS
FREQUENCY: INTERMITTENT

## 2025-01-14 ASSESSMENT — PAIN DESCRIPTION - PAIN TYPE
TYPE: CHRONIC PAIN;ACUTE PAIN
TYPE: ACUTE PAIN
TYPE: ACUTE PAIN

## 2025-01-14 ASSESSMENT — PAIN - FUNCTIONAL ASSESSMENT
PAIN_FUNCTIONAL_ASSESSMENT: ACTIVITIES ARE NOT PREVENTED

## 2025-01-15 ENCOUNTER — APPOINTMENT (OUTPATIENT)
Age: 65
DRG: 121 | End: 2025-01-15
Payer: MEDICAID

## 2025-01-15 LAB
ALBUMIN SERPL-MCNC: 3.1 G/DL (ref 3.4–5)
ANION GAP SERPL CALCULATED.3IONS-SCNC: 9 MMOL/L (ref 3–16)
ANISOCYTOSIS BLD QL SMEAR: ABNORMAL
BASOPHILS # BLD: 0 K/UL (ref 0–0.2)
BASOPHILS NFR BLD: 0.5 %
BUN SERPL-MCNC: 52 MG/DL (ref 7–20)
CALCIUM SERPL-MCNC: 9 MG/DL (ref 8.3–10.6)
CHLORIDE SERPL-SCNC: 107 MMOL/L (ref 99–110)
CO2 SERPL-SCNC: 27 MMOL/L (ref 21–32)
CREAT SERPL-MCNC: 1.9 MG/DL (ref 0.6–1.2)
DACRYOCYTES BLD QL SMEAR: ABNORMAL
DEPRECATED RDW RBC AUTO: 15.3 % (ref 12.4–15.4)
ECHO AO ROOT DIAM: 3.4 CM
ECHO AO ROOT INDEX: 1.55 CM/M2
ECHO AV AREA PEAK VELOCITY: 2.6 CM2
ECHO AV AREA/BSA PEAK VELOCITY: 1.2 CM2/M2
ECHO AV PEAK GRADIENT: 14 MMHG
ECHO AV PEAK VELOCITY: 1.9 M/S
ECHO AV VELOCITY RATIO: 0.74
ECHO BSA: 2.27 M2
ECHO IVC INSP: 0.3 CM
ECHO IVC PROX: 1.6 CM
ECHO LA AREA 2C: 19.5 CM2
ECHO LA AREA 4C: 22 CM2
ECHO LA MAJOR AXIS: 6.4 CM
ECHO LA MINOR AXIS: 5.4 CM
ECHO LA VOL BP: 64 ML (ref 22–52)
ECHO LA VOL MOD A2C: 58 ML (ref 22–52)
ECHO LA VOL MOD A4C: 60 ML (ref 22–52)
ECHO LA VOL/BSA BIPLANE: 29 ML/M2 (ref 16–34)
ECHO LA VOLUME INDEX MOD A2C: 26 ML/M2 (ref 16–34)
ECHO LA VOLUME INDEX MOD A4C: 27 ML/M2 (ref 16–34)
ECHO LV E' LATERAL VELOCITY: 8.81 CM/S
ECHO LV E' SEPTAL VELOCITY: 5.33 CM/S
ECHO LV EDV A2C: 109 ML
ECHO LV EDV A4C: 91 ML
ECHO LV EDV INDEX A4C: 41 ML/M2
ECHO LV EDV NDEX A2C: 50 ML/M2
ECHO LV EJECTION FRACTION A2C: 68 %
ECHO LV EJECTION FRACTION A4C: 71 %
ECHO LV EJECTION FRACTION BIPLANE: 69 % (ref 55–100)
ECHO LV ESV A2C: 35 ML
ECHO LV ESV A4C: 26 ML
ECHO LV ESV INDEX A2C: 16 ML/M2
ECHO LV ESV INDEX A4C: 12 ML/M2
ECHO LV FRACTIONAL SHORTENING: 43 % (ref 28–44)
ECHO LV INTERNAL DIMENSION DIASTOLE INDEX: 1.68 CM/M2
ECHO LV INTERNAL DIMENSION DIASTOLIC: 3.7 CM (ref 3.9–5.3)
ECHO LV INTERNAL DIMENSION SYSTOLIC INDEX: 0.95 CM/M2
ECHO LV INTERNAL DIMENSION SYSTOLIC: 2.1 CM
ECHO LV IVSD: 1.1 CM (ref 0.6–0.9)
ECHO LV MASS 2D: 129.3 G (ref 67–162)
ECHO LV MASS INDEX 2D: 58.8 G/M2 (ref 43–95)
ECHO LV POSTERIOR WALL DIASTOLIC: 1.1 CM (ref 0.6–0.9)
ECHO LV RELATIVE WALL THICKNESS RATIO: 0.59
ECHO LVOT AREA: 3.5 CM2
ECHO LVOT DIAM: 2.1 CM
ECHO LVOT MEAN GRADIENT: 4 MMHG
ECHO LVOT PEAK GRADIENT: 8 MMHG
ECHO LVOT PEAK VELOCITY: 1.4 M/S
ECHO LVOT STROKE VOLUME INDEX: 40.6 ML/M2
ECHO LVOT SV: 89.3 ML
ECHO LVOT VTI: 25.8 CM
ECHO MV A VELOCITY: 1.22 M/S
ECHO MV E DECELERATION TIME (DT): 222 MS
ECHO MV E VELOCITY: 1.12 M/S
ECHO MV E/A RATIO: 0.92
ECHO MV E/E' LATERAL: 12.71
ECHO MV E/E' RATIO (AVERAGED): 16.86
ECHO MV E/E' SEPTAL: 21.01
ECHO PV MAX VELOCITY: 1.2 M/S
ECHO PV PEAK GRADIENT: 6 MMHG
ECHO RA AREA 4C: 18.4 CM2
ECHO RA END SYSTOLIC VOLUME APICAL 4 CHAMBER INDEX BSA: 20 ML/M2
ECHO RA VOLUME: 43 ML
ECHO RV BASAL DIMENSION: 4.2 CM
ECHO RV FREE WALL PEAK S': 16.3 CM/S
ECHO RV MID DIMENSION: 2.8 CM
ECHO RV TAPSE: 2 CM (ref 1.7–?)
EOSINOPHIL # BLD: 0.1 K/UL (ref 0–0.6)
EOSINOPHIL NFR BLD: 1.5 %
GFR SERPLBLD CREATININE-BSD FMLA CKD-EPI: 29 ML/MIN/{1.73_M2}
GLUCOSE BLD-MCNC: 91 MG/DL (ref 70–99)
GLUCOSE SERPL-MCNC: 99 MG/DL (ref 70–99)
HCT VFR BLD AUTO: 35.8 % (ref 36–48)
HGB BLD-MCNC: 11.3 G/DL (ref 12–16)
LYMPHOCYTES # BLD: 1.7 K/UL (ref 1–5.1)
LYMPHOCYTES NFR BLD: 21.9 %
MAGNESIUM SERPL-MCNC: 1.75 MG/DL (ref 1.8–2.4)
MCH RBC QN AUTO: 29.9 PG (ref 26–34)
MCHC RBC AUTO-ENTMCNC: 31.5 G/DL (ref 31–36)
MCV RBC AUTO: 94.7 FL (ref 80–100)
MONOCYTES # BLD: 0.5 K/UL (ref 0–1.3)
MONOCYTES NFR BLD: 6.7 %
NEUTROPHILS # BLD: 5.4 K/UL (ref 1.7–7.7)
NEUTROPHILS NFR BLD: 69.4 %
PERFORMED ON: NORMAL
PHOSPHATE SERPL-MCNC: 5.3 MG/DL (ref 2.5–4.9)
PLATELET # BLD AUTO: 333 K/UL (ref 135–450)
PMV BLD AUTO: 7.5 FL (ref 5–10.5)
POTASSIUM SERPL-SCNC: 5 MMOL/L (ref 3.5–5.1)
RBC # BLD AUTO: 3.78 M/UL (ref 4–5.2)
SLIDE REVIEW: ABNORMAL
SODIUM SERPL-SCNC: 143 MMOL/L (ref 136–145)
WBC # BLD AUTO: 7.8 K/UL (ref 4–11)

## 2025-01-15 PROCEDURE — 6370000000 HC RX 637 (ALT 250 FOR IP)

## 2025-01-15 PROCEDURE — 94640 AIRWAY INHALATION TREATMENT: CPT

## 2025-01-15 PROCEDURE — 85025 COMPLETE CBC W/AUTO DIFF WBC: CPT

## 2025-01-15 PROCEDURE — 99233 SBSQ HOSP IP/OBS HIGH 50: CPT | Performed by: INTERNAL MEDICINE

## 2025-01-15 PROCEDURE — 93306 TTE W/DOPPLER COMPLETE: CPT | Performed by: INTERNAL MEDICINE

## 2025-01-15 PROCEDURE — 94669 MECHANICAL CHEST WALL OSCILL: CPT

## 2025-01-15 PROCEDURE — 6360000004 HC RX CONTRAST MEDICATION: Performed by: INTERNAL MEDICINE

## 2025-01-15 PROCEDURE — 2700000000 HC OXYGEN THERAPY PER DAY

## 2025-01-15 PROCEDURE — 84156 ASSAY OF PROTEIN URINE: CPT

## 2025-01-15 PROCEDURE — 2060000000 HC ICU INTERMEDIATE R&B

## 2025-01-15 PROCEDURE — 2500000003 HC RX 250 WO HCPCS

## 2025-01-15 PROCEDURE — 6360000002 HC RX W HCPCS

## 2025-01-15 PROCEDURE — 80069 RENAL FUNCTION PANEL: CPT

## 2025-01-15 PROCEDURE — 82570 ASSAY OF URINE CREATININE: CPT

## 2025-01-15 PROCEDURE — 83735 ASSAY OF MAGNESIUM: CPT

## 2025-01-15 PROCEDURE — C8929 TTE W OR WO FOL WCON,DOPPLER: HCPCS

## 2025-01-15 PROCEDURE — 94761 N-INVAS EAR/PLS OXIMETRY MLT: CPT

## 2025-01-15 PROCEDURE — 6370000000 HC RX 637 (ALT 250 FOR IP): Performed by: INTERNAL MEDICINE

## 2025-01-15 RX ORDER — PREDNISONE 50 MG/1
50 TABLET ORAL DAILY
Status: DISCONTINUED | OUTPATIENT
Start: 2025-01-15 | End: 2025-01-21 | Stop reason: HOSPADM

## 2025-01-15 RX ORDER — ACETAMINOPHEN 500 MG
1000 TABLET ORAL EVERY 6 HOURS
Status: COMPLETED | OUTPATIENT
Start: 2025-01-15 | End: 2025-01-15

## 2025-01-15 RX ORDER — LIDOCAINE/PRILOCAINE 2.5 %-2.5%
CREAM (GRAM) TOPICAL PRN
Status: DISCONTINUED | OUTPATIENT
Start: 2025-01-15 | End: 2025-01-21 | Stop reason: HOSPADM

## 2025-01-15 RX ORDER — LANOLIN ALCOHOL/MO/W.PET/CERES
400 CREAM (GRAM) TOPICAL ONCE
Status: COMPLETED | OUTPATIENT
Start: 2025-01-15 | End: 2025-01-15

## 2025-01-15 RX ADMIN — ARFORMOTEROL TARTRATE: 15 SOLUTION RESPIRATORY (INHALATION) at 07:54

## 2025-01-15 RX ADMIN — TRAZODONE HYDROCHLORIDE 25 MG: 50 TABLET ORAL at 19:43

## 2025-01-15 RX ADMIN — SODIUM CHLORIDE, PRESERVATIVE FREE 10 ML: 5 INJECTION INTRAVENOUS at 09:10

## 2025-01-15 RX ADMIN — HYDROCORTISONE: 1 CREAM TOPICAL at 19:53

## 2025-01-15 RX ADMIN — APIXABAN 2.5 MG: 2.5 TABLET, FILM COATED ORAL at 09:10

## 2025-01-15 RX ADMIN — CARVEDILOL 6.25 MG: 6.25 TABLET, FILM COATED ORAL at 09:09

## 2025-01-15 RX ADMIN — ACETAMINOPHEN 650 MG: 325 TABLET ORAL at 15:15

## 2025-01-15 RX ADMIN — ACETAMINOPHEN 1000 MG: 500 TABLET, FILM COATED ORAL at 22:46

## 2025-01-15 RX ADMIN — PANTOPRAZOLE SODIUM 40 MG: 40 TABLET, DELAYED RELEASE ORAL at 09:10

## 2025-01-15 RX ADMIN — LETROZOLE 2.5 MG: 2.5 TABLET ORAL at 09:10

## 2025-01-15 RX ADMIN — HYDROCORTISONE: 1 CREAM TOPICAL at 09:13

## 2025-01-15 RX ADMIN — IPRATROPIUM BROMIDE AND ALBUTEROL SULFATE 1 DOSE: 2.5; .5 SOLUTION RESPIRATORY (INHALATION) at 07:54

## 2025-01-15 RX ADMIN — SODIUM CHLORIDE 30 MG/ML INHALATION SOLUTION 4 ML: 30 SOLUTION INHALANT at 07:54

## 2025-01-15 RX ADMIN — SODIUM CHLORIDE 30 MG/ML INHALATION SOLUTION 4 ML: 30 SOLUTION INHALANT at 20:44

## 2025-01-15 RX ADMIN — ACETAMINOPHEN 1000 MG: 500 TABLET, FILM COATED ORAL at 18:00

## 2025-01-15 RX ADMIN — SODIUM CHLORIDE, PRESERVATIVE FREE 10 ML: 5 INJECTION INTRAVENOUS at 19:44

## 2025-01-15 RX ADMIN — APIXABAN 2.5 MG: 2.5 TABLET, FILM COATED ORAL at 19:44

## 2025-01-15 RX ADMIN — HYDROXYZINE HYDROCHLORIDE 25 MG: 25 TABLET ORAL at 19:44

## 2025-01-15 RX ADMIN — LIDOCAINE AND PRILOCAINE: 25; 25 CREAM TOPICAL at 19:59

## 2025-01-15 RX ADMIN — SULFUR HEXAFLUORIDE 2 ML: 60.7; .19; .19 INJECTION, POWDER, LYOPHILIZED, FOR SUSPENSION INTRAVENOUS; INTRAVESICAL at 11:08

## 2025-01-15 RX ADMIN — ARFORMOTEROL TARTRATE: 15 SOLUTION RESPIRATORY (INHALATION) at 20:44

## 2025-01-15 RX ADMIN — Medication 400 MG: at 12:37

## 2025-01-15 RX ADMIN — IPRATROPIUM BROMIDE AND ALBUTEROL SULFATE 1 DOSE: 2.5; .5 SOLUTION RESPIRATORY (INHALATION) at 12:45

## 2025-01-15 RX ADMIN — CARVEDILOL 6.25 MG: 6.25 TABLET, FILM COATED ORAL at 18:00

## 2025-01-15 RX ADMIN — ROPINIROLE HYDROCHLORIDE 0.25 MG: 0.25 TABLET, FILM COATED ORAL at 19:47

## 2025-01-15 RX ADMIN — AMLODIPINE BESYLATE 10 MG: 10 TABLET ORAL at 09:10

## 2025-01-15 RX ADMIN — IPRATROPIUM BROMIDE AND ALBUTEROL SULFATE 1 DOSE: 2.5; .5 SOLUTION RESPIRATORY (INHALATION) at 15:40

## 2025-01-15 RX ADMIN — ACETAMINOPHEN 650 MG: 325 TABLET ORAL at 09:09

## 2025-01-15 RX ADMIN — LIDOCAINE AND PRILOCAINE: 25; 25 CREAM TOPICAL at 18:10

## 2025-01-15 RX ADMIN — Medication 6 MG: at 19:43

## 2025-01-15 RX ADMIN — PREDNISONE 50 MG: 50 TABLET ORAL at 11:29

## 2025-01-15 RX ADMIN — IPRATROPIUM BROMIDE AND ALBUTEROL SULFATE 1 DOSE: 2.5; .5 SOLUTION RESPIRATORY (INHALATION) at 20:44

## 2025-01-15 RX ADMIN — SERTRALINE HYDROCHLORIDE 50 MG: 50 TABLET ORAL at 09:10

## 2025-01-15 RX ADMIN — TEMAZEPAM 15 MG: 15 CAPSULE ORAL at 19:52

## 2025-01-15 ASSESSMENT — PAIN DESCRIPTION - LOCATION
LOCATION: BACK;KNEE
LOCATION: KNEE
LOCATION: KNEE;BACK

## 2025-01-15 ASSESSMENT — PAIN SCALES - GENERAL
PAINLEVEL_OUTOF10: 7
PAINLEVEL_OUTOF10: 0
PAINLEVEL_OUTOF10: 2
PAINLEVEL_OUTOF10: 6
PAINLEVEL_OUTOF10: 3
PAINLEVEL_OUTOF10: 0
PAINLEVEL_OUTOF10: 3

## 2025-01-15 ASSESSMENT — PAIN - FUNCTIONAL ASSESSMENT
PAIN_FUNCTIONAL_ASSESSMENT: ACTIVITIES ARE NOT PREVENTED

## 2025-01-15 ASSESSMENT — PAIN DESCRIPTION - ORIENTATION
ORIENTATION: LEFT;RIGHT;POSTERIOR
ORIENTATION: RIGHT
ORIENTATION: RIGHT;LEFT;POSTERIOR

## 2025-01-15 ASSESSMENT — PAIN DESCRIPTION - DESCRIPTORS
DESCRIPTORS: ACHING

## 2025-01-15 NOTE — CARE COORDINATION
CM continues to follow for DCP- plan to return to LTC at [facility] once medically cleared. Pt has been weaned to 8L O2- on 2L at baseline. CM will continue to follow and set up BLS transport when pt is ready to return to LTC.    Thank you  Cat Abelardo CACERES, BSN, CM  PCU   [phone]

## 2025-01-16 LAB
ALBUMIN SERPL-MCNC: 3.2 G/DL (ref 3.4–5)
ANION GAP SERPL CALCULATED.3IONS-SCNC: 8 MMOL/L (ref 3–16)
ANISOCYTOSIS BLD QL SMEAR: ABNORMAL
BASOPHILS # BLD: 0 K/UL (ref 0–0.2)
BASOPHILS NFR BLD: 0 %
BUN SERPL-MCNC: 56 MG/DL (ref 7–20)
CALCIUM SERPL-MCNC: 9 MG/DL (ref 8.3–10.6)
CHLORIDE SERPL-SCNC: 106 MMOL/L (ref 99–110)
CO2 SERPL-SCNC: 27 MMOL/L (ref 21–32)
CREAT SERPL-MCNC: 2 MG/DL (ref 0.6–1.2)
CREAT UR-MCNC: 50.3 MG/DL (ref 28–259)
DACRYOCYTES BLD QL SMEAR: ABNORMAL
DEPRECATED RDW RBC AUTO: 15.3 % (ref 12.4–15.4)
EOSINOPHIL # BLD: 0 K/UL (ref 0–0.6)
EOSINOPHIL NFR BLD: 0 %
GFR SERPLBLD CREATININE-BSD FMLA CKD-EPI: 28 ML/MIN/{1.73_M2}
GLUCOSE SERPL-MCNC: 201 MG/DL (ref 70–99)
HCT VFR BLD AUTO: 36 % (ref 36–48)
HGB BLD-MCNC: 11.2 G/DL (ref 12–16)
LYMPHOCYTES # BLD: 1.4 K/UL (ref 1–5.1)
LYMPHOCYTES NFR BLD: 11 %
MAGNESIUM SERPL-MCNC: 1.91 MG/DL (ref 1.8–2.4)
MCH RBC QN AUTO: 29.3 PG (ref 26–34)
MCHC RBC AUTO-ENTMCNC: 31.1 G/DL (ref 31–36)
MCV RBC AUTO: 94.1 FL (ref 80–100)
METAMYELOCYTES NFR BLD MANUAL: 1 %
MICROCYTES BLD QL SMEAR: ABNORMAL
MONOCYTES # BLD: 0.3 K/UL (ref 0–1.3)
MONOCYTES NFR BLD: 2 %
NEUTROPHILS # BLD: 11.3 K/UL (ref 1.7–7.7)
NEUTROPHILS NFR BLD: 86 %
PHOSPHATE SERPL-MCNC: 4 MG/DL (ref 2.5–4.9)
PLATELET # BLD AUTO: 325 K/UL (ref 135–450)
PMV BLD AUTO: 7.2 FL (ref 5–10.5)
POTASSIUM SERPL-SCNC: 5.5 MMOL/L (ref 3.5–5.1)
PROT UR-MCNC: 271 MG/DL
PROT/CREAT UR-RTO: 5.4 MG/DL
RBC # BLD AUTO: 3.83 M/UL (ref 4–5.2)
SCHISTOCYTES BLD QL SMEAR: ABNORMAL
SODIUM SERPL-SCNC: 141 MMOL/L (ref 136–145)
WBC # BLD AUTO: 13 K/UL (ref 4–11)

## 2025-01-16 PROCEDURE — 6370000000 HC RX 637 (ALT 250 FOR IP)

## 2025-01-16 PROCEDURE — 99233 SBSQ HOSP IP/OBS HIGH 50: CPT | Performed by: INTERNAL MEDICINE

## 2025-01-16 PROCEDURE — 80069 RENAL FUNCTION PANEL: CPT

## 2025-01-16 PROCEDURE — 94761 N-INVAS EAR/PLS OXIMETRY MLT: CPT

## 2025-01-16 PROCEDURE — 6360000002 HC RX W HCPCS

## 2025-01-16 PROCEDURE — 2700000000 HC OXYGEN THERAPY PER DAY

## 2025-01-16 PROCEDURE — 85025 COMPLETE CBC W/AUTO DIFF WBC: CPT

## 2025-01-16 PROCEDURE — 94669 MECHANICAL CHEST WALL OSCILL: CPT

## 2025-01-16 PROCEDURE — 6370000000 HC RX 637 (ALT 250 FOR IP): Performed by: INTERNAL MEDICINE

## 2025-01-16 PROCEDURE — 94640 AIRWAY INHALATION TREATMENT: CPT

## 2025-01-16 PROCEDURE — 83735 ASSAY OF MAGNESIUM: CPT

## 2025-01-16 PROCEDURE — 99232 SBSQ HOSP IP/OBS MODERATE 35: CPT | Performed by: INTERNAL MEDICINE

## 2025-01-16 PROCEDURE — 2500000003 HC RX 250 WO HCPCS

## 2025-01-16 PROCEDURE — 2060000000 HC ICU INTERMEDIATE R&B

## 2025-01-16 PROCEDURE — 6370000000 HC RX 637 (ALT 250 FOR IP): Performed by: PHYSICIAN ASSISTANT

## 2025-01-16 RX ORDER — FUROSEMIDE 10 MG/ML
40 INJECTION INTRAMUSCULAR; INTRAVENOUS ONCE
Status: COMPLETED | OUTPATIENT
Start: 2025-01-16 | End: 2025-01-16

## 2025-01-16 RX ADMIN — FUROSEMIDE 40 MG: 10 INJECTION, SOLUTION INTRAMUSCULAR; INTRAVENOUS at 16:15

## 2025-01-16 RX ADMIN — ARFORMOTEROL TARTRATE: 15 SOLUTION RESPIRATORY (INHALATION) at 20:07

## 2025-01-16 RX ADMIN — ARFORMOTEROL TARTRATE: 15 SOLUTION RESPIRATORY (INHALATION) at 09:45

## 2025-01-16 RX ADMIN — PANTOPRAZOLE SODIUM 40 MG: 40 TABLET, DELAYED RELEASE ORAL at 08:39

## 2025-01-16 RX ADMIN — APIXABAN 2.5 MG: 2.5 TABLET, FILM COATED ORAL at 08:40

## 2025-01-16 RX ADMIN — SODIUM ZIRCONIUM CYCLOSILICATE 10 G: 10 POWDER, FOR SUSPENSION ORAL at 13:28

## 2025-01-16 RX ADMIN — ACETAMINOPHEN 650 MG: 325 TABLET ORAL at 18:06

## 2025-01-16 RX ADMIN — ACETAMINOPHEN 650 MG: 325 TABLET ORAL at 08:46

## 2025-01-16 RX ADMIN — HYDROCORTISONE: 1 CREAM TOPICAL at 11:26

## 2025-01-16 RX ADMIN — TRAZODONE HYDROCHLORIDE 25 MG: 50 TABLET ORAL at 20:55

## 2025-01-16 RX ADMIN — SODIUM CHLORIDE, PRESERVATIVE FREE 10 ML: 5 INJECTION INTRAVENOUS at 08:40

## 2025-01-16 RX ADMIN — IPRATROPIUM BROMIDE AND ALBUTEROL SULFATE 1 DOSE: 2.5; .5 SOLUTION RESPIRATORY (INHALATION) at 09:45

## 2025-01-16 RX ADMIN — AMLODIPINE BESYLATE 10 MG: 10 TABLET ORAL at 08:39

## 2025-01-16 RX ADMIN — SODIUM CHLORIDE 30 MG/ML INHALATION SOLUTION 4 ML: 30 SOLUTION INHALANT at 20:08

## 2025-01-16 RX ADMIN — CARVEDILOL 6.25 MG: 6.25 TABLET, FILM COATED ORAL at 18:06

## 2025-01-16 RX ADMIN — SODIUM CHLORIDE, PRESERVATIVE FREE 10 ML: 5 INJECTION INTRAVENOUS at 20:56

## 2025-01-16 RX ADMIN — IPRATROPIUM BROMIDE AND ALBUTEROL SULFATE 1 DOSE: 2.5; .5 SOLUTION RESPIRATORY (INHALATION) at 17:14

## 2025-01-16 RX ADMIN — APIXABAN 2.5 MG: 2.5 TABLET, FILM COATED ORAL at 20:55

## 2025-01-16 RX ADMIN — PREDNISONE 50 MG: 50 TABLET ORAL at 08:40

## 2025-01-16 RX ADMIN — ROPINIROLE HYDROCHLORIDE 0.25 MG: 0.25 TABLET, FILM COATED ORAL at 22:14

## 2025-01-16 RX ADMIN — SODIUM CHLORIDE 30 MG/ML INHALATION SOLUTION 4 ML: 30 SOLUTION INHALANT at 09:46

## 2025-01-16 RX ADMIN — LETROZOLE 2.5 MG: 2.5 TABLET ORAL at 08:40

## 2025-01-16 RX ADMIN — IPRATROPIUM BROMIDE AND ALBUTEROL SULFATE 1 DOSE: 2.5; .5 SOLUTION RESPIRATORY (INHALATION) at 20:07

## 2025-01-16 RX ADMIN — HYDROXYZINE HYDROCHLORIDE 25 MG: 25 TABLET ORAL at 20:55

## 2025-01-16 RX ADMIN — Medication 6 MG: at 20:55

## 2025-01-16 RX ADMIN — CARVEDILOL 6.25 MG: 6.25 TABLET, FILM COATED ORAL at 08:39

## 2025-01-16 RX ADMIN — IPRATROPIUM BROMIDE AND ALBUTEROL SULFATE 1 DOSE: 2.5; .5 SOLUTION RESPIRATORY (INHALATION) at 14:31

## 2025-01-16 RX ADMIN — TEMAZEPAM 15 MG: 15 CAPSULE ORAL at 22:15

## 2025-01-16 RX ADMIN — SERTRALINE HYDROCHLORIDE 50 MG: 50 TABLET ORAL at 08:40

## 2025-01-16 RX ADMIN — HYDROCORTISONE: 1 CREAM TOPICAL at 20:56

## 2025-01-16 RX ADMIN — SODIUM ZIRCONIUM CYCLOSILICATE 5 G: 5 POWDER, FOR SUSPENSION ORAL at 11:26

## 2025-01-16 ASSESSMENT — PAIN SCALES - GENERAL
PAINLEVEL_OUTOF10: 0
PAINLEVEL_OUTOF10: 5

## 2025-01-16 ASSESSMENT — PAIN DESCRIPTION - ONSET: ONSET: ON-GOING

## 2025-01-16 ASSESSMENT — PAIN DESCRIPTION - FREQUENCY: FREQUENCY: CONTINUOUS

## 2025-01-16 ASSESSMENT — PAIN DESCRIPTION - PAIN TYPE: TYPE: CHRONIC PAIN

## 2025-01-16 ASSESSMENT — PAIN DESCRIPTION - ORIENTATION: ORIENTATION: RIGHT;LEFT

## 2025-01-16 ASSESSMENT — PAIN DESCRIPTION - DESCRIPTORS: DESCRIPTORS: ACHING

## 2025-01-16 ASSESSMENT — PAIN - FUNCTIONAL ASSESSMENT: PAIN_FUNCTIONAL_ASSESSMENT: ACTIVITIES ARE NOT PREVENTED

## 2025-01-16 ASSESSMENT — PAIN DESCRIPTION - LOCATION: LOCATION: KNEE

## 2025-01-17 ENCOUNTER — APPOINTMENT (OUTPATIENT)
Dept: GENERAL RADIOLOGY | Age: 65
DRG: 121 | End: 2025-01-17
Payer: MEDICAID

## 2025-01-17 LAB
ALBUMIN SERPL-MCNC: 3 G/DL (ref 3.4–5)
ANION GAP SERPL CALCULATED.3IONS-SCNC: 8 MMOL/L (ref 3–16)
BASOPHILS # BLD: 0 K/UL (ref 0–0.2)
BASOPHILS NFR BLD: 0 %
BUN SERPL-MCNC: 63 MG/DL (ref 7–20)
CALCIUM SERPL-MCNC: 9.1 MG/DL (ref 8.3–10.6)
CHLORIDE SERPL-SCNC: 105 MMOL/L (ref 99–110)
CO2 SERPL-SCNC: 28 MMOL/L (ref 21–32)
CREAT SERPL-MCNC: 2 MG/DL (ref 0.6–1.2)
DACRYOCYTES BLD QL SMEAR: ABNORMAL
DEPRECATED RDW RBC AUTO: 15 % (ref 12.4–15.4)
EOSINOPHIL # BLD: 0 K/UL (ref 0–0.6)
EOSINOPHIL NFR BLD: 0 %
GFR SERPLBLD CREATININE-BSD FMLA CKD-EPI: 28 ML/MIN/{1.73_M2}
GLUCOSE BLD-MCNC: 108 MG/DL (ref 70–99)
GLUCOSE BLD-MCNC: 150 MG/DL (ref 70–99)
GLUCOSE BLD-MCNC: 211 MG/DL (ref 70–99)
GLUCOSE BLD-MCNC: 263 MG/DL (ref 70–99)
GLUCOSE SERPL-MCNC: 118 MG/DL (ref 70–99)
HCT VFR BLD AUTO: 34.6 % (ref 36–48)
HGB BLD-MCNC: 10.9 G/DL (ref 12–16)
LYMPHOCYTES # BLD: 2.2 K/UL (ref 1–5.1)
LYMPHOCYTES NFR BLD: 18 %
MAGNESIUM SERPL-MCNC: 1.69 MG/DL (ref 1.8–2.4)
MCH RBC QN AUTO: 29.1 PG (ref 26–34)
MCHC RBC AUTO-ENTMCNC: 31.5 G/DL (ref 31–36)
MCV RBC AUTO: 92.4 FL (ref 80–100)
MICROCYTES BLD QL SMEAR: ABNORMAL
MONOCYTES # BLD: 0.4 K/UL (ref 0–1.3)
MONOCYTES NFR BLD: 3 %
NEUTROPHILS # BLD: 9.8 K/UL (ref 1.7–7.7)
NEUTROPHILS NFR BLD: 78 %
NEUTS BAND NFR BLD MANUAL: 1 % (ref 0–7)
PERFORMED ON: ABNORMAL
PHOSPHATE SERPL-MCNC: 4.2 MG/DL (ref 2.5–4.9)
PLATELET # BLD AUTO: 339 K/UL (ref 135–450)
PMV BLD AUTO: 7.1 FL (ref 5–10.5)
POTASSIUM SERPL-SCNC: 4.9 MMOL/L (ref 3.5–5.1)
RBC # BLD AUTO: 3.74 M/UL (ref 4–5.2)
SODIUM SERPL-SCNC: 141 MMOL/L (ref 136–145)
WBC # BLD AUTO: 12.4 K/UL (ref 4–11)

## 2025-01-17 PROCEDURE — 71045 X-RAY EXAM CHEST 1 VIEW: CPT

## 2025-01-17 PROCEDURE — 94669 MECHANICAL CHEST WALL OSCILL: CPT

## 2025-01-17 PROCEDURE — 6370000000 HC RX 637 (ALT 250 FOR IP)

## 2025-01-17 PROCEDURE — 99233 SBSQ HOSP IP/OBS HIGH 50: CPT | Performed by: INTERNAL MEDICINE

## 2025-01-17 PROCEDURE — 94761 N-INVAS EAR/PLS OXIMETRY MLT: CPT

## 2025-01-17 PROCEDURE — 80069 RENAL FUNCTION PANEL: CPT

## 2025-01-17 PROCEDURE — 2060000000 HC ICU INTERMEDIATE R&B

## 2025-01-17 PROCEDURE — 6360000002 HC RX W HCPCS: Performed by: INTERNAL MEDICINE

## 2025-01-17 PROCEDURE — 94640 AIRWAY INHALATION TREATMENT: CPT

## 2025-01-17 PROCEDURE — 6360000002 HC RX W HCPCS

## 2025-01-17 PROCEDURE — 6370000000 HC RX 637 (ALT 250 FOR IP): Performed by: INTERNAL MEDICINE

## 2025-01-17 PROCEDURE — 99232 SBSQ HOSP IP/OBS MODERATE 35: CPT | Performed by: INTERNAL MEDICINE

## 2025-01-17 PROCEDURE — 85025 COMPLETE CBC W/AUTO DIFF WBC: CPT

## 2025-01-17 PROCEDURE — 2500000003 HC RX 250 WO HCPCS

## 2025-01-17 PROCEDURE — 83735 ASSAY OF MAGNESIUM: CPT

## 2025-01-17 PROCEDURE — 2580000003 HC RX 258: Performed by: INTERNAL MEDICINE

## 2025-01-17 PROCEDURE — 2700000000 HC OXYGEN THERAPY PER DAY

## 2025-01-17 RX ORDER — FUROSEMIDE 10 MG/ML
40 INJECTION INTRAMUSCULAR; INTRAVENOUS 2 TIMES DAILY
Status: DISCONTINUED | OUTPATIENT
Start: 2025-01-17 | End: 2025-01-18

## 2025-01-17 RX ORDER — LANOLIN ALCOHOL/MO/W.PET/CERES
400 CREAM (GRAM) TOPICAL ONCE
Status: COMPLETED | OUTPATIENT
Start: 2025-01-17 | End: 2025-01-17

## 2025-01-17 RX ORDER — INSULIN LISPRO 100 [IU]/ML
0-4 INJECTION, SOLUTION INTRAVENOUS; SUBCUTANEOUS
Status: DISCONTINUED | OUTPATIENT
Start: 2025-01-17 | End: 2025-01-21 | Stop reason: HOSPADM

## 2025-01-17 RX ADMIN — SODIUM CHLORIDE 30 MG/ML INHALATION SOLUTION 4 ML: 30 SOLUTION INHALANT at 20:04

## 2025-01-17 RX ADMIN — AMLODIPINE BESYLATE 10 MG: 10 TABLET ORAL at 08:32

## 2025-01-17 RX ADMIN — APIXABAN 2.5 MG: 2.5 TABLET, FILM COATED ORAL at 21:11

## 2025-01-17 RX ADMIN — LETROZOLE 2.5 MG: 2.5 TABLET ORAL at 08:32

## 2025-01-17 RX ADMIN — ARFORMOTEROL TARTRATE: 15 SOLUTION RESPIRATORY (INHALATION) at 20:04

## 2025-01-17 RX ADMIN — ACETAMINOPHEN 650 MG: 325 TABLET ORAL at 21:16

## 2025-01-17 RX ADMIN — IPRATROPIUM BROMIDE AND ALBUTEROL SULFATE 1 DOSE: 2.5; .5 SOLUTION RESPIRATORY (INHALATION) at 11:26

## 2025-01-17 RX ADMIN — Medication 400 MG: at 09:48

## 2025-01-17 RX ADMIN — CARVEDILOL 6.25 MG: 6.25 TABLET, FILM COATED ORAL at 08:32

## 2025-01-17 RX ADMIN — ARFORMOTEROL TARTRATE: 15 SOLUTION RESPIRATORY (INHALATION) at 07:54

## 2025-01-17 RX ADMIN — SODIUM CHLORIDE, PRESERVATIVE FREE 10 ML: 5 INJECTION INTRAVENOUS at 08:36

## 2025-01-17 RX ADMIN — TEMAZEPAM 15 MG: 15 CAPSULE ORAL at 22:11

## 2025-01-17 RX ADMIN — PREDNISONE 50 MG: 50 TABLET ORAL at 08:32

## 2025-01-17 RX ADMIN — FUROSEMIDE 40 MG: 10 INJECTION, SOLUTION INTRAMUSCULAR; INTRAVENOUS at 11:14

## 2025-01-17 RX ADMIN — LIDOCAINE AND PRILOCAINE: 25; 25 CREAM TOPICAL at 08:33

## 2025-01-17 RX ADMIN — HYDROCORTISONE: 1 CREAM TOPICAL at 21:12

## 2025-01-17 RX ADMIN — CARVEDILOL 6.25 MG: 6.25 TABLET, FILM COATED ORAL at 15:58

## 2025-01-17 RX ADMIN — ROPINIROLE HYDROCHLORIDE 0.25 MG: 0.25 TABLET, FILM COATED ORAL at 21:11

## 2025-01-17 RX ADMIN — IPRATROPIUM BROMIDE AND ALBUTEROL SULFATE 1 DOSE: 2.5; .5 SOLUTION RESPIRATORY (INHALATION) at 20:04

## 2025-01-17 RX ADMIN — IPRATROPIUM BROMIDE AND ALBUTEROL SULFATE 1 DOSE: 2.5; .5 SOLUTION RESPIRATORY (INHALATION) at 14:36

## 2025-01-17 RX ADMIN — SODIUM ZIRCONIUM CYCLOSILICATE 5 G: 5 POWDER, FOR SUSPENSION ORAL at 08:32

## 2025-01-17 RX ADMIN — PANTOPRAZOLE SODIUM 40 MG: 40 TABLET, DELAYED RELEASE ORAL at 08:32

## 2025-01-17 RX ADMIN — SODIUM CHLORIDE 30 MG/ML INHALATION SOLUTION 4 ML: 30 SOLUTION INHALANT at 07:54

## 2025-01-17 RX ADMIN — IPRATROPIUM BROMIDE AND ALBUTEROL SULFATE 1 DOSE: 2.5; .5 SOLUTION RESPIRATORY (INHALATION) at 07:54

## 2025-01-17 RX ADMIN — ACETAMINOPHEN 650 MG: 325 TABLET ORAL at 09:48

## 2025-01-17 RX ADMIN — Medication 6 MG: at 22:11

## 2025-01-17 RX ADMIN — SERTRALINE HYDROCHLORIDE 50 MG: 50 TABLET ORAL at 08:32

## 2025-01-17 RX ADMIN — FUROSEMIDE 40 MG: 10 INJECTION, SOLUTION INTRAMUSCULAR; INTRAVENOUS at 17:33

## 2025-01-17 RX ADMIN — HYDROXYZINE HYDROCHLORIDE 25 MG: 25 TABLET ORAL at 21:11

## 2025-01-17 RX ADMIN — SODIUM CHLORIDE, PRESERVATIVE FREE 10 ML: 5 INJECTION INTRAVENOUS at 21:13

## 2025-01-17 RX ADMIN — APIXABAN 2.5 MG: 2.5 TABLET, FILM COATED ORAL at 08:32

## 2025-01-17 RX ADMIN — TRAZODONE HYDROCHLORIDE 25 MG: 50 TABLET ORAL at 21:11

## 2025-01-17 ASSESSMENT — PAIN SCALES - GENERAL
PAINLEVEL_OUTOF10: 1
PAINLEVEL_OUTOF10: 4
PAINLEVEL_OUTOF10: 3
PAINLEVEL_OUTOF10: 1

## 2025-01-17 ASSESSMENT — PAIN DESCRIPTION - PAIN TYPE
TYPE: CHRONIC PAIN
TYPE: CHRONIC PAIN

## 2025-01-17 ASSESSMENT — PAIN DESCRIPTION - ORIENTATION
ORIENTATION: RIGHT;LEFT
ORIENTATION: RIGHT;LEFT
ORIENTATION: RIGHT;LEFT;MID

## 2025-01-17 ASSESSMENT — PAIN DESCRIPTION - DESCRIPTORS
DESCRIPTORS: ACHING
DESCRIPTORS: ACHING
DESCRIPTORS: ACHING;DISCOMFORT

## 2025-01-17 ASSESSMENT — PAIN - FUNCTIONAL ASSESSMENT
PAIN_FUNCTIONAL_ASSESSMENT: ACTIVITIES ARE NOT PREVENTED
PAIN_FUNCTIONAL_ASSESSMENT: ACTIVITIES ARE NOT PREVENTED
PAIN_FUNCTIONAL_ASSESSMENT: PREVENTS OR INTERFERES SOME ACTIVE ACTIVITIES AND ADLS

## 2025-01-17 ASSESSMENT — PAIN DESCRIPTION - LOCATION
LOCATION: KNEE
LOCATION: KNEE
LOCATION: BACK;KNEE

## 2025-01-17 ASSESSMENT — PAIN DESCRIPTION - FREQUENCY
FREQUENCY: CONTINUOUS
FREQUENCY: CONTINUOUS

## 2025-01-17 ASSESSMENT — PAIN DESCRIPTION - ONSET: ONSET: ON-GOING

## 2025-01-17 NOTE — CARE COORDINATION
CM continues to follow for DCP- plan to return to LTC at [facility] once medically cleared. Pt has been weaned to 3L O2- on 2L at baseline. CM will continue to follow and set up BLS transport when pt is ready to return to LTC.    Thank you  Cat Abelardo CACERES, BSN, CM  PCU   [phone]

## 2025-01-18 LAB
ALBUMIN SERPL-MCNC: 3.4 G/DL (ref 3.4–5)
ANION GAP SERPL CALCULATED.3IONS-SCNC: 9 MMOL/L (ref 3–16)
BASOPHILS # BLD: 0 K/UL (ref 0–0.2)
BASOPHILS NFR BLD: 0 %
BUN SERPL-MCNC: 70 MG/DL (ref 7–20)
CALCIUM SERPL-MCNC: 9.3 MG/DL (ref 8.3–10.6)
CHLORIDE SERPL-SCNC: 100 MMOL/L (ref 99–110)
CO2 SERPL-SCNC: 30 MMOL/L (ref 21–32)
CREAT SERPL-MCNC: 2.1 MG/DL (ref 0.6–1.2)
DEPRECATED RDW RBC AUTO: 15.2 % (ref 12.4–15.4)
EOSINOPHIL # BLD: 0 K/UL (ref 0–0.6)
EOSINOPHIL NFR BLD: 0 %
GFR SERPLBLD CREATININE-BSD FMLA CKD-EPI: 26 ML/MIN/{1.73_M2}
GLUCOSE BLD-MCNC: 110 MG/DL (ref 70–99)
GLUCOSE BLD-MCNC: 186 MG/DL (ref 70–99)
GLUCOSE BLD-MCNC: 197 MG/DL (ref 70–99)
GLUCOSE BLD-MCNC: 216 MG/DL (ref 70–99)
GLUCOSE BLD-MCNC: 273 MG/DL (ref 70–99)
GLUCOSE BLD-MCNC: 91 MG/DL (ref 70–99)
GLUCOSE SERPL-MCNC: 106 MG/DL (ref 70–99)
HCT VFR BLD AUTO: 36.5 % (ref 36–48)
HGB BLD-MCNC: 11.8 G/DL (ref 12–16)
LYMPHOCYTES # BLD: 2.6 K/UL (ref 1–5.1)
LYMPHOCYTES NFR BLD: 20 %
MAGNESIUM SERPL-MCNC: 1.75 MG/DL (ref 1.8–2.4)
MCH RBC QN AUTO: 30.3 PG (ref 26–34)
MCHC RBC AUTO-ENTMCNC: 32.5 G/DL (ref 31–36)
MCV RBC AUTO: 93.3 FL (ref 80–100)
MONOCYTES # BLD: 0.8 K/UL (ref 0–1.3)
MONOCYTES NFR BLD: 6 %
NEUTROPHILS # BLD: 9.6 K/UL (ref 1.7–7.7)
NEUTROPHILS NFR BLD: 73 %
NEUTS BAND NFR BLD MANUAL: 1 % (ref 0–7)
PERFORMED ON: ABNORMAL
PERFORMED ON: NORMAL
PHOSPHATE SERPL-MCNC: 5.7 MG/DL (ref 2.5–4.9)
PLATELET # BLD AUTO: 350 K/UL (ref 135–450)
PLATELET BLD QL SMEAR: ADEQUATE
PMV BLD AUTO: 7.3 FL (ref 5–10.5)
POTASSIUM SERPL-SCNC: 4.3 MMOL/L (ref 3.5–5.1)
RBC # BLD AUTO: 3.91 M/UL (ref 4–5.2)
RBC MORPH BLD: NORMAL
SLIDE REVIEW: ABNORMAL
SODIUM SERPL-SCNC: 139 MMOL/L (ref 136–145)
WBC # BLD AUTO: 13 K/UL (ref 4–11)

## 2025-01-18 PROCEDURE — 6360000002 HC RX W HCPCS

## 2025-01-18 PROCEDURE — 80069 RENAL FUNCTION PANEL: CPT

## 2025-01-18 PROCEDURE — 94640 AIRWAY INHALATION TREATMENT: CPT

## 2025-01-18 PROCEDURE — 94669 MECHANICAL CHEST WALL OSCILL: CPT

## 2025-01-18 PROCEDURE — 6370000000 HC RX 637 (ALT 250 FOR IP): Performed by: INTERNAL MEDICINE

## 2025-01-18 PROCEDURE — 6370000000 HC RX 637 (ALT 250 FOR IP)

## 2025-01-18 PROCEDURE — 99233 SBSQ HOSP IP/OBS HIGH 50: CPT | Performed by: INTERNAL MEDICINE

## 2025-01-18 PROCEDURE — 2700000000 HC OXYGEN THERAPY PER DAY

## 2025-01-18 PROCEDURE — 85025 COMPLETE CBC W/AUTO DIFF WBC: CPT

## 2025-01-18 PROCEDURE — 2500000003 HC RX 250 WO HCPCS

## 2025-01-18 PROCEDURE — 83735 ASSAY OF MAGNESIUM: CPT

## 2025-01-18 PROCEDURE — 2580000003 HC RX 258: Performed by: INTERNAL MEDICINE

## 2025-01-18 PROCEDURE — 83036 HEMOGLOBIN GLYCOSYLATED A1C: CPT

## 2025-01-18 PROCEDURE — 2060000000 HC ICU INTERMEDIATE R&B

## 2025-01-18 PROCEDURE — 94761 N-INVAS EAR/PLS OXIMETRY MLT: CPT

## 2025-01-18 RX ORDER — MAGNESIUM SULFATE IN WATER 40 MG/ML
2000 INJECTION, SOLUTION INTRAVENOUS ONCE
Status: COMPLETED | OUTPATIENT
Start: 2025-01-18 | End: 2025-01-18

## 2025-01-18 RX ORDER — TORSEMIDE 20 MG/1
20 TABLET ORAL DAILY
Status: DISCONTINUED | OUTPATIENT
Start: 2025-01-18 | End: 2025-01-21 | Stop reason: HOSPADM

## 2025-01-18 RX ADMIN — CARVEDILOL 6.25 MG: 6.25 TABLET, FILM COATED ORAL at 07:36

## 2025-01-18 RX ADMIN — HYDROCORTISONE: 1 CREAM TOPICAL at 20:42

## 2025-01-18 RX ADMIN — SODIUM ZIRCONIUM CYCLOSILICATE 5 G: 5 POWDER, FOR SUSPENSION ORAL at 07:37

## 2025-01-18 RX ADMIN — PREDNISONE 50 MG: 50 TABLET ORAL at 07:35

## 2025-01-18 RX ADMIN — IPRATROPIUM BROMIDE AND ALBUTEROL SULFATE 1 DOSE: 2.5; .5 SOLUTION RESPIRATORY (INHALATION) at 15:22

## 2025-01-18 RX ADMIN — SODIUM CHLORIDE 30 MG/ML INHALATION SOLUTION 4 ML: 30 SOLUTION INHALANT at 09:00

## 2025-01-18 RX ADMIN — INSULIN LISPRO 1 UNITS: 100 INJECTION, SOLUTION INTRAVENOUS; SUBCUTANEOUS at 20:41

## 2025-01-18 RX ADMIN — LETROZOLE 2.5 MG: 2.5 TABLET ORAL at 07:36

## 2025-01-18 RX ADMIN — APIXABAN 2.5 MG: 2.5 TABLET, FILM COATED ORAL at 20:42

## 2025-01-18 RX ADMIN — INSULIN LISPRO 2 UNITS: 100 INJECTION, SOLUTION INTRAVENOUS; SUBCUTANEOUS at 11:34

## 2025-01-18 RX ADMIN — ACETAMINOPHEN 650 MG: 325 TABLET ORAL at 16:52

## 2025-01-18 RX ADMIN — MAGNESIUM SULFATE HEPTAHYDRATE 2000 MG: 40 INJECTION, SOLUTION INTRAVENOUS at 07:45

## 2025-01-18 RX ADMIN — TRAZODONE HYDROCHLORIDE 25 MG: 50 TABLET ORAL at 20:42

## 2025-01-18 RX ADMIN — Medication 6 MG: at 21:59

## 2025-01-18 RX ADMIN — IPRATROPIUM BROMIDE AND ALBUTEROL SULFATE 1 DOSE: 2.5; .5 SOLUTION RESPIRATORY (INHALATION) at 11:55

## 2025-01-18 RX ADMIN — LIDOCAINE AND PRILOCAINE: 25; 25 CREAM TOPICAL at 10:03

## 2025-01-18 RX ADMIN — HYDROXYZINE HYDROCHLORIDE 25 MG: 25 TABLET ORAL at 20:42

## 2025-01-18 RX ADMIN — ACETAMINOPHEN 650 MG: 325 TABLET ORAL at 23:33

## 2025-01-18 RX ADMIN — INSULIN LISPRO 1 UNITS: 100 INJECTION, SOLUTION INTRAVENOUS; SUBCUTANEOUS at 16:56

## 2025-01-18 RX ADMIN — ACETAMINOPHEN 650 MG: 325 TABLET ORAL at 07:35

## 2025-01-18 RX ADMIN — PANTOPRAZOLE SODIUM 40 MG: 40 TABLET, DELAYED RELEASE ORAL at 07:36

## 2025-01-18 RX ADMIN — ARFORMOTEROL TARTRATE: 15 SOLUTION RESPIRATORY (INHALATION) at 20:12

## 2025-01-18 RX ADMIN — SODIUM CHLORIDE, PRESERVATIVE FREE 10 ML: 5 INJECTION INTRAVENOUS at 20:42

## 2025-01-18 RX ADMIN — AMLODIPINE BESYLATE 10 MG: 10 TABLET ORAL at 07:36

## 2025-01-18 RX ADMIN — LIDOCAINE AND PRILOCAINE: 25; 25 CREAM TOPICAL at 22:09

## 2025-01-18 RX ADMIN — SODIUM CHLORIDE, PRESERVATIVE FREE 10 ML: 5 INJECTION INTRAVENOUS at 07:37

## 2025-01-18 RX ADMIN — ROPINIROLE HYDROCHLORIDE 0.25 MG: 0.25 TABLET, FILM COATED ORAL at 20:44

## 2025-01-18 RX ADMIN — SODIUM CHLORIDE 30 MG/ML INHALATION SOLUTION 4 ML: 30 SOLUTION INHALANT at 20:13

## 2025-01-18 RX ADMIN — SERTRALINE HYDROCHLORIDE 50 MG: 50 TABLET ORAL at 07:36

## 2025-01-18 RX ADMIN — APIXABAN 2.5 MG: 2.5 TABLET, FILM COATED ORAL at 07:36

## 2025-01-18 RX ADMIN — CARVEDILOL 6.25 MG: 6.25 TABLET, FILM COATED ORAL at 16:52

## 2025-01-18 RX ADMIN — IPRATROPIUM BROMIDE AND ALBUTEROL SULFATE 1 DOSE: 2.5; .5 SOLUTION RESPIRATORY (INHALATION) at 20:12

## 2025-01-18 RX ADMIN — TORSEMIDE 20 MG: 20 TABLET ORAL at 07:55

## 2025-01-18 RX ADMIN — TEMAZEPAM 15 MG: 15 CAPSULE ORAL at 21:59

## 2025-01-18 RX ADMIN — IPRATROPIUM BROMIDE AND ALBUTEROL SULFATE 1 DOSE: 2.5; .5 SOLUTION RESPIRATORY (INHALATION) at 09:00

## 2025-01-18 RX ADMIN — ARFORMOTEROL TARTRATE: 15 SOLUTION RESPIRATORY (INHALATION) at 09:00

## 2025-01-18 ASSESSMENT — PAIN DESCRIPTION - PAIN TYPE: TYPE: CHRONIC PAIN

## 2025-01-18 ASSESSMENT — PAIN DESCRIPTION - ORIENTATION
ORIENTATION: RIGHT
ORIENTATION: RIGHT;LEFT
ORIENTATION: LEFT

## 2025-01-18 ASSESSMENT — PAIN DESCRIPTION - FREQUENCY: FREQUENCY: CONTINUOUS

## 2025-01-18 ASSESSMENT — PAIN DESCRIPTION - LOCATION
LOCATION: KNEE

## 2025-01-18 ASSESSMENT — PAIN DESCRIPTION - DESCRIPTORS
DESCRIPTORS: ACHING

## 2025-01-18 ASSESSMENT — PAIN - FUNCTIONAL ASSESSMENT
PAIN_FUNCTIONAL_ASSESSMENT: PREVENTS OR INTERFERES SOME ACTIVE ACTIVITIES AND ADLS
PAIN_FUNCTIONAL_ASSESSMENT: PREVENTS OR INTERFERES SOME ACTIVE ACTIVITIES AND ADLS
PAIN_FUNCTIONAL_ASSESSMENT: ACTIVITIES ARE NOT PREVENTED

## 2025-01-18 ASSESSMENT — PAIN SCALES - GENERAL
PAINLEVEL_OUTOF10: 4
PAINLEVEL_OUTOF10: 3
PAINLEVEL_OUTOF10: 0
PAINLEVEL_OUTOF10: 3
PAINLEVEL_OUTOF10: 3
PAINLEVEL_OUTOF10: 4

## 2025-01-18 ASSESSMENT — PAIN DESCRIPTION - ONSET: ONSET: ON-GOING

## 2025-01-19 LAB
ALBUMIN SERPL-MCNC: 3.2 G/DL (ref 3.4–5)
ANION GAP SERPL CALCULATED.3IONS-SCNC: 10 MMOL/L (ref 3–16)
BASOPHILS # BLD: 0 K/UL (ref 0–0.2)
BASOPHILS NFR BLD: 0 %
BUN SERPL-MCNC: 77 MG/DL (ref 7–20)
CALCIUM SERPL-MCNC: 8.7 MG/DL (ref 8.3–10.6)
CHLORIDE SERPL-SCNC: 100 MMOL/L (ref 99–110)
CO2 SERPL-SCNC: 30 MMOL/L (ref 21–32)
CREAT SERPL-MCNC: 2.3 MG/DL (ref 0.6–1.2)
DEPRECATED RDW RBC AUTO: 14.9 % (ref 12.4–15.4)
EOSINOPHIL # BLD: 0 K/UL (ref 0–0.6)
EOSINOPHIL NFR BLD: 0 %
EST. AVERAGE GLUCOSE BLD GHB EST-MCNC: 131.2 MG/DL
GFR SERPLBLD CREATININE-BSD FMLA CKD-EPI: 23 ML/MIN/{1.73_M2}
GLUCOSE BLD-MCNC: 118 MG/DL (ref 70–99)
GLUCOSE BLD-MCNC: 164 MG/DL (ref 70–99)
GLUCOSE BLD-MCNC: 165 MG/DL (ref 70–99)
GLUCOSE BLD-MCNC: 246 MG/DL (ref 70–99)
GLUCOSE BLD-MCNC: 250 MG/DL (ref 70–99)
GLUCOSE BLD-MCNC: 292 MG/DL (ref 70–99)
GLUCOSE SERPL-MCNC: 144 MG/DL (ref 70–99)
HBA1C MFR BLD: 6.2 %
HCT VFR BLD AUTO: 35.2 % (ref 36–48)
HGB BLD-MCNC: 11.2 G/DL (ref 12–16)
LYMPHOCYTES # BLD: 1 K/UL (ref 1–5.1)
LYMPHOCYTES NFR BLD: 7 %
MAGNESIUM SERPL-MCNC: 1.96 MG/DL (ref 1.8–2.4)
MCH RBC QN AUTO: 29.5 PG (ref 26–34)
MCHC RBC AUTO-ENTMCNC: 31.9 G/DL (ref 31–36)
MCV RBC AUTO: 92.4 FL (ref 80–100)
METAMYELOCYTES NFR BLD MANUAL: 3 %
MONOCYTES # BLD: 1.4 K/UL (ref 0–1.3)
MONOCYTES NFR BLD: 10 %
MYELOCYTES NFR BLD MANUAL: 1 %
NEUTROPHILS # BLD: 11.6 K/UL (ref 1.7–7.7)
NEUTROPHILS NFR BLD: 78 %
NEUTS BAND NFR BLD MANUAL: 1 % (ref 0–7)
PERFORMED ON: ABNORMAL
PHOSPHATE SERPL-MCNC: 5 MG/DL (ref 2.5–4.9)
PLATELET # BLD AUTO: 343 K/UL (ref 135–450)
PLATELET BLD QL SMEAR: ADEQUATE
PMV BLD AUTO: 7.3 FL (ref 5–10.5)
POTASSIUM SERPL-SCNC: 4.2 MMOL/L (ref 3.5–5.1)
RBC # BLD AUTO: 3.81 M/UL (ref 4–5.2)
SODIUM SERPL-SCNC: 140 MMOL/L (ref 136–145)
WBC # BLD AUTO: 14 K/UL (ref 4–11)

## 2025-01-19 PROCEDURE — 97535 SELF CARE MNGMENT TRAINING: CPT

## 2025-01-19 PROCEDURE — 94669 MECHANICAL CHEST WALL OSCILL: CPT

## 2025-01-19 PROCEDURE — 6370000000 HC RX 637 (ALT 250 FOR IP)

## 2025-01-19 PROCEDURE — 80069 RENAL FUNCTION PANEL: CPT

## 2025-01-19 PROCEDURE — 97161 PT EVAL LOW COMPLEX 20 MIN: CPT

## 2025-01-19 PROCEDURE — 97116 GAIT TRAINING THERAPY: CPT

## 2025-01-19 PROCEDURE — 99233 SBSQ HOSP IP/OBS HIGH 50: CPT | Performed by: INTERNAL MEDICINE

## 2025-01-19 PROCEDURE — 94761 N-INVAS EAR/PLS OXIMETRY MLT: CPT

## 2025-01-19 PROCEDURE — 97165 OT EVAL LOW COMPLEX 30 MIN: CPT

## 2025-01-19 PROCEDURE — 94640 AIRWAY INHALATION TREATMENT: CPT

## 2025-01-19 PROCEDURE — 83735 ASSAY OF MAGNESIUM: CPT

## 2025-01-19 PROCEDURE — 2060000000 HC ICU INTERMEDIATE R&B

## 2025-01-19 PROCEDURE — 2500000003 HC RX 250 WO HCPCS

## 2025-01-19 PROCEDURE — 6370000000 HC RX 637 (ALT 250 FOR IP): Performed by: INTERNAL MEDICINE

## 2025-01-19 PROCEDURE — 6360000002 HC RX W HCPCS

## 2025-01-19 PROCEDURE — 2700000000 HC OXYGEN THERAPY PER DAY

## 2025-01-19 PROCEDURE — 85025 COMPLETE CBC W/AUTO DIFF WBC: CPT

## 2025-01-19 RX ADMIN — PANTOPRAZOLE SODIUM 40 MG: 40 TABLET, DELAYED RELEASE ORAL at 08:25

## 2025-01-19 RX ADMIN — PREDNISONE 50 MG: 50 TABLET ORAL at 08:24

## 2025-01-19 RX ADMIN — INSULIN LISPRO 2 UNITS: 100 INJECTION, SOLUTION INTRAVENOUS; SUBCUTANEOUS at 17:07

## 2025-01-19 RX ADMIN — SODIUM CHLORIDE 30 MG/ML INHALATION SOLUTION 4 ML: 30 SOLUTION INHALANT at 08:51

## 2025-01-19 RX ADMIN — TORSEMIDE 20 MG: 20 TABLET ORAL at 08:23

## 2025-01-19 RX ADMIN — CARVEDILOL 6.25 MG: 6.25 TABLET, FILM COATED ORAL at 08:25

## 2025-01-19 RX ADMIN — ROPINIROLE HYDROCHLORIDE 0.25 MG: 0.25 TABLET, FILM COATED ORAL at 21:16

## 2025-01-19 RX ADMIN — Medication 6 MG: at 23:22

## 2025-01-19 RX ADMIN — INSULIN LISPRO 2 UNITS: 100 INJECTION, SOLUTION INTRAVENOUS; SUBCUTANEOUS at 21:17

## 2025-01-19 RX ADMIN — IPRATROPIUM BROMIDE AND ALBUTEROL SULFATE 1 DOSE: 2.5; .5 SOLUTION RESPIRATORY (INHALATION) at 23:47

## 2025-01-19 RX ADMIN — IPRATROPIUM BROMIDE AND ALBUTEROL SULFATE 1 DOSE: 2.5; .5 SOLUTION RESPIRATORY (INHALATION) at 11:52

## 2025-01-19 RX ADMIN — ACETAMINOPHEN 650 MG: 325 TABLET ORAL at 14:45

## 2025-01-19 RX ADMIN — LIDOCAINE AND PRILOCAINE: 25; 25 CREAM TOPICAL at 14:46

## 2025-01-19 RX ADMIN — HYDROXYZINE HYDROCHLORIDE 25 MG: 25 TABLET ORAL at 21:17

## 2025-01-19 RX ADMIN — HYDROCORTISONE: 1 CREAM TOPICAL at 08:26

## 2025-01-19 RX ADMIN — ARFORMOTEROL TARTRATE: 15 SOLUTION RESPIRATORY (INHALATION) at 23:47

## 2025-01-19 RX ADMIN — ACETAMINOPHEN 650 MG: 325 TABLET ORAL at 21:17

## 2025-01-19 RX ADMIN — APIXABAN 2.5 MG: 2.5 TABLET, FILM COATED ORAL at 21:17

## 2025-01-19 RX ADMIN — SERTRALINE HYDROCHLORIDE 50 MG: 50 TABLET ORAL at 08:25

## 2025-01-19 RX ADMIN — TEMAZEPAM 15 MG: 15 CAPSULE ORAL at 23:22

## 2025-01-19 RX ADMIN — ARFORMOTEROL TARTRATE: 15 SOLUTION RESPIRATORY (INHALATION) at 08:50

## 2025-01-19 RX ADMIN — TRAZODONE HYDROCHLORIDE 25 MG: 50 TABLET ORAL at 21:17

## 2025-01-19 RX ADMIN — AMLODIPINE BESYLATE 10 MG: 10 TABLET ORAL at 08:25

## 2025-01-19 RX ADMIN — SODIUM CHLORIDE, PRESERVATIVE FREE 10 ML: 5 INJECTION INTRAVENOUS at 21:18

## 2025-01-19 RX ADMIN — CARVEDILOL 6.25 MG: 6.25 TABLET, FILM COATED ORAL at 17:03

## 2025-01-19 RX ADMIN — HYDROCORTISONE: 1 CREAM TOPICAL at 21:18

## 2025-01-19 RX ADMIN — LIDOCAINE AND PRILOCAINE: 25; 25 CREAM TOPICAL at 23:23

## 2025-01-19 RX ADMIN — APIXABAN 2.5 MG: 2.5 TABLET, FILM COATED ORAL at 08:25

## 2025-01-19 RX ADMIN — IPRATROPIUM BROMIDE AND ALBUTEROL SULFATE 1 DOSE: 2.5; .5 SOLUTION RESPIRATORY (INHALATION) at 08:50

## 2025-01-19 RX ADMIN — LETROZOLE 2.5 MG: 2.5 TABLET ORAL at 08:32

## 2025-01-19 RX ADMIN — ACETAMINOPHEN 650 MG: 325 TABLET ORAL at 08:24

## 2025-01-19 RX ADMIN — SODIUM CHLORIDE, PRESERVATIVE FREE 10 ML: 5 INJECTION INTRAVENOUS at 08:25

## 2025-01-19 RX ADMIN — IPRATROPIUM BROMIDE AND ALBUTEROL SULFATE 1 DOSE: 2.5; .5 SOLUTION RESPIRATORY (INHALATION) at 15:53

## 2025-01-19 ASSESSMENT — PAIN DESCRIPTION - FREQUENCY
FREQUENCY: CONTINUOUS

## 2025-01-19 ASSESSMENT — PAIN SCALES - GENERAL
PAINLEVEL_OUTOF10: 4
PAINLEVEL_OUTOF10: 0
PAINLEVEL_OUTOF10: 4
PAINLEVEL_OUTOF10: 0
PAINLEVEL_OUTOF10: 3
PAINLEVEL_OUTOF10: 0
PAINLEVEL_OUTOF10: 3
PAINLEVEL_OUTOF10: 4

## 2025-01-19 ASSESSMENT — PAIN DESCRIPTION - ONSET
ONSET: ON-GOING

## 2025-01-19 ASSESSMENT — PAIN DESCRIPTION - DESCRIPTORS
DESCRIPTORS: ACHING
DESCRIPTORS: ACHING;DISCOMFORT
DESCRIPTORS: ACHING

## 2025-01-19 ASSESSMENT — PAIN DESCRIPTION - ORIENTATION
ORIENTATION: RIGHT;LEFT
ORIENTATION: RIGHT
ORIENTATION: RIGHT;LEFT
ORIENTATION: RIGHT
ORIENTATION: RIGHT

## 2025-01-19 ASSESSMENT — PAIN - FUNCTIONAL ASSESSMENT
PAIN_FUNCTIONAL_ASSESSMENT: PREVENTS OR INTERFERES SOME ACTIVE ACTIVITIES AND ADLS
PAIN_FUNCTIONAL_ASSESSMENT: ACTIVITIES ARE NOT PREVENTED
PAIN_FUNCTIONAL_ASSESSMENT: ACTIVITIES ARE NOT PREVENTED
PAIN_FUNCTIONAL_ASSESSMENT: PREVENTS OR INTERFERES SOME ACTIVE ACTIVITIES AND ADLS
PAIN_FUNCTIONAL_ASSESSMENT: PREVENTS OR INTERFERES SOME ACTIVE ACTIVITIES AND ADLS

## 2025-01-19 ASSESSMENT — PAIN DESCRIPTION - LOCATION
LOCATION: KNEE

## 2025-01-19 ASSESSMENT — PAIN DESCRIPTION - PAIN TYPE
TYPE: CHRONIC PAIN

## 2025-01-20 LAB
ALBUMIN SERPL-MCNC: 3.1 G/DL (ref 3.4–5)
ANION GAP SERPL CALCULATED.3IONS-SCNC: 10 MMOL/L (ref 3–16)
BASOPHILS # BLD: 0 K/UL (ref 0–0.2)
BASOPHILS NFR BLD: 0 %
BUN SERPL-MCNC: 73 MG/DL (ref 7–20)
CALCIUM SERPL-MCNC: 8.4 MG/DL (ref 8.3–10.6)
CHLORIDE SERPL-SCNC: 104 MMOL/L (ref 99–110)
CO2 SERPL-SCNC: 27 MMOL/L (ref 21–32)
CREAT SERPL-MCNC: 2 MG/DL (ref 0.6–1.2)
DEPRECATED RDW RBC AUTO: 15.3 % (ref 12.4–15.4)
EOSINOPHIL # BLD: 0.1 K/UL (ref 0–0.6)
EOSINOPHIL NFR BLD: 1 %
GFR SERPLBLD CREATININE-BSD FMLA CKD-EPI: 28 ML/MIN/{1.73_M2}
GLUCOSE BLD-MCNC: 123 MG/DL (ref 70–99)
GLUCOSE BLD-MCNC: 152 MG/DL (ref 70–99)
GLUCOSE BLD-MCNC: 284 MG/DL (ref 70–99)
GLUCOSE BLD-MCNC: 93 MG/DL (ref 70–99)
GLUCOSE SERPL-MCNC: 190 MG/DL (ref 70–99)
HCT VFR BLD AUTO: 33.7 % (ref 36–48)
HGB BLD-MCNC: 10.8 G/DL (ref 12–16)
LYMPHOCYTES # BLD: 0.6 K/UL (ref 1–5.1)
LYMPHOCYTES NFR BLD: 4 %
MAGNESIUM SERPL-MCNC: 1.63 MG/DL (ref 1.8–2.4)
MCH RBC QN AUTO: 30 PG (ref 26–34)
MCHC RBC AUTO-ENTMCNC: 32 G/DL (ref 31–36)
MCV RBC AUTO: 93.8 FL (ref 80–100)
MONOCYTES # BLD: 0 K/UL (ref 0–1.3)
MONOCYTES NFR BLD: 0 %
NEUTROPHILS # BLD: 13.2 K/UL (ref 1.7–7.7)
NEUTROPHILS NFR BLD: 95 %
PERFORMED ON: ABNORMAL
PERFORMED ON: NORMAL
PHOSPHATE SERPL-MCNC: 3.3 MG/DL (ref 2.5–4.9)
PLATELET # BLD AUTO: 313 K/UL (ref 135–450)
PMV BLD AUTO: 7.5 FL (ref 5–10.5)
POTASSIUM SERPL-SCNC: 4.1 MMOL/L (ref 3.5–5.1)
RBC # BLD AUTO: 3.59 M/UL (ref 4–5.2)
SODIUM SERPL-SCNC: 141 MMOL/L (ref 136–145)
WBC # BLD AUTO: 13.9 K/UL (ref 4–11)

## 2025-01-20 PROCEDURE — 6370000000 HC RX 637 (ALT 250 FOR IP)

## 2025-01-20 PROCEDURE — 2700000000 HC OXYGEN THERAPY PER DAY

## 2025-01-20 PROCEDURE — 2500000003 HC RX 250 WO HCPCS

## 2025-01-20 PROCEDURE — 6370000000 HC RX 637 (ALT 250 FOR IP): Performed by: INTERNAL MEDICINE

## 2025-01-20 PROCEDURE — 99232 SBSQ HOSP IP/OBS MODERATE 35: CPT | Performed by: INTERNAL MEDICINE

## 2025-01-20 PROCEDURE — 6360000002 HC RX W HCPCS

## 2025-01-20 PROCEDURE — 2060000000 HC ICU INTERMEDIATE R&B

## 2025-01-20 PROCEDURE — 99233 SBSQ HOSP IP/OBS HIGH 50: CPT | Performed by: INTERNAL MEDICINE

## 2025-01-20 PROCEDURE — 6360000002 HC RX W HCPCS: Performed by: STUDENT IN AN ORGANIZED HEALTH CARE EDUCATION/TRAINING PROGRAM

## 2025-01-20 PROCEDURE — 85025 COMPLETE CBC W/AUTO DIFF WBC: CPT

## 2025-01-20 PROCEDURE — 94669 MECHANICAL CHEST WALL OSCILL: CPT

## 2025-01-20 PROCEDURE — 2580000003 HC RX 258: Performed by: INTERNAL MEDICINE

## 2025-01-20 PROCEDURE — 94761 N-INVAS EAR/PLS OXIMETRY MLT: CPT

## 2025-01-20 PROCEDURE — 83735 ASSAY OF MAGNESIUM: CPT

## 2025-01-20 PROCEDURE — 80069 RENAL FUNCTION PANEL: CPT

## 2025-01-20 PROCEDURE — 94640 AIRWAY INHALATION TREATMENT: CPT

## 2025-01-20 RX ORDER — LANOLIN ALCOHOL/MO/W.PET/CERES
400 CREAM (GRAM) TOPICAL ONCE
Status: DISCONTINUED | OUTPATIENT
Start: 2025-01-20 | End: 2025-01-21 | Stop reason: HOSPADM

## 2025-01-20 RX ORDER — MAGNESIUM SULFATE IN WATER 40 MG/ML
2000 INJECTION, SOLUTION INTRAVENOUS ONCE
Status: COMPLETED | OUTPATIENT
Start: 2025-01-20 | End: 2025-01-20

## 2025-01-20 RX ORDER — SODIUM CHLORIDE FOR INHALATION 3 %
4 VIAL, NEBULIZER (ML) INHALATION 2 TIMES DAILY
Status: DISCONTINUED | OUTPATIENT
Start: 2025-01-20 | End: 2025-01-21 | Stop reason: HOSPADM

## 2025-01-20 RX ADMIN — APIXABAN 2.5 MG: 2.5 TABLET, FILM COATED ORAL at 09:10

## 2025-01-20 RX ADMIN — IPRATROPIUM BROMIDE AND ALBUTEROL SULFATE 1 DOSE: 2.5; .5 SOLUTION RESPIRATORY (INHALATION) at 11:58

## 2025-01-20 RX ADMIN — IPRATROPIUM BROMIDE AND ALBUTEROL SULFATE 1 DOSE: 2.5; .5 SOLUTION RESPIRATORY (INHALATION) at 16:15

## 2025-01-20 RX ADMIN — ACETAMINOPHEN 650 MG: 325 TABLET ORAL at 22:17

## 2025-01-20 RX ADMIN — LIDOCAINE AND PRILOCAINE: 25; 25 CREAM TOPICAL at 09:14

## 2025-01-20 RX ADMIN — SODIUM CHLORIDE, PRESERVATIVE FREE 10 ML: 5 INJECTION INTRAVENOUS at 22:18

## 2025-01-20 RX ADMIN — APIXABAN 2.5 MG: 2.5 TABLET, FILM COATED ORAL at 22:16

## 2025-01-20 RX ADMIN — SODIUM CHLORIDE 30 MG/ML INHALATION SOLUTION 4 ML: 30 SOLUTION INHALANT at 22:01

## 2025-01-20 RX ADMIN — ACETAMINOPHEN 650 MG: 325 TABLET ORAL at 09:09

## 2025-01-20 RX ADMIN — PREDNISONE 50 MG: 50 TABLET ORAL at 09:10

## 2025-01-20 RX ADMIN — MAGNESIUM SULFATE HEPTAHYDRATE 2000 MG: 40 INJECTION, SOLUTION INTRAVENOUS at 06:42

## 2025-01-20 RX ADMIN — HYDROXYZINE HYDROCHLORIDE 25 MG: 25 TABLET ORAL at 22:17

## 2025-01-20 RX ADMIN — INSULIN LISPRO 2 UNITS: 100 INJECTION, SOLUTION INTRAVENOUS; SUBCUTANEOUS at 17:29

## 2025-01-20 RX ADMIN — IPRATROPIUM BROMIDE AND ALBUTEROL SULFATE 1 DOSE: 2.5; .5 SOLUTION RESPIRATORY (INHALATION) at 22:01

## 2025-01-20 RX ADMIN — AMLODIPINE BESYLATE 10 MG: 10 TABLET ORAL at 09:10

## 2025-01-20 RX ADMIN — ARFORMOTEROL TARTRATE: 15 SOLUTION RESPIRATORY (INHALATION) at 22:01

## 2025-01-20 RX ADMIN — HYDROCORTISONE: 1 CREAM TOPICAL at 22:21

## 2025-01-20 RX ADMIN — CARVEDILOL 6.25 MG: 6.25 TABLET, FILM COATED ORAL at 09:10

## 2025-01-20 RX ADMIN — CARVEDILOL 6.25 MG: 6.25 TABLET, FILM COATED ORAL at 17:02

## 2025-01-20 RX ADMIN — PANTOPRAZOLE SODIUM 40 MG: 40 TABLET, DELAYED RELEASE ORAL at 09:10

## 2025-01-20 RX ADMIN — SODIUM CHLORIDE, PRESERVATIVE FREE 10 ML: 5 INJECTION INTRAVENOUS at 09:15

## 2025-01-20 RX ADMIN — IPRATROPIUM BROMIDE AND ALBUTEROL SULFATE 1 DOSE: 2.5; .5 SOLUTION RESPIRATORY (INHALATION) at 08:06

## 2025-01-20 RX ADMIN — HYDROCORTISONE: 1 CREAM TOPICAL at 09:11

## 2025-01-20 RX ADMIN — ACETAMINOPHEN 650 MG: 325 TABLET ORAL at 17:02

## 2025-01-20 RX ADMIN — TRAZODONE HYDROCHLORIDE 25 MG: 50 TABLET ORAL at 22:17

## 2025-01-20 RX ADMIN — Medication 6 MG: at 22:16

## 2025-01-20 RX ADMIN — ROPINIROLE HYDROCHLORIDE 0.25 MG: 0.25 TABLET, FILM COATED ORAL at 22:17

## 2025-01-20 RX ADMIN — TEMAZEPAM 15 MG: 15 CAPSULE ORAL at 22:16

## 2025-01-20 RX ADMIN — ARFORMOTEROL TARTRATE: 15 SOLUTION RESPIRATORY (INHALATION) at 08:06

## 2025-01-20 RX ADMIN — SERTRALINE HYDROCHLORIDE 50 MG: 50 TABLET ORAL at 09:10

## 2025-01-20 RX ADMIN — LETROZOLE 2.5 MG: 2.5 TABLET ORAL at 09:27

## 2025-01-20 ASSESSMENT — PAIN SCALES - GENERAL
PAINLEVEL_OUTOF10: 2
PAINLEVEL_OUTOF10: 3
PAINLEVEL_OUTOF10: 3

## 2025-01-20 ASSESSMENT — PAIN DESCRIPTION - PAIN TYPE
TYPE: CHRONIC PAIN
TYPE: CHRONIC PAIN

## 2025-01-20 ASSESSMENT — PAIN DESCRIPTION - LOCATION
LOCATION: LEG
LOCATION: LEG

## 2025-01-20 ASSESSMENT — PAIN DESCRIPTION - FREQUENCY
FREQUENCY: CONTINUOUS
FREQUENCY: CONTINUOUS

## 2025-01-20 ASSESSMENT — PAIN DESCRIPTION - ONSET
ONSET: ON-GOING
ONSET: ON-GOING

## 2025-01-20 ASSESSMENT — PAIN DESCRIPTION - ORIENTATION
ORIENTATION: RIGHT
ORIENTATION: RIGHT

## 2025-01-20 ASSESSMENT — ENCOUNTER SYMPTOMS: SHORTNESS OF BREATH: 1

## 2025-01-20 ASSESSMENT — PAIN DESCRIPTION - DESCRIPTORS
DESCRIPTORS: STABBING
DESCRIPTORS: STABBING

## 2025-01-20 NOTE — CARE COORDINATION
DISCHARGE PLANNING:  Chart reviewed.  Patient is from [facility] as a LTC resident. She is can return no precert needed.    Currently patient is on 3L oxygen.   Waiting for nephrology clearance and bronch results.  CM team will arrange medical transport back to facility when patient is appropriate for discharge.    CM team will continue to follow.  Sandhya Lynch RN Case Manager  [phone]

## 2025-01-21 VITALS
WEIGHT: 238.1 LBS | SYSTOLIC BLOOD PRESSURE: 141 MMHG | HEART RATE: 89 BPM | BODY MASS INDEX: 35.27 KG/M2 | OXYGEN SATURATION: 92 % | RESPIRATION RATE: 18 BRPM | HEIGHT: 69 IN | TEMPERATURE: 98.1 F | DIASTOLIC BLOOD PRESSURE: 73 MMHG

## 2025-01-21 LAB
ACID FAST STN SPEC QL: NORMAL
ACID FAST STN SPEC QL: NORMAL
ALBUMIN SERPL-MCNC: 3.1 G/DL (ref 3.4–5)
ANION GAP SERPL CALCULATED.3IONS-SCNC: 9 MMOL/L (ref 3–16)
BASOPHILS # BLD: 0 K/UL (ref 0–0.2)
BASOPHILS NFR BLD: 0 %
BUN SERPL-MCNC: 68 MG/DL (ref 7–20)
CALCIUM SERPL-MCNC: 8.6 MG/DL (ref 8.3–10.6)
CHLORIDE SERPL-SCNC: 104 MMOL/L (ref 99–110)
CO2 SERPL-SCNC: 26 MMOL/L (ref 21–32)
CREAT SERPL-MCNC: 2 MG/DL (ref 0.6–1.2)
DEPRECATED RDW RBC AUTO: 15.1 % (ref 12.4–15.4)
EOSINOPHIL # BLD: 0 K/UL (ref 0–0.6)
EOSINOPHIL NFR BLD: 0 %
GFR SERPLBLD CREATININE-BSD FMLA CKD-EPI: 28 ML/MIN/{1.73_M2}
GLUCOSE BLD-MCNC: 104 MG/DL (ref 70–99)
GLUCOSE BLD-MCNC: 222 MG/DL (ref 70–99)
GLUCOSE SERPL-MCNC: 143 MG/DL (ref 70–99)
HCT VFR BLD AUTO: 34.7 % (ref 36–48)
HGB BLD-MCNC: 11.2 G/DL (ref 12–16)
LYMPHOCYTES # BLD: 2.5 K/UL (ref 1–5.1)
LYMPHOCYTES NFR BLD: 15 %
MAGNESIUM SERPL-MCNC: 2 MG/DL (ref 1.8–2.4)
MCH RBC QN AUTO: 30.1 PG (ref 26–34)
MCHC RBC AUTO-ENTMCNC: 32.3 G/DL (ref 31–36)
MCV RBC AUTO: 93.1 FL (ref 80–100)
MONOCYTES # BLD: 0.8 K/UL (ref 0–1.3)
MONOCYTES NFR BLD: 5 %
MYCOBACTERIUM SPEC CULT: NORMAL
MYCOBACTERIUM SPEC CULT: NORMAL
MYELOCYTES NFR BLD MANUAL: 1 %
NEUTROPHILS # BLD: 13.1 K/UL (ref 1.7–7.7)
NEUTROPHILS NFR BLD: 79 %
PATH INTERP BLD-IMP: NO
PERFORMED ON: ABNORMAL
PERFORMED ON: ABNORMAL
PHOSPHATE SERPL-MCNC: 3.9 MG/DL (ref 2.5–4.9)
PLATELET # BLD AUTO: 341 K/UL (ref 135–450)
PLATELET BLD QL SMEAR: ADEQUATE
PMV BLD AUTO: 7.7 FL (ref 5–10.5)
POTASSIUM SERPL-SCNC: 4.9 MMOL/L (ref 3.5–5.1)
RBC # BLD AUTO: 3.73 M/UL (ref 4–5.2)
RBC MORPH BLD: NORMAL
SLIDE REVIEW: ABNORMAL
SMUDGE CELLS BLD QL SMEAR: PRESENT
SODIUM SERPL-SCNC: 139 MMOL/L (ref 136–145)
WBC # BLD AUTO: 16.4 K/UL (ref 4–11)

## 2025-01-21 PROCEDURE — 6370000000 HC RX 637 (ALT 250 FOR IP)

## 2025-01-21 PROCEDURE — 83735 ASSAY OF MAGNESIUM: CPT

## 2025-01-21 PROCEDURE — 2500000003 HC RX 250 WO HCPCS

## 2025-01-21 PROCEDURE — 6370000000 HC RX 637 (ALT 250 FOR IP): Performed by: INTERNAL MEDICINE

## 2025-01-21 PROCEDURE — 94761 N-INVAS EAR/PLS OXIMETRY MLT: CPT

## 2025-01-21 PROCEDURE — 2700000000 HC OXYGEN THERAPY PER DAY

## 2025-01-21 PROCEDURE — 99233 SBSQ HOSP IP/OBS HIGH 50: CPT | Performed by: INTERNAL MEDICINE

## 2025-01-21 PROCEDURE — 85025 COMPLETE CBC W/AUTO DIFF WBC: CPT

## 2025-01-21 PROCEDURE — 6360000002 HC RX W HCPCS

## 2025-01-21 PROCEDURE — 80069 RENAL FUNCTION PANEL: CPT

## 2025-01-21 PROCEDURE — 94669 MECHANICAL CHEST WALL OSCILL: CPT

## 2025-01-21 PROCEDURE — 94640 AIRWAY INHALATION TREATMENT: CPT

## 2025-01-21 RX ORDER — PREDNISONE 50 MG/1
50 TABLET ORAL DAILY
Qty: 10 TABLET | Refills: 0 | Status: SHIPPED | OUTPATIENT
Start: 2025-01-22 | End: 2025-02-01

## 2025-01-21 RX ORDER — TORSEMIDE 20 MG/1
20 TABLET ORAL DAILY
Qty: 30 TABLET | Refills: 0 | Status: SHIPPED | OUTPATIENT
Start: 2025-01-22 | End: 2025-01-21

## 2025-01-21 RX ORDER — NICOTINE 21 MG/24HR
1 PATCH, TRANSDERMAL 24 HOURS TRANSDERMAL DAILY
Qty: 30 PATCH | Refills: 3 | Status: SHIPPED | OUTPATIENT
Start: 2025-01-21

## 2025-01-21 RX ORDER — LOSARTAN POTASSIUM 25 MG/1
25 TABLET ORAL DAILY
Qty: 90 TABLET | Refills: 1 | Status: SHIPPED | OUTPATIENT
Start: 2025-01-21

## 2025-01-21 RX ORDER — TORSEMIDE 20 MG/1
10 TABLET ORAL DAILY
Qty: 30 TABLET | Refills: 0 | Status: SHIPPED | OUTPATIENT
Start: 2025-01-22

## 2025-01-21 RX ADMIN — CARVEDILOL 6.25 MG: 6.25 TABLET, FILM COATED ORAL at 08:07

## 2025-01-21 RX ADMIN — IPRATROPIUM BROMIDE AND ALBUTEROL SULFATE 1 DOSE: 2.5; .5 SOLUTION RESPIRATORY (INHALATION) at 08:16

## 2025-01-21 RX ADMIN — APIXABAN 2.5 MG: 2.5 TABLET, FILM COATED ORAL at 08:11

## 2025-01-21 RX ADMIN — ARFORMOTEROL TARTRATE: 15 SOLUTION RESPIRATORY (INHALATION) at 08:16

## 2025-01-21 RX ADMIN — LETROZOLE 2.5 MG: 2.5 TABLET ORAL at 08:19

## 2025-01-21 RX ADMIN — PANTOPRAZOLE SODIUM 40 MG: 40 TABLET, DELAYED RELEASE ORAL at 08:07

## 2025-01-21 RX ADMIN — HYDROCORTISONE: 1 CREAM TOPICAL at 08:14

## 2025-01-21 RX ADMIN — PREDNISONE 50 MG: 50 TABLET ORAL at 08:07

## 2025-01-21 RX ADMIN — AMLODIPINE BESYLATE 10 MG: 10 TABLET ORAL at 08:07

## 2025-01-21 RX ADMIN — ACETAMINOPHEN 650 MG: 325 TABLET ORAL at 08:19

## 2025-01-21 RX ADMIN — TORSEMIDE 20 MG: 20 TABLET ORAL at 09:20

## 2025-01-21 RX ADMIN — SODIUM CHLORIDE 30 MG/ML INHALATION SOLUTION 4 ML: 30 SOLUTION INHALANT at 08:16

## 2025-01-21 RX ADMIN — SODIUM CHLORIDE, PRESERVATIVE FREE 10 ML: 5 INJECTION INTRAVENOUS at 08:07

## 2025-01-21 RX ADMIN — SERTRALINE HYDROCHLORIDE 50 MG: 50 TABLET ORAL at 08:07

## 2025-01-21 RX ADMIN — INSULIN LISPRO 1 UNITS: 100 INJECTION, SOLUTION INTRAVENOUS; SUBCUTANEOUS at 11:24

## 2025-01-21 ASSESSMENT — PAIN SCALES - GENERAL
PAINLEVEL_OUTOF10: 0
PAINLEVEL_OUTOF10: 3

## 2025-01-21 ASSESSMENT — PAIN - FUNCTIONAL ASSESSMENT: PAIN_FUNCTIONAL_ASSESSMENT: ACTIVITIES ARE NOT PREVENTED

## 2025-01-21 ASSESSMENT — PAIN DESCRIPTION - LOCATION: LOCATION: GENERALIZED

## 2025-01-21 NOTE — PLAN OF CARE
Problem: Chronic Conditions and Co-morbidities  Goal: Patient's chronic conditions and co-morbidity symptoms are monitored and maintained or improved  1/11/2025 1010 by Mae Moar RN  Outcome: Progressing  Flowsheets  Taken 1/11/2025 1010  Care Plan - Patient's Chronic Conditions and Co-Morbidity Symptoms are Monitored and Maintained or Improved:   Monitor and assess patient's chronic conditions and comorbid symptoms for stability, deterioration, or improvement   Collaborate with multidisciplinary team to address chronic and comorbid conditions and prevent exacerbation or deterioration  Taken 1/11/2025 0759  Care Plan - Patient's Chronic Conditions and Co-Morbidity Symptoms are Monitored and Maintained or Improved: Collaborate with multidisciplinary team to address chronic and comorbid conditions and prevent exacerbation or deterioration     Problem: Discharge Planning  Goal: Discharge to home or other facility with appropriate resources  1/11/2025 1010 by Mae Mora, RN  Outcome: Progressing  Flowsheets  Taken 1/11/2025 1010  Discharge to home or other facility with appropriate resources:   Identify barriers to discharge with patient and caregiver   Arrange for needed discharge resources and transportation as appropriate  Taken 1/11/2025 0759  Discharge to home or other facility with appropriate resources: Identify barriers to discharge with patient and caregiver     Problem: Respiratory - Adult  Goal: Achieves optimal ventilation and oxygenation  1/11/2025 1010 by Mae Mora RN  Outcome: Progressing  Flowsheets  Taken 1/11/2025 1010  Achieves optimal ventilation and oxygenation:   Assess for changes in respiratory status   Assess for changes in mentation and behavior  Taken 1/11/2025 0759  Achieves optimal ventilation and oxygenation: Assess for changes in respiratory status     Problem: Cardiovascular - Adult  Goal: Maintains optimal cardiac output and hemodynamic stability  Outcome: 
  Problem: Chronic Conditions and Co-morbidities  Goal: Patient's chronic conditions and co-morbidity symptoms are monitored and maintained or improved  1/12/2025 1013 by Mae Mora RN  Outcome: Progressing  Flowsheets (Taken 1/12/2025 1013)  Care Plan - Patient's Chronic Conditions and Co-Morbidity Symptoms are Monitored and Maintained or Improved:   Monitor and assess patient's chronic conditions and comorbid symptoms for stability, deterioration, or improvement   Collaborate with multidisciplinary team to address chronic and comorbid conditions and prevent exacerbation or deterioration     Problem: Discharge Planning  Goal: Discharge to home or other facility with appropriate resources  1/12/2025 1013 by Mae Mora RN  Outcome: Progressing  Flowsheets (Taken 1/12/2025 1013)  Discharge to home or other facility with appropriate resources:   Identify barriers to discharge with patient and caregiver   Arrange for needed discharge resources and transportation as appropriate     Problem: Respiratory - Adult  Goal: Achieves optimal ventilation and oxygenation  1/12/2025 1013 by Mae Mora RN  Outcome: Progressing  Flowsheets (Taken 1/12/2025 1013)  Achieves optimal ventilation and oxygenation:   Assess for changes in respiratory status   Assess for changes in mentation and behavior     Problem: Cardiovascular - Adult  Goal: Maintains optimal cardiac output and hemodynamic stability  1/12/2025 1013 by Mae Mora RN  Outcome: Progressing  Flowsheets (Taken 1/11/2025 1010)  Maintains optimal cardiac output and hemodynamic stability:   Monitor blood pressure and heart rate   Monitor urine output and notify Licensed Independent Practitioner for values outside of normal range     Problem: Pain  Goal: Verbalizes/displays adequate comfort level or baseline comfort level  1/12/2025 1013 by Mae Mora RN  Outcome: Progressing  Flowsheets (Taken 1/12/2025 1013)  Verbalizes/displays adequate 
  Problem: Chronic Conditions and Co-morbidities  Goal: Patient's chronic conditions and co-morbidity symptoms are monitored and maintained or improved  1/14/2025 0147 by Figueroa Groves RN  Outcome: Progressing  Flowsheets (Taken 1/14/2025 0147)  Care Plan - Patient's Chronic Conditions and Co-Morbidity Symptoms are Monitored and Maintained or Improved:   Monitor and assess patient's chronic conditions and comorbid symptoms for stability, deterioration, or improvement   Collaborate with multidisciplinary team to address chronic and comorbid conditions and prevent exacerbation or deterioration   Update acute care plan with appropriate goals if chronic or comorbid symptoms are exacerbated and prevent overall improvement and discharge     Problem: Discharge Planning  Goal: Discharge to home or other facility with appropriate resources  1/14/2025 0147 by Figueroa Groves RN  Outcome: Progressing  Flowsheets (Taken 1/14/2025 0147)  Discharge to home or other facility with appropriate resources:   Identify barriers to discharge with patient and caregiver   Arrange for needed discharge resources and transportation as appropriate   Identify discharge learning needs (meds, wound care, etc)   Refer to discharge planning if patient needs post-hospital services based on physician order or complex needs related to functional status, cognitive ability or social support system     Problem: Respiratory - Adult  Goal: Achieves optimal ventilation and oxygenation  1/14/2025 0147 by Figueroa Groves RN  Outcome: Progressing  Flowsheets (Taken 1/14/2025 0147)  Achieves optimal ventilation and oxygenation:   Assess for changes in respiratory status   Assess for changes in mentation and behavior   Position to facilitate oxygenation and minimize respiratory effort   Oxygen supplementation based on oxygen saturation or arterial blood gases   Encourage broncho-pulmonary hygiene including cough, deep breathe, incentive spirometry   
  Problem: Chronic Conditions and Co-morbidities  Goal: Patient's chronic conditions and co-morbidity symptoms are monitored and maintained or improved  1/14/2025 2045 by Nir Rojo RN  Outcome: Progressing  Flowsheets (Taken 1/14/2025 2045)  Care Plan - Patient's Chronic Conditions and Co-Morbidity Symptoms are Monitored and Maintained or Improved:   Monitor and assess patient's chronic conditions and comorbid symptoms for stability, deterioration, or improvement   Collaborate with multidisciplinary team to address chronic and comorbid conditions and prevent exacerbation or deterioration   Update acute care plan with appropriate goals if chronic or comorbid symptoms are exacerbated and prevent overall improvement and discharge     Problem: Discharge Planning  Goal: Discharge to home or other facility with appropriate resources  1/14/2025 2045 by Nir Rojo RN  Outcome: Progressing  Flowsheets (Taken 1/14/2025 2045)  Discharge to home or other facility with appropriate resources:   Identify barriers to discharge with patient and caregiver   Identify discharge learning needs (meds, wound care, etc)   Refer to discharge planning if patient needs post-hospital services based on physician order or complex needs related to functional status, cognitive ability or social support system     Problem: Respiratory - Adult  Goal: Achieves optimal ventilation and oxygenation  1/14/2025 2045 by Nir Rojo RN  Outcome: Progressing  Flowsheets (Taken 1/14/2025 2045)  Achieves optimal ventilation and oxygenation:   Assess for changes in respiratory status   Position to facilitate oxygenation and minimize respiratory effort   Initiate smoking cessation protocol as indicated   Assess the need for suctioning and aspirate as needed     Problem: Pain  Goal: Verbalizes/displays adequate comfort level or baseline comfort level  1/14/2025 2045 by Nir Rojo RN  Outcome: Progressing  Flowsheets (Taken 1/14/2025 
  Problem: Chronic Conditions and Co-morbidities  Goal: Patient's chronic conditions and co-morbidity symptoms are monitored and maintained or improved  1/15/2025 2059 by Nir Rojo RN  Outcome: Progressing  Flowsheets (Taken 1/15/2025 2059)  Care Plan - Patient's Chronic Conditions and Co-Morbidity Symptoms are Monitored and Maintained or Improved:   Monitor and assess patient's chronic conditions and comorbid symptoms for stability, deterioration, or improvement   Collaborate with multidisciplinary team to address chronic and comorbid conditions and prevent exacerbation or deterioration   Update acute care plan with appropriate goals if chronic or comorbid symptoms are exacerbated and prevent overall improvement and discharge     Problem: Discharge Planning  Goal: Discharge to home or other facility with appropriate resources  1/15/2025 2059 by Nir Rojo RN  Outcome: Progressing  Flowsheets (Taken 1/15/2025 2059)  Discharge to home or other facility with appropriate resources:   Identify barriers to discharge with patient and caregiver   Identify discharge learning needs (meds, wound care, etc)     Problem: Respiratory - Adult  Goal: Achieves optimal ventilation and oxygenation  1/15/2025 2059 by Nir Rojo RN  Outcome: Progressing  Flowsheets (Taken 1/15/2025 2059)  Achieves optimal ventilation and oxygenation:   Assess for changes in respiratory status   Position to facilitate oxygenation and minimize respiratory effort   Initiate smoking cessation protocol as indicated   Assess the need for suctioning and aspirate as needed     Problem: Cardiovascular - Adult  Goal: Maintains optimal cardiac output and hemodynamic stability  1/15/2025 1900 by Grisel Guzman RN  Outcome: Progressing  Flowsheets (Taken 1/15/2025 1900)  Maintains optimal cardiac output and hemodynamic stability: Monitor blood pressure and heart rate     Problem: Pain  Goal: Verbalizes/displays adequate comfort level or baseline 
  Problem: Chronic Conditions and Co-morbidities  Goal: Patient's chronic conditions and co-morbidity symptoms are monitored and maintained or improved  1/17/2025 0125 by Cruzito Neito RN  Outcome: Progressing  Flowsheets (Taken 1/17/2025 0125)  Care Plan - Patient's Chronic Conditions and Co-Morbidity Symptoms are Monitored and Maintained or Improved:   Monitor and assess patient's chronic conditions and comorbid symptoms for stability, deterioration, or improvement   Collaborate with multidisciplinary team to address chronic and comorbid conditions and prevent exacerbation or deterioration   Update acute care plan with appropriate goals if chronic or comorbid symptoms are exacerbated and prevent overall improvement and discharge     Problem: Discharge Planning  Goal: Discharge to home or other facility with appropriate resources  1/17/2025 0125 by Cruzito Nieto RN  Outcome: Progressing  Flowsheets (Taken 1/17/2025 0125)  Discharge to home or other facility with appropriate resources:   Identify barriers to discharge with patient and caregiver   Arrange for needed discharge resources and transportation as appropriate   Identify discharge learning needs (meds, wound care, etc)     Problem: Respiratory - Adult  Goal: Achieves optimal ventilation and oxygenation  1/17/2025 0125 by Cruzito Nieto RN  Outcome: Progressing  Flowsheets (Taken 1/17/2025 0125)  Achieves optimal ventilation and oxygenation:   Assess for changes in respiratory status   Assess for changes in mentation and behavior   Position to facilitate oxygenation and minimize respiratory effort   Oxygen supplementation based on oxygen saturation or arterial blood gases  Note: On 4L NC, with 8-10L when ambulating to the bathroom.     Problem: Cardiovascular - Adult  Goal: Maintains optimal cardiac output and hemodynamic stability  1/17/2025 0125 by Cruzito Nieto RN  Outcome: Progressing  Flowsheets (Taken 1/17/2025 0125)  Maintains optimal 
  Problem: Chronic Conditions and Co-morbidities  Goal: Patient's chronic conditions and co-morbidity symptoms are monitored and maintained or improved  1/17/2025 1242 by Marek Orta, RN  Outcome: Progressing  Flowsheets (Taken 1/17/2025 1242)  Care Plan - Patient's Chronic Conditions and Co-Morbidity Symptoms are Monitored and Maintained or Improved:   Monitor and assess patient's chronic conditions and comorbid symptoms for stability, deterioration, or improvement   Collaborate with multidisciplinary team to address chronic and comorbid conditions and prevent exacerbation or deterioration   Update acute care plan with appropriate goals if chronic or comorbid symptoms are exacerbated and prevent overall improvement and discharge  Note: Pt chronic conditions monitored and assessed. Multidisciplinary teams consulted to address conditions. Plan of care updated accordingly     Problem: Discharge Planning  Goal: Discharge to home or other facility with appropriate resources  1/17/2025 1242 by Marek Orta, RN  Outcome: Progressing  Flowsheets (Taken 1/17/2025 1242)  Discharge to home or other facility with appropriate resources:   Identify barriers to discharge with patient and caregiver   Arrange for needed discharge resources and transportation as appropriate   Identify discharge learning needs (meds, wound care, etc)   Arrange for interpreters to assist at discharge as needed   Refer to discharge planning if patient needs post-hospital services based on physician order or complex needs related to functional status, cognitive ability or social support system  Note: Barriers to discharge identified and addressed. Learning needs and resources addressed. Discharge planning updated according to providers     Problem: Respiratory - Adult  Goal: Achieves optimal ventilation and oxygenation  1/17/2025 1242 by Marek Orta, RN  Outcome: Progressing  Flowsheets (Taken 1/17/2025 1242)  Achieves optimal ventilation 
  Problem: Chronic Conditions and Co-morbidities  Goal: Patient's chronic conditions and co-morbidity symptoms are monitored and maintained or improved  1/18/2025 1236 by Marek Orta RN  Outcome: Progressing  Flowsheets (Taken 1/18/2025 0426 by Cruzito Nieto, RN)  Care Plan - Patient's Chronic Conditions and Co-Morbidity Symptoms are Monitored and Maintained or Improved:   Monitor and assess patient's chronic conditions and comorbid symptoms for stability, deterioration, or improvement   Collaborate with multidisciplinary team to address chronic and comorbid conditions and prevent exacerbation or deterioration  Note: Pt chronic conditions assessed and monitored. Multidisciplinary teams consulted to update plan of care. Pt plan of care updated according to treatment team     Problem: Discharge Planning  Goal: Discharge to home or other facility with appropriate resources  1/18/2025 1236 by Marek Orta, RN  Outcome: Progressing  Flowsheets (Taken 1/18/2025 1236)  Discharge to home or other facility with appropriate resources:   Identify barriers to discharge with patient and caregiver   Arrange for needed discharge resources and transportation as appropriate   Identify discharge learning needs (meds, wound care, etc)   Arrange for interpreters to assist at discharge as needed   Refer to discharge planning if patient needs post-hospital services based on physician order or complex needs related to functional status, cognitive ability or social support system  Note: Discharge plans assessed and barriers identified. Learning needs and resources addressed. Discharge planning updated     Problem: Respiratory - Adult  Goal: Achieves optimal ventilation and oxygenation  1/18/2025 1236 by Marek Orta, RN  Outcome: Progressing  Flowsheets (Taken 1/18/2025 1236)  Achieves optimal ventilation and oxygenation:   Assess for changes in respiratory status   Assess for changes in mentation and behavior   Position 
  Problem: Chronic Conditions and Co-morbidities  Goal: Patient's chronic conditions and co-morbidity symptoms are monitored and maintained or improved  1/9/2025 1010 by Greg Joe RN  Outcome: Progressing  Flowsheets (Taken 1/9/2025 1010)  Care Plan - Patient's Chronic Conditions and Co-Morbidity Symptoms are Monitored and Maintained or Improved:   Collaborate with multidisciplinary team to address chronic and comorbid conditions and prevent exacerbation or deterioration   Monitor and assess patient's chronic conditions and comorbid symptoms for stability, deterioration, or improvement   Update acute care plan with appropriate goals if chronic or comorbid symptoms are exacerbated and prevent overall improvement and discharge     Problem: Respiratory - Adult  Goal: Achieves optimal ventilation and oxygenation  1/9/2025 1010 by Greg Joe RN  Outcome: Progressing  Flowsheets (Taken 1/9/2025 1010)  Achieves optimal ventilation and oxygenation:   Assess for changes in respiratory status   Assess for changes in mentation and behavior   Position to facilitate oxygenation and minimize respiratory effort     Problem: Pain  Goal: Verbalizes/displays adequate comfort level or baseline comfort level  Outcome: Progressing  Flowsheets (Taken 1/9/2025 1010)  Verbalizes/displays adequate comfort level or baseline comfort level:   Encourage patient to monitor pain and request assistance   Assess pain using appropriate pain scale     
  Problem: Chronic Conditions and Co-morbidities  Goal: Patient's chronic conditions and co-morbidity symptoms are monitored and maintained or improved  Outcome: Progressing     Problem: Discharge Planning  Goal: Discharge to home or other facility with appropriate resources  1/21/2025 0018 by Rebecca Kiser RN  Outcome: Progressing  1/20/2025 1820 by Tiki Alonzo RN  Outcome: Progressing  Flowsheets (Taken 1/20/2025 1820)  Discharge to home or other facility with appropriate resources:   Identify barriers to discharge with patient and caregiver   Identify discharge learning needs (meds, wound care, etc)   Arrange for needed discharge resources and transportation as appropriate     Problem: Respiratory - Adult  Goal: Achieves optimal ventilation and oxygenation  1/21/2025 0018 by Rebecca Kiser RN  Outcome: Progressing  1/20/2025 1820 by Tiki Alonzo RN  Outcome: Progressing  Flowsheets (Taken 1/20/2025 1820)  Achieves optimal ventilation and oxygenation:   Position to facilitate oxygenation and minimize respiratory effort   Assess for changes in respiratory status   Assess for changes in mentation and behavior     Problem: Cardiovascular - Adult  Goal: Maintains optimal cardiac output and hemodynamic stability  Outcome: Progressing     Problem: Pain  Goal: Verbalizes/displays adequate comfort level or baseline comfort level  1/21/2025 0018 by Rebecca Kiser RN  Outcome: Progressing  1/20/2025 1820 by Tiki Alonzo RN  Outcome: Progressing  Flowsheets (Taken 1/20/2025 1820)  Verbalizes/displays adequate comfort level or baseline comfort level:   Encourage patient to monitor pain and request assistance   Administer analgesics based on type and severity of pain and evaluate response   Implement non-pharmacological measures as appropriate and evaluate response   Assess pain using appropriate pain scale     
  Problem: Chronic Conditions and Co-morbidities  Goal: Patient's chronic conditions and co-morbidity symptoms are monitored and maintained or improved  Outcome: Progressing     Problem: Discharge Planning  Goal: Discharge to home or other facility with appropriate resources  Outcome: Progressing     Problem: Respiratory - Adult  Goal: Achieves optimal ventilation and oxygenation  Outcome: Progressing     Problem: Pain  Goal: Verbalizes/displays adequate comfort level or baseline comfort level  Outcome: Progressing     
  Problem: Chronic Conditions and Co-morbidities  Goal: Patient's chronic conditions and co-morbidity symptoms are monitored and maintained or improved  Outcome: Progressing     Problem: Discharge Planning  Goal: Discharge to home or other facility with appropriate resources  Outcome: Progressing  Note: Patient will be discharged to home once criteria is met.     Problem: Respiratory - Adult  Goal: Achieves optimal ventilation and oxygenation  Outcome: Progressing  Note: Patient without respiratory distress this shift. Sao2  decreases into low -mid 80's and SOB with any activity. Hi flow is increased to 15 to help recover patient and decreased back to 10 where pt maintains SaO2 92-95%. Patient is placed on portable oxygen when ambulating to bathroom.     Problem: Cardiovascular - Adult  Goal: Maintains optimal cardiac output and hemodynamic stability  Outcome: Progressing  Note: Cardiac output is maintained and VSS. Denies chest discomfort.      Problem: Pain  Goal: Verbalizes/displays adequate comfort level or baseline comfort level  Outcome: Not Progressing  Note: Patient with complaints of chronic pain to mid lower back and marycarmen knees  related to arthritis per patient during shift. Patient states pain is usually 5/10 and she takes Tylenol 1gm tid to maintain. Will continue to assess comfort and pain on 0-10 number scale and medicate per order while encouraging non pharmacological techniques as well. Call light within reach and hourly rounding in place.      Problem: Safety - Adult  Goal: Free from fall injury  Note: Patient remained free of falls during shift. Patient remains a Perez Fall Risk: High (45 and higher). Patient makes no attempt to get out of bed without help from hospital staff. Will continue to encourage call light usage prior to ambulating. Call light within reach and hourly rounding in place.      Problem: Pain  Goal: Verbalizes/displays adequate comfort level or baseline comfort level  Outcome: 
  Problem: Chronic Conditions and Co-morbidities  Goal: Patient's chronic conditions and co-morbidity symptoms are monitored and maintained or improved  Outcome: Progressing  Flowsheets (Taken 1/13/2025 0113)  Care Plan - Patient's Chronic Conditions and Co-Morbidity Symptoms are Monitored and Maintained or Improved:   Monitor and assess patient's chronic conditions and comorbid symptoms for stability, deterioration, or improvement   Collaborate with multidisciplinary team to address chronic and comorbid conditions and prevent exacerbation or deterioration   Update acute care plan with appropriate goals if chronic or comorbid symptoms are exacerbated and prevent overall improvement and discharge     Problem: Discharge Planning  Goal: Discharge to home or other facility with appropriate resources  Outcome: Progressing  Flowsheets (Taken 1/13/2025 0113)  Discharge to home or other facility with appropriate resources:   Identify barriers to discharge with patient and caregiver   Arrange for needed discharge resources and transportation as appropriate   Identify discharge learning needs (meds, wound care, etc)   Refer to discharge planning if patient needs post-hospital services based on physician order or complex needs related to functional status, cognitive ability or social support system     Problem: Respiratory - Adult  Goal: Achieves optimal ventilation and oxygenation  Outcome: Progressing  Flowsheets (Taken 1/13/2025 0113)  Achieves optimal ventilation and oxygenation:   Assess for changes in respiratory status   Assess for changes in mentation and behavior   Position to facilitate oxygenation and minimize respiratory effort   Oxygen supplementation based on oxygen saturation or arterial blood gases   Encourage broncho-pulmonary hygiene including cough, deep breathe, incentive spirometry   Assess the need for suctioning and aspirate as needed   Assess and instruct to report shortness of breath or any respiratory 
  Problem: Chronic Conditions and Co-morbidities  Goal: Patient's chronic conditions and co-morbidity symptoms are monitored and maintained or improved  Outcome: Progressing  Flowsheets (Taken 1/13/2025 1524)  Care Plan - Patient's Chronic Conditions and Co-Morbidity Symptoms are Monitored and Maintained or Improved:   Monitor and assess patient's chronic conditions and comorbid symptoms for stability, deterioration, or improvement   Update acute care plan with appropriate goals if chronic or comorbid symptoms are exacerbated and prevent overall improvement and discharge   Collaborate with multidisciplinary team to address chronic and comorbid conditions and prevent exacerbation or deterioration  Note: Pt on 8 L O2 at rest, 15 L w/ ambulation. SpO2>88%.  Bronch completed.     Problem: Discharge Planning  Goal: Discharge to home or other facility with appropriate resources  Outcome: Progressing  Flowsheets (Taken 1/13/2025 1524)  Discharge to home or other facility with appropriate resources:   Identify barriers to discharge with patient and caregiver   Arrange for needed discharge resources and transportation as appropriate   Identify discharge learning needs (meds, wound care, etc)  Note: Increased oxygen demand.     Problem: Respiratory - Adult  Goal: Achieves optimal ventilation and oxygenation  Outcome: Progressing  Flowsheets (Taken 1/13/2025 1524)  Achieves optimal ventilation and oxygenation:   Assess for changes in respiratory status   Assess for changes in mentation and behavior   Position to facilitate oxygenation and minimize respiratory effort   Oxygen supplementation based on oxygen saturation or arterial blood gases   Encourage broncho-pulmonary hygiene including cough, deep breathe, incentive spirometry   Assess the need for suctioning and aspirate as needed   Assess and instruct to report shortness of breath or any respiratory difficulty     Problem: Pain  Goal: Verbalizes/displays adequate comfort 
  Problem: Chronic Conditions and Co-morbidities  Goal: Patient's chronic conditions and co-morbidity symptoms are monitored and maintained or improved  Outcome: Progressing  Flowsheets (Taken 1/14/2025 0147 by Figueroa Groves RN)  Care Plan - Patient's Chronic Conditions and Co-Morbidity Symptoms are Monitored and Maintained or Improved:   Monitor and assess patient's chronic conditions and comorbid symptoms for stability, deterioration, or improvement   Collaborate with multidisciplinary team to address chronic and comorbid conditions and prevent exacerbation or deterioration   Update acute care plan with appropriate goals if chronic or comorbid symptoms are exacerbated and prevent overall improvement and discharge  Note: Pt requiring 8-10 L of O2 at rest and 15 L O2 w/ ambulation.     Problem: Discharge Planning  Goal: Discharge to home or other facility with appropriate resources  Outcome: Progressing  Flowsheets (Taken 1/14/2025 1848)  Discharge to home or other facility with appropriate resources:   Identify barriers to discharge with patient and caregiver   Identify discharge learning needs (meds, wound care, etc)   Arrange for needed discharge resources and transportation as appropriate     Problem: Respiratory - Adult  Goal: Achieves optimal ventilation and oxygenation  Outcome: Progressing  Flowsheets (Taken 1/14/2025 1848)  Achieves optimal ventilation and oxygenation:   Assess for changes in respiratory status   Assess for changes in mentation and behavior   Position to facilitate oxygenation and minimize respiratory effort   Oxygen supplementation based on oxygen saturation or arterial blood gases   Encourage broncho-pulmonary hygiene including cough, deep breathe, incentive spirometry   Assess the need for suctioning and aspirate as needed   Assess and instruct to report shortness of breath or any respiratory difficulty     Problem: Pain  Goal: Verbalizes/displays adequate comfort level or baseline 
  Problem: Chronic Conditions and Co-morbidities  Goal: Patient's chronic conditions and co-morbidity symptoms are monitored and maintained or improved  Outcome: Progressing  Flowsheets (Taken 1/15/2025 1900)  Care Plan - Patient's Chronic Conditions and Co-Morbidity Symptoms are Monitored and Maintained or Improved: Monitor and assess patient's chronic conditions and comorbid symptoms for stability, deterioration, or improvement     Problem: Discharge Planning  Goal: Discharge to home or other facility with appropriate resources  Outcome: Progressing  Flowsheets (Taken 1/15/2025 1900)  Discharge to home or other facility with appropriate resources:   Identify barriers to discharge with patient and caregiver   Identify discharge learning needs (meds, wound care, etc)     Problem: Respiratory - Adult  Goal: Achieves optimal ventilation and oxygenation  Outcome: Progressing  Flowsheets (Taken 1/15/2025 1900)  Achieves optimal ventilation and oxygenation:   Assess for changes in respiratory status   Assess for changes in mentation and behavior   Position to facilitate oxygenation and minimize respiratory effort   Oxygen supplementation based on oxygen saturation or arterial blood gases   Encourage broncho-pulmonary hygiene including cough, deep breathe, incentive spirometry   Assess and instruct to report shortness of breath or any respiratory difficulty   Assess the need for suctioning and aspirate as needed   Respiratory therapy support as indicated  Note: Patient wean to 8L high flow to 6L high flow. SpO2 94%. Pt on continue O2 monitoring.      Problem: Cardiovascular - Adult  Goal: Maintains optimal cardiac output and hemodynamic stability  Outcome: Progressing  Flowsheets (Taken 1/15/2025 1900)  Maintains optimal cardiac output and hemodynamic stability: Monitor blood pressure and heart rate     Goal: Verbalizes/displays adequate comfort level or baseline comfort level  Outcome: Progressing  Flowsheets (Taken 
  Problem: Chronic Conditions and Co-morbidities  Goal: Patient's chronic conditions and co-morbidity symptoms are monitored and maintained or improved  Outcome: Progressing  Flowsheets (Taken 1/16/2025 1344)  Care Plan - Patient's Chronic Conditions and Co-Morbidity Symptoms are Monitored and Maintained or Improved:   Monitor and assess patient's chronic conditions and comorbid symptoms for stability, deterioration, or improvement   Collaborate with multidisciplinary team to address chronic and comorbid conditions and prevent exacerbation or deterioration   Update acute care plan with appropriate goals if chronic or comorbid symptoms are exacerbated and prevent overall improvement and discharge  Note: Pt chronic conditions monitored and assessed. Multidisciplinary teams consulted to update plan of care     Problem: Discharge Planning  Goal: Discharge to home or other facility with appropriate resources  Outcome: Progressing  Flowsheets (Taken 1/16/2025 1345)  Discharge to home or other facility with appropriate resources:   Identify barriers to discharge with patient and caregiver   Arrange for needed discharge resources and transportation as appropriate   Identify discharge learning needs (meds, wound care, etc)   Arrange for interpreters to assist at discharge as needed   Refer to discharge planning if patient needs post-hospital services based on physician order or complex needs related to functional status, cognitive ability or social support system  Note: Barriers to discharge identified and addressed. Learning needs and resources identified. Discharge planning updated according to treatment team     Problem: Respiratory - Adult  Goal: Achieves optimal ventilation and oxygenation  Outcome: Progressing  Flowsheets (Taken 1/16/2025 1344)  Achieves optimal ventilation and oxygenation:   Assess for changes in respiratory status   Assess for changes in mentation and behavior   Position to facilitate oxygenation and 
  Problem: Chronic Conditions and Co-morbidities  Goal: Patient's chronic conditions and co-morbidity symptoms are monitored and maintained or improved  Outcome: Progressing  Flowsheets (Taken 1/19/2025 0310)  Care Plan - Patient's Chronic Conditions and Co-Morbidity Symptoms are Monitored and Maintained or Improved:   Monitor and assess patient's chronic conditions and comorbid symptoms for stability, deterioration, or improvement   Collaborate with multidisciplinary team to address chronic and comorbid conditions and prevent exacerbation or deterioration     Problem: Discharge Planning  Goal: Discharge to home or other facility with appropriate resources  Outcome: Progressing  Flowsheets (Taken 1/19/2025 0310)  Discharge to home or other facility with appropriate resources:   Identify barriers to discharge with patient and caregiver   Arrange for needed discharge resources and transportation as appropriate   Identify discharge learning needs (meds, wound care, etc)     Problem: Respiratory - Adult  Goal: Achieves optimal ventilation and oxygenation  Outcome: Progressing  Flowsheets (Taken 1/19/2025 0310)  Achieves optimal ventilation and oxygenation:   Assess for changes in respiratory status   Assess for changes in mentation and behavior   Position to facilitate oxygenation and minimize respiratory effort   Oxygen supplementation based on oxygen saturation or arterial blood gases  Note: On 3L HFNC at rest, 6L HFNC when ambulating      Problem: Cardiovascular - Adult  Goal: Maintains optimal cardiac output and hemodynamic stability  Outcome: Progressing  Flowsheets (Taken 1/19/2025 0310)  Maintains optimal cardiac output and hemodynamic stability:   Monitor blood pressure and heart rate   Monitor urine output and notify Licensed Independent Practitioner for values outside of normal range   Assess for signs of decreased cardiac output     Problem: Pain  Goal: Verbalizes/displays adequate comfort level or baseline 
  Problem: Chronic Conditions and Co-morbidities  Goal: Patient's chronic conditions and co-morbidity symptoms are monitored and maintained or improved  Outcome: Progressing  Flowsheets (Taken 1/20/2025 0145)  Care Plan - Patient's Chronic Conditions and Co-Morbidity Symptoms are Monitored and Maintained or Improved:   Monitor and assess patient's chronic conditions and comorbid symptoms for stability, deterioration, or improvement   Collaborate with multidisciplinary team to address chronic and comorbid conditions and prevent exacerbation or deterioration     Problem: Discharge Planning  Goal: Discharge to home or other facility with appropriate resources  1/20/2025 0145 by Cruzito Nieto RN  Outcome: Progressing  Flowsheets (Taken 1/20/2025 0145)  Discharge to home or other facility with appropriate resources:   Identify barriers to discharge with patient and caregiver   Identify discharge learning needs (meds, wound care, etc)   Arrange for needed discharge resources and transportation as appropriate     Problem: Respiratory - Adult  Goal: Achieves optimal ventilation and oxygenation  1/20/2025 0145 by Cruzito Nieto RN  Outcome: Progressing  Flowsheets (Taken 1/20/2025 0145)  Achieves optimal ventilation and oxygenation:   Assess for changes in respiratory status   Position to facilitate oxygenation and minimize respiratory effort   Assess for changes in mentation and behavior     Problem: Cardiovascular - Adult  Goal: Maintains optimal cardiac output and hemodynamic stability  1/20/2025 0145 by Cruzito Nieto RN  Outcome: Progressing  Flowsheets (Taken 1/20/2025 0145)  Maintains optimal cardiac output and hemodynamic stability:   Monitor blood pressure and heart rate   Monitor urine output and notify Licensed Independent Practitioner for values outside of normal range   Assess for signs of decreased cardiac output     Problem: Pain  Goal: Verbalizes/displays adequate comfort level or baseline comfort 
  Problem: Chronic Conditions and Co-morbidities  Goal: Patient's chronic conditions and co-morbidity symptoms are monitored and maintained or improved  Outcome: Progressing  Flowsheets (Taken 1/9/2025 0344)  Care Plan - Patient's Chronic Conditions and Co-Morbidity Symptoms are Monitored and Maintained or Improved:   Monitor and assess patient's chronic conditions and comorbid symptoms for stability, deterioration, or improvement   Collaborate with multidisciplinary team to address chronic and comorbid conditions and prevent exacerbation or deterioration   Update acute care plan with appropriate goals if chronic or comorbid symptoms are exacerbated and prevent overall improvement and discharge     Problem: Discharge Planning  Goal: Discharge to home or other facility with appropriate resources  Outcome: Progressing  Flowsheets (Taken 1/9/2025 0344)  Discharge to home or other facility with appropriate resources:   Identify barriers to discharge with patient and caregiver   Identify discharge learning needs (meds, wound care, etc)   Arrange for needed discharge resources and transportation as appropriate     Problem: Respiratory - Adult  Goal: Achieves optimal ventilation and oxygenation  Outcome: Progressing  Flowsheets (Taken 1/9/2025 0344)  Achieves optimal ventilation and oxygenation:   Assess for changes in respiratory status   Position to facilitate oxygenation and minimize respiratory effort   Initiate smoking cessation protocol as indicated   Assess the need for suctioning and aspirate as needed   Respiratory therapy support as indicated   Assess for changes in mentation and behavior   Oxygen supplementation based on oxygen saturation or arterial blood gases   Encourage broncho-pulmonary hygiene including cough, deep breathe, incentive spirometry   Assess and instruct to report shortness of breath or any respiratory difficulty     Problem: Cardiovascular - Adult  Goal: Maintains optimal cardiac output and 
  Problem: Chronic Conditions and Co-morbidities  Goal: Patient's chronic conditions and co-morbidity symptoms are monitored and maintained or improved  Outcome: Progressing  Flowsheets (Taken 1/9/2025 1010 by Greg Joe, RN)  Care Plan - Patient's Chronic Conditions and Co-Morbidity Symptoms are Monitored and Maintained or Improved:   Collaborate with multidisciplinary team to address chronic and comorbid conditions and prevent exacerbation or deterioration   Monitor and assess patient's chronic conditions and comorbid symptoms for stability, deterioration, or improvement   Update acute care plan with appropriate goals if chronic or comorbid symptoms are exacerbated and prevent overall improvement and discharge     Problem: Discharge Planning  Goal: Discharge to home or other facility with appropriate resources  Outcome: Progressing  Flowsheets (Taken 1/9/2025 0344 by Sarah Vergara, RN)  Discharge to home or other facility with appropriate resources:   Identify barriers to discharge with patient and caregiver   Identify discharge learning needs (meds, wound care, etc)   Arrange for needed discharge resources and transportation as appropriate     Problem: Respiratory - Adult  Goal: Achieves optimal ventilation and oxygenation  Outcome: Progressing  Flowsheets (Taken 1/10/2025 1302 by Megan Nagel, RN)  Achieves optimal ventilation and oxygenation:   Assess for changes in respiratory status   Assess for changes in mentation and behavior   Oxygen supplementation based on oxygen saturation or arterial blood gases  Note: SpO2 94% 13 L HFNC.      Problem: Pain  Goal: Verbalizes/displays adequate comfort level or baseline comfort level  Outcome: Progressing  Flowsheets (Taken 1/10/2025 1302 by Megan Nagel, RN)  Verbalizes/displays adequate comfort level or baseline comfort level: Assess pain using appropriate pain scale     
  Problem: Discharge Planning  Goal: Discharge to home or other facility with appropriate resources  1/19/2025 1451 by Vin Farah  Outcome: Progressing  Flowsheets (Taken 1/19/2025 1451)  Discharge to home or other facility with appropriate resources:   Identify barriers to discharge with patient and caregiver   Arrange for needed discharge resources and transportation as appropriate   Identify discharge learning needs (meds, wound care, etc)     Problem: Respiratory - Adult  Goal: Achieves optimal ventilation and oxygenation  1/19/2025 1451 by Vin Farah  Outcome: Progressing  Flowsheets (Taken 1/19/2025 1451)  Achieves optimal ventilation and oxygenation:   Assess for changes in respiratory status   Assess for changes in mentation and behavior   Position to facilitate oxygenation and minimize respiratory effort   Oxygen supplementation based on oxygen saturation or arterial blood gases   Encourage broncho-pulmonary hygiene including cough, deep breathe, incentive spirometry   Assess the need for suctioning and aspirate as needed   Assess and instruct to report shortness of breath or any respiratory difficulty   Respiratory therapy support as indicated  Note: Pt currently on 3L NC and SpO2 has been WNL. Pt continues to become SOB on exertion and intermittently requires an increase of oxygen up to 6L.     Problem: Cardiovascular - Adult  Goal: Maintains optimal cardiac output and hemodynamic stability  1/19/2025 1451 by Vin Farah  Outcome: Progressing  Flowsheets (Taken 1/19/2025 1451)  Maintains optimal cardiac output and hemodynamic stability:   Monitor blood pressure and heart rate   Monitor urine output and notify Licensed Independent Practitioner for values outside of normal range   Assess for signs of decreased cardiac output   Administer fluid and/or volume expanders as ordered   Administer vasoactive medications as ordered  Note: Pt is on continuous telemetry and heart rhythm is NSR.     Problem: 
  Problem: Discharge Planning  Goal: Discharge to home or other facility with appropriate resources  Outcome: Progressing  Flowsheets (Taken 1/20/2025 1820)  Discharge to home or other facility with appropriate resources:   Identify barriers to discharge with patient and caregiver   Identify discharge learning needs (meds, wound care, etc)   Arrange for needed discharge resources and transportation as appropriate     Problem: Respiratory - Adult  Goal: Achieves optimal ventilation and oxygenation  Outcome: Progressing  Flowsheets (Taken 1/20/2025 1820)  Achieves optimal ventilation and oxygenation:   Position to facilitate oxygenation and minimize respiratory effort   Assess for changes in respiratory status   Assess for changes in mentation and behavior     Problem: Pain  Goal: Verbalizes/displays adequate comfort level or baseline comfort level  Outcome: Progressing  Flowsheets (Taken 1/20/2025 1820)  Verbalizes/displays adequate comfort level or baseline comfort level:   Encourage patient to monitor pain and request assistance   Administer analgesics based on type and severity of pain and evaluate response   Implement non-pharmacological measures as appropriate and evaluate response   Assess pain using appropriate pain scale     
  Problem: Respiratory - Adult  Goal: Achieves optimal ventilation and oxygenation  1/10/2025 1302 by Megan Nagel, RN  Outcome: Progressing  Flowsheets (Taken 1/10/2025 1302)  Achieves optimal ventilation and oxygenation:   Assess for changes in respiratory status   Assess for changes in mentation and behavior   Oxygen supplementation based on oxygen saturation or arterial blood gases  Note: SpO2 92% on 13L HF, desats to the 80s after returning from the bathroom. Lungs with expiratory wheezes, dyspnea with exertion.     Problem: Cardiovascular - Adult  Goal: Maintains optimal cardiac output and hemodynamic stability  Outcome: Progressing  Flowsheets (Taken 1/10/2025 1302)  Maintains optimal cardiac output and hemodynamic stability: Monitor blood pressure and heart rate  Note: SR on tele, HR increases with exertion. All other vitals stable.     Problem: Pain  Goal: Verbalizes/displays adequate comfort level or baseline comfort level  1/10/2025 1302 by Megan Nagel, RN  Outcome: Progressing  Flowsheets (Taken 1/10/2025 1302)  Verbalizes/displays adequate comfort level or baseline comfort level: Assess pain using appropriate pain scale  Note: No c/o pain        
Pt was encouraged to be bathed/CHG wipes d/t presence of port but pt refused despite of health teaching and discussing the benefits vs the risk of proper hygiene to prevent infection.Notified charge RN and advised PCA to reinforce.  
Received page from bedside RN due to complaints of knee pain consistent with her chronic arthritis pain.  She takes Tylenol 1000 mg 3 times daily at home.  Currently prescribed Tylenol 650 every 6 hours for pain.  Ordered one-time dose of Tylenol 325, ordered lidocaine patch to apply to affected knee.    Fuad Christianson MD  Internal Medicine, PGY-1  Contact via [facility]    
Assessment  Goal: Absence of physical injury  Outcome: Progressing  Flowsheets (Taken 1/18/2025 0426)  Absence of Physical Injury: Implement safety measures based on patient assessment     Problem: Chronic Conditions and Co-morbidities  Goal: Patient's chronic conditions and co-morbidity symptoms are monitored and maintained or improved  Flowsheets (Taken 1/18/2025 0426)  Care Plan - Patient's Chronic Conditions and Co-Morbidity Symptoms are Monitored and Maintained or Improved:   Monitor and assess patient's chronic conditions and comorbid symptoms for stability, deterioration, or improvement   Collaborate with multidisciplinary team to address chronic and comorbid conditions and prevent exacerbation or deterioration

## 2025-01-21 NOTE — CARE COORDINATION
Case Management Assessment            Discharge Note                    Date / Time of Note: 1/21/2025 9:25 AM                  Discharge Note Completed by: REBECCA ESPINAL    Patient Name: Shoaib Rothman   YOB: 1960  Diagnosis: COPD exacerbation (HCC) [J44.1]  COPD with acute exacerbation (HCC) [J44.1]  Pneumonia of right lower lobe due to infectious organism [J18.9]   Date / Time: 1/8/2025  6:04 PM    Current PCP: Lamar Mondragon MD  Clinic patient: No    Hospitalization in the last 30 days: No       Advance Directives:  Code Status: Full Code  [place] DNR form completed and on chart: Not Indicated    Financial:  Payor: MEDICAID [place] / Plan: MEDICAID [facility] DEPT OF JOB / Product Type: *No Product type* /      Pharmacy:    [facility] [place], [place] - [address] [facility] -  [phone] - F [phone]  [facility] [address] [facility] [address]  Phone: [phone] Fax: [phone]    [place] [id number] [facility] -  [phone] - F [phone]  [facility] [address]  Phone: [phone] Fax: [phone]    [id number] - [place], [place] - [address]  - P [phone] - F [phone]  [address] [address]  Phone: [phone] Fax: [phone]    [id number] - [place], [place] - [address] - P [phone] - F [phone]  [address] [place] [address]  Phone: [phone] Fax: [phone]      Assistance purchasing medications?: Potential Assistance Purchasing Medications: No  Assistance provided by Case Management: None at this time    Does patient want to participate in local refill/ meds to beds program?: No    Meds To Beds General Rules:  1. Can ONLY be done Monday- Friday between 8:30am-5pm  2. Prescription(s) must be in pharmacy by 3pm to be filled same day  3.Copy of patient's insurance/ prescription drug card and patient face sheet must be sent along

## 2025-01-21 NOTE — DISCHARGE INSTRUCTIONS
Please continue to take the Torsemide and Prednisone every day. Dr. Lockett will adjust as needed. Thank you.

## 2025-01-21 NOTE — RT PROTOCOL NOTE
RT Inhaler-Nebulizer Bronchodilator Protocol Note    There is a bronchodilator order in the chart from a provider indicating to follow the RT Bronchodilator Protocol and there is an “Initiate RT Inhaler-Nebulizer Bronchodilator Protocol” order as well (see protocol at bottom of note).    CXR Findings:  No results found.    The findings from the last RT Protocol Assessment were as follows:   History Pulmonary Disease: Chronic pulmonary disease  Respiratory Pattern: Mild dyspnea at rest, irregular pattern, or RR 21-25 bpm  Breath Sounds: Inspiratory and expiratory or bilateral wheezing and/or rhonchi  Cough: Strong, spontaneous, non-productive  Indication for Bronchodilator Therapy: Wheezing associated with pulm disorder  Bronchodilator Assessment Score: 12  Pt requesting scheduled txs through night time.  Aerosolized bronchodilator medication orders have been revised according to the RT Inhaler-Nebulizer Bronchodilator Protocol below.    Respiratory Therapist to perform RT Therapy Protocol Assessment initially then follow the protocol.  Repeat RT Therapy Protocol Assessment PRN for score 0-3 or on second treatment, BID, and PRN for scores above 3.    No Indications - adjust the frequency to every 6 hours PRN wheezing or bronchospasm, if no treatments needed after 48 hours then discontinue using Per Protocol order mode.     If indication present, adjust the RT bronchodilator orders based on the Bronchodilator Assessment Score as indicated below.  Use Inhaler orders unless patient has one or more of the following: on home nebulizer, not able to hold breath for 10 seconds, is not alert and oriented, cannot activate and use MDI correctly, or respiratory rate 25 breaths per minute or more, then use the equivalent nebulizer order(s) with same Frequency and PRN reasons based on the score.  If a patient is on this medication at home then do not decrease Frequency below that used at home.    0-3 - enter or revise RT 
RT Nebulizer Bronchodilator Protocol Note    There is a bronchodilator order in the chart from a provider indicating to follow the RT Bronchodilator Protocol and there is an “Initiate RT Bronchodilator Protocol” order as well (see protocol at bottom of note).    CXR Findings:  XR CHEST PORTABLE    Result Date: 1/10/2025  Dense right lower lung consolidation mildly progressive since 1/8/2025. Left lung is clear. Stable cardiomediastinal silhouette with volume loss and rightward shift of the mediastinum. Malpositioned right chest port is again seen with tip in the peripheral subclavian vein. Metallic fragments again noted overlying the left chest. Electronically signed by Armando Li      The findings from the last RT Protocol Assessment were as follows:  Smoking: Chronic pulmonary disease  Respiratory Pattern: Dyspnea on exertion or RR 21-25 bpm  Breath Sounds: Intermittent or unilateral wheezes  Cough: Strong, spontaneous, non-productive  Indication for Bronchodilator Therapy: Wheezing associated with pulm disorder  Bronchodilator Assessment Score: 8  Will continue with Q4 WA treatments    Aerosolized bronchodilator medication orders have been revised according to the RT Nebulizer Bronchodilator Protocol below.    Respiratory Therapist to perform RT Therapy Protocol Assessment initially then follow the protocol.  Repeat RT Therapy Protocol Assessment PRN for score 0-3 or on second treatment, BID, and PRN for scores above 3.    No Indications - adjust the frequency to every 6 hours PRN wheezing or bronchospasm, if no treatments needed after 48 hours then discontinue using Per Protocol order mode.     If indication present, adjust the RT bronchodilator orders based on the Bronchodilator Assessment Score as indicated below.  If a patient is on this medication at home then do not decrease Frequency below that used at home.    0-3 - enter or revise RT bronchodilator order(s) to equivalent RT Bronchodilator order with 
RT Nebulizer Bronchodilator Protocol Note    There is a bronchodilator order in the chart from a provider indicating to follow the RT Bronchodilator Protocol and there is an “Initiate RT Bronchodilator Protocol” order as well (see protocol at bottom of note).    CXR Findings:  XR CHEST PORTABLE    Result Date: 1/13/2025  Impression: Stable right basilar pleural parenchymal opacities. Electronically signed by Samy Spann MD      The findings from the last RT Protocol Assessment were as follows:  Smoking: Chronic pulmonary disease  Respiratory Pattern: Dyspnea on exertion or RR 21-25 bpm  Breath Sounds: Severe inspiratory and expiratory wheezing or severely diminished  Cough: Strong, spontaneous, non-productive  Indication for Bronchodilator Therapy: Wheezing associated with pulm disorder  Bronchodilator Assessment Score: 12    Aerosolized bronchodilator medication orders have been revised according to the RT Nebulizer Bronchodilator Protocol below.    Respiratory Therapist to perform RT Therapy Protocol Assessment initially then follow the protocol.  Repeat RT Therapy Protocol Assessment PRN for score 0-3 or on second treatment, BID, and PRN for scores above 3.    No Indications - adjust the frequency to every 6 hours PRN wheezing or bronchospasm, if no treatments needed after 48 hours then discontinue using Per Protocol order mode.     If indication present, adjust the RT bronchodilator orders based on the Bronchodilator Assessment Score as indicated below.  If a patient is on this medication at home then do not decrease Frequency below that used at home.    0-3 - enter or revise RT bronchodilator order(s) to equivalent RT Bronchodilator order with Frequency of every 4 hours PRN for wheezing or increased work of breathing using Per Protocol order mode.       4-6 - enter or revise RT Bronchodilator order(s) to two equivalent RT bronchodilator orders with one order with BID Frequency and one order with Frequency of 
RT Nebulizer Bronchodilator Protocol Note    There is a bronchodilator order in the chart from a provider indicating to follow the RT Bronchodilator Protocol and there is an “Initiate RT Bronchodilator Protocol” order as well (see protocol at bottom of note).    CXR Findings:  XR CHEST PORTABLE    Result Date: 1/17/2025  Interval improvement in right basilar airspace disease. Right-sided Port-A-Cath with tip in the subclavian vein. Electronically signed by Ronald Price      The findings from the last RT Protocol Assessment were as follows:  Smoking: Chronic pulmonary disease  Respiratory Pattern: Dyspnea on exertion or RR 21-25 bpm  Breath Sounds: Slightly diminished and/or crackles  Cough: Strong, spontaneous, non-productive  Indication for Bronchodilator Therapy: Wheezing associated with pulm disorder  Bronchodilator Assessment Score: 6    Aerosolized bronchodilator medication orders have been revised according to the RT Nebulizer Bronchodilator Protocol below.    Respiratory Therapist to perform RT Therapy Protocol Assessment initially then follow the protocol.  Repeat RT Therapy Protocol Assessment PRN for score 0-3 or on second treatment, BID, and PRN for scores above 3.    No Indications - adjust the frequency to every 6 hours PRN wheezing or bronchospasm, if no treatments needed after 48 hours then discontinue using Per Protocol order mode.     If indication present, adjust the RT bronchodilator orders based on the Bronchodilator Assessment Score as indicated below.  If a patient is on this medication at home then do not decrease Frequency below that used at home.    0-3 - enter or revise RT bronchodilator order(s) to equivalent RT Bronchodilator order with Frequency of every 4 hours PRN for wheezing or increased work of breathing using Per Protocol order mode.       4-6 - enter or revise RT Bronchodilator order(s) to two equivalent RT bronchodilator orders with one order with BID Frequency and one order 
RT Nebulizer Bronchodilator Protocol Note    There is a bronchodilator order in the chart from a provider indicating to follow the RT Bronchodilator Protocol and there is an “Initiate RT Bronchodilator Protocol” order as well (see protocol at bottom of note).    CXR Findings:  XR CHEST PORTABLE    Result Date: 1/8/2025  Right basilar airspace disease similar to prior study. Electronically signed by Ronald Price MD      The findings from the last RT Protocol Assessment were as follows:  Smoking: Chronic pulmonary disease  Respiratory Pattern: Dyspnea on exertion or RR 21-25 bpm  Breath Sounds: Slightly diminished and/or crackles  Cough: Strong, spontaneous, non-productive  Indication for Bronchodilator Therapy: Wheezing associated with pulm disorder  Bronchodilator Assessment Score: 6  Pt wants to stay on q4 schedule  Aerosolized bronchodilator medication orders have been revised according to the RT Nebulizer Bronchodilator Protocol below.    Respiratory Therapist to perform RT Therapy Protocol Assessment initially then follow the protocol.  Repeat RT Therapy Protocol Assessment PRN for score 0-3 or on second treatment, BID, and PRN for scores above 3.    No Indications - adjust the frequency to every 6 hours PRN wheezing or bronchospasm, if no treatments needed after 48 hours then discontinue using Per Protocol order mode.     If indication present, adjust the RT bronchodilator orders based on the Bronchodilator Assessment Score as indicated below.  If a patient is on this medication at home then do not decrease Frequency below that used at home.    0-3 - enter or revise RT bronchodilator order(s) to equivalent RT Bronchodilator order with Frequency of every 4 hours PRN for wheezing or increased work of breathing using Per Protocol order mode.       4-6 - enter or revise RT Bronchodilator order(s) to two equivalent RT bronchodilator orders with one order with BID Frequency and one order with Frequency of every 4 
increased work of breathing using Per Protocol order mode.        4-6 - enter or revise RT Bronchodilator order(s) to two equivalent RT bronchodilator orders with one order with BID Frequency and one order with Frequency of every 4 hours PRN wheezing or increased work of breathing using Per Protocol order mode.        7-10 - enter or revise RT Bronchodilator order(s) to two equivalent RT bronchodilator orders with one order with TID Frequency and one order with Frequency of every 4 hours PRN wheezing or increased work of breathing using Per Protocol order mode.       11-13 - enter or revise RT Bronchodilator order(s) to one equivalent RT bronchodilator order with QID Frequency and an Albuterol order with Frequency of every 4 hours PRN wheezing or increased work of breathing using Per Protocol order mode.      Greater than 13 - enter or revise RT Bronchodilator order(s) to one equivalent RT bronchodilator order with every 4 hours Frequency and an Albuterol order with Frequency of every 2 hours PRN wheezing or increased work of breathing using Per Protocol order mode.     RT to enter RT Home Evaluation for COPD & MDI Assessment order using Per Protocol order mode.    Electronically signed by Opal Zhong RCP on 1/21/2025 at 8:18 AM  
oriented to person, place, time and situation

## 2025-01-21 NOTE — PROGRESS NOTES
Internal Medicine Progress Note    Patient Name: Shoaib Rothman   Patient : 1960   Date: 2025   Admit Date: 2025     CC: Shortness of Breath (Patient to the ED for shortness of breath for about two days. Patient took breathing treatment with no help  currently 91% on 15L non rebreather)       Interval History     NAOE. Cr back down to 2.0. Patient satting in low 90s at 3L, requiring 5-6 when exerting herself. Still holding torsemide as of now. On duonebs and respimat. Looks overall well appearing and much improved.     ROS   Review of Systems   Respiratory:  Positive for shortness of breath.    All other systems reviewed and are negative.     Per interval history    Objective     I/Os:  I/O last 3 completed shifts:  In: 2890 [P.O.:2880; I.V.:10]  Out: 6600 [Urine:6600]      Vital Signs:  Patient Vitals for the past 8 hrs:   BP Temp Temp src Pulse Resp SpO2   25 0813 -- -- -- 87 -- 100 %   25 0811 -- -- -- 90 -- 100 %   25 0809 -- -- -- 88 18 97 %   25 0731 (!) 147/84 97.1 °F (36.2 °C) Oral 97 18 92 %       Physical Exam:  Physical Exam  Constitutional:       Appearance: Normal appearance.   HENT:      Head: Normocephalic and atraumatic.      Mouth/Throat:      Mouth: Mucous membranes are moist.   Eyes:      Extraocular Movements: Extraocular movements intact.      Pupils: Pupils are equal, round, and reactive to light.   Cardiovascular:      Rate and Rhythm: Normal rate and regular rhythm.   Pulmonary:      Effort: Pulmonary effort is normal.      Comments: Diminished breath sounds BL  Skin:     General: Skin is warm.      Capillary Refill: Capillary refill takes less than 2 seconds.   Neurological:      General: No focal deficit present.      Mental Status: She is alert and oriented to person, place, and time.         Medications:   [Held by provider] magnesium oxide  400 mg Oral Once    tiotropium  2 puff Inhalation Daily RT    sodium chloride (Inhalant)  4 mL 
      Internal Medicine Progress Note    Patient Name: Shoaib Rothman   Patient : 1960   Date: 2025   Admit Date: 2025     CC: Shortness of Breath (Patient to the ED for shortness of breath for about two days. Patient took breathing treatment with no help  currently 91% on 15L non rebreather)       Interval History     NAOE. Cr increased slightly. Patient endorses mild improvement in breathing. O2 req less this AM. Denies any new complaints    ROS   Review of Systems   Per interval history    Objective     I/Os:  I/O last 3 completed shifts:  In:  [P.O.:]  Out: 6650 [Urine:6650]      Vital Signs:  Patient Vitals for the past 8 hrs:   BP Temp Temp src Pulse Resp SpO2 Weight   25 0846 -- -- -- 99 15 96 % --   25 0723 (!) 155/65 97.8 °F (36.6 °C) Axillary 93 18 95 % --   25 0300 (!) 166/77 98.2 °F (36.8 °C) Axillary 95 18 92 % 105.6 kg (232 lb 12.9 oz)       Physical Exam:  Physical Exam  Constitutional:       Appearance: Normal appearance.   HENT:      Head: Normocephalic and atraumatic.      Mouth/Throat:      Mouth: Mucous membranes are moist.   Eyes:      Extraocular Movements: Extraocular movements intact.      Pupils: Pupils are equal, round, and reactive to light.   Cardiovascular:      Rate and Rhythm: Normal rate and regular rhythm.   Pulmonary:      Effort: Pulmonary effort is normal.      Comments: Diminished breath sounds BL  Skin:     General: Skin is warm.      Capillary Refill: Capillary refill takes less than 2 seconds.   Neurological:      General: No focal deficit present.      Mental Status: She is alert and oriented to person, place, and time.         Medications:   torsemide  20 mg Oral Daily    insulin lispro  0-4 Units SubCUTAneous 4x Daily AC & HS    predniSONE  50 mg Oral Daily    ipratropium 0.5 mg-albuterol 2.5 mg  1 Dose Inhalation 4x Daily RT    lidocaine  1 patch TransDERmal Daily    nicotine  1 patch TransDERmal Daily    hydrocortisone   
      Internal Medicine Progress Note    Patient Name: Shoaib Rothman   Patient : 1960   Date: 2025   Admit Date: 2025     CC: Shortness of Breath (Patient to the ED for shortness of breath for about two days. Patient took breathing treatment with no help  currently 91% on 15L non rebreather)       Interval History     No acute events overnight.  Still endorses shortness of breath with exertion.  Currently satting around 95% on 4 L.    ROS   Review of Systems   Per interval history    Objective     I/Os:  I/O last 3 completed shifts:  In: 2290 [P.O.:2290]  Out: 8350 [Urine:8350]      Vital Signs:  Patient Vitals for the past 8 hrs:   BP Temp Temp src Pulse Resp SpO2   25 0932 -- -- -- 100 -- 92 %   25 0931 -- -- -- 84 -- 91 %   25 0930 -- -- -- 85 -- 90 %   25 0730 (!) 144/72 97.8 °F (36.6 °C) Axillary 93 20 94 %   25 0310 (!) 149/78 97.2 °F (36.2 °C) Axillary 97 18 93 %       Physical Exam:  Physical Exam  HENT:      Head: Normocephalic.      Nose: Nose normal.      Mouth/Throat:      Mouth: Mucous membranes are moist.   Eyes:      Pupils: Pupils are equal, round, and reactive to light.   Cardiovascular:      Rate and Rhythm: Regular rhythm. Tachycardia present.      Pulses: Normal pulses.   Pulmonary:      Effort: Pulmonary effort is normal. No respiratory distress.      Breath sounds: Normal breath sounds.      Comments: Bilateral wheezing   Abdominal:      General: Bowel sounds are normal.      Palpations: Abdomen is soft.   Skin:     General: Skin is warm.   Neurological:      General: No focal deficit present.      Mental Status: She is alert.   Psychiatric:         Mood and Affect: Mood normal.    Medications:   torsemide  20 mg Oral Daily    insulin lispro  0-4 Units SubCUTAneous 4x Daily AC & HS    sodium zirconium cyclosilicate  5 g Oral Daily    predniSONE  50 mg Oral Daily    ipratropium 0.5 mg-albuterol 2.5 mg  1 Dose Inhalation 4x Daily RT    lidocaine  
     Internal Medicine Progress Note    Date: 1/11/2025   Patient: Shoaib Rothman   [facility] Day: 3      CC: Shortness of Breath (Patient to the ED for shortness of breath for about two days. Patient took breathing treatment with no help  currently 91% on 15L non rebreather)       Interval Hx   1/11  Patient seen at bedside , state that she continue having SOB and dry cough ,she is on 12 litter of oxygen with nasal canulaNo fever since admission  Continue having some episodes of tachycardia 103-106  Net balance positive      HPI:   Ms. Shoaib Rothman is a 64 y.o. female with a history of COPD, sleep apnea on CPAP, insomnia, type 2 diabetes, mood disorder, DVT on AC, breast cancer on hormone therapy  and CKD stage 3 who presents with worsening shortness of breath.   presents with several days of worsening shortness of breath with associated cough and productive thick white sputum. She uses oxygen chronically at home, baseline oxygen requirement ~2L per patient.   Chart review   December, 2024 for acute exacerbation of COPD secondary to pneumonia   Underwent bronchoscopy during this admission, demonstrated congested and erythematous airway mucosa, excessive dynamic airway collapse, thick secretions.  Molecular pneumonia panel positive for MSSA and coronavirus on PCR.     Objective     Vital Signs:  Patient Vitals for the past 8 hrs:   BP Temp Temp src Pulse Resp SpO2 Weight   01/11/25 0816 -- -- -- (!) 104 -- 98 % --   01/11/25 0812 -- -- -- (!) 104 -- 95 % --   01/11/25 0809 -- -- -- (!) 122 -- -- --   01/11/25 0806 -- -- -- (!) 101 24 92 % --   01/11/25 0759 (!) 145/96 98.1 °F (36.7 °C) Oral 99 18 99 % --   01/11/25 0629 -- -- -- -- -- -- 105.8 kg (233 lb 4 oz)   01/11/25 0600 -- -- -- 96 -- -- --   01/11/25 0503 -- -- -- (!) 105 -- 94 % --   01/11/25 0417 (!) 152/85 97.7 °F (36.5 °C) Oral 99 20 94 % --   01/11/25 0400 -- -- -- 100 -- -- --   01/11/25 0200 -- -- -- (!) 102 -- -- --       Physical Exam  AMARJIT:     
     Internal Medicine Progress Note    Date: 1/12/2025   Patient: Shoaib Rothman   [facility] Day: 4      CC: Shortness of Breath (Patient to the ED for shortness of breath for about two days. Patient took breathing treatment with no help  currently 91% on 15L non rebreather)       Interval Hx   1/11  Patient seen at bedside , state that she continue having SOB and dry cough ,she is on 10 litter of oxygen with nasal canula  No fever since admission  Continue having some episodes of tachycardia 103-111  Net balance positive      HPI:   Ms. Shoaib Rothman is a 64 y.o. female with a history of COPD, sleep apnea on CPAP, insomnia, type 2 diabetes, mood disorder, DVT on AC, breast cancer on hormone therapy  and CKD stage 3 who presents with worsening shortness of breath.   presents with several days of worsening shortness of breath with associated cough and productive thick white sputum. She uses oxygen chronically at home, baseline oxygen requirement ~2L per patient.   Chart review   December, 2024 for acute exacerbation of COPD secondary to pneumonia   Underwent bronchoscopy during this admission, demonstrated congested and erythematous airway mucosa, excessive dynamic airway collapse, thick secretions.  Molecular pneumonia panel positive for MSSA and coronavirus on PCR.     Objective     Vital Signs:  Patient Vitals for the past 8 hrs:   BP Temp Temp src Pulse Resp SpO2   01/12/25 1013 -- -- -- -- -- 94 %   01/12/25 0814 -- -- -- 95 -- 95 %   01/12/25 0810 -- -- -- 92 -- 96 %   01/12/25 0807 -- -- -- 91 -- 95 %   01/12/25 0804 -- -- -- 95 20 94 %   01/12/25 0800 -- -- -- 99 -- --   01/12/25 0754 (!) 152/93 98 °F (36.7 °C) Oral 90 18 92 %   01/12/25 0500 138/81 97.6 °F (36.4 °C) Oral (!) 102 18 92 %   01/12/25 0459 -- -- -- 98 -- 90 %       Physical Exam  HENT:      Head: Normocephalic.      Nose: Nose normal.      Mouth/Throat:      Mouth: Mucous membranes are moist.   Eyes:      Pupils: Pupils are equal, round, 
     Internal Medicine Progress Note    Date: 1/14/2025   Patient: Shoaib Rothman   [facility] Day: 6      CC: Shortness of Breath (Patient to the ED for shortness of breath for about two days. Patient took breathing treatment with no help  currently 91% on 15L non rebreather)       Interval Hx   Patient seen in room this morning. Feeling a little bit better after bronchoscopy with mucus plug.Still on 8L high flow, will attempt to wean further today.     HPI:   Ms. Shoaib Rothman is a 64 y.o. female with a history of COPD, sleep apnea on CPAP, insomnia, type 2 diabetes, mood disorder, DVT on AC, breast cancer on hormone therapy  and CKD stage 3 who presents with worsening shortness of breath.   presents with several days of worsening shortness of breath with associated cough and productive thick white sputum. She uses oxygen chronically at home, baseline oxygen requirement ~2L per patient.   Chart review   December, 2024 for acute exacerbation of COPD secondary to pneumonia   Underwent bronchoscopy during this admission, demonstrated congested and erythematous airway mucosa, excessive dynamic airway collapse, thick secretions.  Molecular pneumonia panel positive for MSSA and coronavirus on PCR.     Objective     Vital Signs:  Patient Vitals for the past 8 hrs:   BP Temp Temp src Pulse Resp SpO2 Weight   01/14/25 1047 -- -- -- 89 -- 91 % --   01/14/25 1019 -- -- -- -- -- 91 % --   01/14/25 1016 -- -- -- 83 -- (!) 86 % --   01/14/25 0830 -- -- -- 93 -- 92 % --   01/14/25 0810 -- -- -- 90 -- 92 % --   01/14/25 0808 -- -- -- 89 -- 91 % --   01/14/25 0806 -- -- -- 92 -- 91 % --   01/14/25 0805 -- -- -- 97 -- 91 % --   01/14/25 0740 125/70 97.5 °F (36.4 °C) Oral 92 20 93 % --   01/14/25 0624 -- -- -- 94 -- 90 % --   01/14/25 0430 (!) 144/74 98.3 °F (36.8 °C) Oral 96 20 91 % 105.8 kg (233 lb 4 oz)   01/14/25 0344 -- -- -- 90 -- 97 % --       Physical Exam  HENT:      Head: Normocephalic.      Nose: Nose normal.      
     Internal Medicine Progress Note    Date: 1/16/2025   Patient: Shoaib Rothman   [facility] Day: 8      CC: Shortness of Breath (Patient to the ED for shortness of breath for about two days. Patient took breathing treatment with no help  currently 91% on 15L non rebreather)       Interval Hx   Patient seen in room this morning. Feeling a little bit better but still complains of shortness of breath after moving around. Currently on 4 L of oxygen. 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 Lokelma given for high potassium, likely due to CHANTELLE. 1 dose Lasix given today for volume overload.    HPI:   Ms. Shoaib Rothman is a 64 y.o. female with a history of COPD, sleep apnea on CPAP, insomnia, type 2 diabetes, mood disorder, DVT on AC, breast cancer on hormone therapy  and CKD stage 3 who presents with worsening shortness of breath.   presents with several days of worsening shortness of breath with associated cough and productive thick white sputum. She uses oxygen chronically at home, baseline oxygen requirement ~2L per patient.   Chart review   December, 2024 for acute exacerbation of COPD secondary to pneumonia   Underwent bronchoscopy during this admission, demonstrated congested and erythematous airway mucosa, excessive dynamic airway collapse, thick secretions.  Molecular pneumonia panel positive for MSSA and coronavirus on PCR.     Objective     Vital Signs:  Patient Vitals for the past 8 hrs:   BP Temp Temp src Pulse Resp SpO2 Weight   01/16/25 1119 137/74 97 °F (36.1 °C) Oral 87 20 92 % --   01/16/25 0951 -- -- -- 87 -- 99 % --   01/16/25 0949 -- -- -- 85 -- 100 % --   01/16/25 0947 -- -- -- 79 -- 97 % --   01/16/25 0945 -- -- -- 88 18 93 % --   01/16/25 0823 (!) 176/89 97.7 °F (36.5 °C) Oral 95 18 94 % --   01/16/25 0522 -- -- -- 90 -- -- --   01/16/25 0511 -- -- -- -- -- -- 105.8 kg (233 lb 4 oz)       Physical Exam  HENT:      Head: Normocephalic.      Nose: Nose normal.      Mouth/Throat:      Mouth: Mucous membranes 
     Internal Medicine Progress Note    Date: 1/16/2025   Patient: Shoaib Rothman   [facility] Day: 8      CC: Shortness of Breath (Patient to the ED for shortness of breath for about two days. Patient took breathing treatment with no help  currently 91% on 15L non rebreather)       Interval Hx   Patient seen in room this morning. Has no complaints besides the shortness of breath on exertion. Slowly titrating the oxygen down as pt tolerates. 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     HPI:   Ms. Shoaib Rothman is a 64 y.o. female with a history of COPD, sleep apnea on CPAP, insomnia, type 2 diabetes, mood disorder, DVT on AC, breast cancer on hormone therapy  and CKD stage 3 who presents with worsening shortness of breath.   presents with several days of worsening shortness of breath with associated cough and productive thick white sputum. She uses oxygen chronically at home, baseline oxygen requirement ~2L per patient.   Chart review   December, 2024 for acute exacerbation of COPD secondary to pneumonia   Underwent bronchoscopy during this admission, demonstrated congested and erythematous airway mucosa, excessive dynamic airway collapse, thick secretions.  Molecular pneumonia panel positive for MSSA and coronavirus on PCR.     Objective     Vital Signs:  Patient Vitals for the past 8 hrs:   BP Temp Temp src Pulse Resp SpO2   01/16/25 1612 (!) 174/85 98.3 °F (36.8 °C) Oral 94 18 94 %   01/16/25 1119 137/74 97 °F (36.1 °C) Oral 87 20 92 %   01/16/25 0951 -- -- -- 87 -- 99 %   01/16/25 0949 -- -- -- 85 -- 100 %   01/16/25 0947 -- -- -- 79 -- 97 %   01/16/25 0945 -- -- -- 88 18 93 %       Physical Exam  HENT:      Head: Normocephalic.      Nose: Nose normal.      Mouth/Throat:      Mouth: Mucous membranes are moist.   Eyes:      Pupils: Pupils are equal, round, and reactive to light.   Cardiovascular:      Rate and Rhythm: Regular rhythm. Tachycardia present.      Pulses: Normal pulses.   Pulmonary:     
     Internal Medicine Progress Note    Date: 1/9/2025   Patient: Shoaib Rothman   [facility] Day: 1      CC: Shortness of Breath (Patient to the ED for shortness of breath for about two days. Patient took breathing treatment with no help  currently 91% on 15L non rebreather)       Interval Hx         HPI:   Ms. Shoaib Rothman is a 64 y.o. female with a history of COPD, sleep apnea on CPAP, insomnia, type 2 diabetes, mood disorder, DVT on AC, breast cancer on hormone therapy  and CKD stage 3 who presents with worsening shortness of breath.   presents with several days of worsening shortness of breath with associated cough and productive thick white sputum. She uses oxygen chronically at home, baseline oxygen requirement ~2L per patient.   Chart review   December, 2024 for acute exacerbation of COPD secondary to pneumonia   Underwent bronchoscopy during this admission, demonstrated congested and erythematous airway mucosa, excessive dynamic airway collapse, thick secretions.  Molecular pneumonia panel positive for MSSA and coronavirus on PCR.     Objective     Vital Signs:  Patient Vitals for the past 8 hrs:   BP Temp Temp src Pulse Resp SpO2   01/09/25 0759 -- -- -- -- -- 94 %   01/09/25 0744 (!) 147/86 98 °F (36.7 °C) Oral (!) 106 18 94 %   01/09/25 0600 -- -- -- -- -- 97 %   01/09/25 0439 -- -- -- (!) 109 20 91 %   01/09/25 0400 -- -- -- -- -- 91 %   01/09/25 0321 (!) 143/81 97.9 °F (36.6 °C) Oral (!) 108 22 92 %   01/09/25 0200 -- -- -- -- -- 90 %       Physical Exam  HENT:      Head: Normocephalic.      Nose: Nose normal.      Mouth/Throat:      Mouth: Mucous membranes are moist.   Eyes:      Pupils: Pupils are equal, round, and reactive to light.   Cardiovascular:      Rate and Rhythm: Regular rhythm. Tachycardia present.      Pulses: Normal pulses.   Pulmonary:      Effort: Respiratory distress present.      Comments: Bilateral wheezing   Abdominal:      General: Bowel sounds are normal.      Palpations: 
   01/09/25 1016   RT Protocol   History Pulmonary Disease 2   Respiratory pattern 2   Breath sounds 4   Cough 0   Indications for Bronchodilator Therapy Wheezing associated with pulm disorder   Bronchodilator Assessment Score 8       
   [facility]/[place]   PULMONARY AND CRITICAL CARE MEDICINE    PULMONARY MEDICINE  PROGRESS NOTE     CC: Respiratory failure    SUBJECTIVE / INTERVAL HISTORY:    Shoaib had bronchoscopy yesterday with significant mucous plugging -some around bertrand but most was right bronchial tree occluding BI and RLL.  See procedure note for pictures.  BAL of right lower lobe was performed and sent for immunocompromised panel.   This morning she states that her breathing is somewhat better but she is still requiring 8 L of oxygen    ROS:  Denies headache, nausea or chest pain.    24HR INTAKE/OUTPUT:    Intake/Output Summary (Last 24 hours) at 2025 0955  Last data filed at 2025 0740  Gross per 24 hour   Intake  ml   Output 0 ml   Net  ml        ipratropium 0.5 mg-albuterol 2.5 mg  1 Dose Inhalation 4x Daily RT    lidocaine  1 patch TransDERmal Daily    nicotine  1 patch TransDERmal Daily    hydrocortisone   Topical BID    insulin lispro  0-4 Units SubCUTAneous 4x Daily AC & HS    sodium chloride (Inhalant)  4 mL Nebulization BID    sodium chloride flush  5-40 mL IntraVENous 2 times per day    apixaban  2.5 mg Oral BID    amLODIPine  10 mg Oral Daily    carvedilol  6.25 mg Oral BID WC    hydrOXYzine HCl  25 mg Oral QHS    melatonin  6 mg Oral Nightly    rOPINIRole  0.25 mg Oral Nightly    traZODone  25 mg Oral Nightly    temazepam  15 mg Oral Nightly    sertraline  50 mg Oral Daily    pantoprazole  40 mg Oral Daily    arformoterol 15 mcg-budesonide 0.25 mg neb solution   Nebulization BID RT    letrozole  2.5 mg Oral Daily       PHYSICAL EXAMINATION:  /70   Pulse 90   Temp 97.5 °F (36.4 °C) (Oral)   Resp 20   Ht 1.753 m (5' 9\")   Wt 105.8 kg (233 lb 4 oz)   LMP 2014   SpO2 92%   BMI 34.44 kg/m²   CURRENT PULSE OXIMETRY:  SpO2: 92 %  24HR PULSE OXIMETRY RANGE:  SpO2  Av.2 %  Min: 88 %  Max: 98 %     Gen: No distress. Speaking in full sentences without accessory muscle 
   [facility]/[place]   PULMONARY AND CRITICAL CARE MEDICINE    PULMONARY MEDICINE  PROGRESS NOTE     CC: Respiratory failure    SUBJECTIVE / INTERVAL HISTORY:    Shoaib remains afebrile  Oxygen is down to 8 L from 10 L    ROS:  Denies headache, nausea or chest pain.    24HR INTAKE/OUTPUT:    Intake/Output Summary (Last 24 hours) at 2025 1029  Last data filed at 2025 0838  Gross per 24 hour   Intake 720 ml   Output 0 ml   Net 720 ml        lidocaine  1 patch TransDERmal Daily    nicotine  1 patch TransDERmal Daily    hydrocortisone   Topical BID    insulin lispro  0-4 Units SubCUTAneous 4x Daily AC & HS    sodium chloride (Inhalant)  4 mL Nebulization BID    sodium chloride flush  5-40 mL IntraVENous 2 times per day    ipratropium 0.5 mg-albuterol 2.5 mg  1 Dose Inhalation Q4H RT    [Held by provider] apixaban  2.5 mg Oral BID    amLODIPine  10 mg Oral Daily    carvedilol  6.25 mg Oral BID WC    hydrOXYzine HCl  25 mg Oral QHS    melatonin  6 mg Oral Nightly    rOPINIRole  0.25 mg Oral Nightly    traZODone  25 mg Oral Nightly    temazepam  15 mg Oral Nightly    sertraline  50 mg Oral Daily    pantoprazole  40 mg Oral Daily    arformoterol 15 mcg-budesonide 0.25 mg neb solution   Nebulization BID RT    letrozole  2.5 mg Oral Daily       PHYSICAL EXAMINATION:  BP (!) 141/85   Pulse 87   Temp 97 °F (36.1 °C) (Oral)   Resp 22   Ht 1.753 m (5' 9\")   Wt 104.8 kg (231 lb 0.7 oz)   LMP 2014   SpO2 99%   BMI 34.12 kg/m²   CURRENT PULSE OXIMETRY:  SpO2: 99 %  24HR PULSE OXIMETRY RANGE:  SpO2  Av.4 %  Min: 88 %  Max: 100 %     Gen: No distress. Speaking in full sentences without accessory muscle use  HEENT: PERRL, EOMI, OP nl  Lung: Bilateral rhonchi  CV: RRR without M/R/R  Abd: +BS, soft, NT/ND  Ext: No edema.    DATA  CBC:   Recent Labs     25  1025 25  0515 25  0544   WBC 9.3 9.4 10.1   HGB 11.8* 12.0 12.2   HCT 37.7 36.8 38.3   MCV 94.5 93.7 93.6    396 
   [facility]/[place]   PULMONARY AND CRITICAL CARE MEDICINE    PULMONARY MEDICINE  PROGRESS NOTE     CC: Respiratory failure    SUBJECTIVE / INTERVAL HISTORY:    Shoaib had bronchoscopy  with significant mucous plugging -some around bertrand but most was right bronchial tree occluding BI and RLL.  See procedure note for pictures.  BAL of right lower lobe was performed and results still pending    She remains afebrile and oxygen requirement remains at 8 L with an O2 sat of 97%    ROS:  Denies headache, nausea or chest pain.    24HR INTAKE/OUTPUT:    Intake/Output Summary (Last 24 hours) at 1/15/2025 1226  Last data filed at 1/15/2025 1005  Gross per 24 hour   Intake 1480 ml   Output 0 ml   Net 1480 ml        predniSONE  50 mg Oral Daily    magnesium oxide  400 mg Oral Once    ipratropium 0.5 mg-albuterol 2.5 mg  1 Dose Inhalation 4x Daily RT    lidocaine  1 patch TransDERmal Daily    nicotine  1 patch TransDERmal Daily    hydrocortisone   Topical BID    sodium chloride (Inhalant)  4 mL Nebulization BID    sodium chloride flush  5-40 mL IntraVENous 2 times per day    apixaban  2.5 mg Oral BID    amLODIPine  10 mg Oral Daily    carvedilol  6.25 mg Oral BID WC    hydrOXYzine HCl  25 mg Oral QHS    melatonin  6 mg Oral Nightly    rOPINIRole  0.25 mg Oral Nightly    traZODone  25 mg Oral Nightly    temazepam  15 mg Oral Nightly    sertraline  50 mg Oral Daily    pantoprazole  40 mg Oral Daily    arformoterol 15 mcg-budesonide 0.25 mg neb solution   Nebulization BID RT    letrozole  2.5 mg Oral Daily       PHYSICAL EXAMINATION:  BP (!) 142/75   Pulse 96   Temp 97.3 °F (36.3 °C) (Oral)   Resp 24   Ht 1.753 m (5' 9\")   Wt 105.7 kg (233 lb)   LMP 2014   SpO2 92%   BMI 34.41 kg/m²   CURRENT PULSE OXIMETRY:  SpO2: 92 %  24HR PULSE OXIMETRY RANGE:  SpO2  Av %  Min: 90 %  Max: 97 %     Gen: No distress. Speaking in full sentences without accessory muscle use  HEENT: PERRL, EOMI, OP nl  Lung: 
   [facility]/[place]   PULMONARY AND CRITICAL CARE MEDICINE    PULMONARY MEDICINE  PROGRESS NOTE     CC: Respiratory failure    SUBJECTIVE / INTERVAL HISTORY:    Shoaib remains afebrile  She remains on 10 L oxygen with O2 sat 92%  Dyspnea is no better and she feels like she is congested but unable to mobilize secretions  She had bronchoscopy by Dr. Rivera December 6th that showed extensive amount of secretions with obstructive and nonobstructive mucous plugging noted at the bertrand, right mainstem bronchus, right upper lobe takeoff, right middle lobe, right lower lobe, left upper lobe takeoff, lingula, and left lower lobe    BAL cultures were negative and pneumonia likely panel just showed 10,000 copies of of MSSA    ROS:  Denies headache, nausea or chest pain.    24HR INTAKE/OUTPUT:    Intake/Output Summary (Last 24 hours) at 1/12/2025 1229  Last data filed at 1/12/2025 1141  Gross per 24 hour   Intake 880 ml   Output 0 ml   Net 880 ml        nicotine  1 patch TransDERmal Daily    hydrocortisone   Topical BID    insulin lispro  0-4 Units SubCUTAneous 4x Daily AC & HS    levoFLOXacin  750 mg Oral Every Other Day    sodium chloride (Inhalant)  4 mL Nebulization BID    sodium chloride flush  5-40 mL IntraVENous 2 times per day    ipratropium 0.5 mg-albuterol 2.5 mg  1 Dose Inhalation Q4H RT    predniSONE  40 mg Oral Q24H    [Held by provider] apixaban  2.5 mg Oral BID    amLODIPine  10 mg Oral Daily    carvedilol  6.25 mg Oral BID WC    hydrOXYzine HCl  25 mg Oral QHS    melatonin  6 mg Oral Nightly    rOPINIRole  0.25 mg Oral Nightly    traZODone  25 mg Oral Nightly    temazepam  15 mg Oral Nightly    sertraline  50 mg Oral Daily    pantoprazole  40 mg Oral Daily    arformoterol 15 mcg-budesonide 0.25 mg neb solution   Nebulization BID RT    letrozole  2.5 mg Oral Daily       PHYSICAL EXAMINATION:  BP (!) 159/85   Pulse 94   Temp 98 °F (36.7 °C) (Oral)   Resp 18   Ht 1.753 m (5' 9\")   Wt 105.8 
   [facility]/[place]   PULMONARY AND CRITICAL CARE MEDICINE    PULMONARY MEDICINE  PROGRESS NOTE     CC: Respiratory failure    SUBJECTIVE / INTERVAL HISTORY:  CT chest showed RLL consolidation with mucus plugging. States she is feeling about the same.   - O2 Flow Rate (L/min): 13 L/min     ROS:  Denies headache, nausea or chest pain.    24HR INTAKE/OUTPUT:    Intake/Output Summary (Last 24 hours) at 1/10/2025 1017  Last data filed at 1/10/2025 0958  Gross per 24 hour   Intake 690 ml   Output 700 ml   Net -10 ml        levoFLOXacin  250 mg Oral Daily    sodium chloride (Inhalant)  4 mL Nebulization BID    albuterol  2.5 mg Nebulization BID    sodium chloride flush  5-40 mL IntraVENous 2 times per day    ipratropium 0.5 mg-albuterol 2.5 mg  1 Dose Inhalation Q4H RT    predniSONE  40 mg Oral Q24H    apixaban  2.5 mg Oral BID    amLODIPine  10 mg Oral Daily    carvedilol  6.25 mg Oral BID WC    hydrOXYzine HCl  25 mg Oral QHS    melatonin  6 mg Oral Nightly    rOPINIRole  0.25 mg Oral Nightly    traZODone  25 mg Oral Nightly    temazepam  15 mg Oral Nightly    sertraline  50 mg Oral Daily    pantoprazole  40 mg Oral Daily    arformoterol 15 mcg-budesonide 0.25 mg neb solution   Nebulization BID RT    letrozole  2.5 mg Oral Daily       PHYSICAL EXAMINATION:  BP (!) 152/86   Pulse 96   Temp 98 °F (36.7 °C) (Oral)   Resp 20   Ht 1.753 m (5' 9\")   Wt 108.6 kg (239 lb 6.7 oz)   LMP 2014   SpO2 94%   BMI 35.36 kg/m²   CURRENT PULSE OXIMETRY:  SpO2: 94 %  24HR PULSE OXIMETRY RANGE:  SpO2  Av.9 %  Min: 88 %  Max: 98 %     Gen: No distress. Speaking in full sentences without accessory muscle use  HEENT: PERRL, EOMI, OP nl  Lung:  Decreased at R base   CV: RRR without M/R/R  Abd: +BS, soft, NT/ND  Ext: No edema.    DATA  CBC:   Recent Labs     25  1849 25  0600 01/10/25  0541   WBC 8.1 6.2 8.4   HGB 10.6* 10.4* 11.2*   HCT 32.8* 31.9* 34.9*   MCV 92.7 93.6 94.0    326 366 
  Comprehensive Nutrition Assessment    RECOMMENDATIONS:  PO Diet: Continue Regular  Nutrition Supplement: No indication   Nutrition Education: No recommendation at this time     NUTRITION ASSESSMENT:   Nutritional summary & status: LOS. Presented to ED w/ SOB x2 days, found to be in acute COPD exacerbation per H&P. S/p bronchoscopy 1/13. Wt stable x2 years per wt hx in EMR. Documented PO intake per EMR shows majority of meals at % consumed. Hyperkalemic; was planning to add low potassium diet modifier but per nephrology note today 1/16, pt refusing low K+ diet. Upon visit, pt reports good appetite and eating well, endorses consuming >50% of meals. Currently, no indication for ONS. Having regular BMs per flowsheet I/Os. Will continue to monitor PO intake and any other changes to nutritional status.   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, breast cancer on hormone therapy    MALNUTRITION ASSESSMENT  Context of Malnutrition: Acute Illness   Malnutrition Status: No malnutrition  Findings of the 6 clinical characteristics of malnutrition (Minimum of 2 out of 6 clinical characteristics is required to make the diagnosis of moderate or severe Protein Calorie Malnutrition based on AND/ASPEN Guidelines):  Energy Intake:  No decrease in energy intake  Weight Loss:  No weight loss     Body Fat Loss:  No body fat loss     Muscle Mass Loss:  No muscle mass loss      NUTRITION DIAGNOSIS   No nutrition diagnosis at this time    Nutrition Monitoring and Evaluation:   Food/Nutrient Intake Outcomes:  Food and Nutrient Intake  Physical Signs/Symptoms Outcomes:  Biochemical Data, Nutrition Focused Physical Findings, Weight     OBJECTIVE DATA: Significant to nutrition assessment  Nutrition Related Findings: LBM 1/15; active bowel sounds. Labs reviewed; lytes WNL except K+ 5.5. POCG . Trace edema BLE.  Wounds: 
  Pharmacy Note - Renal Dosing    Levofloxacin  500 mg PO daily  for treatment of Community acquired pneumonia. Per [facility] Renal Dose Adjustment Policy, levofloxacin will be changed to  500 mg PO once followed by 250 mg PO daily.    Estimated Creatinine Clearance: Estimated Creatinine Clearance: 45 mL/min (A) (based on SCr of 1.7 mg/dL (H)).    Dialysis Status, CHANTELLE, CKD: CKD stage III    BMI: Body mass index is 35.59 kg/m².    Rationale for Adjustment: Agent is renally eliminated.    Pharmacy will continue to monitor renal function and adjust dose as necessary.      Please call with any questions.    Thank you,    Maribel Lazaro [facility]    
  Physician Progress Note      PATIENT:               MALISSA DEE  [facility] #:                  [id number]  :                       1960  ADMIT DATE:       2025 6:04 PM  DISCH DATE:  RESPONDING  PROVIDER #:        Tristan Matthews MD          QUERY TEXT:    Pt admitted with AECOPD.  Pt noted to have shortness of breath for about two   days on arrival to the ED, she was found to be breathing with accessory muscle   use and on 15L non rebreather. If possible, please document in the progress   notes and discharge summary if you are evaluating and/or treating any of the   following:    The medical record reflects the following:  Risk Factors: AECOPD, possible pneumonia, baseline oxygen requirement   2L  Clinical Indicators: On arrival to the ED, she was found to be breathing with   accessory muscle use, to the ED for shortness of breath for about two days.   Patient took breathing treatment with no help  currently 91% on 15L non   rebreather), VBG unremarkable per note, 15LNC @ 91%, NC  6L O2 sat 92%,   currently on 11LHFNC @ 94%  Treatment: VBGs, Non rebreather, 15LNC, placed on BiPAP, now at 11LHFNC,   albuterol, duonebs, prednisone 60 mg  Options provided:  -- Chronic respiratory failure with hypoxia  -- Chronic respiratory failure with hypercapnia  -- Chronic respiratory failure with hypoxia and hypercapnia  -- Acute on chronic respiratory failure with hypoxia  -- Acute on chronic respiratory failure with hypercapnia  -- Acute on chronic respiratory failure with hypoxia and hypercapnia  -- Other - I will add my own diagnosis  -- Disagree - Not applicable / Not valid  -- Disagree - Clinically unable to determine / Unknown  -- Refer to Clinical Documentation Reviewer    PROVIDER RESPONSE TEXT:    This patient has chronic respiratory failure with hypoxia and hypercapnia.    Query created by: Irena Power on 2025 9:11 AM      Electronically signed by:  Tristan Matthews MD 2025 10:41 AM          
4 Eyes Skin Assessment     NAME:  Shoaib Rothman  YOB: 1960  MEDICAL RECORD NUMBER:  [id number]    The patient is being assessed for  Admission    I agree that at least one RN has performed a thorough Head to Toe Skin Assessment on the patient. ALL assessment sites listed below have been assessed.      Areas assessed by both nurses:    Head, Face, Ears, Shoulders, Back, Chest, Arms, Elbows, Hands, Sacrum. Buttock, Coccyx, Ischium, Legs. Feet and Heels, and Under Medical Devices         Does the Patient have a Wound? No noted wound(s)       Redness abd folds    Manan Prevention initiated by RN: No  Wound Care Orders initiated by RN: No    Pressure Injury (Stage 3,4, Unstageable, DTI, NWPT, and Complex wounds) if present, place Wound referral order by RN under : No    New Ostomies, if present place, Ostomy referral order under : No     Nurse 1 eSignature: Electronically signed by Sarah Vergara RN on 1/9/25 at 2:19 AM EST    **SHARE this note so that the co-signing nurse can place an eSignature**    Nurse 2 eSignature: Electronically signed by Luz Marina Corbett RN on 1/9/25 at 2:56 AM EST   
All breathing treatments completed, unable to confirm with badge.  
Breathing treatments given, unable to verify in EPIC  
Came to bedside as patient was refusing antiseptic bath for infection control in the setting of accessing the patient's port. Patient's day shift and night shift nurse were both present as well. Explained to the patient the reason for the antiseptic bath.  Patient said that she receives antiseptic baths two times a week at home and got one the day before coming to the hospital and said her next was not due until Sunday. She also did not understand how \"dirty sink water\" is good for infection control. Explained that there is a antimicrobial solution added. She stated that she has had her port since 2018 and does not understand why she needs to get another antiseptic bath so soon.  Explained to her that even though she had one done in the skilled nursing facility, she would require another while here or we will not be able to access her port and would likely have to place an IV for vascular access. Patient became very frustrated and refused any IV access. She also was frustrated that last time she was in the hospital and did not have to get the antimicrobial bath and the \"orange wipes\" were \"good enough for the ICU and her last admission\". Her nurses and I explained to her that it is hospital policy that we would not be able to use her port unless she receives the antiseptic bath. Patient eventually agreed to the antiseptic bath as long as she could speak with the PCU manager tomorrow.     Dom Kenyon DO  7315 01/09/25    
Discharge instructions reviewed with pt/family, discussed medications, VU, denies any further questions or anxiety related to discharge. IV/tele discontinued. Pt discharged to [facility] with EMS. Taken from room by EMS, personal belongings taken with. Mask provided to patient for discharge. Attempted to call report to [facility] 3 times with no answer.   
Home o2 eval on hold for patient safety and patients 13L oxygen requirement.   
INTERNAL MEDICINE DEPARTMENT AT THE [facility]  DISCHARGE SUMMARY    Patient ID: Shoaib Rothman                                             Discharge Date: 1/21/2025   Patient's PCP: Lamar Mondragon MD                                          Discharge Physician: Karolyn Parry DO  Admit Date: 1/8/2025   Admitting Physician: Babita Hassan MD    PROBLEMS DURING HOSPITALIZATION:  Present on Admission:   COPD exacerbation (HCC)   Stage 3 chronic kidney disease (HCC)   Proteinuria   Mucus plug in respiratory tract      DISCHARGE DIAGNOSES: COPD exacerbation, Stage 3 CKD    HPI: Ms. Shoaib Rothman is a 64 y.o. female with a history of COPD, sleep apnea on CPAP, insomnia, type 2 diabetes, mood disorder, DVT on AC, breast cancer on hormone therapy  and CKD stage 3 who presents with worsening shortness of breath.   presents with several days of worsening shortness of breath with associated cough and productive thick white sputum. She uses oxygen chronically at home, baseline oxygen requirement ~2L per patient.   Chart review   December, 2024 for acute exacerbation of COPD secondary to pneumonia   Underwent bronchoscopy during this admission, demonstrated congested and erythematous airway mucosa, excessive dynamic airway collapse, thick secretions.  Molecular pneumonia panel positive for MSSA and coronavirus on PCR.     The following issues were addressed during hospitalization:   Acute Hypoxic RF  COPD exacerbation   Presented with shortness of breath and elevated oxygen requirements about 10 L.  Since oxygen requirement has improved after steroids, antibiotics, bronchoscopy removing a mucous plug.  Then between 2 to 4 L at rest, but requires up to 8 L when moving around.  Infectious workup negative.  Imaging has showed improvement of lungs throughout hospital stay. Pt has continued on oxygen supplementation and Brovana and Pulmicort and DuoNebs with hypertonic saline via MetaNebs. Patient improved slowly 
Nephrology Note                                                                                                                                                                                                                                                                                                                                                               Office : [phone]     Fax :[phone]    Patient's Name: Shoaib Rothman  10:13 AM  1/12/2025    Reason for Consult:  FSGS  Requesting Physician:  Lamar Mondragon MD  Chief Complaint:    Chief Complaint   Patient presents with    Shortness of Breath     Patient to the ED for shortness of breath for about two days. 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   - Will likely benefit from ACEI/ARB will hold at this time given acute illness    #HTN  - BP acceptable  - continue  home meds    #Anemia of chronic disease  - likely due to CKD  - Hgb at baseline, continue  to monitor    #T2DM  - management per primary    #COPD exacerbation  - management per  primary  - currently on  13L O2, on home 2L O2    History of Present Ilness:    Shoaib Rothman is a 64 y.o. female with PMHx of  COPD on home 2L, CHRISTA  on home  CPAP, T2DM, mood disorder, hx  of DVTon AC, breast cancer and CKD who presents 3 day history of SOB. Patient states she has had  SOB along with increased coughing and sputum production described as cloudy white. Prior history of admissions with COPD exacerbation. Denies recent fever, chills, sore throat, chest pain, dysuria, or other urinary symptoms. Notes  3 day history of bilateral leg swelling.    Admission labs:  BUN 30, Cr  1.7. At baseline  CXR showing R 
Nephrology Progress Note                                                                                                                                                                                                                                                                                                                                                               Office : [phone]     Fax :[phone]    Patient's Name: Shoaib Rothman  8:41 AM  1/16/2025    Reason for Consult:  FSGS  Requesting Physician:  Lamar Mondragon MD  Chief Complaint:    Chief Complaint   Patient presents with    Shortness of Breath     Patient to the ED for shortness of breath for about two days. 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   - CREATININE fluctuation noted   - seems euvolemic - monitor without IVF     - CKD Secondary to FSGS & has solitary kidney  - UPCR > 8 g worse again - repeat UACR 5.4 g - suspect sec to FSGS recurrence   - Started on PO Prednisone 50 mg daily     - Will likely benefit from ACEI/ARB but will hold at this time given acute illness    # Hyperkalemia  - Pt is refusing low K diet  - Give Lokelma 10 g x 1 today  - Monitor K    #HTN  - Continue home BP regimen   - Monitor     #Anemia of chronic disease  - Hgb at baseline  - Monitor     #T2DM  - Management per primary    #COPD exacerbation  - Management per primary      History of Present Ilness:    Shoaib Rothman is a 64 y.o. female with PMHx of  COPD on home 2L, CHRISTA  on home  CPAP, T2DM, mood disorder, hx  of DVTon AC, breast cancer and CKD who presents 3 day history of SOB. Patient states she has had  SOB along with increased coughing and sputum production described as cloudy white. Prior history of admissions with COPD exacerbation. Denies recent fever, chills, sore throat, chest pain, dysuria, or other 
Nephrology Progress Note                                                                                                                                                                                                                                                                                                                                                               Office : [phone]     Fax :[phone]    Patient's Name: Shoaib Rothman  9:53 AM  1/13/2025    Reason for Consult:  FSGS  Requesting Physician:  Lamar Mondragon MD  Chief Complaint:    Chief Complaint   Patient presents with    Shortness of Breath     Patient to the ED for shortness of breath for about two days. 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 female with PMHx of  COPD on home 2L, CHRISTA  on home  CPAP, T2DM, mood disorder, hx  of DVTon AC, breast cancer and CKD who presents 3 day history of SOB. Patient states she has had  SOB along with increased coughing and sputum production described as cloudy white. Prior history of admissions with COPD exacerbation. Denies recent fever, chills, sore throat, chest pain, dysuria, or other urinary symptoms. Notes  3 day history of bilateral leg swelling.    Admission labs:  BUN 30, Cr  1.7. At baseline  CXR showing R basilar airspace disease similar  to prior study   which is baseline    Interval 
Nephrology Progress Note                                                                                                                                                                                                                                                                                                                                                               Office : [phone]     Fax :[phone]    Patient's Name: Shoaib Rothman  8:54 AM  1/15/2025    Reason for Consult:  FSGS  Requesting Physician:  Lamar Mondragon MD  Chief Complaint:    Chief Complaint   Patient presents with    Shortness of Breath     Patient to the ED for shortness of breath for about two days. 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 female with PMHx of  COPD on home 2L, CHRISTA  on home  CPAP, T2DM, mood disorder, hx  of DVTon AC, breast cancer and CKD who presents 3 day history of SOB. Patient states she has had  SOB along with increased coughing and sputum production described as cloudy white. Prior history of admissions with COPD exacerbation. Denies recent fever, chills, sore throat, chest pain, dysuria, or other urinary symptoms. Notes  3 day history of bilateral leg swelling.    Admission labs:  BUN 30, Cr  1.7. At baseline  CXR showing R basilar airspace disease 
Nephrology Progress Note                                                                                                                                                                                                                                                                                                                                                               Office : [phone]     Fax :[phone]    Patient's Name: Shoaib Rothman  10:15 AM  1/19/2025    Reason for Consult:  FSGS  Requesting Physician:  Lamar Mondragon MD  Chief Complaint:    Chief Complaint   Patient presents with    Shortness of Breath     Patient to the ED for shortness of breath for about two days. 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   - CREATININE fluctuation sec to volume changes   - IV Lasix BID with good response --> PO Toresmide 20 mg daily but will hold for now   - CKD Secondary to FSGS & has solitary kidney  - UPCR > 8 g worse again - repeat UACR 5.4 g - suspect sec to FSGS recurrence   - Started on PO Prednisone 50 mg daily   - Will likely benefit from ACEI/ARB but will hold at this time given acute illness    # Hyperkalemia  - Pt is refusing low K diet  - K improved s/p lokelma   - Monitor K    # HTN  - Continue home BP regimen   - Monitor     # Anemia of chronic disease  - Hgb at baseline  - Monitor     # T2DM  - Management per primary    # COPD exacerbation  - Management per primary      History of Present Ilness:    Shoaib Rothman is a 64 y.o. female with PMHx of  COPD on home 2L, CHRISTA  on home  CPAP, T2DM, mood disorder, hx  of DVTon AC, breast cancer and CKD who presents 3 day history of SOB. Patient states she has had  SOB along with increased coughing and sputum production described as cloudy white. Prior history of admissions with COPD exacerbation. Denies recent 
Nephrology Progress Note                                                                                                                                                                                                                                                                                                                                                               Office : [phone]     Fax :[phone]    Patient's Name: Shoaib Rothman    Reason for Consult:  FSGS  Requesting Physician:  Lamar Mondragon MD  Chief Complaint:    Chief Complaint   Patient presents with    Shortness of Breath     Patient to the ED for shortness of breath for about two days. 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   - CREATININE fluctuation sec to volume changes   - Cr better   - resume PO torsemide in am   - CKD Secondary to FSGS & has solitary kidney  - UPCR > 8 g worse again - repeat UACR 5.4 g - suspect sec to FSGS recurrence   - cont on PO Prednisone 50 mg daily   - torsemide 10 daily at discharge   - need close f/u     # Hyperkalemia  - Pt is refusing low K diet  - K improved s/p lokelma   - Monitor K    # HTN  - Continue home BP regimen   - Monitor     # Anemia of chronic disease  - Hgb at baseline  - Monitor     # T2DM  - Management per primary    # COPD exacerbation  - Management per primary      History of Present Ilness:    Shoaib Rothman is a 64 y.o. female with PMHx of  COPD on home 2L, CHRISTA  on home  CPAP, T2DM, mood disorder, hx  of DVTon AC, breast cancer and CKD who presents 3 day history of SOB. Patient states she has had  SOB along with increased coughing and sputum production described as cloudy white. Prior history of admissions with COPD exacerbation. Denies recent fever, chills, sore throat, chest pain, dysuria, or other urinary symptoms. Notes  3 day history 
Nephrology Progress Note                                                                                                                                                                                                                                                                                                                                                               Office : [phone]     Fax :[phone]    Patient's Name: Shoaib Rothman  2:29 PM  1/17/2025    Reason for Consult:  FSGS  Requesting Physician:  Lamar Mondragon MD  Chief Complaint:    Chief Complaint   Patient presents with    Shortness of Breath     Patient to the ED for shortness of breath for about two days. 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   - CREATININE stable  - IV Lasix BID today with good response   - CKD Secondary to FSGS & has solitary kidney  - UPCR > 8 g worse again - repeat UACR 5.4 g - suspect sec to FSGS recurrence   - Started on PO Prednisone 50 mg daily   - Will likely benefit from ACEI/ARB but will hold at this time given acute illness    # Hyperkalemia  - Pt is refusing low K diet  - K improved s/p lokelma   - Monitor K    #HTN  - Continue home BP regimen   - Monitor     #Anemia of chronic disease  - Hgb at baseline  - Monitor     #T2DM  - Management per primary    #COPD exacerbation  - Management per primary      History of Present Ilness:    Shoaib Rothman is a 64 y.o. female with PMHx of  COPD on home 2L, CHRISTA  on home  CPAP, T2DM, mood disorder, hx  of DVTon AC, breast cancer and CKD who presents 3 day history of SOB. Patient states she has had  SOB along with increased coughing and sputum production described as cloudy white. Prior history of admissions with COPD exacerbation. Denies recent fever, chills, sore throat, chest pain, dysuria, or other urinary symptoms. 
Notified Dr. Longoria that CXR stated port is malpositioned. Port flushes well and has blood return. She stated port was fine to use.  Megan Nagel RN  1/10/2025  11:07 AM    
PACU Transfer Note    Vitals:    01/13/25 1350   BP: (!) 144/89   Pulse: 80   Resp: 19   Temp: 97.9 °F (36.6 °C)   SpO2: 92%       BP meets phase one discharge criteria       In: 700 [I.V.:700]  Out: 0     Pain assessment:  none  Pain Level: 0    Report given to Receiving unit Ana Rosa    1/13/2025 1:56 PM       
PCA stating that patient refusing CHG bath.  Patient refusing CHG bath x3 with this RN.  Patient states \"she had a bath last night before she was admitted and does not want another one today\".  This RN educated patient on importance of CHG bath.  Patient still refusing. Charge RN aware as well as unit manager.  Residents made aware and discussed with patient importance of CHG bath and infection prevention.  Patient continuing to refuse.     
Patient /94.  Dr. Longoria made aware via perfect serve.  No new orders placed.    
Patient continuously refusing multiple offers for CHG bath from two different RN and one PCA this shift, patient has port accessed and was provided education on the importance of a daily CHG bath while in hospital to help prevent infection. She remains alert and oriented x4 throughout education. MD notified and resident sent to bedside to discuss with patient. Patient was informed if she continued to refuse CHG bath she would require port to be de accessed per hospital policy on PCU. Agreeable to CHG bath tonight and wishes to speak with manager tomorrow AM regarding policies and procedures and when they went into effect. Charge nurse and PCU supervisor updated at this time.     Electronically signed by Cielo Cerrato RN on 1/9/2025 at 7:40 PM    
Patient declined conversation or spiritual care needs at this time     01/15/25 1428   Encounter Summary   Encounter Overview/Reason Initial Encounter   Service Provided For Patient   Referral/Consult From Rounding   Support System Unknown   Last Encounter  01/15/25  (GRD)   Complexity of Encounter Low   Begin Time 1425   End Time  1430   Total Time Calculated 5 min   Assessment/Intervention/Outcome   Assessment Calm   Intervention Active listening   Outcome Refused/Declined       
Patient returned to 4452 after scans with NRB mask at 15L. Patient SpO2 remains WNL on 15L HFNC       
Patient transferred to [facility].  Report given to RN Jane, all questions answered.  
Patient transported down to CT, Xray, and Nuc Med with this RN d/t increased O2 req 15-25L  
Pt arrived from Endo limited breathing dry cough. Congested report received from CRNA and RN Endo specimen removed in Endo  
Pt off floor for procedure.  
Respiratory called for breathing treatment   
[facility] - Clinical Pharmacy Note - Renal Dosing    Levofloxacin ordered for treatment of PNA. Per [facility] Renal Dose Adjustment Policy, Levofloxacin will be changed to 750 mg po q48h.     Estimated Creatinine Clearance: Estimated Creatinine Clearance: 48 mL/min (A) (based on SCr of 1.6 mg/dL (H)).  Dialysis Status, CHANTELLE, CKD:    BMI: Body mass index is 35.36 kg/m².    Rationale for Adjustment: Agent is renally eliminated.    Pharmacy will continue to monitor renal function and adjust dose as necessary.      Please call with questions--  Patricia Alves, PharmD, BCPS  Wireless: [phone]   1/10/2025 10:27 AM      
12:22 PM      
accessory muscle use  HEENT: PERRL, EOMI, OP nl  Lung: Clear to auscultation  CV: RRR without M/R/R  Abd: +BS, soft, NT/ND  Ext: No edema.    DATA  CBC:   Recent Labs     01/14/25  0625 01/15/25  0530 01/16/25  0512   WBC 11.7* 7.8 13.0*   HGB 10.9* 11.3* 11.2*   HCT 35.0* 35.8* 36.0   MCV 94.0 94.7 94.1    333 325     BMP:   Recent Labs     01/14/25  0625 01/15/25  0530 01/16/25  0512    143 141   K 5.0 5.0 5.5*    107 106   CO2 28 27 27   PHOS 4.6 5.3* 4.0   BUN 54* 52* 56*   CREATININE 2.1* 1.9* 2.0*       Procalcitonin:    Lab Results   Component Value Date/Time    PROCAL 0.09 01/08/2025 07:40 PM     Microbiology  BAL 1/14: Pending    CT chest January 14 compared to January 9 REVIEWED BY ME AND SHOWED:  FINDINGS:     LUNGS AND AIRWAYS: There is improved patency of the central airways,  particularly on the right, status post removal of a mucous plug from the  bronchus intermedius. There is, however, persistent opacification of right lower  lobe bronchi.     There are scattered consolidative and groundglass opacities bilaterally.  Background of emphysema is noted.     There is no new pulmonary mass lesion.     PLEURA: No pleural effusions or pleural thickening.     HEART AND GREAT VESSELS: The heart is stable in size. There is dilation of the  main pulmonary artery to 3.5 cm.     ADENOPATHY/MEDIASTINUM: Not enlarged mediastinal or hilar lymph nodes are again  noted.     CHEST WALL / LOWER NECK: No significant abnormality.     UPPER ABDOMEN: No significant abnormality.     BONES: No significant abnormality.     IMPRESSION:     1. Persistent mucus plugging of the right lower lobe bronchi, although, this  appears less significant than on the prior study.     2. Continued airspace disease involving both lungs.     3. Emphysema.                 Surgical pathology  January 13, 2025  FINAL DIAGNOSIS:     Biopsy, right lower lobe lung:   Abundant acellular mucinous debris and fibrin tissue with acute 
breath sounds  CV: RRR without M/R/R  Abd: +BS, soft, NT/ND  Ext: No edema.    DATA  CBC:   Recent Labs     01/18/25  0546 01/19/25  0546 01/20/25 0524   WBC 13.0* 14.0* 13.9*   HGB 11.8* 11.2* 10.8*   HCT 36.5 35.2* 33.7*   MCV 93.3 92.4 93.8    343 313     BMP:   Recent Labs     01/18/25  0546 01/19/25  0540 01/20/25 0524    140 141   K 4.3 4.2 4.1    100 104   CO2 30 30 27   PHOS 5.7* 5.0* 3.3   BUN 70* 77* 73*   CREATININE 2.1* 2.3* 2.0*     No results for input(s): \"PHART\", \"GUF4NMT\", \"PO2ART\" in the last 72 hours.  LIVER PROFILE: No results for input(s): \"AST\", \"ALT\", \"LIPASE\", \"AMYLASE\", \"BILIDIR\", \"BILITOT\", \"ALKPHOS\" in the last 72 hours.    Invalid input(s): \"ALB\"    CXR REVIEWED BY ME AND SHOWED:  XR CHEST PORTABLE   Final Result   Interval improvement in right basilar airspace disease.      Right-sided Port-A-Cath with tip in the subclavian vein.      Electronically signed by Ronald Price      CT CHEST WO CONTRAST   Final Result      1. Persistent mucus plugging of the right lower lobe bronchi, although, this   appears less significant than on the prior study.      2. Continued airspace disease involving both lungs.      3. Emphysema.      Electronically signed by Colten Wheeler      XR CHEST PORTABLE   Final Result   Impression:       Stable right basilar pleural parenchymal opacities.      Electronically signed by Samy Spann MD      XR CHEST PORTABLE   Final Result      Dense right lower lung consolidation mildly progressive since 1/8/2025.      Left lung is clear.      Stable cardiomediastinal silhouette with volume loss and rightward shift of the   mediastinum.      Malpositioned right chest port is again seen with tip in the peripheral   subclavian vein.      Metallic fragments again noted overlying the left chest.      Electronically signed by Armando Traiforos      NM LUNG SCAN PERFUSION ONLY   Final Result   1. Correlative radiographs showing progression of right lower 
mucinous debris and fibrin tissue with acute   inflammation/mucous plug.   No respiratory epithelial\alveolar tissue available for morphologic   evaluation.   No malignant features identified.     Medical cytology  January 13, 2025  FINAL DIAGNOSIS:     Lung, Right Lower Lobe Bronchoalveolar Lavage:   No malignant cells identified.   Abundant macrophages with pigment-laden macrophages, bronchial cells and   nonspecific inflammation.   Macrophages are CD68 positive and pankeratin\CAM5.2 negative supporting   diagnosis.   GMS stain is negative for fungal and pneumocystis jirovecii organisms.     BAL  Pneumonia molecular panel: No organisms detected    ASSESSMENT:  Acute on chronic hypoxic respiratory failure -slowly improving  RLL infiltrate with mucus plugging - Finished a 7-day course of Levaquin.  I reviewed chest x-ray today and agree that is significantly improved to previous  EDAC  Asthma/COPD, no acute exacerbation   Hx DVT/PE on Eliquis   Hx Breast cancer s/p chemo/mastectomy  FSGS -prednisone per Dr. Mart    PLAN:      Provide O2 supplementation to keep SpO2 between 88-92% if needed.    Continue brovana/pulmicort and duonebs with 3 %hypertonic saline via MetaNeb  Continue prednisone for FSGS  I strongly recommended discontinuing smoking and vaping  Dr. Tyler assuming care this weekend    The note was completed using EMR and Dragon dictation system. Every effort was made to ensure accuracy; however, inadvertent computerized transcription errors may be present.    Guille Mabry MD  Pulmonary and Critical Care Medicine    
the right lower lobe with crazy paving pattern and patchy groundglass airspace disease in the left lower lobe now present.   2. Extensive hyperaeration with bullous emphysema of the right upper lobe greater than left.   3. Underlying paraseptal emphysema.      Electronically signed by Porfirio Shanks MD      XR CHEST PORTABLE   Final Result      Right basilar airspace disease similar to prior study.       Electronically signed by Ronald Price MD      XR CHEST PORTABLE    (Results Pending)         Assessment & Plan   Ms. Shoaib Rothman is a 64 y.o. female with a history of COPD, sleep apnea on CPAP, insomnia, type 2 diabetes, mood disorder, DVT on AC, breast cancer on hormone therapy who presents with worsening shortness of breath. The following issues will be addressed during this hospitalization:     # Acute on chronic COPD exacerbation possibly 2/2 pna   Patient presents with several days of shortness of breath and increasing oxygen requirement.  On exam, reduced lung sounds in right lower lung base.  Chest x-ray right basilar airspace disease similar to prior.  Labs with unremarkable white count, negative Pro-Viktor.  Of note, patient has been previously hospitalized for COPD exacerbation in December secondary to pneumonia, where she had a unremarkable Pro-Viktor, however bronchoscopy with molecular panel positive for MSSA.  Given this recurrence, will treat empirically for pneumonia.  In the past, she had been treated with levofloxacin, however QTc at this time 497. On 12 Litters of oxygen with high flow nasal canula   V/Q scan : progression of right lower lobe pneumonia from prior study Low probability for acute or chronic pulmonary embolus   CT chest without contrast: Worsening of multifocal pneumonia with new consolidative process in the right lower lobe /patchy groundglass airspace disease in the left lower lobe Underlying paraseptal emphysema   Plan  -V/Q scan ordered to r/e PE  -Pulmonology consulted   - 
50 mg, Daily  pantoprazole (PROTONIX) tablet 40 mg, Daily  arformoterol 15 mcg-budesonide 0.25 mg neb solution, BID RT  letrozole (FEMARA) tablet 2.5 mg, Daily      Physical exam:     Vitals:  BP (!) 144/72   Pulse 93   Temp 97.8 °F (36.6 °C) (Axillary)   Resp 20   Ht 1.753 m (5' 9\")   Wt 103.7 kg (228 lb 9.9 oz)   LMP 03/09/2014   SpO2 94%   BMI 33.76 kg/m²   Constitutional:  OAA X3 NAD Yes  Skin: no rash, turgor wnl  Heent:  eomi, mmm  Neck: no bruits or jvd noted  Cardiovascular:  S1, S2 without m/r/g  Respiratory: Diminished BS. + supplemental O2  Abdomen: soft, nt, nd  Ext:  lower extremity edema, Yes R > L  Psychiatric: mood and affect normal  Musculoskeletal:  Rom, muscular strength intact    Data:   Labs:  CBC:   Recent Labs     01/16/25  0512 01/17/25  0550 01/18/25  0546   WBC 13.0* 12.4* 13.0*   HGB 11.2* 10.9* 11.8*    339 350     BMP:    Recent Labs     01/16/25  0512 01/17/25  0550 01/18/25  0546    141 139   K 5.5* 4.9 4.3    105 100   CO2 27 28 30   BUN 56* 63* 70*   CREATININE 2.0* 2.0* 2.1*   GLUCOSE 201* 118* 106*     Ca/Mg/Phos:   Recent Labs     01/16/25  0512 01/17/25  0550 01/18/25  0546   CALCIUM 9.0 9.1 9.3   MG 1.91 1.69* 1.75*   PHOS 4.0 4.2 5.7*     Hepatic: No results for input(s): \"AST\", \"ALT\", \"BILITOT\", \"ALKPHOS\" in the last 72 hours.    Invalid input(s): \"ALB\"  Troponin: No results for input(s): \"TROPONINI\" in the last 72 hours.  BNP: No results for input(s): \"BNP\" in the last 72 hours.  Lipids: No results for input(s): \"CHOL\", \"TRIG\", \"HDL\" in the last 72 hours.    Invalid input(s): \"LDLCALC\", \"LABVLDL\"  ABGs: No results for input(s): \"PHART\", \"PO2ART\", \"HYD1QKE\" in the last 72 hours.  INR: No results for input(s): \"INR\" in the last 72 hours.  UA:No results for input(s): \"COLORU\", \"CLARITYU\", \"GLUCOSEU\", \"BILIRUBINUR\", \"KETUA\", \"SPECGRAV\", \"BLOODU\", \"PHUR\", \"PROTEINU\", \"UROBILINOGEN\", \"NITRU\", \"LEUKOCYTESUR\", \"URINETYPE\" in the last 72 hours.    Invalid 
NAD Yes  Skin: no rash, turgor wnl  Heent:  eomi, mmm  Neck: no bruits or jvd noted  Cardiovascular:  S1, S2 without m/r/g  Respiratory: Diminished BS. + HFNC  Abdomen: soft, nt, nd  Ext:  lower extremity edema, R > L  Psychiatric: mood and affect normal  Musculoskeletal:  Rom, muscular strength intact    Data:   Labs:  CBC:   Recent Labs     01/12/25  0515 01/13/25  0544 01/14/25  0625   WBC 9.4 10.1 11.7*   HGB 12.0 12.2 10.9*    400 357     BMP:    Recent Labs     01/12/25  0515 01/13/25  0544 01/14/25  0625    141 141   K 4.8 4.8 5.0    106 107   CO2 23 26 28   BUN 50* 53* 54*   CREATININE 1.8* 1.6* 2.1*   GLUCOSE 156* 177* 140*     Ca/Mg/Phos:   Recent Labs     01/12/25 0515 01/13/25  0544 01/14/25  0625   CALCIUM 8.7 8.8 8.4   MG 1.58* 2.29 2.02   PHOS 4.1 4.8 4.6     Hepatic: No results for input(s): \"AST\", \"ALT\", \"BILITOT\", \"ALKPHOS\" in the last 72 hours.    Invalid input(s): \"ALB\"  Troponin: No results for input(s): \"TROPONINI\" in the last 72 hours.  BNP: No results for input(s): \"BNP\" in the last 72 hours.  Lipids: No results for input(s): \"CHOL\", \"TRIG\", \"HDL\" in the last 72 hours.    Invalid input(s): \"LDLCALC\", \"LABVLDL\"  ABGs: No results for input(s): \"PHART\", \"PO2ART\", \"IJK3NXC\" in the last 72 hours.  INR: No results for input(s): \"INR\" in the last 72 hours.  UA:No results for input(s): \"COLORU\", \"CLARITYU\", \"GLUCOSEU\", \"BILIRUBINUR\", \"KETUA\", \"SPECGRAV\", \"BLOODU\", \"PHUR\", \"PROTEINU\", \"UROBILINOGEN\", \"NITRU\", \"LEUKOCYTESUR\", \"URINETYPE\" in the last 72 hours.    Invalid input(s): \"LABMICR\"   Urine Microscopic: No results for input(s): \"LABCAST\", \"BACTERIA\", \"COMU\", \"HYALCAST\", \"WBCUA\", \"RBCUA\" in the last 72 hours.    Invalid input(s): \"EPIU\"  Urine Culture: No results for input(s): \"LABURIN\" in the last 72 hours.  Urine Chemistry:   No results for input(s): \"CLUR\", \"LABCREA\", \"PROTEINUR\", \"NAUR\" in the last 72 hours.        IMAGING:  XR CHEST PORTABLE   Final Result   Impression:  
conservation and breathing techniques. Pt verbalized/demo understanding.    AM-PAC - ADL  AM-PAC Daily Activity - Inpatient   How much help is needed for putting on and taking off regular lower body clothing?: A Little  How much help is needed for bathing (which includes washing, rinsing, drying)?: A Little  How much help is needed for toileting (which includes using toilet, bedpan, or urinal)?: A Little  How much help is needed for putting on and taking off regular upper body clothing?: None  How much help is needed for taking care of personal grooming?: A Little  How much help for eating meals?: None  AM-PAC Inpatient Daily Activity Raw Score: 20  AM-PAC Inpatient ADL T-Scale Score : 42.03  ADL Inpatient CMS 0-100% Score: 38.32  ADL Inpatient CMS G-Code Modifier : CJ    Goals  Short Term Goals  Time Frame for Short Term Goals: 1 week  Short Term Goal 1: Complete 5 mins functional standing activity with SPO2>90% on RA  Short Term Goal 2: LB dressing with SPV  Short Term Goal 3: Verbalize 3 energy conservation techniques  Patient Goals   Patient goals : \"I want to be able to breathe better, not need oxygen, and be independent.\"      Therapy Time   Individual Concurrent Group Co-treatment   Time In 1310         Time Out 1333         Minutes 23         Timed Code Treatment Minutes: 8 Minutes       If patient is discharged prior to next treatment session, this note will serve as the discharge summary.  Ira Tran, OTR/L #[id number]       
lower extremity edema, R > L  Psychiatric: mood and affect normal  Musculoskeletal:  Rom, muscular strength intact    Data:   Labs:  CBC:   Recent Labs     01/09/25  0600 01/10/25  0541 01/11/25  1025   WBC 6.2 8.4 9.3   HGB 10.4* 11.2* 11.8*    366 387     BMP:    Recent Labs     01/09/25  0600 01/10/25  0541 01/11/25  1025    143 140   K 3.7 4.2 4.2    108 104   CO2 25 25 27   BUN 30* 37* 41*   CREATININE 1.7* 1.6* 1.5*   GLUCOSE 139* 170* 147*     Ca/Mg/Phos:   Recent Labs     01/09/25  0600 01/10/25  0541 01/11/25  1025   CALCIUM 8.3 8.7 9.2   MG 1.27* 2.06 1.52*   PHOS 2.7 3.4 3.1     Hepatic: No results for input(s): \"AST\", \"ALT\", \"BILITOT\", \"ALKPHOS\" in the last 72 hours.    Invalid input(s): \"ALB\"  Troponin: No results for input(s): \"TROPONINI\" in the last 72 hours.  BNP: No results for input(s): \"BNP\" in the last 72 hours.  Lipids: No results for input(s): \"CHOL\", \"TRIG\", \"HDL\" in the last 72 hours.    Invalid input(s): \"LDLCALC\", \"LABVLDL\"  ABGs: No results for input(s): \"PHART\", \"PO2ART\", \"IRF8KFC\" in the last 72 hours.  INR: No results for input(s): \"INR\" in the last 72 hours.  UA:No results for input(s): \"COLORU\", \"CLARITYU\", \"GLUCOSEU\", \"BILIRUBINUR\", \"KETUA\", \"SPECGRAV\", \"BLOODU\", \"PHUR\", \"PROTEINU\", \"UROBILINOGEN\", \"NITRU\", \"LEUKOCYTESUR\", \"URINETYPE\" in the last 72 hours.    Invalid input(s): \"LABMICR\"   Urine Microscopic: No results for input(s): \"LABCAST\", \"BACTERIA\", \"COMU\", \"HYALCAST\", \"WBCUA\", \"RBCUA\" in the last 72 hours.    Invalid input(s): \"EPIU\"  Urine Culture: No results for input(s): \"LABURIN\" in the last 72 hours.  Urine Chemistry:   Recent Labs     01/09/25  1345   PROTEINUR 237.00*         IMAGING:  XR CHEST PORTABLE   Final Result      Dense right lower lung consolidation mildly progressive since 1/8/2025.      Left lung is clear.      Stable cardiomediastinal silhouette with volume loss and rightward shift of the   mediastinum.      Malpositioned right chest port 
  Electronically signed by Armando Li      NM LUNG SCAN PERFUSION ONLY   Final Result   1. Correlative radiographs showing progression of right lower lobe pneumonia from prior study   2. Low probability for acute or chronic pulmonary embolus.       Electronically signed by Porfirio Shanks MD      CT CHEST WO CONTRAST   Final Result   1. Worsening of multifocal pneumonia with new consolidative process in the right lower lobe with crazy paving pattern and patchy groundglass airspace disease in the left lower lobe now present.   2. Extensive hyperaeration with bullous emphysema of the right upper lobe greater than left.   3. Underlying paraseptal emphysema.      Electronically signed by Porfirio Shanks MD      XR CHEST PORTABLE   Final Result      Right basilar airspace disease similar to prior study.       Electronically signed by Ronald Price MD            Medical Decision Making:  The following items were considered in medical decision making:  Discussion of patient care with other providers  Reviewed clinical lab tests  Reviewed radiology tests  Reviewed other diagnostic tests/interventions    Will be discussed w/  Primary team     Thank you for allowing us to participate in care of Shoaib Rothman   Feel free to contact me,     Sukhdev Chávez MD    
  Urine Microscopic: No results for input(s): \"LABCAST\", \"BACTERIA\", \"COMU\", \"HYALCAST\", \"WBCUA\", \"RBCUA\" in the last 72 hours.    Invalid input(s): \"EPIU\"  Urine Culture: No results for input(s): \"LABURIN\" in the last 72 hours.  Urine Chemistry:   No results for input(s): \"CLUR\", \"LABCREA\", \"PROTEINUR\", \"NAUR\" in the last 72 hours.          IMAGING:  XR CHEST PORTABLE   Final Result   Interval improvement in right basilar airspace disease.      Right-sided Port-A-Cath with tip in the subclavian vein.      Electronically signed by Ronald Price      CT CHEST WO CONTRAST   Final Result      1. Persistent mucus plugging of the right lower lobe bronchi, although, this   appears less significant than on the prior study.      2. Continued airspace disease involving both lungs.      3. Emphysema.      Electronically signed by Colten Wheeler      XR CHEST PORTABLE   Final Result   Impression:       Stable right basilar pleural parenchymal opacities.      Electronically signed by Samy Spann MD      XR CHEST PORTABLE   Final Result      Dense right lower lung consolidation mildly progressive since 1/8/2025.      Left lung is clear.      Stable cardiomediastinal silhouette with volume loss and rightward shift of the   mediastinum.      Malpositioned right chest port is again seen with tip in the peripheral   subclavian vein.      Metallic fragments again noted overlying the left chest.      Electronically signed by Armando Li      NM LUNG SCAN PERFUSION ONLY   Final Result   1. Correlative radiographs showing progression of right lower lobe pneumonia from prior study   2. Low probability for acute or chronic pulmonary embolus.       Electronically signed by Porfirio Shanks MD      CT CHEST WO CONTRAST   Final Result   1. Worsening of multifocal pneumonia with new consolidative process in the right lower lobe with crazy paving pattern and patchy groundglass airspace disease in the left lower lobe now present.   2. 
(PGY-1)      I have seen the patient independently from the resident .I discussed the care with the resident. I personally reviewed the HPI, PH, FH, SH, ROS and medications. I repeated pertinent portions of the examination and reviewed the relevant imaging and laboratory data. I agree with the findings, assessment and plan as documented, with the following addendum:      Lm Lockett MD     
T-Scale Score : 50.25  Mobility Inpatient CMS 0-100% Score: 28.97  Mobility Inpatient CMS G-Code Modifier : CJ    Goals  Short Term Goals  Time Frame for Short Term Goals: discharge  Short Term Goal 1: Pt. will demonstrate bed mobility (I)  Short Term Goal 2: Pt. will ambulate >/= 150ft with LRAD modified (I)  Patient Goals   Patient Goals : \"breathe better so I can get back over to [facility]\"       Education  Patient Education  Education Given To: Patient  Education Provided: Role of Therapy;Plan of Care  Education Provided Comments: use of call light, PT recommendations  Education Method: Demonstration;Verbal  Barriers to Learning: None  Education Outcome: Verbalized understanding;Demonstrated understanding      Therapy Time   Individual Concurrent Group Co-treatment   Time In       1310   Time Out       1333   Minutes       23             Timed Code Treatment Minutes: 8 minutes    Total Treatment Minutes: 23 Minutes    If patient discharges prior to next treatment, this note will serve as discharge summary.    Cecelia Nobles PT, DPT #[id number]    
conditions:  HTN - continue home amlodipine, coreg  Sleep apnea - CPAP ordered  Insomnia - Takes significant regimen for sleep at home, including the following:  Temazepam 15 mg qhs, trazodone 25 mg qhs, melatonin 6 mg qhs, Atarax 25 mg qhs  RLS - continue ropinirole  Type 2 diabetes -Glucose this morning 139 / Holding home tradjenta  Mood disorder - continue home zoloft  Hx DVT / PE on eliquis - Continue home eliquis  Breast cancer on hormone therapy - continue home letrozole    DVT PPx:  eliquis  Diet:  ADULT DIET; Regular; 4 carb choices (60 gm/meal)   Code status:  Full Code      ELOS: several days  Barriers to discharge: COPD exacerbation  Disposition  - Preadmission: from facility  - Current: PCU  - Upon discharge:  TB      Will discuss with attending physician Tristan Olivares MD Katie Jaffe, DO   Internal Medicine Resident, PGY-1    Patient seen and examined, labs and imaging studies reviewed, agree with assessment and plan as outlined above.  Continue with current care and plan.  Discussed case with patients nurse, discussed case with care team, discussed plan.  I have seen, examined and evaluated the patient as did the resident physician, on . We have discussed the plan of care and decisions made during that discussion were incorporated into this note. I have reviewed the resident physician's note and agree with the assessment and plan of care as documented.    Tristan Matthews MD FACP  
zoloft  Hx DVT / PE on eliquis - Continue home eliquis  Breast cancer on hormone therapy - continue home letrozole    DVT PPx:  eliquis  Diet:  ADULT DIET; Regular; 4 carb choices (60 gm/meal)   Code status:  Full Code      ELOS: several days  Barriers to discharge: COPD exacerbation  Disposition  - Preadmission: from facility  - Current: PCU  - Upon discharge:  TBD        Will discuss with attending physician Tristan lOivares MD     _____________________  Karolyn Parry,    Internal Medicine Resident, PGY-1    Patient seen and examined, labs and imaging studies reviewed, agree with assessment and plan as outlined above.  Continue with current care and plan.  Discussed case with patients nurse, discussed case with care team, discussed plan.      Tristan Matthews MD FACP

## 2025-01-21 NOTE — DISCHARGE INSTR - COC
Continuity of Care Form    Patient Name: Shoaib Rothman   :  1960  MRN:  [id number]    Admit date:  2025  Discharge date:  25    Code Status Order: Full Code   Advance Directives:   Advance Care Flowsheet Documentation             Admitting Physician:  Babita Hassan MD  PCP: Lamar Mondragon MD    Discharging Nurse[facility]452/4452-01  Discharging Unit Phone Number: [phone]    Emergency Contact:   Extended Emergency Contact Information  Primary Emergency Contact: devante españa  Home Phone: [phone]  Work Phone: [phone]  Mobile Phone: [phone]  Relation: Brother/Sister   needed? No    Past Surgical History:  Past Surgical History:   Procedure Laterality Date    APPENDECTOMY      BREAST SURGERY      BRONCHOSCOPY N/A 2024    BRONCHOSCOPY performed by Fernando Dhillon MD at [facility] ENDOSCOPY    BRONCHOSCOPY N/A 2025    BRONCHOSCOPY ALVEOLAR LAVAGE / BX performed by Guille Mabry MD at [facility] ENDOSCOPY    CHOLECYSTECTOMY      CT BIOPSY RENAL  2020    CT BIOPSY RENAL 2020 [facility] CT SCAN    CYSTOSCOPY N/A 2024    RIGID CYSTOSCOPY, BLADDER BIOPSY performed by Kris Kan MD at [facility] OR    MASTECTOMY Left 2019    LEFT MODIFIED RADICAL MASTECTOMY; EXPAREL INTERCOSTAL BLOCK performed by Rebecca Montesinos MD at [facility] OR    SKIN GRAFT Left 2019    COMPLEX CLOSURE OF LEFT BREAST, FULL THICKNESS SKIN GRAFT LEFT BREAST 4x3 performed by Moise Carlin MD at [facility] OR    TOTAL NEPHRECTOMY Left     TUBAL LIGATION      TUNNELED VENOUS PORT PLACEMENT  2019    still in     US BREAST BIOPSY W LOC DEVICE EACH ADDL LESION LEFT  2020    US BREAST BIOPSY NEEDLE ADDITIONAL LEFT 2020 Steven J Perlman, MD [facility] MOB ULTRASOUND       Immunization History:   Immunization History   Administered Date(s) Administered    COVID-19, MODERNA BLUE border, Primary or Immunocompromised, (age 12y+), IM, 100 mcg/0.5mL

## 2025-02-20 ENCOUNTER — TELEPHONE (OUTPATIENT)
Dept: PULMONOLOGY | Age: 65
End: 2025-02-20

## 2025-02-20 NOTE — TELEPHONE ENCOUNTER
Patient is scheduled 2/26/25 for a hospital follow up.   She is residing at [facility]. She is asking that we fax an order to them for a concentrator that will go up to 10 LPM  Fax [phone]   Order given to Dr Rivera to sign

## 2025-02-23 ENCOUNTER — HOSPITAL ENCOUNTER (INPATIENT)
Age: 65
LOS: 8 days | Discharge: NURSING, REHAB OR LONG-TERM CARE | DRG: 139 | End: 2025-03-03
Attending: STUDENT IN AN ORGANIZED HEALTH CARE EDUCATION/TRAINING PROGRAM | Admitting: INTERNAL MEDICINE
Payer: MEDICAID

## 2025-02-23 ENCOUNTER — APPOINTMENT (OUTPATIENT)
Dept: GENERAL RADIOLOGY | Age: 65
DRG: 139 | End: 2025-02-23
Payer: MEDICAID

## 2025-02-23 DIAGNOSIS — J96.01 ACUTE RESPIRATORY FAILURE WITH HYPOXIA AND HYPERCAPNIA (HCC): ICD-10-CM

## 2025-02-23 DIAGNOSIS — J96.02 ACUTE RESPIRATORY FAILURE WITH HYPOXIA AND HYPERCAPNIA (HCC): ICD-10-CM

## 2025-02-23 DIAGNOSIS — J44.1 COPD EXACERBATION (HCC): Primary | ICD-10-CM

## 2025-02-23 PROBLEM — J96.12 ACUTE HYPOXIC ON CHRONIC HYPERCAPNIC RESPIRATORY FAILURE (HCC): Status: ACTIVE | Noted: 2025-02-23

## 2025-02-23 LAB
ANION GAP SERPL CALCULATED.3IONS-SCNC: 12 MMOL/L (ref 3–16)
BASE EXCESS BLDV CALC-SCNC: 6.8 MMOL/L (ref -2–3)
BASE EXCESS BLDV CALC-SCNC: 7.4 MMOL/L (ref -2–3)
BASOPHILS # BLD: 0 K/UL (ref 0–0.2)
BASOPHILS NFR BLD: 0 %
BUN SERPL-MCNC: 63 MG/DL (ref 7–20)
CALCIUM SERPL-MCNC: 8.2 MG/DL (ref 8.3–10.6)
CHLORIDE SERPL-SCNC: 96 MMOL/L (ref 99–110)
CO2 BLDV-SCNC: 39 MMOL/L
CO2 BLDV-SCNC: 41 MMOL/L
CO2 SERPL-SCNC: 33 MMOL/L (ref 21–32)
COHGB MFR BLDV: 2 % (ref 0–1.5)
COHGB MFR BLDV: 4.3 % (ref 0–1.5)
CREAT SERPL-MCNC: 2.2 MG/DL (ref 0.6–1.2)
DEPRECATED RDW RBC AUTO: 16.5 % (ref 12.4–15.4)
DO-HGB MFR BLDV: 33.1 %
DO-HGB MFR BLDV: 7.3 %
EKG ATRIAL RATE: 119 BPM
EKG DIAGNOSIS: NORMAL
EKG P AXIS: 29 DEGREES
EKG P-R INTERVAL: 108 MS
EKG Q-T INTERVAL: 334 MS
EKG QRS DURATION: 86 MS
EKG QTC CALCULATION (BAZETT): 469 MS
EKG R AXIS: 76 DEGREES
EKG T AXIS: 48 DEGREES
EKG VENTRICULAR RATE: 119 BPM
EOSINOPHIL # BLD: 0 K/UL (ref 0–0.6)
EOSINOPHIL NFR BLD: 0 %
FLUAV RNA RESP QL NAA+PROBE: NOT DETECTED
FLUBV RNA RESP QL NAA+PROBE: NOT DETECTED
GFR SERPLBLD CREATININE-BSD FMLA CKD-EPI: 25 ML/MIN/{1.73_M2}
GLUCOSE BLD-MCNC: 161 MG/DL (ref 70–99)
GLUCOSE BLD-MCNC: 239 MG/DL (ref 70–99)
GLUCOSE SERPL-MCNC: 95 MG/DL (ref 70–99)
HCO3 BLDV-SCNC: 36 MMOL/L (ref 24–28)
HCO3 BLDV-SCNC: 38.4 MMOL/L (ref 24–28)
HCT VFR BLD AUTO: 34.3 % (ref 36–48)
HGB BLD-MCNC: 11.2 G/DL (ref 12–16)
LACTATE BLDV-SCNC: 0.8 MMOL/L (ref 0.4–1.9)
LYMPHOCYTES # BLD: 0.8 K/UL (ref 1–5.1)
LYMPHOCYTES NFR BLD: 8 %
MCH RBC QN AUTO: 30.6 PG (ref 26–34)
MCHC RBC AUTO-ENTMCNC: 32.5 G/DL (ref 31–36)
MCV RBC AUTO: 94 FL (ref 80–100)
METHGB MFR BLDV: 0.4 % (ref 0–1.5)
METHGB MFR BLDV: 1.5 % (ref 0–1.5)
MONOCYTES # BLD: 0.3 K/UL (ref 0–1.3)
MONOCYTES NFR BLD: 3 %
NEUTROPHILS # BLD: 9.4 K/UL (ref 1.7–7.7)
NEUTROPHILS NFR BLD: 89 %
NT-PROBNP SERPL-MCNC: 898 PG/ML (ref 0–124)
PCO2 BLDV: 80.7 MMHG (ref 41–51)
PCO2 BLDV: 91.1 MMHG (ref 41–51)
PERFORMED ON: ABNORMAL
PERFORMED ON: ABNORMAL
PH BLDV: 7.23 [PH] (ref 7.35–7.45)
PH BLDV: 7.26 [PH] (ref 7.35–7.45)
PLATELET # BLD AUTO: 258 K/UL (ref 135–450)
PLATELET BLD QL SMEAR: ADEQUATE
PMV BLD AUTO: 7.1 FL (ref 5–10.5)
PO2 BLDV: 38.9 MMHG (ref 25–40)
PO2 BLDV: 75 MMHG (ref 25–40)
POTASSIUM SERPL-SCNC: 3.9 MMOL/L (ref 3.5–5.1)
RBC # BLD AUTO: 3.66 M/UL (ref 4–5.2)
SAO2 % BLDV: 66 %
SAO2 % BLDV: 92 %
SARS-COV-2 RNA RESP QL NAA+PROBE: NOT DETECTED
SLIDE REVIEW: ABNORMAL
SODIUM SERPL-SCNC: 141 MMOL/L (ref 136–145)
STOMATOCYTES BLD QL SMEAR: ABNORMAL
TROPONIN, HIGH SENSITIVITY: 51 NG/L (ref 0–14)
TROPONIN, HIGH SENSITIVITY: 52 NG/L (ref 0–14)
WBC # BLD AUTO: 10.6 K/UL (ref 4–11)

## 2025-02-23 PROCEDURE — 94660 CPAP INITIATION&MGMT: CPT

## 2025-02-23 PROCEDURE — 87449 NOS EACH ORGANISM AG IA: CPT

## 2025-02-23 PROCEDURE — 94761 N-INVAS EAR/PLS OXIMETRY MLT: CPT

## 2025-02-23 PROCEDURE — 99223 1ST HOSP IP/OBS HIGH 75: CPT | Performed by: INTERNAL MEDICINE

## 2025-02-23 PROCEDURE — 6370000000 HC RX 637 (ALT 250 FOR IP): Performed by: INTERNAL MEDICINE

## 2025-02-23 PROCEDURE — 93005 ELECTROCARDIOGRAM TRACING: CPT | Performed by: STUDENT IN AN ORGANIZED HEALTH CARE EDUCATION/TRAINING PROGRAM

## 2025-02-23 PROCEDURE — 85025 COMPLETE CBC W/AUTO DIFF WBC: CPT

## 2025-02-23 PROCEDURE — 2500000003 HC RX 250 WO HCPCS: Performed by: INTERNAL MEDICINE

## 2025-02-23 PROCEDURE — 87385 HISTOPLASMA CAPSUL AG IA: CPT

## 2025-02-23 PROCEDURE — 96365 THER/PROPH/DIAG IV INF INIT: CPT

## 2025-02-23 PROCEDURE — 71045 X-RAY EXAM CHEST 1 VIEW: CPT

## 2025-02-23 PROCEDURE — 82803 BLOOD GASES ANY COMBINATION: CPT

## 2025-02-23 PROCEDURE — 99223 1ST HOSP IP/OBS HIGH 75: CPT | Performed by: HOSPITALIST

## 2025-02-23 PROCEDURE — 83880 ASSAY OF NATRIURETIC PEPTIDE: CPT

## 2025-02-23 PROCEDURE — 2580000003 HC RX 258: Performed by: STUDENT IN AN ORGANIZED HEALTH CARE EDUCATION/TRAINING PROGRAM

## 2025-02-23 PROCEDURE — 94640 AIRWAY INHALATION TREATMENT: CPT

## 2025-02-23 PROCEDURE — 2700000000 HC OXYGEN THERAPY PER DAY

## 2025-02-23 PROCEDURE — 2500000003 HC RX 250 WO HCPCS: Performed by: STUDENT IN AN ORGANIZED HEALTH CARE EDUCATION/TRAINING PROGRAM

## 2025-02-23 PROCEDURE — 80048 BASIC METABOLIC PNL TOTAL CA: CPT

## 2025-02-23 PROCEDURE — 6360000002 HC RX W HCPCS: Performed by: INTERNAL MEDICINE

## 2025-02-23 PROCEDURE — 2060000000 HC ICU INTERMEDIATE R&B

## 2025-02-23 PROCEDURE — 6370000000 HC RX 637 (ALT 250 FOR IP): Performed by: STUDENT IN AN ORGANIZED HEALTH CARE EDUCATION/TRAINING PROGRAM

## 2025-02-23 PROCEDURE — 2580000003 HC RX 258: Performed by: INTERNAL MEDICINE

## 2025-02-23 PROCEDURE — 96375 TX/PRO/DX INJ NEW DRUG ADDON: CPT

## 2025-02-23 PROCEDURE — 99285 EMERGENCY DEPT VISIT HI MDM: CPT

## 2025-02-23 PROCEDURE — 87636 SARSCOV2 & INF A&B AMP PRB: CPT

## 2025-02-23 PROCEDURE — 6360000002 HC RX W HCPCS: Performed by: STUDENT IN AN ORGANIZED HEALTH CARE EDUCATION/TRAINING PROGRAM

## 2025-02-23 PROCEDURE — 83605 ASSAY OF LACTIC ACID: CPT

## 2025-02-23 PROCEDURE — 84484 ASSAY OF TROPONIN QUANT: CPT

## 2025-02-23 PROCEDURE — 5A09457 ASSISTANCE WITH RESPIRATORY VENTILATION, 24-96 CONSECUTIVE HOURS, CONTINUOUS POSITIVE AIRWAY PRESSURE: ICD-10-PCS | Performed by: INTERNAL MEDICINE

## 2025-02-23 RX ORDER — SODIUM BICARBONATE 650 MG/1
650 TABLET ORAL 2 TIMES DAILY
Status: DISCONTINUED | OUTPATIENT
Start: 2025-02-23 | End: 2025-02-24

## 2025-02-23 RX ORDER — ENOXAPARIN SODIUM 100 MG/ML
30 INJECTION SUBCUTANEOUS 2 TIMES DAILY
Status: DISCONTINUED | OUTPATIENT
Start: 2025-02-23 | End: 2025-02-23

## 2025-02-23 RX ORDER — ROPINIROLE 0.25 MG/1
0.25 TABLET, FILM COATED ORAL NIGHTLY
Status: DISCONTINUED | OUTPATIENT
Start: 2025-02-23 | End: 2025-03-03 | Stop reason: HOSPADM

## 2025-02-23 RX ORDER — IPRATROPIUM BROMIDE AND ALBUTEROL SULFATE 2.5; .5 MG/3ML; MG/3ML
1 SOLUTION RESPIRATORY (INHALATION)
Status: DISCONTINUED | OUTPATIENT
Start: 2025-02-23 | End: 2025-02-23

## 2025-02-23 RX ORDER — GLUCAGON 1 MG/ML
1 KIT INJECTION PRN
Status: DISCONTINUED | OUTPATIENT
Start: 2025-02-23 | End: 2025-03-03 | Stop reason: HOSPADM

## 2025-02-23 RX ORDER — TRAZODONE HYDROCHLORIDE 50 MG/1
25 TABLET ORAL NIGHTLY
Status: DISCONTINUED | OUTPATIENT
Start: 2025-02-23 | End: 2025-03-03 | Stop reason: HOSPADM

## 2025-02-23 RX ORDER — ARFORMOTEROL TARTRATE 15 UG/2ML
15 SOLUTION RESPIRATORY (INHALATION)
Status: DISCONTINUED | OUTPATIENT
Start: 2025-02-23 | End: 2025-03-03 | Stop reason: HOSPADM

## 2025-02-23 RX ORDER — SODIUM CHLORIDE FOR INHALATION 3 %
4 VIAL, NEBULIZER (ML) INHALATION 3 TIMES DAILY
Status: DISCONTINUED | OUTPATIENT
Start: 2025-02-23 | End: 2025-02-25

## 2025-02-23 RX ORDER — NICOTINE 21 MG/24HR
1 PATCH, TRANSDERMAL 24 HOURS TRANSDERMAL DAILY
Status: DISCONTINUED | OUTPATIENT
Start: 2025-02-23 | End: 2025-02-23

## 2025-02-23 RX ORDER — POTASSIUM CHLORIDE 1500 MG/1
40 TABLET, EXTENDED RELEASE ORAL PRN
Status: DISCONTINUED | OUTPATIENT
Start: 2025-02-23 | End: 2025-03-03 | Stop reason: HOSPADM

## 2025-02-23 RX ORDER — ONDANSETRON 4 MG/1
4 TABLET, ORALLY DISINTEGRATING ORAL EVERY 8 HOURS PRN
Status: DISCONTINUED | OUTPATIENT
Start: 2025-02-23 | End: 2025-03-03 | Stop reason: HOSPADM

## 2025-02-23 RX ORDER — LETROZOLE 2.5 MG/1
2.5 TABLET, FILM COATED ORAL DAILY
Status: DISCONTINUED | OUTPATIENT
Start: 2025-02-24 | End: 2025-03-03 | Stop reason: HOSPADM

## 2025-02-23 RX ORDER — TORSEMIDE 20 MG/1
1 TABLET ORAL DAILY
Status: ON HOLD | COMMUNITY
End: 2025-03-03

## 2025-02-23 RX ORDER — TEMAZEPAM 15 MG/1
15 CAPSULE ORAL NIGHTLY
Status: DISCONTINUED | OUTPATIENT
Start: 2025-02-23 | End: 2025-03-03 | Stop reason: HOSPADM

## 2025-02-23 RX ORDER — SODIUM CHLORIDE 0.9 % (FLUSH) 0.9 %
5-40 SYRINGE (ML) INJECTION PRN
Status: DISCONTINUED | OUTPATIENT
Start: 2025-02-23 | End: 2025-03-03 | Stop reason: HOSPADM

## 2025-02-23 RX ORDER — MINERAL OIL/HYDROPHIL PETROLAT
OINTMENT (GRAM) TOPICAL
COMMUNITY

## 2025-02-23 RX ORDER — MAGNESIUM SULFATE IN WATER 40 MG/ML
2000 INJECTION, SOLUTION INTRAVENOUS PRN
Status: DISCONTINUED | OUTPATIENT
Start: 2025-02-23 | End: 2025-03-03 | Stop reason: HOSPADM

## 2025-02-23 RX ORDER — ALBUTEROL SULFATE 0.83 MG/ML
2.5 SOLUTION RESPIRATORY (INHALATION) ONCE
Status: COMPLETED | OUTPATIENT
Start: 2025-02-23 | End: 2025-02-23

## 2025-02-23 RX ORDER — SODIUM CHLORIDE 0.9 % (FLUSH) 0.9 %
5-40 SYRINGE (ML) INJECTION EVERY 12 HOURS SCHEDULED
Status: DISCONTINUED | OUTPATIENT
Start: 2025-02-23 | End: 2025-03-03 | Stop reason: HOSPADM

## 2025-02-23 RX ORDER — SODIUM CHLORIDE 9 MG/ML
INJECTION, SOLUTION INTRAVENOUS PRN
Status: DISCONTINUED | OUTPATIENT
Start: 2025-02-23 | End: 2025-03-03 | Stop reason: HOSPADM

## 2025-02-23 RX ORDER — ONDANSETRON 2 MG/ML
4 INJECTION INTRAMUSCULAR; INTRAVENOUS EVERY 6 HOURS PRN
Status: DISCONTINUED | OUTPATIENT
Start: 2025-02-23 | End: 2025-03-03 | Stop reason: HOSPADM

## 2025-02-23 RX ORDER — INSULIN LISPRO 100 [IU]/ML
0-4 INJECTION, SOLUTION INTRAVENOUS; SUBCUTANEOUS
Status: DISCONTINUED | OUTPATIENT
Start: 2025-02-23 | End: 2025-02-26

## 2025-02-23 RX ORDER — ACETAMINOPHEN 325 MG/1
650 TABLET ORAL EVERY 6 HOURS PRN
Status: DISCONTINUED | OUTPATIENT
Start: 2025-02-23 | End: 2025-03-03 | Stop reason: HOSPADM

## 2025-02-23 RX ORDER — PANTOPRAZOLE SODIUM 20 MG/1
1 TABLET, DELAYED RELEASE ORAL DAILY
COMMUNITY

## 2025-02-23 RX ORDER — POTASSIUM CHLORIDE 7.45 MG/ML
10 INJECTION INTRAVENOUS PRN
Status: DISCONTINUED | OUTPATIENT
Start: 2025-02-23 | End: 2025-03-03 | Stop reason: HOSPADM

## 2025-02-23 RX ORDER — TORSEMIDE 20 MG/1
20 TABLET ORAL DAILY
Status: DISCONTINUED | OUTPATIENT
Start: 2025-02-23 | End: 2025-02-23

## 2025-02-23 RX ORDER — BUDESONIDE 0.5 MG/2ML
0.5 INHALANT ORAL
Status: DISCONTINUED | OUTPATIENT
Start: 2025-02-23 | End: 2025-03-03 | Stop reason: HOSPADM

## 2025-02-23 RX ORDER — LOSARTAN POTASSIUM 25 MG/1
25 TABLET ORAL DAILY
Status: DISCONTINUED | OUTPATIENT
Start: 2025-02-23 | End: 2025-02-24

## 2025-02-23 RX ORDER — FUROSEMIDE 10 MG/ML
40 INJECTION INTRAMUSCULAR; INTRAVENOUS 2 TIMES DAILY
Status: DISCONTINUED | OUTPATIENT
Start: 2025-02-23 | End: 2025-02-24

## 2025-02-23 RX ORDER — POLYETHYLENE GLYCOL 3350 17 G/17G
17 POWDER, FOR SOLUTION ORAL DAILY PRN
Status: DISCONTINUED | OUTPATIENT
Start: 2025-02-23 | End: 2025-03-03 | Stop reason: HOSPADM

## 2025-02-23 RX ORDER — CARVEDILOL 6.25 MG/1
6.25 TABLET ORAL 2 TIMES DAILY WITH MEALS
Status: DISCONTINUED | OUTPATIENT
Start: 2025-02-23 | End: 2025-03-03 | Stop reason: HOSPADM

## 2025-02-23 RX ORDER — IPRATROPIUM BROMIDE AND ALBUTEROL SULFATE 2.5; .5 MG/3ML; MG/3ML
1 SOLUTION RESPIRATORY (INHALATION) EVERY 6 HOURS PRN
COMMUNITY

## 2025-02-23 RX ORDER — IPRATROPIUM BROMIDE AND ALBUTEROL SULFATE 2.5; .5 MG/3ML; MG/3ML
1 SOLUTION RESPIRATORY (INHALATION) ONCE
Status: COMPLETED | OUTPATIENT
Start: 2025-02-23 | End: 2025-02-23

## 2025-02-23 RX ORDER — NICOTINE 21 MG/24HR
1 PATCH, TRANSDERMAL 24 HOURS TRANSDERMAL DAILY
Status: DISCONTINUED | OUTPATIENT
Start: 2025-02-24 | End: 2025-03-03 | Stop reason: HOSPADM

## 2025-02-23 RX ORDER — IPRATROPIUM BROMIDE AND ALBUTEROL SULFATE 2.5; .5 MG/3ML; MG/3ML
1 SOLUTION RESPIRATORY (INHALATION)
Status: DISCONTINUED | OUTPATIENT
Start: 2025-02-23 | End: 2025-02-28

## 2025-02-23 RX ORDER — DEXTROSE MONOHYDRATE 100 MG/ML
INJECTION, SOLUTION INTRAVENOUS CONTINUOUS PRN
Status: DISCONTINUED | OUTPATIENT
Start: 2025-02-23 | End: 2025-03-03 | Stop reason: HOSPADM

## 2025-02-23 RX ORDER — ACETAMINOPHEN 650 MG/1
650 SUPPOSITORY RECTAL EVERY 6 HOURS PRN
Status: DISCONTINUED | OUTPATIENT
Start: 2025-02-23 | End: 2025-03-03 | Stop reason: HOSPADM

## 2025-02-23 RX ADMIN — SODIUM CHLORIDE 30 MG/ML INHALATION SOLUTION 4 ML: 30 SOLUTION INHALANT at 21:50

## 2025-02-23 RX ADMIN — IPRATROPIUM BROMIDE AND ALBUTEROL SULFATE 1 DOSE: .5; 3 SOLUTION RESPIRATORY (INHALATION) at 21:47

## 2025-02-23 RX ADMIN — SODIUM CHLORIDE, PRESERVATIVE FREE 10 ML: 5 INJECTION INTRAVENOUS at 20:27

## 2025-02-23 RX ADMIN — BUDESONIDE INHALATION 500 MCG: 0.5 SUSPENSION RESPIRATORY (INHALATION) at 21:47

## 2025-02-23 RX ADMIN — APIXABAN 2.5 MG: 5 TABLET, FILM COATED ORAL at 20:23

## 2025-02-23 RX ADMIN — ARFORMOTEROL TARTRATE 15 MCG: 15 SOLUTION RESPIRATORY (INHALATION) at 21:47

## 2025-02-23 RX ADMIN — ROPINIROLE HYDROCHLORIDE 0.25 MG: 0.25 TABLET, FILM COATED ORAL at 20:23

## 2025-02-23 RX ADMIN — WATER 125 MG: 1 INJECTION INTRAMUSCULAR; INTRAVENOUS; SUBCUTANEOUS at 04:41

## 2025-02-23 RX ADMIN — CARVEDILOL 6.25 MG: 6.25 TABLET, FILM COATED ORAL at 17:05

## 2025-02-23 RX ADMIN — INSULIN LISPRO 2 UNITS: 100 INJECTION, SOLUTION INTRAVENOUS; SUBCUTANEOUS at 17:05

## 2025-02-23 RX ADMIN — TRAZODONE HYDROCHLORIDE 25 MG: 50 TABLET ORAL at 20:23

## 2025-02-23 RX ADMIN — IPRATROPIUM BROMIDE AND ALBUTEROL SULFATE 1 DOSE: .5; 2.5 SOLUTION RESPIRATORY (INHALATION) at 04:38

## 2025-02-23 RX ADMIN — AZITHROMYCIN MONOHYDRATE 500 MG: 500 INJECTION, POWDER, LYOPHILIZED, FOR SOLUTION INTRAVENOUS at 04:41

## 2025-02-23 RX ADMIN — ALBUTEROL SULFATE 2.5 MG: 2.5 SOLUTION RESPIRATORY (INHALATION) at 04:38

## 2025-02-23 RX ADMIN — WATER 40 MG: 1 INJECTION INTRAMUSCULAR; INTRAVENOUS; SUBCUTANEOUS at 17:06

## 2025-02-23 RX ADMIN — LOSARTAN POTASSIUM 25 MG: 25 TABLET, FILM COATED ORAL at 17:05

## 2025-02-23 RX ADMIN — SERTRALINE HYDROCHLORIDE 50 MG: 50 TABLET ORAL at 17:05

## 2025-02-23 RX ADMIN — SODIUM BICARBONATE 650 MG: 650 TABLET ORAL at 20:23

## 2025-02-23 RX ADMIN — IPRATROPIUM BROMIDE AND ALBUTEROL SULFATE 1 DOSE: 2.5; .5 SOLUTION RESPIRATORY (INHALATION) at 16:06

## 2025-02-23 RX ADMIN — ACETAMINOPHEN 650 MG: 325 TABLET ORAL at 20:32

## 2025-02-23 RX ADMIN — FUROSEMIDE 40 MG: 10 INJECTION, SOLUTION INTRAMUSCULAR; INTRAVENOUS at 17:07

## 2025-02-23 RX ADMIN — WATER 2000 MG: 1 INJECTION INTRAMUSCULAR; INTRAVENOUS; SUBCUTANEOUS at 07:53

## 2025-02-23 RX ADMIN — TEMAZEPAM 15 MG: 15 CAPSULE ORAL at 20:23

## 2025-02-23 ASSESSMENT — PAIN SCALES - GENERAL
PAINLEVEL_OUTOF10: 0
PAINLEVEL_OUTOF10: 0
PAINLEVEL_OUTOF10: 3

## 2025-02-23 ASSESSMENT — PAIN DESCRIPTION - DESCRIPTORS: DESCRIPTORS: DISCOMFORT;ACHING

## 2025-02-23 ASSESSMENT — PAIN DESCRIPTION - FREQUENCY: FREQUENCY: CONTINUOUS

## 2025-02-23 ASSESSMENT — PAIN - FUNCTIONAL ASSESSMENT
PAIN_FUNCTIONAL_ASSESSMENT: PREVENTS OR INTERFERES SOME ACTIVE ACTIVITIES AND ADLS
PAIN_FUNCTIONAL_ASSESSMENT: NONE - DENIES PAIN

## 2025-02-23 ASSESSMENT — PAIN DESCRIPTION - PAIN TYPE: TYPE: ACUTE PAIN

## 2025-02-23 ASSESSMENT — LIFESTYLE VARIABLES
HOW OFTEN DO YOU HAVE A DRINK CONTAINING ALCOHOL: NEVER
HOW MANY STANDARD DRINKS CONTAINING ALCOHOL DO YOU HAVE ON A TYPICAL DAY: PATIENT DOES NOT DRINK

## 2025-02-23 ASSESSMENT — PAIN DESCRIPTION - ONSET: ONSET: ON-GOING

## 2025-02-23 NOTE — ED NOTES
Patient vaping in room, MD aware. Vape confiscated  and patient was spoken to about hospital policy      Adilia Rothman RN  02/23/25 [phone]
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
## 2025-02-23 NOTE — ED TRIAGE NOTES
Per EMS Pt hax hx of Breast cancer and COPD. Pt received Duoneb at facility and Albuterol en route.

## 2025-02-23 NOTE — ED NOTES
Patient Name: Shoaib Rothman  : 1960 64 y.o.  MRN: [id number]  ED Room #: 2TR/2[facility]-     Chief complaint:   Chief Complaint   Patient presents with    Shortness of Breath     Pt coming from [facility] with complaint of SOB since yesterday.      Hospital Problem/Diagnosis:   Hospital Problems             Last Modified POA    * (Principal) Acute hypoxic on chronic hypercapnic respiratory failure (HCC) 2025 Yes         O2 Flow Rate:O2 Device: PAP (positive airway pressure)   (if applicable)  Cardiac Rhythm:   (if applicable)  Active LDA's:           How does patient ambulate? Stand by assist    2. How does patient take pills? Whole with Water    3. Is patient alert? Alert    4. Is patient oriented? To Person, To Place, To Time, To Situation, and Follows Commands    5.   Patient arrived from:  nursing home  Facility Name: ___________________________________________    6. If patient is disoriented or from a Skill Nursing Facility has family been notified of admission? No. Pt alert and oriented. This RN offered to call pt's family to notify of admission, pt states she can call them herself     7. Patient belongings? Belongings: purse    Disposition of belongings? Kept with Patient     8. Any specific patient or family belongings/needs/dynamics?   a. N/a    9. Miscellaneous comments/pending orders?  a. N/a      If there are any additional questions please reach out to the Emergency Department.      Ricki Urban RN  [phone]

## 2025-02-24 LAB
EST. AVERAGE GLUCOSE BLD GHB EST-MCNC: 145.6 MG/DL
GLUCOSE BLD-MCNC: 112 MG/DL (ref 70–99)
GLUCOSE BLD-MCNC: 139 MG/DL (ref 70–99)
GLUCOSE BLD-MCNC: 195 MG/DL (ref 70–99)
GLUCOSE BLD-MCNC: 287 MG/DL (ref 70–99)
HBA1C MFR BLD: 6.7 %
IGA SERPL-MCNC: 210 MG/DL (ref 70–400)
IGG SERPL-MCNC: 546 MG/DL (ref 700–1600)
IGM SERPL-MCNC: 75.3 MG/DL (ref 40–230)
PERFORMED ON: ABNORMAL
PROCALCITONIN SERPL IA-MCNC: 0.18 NG/ML (ref 0–0.15)

## 2025-02-24 PROCEDURE — 99233 SBSQ HOSP IP/OBS HIGH 50: CPT | Performed by: HOSPITALIST

## 2025-02-24 PROCEDURE — 2580000003 HC RX 258: Performed by: INTERNAL MEDICINE

## 2025-02-24 PROCEDURE — 82784 ASSAY IGA/IGD/IGG/IGM EACH: CPT

## 2025-02-24 PROCEDURE — 6360000002 HC RX W HCPCS: Performed by: INTERNAL MEDICINE

## 2025-02-24 PROCEDURE — 2060000000 HC ICU INTERMEDIATE R&B

## 2025-02-24 PROCEDURE — 6360000002 HC RX W HCPCS: Performed by: HOSPITALIST

## 2025-02-24 PROCEDURE — 6370000000 HC RX 637 (ALT 250 FOR IP): Performed by: NURSE PRACTITIONER

## 2025-02-24 PROCEDURE — 6370000000 HC RX 637 (ALT 250 FOR IP): Performed by: INTERNAL MEDICINE

## 2025-02-24 PROCEDURE — 84145 PROCALCITONIN (PCT): CPT

## 2025-02-24 PROCEDURE — 2500000003 HC RX 250 WO HCPCS: Performed by: INTERNAL MEDICINE

## 2025-02-24 PROCEDURE — 94660 CPAP INITIATION&MGMT: CPT

## 2025-02-24 PROCEDURE — 6370000000 HC RX 637 (ALT 250 FOR IP): Performed by: HOSPITALIST

## 2025-02-24 PROCEDURE — 83036 HEMOGLOBIN GLYCOSYLATED A1C: CPT

## 2025-02-24 PROCEDURE — 94761 N-INVAS EAR/PLS OXIMETRY MLT: CPT

## 2025-02-24 PROCEDURE — 94640 AIRWAY INHALATION TREATMENT: CPT

## 2025-02-24 PROCEDURE — 94669 MECHANICAL CHEST WALL OSCILL: CPT

## 2025-02-24 PROCEDURE — 99233 SBSQ HOSP IP/OBS HIGH 50: CPT | Performed by: INTERNAL MEDICINE

## 2025-02-24 PROCEDURE — 2700000000 HC OXYGEN THERAPY PER DAY

## 2025-02-24 RX ORDER — GUAIFENESIN 600 MG/1
600 TABLET, EXTENDED RELEASE ORAL 2 TIMES DAILY
Status: DISCONTINUED | OUTPATIENT
Start: 2025-02-24 | End: 2025-02-28

## 2025-02-24 RX ORDER — LEVOFLOXACIN 5 MG/ML
750 INJECTION, SOLUTION INTRAVENOUS
Status: COMPLETED | OUTPATIENT
Start: 2025-02-25 | End: 2025-02-27

## 2025-02-24 RX ORDER — ACETYLCYSTEINE 200 MG/ML
600 SOLUTION ORAL; RESPIRATORY (INHALATION)
Status: DISCONTINUED | OUTPATIENT
Start: 2025-02-24 | End: 2025-02-26

## 2025-02-24 RX ORDER — BUMETANIDE 0.25 MG/ML
2 INJECTION, SOLUTION INTRAMUSCULAR; INTRAVENOUS 2 TIMES DAILY
Status: DISCONTINUED | OUTPATIENT
Start: 2025-02-24 | End: 2025-02-24

## 2025-02-24 RX ORDER — METOLAZONE 5 MG/1
5 TABLET ORAL ONCE
Status: COMPLETED | OUTPATIENT
Start: 2025-02-24 | End: 2025-02-24

## 2025-02-24 RX ORDER — BUMETANIDE 0.25 MG/ML
3 INJECTION, SOLUTION INTRAMUSCULAR; INTRAVENOUS 2 TIMES DAILY
Status: DISCONTINUED | OUTPATIENT
Start: 2025-02-24 | End: 2025-02-25

## 2025-02-24 RX ADMIN — SODIUM CHLORIDE 30 MG/ML INHALATION SOLUTION 4 ML: 30 SOLUTION INHALANT at 09:38

## 2025-02-24 RX ADMIN — WATER 40 MG: 1 INJECTION INTRAMUSCULAR; INTRAVENOUS; SUBCUTANEOUS at 04:52

## 2025-02-24 RX ADMIN — SODIUM CHLORIDE, PRESERVATIVE FREE 10 ML: 5 INJECTION INTRAVENOUS at 08:34

## 2025-02-24 RX ADMIN — ACETAMINOPHEN 650 MG: 325 TABLET ORAL at 16:46

## 2025-02-24 RX ADMIN — AZITHROMYCIN MONOHYDRATE 500 MG: 500 INJECTION, POWDER, LYOPHILIZED, FOR SOLUTION INTRAVENOUS at 05:09

## 2025-02-24 RX ADMIN — SODIUM CHLORIDE 30 MG/ML INHALATION SOLUTION 4 ML: 30 SOLUTION INHALANT at 14:11

## 2025-02-24 RX ADMIN — WATER 1000 MG: 1 INJECTION INTRAMUSCULAR; INTRAVENOUS; SUBCUTANEOUS at 06:23

## 2025-02-24 RX ADMIN — APIXABAN 2.5 MG: 5 TABLET, FILM COATED ORAL at 08:30

## 2025-02-24 RX ADMIN — SERTRALINE HYDROCHLORIDE 50 MG: 50 TABLET ORAL at 08:31

## 2025-02-24 RX ADMIN — IPRATROPIUM BROMIDE AND ALBUTEROL SULFATE 1 DOSE: .5; 3 SOLUTION RESPIRATORY (INHALATION) at 17:30

## 2025-02-24 RX ADMIN — BUMETANIDE 3 MG: 0.25 INJECTION INTRAMUSCULAR; INTRAVENOUS at 17:44

## 2025-02-24 RX ADMIN — CARVEDILOL 6.25 MG: 6.25 TABLET, FILM COATED ORAL at 08:35

## 2025-02-24 RX ADMIN — SODIUM CHLORIDE, PRESERVATIVE FREE 10 ML: 5 INJECTION INTRAVENOUS at 20:48

## 2025-02-24 RX ADMIN — IPRATROPIUM BROMIDE AND ALBUTEROL SULFATE 1 DOSE: .5; 3 SOLUTION RESPIRATORY (INHALATION) at 00:24

## 2025-02-24 RX ADMIN — APIXABAN 2.5 MG: 5 TABLET, FILM COATED ORAL at 20:48

## 2025-02-24 RX ADMIN — IPRATROPIUM BROMIDE AND ALBUTEROL SULFATE 1 DOSE: .5; 3 SOLUTION RESPIRATORY (INHALATION) at 21:03

## 2025-02-24 RX ADMIN — IPRATROPIUM BROMIDE AND ALBUTEROL SULFATE 1 DOSE: .5; 3 SOLUTION RESPIRATORY (INHALATION) at 04:32

## 2025-02-24 RX ADMIN — ARFORMOTEROL TARTRATE 15 MCG: 15 SOLUTION RESPIRATORY (INHALATION) at 19:41

## 2025-02-24 RX ADMIN — SODIUM BICARBONATE 650 MG: 650 TABLET ORAL at 08:30

## 2025-02-24 RX ADMIN — TEMAZEPAM 15 MG: 15 CAPSULE ORAL at 20:47

## 2025-02-24 RX ADMIN — TRAZODONE HYDROCHLORIDE 25 MG: 50 TABLET ORAL at 20:48

## 2025-02-24 RX ADMIN — SODIUM CHLORIDE: 0.9 INJECTION, SOLUTION INTRAVENOUS at 05:08

## 2025-02-24 RX ADMIN — CARVEDILOL 6.25 MG: 6.25 TABLET, FILM COATED ORAL at 16:46

## 2025-02-24 RX ADMIN — IPRATROPIUM BROMIDE AND ALBUTEROL SULFATE 1 DOSE: .5; 3 SOLUTION RESPIRATORY (INHALATION) at 14:29

## 2025-02-24 RX ADMIN — INSULIN LISPRO 1 UNITS: 100 INJECTION, SOLUTION INTRAVENOUS; SUBCUTANEOUS at 12:22

## 2025-02-24 RX ADMIN — ACETYLCYSTEINE 600 MG: 200 INHALANT RESPIRATORY (INHALATION) at 19:41

## 2025-02-24 RX ADMIN — BUMETANIDE 2 MG: 0.25 INJECTION INTRAMUSCULAR; INTRAVENOUS at 08:31

## 2025-02-24 RX ADMIN — LETROZOLE 2.5 MG: 2.5 TABLET ORAL at 08:30

## 2025-02-24 RX ADMIN — GUAIFENESIN 600 MG: 600 TABLET ORAL at 20:48

## 2025-02-24 RX ADMIN — BUDESONIDE INHALATION 500 MCG: 0.5 SUSPENSION RESPIRATORY (INHALATION) at 09:38

## 2025-02-24 RX ADMIN — ROPINIROLE HYDROCHLORIDE 0.25 MG: 0.25 TABLET, FILM COATED ORAL at 20:48

## 2025-02-24 RX ADMIN — INSULIN LISPRO 2 UNITS: 100 INJECTION, SOLUTION INTRAVENOUS; SUBCUTANEOUS at 20:47

## 2025-02-24 RX ADMIN — BUMETANIDE 3 MG: 0.25 INJECTION INTRAMUSCULAR; INTRAVENOUS at 10:31

## 2025-02-24 RX ADMIN — WATER 40 MG: 1 INJECTION INTRAMUSCULAR; INTRAVENOUS; SUBCUTANEOUS at 16:46

## 2025-02-24 RX ADMIN — IPRATROPIUM BROMIDE AND ALBUTEROL SULFATE 1 DOSE: .5; 3 SOLUTION RESPIRATORY (INHALATION) at 09:33

## 2025-02-24 RX ADMIN — IPRATROPIUM BROMIDE AND ALBUTEROL SULFATE 1 DOSE: .5; 3 SOLUTION RESPIRATORY (INHALATION) at 19:41

## 2025-02-24 RX ADMIN — BUDESONIDE INHALATION 500 MCG: 0.5 SUSPENSION RESPIRATORY (INHALATION) at 19:41

## 2025-02-24 RX ADMIN — METOLAZONE 5 MG: 5 TABLET ORAL at 01:24

## 2025-02-24 RX ADMIN — ACETAMINOPHEN 650 MG: 325 TABLET ORAL at 10:38

## 2025-02-24 RX ADMIN — METOLAZONE 5 MG: 5 TABLET ORAL at 10:12

## 2025-02-24 RX ADMIN — ARFORMOTEROL TARTRATE 15 MCG: 15 SOLUTION RESPIRATORY (INHALATION) at 09:38

## 2025-02-24 RX ADMIN — GUAIFENESIN 600 MG: 600 TABLET ORAL at 12:22

## 2025-02-24 RX ADMIN — SODIUM CHLORIDE 30 MG/ML INHALATION SOLUTION 4 ML: 30 SOLUTION INHALANT at 19:41

## 2025-02-24 ASSESSMENT — PAIN SCALES - GENERAL
PAINLEVEL_OUTOF10: 0
PAINLEVEL_OUTOF10: 0
PAINLEVEL_OUTOF10: 6
PAINLEVEL_OUTOF10: 7

## 2025-02-24 ASSESSMENT — PAIN DESCRIPTION - DESCRIPTORS
DESCRIPTORS: DISCOMFORT
DESCRIPTORS: DISCOMFORT

## 2025-02-24 ASSESSMENT — PAIN DESCRIPTION - LOCATION
LOCATION: KNEE
LOCATION: BACK

## 2025-02-24 ASSESSMENT — PAIN DESCRIPTION - ORIENTATION
ORIENTATION: LEFT;RIGHT
ORIENTATION: MID;LOWER

## 2025-02-24 NOTE — CONSULTS
Clinical Pharmacy Consult Note  Medication History     Admit Date: 2/23/2025    Pharmacy consulted to verify home medication list by Dr. Rosmery Polanco.    List of current medications patient is taking is complete. Home Medication list in Epic updated to reflect changes noted below.    Source of information: I used the patients assisted living transfer papers.    Patient's home pharmacy: [id number] - [facility] [address] - P [phone] - F [phone]      Changes made to medication list:   Medications removed: (include reason, ex: therapy completed, patient no longer taking, etc.)  Humalog- Not on list  Nicotine patch- Not on list  Prednisone- Not on list  Medications added:   None  Medication doses adjusted:   Duoneb Q6H PRN (not scheduled)  Other notes:   Letrozole was on both her assisted living medication list and dispense report.    Current Outpatient Medications   Medication Instructions    acetaminophen (TYLENOL) 500 mg, Oral, 3 TIMES DAILY    albuterol sulfate HFA (PROVENTIL;VENTOLIN;PROAIR) 108 (90 Base) MCG/ACT inhaler 2 puffs, Inhalation, EVERY 4 HOURS PRN    amLODIPine (NORVASC) 10 mg, Oral, DAILY    apixaban (ELIQUIS) 2.5 MG TABS tablet 1 tablet, Oral, 2 times daily    Calcium Carbonate-Vit D-Min (CALCIUM 600 + MINERALS) 600-200 MG-UNIT TABS 1 caplet, Oral, DAILY    carvedilol (COREG) 6.25 MG tablet 1 tablet, Oral, 2 times daily, Hold for BP < 100/60 or HR < 55    diphenhydrAMINE (BENADRYL) 12.5 mg, Oral, EVERY 6 HOURS PRN, Every 6 hours as needed    hydrOXYzine HCl (ATARAX) 25 mg, Oral, Nightly    ipratropium 0.5 mg-albuterol 2.5 mg (DUONEB) 0.5-2.5 (3) MG/3ML SOLN nebulizer solution 1 vial, Inhalation, EVERY 6 HOURS PRN    letrozole (FEMARA) 2.5 mg, Oral, DAILY    loperamide (IMODIUM) 2 mg, Oral, EVERY 8 HOURS PRN    losartan (COZAAR) 25 mg, Oral, DAILY    melatonin 6 mg, Oral, Nightly    mineral oil-hydrophilic petrolatum (AQUAPHOR) ointment Apply topically to 
Consult received.  Patient seen.   Admitted with Acute respiratory failure.   Fluid overloaded.  IV diuretics.  Discussed with RN at bedside.  Formal consult note to follow.     
Nephrology Consult Note                                                                                                                                                                                                                                                                                                                                                               Office : [phone]     Fax :[phone]    Patient's Name: Shoaib Rothman  11:17 AM  2/24/2025    Reason for Consult:  CKD 3 / volume overload   Requesting Physician:  Lamar Mondragon MD  Chief Complaint:    Chief Complaint   Patient presents with    Shortness of Breath     Pt coming from [facility] with complaint of SOB since yesterday.        Assessment/Plan     # Acute on chronic hypoxic and hypercapnic respiratory failure  - Suspect multifactorial, COPD exacerbation, pneumonia & volume overload   - On abx & diuretics  - BUMEX and Metolazone   - Pulmonary on board    # CHANTELLE on CKD 3b  - Secondary to FSGS and solitary kidney   - Back on steroids (Prednisone 50 mg daily outpatient) for FSGS relapse   - IV Bumex 3 mg BID for diuresis   - Monitor UOP  - Avoid nephrotoxins  - Closely monitor renal labs    # HTN  - BP's acceptable  - Cont home meds and monitor     # Acid- base/ Electrolyte imbalance   - Stop sodium bicarb   - Monitor     # DM 2  - Insulin management per primary        History of Present Ilness:    Shoaib Rothman is a 64 y.o. female with a PMH of FSGS, solitary kidney, CKD 3, DM2, COPD, and CHRISTA, on chronic supplemental O2, who presents with increased SOB. In the ER, patient was placed on a 15L NRB and then BiPAP. CXR revealed a left upper lobe and left lung consolidation. She was admitted for further evaluation and management. We are consulted for CKD 3 management and volume overload.    Interval hx:          Past Medical History:   Diagnosis Date    Asthma     Breast cancer (HCC) 2019    Cancer (HCC)     Breast cancer    
(L/min): 15 L/min    CONSTITUTIONAL:  awake, alert, cooperative, no distress, and appears stated age, on AVAPS  NECK:  Supple, symmetrical, trachea midline, no adenopathy, thyroid symmetric, not enlarged and no tenderness, skin normal  LUNGS:  Decreased breath sounds bilaterally, no wheezing.  CARDIOVASCULAR:  normal S1 and S2, no edema and no JVD  ABDOMEN:  normal bowel sounds, non-distended and no masses palpated, and no tenderness to palpation. No hepatospleenomegaly  LYMPHADENOPATHY:  no axillary or supraclavicular adenopathy. No cervical adnenopathy  PSYCHIATRIC: Oriented to person place and time. No obvious depression or anxiety.  MUSCULOSKELETAL: No obvious misalignment or effusion of the joints. No clubbing, cyanosis of the digits.  SKIN:  normal skin color, texture, turgor and no redness, warmth, or swelling. No palpable nodules    DATA:    Data reviewed include but is not limited to:  CBC with Differential:    Lab Results   Component Value Date/Time    WBC 10.6 02/23/2025 04:35 AM    RBC 3.66 02/23/2025 04:35 AM    HGB 11.2 02/23/2025 04:35 AM    HCT 34.3 02/23/2025 04:35 AM     02/23/2025 04:35 AM    MCV 94.0 02/23/2025 04:35 AM    MCH 30.6 02/23/2025 04:35 AM    MCHC 32.5 02/23/2025 04:35 AM    RDW 16.5 02/23/2025 04:35 AM    BANDSPCT 1 01/19/2025 05:46 AM    METASPCT 3 01/19/2025 05:46 AM    LYMPHOPCT 8.0 02/23/2025 04:35 AM    MONOPCT 3.0 02/23/2025 04:35 AM    MYELOPCT 1 01/21/2025 05:30 AM    EOSPCT 0.0 02/23/2025 04:35 AM    BASOPCT 0.0 02/23/2025 04:35 AM    MONOSABS 0.3 02/23/2025 04:35 AM    LYMPHSABS 0.8 02/23/2025 04:35 AM    EOSABS 0.0 02/23/2025 04:35 AM    BASOSABS 0.0 02/23/2025 04:35 AM     BMP:    Lab Results   Component Value Date/Time     02/23/2025 04:35 AM    K 3.9 02/23/2025 04:35 AM    CL 96 02/23/2025 04:35 AM    CO2 33 02/23/2025 04:35 AM    BUN 63 02/23/2025 04:35 AM    CREATININE 2.2 02/23/2025 04:35 AM    CALCIUM 8.2 02/23/2025 04:35 AM    GFRAA 55 04/19/2022 06:15

## 2025-02-25 ENCOUNTER — APPOINTMENT (OUTPATIENT)
Dept: GENERAL RADIOLOGY | Age: 65
DRG: 139 | End: 2025-02-25
Payer: MEDICAID

## 2025-02-25 LAB
ALBUMIN SERPL-MCNC: 3.3 G/DL (ref 3.4–5)
ANION GAP SERPL CALCULATED.3IONS-SCNC: 8 MMOL/L (ref 3–16)
BASOPHILS # BLD: 0 K/UL (ref 0–0.2)
BASOPHILS NFR BLD: 0 %
BUN SERPL-MCNC: 81 MG/DL (ref 7–20)
CALCIUM SERPL-MCNC: 9.3 MG/DL (ref 8.3–10.6)
CHLORIDE SERPL-SCNC: 92 MMOL/L (ref 99–110)
CO2 SERPL-SCNC: 43 MMOL/L (ref 21–32)
CREAT SERPL-MCNC: 2.2 MG/DL (ref 0.6–1.2)
DEPRECATED RDW RBC AUTO: 16.2 % (ref 12.4–15.4)
EOSINOPHIL # BLD: 0 K/UL (ref 0–0.6)
EOSINOPHIL NFR BLD: 0 %
GFR SERPLBLD CREATININE-BSD FMLA CKD-EPI: 25 ML/MIN/{1.73_M2}
GLUCOSE BLD-MCNC: 101 MG/DL (ref 70–99)
GLUCOSE BLD-MCNC: 140 MG/DL (ref 70–99)
GLUCOSE BLD-MCNC: 203 MG/DL (ref 70–99)
GLUCOSE BLD-MCNC: 332 MG/DL (ref 70–99)
GLUCOSE SERPL-MCNC: 154 MG/DL (ref 70–99)
HCT VFR BLD AUTO: 35.9 % (ref 36–48)
HGB BLD-MCNC: 11.5 G/DL (ref 12–16)
LEGIONELLA AG UR QL: NORMAL
LYMPHOCYTES # BLD: 0.7 K/UL (ref 1–5.1)
LYMPHOCYTES NFR BLD: 7 %
MCH RBC QN AUTO: 30.1 PG (ref 26–34)
MCHC RBC AUTO-ENTMCNC: 32.1 G/DL (ref 31–36)
MCV RBC AUTO: 93.8 FL (ref 80–100)
MONOCYTES # BLD: 0.1 K/UL (ref 0–1.3)
MONOCYTES NFR BLD: 1 %
NEUTROPHILS # BLD: 9 K/UL (ref 1.7–7.7)
NEUTROPHILS NFR BLD: 92 %
PERFORMED ON: ABNORMAL
PHOSPHATE SERPL-MCNC: 4.7 MG/DL (ref 2.5–4.9)
PLATELET # BLD AUTO: 307 K/UL (ref 135–450)
PLATELET BLD QL SMEAR: ADEQUATE
PMV BLD AUTO: 7.3 FL (ref 5–10.5)
POTASSIUM SERPL-SCNC: 4.7 MMOL/L (ref 3.5–5.1)
RBC # BLD AUTO: 3.83 M/UL (ref 4–5.2)
RBC MORPH BLD: NORMAL
S PNEUM AG UR QL: NORMAL
SLIDE REVIEW: ABNORMAL
SODIUM SERPL-SCNC: 143 MMOL/L (ref 136–145)
WBC # BLD AUTO: 9.8 K/UL (ref 4–11)

## 2025-02-25 PROCEDURE — 94669 MECHANICAL CHEST WALL OSCILL: CPT

## 2025-02-25 PROCEDURE — 2700000000 HC OXYGEN THERAPY PER DAY

## 2025-02-25 PROCEDURE — 6370000000 HC RX 637 (ALT 250 FOR IP): Performed by: NURSE PRACTITIONER

## 2025-02-25 PROCEDURE — 6370000000 HC RX 637 (ALT 250 FOR IP): Performed by: INTERNAL MEDICINE

## 2025-02-25 PROCEDURE — 6360000002 HC RX W HCPCS: Performed by: INTERNAL MEDICINE

## 2025-02-25 PROCEDURE — 94761 N-INVAS EAR/PLS OXIMETRY MLT: CPT

## 2025-02-25 PROCEDURE — 94660 CPAP INITIATION&MGMT: CPT

## 2025-02-25 PROCEDURE — 99233 SBSQ HOSP IP/OBS HIGH 50: CPT | Performed by: INTERNAL MEDICINE

## 2025-02-25 PROCEDURE — 80069 RENAL FUNCTION PANEL: CPT

## 2025-02-25 PROCEDURE — 2060000000 HC ICU INTERMEDIATE R&B

## 2025-02-25 PROCEDURE — 82570 ASSAY OF URINE CREATININE: CPT

## 2025-02-25 PROCEDURE — 2500000003 HC RX 250 WO HCPCS: Performed by: INTERNAL MEDICINE

## 2025-02-25 PROCEDURE — 84156 ASSAY OF PROTEIN URINE: CPT

## 2025-02-25 PROCEDURE — 71045 X-RAY EXAM CHEST 1 VIEW: CPT

## 2025-02-25 PROCEDURE — 94640 AIRWAY INHALATION TREATMENT: CPT

## 2025-02-25 PROCEDURE — 6360000002 HC RX W HCPCS: Performed by: HOSPITALIST

## 2025-02-25 PROCEDURE — 2580000003 HC RX 258: Performed by: INTERNAL MEDICINE

## 2025-02-25 PROCEDURE — 85025 COMPLETE CBC W/AUTO DIFF WBC: CPT

## 2025-02-25 PROCEDURE — 6370000000 HC RX 637 (ALT 250 FOR IP)

## 2025-02-25 RX ORDER — ACETAZOLAMIDE 250 MG/1
500 TABLET ORAL ONCE
Status: COMPLETED | OUTPATIENT
Start: 2025-02-25 | End: 2025-02-25

## 2025-02-25 RX ORDER — BUMETANIDE 0.25 MG/ML
2 INJECTION, SOLUTION INTRAMUSCULAR; INTRAVENOUS 2 TIMES DAILY
Status: DISCONTINUED | OUTPATIENT
Start: 2025-02-25 | End: 2025-02-27

## 2025-02-25 RX ORDER — SODIUM CHLORIDE FOR INHALATION 3 %
4 VIAL, NEBULIZER (ML) INHALATION
Status: DISCONTINUED | OUTPATIENT
Start: 2025-02-25 | End: 2025-03-03 | Stop reason: HOSPADM

## 2025-02-25 RX ORDER — HYDROXYZINE HYDROCHLORIDE 25 MG/1
25 TABLET, FILM COATED ORAL 3 TIMES DAILY PRN
Status: DISCONTINUED | OUTPATIENT
Start: 2025-02-25 | End: 2025-03-03 | Stop reason: HOSPADM

## 2025-02-25 RX ADMIN — INSULIN LISPRO 3 UNITS: 100 INJECTION, SOLUTION INTRAVENOUS; SUBCUTANEOUS at 11:58

## 2025-02-25 RX ADMIN — ACETAMINOPHEN 650 MG: 325 TABLET ORAL at 01:30

## 2025-02-25 RX ADMIN — SODIUM CHLORIDE 30 MG/ML INHALATION SOLUTION 4 ML: 30 SOLUTION INHALANT at 08:31

## 2025-02-25 RX ADMIN — IPRATROPIUM BROMIDE AND ALBUTEROL SULFATE 1 DOSE: .5; 3 SOLUTION RESPIRATORY (INHALATION) at 11:47

## 2025-02-25 RX ADMIN — TEMAZEPAM 15 MG: 15 CAPSULE ORAL at 20:45

## 2025-02-25 RX ADMIN — ARFORMOTEROL TARTRATE 15 MCG: 15 SOLUTION RESPIRATORY (INHALATION) at 21:15

## 2025-02-25 RX ADMIN — APIXABAN 2.5 MG: 5 TABLET, FILM COATED ORAL at 09:27

## 2025-02-25 RX ADMIN — SODIUM CHLORIDE 30 MG/ML INHALATION SOLUTION 4 ML: 30 SOLUTION INHALANT at 21:15

## 2025-02-25 RX ADMIN — BUMETANIDE 2 MG: 0.25 INJECTION INTRAMUSCULAR; INTRAVENOUS at 18:39

## 2025-02-25 RX ADMIN — IPRATROPIUM BROMIDE AND ALBUTEROL SULFATE 1 DOSE: .5; 3 SOLUTION RESPIRATORY (INHALATION) at 08:31

## 2025-02-25 RX ADMIN — IPRATROPIUM BROMIDE AND ALBUTEROL SULFATE 1 DOSE: .5; 3 SOLUTION RESPIRATORY (INHALATION) at 21:15

## 2025-02-25 RX ADMIN — ACETYLCYSTEINE 600 MG: 200 INHALANT RESPIRATORY (INHALATION) at 08:31

## 2025-02-25 RX ADMIN — ROPINIROLE HYDROCHLORIDE 0.25 MG: 0.25 TABLET, FILM COATED ORAL at 20:45

## 2025-02-25 RX ADMIN — INSULIN LISPRO 1 UNITS: 100 INJECTION, SOLUTION INTRAVENOUS; SUBCUTANEOUS at 22:40

## 2025-02-25 RX ADMIN — TRAZODONE HYDROCHLORIDE 25 MG: 50 TABLET ORAL at 20:45

## 2025-02-25 RX ADMIN — HYDROXYZINE HYDROCHLORIDE 25 MG: 25 TABLET ORAL at 22:15

## 2025-02-25 RX ADMIN — ACETAMINOPHEN 650 MG: 325 TABLET ORAL at 20:44

## 2025-02-25 RX ADMIN — ACETAMINOPHEN 650 MG: 325 TABLET ORAL at 07:50

## 2025-02-25 RX ADMIN — ACETAZOLAMIDE 500 MG: 250 TABLET ORAL at 11:58

## 2025-02-25 RX ADMIN — SODIUM CHLORIDE, PRESERVATIVE FREE 10 ML: 5 INJECTION INTRAVENOUS at 07:52

## 2025-02-25 RX ADMIN — WATER 40 MG: 1 INJECTION INTRAMUSCULAR; INTRAVENOUS; SUBCUTANEOUS at 05:01

## 2025-02-25 RX ADMIN — BUDESONIDE INHALATION 500 MCG: 0.5 SUSPENSION RESPIRATORY (INHALATION) at 08:31

## 2025-02-25 RX ADMIN — IPRATROPIUM BROMIDE AND ALBUTEROL SULFATE 1 DOSE: .5; 3 SOLUTION RESPIRATORY (INHALATION) at 15:45

## 2025-02-25 RX ADMIN — CARVEDILOL 6.25 MG: 6.25 TABLET, FILM COATED ORAL at 07:50

## 2025-02-25 RX ADMIN — SODIUM CHLORIDE, PRESERVATIVE FREE 10 ML: 5 INJECTION INTRAVENOUS at 22:43

## 2025-02-25 RX ADMIN — SERTRALINE HYDROCHLORIDE 50 MG: 50 TABLET ORAL at 07:50

## 2025-02-25 RX ADMIN — IPRATROPIUM BROMIDE AND ALBUTEROL SULFATE 1 DOSE: .5; 3 SOLUTION RESPIRATORY (INHALATION) at 23:54

## 2025-02-25 RX ADMIN — CARVEDILOL 6.25 MG: 6.25 TABLET, FILM COATED ORAL at 16:09

## 2025-02-25 RX ADMIN — ARFORMOTEROL TARTRATE 15 MCG: 15 SOLUTION RESPIRATORY (INHALATION) at 08:31

## 2025-02-25 RX ADMIN — ACETYLCYSTEINE 600 MG: 200 INHALANT RESPIRATORY (INHALATION) at 21:15

## 2025-02-25 RX ADMIN — LEVOFLOXACIN 750 MG: 5 INJECTION, SOLUTION INTRAVENOUS at 05:23

## 2025-02-25 RX ADMIN — LETROZOLE 2.5 MG: 2.5 TABLET ORAL at 07:50

## 2025-02-25 RX ADMIN — GUAIFENESIN 600 MG: 600 TABLET ORAL at 20:45

## 2025-02-25 RX ADMIN — BUMETANIDE 3 MG: 0.25 INJECTION INTRAMUSCULAR; INTRAVENOUS at 07:50

## 2025-02-25 RX ADMIN — WATER 40 MG: 1 INJECTION INTRAMUSCULAR; INTRAVENOUS; SUBCUTANEOUS at 16:09

## 2025-02-25 RX ADMIN — GUAIFENESIN 600 MG: 600 TABLET ORAL at 07:50

## 2025-02-25 RX ADMIN — BUDESONIDE INHALATION 500 MCG: 0.5 SUSPENSION RESPIRATORY (INHALATION) at 21:15

## 2025-02-25 ASSESSMENT — PAIN DESCRIPTION - PAIN TYPE: TYPE: CHRONIC PAIN

## 2025-02-25 ASSESSMENT — PAIN SCALES - GENERAL
PAINLEVEL_OUTOF10: 0
PAINLEVEL_OUTOF10: 0
PAINLEVEL_OUTOF10: 1
PAINLEVEL_OUTOF10: 3

## 2025-02-25 ASSESSMENT — PAIN - FUNCTIONAL ASSESSMENT
PAIN_FUNCTIONAL_ASSESSMENT: ACTIVITIES ARE NOT PREVENTED

## 2025-02-25 ASSESSMENT — PAIN DESCRIPTION - ORIENTATION
ORIENTATION: LEFT;RIGHT
ORIENTATION: RIGHT;LEFT
ORIENTATION: RIGHT;LEFT

## 2025-02-25 ASSESSMENT — PAIN DESCRIPTION - DESCRIPTORS
DESCRIPTORS: ACHING

## 2025-02-25 ASSESSMENT — PAIN DESCRIPTION - ONSET: ONSET: ON-GOING

## 2025-02-25 ASSESSMENT — PAIN DESCRIPTION - LOCATION
LOCATION: KNEE

## 2025-02-25 ASSESSMENT — PAIN DESCRIPTION - FREQUENCY: FREQUENCY: CONTINUOUS

## 2025-02-26 ENCOUNTER — ANESTHESIA (OUTPATIENT)
Dept: ENDOSCOPY | Age: 65
End: 2025-02-26
Payer: MEDICAID

## 2025-02-26 ENCOUNTER — APPOINTMENT (OUTPATIENT)
Dept: GENERAL RADIOLOGY | Age: 65
DRG: 139 | End: 2025-02-26
Payer: MEDICAID

## 2025-02-26 ENCOUNTER — APPOINTMENT (OUTPATIENT)
Dept: ENDOSCOPY | Age: 65
DRG: 139 | End: 2025-02-26
Attending: INTERNAL MEDICINE
Payer: MEDICAID

## 2025-02-26 ENCOUNTER — ANESTHESIA EVENT (OUTPATIENT)
Dept: ENDOSCOPY | Age: 65
End: 2025-02-26
Payer: MEDICAID

## 2025-02-26 PROBLEM — E87.70 HYPERVOLEMIA: Status: ACTIVE | Noted: 2025-02-26

## 2025-02-26 PROBLEM — E87.8 ELECTROLYTE IMBALANCE: Status: ACTIVE | Noted: 2025-02-26

## 2025-02-26 LAB
ALBUMIN SERPL-MCNC: 3.4 G/DL (ref 3.4–5)
ANION GAP SERPL CALCULATED.3IONS-SCNC: 7 MMOL/L (ref 3–16)
APPEARANCE BRONCH: ABNORMAL
BASOPHILS # BLD: 0 K/UL (ref 0–0.2)
BASOPHILS NFR BLD: 0.2 %
BUN SERPL-MCNC: 86 MG/DL (ref 7–20)
CALCIUM SERPL-MCNC: 9.6 MG/DL (ref 8.3–10.6)
CHLORIDE SERPL-SCNC: 91 MMOL/L (ref 99–110)
CLOT SPEC QL: ABNORMAL
CO2 SERPL-SCNC: 44 MMOL/L (ref 21–32)
COLOR BRONCH: COLORLESS
CREAT SERPL-MCNC: 2.1 MG/DL (ref 0.6–1.2)
CREAT UR-MCNC: 17.5 MG/DL (ref 28–259)
CREAT UR-MCNC: 27 MG/DL (ref 28–259)
DEPRECATED RDW RBC AUTO: 15.6 % (ref 12.4–15.4)
EOSINOPHIL # BLD: 0 K/UL (ref 0–0.6)
EOSINOPHIL NFR BLD: 0 %
GFR SERPLBLD CREATININE-BSD FMLA CKD-EPI: 26 ML/MIN/{1.73_M2}
GLUCOSE BLD-MCNC: 124 MG/DL (ref 70–99)
GLUCOSE BLD-MCNC: 155 MG/DL (ref 70–99)
GLUCOSE BLD-MCNC: 162 MG/DL (ref 70–99)
GLUCOSE SERPL-MCNC: 123 MG/DL (ref 70–99)
HCT VFR BLD AUTO: 35.5 % (ref 36–48)
HGB BLD-MCNC: 11.5 G/DL (ref 12–16)
LYMPHOCYTES # BLD: 0.7 K/UL (ref 1–5.1)
LYMPHOCYTES NFR BLD: 8.9 %
LYMPHOCYTES NFR BRONCH MANUAL: 16 % (ref 5–10)
MACROPHAGES NFR BRONCH MANUAL: 17 % (ref 90–95)
MCH RBC QN AUTO: 30.7 PG (ref 26–34)
MCHC RBC AUTO-ENTMCNC: 32.4 G/DL (ref 31–36)
MCV RBC AUTO: 94.8 FL (ref 80–100)
MONOCYTES # BLD: 0.6 K/UL (ref 0–1.3)
MONOCYTES NFR BLD: 6.7 %
NEUTROPHILS # BLD: 7.1 K/UL (ref 1.7–7.7)
NEUTROPHILS NFR BLD: 84.2 %
NEUTROPHILS NFR BRONCH MANUAL: 65 % (ref 5–10)
NUC CELL # BRONCH MANUAL: 123 /CUMM
PERFORMED ON: ABNORMAL
PHOSPHATE SERPL-MCNC: 6.3 MG/DL (ref 2.5–4.9)
PLATELET # BLD AUTO: 290 K/UL (ref 135–450)
PMV BLD AUTO: 7.3 FL (ref 5–10.5)
POTASSIUM SERPL-SCNC: 4.7 MMOL/L (ref 3.5–5.1)
PROT UR-MCNC: 107 MG/DL
PROT UR-MCNC: 164 MG/DL
PROT/CREAT UR-RTO: 6.1 MG/DL
PROT/CREAT UR-RTO: 6.1 MG/DL
RBC # BLD AUTO: 3.75 M/UL (ref 4–5.2)
RBC # FLD MANUAL: 748 /CUMM
SODIUM SERPL-SCNC: 142 MMOL/L (ref 136–145)
TOTAL CELLS COUNTED BRONCH: 100
VARIANT LYMPHS NFR BRONCH MANUAL: 2 %
WBC # BLD AUTO: 8.4 K/UL (ref 4–11)

## 2025-02-26 PROCEDURE — 0B9K4ZX DRAINAGE OF RIGHT LUNG, PERCUTANEOUS ENDOSCOPIC APPROACH, DIAGNOSTIC: ICD-10-PCS | Performed by: INTERNAL MEDICINE

## 2025-02-26 PROCEDURE — 6360000002 HC RX W HCPCS

## 2025-02-26 PROCEDURE — 2500000003 HC RX 250 WO HCPCS: Performed by: INTERNAL MEDICINE

## 2025-02-26 PROCEDURE — 87102 FUNGUS ISOLATION CULTURE: CPT

## 2025-02-26 PROCEDURE — 80069 RENAL FUNCTION PANEL: CPT

## 2025-02-26 PROCEDURE — 2580000003 HC RX 258: Performed by: INTERNAL MEDICINE

## 2025-02-26 PROCEDURE — 6360000002 HC RX W HCPCS: Performed by: INTERNAL MEDICINE

## 2025-02-26 PROCEDURE — 2500000003 HC RX 250 WO HCPCS

## 2025-02-26 PROCEDURE — 94761 N-INVAS EAR/PLS OXIMETRY MLT: CPT

## 2025-02-26 PROCEDURE — 88305 TISSUE EXAM BY PATHOLOGIST: CPT

## 2025-02-26 PROCEDURE — 31635 BRONCHOSCOPY W/FB REMOVAL: CPT | Performed by: INTERNAL MEDICINE

## 2025-02-26 PROCEDURE — 0B9D8ZX DRAINAGE OF RIGHT MIDDLE LUNG LOBE, VIA NATURAL OR ARTIFICIAL OPENING ENDOSCOPIC, DIAGNOSTIC: ICD-10-PCS | Performed by: INTERNAL MEDICINE

## 2025-02-26 PROCEDURE — 31641 BRONCHOSCOPY TREAT BLOCKAGE: CPT | Performed by: INTERNAL MEDICINE

## 2025-02-26 PROCEDURE — 31645 BRNCHSC W/THER ASPIR 1ST: CPT | Performed by: INTERNAL MEDICINE

## 2025-02-26 PROCEDURE — 87206 SMEAR FLUORESCENT/ACID STAI: CPT

## 2025-02-26 PROCEDURE — 31624 DX BRONCHOSCOPE/LAVAGE: CPT | Performed by: INTERNAL MEDICINE

## 2025-02-26 PROCEDURE — 99233 SBSQ HOSP IP/OBS HIGH 50: CPT | Performed by: INTERNAL MEDICINE

## 2025-02-26 PROCEDURE — 6370000000 HC RX 637 (ALT 250 FOR IP): Performed by: NURSE PRACTITIONER

## 2025-02-26 PROCEDURE — 87633 RESP VIRUS 12-25 TARGETS: CPT

## 2025-02-26 PROCEDURE — 88173 CYTOPATH EVAL FNA REPORT: CPT

## 2025-02-26 PROCEDURE — 71045 X-RAY EXAM CHEST 1 VIEW: CPT

## 2025-02-26 PROCEDURE — 0BC48ZZ EXTIRPATION OF MATTER FROM RIGHT UPPER LOBE BRONCHUS, VIA NATURAL OR ARTIFICIAL OPENING ENDOSCOPIC: ICD-10-PCS | Performed by: INTERNAL MEDICINE

## 2025-02-26 PROCEDURE — 89051 BODY FLUID CELL COUNT: CPT

## 2025-02-26 PROCEDURE — 87116 MYCOBACTERIA CULTURE: CPT

## 2025-02-26 PROCEDURE — 0BH17EZ INSERTION OF ENDOTRACHEAL AIRWAY INTO TRACHEA, VIA NATURAL OR ARTIFICIAL OPENING: ICD-10-PCS | Performed by: INTERNAL MEDICINE

## 2025-02-26 PROCEDURE — 85025 COMPLETE CBC W/AUTO DIFF WBC: CPT

## 2025-02-26 PROCEDURE — 87205 SMEAR GRAM STAIN: CPT

## 2025-02-26 PROCEDURE — 74018 RADEX ABDOMEN 1 VIEW: CPT

## 2025-02-26 PROCEDURE — 87798 DETECT AGENT NOS DNA AMP: CPT

## 2025-02-26 PROCEDURE — 87541 LEGION PNEUMO DNA AMP PROB: CPT

## 2025-02-26 PROCEDURE — 2700000000 HC OXYGEN THERAPY PER DAY

## 2025-02-26 PROCEDURE — 6370000000 HC RX 637 (ALT 250 FOR IP): Performed by: INTERNAL MEDICINE

## 2025-02-26 PROCEDURE — 2000000000 HC ICU R&B

## 2025-02-26 PROCEDURE — 94002 VENT MGMT INPAT INIT DAY: CPT

## 2025-02-26 PROCEDURE — 2580000003 HC RX 258

## 2025-02-26 PROCEDURE — 2720000010 HC SURG SUPPLY STERILE: Performed by: INTERNAL MEDICINE

## 2025-02-26 PROCEDURE — 87305 ASPERGILLUS AG IA: CPT

## 2025-02-26 PROCEDURE — 88112 CYTOPATH CELL ENHANCE TECH: CPT

## 2025-02-26 PROCEDURE — 5A1945Z RESPIRATORY VENTILATION, 24-96 CONSECUTIVE HOURS: ICD-10-PCS | Performed by: INTERNAL MEDICINE

## 2025-02-26 PROCEDURE — 87070 CULTURE OTHR SPECIMN AEROBIC: CPT

## 2025-02-26 PROCEDURE — 94640 AIRWAY INHALATION TREATMENT: CPT

## 2025-02-26 PROCEDURE — 3609010800 HC BRONCHOSCOPY ALVEOLAR LAVAGE: Performed by: INTERNAL MEDICINE

## 2025-02-26 PROCEDURE — 31500 INSERT EMERGENCY AIRWAY: CPT

## 2025-02-26 PROCEDURE — 3700000000 HC ANESTHESIA ATTENDED CARE: Performed by: INTERNAL MEDICINE

## 2025-02-26 PROCEDURE — 3700000001 HC ADD 15 MINUTES (ANESTHESIA): Performed by: INTERNAL MEDICINE

## 2025-02-26 PROCEDURE — 0BC38ZZ EXTIRPATION OF MATTER FROM RIGHT MAIN BRONCHUS, VIA NATURAL OR ARTIFICIAL OPENING ENDOSCOPIC: ICD-10-PCS | Performed by: INTERNAL MEDICINE

## 2025-02-26 RX ORDER — ACETYLCYSTEINE 200 MG/ML
SOLUTION ORAL; RESPIRATORY (INHALATION) PRN
Status: DISCONTINUED | OUTPATIENT
Start: 2025-02-26 | End: 2025-02-26 | Stop reason: ALTCHOICE

## 2025-02-26 RX ORDER — PROPOFOL 10 MG/ML
INJECTION, EMULSION INTRAVENOUS
Status: DISCONTINUED | OUTPATIENT
Start: 2025-02-26 | End: 2025-02-26 | Stop reason: SDUPTHER

## 2025-02-26 RX ORDER — NALOXONE HYDROCHLORIDE 0.4 MG/ML
INJECTION, SOLUTION INTRAMUSCULAR; INTRAVENOUS; SUBCUTANEOUS PRN
Status: DISCONTINUED | OUTPATIENT
Start: 2025-02-26 | End: 2025-02-26 | Stop reason: HOSPADM

## 2025-02-26 RX ORDER — PROCHLORPERAZINE EDISYLATE 5 MG/ML
5 INJECTION INTRAMUSCULAR; INTRAVENOUS
Status: DISCONTINUED | OUTPATIENT
Start: 2025-02-26 | End: 2025-02-26 | Stop reason: HOSPADM

## 2025-02-26 RX ORDER — SODIUM CHLORIDE, SODIUM LACTATE, POTASSIUM CHLORIDE, CALCIUM CHLORIDE 600; 310; 30; 20 MG/100ML; MG/100ML; MG/100ML; MG/100ML
INJECTION, SOLUTION INTRAVENOUS
Status: DISCONTINUED | OUTPATIENT
Start: 2025-02-26 | End: 2025-02-26 | Stop reason: SDUPTHER

## 2025-02-26 RX ORDER — SODIUM CHLORIDE 9 MG/ML
INJECTION, SOLUTION INTRAVENOUS PRN
Status: DISCONTINUED | OUTPATIENT
Start: 2025-02-26 | End: 2025-02-26 | Stop reason: HOSPADM

## 2025-02-26 RX ORDER — HALOPERIDOL 5 MG/ML
1 INJECTION INTRAMUSCULAR
Status: DISCONTINUED | OUTPATIENT
Start: 2025-02-26 | End: 2025-02-26 | Stop reason: HOSPADM

## 2025-02-26 RX ORDER — SODIUM CHLORIDE 0.9 % (FLUSH) 0.9 %
5-40 SYRINGE (ML) INJECTION PRN
Status: DISCONTINUED | OUTPATIENT
Start: 2025-02-26 | End: 2025-02-26 | Stop reason: HOSPADM

## 2025-02-26 RX ORDER — FENTANYL CITRATE 50 UG/ML
25 INJECTION, SOLUTION INTRAMUSCULAR; INTRAVENOUS EVERY 5 MIN PRN
Status: DISCONTINUED | OUTPATIENT
Start: 2025-02-26 | End: 2025-02-26 | Stop reason: HOSPADM

## 2025-02-26 RX ORDER — PROPOFOL 10 MG/ML
5-50 INJECTION, EMULSION INTRAVENOUS CONTINUOUS
Status: DISCONTINUED | OUTPATIENT
Start: 2025-02-26 | End: 2025-02-28 | Stop reason: ALTCHOICE

## 2025-02-26 RX ORDER — PANTOPRAZOLE SODIUM 40 MG/10ML
40 INJECTION, POWDER, LYOPHILIZED, FOR SOLUTION INTRAVENOUS DAILY
Status: DISCONTINUED | OUTPATIENT
Start: 2025-02-26 | End: 2025-02-28

## 2025-02-26 RX ORDER — ROCURONIUM BROMIDE 10 MG/ML
INJECTION, SOLUTION INTRAVENOUS
Status: DISCONTINUED | OUTPATIENT
Start: 2025-02-26 | End: 2025-02-26 | Stop reason: SDUPTHER

## 2025-02-26 RX ORDER — PHENYLEPHRINE HYDROCHLORIDE 10 MG/ML
INJECTION INTRAVENOUS
Status: DISCONTINUED | OUTPATIENT
Start: 2025-02-26 | End: 2025-02-26 | Stop reason: SDUPTHER

## 2025-02-26 RX ORDER — ONDANSETRON 2 MG/ML
INJECTION INTRAMUSCULAR; INTRAVENOUS
Status: DISCONTINUED | OUTPATIENT
Start: 2025-02-26 | End: 2025-02-26 | Stop reason: SDUPTHER

## 2025-02-26 RX ORDER — INSULIN LISPRO 100 [IU]/ML
0-4 INJECTION, SOLUTION INTRAVENOUS; SUBCUTANEOUS EVERY 6 HOURS
Status: DISCONTINUED | OUTPATIENT
Start: 2025-02-26 | End: 2025-03-02

## 2025-02-26 RX ORDER — FENTANYL CITRATE-0.9 % NACL/PF 10 MCG/ML
25-200 PLASTIC BAG, INJECTION (ML) INTRAVENOUS CONTINUOUS
Status: DISCONTINUED | OUTPATIENT
Start: 2025-02-26 | End: 2025-02-28 | Stop reason: ALTCHOICE

## 2025-02-26 RX ORDER — HYDROMORPHONE HYDROCHLORIDE 1 MG/ML
0.5 INJECTION, SOLUTION INTRAMUSCULAR; INTRAVENOUS; SUBCUTANEOUS EVERY 5 MIN PRN
Status: DISCONTINUED | OUTPATIENT
Start: 2025-02-26 | End: 2025-02-26 | Stop reason: HOSPADM

## 2025-02-26 RX ORDER — CASTOR OIL AND BALSAM, PERU 788; 87 MG/G; MG/G
OINTMENT TOPICAL 2 TIMES DAILY
Status: DISCONTINUED | OUTPATIENT
Start: 2025-02-26 | End: 2025-03-03 | Stop reason: HOSPADM

## 2025-02-26 RX ORDER — LIDOCAINE HYDROCHLORIDE 20 MG/ML
INJECTION, SOLUTION INTRAVENOUS
Status: DISCONTINUED | OUTPATIENT
Start: 2025-02-26 | End: 2025-02-26 | Stop reason: SDUPTHER

## 2025-02-26 RX ORDER — SUCCINYLCHOLINE CHLORIDE 20 MG/ML
INJECTION INTRAMUSCULAR; INTRAVENOUS
Status: DISCONTINUED | OUTPATIENT
Start: 2025-02-26 | End: 2025-02-26 | Stop reason: SDUPTHER

## 2025-02-26 RX ORDER — DEXAMETHASONE SODIUM PHOSPHATE 4 MG/ML
INJECTION, SOLUTION INTRA-ARTICULAR; INTRALESIONAL; INTRAMUSCULAR; INTRAVENOUS; SOFT TISSUE
Status: DISCONTINUED | OUTPATIENT
Start: 2025-02-26 | End: 2025-02-26 | Stop reason: SDUPTHER

## 2025-02-26 RX ORDER — SODIUM CHLORIDE 0.9 % (FLUSH) 0.9 %
5-40 SYRINGE (ML) INJECTION EVERY 12 HOURS SCHEDULED
Status: DISCONTINUED | OUTPATIENT
Start: 2025-02-26 | End: 2025-02-26 | Stop reason: HOSPADM

## 2025-02-26 RX ADMIN — ONDANSETRON 4 MG: 2 INJECTION INTRAMUSCULAR; INTRAVENOUS at 11:26

## 2025-02-26 RX ADMIN — PHENYLEPHRINE HYDROCHLORIDE 100 MCG: 10 INJECTION INTRAVENOUS at 12:00

## 2025-02-26 RX ADMIN — WATER 40 MG: 1 INJECTION INTRAMUSCULAR; INTRAVENOUS; SUBCUTANEOUS at 05:21

## 2025-02-26 RX ADMIN — Medication 25 MCG/HR: at 18:29

## 2025-02-26 RX ADMIN — PHENYLEPHRINE HYDROCHLORIDE 100 MCG: 10 INJECTION INTRAVENOUS at 11:40

## 2025-02-26 RX ADMIN — TEMAZEPAM 15 MG: 15 CAPSULE ORAL at 21:50

## 2025-02-26 RX ADMIN — PROPOFOL 40 MCG/KG/MIN: 10 INJECTION, EMULSION INTRAVENOUS at 12:55

## 2025-02-26 RX ADMIN — BUDESONIDE INHALATION 500 MCG: 0.5 SUSPENSION RESPIRATORY (INHALATION) at 20:31

## 2025-02-26 RX ADMIN — PHENYLEPHRINE HYDROCHLORIDE 100 MCG: 10 INJECTION INTRAVENOUS at 12:07

## 2025-02-26 RX ADMIN — ACETYLCYSTEINE 600 MG: 200 INHALANT RESPIRATORY (INHALATION) at 08:25

## 2025-02-26 RX ADMIN — SUGAMMADEX 200 MG: 100 INJECTION, SOLUTION INTRAVENOUS at 12:26

## 2025-02-26 RX ADMIN — PHENYLEPHRINE HYDROCHLORIDE 100 MCG: 10 INJECTION INTRAVENOUS at 12:10

## 2025-02-26 RX ADMIN — PANTOPRAZOLE SODIUM 40 MG: 40 INJECTION, POWDER, FOR SOLUTION INTRAVENOUS at 16:58

## 2025-02-26 RX ADMIN — SODIUM CHLORIDE, SODIUM LACTATE, POTASSIUM CHLORIDE, AND CALCIUM CHLORIDE: .6; .31; .03; .02 INJECTION, SOLUTION INTRAVENOUS at 11:12

## 2025-02-26 RX ADMIN — ROCURONIUM BROMIDE 20 MG: 10 INJECTION, SOLUTION INTRAVENOUS at 12:06

## 2025-02-26 RX ADMIN — IPRATROPIUM BROMIDE AND ALBUTEROL SULFATE 1 DOSE: .5; 3 SOLUTION RESPIRATORY (INHALATION) at 20:30

## 2025-02-26 RX ADMIN — SUCCINYLCHOLINE CHLORIDE 140 MG: 20 INJECTION, SOLUTION INTRAMUSCULAR; INTRAVENOUS at 11:20

## 2025-02-26 RX ADMIN — BUDESONIDE INHALATION 500 MCG: 0.5 SUSPENSION RESPIRATORY (INHALATION) at 08:25

## 2025-02-26 RX ADMIN — SERTRALINE HYDROCHLORIDE 50 MG: 50 TABLET ORAL at 08:00

## 2025-02-26 RX ADMIN — PHENYLEPHRINE HYDROCHLORIDE 100 MCG: 10 INJECTION INTRAVENOUS at 12:16

## 2025-02-26 RX ADMIN — PROPOFOL 25 MG: 10 INJECTION, EMULSION INTRAVENOUS at 17:11

## 2025-02-26 RX ADMIN — ARFORMOTEROL TARTRATE 15 MCG: 15 SOLUTION RESPIRATORY (INHALATION) at 08:25

## 2025-02-26 RX ADMIN — DEXAMETHASONE SODIUM PHOSPHATE 4 MG: 4 INJECTION INTRA-ARTICULAR; INTRALESIONAL; INTRAMUSCULAR; INTRAVENOUS; SOFT TISSUE at 11:26

## 2025-02-26 RX ADMIN — PHENYLEPHRINE HYDROCHLORIDE 200 MCG: 10 INJECTION INTRAVENOUS at 11:30

## 2025-02-26 RX ADMIN — IPRATROPIUM BROMIDE AND ALBUTEROL SULFATE 1 DOSE: .5; 3 SOLUTION RESPIRATORY (INHALATION) at 03:42

## 2025-02-26 RX ADMIN — WATER 40 MG: 1 INJECTION INTRAMUSCULAR; INTRAVENOUS; SUBCUTANEOUS at 16:59

## 2025-02-26 RX ADMIN — LIDOCAINE HYDROCHLORIDE 100 MG: 20 INJECTION, SOLUTION INTRAVENOUS at 11:20

## 2025-02-26 RX ADMIN — PROPOFOL 40 MCG/KG/MIN: 10 INJECTION, EMULSION INTRAVENOUS at 22:40

## 2025-02-26 RX ADMIN — PHENYLEPHRINE HYDROCHLORIDE 200 MCG: 10 INJECTION INTRAVENOUS at 12:45

## 2025-02-26 RX ADMIN — GUAIFENESIN 600 MG: 600 TABLET ORAL at 08:00

## 2025-02-26 RX ADMIN — ARFORMOTEROL TARTRATE 15 MCG: 15 SOLUTION RESPIRATORY (INHALATION) at 20:30

## 2025-02-26 RX ADMIN — PHENYLEPHRINE HYDROCHLORIDE 100 MCG: 10 INJECTION INTRAVENOUS at 11:26

## 2025-02-26 RX ADMIN — PROPOFOL 160 MG: 10 INJECTION, EMULSION INTRAVENOUS at 11:20

## 2025-02-26 RX ADMIN — PROPOFOL 50 MCG/KG/MIN: 10 INJECTION, EMULSION INTRAVENOUS at 13:52

## 2025-02-26 RX ADMIN — SODIUM CHLORIDE 30 MG/ML INHALATION SOLUTION 4 ML: 30 SOLUTION INHALANT at 08:26

## 2025-02-26 RX ADMIN — PHENYLEPHRINE HYDROCHLORIDE 100 MCG: 10 INJECTION INTRAVENOUS at 11:27

## 2025-02-26 RX ADMIN — PHENYLEPHRINE HYDROCHLORIDE 100 MCG: 10 INJECTION INTRAVENOUS at 11:42

## 2025-02-26 RX ADMIN — SODIUM CHLORIDE 30 MG/ML INHALATION SOLUTION 4 ML: 30 SOLUTION INHALANT at 20:31

## 2025-02-26 RX ADMIN — TRAZODONE HYDROCHLORIDE 25 MG: 50 TABLET ORAL at 21:49

## 2025-02-26 RX ADMIN — SODIUM CHLORIDE, PRESERVATIVE FREE 10 ML: 5 INJECTION INTRAVENOUS at 07:58

## 2025-02-26 RX ADMIN — ROPINIROLE HYDROCHLORIDE 0.25 MG: 0.25 TABLET, FILM COATED ORAL at 21:49

## 2025-02-26 RX ADMIN — CARVEDILOL 6.25 MG: 6.25 TABLET, FILM COATED ORAL at 08:00

## 2025-02-26 RX ADMIN — LETROZOLE 2.5 MG: 2.5 TABLET ORAL at 08:11

## 2025-02-26 RX ADMIN — IPRATROPIUM BROMIDE AND ALBUTEROL SULFATE 1 DOSE: .5; 3 SOLUTION RESPIRATORY (INHALATION) at 17:21

## 2025-02-26 RX ADMIN — IPRATROPIUM BROMIDE AND ALBUTEROL SULFATE 1 DOSE: .5; 3 SOLUTION RESPIRATORY (INHALATION) at 08:25

## 2025-02-26 RX ADMIN — PROPOFOL 160 MG: 10 INJECTION, EMULSION INTRAVENOUS at 12:38

## 2025-02-26 RX ADMIN — PHENYLEPHRINE HYDROCHLORIDE 100 MCG: 10 INJECTION INTRAVENOUS at 11:58

## 2025-02-26 RX ADMIN — BUMETANIDE 2 MG: 0.25 INJECTION INTRAMUSCULAR; INTRAVENOUS at 18:32

## 2025-02-26 RX ADMIN — PROPOFOL 45 MCG/KG/MIN: 10 INJECTION, EMULSION INTRAVENOUS at 18:33

## 2025-02-26 RX ADMIN — SUCCINYLCHOLINE CHLORIDE 140 MG: 20 INJECTION, SOLUTION INTRAMUSCULAR; INTRAVENOUS at 12:38

## 2025-02-26 RX ADMIN — SODIUM CHLORIDE, PRESERVATIVE FREE 10 ML: 5 INJECTION INTRAVENOUS at 21:51

## 2025-02-26 RX ADMIN — BUMETANIDE 2 MG: 0.25 INJECTION INTRAMUSCULAR; INTRAVENOUS at 07:59

## 2025-02-26 RX ADMIN — PHENYLEPHRINE HYDROCHLORIDE 200 MCG: 10 INJECTION INTRAVENOUS at 13:12

## 2025-02-26 RX ADMIN — PROPOFOL 50 MCG/KG/MIN: 10 INJECTION, EMULSION INTRAVENOUS at 15:29

## 2025-02-26 RX ADMIN — GUAIFENESIN 600 MG: 600 TABLET ORAL at 21:49

## 2025-02-26 RX ADMIN — ROCURONIUM BROMIDE 30 MG: 10 INJECTION, SOLUTION INTRAVENOUS at 11:35

## 2025-02-26 ASSESSMENT — PAIN SCALES - GENERAL
PAINLEVEL_OUTOF10: 0
PAINLEVEL_OUTOF10: 4
PAINLEVEL_OUTOF10: 0

## 2025-02-26 ASSESSMENT — PULMONARY FUNCTION TESTS
PIF_VALUE: 26
PIF_VALUE: 26
PIF_VALUE: 22
PIF_VALUE: 22
PIF_VALUE: 25
PIF_VALUE: 22
PIF_VALUE: 23
PIF_VALUE: 22
PIF_VALUE: 23
PIF_VALUE: 25
PIF_VALUE: 24
PIF_VALUE: 23
PIF_VALUE: 26
PIF_VALUE: 26
PIF_VALUE: 27
PIF_VALUE: 24
PIF_VALUE: 27
PIF_VALUE: 23
PIF_VALUE: 25
PIF_VALUE: 26
PIF_VALUE: 24
PIF_VALUE: 26
PIF_VALUE: 26
PIF_VALUE: 28
PIF_VALUE: 22
PIF_VALUE: 25
PIF_VALUE: 26
PIF_VALUE: 22
PIF_VALUE: 25
PIF_VALUE: 26
PIF_VALUE: 26
PIF_VALUE: 22
PIF_VALUE: 24

## 2025-02-26 ASSESSMENT — PAIN - FUNCTIONAL ASSESSMENT: PAIN_FUNCTIONAL_ASSESSMENT: 0-10

## 2025-02-26 NOTE — ANESTHESIA PRE PROCEDURE
Department of Anesthesiology  Preprocedure Note       Name:  Shoaib Rothman   Age:  64 y.o.  :  1960                                          MRN:  [id number]         Date:  2025      Surgeon: Surgeon(s):  Maximo Tyler MD    Procedure: Procedure(s):  BRONCHOSCOPY ALVEOLAR LAVAGE    Medications prior to admission:   Prior to Admission medications    Medication Sig Start Date End Date Taking? Authorizing Provider   apixaban (ELIQUIS) 2.5 MG TABS tablet Take 1 tablet by mouth in the morning and at bedtime   Yes Chago Mckeon MD   mineral oil-hydrophilic petrolatum (AQUAPHOR) ointment Apply topically to face every 6 hours as needed for dry skin.   Yes Chago Mckeon MD   Calcium Carbonate-Vit D-Min (CALCIUM 600 + MINERALS) 600-200 MG-UNIT TABS Take 1 caplet by mouth daily   Yes Chago Mckeon MD   ipratropium 0.5 mg-albuterol 2.5 mg (DUONEB) 0.5-2.5 (3) MG/3ML SOLN nebulizer solution Inhale 3 mLs into the lungs every 6 hours as needed for Shortness of Breath   Yes ProviderChago MD   pantoprazole (PROTONIX) 20 MG tablet Take 1 tablet by mouth daily   Yes ProviderChago MD   torsemide (DEMADEX) 20 MG tablet Take 1 tablet by mouth daily   Yes ProviderChago MD   losartan (COZAAR) 25 MG tablet Take 1 tablet by mouth daily 25  Yes Lm Lockett MD   amLODIPine (NORVASC) 10 MG tablet Take 1 tablet by mouth daily 24  Yes Kacey Joe APRN - CNP   sodium zirconium cyclosilicate (LOKELMA) 10 g PACK oral suspension Take 1 packet by mouth daily 24  Yes Lm Lockett MD   sodium bicarbonate 650 MG tablet Take 1 tablet by mouth 2 times daily 24 Yes Lm Lockett MD   carvedilol (COREG) 6.25 MG tablet Take 1 tablet by mouth in the morning and at bedtime Hold for BP < 100/60 or HR < 55 23  Yes Chago Mckeon MD   TRADJENTA 5 MG tablet Take 1 tablet by mouth daily 24  Yes Chago Mckeon MD   rOPINIRole

## 2025-02-26 NOTE — PROCEDURES
remainder of the case.  The appearance of the mucous was unusual compared to other mucous plugs that I have cleared and I sent the bulk of the mucus cleared from the right upper lobe to pathology for analysis for foreign body material.      Main bertrand with occlusion of mainstem bronchus on the right.      Occlusion of the right mainstem bronchus.        Occlusion of the bronchus intermedius after clearance of the right upper lobe bronchus      Clearance of the right middle lobe bronchus with persistent occlusion of the right lower lobe bronchus        Right middle and right lower lobe bronchi status post clearance      View from right mainstem bronchus after removal of mucous plugs.      Residual mucous plugs from right middle and right lower lobe bronchi.    After clearing the mucus from the airways I did perform a BAL in the right middle lobe.  Despite the 10 of PEEP I asked anesthesia to set the ventilator to, her airway was very collapsible and while I instilled 100 mL of saline into the right middle lobe I was only able to return about 15 mL.  I had obtained washings and biopsies of the mucous and airway secretions as well so I sent studies on the washings, lavage and pathology on foreign body removal.    The patient tolerated the procedure well.  However post procedure she was unable to tolerate extubation due to hypoxia and shallow respirations.  She required reintubation and will be brought to ICU for further management.    Estimated blood loss:  Minimal    FOLLOW UP:  Cultures, and cytology sent on wash and lavage.      Maximo Tyler MD

## 2025-02-26 NOTE — RT PROTOCOL NOTE
RT Inhaler-Nebulizer Bronchodilator Protocol Note    There is a bronchodilator order in the chart from a provider indicating to follow the RT Bronchodilator Protocol and there is an “Initiate RT Inhaler-Nebulizer Bronchodilator Protocol” order as well (see protocol at bottom of note).    CXR Findings:  XR CHEST PORTABLE    Result Date: 2/23/2025  New consolidation in the left upper lobe concerning for pneumonia. New density in the left lung base may represent consolidation or pleural effusion. PA and lateral chest x-ray would be helpful for further evaluation. Hyperlucency in the right upper lobe compatible with known emphysema. Cardiomegaly. Metallic fragments again noted overlying the left chest. Electronically signed by Armando Li MD      The findings from the last RT Protocol Assessment were as follows:   History Pulmonary Disease: Chronic pulmonary disease  Respiratory Pattern: Use of accessory muscles, prolonged exhalation, or RR 26-30 bpm  Breath Sounds: Inspiratory and expiratory or bilateral wheezing and/or rhonchi  Cough: Strong, spontaneous, non-productive  Indication for Bronchodilator Therapy: Wheezing associated with pulm disorder  Bronchodilator Assessment Score: 14    Aerosolized bronchodilator medication orders have been revised according to the RT Inhaler-Nebulizer Bronchodilator Protocol below.    Respiratory Therapist to perform RT Therapy Protocol Assessment initially then follow the protocol.  Repeat RT Therapy Protocol Assessment PRN for score 0-3 or on second treatment, BID, and PRN for scores above 3.    No Indications - adjust the frequency to every 6 hours PRN wheezing or bronchospasm, if no treatments needed after 48 hours then discontinue using Per Protocol order mode.     If indication present, adjust the RT bronchodilator orders based on the Bronchodilator Assessment Score as indicated below.  Use Inhaler orders unless patient has one or more of the following: on home nebulizer, 
RT Inhaler-Nebulizer Bronchodilator Protocol Note    There is a bronchodilator order in the chart from a provider indicating to follow the RT Bronchodilator Protocol and there is an “Initiate RT Inhaler-Nebulizer Bronchodilator Protocol” order as well (see protocol at bottom of note).    CXR Findings:  XR CHEST PORTABLE    Result Date: 2/23/2025  New consolidation in the left upper lobe concerning for pneumonia. New density in the left lung base may represent consolidation or pleural effusion. PA and lateral chest x-ray would be helpful for further evaluation. Hyperlucency in the right upper lobe compatible with known emphysema. Cardiomegaly. Metallic fragments again noted overlying the left chest. Electronically signed by Armando Li MD      The findings from the last RT Protocol Assessment were as follows:   History Pulmonary Disease: Chronic pulmonary disease  Respiratory Pattern: Use of accessory muscles, prolonged exhalation, or RR 26-30 bpm  Breath Sounds: Slightly diminished and/or crackles  Cough: Strong, spontaneous, non-productive  Indication for Bronchodilator Therapy: Wheezing associated with pulm disorder, Decreased or absent breath sounds  Bronchodilator Assessment Score: 10    Aerosolized bronchodilator medication orders have been revised according to the RT Inhaler-Nebulizer Bronchodilator Protocol below.    Respiratory Therapist to perform RT Therapy Protocol Assessment initially then follow the protocol.  Repeat RT Therapy Protocol Assessment PRN for score 0-3 or on second treatment, BID, and PRN for scores above 3.    No Indications - adjust the frequency to every 6 hours PRN wheezing or bronchospasm, if no treatments needed after 48 hours then discontinue using Per Protocol order mode.     If indication present, adjust the RT bronchodilator orders based on the Bronchodilator Assessment Score as indicated below.  Use Inhaler orders unless patient has one or more of the following: on home 
Bronchodilator order with Frequency of every 4 hours PRN for wheezing or increased work of breathing using Per Protocol order mode.        4-6 - enter or revise RT Bronchodilator order(s) to two equivalent RT bronchodilator orders with one order with BID Frequency and one order with Frequency of every 4 hours PRN wheezing or increased work of breathing using Per Protocol order mode.        7-10 - enter or revise RT Bronchodilator order(s) to two equivalent RT bronchodilator orders with one order with TID Frequency and one order with Frequency of every 4 hours PRN wheezing or increased work of breathing using Per Protocol order mode.       11-13 - enter or revise RT Bronchodilator order(s) to one equivalent RT bronchodilator order with QID Frequency and an Albuterol order with Frequency of every 4 hours PRN wheezing or increased work of breathing using Per Protocol order mode.      Greater than 13 - enter or revise RT Bronchodilator order(s) to one equivalent RT bronchodilator order with every 4 hours Frequency and an Albuterol order with Frequency of every 2 hours PRN wheezing or increased work of breathing using Per Protocol order mode.     RT to enter RT Home Evaluation for COPD & MDI Assessment order using Per Protocol order mode.    Electronically signed by Shanna Pedroza RCP on 2/25/2025 at 8:51 AM  
order(s) to two equivalent RT bronchodilator orders with one order with BID Frequency and one order with Frequency of every 4 hours PRN wheezing or increased work of breathing using Per Protocol order mode.         7-10 - enter or revise RT Bronchodilator order(s) to two equivalent RT bronchodilator orders with one order with TID Frequency and one order with Frequency of every 4 hours PRN wheezing or increased work of breathing using Per Protocol order mode.       11-13 - enter or revise RT Bronchodilator order(s) to one equivalent RT bronchodilator order with QID Frequency and an Albuterol order with Frequency of every 4 hours PRN wheezing or increased work of breathing using Per Protocol order mode.      Greater than 13 - enter or revise RT Bronchodilator order(s) to one equivalent RT bronchodilator order with every 4 hours Frequency and an Albuterol order with Frequency of every 2 hours PRN wheezing or increased work of breathing using Per Protocol order mode.     RT to enter RT Home Evaluation for COPD & MDI Assessment order using Per Protocol order mode.    Electronically signed by Madyson Alex RCP on 2/26/2025 at 8:30 AM

## 2025-02-26 NOTE — ANESTHESIA POSTPROCEDURE EVALUATION
Department of Anesthesiology  Postprocedure Note    Patient: Shoaib Rothman  MRN: [id number]  YOB: 1960  Date of evaluation: 2/26/2025    Procedure Summary       Date: 02/26/25 Room / Location: [place] [id number] / [place]    Anesthesia Start: 1112 Anesthesia Stop: 1330    Procedure: BRONCHOSCOPY ALVEOLAR LAVAGE Diagnosis:       Acute respiratory failure with hypoxia and hypercapnia (HCC)      (Acute respiratory failure with hypoxia and hypercapnia (HCC) [J96.01, J96.02])    Surgeons: Maximo Tyler MD Responsible Provider: Porfirio Benavides MD    Anesthesia Type: general ASA Status: 3            Anesthesia Type: No value filed.    Carole Phase I: Carole Score: 10    Carole Phase II:      Anesthesia Post Evaluation    Patient location during evaluation: ICU  Patient participation: complete - patient participated  Level of consciousness: responsive to light touch  Pain score: 2  Airway patency: patent (intubated)  Cardiovascular status: blood pressure returned to baseline  Respiratory status: acceptable, ventilator and intubated  Hydration status: euvolemic  Comments: Pt reintubated for hypoxia after extubation   Likely reexpansion pulmonary edema vs atelectasis due do loss of surfactant  Pain management: adequate    No notable events documented.

## 2025-02-27 ENCOUNTER — APPOINTMENT (OUTPATIENT)
Dept: GENERAL RADIOLOGY | Age: 65
DRG: 139 | End: 2025-02-27
Payer: MEDICAID

## 2025-02-27 LAB
ACID FAST STN SPEC QL: NORMAL
ACID FAST STN SPEC QL: NORMAL
ALBUMIN SERPL-MCNC: 2.8 G/DL (ref 3.4–5)
ANION GAP SERPL CALCULATED.3IONS-SCNC: 13 MMOL/L (ref 3–16)
BASE EXCESS BLDA CALC-SCNC: 18 MMOL/L (ref -3–3)
BASOPHILS # BLD: 0 K/UL (ref 0–0.2)
BASOPHILS NFR BLD: 0 %
BUN SERPL-MCNC: 91 MG/DL (ref 7–20)
CA-I BLD-SCNC: 1.13 MMOL/L (ref 1.12–1.32)
CALCIUM SERPL-MCNC: 9.1 MG/DL (ref 8.3–10.6)
CHLORIDE SERPL-SCNC: 90 MMOL/L (ref 99–110)
CO2 BLDA-SCNC: 44 MMOL/L
CO2 SERPL-SCNC: 35 MMOL/L (ref 21–32)
CREAT SERPL-MCNC: 2.2 MG/DL (ref 0.6–1.2)
DEPRECATED RDW RBC AUTO: 16.6 % (ref 12.4–15.4)
EOSINOPHIL # BLD: 0.1 K/UL (ref 0–0.6)
EOSINOPHIL NFR BLD: 1 %
GFR SERPLBLD CREATININE-BSD FMLA CKD-EPI: 25 ML/MIN/{1.73_M2}
GLUCOSE BLD-MCNC: 137 MG/DL (ref 70–99)
GLUCOSE BLD-MCNC: 138 MG/DL (ref 70–99)
GLUCOSE BLD-MCNC: 152 MG/DL (ref 70–99)
GLUCOSE BLD-MCNC: 158 MG/DL (ref 70–99)
GLUCOSE BLD-MCNC: 162 MG/DL (ref 70–99)
GLUCOSE SERPL-MCNC: 121 MG/DL (ref 70–99)
H CAPSUL AG UR IA-ACNC: NOT DETECTED NG/ML
H CAPSUL AG UR QL IA: NOT DETECTED
HCO3 BLDA-SCNC: 42.4 MMOL/L (ref 21–29)
HCT VFR BLD AUTO: 37.1 % (ref 36–48)
HGB BLD-MCNC: 11.7 G/DL (ref 12–16)
LACTATE BLD-SCNC: 1.62 MMOL/L (ref 0.4–2)
LOEFFLER MB STN SPEC: NORMAL
LYMPHOCYTES # BLD: 0.9 K/UL (ref 1–5.1)
LYMPHOCYTES NFR BLD: 10.8 %
MCH RBC QN AUTO: 30.9 PG (ref 26–34)
MCHC RBC AUTO-ENTMCNC: 31.7 G/DL (ref 31–36)
MCV RBC AUTO: 97.5 FL (ref 80–100)
MONOCYTES # BLD: 0.8 K/UL (ref 0–1.3)
MONOCYTES NFR BLD: 9.3 %
NEUTROPHILS # BLD: 6.7 K/UL (ref 1.7–7.7)
NEUTROPHILS NFR BLD: 78.9 %
PCO2 BLDA: 64.9 MM HG (ref 35–45)
PERFORMED ON: ABNORMAL
PH BLDA: 7.42 [PH] (ref 7.35–7.45)
PHOSPHATE SERPL-MCNC: 6.3 MG/DL (ref 2.5–4.9)
PLATELET # BLD AUTO: 255 K/UL (ref 135–450)
PMV BLD AUTO: 6.9 FL (ref 5–10.5)
PO2 BLDA: 70.4 MM HG (ref 75–108)
POC SAMPLE TYPE: ABNORMAL
POTASSIUM BLD-SCNC: 4.8 MMOL/L (ref 3.5–5.1)
POTASSIUM SERPL-SCNC: 4.9 MMOL/L (ref 3.5–5.1)
POTASSIUM SERPL-SCNC: ABNORMAL MMOL/L (ref 3.5–5.1)
RBC # BLD AUTO: 3.8 M/UL (ref 4–5.2)
REPORT: NORMAL
RESP PATH DNA+RNA PNL L RESP NAA+NON-PRB: NORMAL
SAO2 % BLDA: 93 % (ref 93–100)
SODIUM BLD-SCNC: 139 MMOL/L (ref 136–145)
SODIUM SERPL-SCNC: 138 MMOL/L (ref 136–145)
WBC # BLD AUTO: 8.6 K/UL (ref 4–11)

## 2025-02-27 PROCEDURE — 99291 CRITICAL CARE FIRST HOUR: CPT | Performed by: INTERNAL MEDICINE

## 2025-02-27 PROCEDURE — 82947 ASSAY GLUCOSE BLOOD QUANT: CPT

## 2025-02-27 PROCEDURE — 94761 N-INVAS EAR/PLS OXIMETRY MLT: CPT

## 2025-02-27 PROCEDURE — 84295 ASSAY OF SERUM SODIUM: CPT

## 2025-02-27 PROCEDURE — 82330 ASSAY OF CALCIUM: CPT

## 2025-02-27 PROCEDURE — 71045 X-RAY EXAM CHEST 1 VIEW: CPT

## 2025-02-27 PROCEDURE — 6360000002 HC RX W HCPCS: Performed by: INTERNAL MEDICINE

## 2025-02-27 PROCEDURE — 83605 ASSAY OF LACTIC ACID: CPT

## 2025-02-27 PROCEDURE — 6370000000 HC RX 637 (ALT 250 FOR IP): Performed by: INTERNAL MEDICINE

## 2025-02-27 PROCEDURE — 2000000000 HC ICU R&B

## 2025-02-27 PROCEDURE — 6370000000 HC RX 637 (ALT 250 FOR IP): Performed by: NURSE PRACTITIONER

## 2025-02-27 PROCEDURE — 6370000000 HC RX 637 (ALT 250 FOR IP)

## 2025-02-27 PROCEDURE — 99233 SBSQ HOSP IP/OBS HIGH 50: CPT | Performed by: INTERNAL MEDICINE

## 2025-02-27 PROCEDURE — 36415 COLL VENOUS BLD VENIPUNCTURE: CPT

## 2025-02-27 PROCEDURE — 84132 ASSAY OF SERUM POTASSIUM: CPT

## 2025-02-27 PROCEDURE — 85025 COMPLETE CBC W/AUTO DIFF WBC: CPT

## 2025-02-27 PROCEDURE — 80069 RENAL FUNCTION PANEL: CPT

## 2025-02-27 PROCEDURE — 2500000003 HC RX 250 WO HCPCS: Performed by: INTERNAL MEDICINE

## 2025-02-27 PROCEDURE — 82803 BLOOD GASES ANY COMBINATION: CPT

## 2025-02-27 PROCEDURE — 94003 VENT MGMT INPAT SUBQ DAY: CPT

## 2025-02-27 PROCEDURE — 94640 AIRWAY INHALATION TREATMENT: CPT

## 2025-02-27 PROCEDURE — 6360000002 HC RX W HCPCS

## 2025-02-27 PROCEDURE — 2700000000 HC OXYGEN THERAPY PER DAY

## 2025-02-27 RX ORDER — TORSEMIDE 100 MG/1
100 TABLET ORAL DAILY
Status: DISCONTINUED | OUTPATIENT
Start: 2025-02-28 | End: 2025-03-01

## 2025-02-27 RX ORDER — TORSEMIDE 20 MG/1
50 TABLET ORAL DAILY
Status: DISCONTINUED | OUTPATIENT
Start: 2025-02-27 | End: 2025-02-27

## 2025-02-27 RX ADMIN — SERTRALINE HYDROCHLORIDE 50 MG: 50 TABLET ORAL at 07:32

## 2025-02-27 RX ADMIN — IPRATROPIUM BROMIDE AND ALBUTEROL SULFATE 1 DOSE: .5; 3 SOLUTION RESPIRATORY (INHALATION) at 04:24

## 2025-02-27 RX ADMIN — PROPOFOL 40 MCG/KG/MIN: 10 INJECTION, EMULSION INTRAVENOUS at 01:46

## 2025-02-27 RX ADMIN — TEMAZEPAM 15 MG: 15 CAPSULE ORAL at 23:00

## 2025-02-27 RX ADMIN — ROPINIROLE HYDROCHLORIDE 0.25 MG: 0.25 TABLET, FILM COATED ORAL at 21:01

## 2025-02-27 RX ADMIN — BUDESONIDE INHALATION 500 MCG: 0.5 SUSPENSION RESPIRATORY (INHALATION) at 21:51

## 2025-02-27 RX ADMIN — IPRATROPIUM BROMIDE AND ALBUTEROL SULFATE 1 DOSE: .5; 3 SOLUTION RESPIRATORY (INHALATION) at 08:37

## 2025-02-27 RX ADMIN — Medication: at 20:49

## 2025-02-27 RX ADMIN — Medication: at 01:50

## 2025-02-27 RX ADMIN — PROPOFOL 45 MCG/KG/MIN: 10 INJECTION, EMULSION INTRAVENOUS at 05:16

## 2025-02-27 RX ADMIN — LETROZOLE 2.5 MG: 2.5 TABLET ORAL at 07:33

## 2025-02-27 RX ADMIN — WATER 40 MG: 1 INJECTION INTRAMUSCULAR; INTRAVENOUS; SUBCUTANEOUS at 16:24

## 2025-02-27 RX ADMIN — Medication: at 07:38

## 2025-02-27 RX ADMIN — APIXABAN 2.5 MG: 5 TABLET, FILM COATED ORAL at 21:00

## 2025-02-27 RX ADMIN — IPRATROPIUM BROMIDE AND ALBUTEROL SULFATE 1 DOSE: .5; 3 SOLUTION RESPIRATORY (INHALATION) at 12:13

## 2025-02-27 RX ADMIN — APIXABAN 2.5 MG: 5 TABLET, FILM COATED ORAL at 11:55

## 2025-02-27 RX ADMIN — BUDESONIDE INHALATION 500 MCG: 0.5 SUSPENSION RESPIRATORY (INHALATION) at 08:37

## 2025-02-27 RX ADMIN — CARVEDILOL 6.25 MG: 6.25 TABLET, FILM COATED ORAL at 07:33

## 2025-02-27 RX ADMIN — PROPOFOL 25 MCG/KG/MIN: 10 INJECTION, EMULSION INTRAVENOUS at 13:46

## 2025-02-27 RX ADMIN — IPRATROPIUM BROMIDE AND ALBUTEROL SULFATE 1 DOSE: .5; 3 SOLUTION RESPIRATORY (INHALATION) at 00:25

## 2025-02-27 RX ADMIN — TORSEMIDE 50 MG: 20 TABLET ORAL at 07:33

## 2025-02-27 RX ADMIN — LEVOFLOXACIN 750 MG: 5 INJECTION, SOLUTION INTRAVENOUS at 06:03

## 2025-02-27 RX ADMIN — SODIUM CHLORIDE, PRESERVATIVE FREE 10 ML: 5 INJECTION INTRAVENOUS at 07:34

## 2025-02-27 RX ADMIN — SODIUM CHLORIDE 30 MG/ML INHALATION SOLUTION 4 ML: 30 SOLUTION INHALANT at 08:37

## 2025-02-27 RX ADMIN — IPRATROPIUM BROMIDE AND ALBUTEROL SULFATE 1 DOSE: .5; 3 SOLUTION RESPIRATORY (INHALATION) at 21:51

## 2025-02-27 RX ADMIN — TRAZODONE HYDROCHLORIDE 25 MG: 50 TABLET ORAL at 21:00

## 2025-02-27 RX ADMIN — WATER 40 MG: 1 INJECTION INTRAMUSCULAR; INTRAVENOUS; SUBCUTANEOUS at 04:41

## 2025-02-27 RX ADMIN — ARFORMOTEROL TARTRATE 15 MCG: 15 SOLUTION RESPIRATORY (INHALATION) at 08:37

## 2025-02-27 RX ADMIN — ARFORMOTEROL TARTRATE 15 MCG: 15 SOLUTION RESPIRATORY (INHALATION) at 21:51

## 2025-02-27 RX ADMIN — PROPOFOL 40 MCG/KG/MIN: 10 INJECTION, EMULSION INTRAVENOUS at 09:14

## 2025-02-27 RX ADMIN — SODIUM CHLORIDE 30 MG/ML INHALATION SOLUTION 4 ML: 30 SOLUTION INHALANT at 21:52

## 2025-02-27 RX ADMIN — IPRATROPIUM BROMIDE AND ALBUTEROL SULFATE 1 DOSE: .5; 3 SOLUTION RESPIRATORY (INHALATION) at 16:28

## 2025-02-27 RX ADMIN — CARVEDILOL 6.25 MG: 6.25 TABLET, FILM COATED ORAL at 16:24

## 2025-02-27 RX ADMIN — GUAIFENESIN 600 MG: 600 TABLET ORAL at 21:01

## 2025-02-27 RX ADMIN — PROPOFOL 30 MCG/KG/MIN: 10 INJECTION, EMULSION INTRAVENOUS at 19:02

## 2025-02-27 RX ADMIN — GUAIFENESIN 600 MG: 600 TABLET ORAL at 07:33

## 2025-02-27 RX ADMIN — PANTOPRAZOLE SODIUM 40 MG: 40 INJECTION, POWDER, FOR SOLUTION INTRAVENOUS at 07:35

## 2025-02-27 ASSESSMENT — PULMONARY FUNCTION TESTS
PIF_VALUE: 19
PIF_VALUE: 19
PIF_VALUE: 22
PIF_VALUE: 20
PIF_VALUE: 19
PIF_VALUE: 21
PIF_VALUE: 20
PIF_VALUE: 20
PIF_VALUE: 21
PIF_VALUE: 24
PIF_VALUE: 20
PIF_VALUE: 21
PIF_VALUE: 20
PIF_VALUE: 22
PIF_VALUE: 21
PIF_VALUE: 22
PIF_VALUE: 19
PIF_VALUE: 18
PIF_VALUE: 18
PIF_VALUE: 22
PIF_VALUE: 18
PIF_VALUE: 22
PIF_VALUE: 21
PIF_VALUE: 17
PIF_VALUE: 16
PIF_VALUE: 15
PIF_VALUE: 23
PIF_VALUE: 17
PIF_VALUE: 18
PIF_VALUE: 22
PIF_VALUE: 19
PIF_VALUE: 23
PIF_VALUE: 19
PIF_VALUE: 22
PIF_VALUE: 18
PIF_VALUE: 22
PIF_VALUE: 20
PIF_VALUE: 23
PIF_VALUE: 20
PIF_VALUE: 23
PIF_VALUE: 20
PIF_VALUE: 22
PIF_VALUE: 20
PIF_VALUE: 24
PIF_VALUE: 23
PIF_VALUE: 20
PIF_VALUE: 20
PIF_VALUE: 21
PIF_VALUE: 20

## 2025-02-27 ASSESSMENT — PAIN SCALES - GENERAL
PAINLEVEL_OUTOF10: 0
PAINLEVEL_OUTOF10: 0

## 2025-02-27 NOTE — ACP (ADVANCE CARE PLANNING)
Advanced Care Planning Note.    Purpose of Encounter: Advanced care planning in light of acute and chronic deteriorating medical conditions.    Parties In Attendance: Patient (Shoaib Rothman), Joni Ley brother (POA), Rosmery Polanco MD  (myself)    Decisional Capacity: Yes    Subjective: Patient/family understand in this voluntary conversation that Shoaib Rothman continues to deteriorate and discuss what inteventions and plans patient would want implemented in light of the following diagnoses:  Active Hospital Problems    Diagnosis Date Noted    Electrolyte imbalance [E87.8] 02/26/2025    Hypervolemia [E87.70] 02/26/2025    Acute hypoxic on chronic hypercapnic respiratory failure (HCC) [J96.01, J96.12] 02/23/2025        Objective: Pt has been having chronic decline in functional status with increased dependence on others for ADLs  Increased frequency of Hospitalizations.  Likelihood of further hospitalizations with likelihood of escalation of medical interventions with the expectation of returning to a diminishing baseline level of functionality.      Discussion highlights: I discussed with patient the ramifications of their acute and chronic medical problems- and the likely outcomes expected, both in terms of statistics, and as part of my experience seeing patients with similar conditions.      Goals of Care Determination:  Patient wishes to remain Full code for now, but has interest in continuing this conversation, and discussing with family before making any changes to code status and goals of care parameters.  She tells me very clearly that she is okay to get intubated but she does not want to feel it when she is getting intubated.  She would never want to be on the ventilator for long-term and would not want trach.  If we are unable to wean her off ventilator she would like to focus more on comfort measures.    Goals of care

## 2025-02-28 LAB
ALBUMIN SERPL-MCNC: 3 G/DL (ref 3.4–5)
ANION GAP SERPL CALCULATED.3IONS-SCNC: 13 MMOL/L (ref 3–16)
ASPERGILLUS GALACTO AG: NEGATIVE
ASPERGILLUS GALACTO AG: NEGATIVE
BACTERIA SPEC RESP CULT: NORMAL
BACTERIA SPEC RESP CULT: NORMAL
BASE EXCESS BLDA CALC-SCNC: 15 MMOL/L (ref -3–3)
BASOPHILS # BLD: 0 K/UL (ref 0–0.2)
BASOPHILS NFR BLD: 0 %
BUN SERPL-MCNC: 95 MG/DL (ref 7–20)
CA-I BLD-SCNC: 1.17 MMOL/L (ref 1.12–1.32)
CALCIUM SERPL-MCNC: 9.3 MG/DL (ref 8.3–10.6)
CHLORIDE SERPL-SCNC: 91 MMOL/L (ref 99–110)
CO2 BLDA-SCNC: 42 MMOL/L
CO2 SERPL-SCNC: 34 MMOL/L (ref 21–32)
CREAT SERPL-MCNC: 2.5 MG/DL (ref 0.6–1.2)
DEPRECATED RDW RBC AUTO: 16.9 % (ref 12.4–15.4)
EOSINOPHIL # BLD: 0 K/UL (ref 0–0.6)
EOSINOPHIL NFR BLD: 0 %
GALACTOMANNAN AG SERPL IA-ACNC: 0.21
GALACTOMANNAN AG SERPL IA-ACNC: 0.25
GFR SERPLBLD CREATININE-BSD FMLA CKD-EPI: 21 ML/MIN/{1.73_M2}
GLUCOSE BLD-MCNC: 148 MG/DL (ref 70–99)
GLUCOSE BLD-MCNC: 152 MG/DL (ref 70–99)
GLUCOSE BLD-MCNC: 156 MG/DL (ref 70–99)
GLUCOSE BLD-MCNC: 183 MG/DL (ref 70–99)
GLUCOSE BLD-MCNC: 215 MG/DL (ref 70–99)
GLUCOSE SERPL-MCNC: 143 MG/DL (ref 70–99)
GRAM STN SPEC: NORMAL
GRAM STN SPEC: NORMAL
HCO3 BLDA-SCNC: 40 MMOL/L (ref 21–29)
HCT VFR BLD AUTO: 37.3 % (ref 36–48)
HGB BLD-MCNC: 11.8 G/DL (ref 12–16)
LACTATE BLD-SCNC: 1.06 MMOL/L (ref 0.4–2)
LYMPHOCYTES # BLD: 0.7 K/UL (ref 1–5.1)
LYMPHOCYTES NFR BLD: 8.6 %
MCH RBC QN AUTO: 30.7 PG (ref 26–34)
MCHC RBC AUTO-ENTMCNC: 31.7 G/DL (ref 31–36)
MCV RBC AUTO: 96.8 FL (ref 80–100)
MONOCYTES # BLD: 0.5 K/UL (ref 0–1.3)
MONOCYTES NFR BLD: 6.6 %
NEUTROPHILS # BLD: 7.1 K/UL (ref 1.7–7.7)
NEUTROPHILS NFR BLD: 84.8 %
PCO2 BLDA: 62.2 MM HG (ref 35–45)
PERFORMED ON: ABNORMAL
PH BLDA: 7.42 [PH] (ref 7.35–7.45)
PHOSPHATE SERPL-MCNC: 6.1 MG/DL (ref 2.5–4.9)
PLATELET # BLD AUTO: 236 K/UL (ref 135–450)
PMV BLD AUTO: 7.7 FL (ref 5–10.5)
PO2 BLDA: 76.6 MM HG (ref 75–108)
POC SAMPLE TYPE: ABNORMAL
POTASSIUM BLD-SCNC: 4.4 MMOL/L (ref 3.5–5.1)
POTASSIUM SERPL-SCNC: 4.5 MMOL/L (ref 3.5–5.1)
RBC # BLD AUTO: 3.85 M/UL (ref 4–5.2)
SAO2 % BLDA: 95 % (ref 93–100)
SODIUM BLD-SCNC: 139 MMOL/L (ref 136–145)
SODIUM SERPL-SCNC: 138 MMOL/L (ref 136–145)
TRIGL SERPL-MCNC: 559 MG/DL (ref 0–150)
WBC # BLD AUTO: 8.4 K/UL (ref 4–11)

## 2025-02-28 PROCEDURE — 6370000000 HC RX 637 (ALT 250 FOR IP)

## 2025-02-28 PROCEDURE — 2000000000 HC ICU R&B

## 2025-02-28 PROCEDURE — 94640 AIRWAY INHALATION TREATMENT: CPT

## 2025-02-28 PROCEDURE — 6370000000 HC RX 637 (ALT 250 FOR IP): Performed by: INTERNAL MEDICINE

## 2025-02-28 PROCEDURE — 6360000002 HC RX W HCPCS: Performed by: INTERNAL MEDICINE

## 2025-02-28 PROCEDURE — 6360000002 HC RX W HCPCS

## 2025-02-28 PROCEDURE — 94761 N-INVAS EAR/PLS OXIMETRY MLT: CPT

## 2025-02-28 PROCEDURE — 82947 ASSAY GLUCOSE BLOOD QUANT: CPT

## 2025-02-28 PROCEDURE — 94003 VENT MGMT INPAT SUBQ DAY: CPT

## 2025-02-28 PROCEDURE — 84295 ASSAY OF SERUM SODIUM: CPT

## 2025-02-28 PROCEDURE — 99291 CRITICAL CARE FIRST HOUR: CPT | Performed by: INTERNAL MEDICINE

## 2025-02-28 PROCEDURE — 2500000003 HC RX 250 WO HCPCS: Performed by: INTERNAL MEDICINE

## 2025-02-28 PROCEDURE — 6370000000 HC RX 637 (ALT 250 FOR IP): Performed by: NURSE PRACTITIONER

## 2025-02-28 PROCEDURE — 80069 RENAL FUNCTION PANEL: CPT

## 2025-02-28 PROCEDURE — 84478 ASSAY OF TRIGLYCERIDES: CPT

## 2025-02-28 PROCEDURE — 84132 ASSAY OF SERUM POTASSIUM: CPT

## 2025-02-28 PROCEDURE — 83605 ASSAY OF LACTIC ACID: CPT

## 2025-02-28 PROCEDURE — 85025 COMPLETE CBC W/AUTO DIFF WBC: CPT

## 2025-02-28 PROCEDURE — 2700000000 HC OXYGEN THERAPY PER DAY

## 2025-02-28 PROCEDURE — 82330 ASSAY OF CALCIUM: CPT

## 2025-02-28 PROCEDURE — 36415 COLL VENOUS BLD VENIPUNCTURE: CPT

## 2025-02-28 PROCEDURE — 2500000003 HC RX 250 WO HCPCS

## 2025-02-28 PROCEDURE — 99233 SBSQ HOSP IP/OBS HIGH 50: CPT | Performed by: INTERNAL MEDICINE

## 2025-02-28 PROCEDURE — 82803 BLOOD GASES ANY COMBINATION: CPT

## 2025-02-28 RX ORDER — PANTOPRAZOLE SODIUM 20 MG/1
20 TABLET, DELAYED RELEASE ORAL
Status: DISCONTINUED | OUTPATIENT
Start: 2025-03-01 | End: 2025-03-03 | Stop reason: HOSPADM

## 2025-02-28 RX ORDER — IPRATROPIUM BROMIDE AND ALBUTEROL SULFATE 2.5; .5 MG/3ML; MG/3ML
1 SOLUTION RESPIRATORY (INHALATION)
Status: DISCONTINUED | OUTPATIENT
Start: 2025-03-01 | End: 2025-03-03 | Stop reason: HOSPADM

## 2025-02-28 RX ADMIN — SERTRALINE HYDROCHLORIDE 50 MG: 50 TABLET ORAL at 07:49

## 2025-02-28 RX ADMIN — IPRATROPIUM BROMIDE AND ALBUTEROL SULFATE 1 DOSE: .5; 3 SOLUTION RESPIRATORY (INHALATION) at 11:46

## 2025-02-28 RX ADMIN — INSULIN LISPRO 1 UNITS: 100 INJECTION, SOLUTION INTRAVENOUS; SUBCUTANEOUS at 21:48

## 2025-02-28 RX ADMIN — CARVEDILOL 6.25 MG: 6.25 TABLET, FILM COATED ORAL at 07:50

## 2025-02-28 RX ADMIN — BUDESONIDE INHALATION 500 MCG: 0.5 SUSPENSION RESPIRATORY (INHALATION) at 08:42

## 2025-02-28 RX ADMIN — GUAIFENESIN 600 MG: 600 TABLET ORAL at 07:49

## 2025-02-28 RX ADMIN — IPRATROPIUM BROMIDE AND ALBUTEROL SULFATE 1 DOSE: .5; 3 SOLUTION RESPIRATORY (INHALATION) at 08:41

## 2025-02-28 RX ADMIN — WATER 40 MG: 1 INJECTION INTRAMUSCULAR; INTRAVENOUS; SUBCUTANEOUS at 04:24

## 2025-02-28 RX ADMIN — Medication: at 08:13

## 2025-02-28 RX ADMIN — SODIUM CHLORIDE 30 MG/ML INHALATION SOLUTION 4 ML: 30 SOLUTION INHALANT at 08:41

## 2025-02-28 RX ADMIN — INSULIN LISPRO 1 UNITS: 100 INJECTION, SOLUTION INTRAVENOUS; SUBCUTANEOUS at 14:29

## 2025-02-28 RX ADMIN — TRAZODONE HYDROCHLORIDE 25 MG: 50 TABLET ORAL at 20:44

## 2025-02-28 RX ADMIN — LETROZOLE 2.5 MG: 2.5 TABLET ORAL at 07:49

## 2025-02-28 RX ADMIN — TEMAZEPAM 15 MG: 15 CAPSULE ORAL at 20:45

## 2025-02-28 RX ADMIN — IPRATROPIUM BROMIDE AND ALBUTEROL SULFATE 1 DOSE: .5; 3 SOLUTION RESPIRATORY (INHALATION) at 00:15

## 2025-02-28 RX ADMIN — TORSEMIDE 100 MG: 100 TABLET ORAL at 08:25

## 2025-02-28 RX ADMIN — PANTOPRAZOLE SODIUM 40 MG: 40 INJECTION, POWDER, FOR SOLUTION INTRAVENOUS at 07:50

## 2025-02-28 RX ADMIN — PROPOFOL 30 MCG/KG/MIN: 10 INJECTION, EMULSION INTRAVENOUS at 00:47

## 2025-02-28 RX ADMIN — ROPINIROLE HYDROCHLORIDE 0.25 MG: 0.25 TABLET, FILM COATED ORAL at 20:45

## 2025-02-28 RX ADMIN — IPRATROPIUM BROMIDE AND ALBUTEROL SULFATE 1 DOSE: .5; 3 SOLUTION RESPIRATORY (INHALATION) at 20:56

## 2025-02-28 RX ADMIN — ARFORMOTEROL TARTRATE 15 MCG: 15 SOLUTION RESPIRATORY (INHALATION) at 08:41

## 2025-02-28 RX ADMIN — IPRATROPIUM BROMIDE AND ALBUTEROL SULFATE 1 DOSE: .5; 3 SOLUTION RESPIRATORY (INHALATION) at 04:40

## 2025-02-28 RX ADMIN — SODIUM CHLORIDE, PRESERVATIVE FREE 10 ML: 5 INJECTION INTRAVENOUS at 08:13

## 2025-02-28 RX ADMIN — ARFORMOTEROL TARTRATE 15 MCG: 15 SOLUTION RESPIRATORY (INHALATION) at 20:56

## 2025-02-28 RX ADMIN — SODIUM CHLORIDE, PRESERVATIVE FREE 10 ML: 5 INJECTION INTRAVENOUS at 21:49

## 2025-02-28 RX ADMIN — IPRATROPIUM BROMIDE AND ALBUTEROL SULFATE 1 DOSE: .5; 3 SOLUTION RESPIRATORY (INHALATION) at 16:56

## 2025-02-28 RX ADMIN — Medication 25 MCG/HR: at 02:44

## 2025-02-28 RX ADMIN — CARVEDILOL 6.25 MG: 6.25 TABLET, FILM COATED ORAL at 15:34

## 2025-02-28 RX ADMIN — BUDESONIDE INHALATION 500 MCG: 0.5 SUSPENSION RESPIRATORY (INHALATION) at 20:56

## 2025-02-28 RX ADMIN — Medication: at 21:48

## 2025-02-28 RX ADMIN — PROPOFOL 10 MCG/KG/MIN: 10 INJECTION, EMULSION INTRAVENOUS at 07:53

## 2025-02-28 RX ADMIN — APIXABAN 2.5 MG: 5 TABLET, FILM COATED ORAL at 07:49

## 2025-02-28 RX ADMIN — SODIUM CHLORIDE 30 MG/ML INHALATION SOLUTION 4 ML: 30 SOLUTION INHALANT at 20:56

## 2025-02-28 RX ADMIN — APIXABAN 2.5 MG: 5 TABLET, FILM COATED ORAL at 20:44

## 2025-02-28 ASSESSMENT — PULMONARY FUNCTION TESTS
PIF_VALUE: 17
PIF_VALUE: 15
PIF_VALUE: 15
PIF_VALUE: 17
PIF_VALUE: 20
PIF_VALUE: 15
PIF_VALUE: 16
PIF_VALUE: 15
PIF_VALUE: 15
PIF_VALUE: 20
PIF_VALUE: 16
PIF_VALUE: 15
PIF_VALUE: 16
PIF_VALUE: 20
PIF_VALUE: 16
PIF_VALUE: 20
PIF_VALUE: 19
PIF_VALUE: 15
PIF_VALUE: 17
PIF_VALUE: 15
PIF_VALUE: 18
PIF_VALUE: 15
PIF_VALUE: 15

## 2025-02-28 ASSESSMENT — PAIN SCALES - GENERAL
PAINLEVEL_OUTOF10: 0

## 2025-03-01 ENCOUNTER — APPOINTMENT (OUTPATIENT)
Dept: GENERAL RADIOLOGY | Age: 65
DRG: 139 | End: 2025-03-01
Payer: MEDICAID

## 2025-03-01 LAB
ALBUMIN SERPL-MCNC: 3 G/DL (ref 3.4–5)
ANION GAP SERPL CALCULATED.3IONS-SCNC: 14 MMOL/L (ref 3–16)
BASOPHILS # BLD: 0 K/UL (ref 0–0.2)
BASOPHILS NFR BLD: 0 %
BUN SERPL-MCNC: 101 MG/DL (ref 7–20)
CALCIUM SERPL-MCNC: 8.2 MG/DL (ref 8.3–10.6)
CHLORIDE SERPL-SCNC: 89 MMOL/L (ref 99–110)
CO2 SERPL-SCNC: 31 MMOL/L (ref 21–32)
CREAT SERPL-MCNC: 3 MG/DL (ref 0.6–1.2)
DEPRECATED RDW RBC AUTO: 16.4 % (ref 12.4–15.4)
EOSINOPHIL # BLD: 0.1 K/UL (ref 0–0.6)
EOSINOPHIL NFR BLD: 1 %
GFR SERPLBLD CREATININE-BSD FMLA CKD-EPI: 17 ML/MIN/{1.73_M2}
GLUCOSE BLD-MCNC: 176 MG/DL (ref 70–99)
GLUCOSE BLD-MCNC: 206 MG/DL (ref 70–99)
GLUCOSE BLD-MCNC: 249 MG/DL (ref 70–99)
GLUCOSE BLD-MCNC: 251 MG/DL (ref 70–99)
GLUCOSE SERPL-MCNC: 98 MG/DL (ref 70–99)
HCT VFR BLD AUTO: 34.5 % (ref 36–48)
HGB BLD-MCNC: 11.2 G/DL (ref 12–16)
L PNEUMO DNA SPEC QL NAA+PROBE: NOT DETECTED
LEGIONELLA DNA SPEC NAA+PROBE: NOT DETECTED
LYMPHOCYTES # BLD: 0.9 K/UL (ref 1–5.1)
LYMPHOCYTES NFR BLD: 8 %
MAGNESIUM SERPL-MCNC: 1.62 MG/DL (ref 1.8–2.4)
MCH RBC QN AUTO: 29.9 PG (ref 26–34)
MCHC RBC AUTO-ENTMCNC: 32.5 G/DL (ref 31–36)
MCV RBC AUTO: 92.1 FL (ref 80–100)
MONOCYTES # BLD: 0.7 K/UL (ref 0–1.3)
MONOCYTES NFR BLD: 6 %
NEUTROPHILS # BLD: 9.4 K/UL (ref 1.7–7.7)
NEUTROPHILS NFR BLD: 85 %
PERFORMED ON: ABNORMAL
PHOSPHATE SERPL-MCNC: 5 MG/DL (ref 2.5–4.9)
PLATELET # BLD AUTO: 268 K/UL (ref 135–450)
PLATELET BLD QL SMEAR: ADEQUATE
PMV BLD AUTO: 7.1 FL (ref 5–10.5)
POTASSIUM SERPL-SCNC: 3.9 MMOL/L (ref 3.5–5.1)
RBC # BLD AUTO: 3.75 M/UL (ref 4–5.2)
SLIDE REVIEW: ABNORMAL
SODIUM SERPL-SCNC: 134 MMOL/L (ref 136–145)
SPECIMEN SOURCE: NORMAL
WBC # BLD AUTO: 11 K/UL (ref 4–11)

## 2025-03-01 PROCEDURE — 6370000000 HC RX 637 (ALT 250 FOR IP)

## 2025-03-01 PROCEDURE — 6360000002 HC RX W HCPCS

## 2025-03-01 PROCEDURE — 2060000000 HC ICU INTERMEDIATE R&B

## 2025-03-01 PROCEDURE — 6360000002 HC RX W HCPCS: Performed by: INTERNAL MEDICINE

## 2025-03-01 PROCEDURE — 6370000000 HC RX 637 (ALT 250 FOR IP): Performed by: NURSE PRACTITIONER

## 2025-03-01 PROCEDURE — 71045 X-RAY EXAM CHEST 1 VIEW: CPT

## 2025-03-01 PROCEDURE — 99291 CRITICAL CARE FIRST HOUR: CPT | Performed by: INTERNAL MEDICINE

## 2025-03-01 PROCEDURE — 2500000003 HC RX 250 WO HCPCS

## 2025-03-01 PROCEDURE — 2580000003 HC RX 258: Performed by: INTERNAL MEDICINE

## 2025-03-01 PROCEDURE — 2700000000 HC OXYGEN THERAPY PER DAY

## 2025-03-01 PROCEDURE — 94669 MECHANICAL CHEST WALL OSCILL: CPT

## 2025-03-01 PROCEDURE — 83735 ASSAY OF MAGNESIUM: CPT

## 2025-03-01 PROCEDURE — 99233 SBSQ HOSP IP/OBS HIGH 50: CPT | Performed by: INTERNAL MEDICINE

## 2025-03-01 PROCEDURE — 2500000003 HC RX 250 WO HCPCS: Performed by: INTERNAL MEDICINE

## 2025-03-01 PROCEDURE — 80069 RENAL FUNCTION PANEL: CPT

## 2025-03-01 PROCEDURE — 94640 AIRWAY INHALATION TREATMENT: CPT

## 2025-03-01 PROCEDURE — 85025 COMPLETE CBC W/AUTO DIFF WBC: CPT

## 2025-03-01 PROCEDURE — 6370000000 HC RX 637 (ALT 250 FOR IP): Performed by: INTERNAL MEDICINE

## 2025-03-01 PROCEDURE — 94761 N-INVAS EAR/PLS OXIMETRY MLT: CPT

## 2025-03-01 RX ORDER — INSULIN GLARGINE 100 [IU]/ML
5 INJECTION, SOLUTION SUBCUTANEOUS NIGHTLY
Status: DISCONTINUED | OUTPATIENT
Start: 2025-03-01 | End: 2025-03-03 | Stop reason: HOSPADM

## 2025-03-01 RX ORDER — MAGNESIUM SULFATE IN WATER 40 MG/ML
2000 INJECTION, SOLUTION INTRAVENOUS ONCE
Status: COMPLETED | OUTPATIENT
Start: 2025-03-01 | End: 2025-03-01

## 2025-03-01 RX ORDER — TORSEMIDE 20 MG/1
20 TABLET ORAL DAILY
Status: DISCONTINUED | OUTPATIENT
Start: 2025-03-01 | End: 2025-03-01

## 2025-03-01 RX ADMIN — SODIUM CHLORIDE 30 MG/ML INHALATION SOLUTION 4 ML: 30 SOLUTION INHALANT at 20:17

## 2025-03-01 RX ADMIN — IPRATROPIUM BROMIDE AND ALBUTEROL SULFATE 1 DOSE: .5; 2.5 SOLUTION RESPIRATORY (INHALATION) at 01:38

## 2025-03-01 RX ADMIN — APIXABAN 2.5 MG: 5 TABLET, FILM COATED ORAL at 09:00

## 2025-03-01 RX ADMIN — APIXABAN 2.5 MG: 5 TABLET, FILM COATED ORAL at 21:03

## 2025-03-01 RX ADMIN — MAGNESIUM SULFATE HEPTAHYDRATE 2000 MG: 40 INJECTION, SOLUTION INTRAVENOUS at 09:34

## 2025-03-01 RX ADMIN — ROPINIROLE HYDROCHLORIDE 0.25 MG: 0.25 TABLET, FILM COATED ORAL at 21:03

## 2025-03-01 RX ADMIN — ACETAMINOPHEN 650 MG: 325 TABLET ORAL at 17:01

## 2025-03-01 RX ADMIN — BUDESONIDE INHALATION 500 MCG: 0.5 SUSPENSION RESPIRATORY (INHALATION) at 07:24

## 2025-03-01 RX ADMIN — INSULIN LISPRO 1 UNITS: 100 INJECTION, SOLUTION INTRAVENOUS; SUBCUTANEOUS at 13:56

## 2025-03-01 RX ADMIN — ARFORMOTEROL TARTRATE 15 MCG: 15 SOLUTION RESPIRATORY (INHALATION) at 07:24

## 2025-03-01 RX ADMIN — CARVEDILOL 6.25 MG: 6.25 TABLET, FILM COATED ORAL at 09:21

## 2025-03-01 RX ADMIN — Medication: at 09:01

## 2025-03-01 RX ADMIN — WATER 40 MG: 1 INJECTION INTRAMUSCULAR; INTRAVENOUS; SUBCUTANEOUS at 09:22

## 2025-03-01 RX ADMIN — SODIUM CHLORIDE, PRESERVATIVE FREE 10 ML: 5 INJECTION INTRAVENOUS at 09:23

## 2025-03-01 RX ADMIN — SODIUM CHLORIDE, PRESERVATIVE FREE 10 ML: 5 INJECTION INTRAVENOUS at 21:04

## 2025-03-01 RX ADMIN — IPRATROPIUM BROMIDE AND ALBUTEROL SULFATE 1 DOSE: .5; 2.5 SOLUTION RESPIRATORY (INHALATION) at 16:17

## 2025-03-01 RX ADMIN — IPRATROPIUM BROMIDE AND ALBUTEROL SULFATE 1 DOSE: .5; 2.5 SOLUTION RESPIRATORY (INHALATION) at 11:40

## 2025-03-01 RX ADMIN — ARFORMOTEROL TARTRATE 15 MCG: 15 SOLUTION RESPIRATORY (INHALATION) at 20:17

## 2025-03-01 RX ADMIN — SODIUM CHLORIDE 30 MG/ML INHALATION SOLUTION 4 ML: 30 SOLUTION INHALANT at 07:24

## 2025-03-01 RX ADMIN — LETROZOLE 2.5 MG: 2.5 TABLET ORAL at 09:01

## 2025-03-01 RX ADMIN — IPRATROPIUM BROMIDE AND ALBUTEROL SULFATE 1 DOSE: .5; 2.5 SOLUTION RESPIRATORY (INHALATION) at 07:23

## 2025-03-01 RX ADMIN — PANTOPRAZOLE SODIUM 20 MG: 20 TABLET, DELAYED RELEASE ORAL at 06:35

## 2025-03-01 RX ADMIN — Medication: at 21:04

## 2025-03-01 RX ADMIN — BUDESONIDE INHALATION 500 MCG: 0.5 SUSPENSION RESPIRATORY (INHALATION) at 20:17

## 2025-03-01 RX ADMIN — CARVEDILOL 6.25 MG: 6.25 TABLET, FILM COATED ORAL at 17:02

## 2025-03-01 RX ADMIN — TORSEMIDE 20 MG: 20 TABLET ORAL at 09:00

## 2025-03-01 RX ADMIN — IPRATROPIUM BROMIDE AND ALBUTEROL SULFATE 1 DOSE: .5; 2.5 SOLUTION RESPIRATORY (INHALATION) at 20:17

## 2025-03-01 RX ADMIN — INSULIN GLARGINE 5 UNITS: 100 INJECTION, SOLUTION SUBCUTANEOUS at 21:03

## 2025-03-01 RX ADMIN — ACETAMINOPHEN 650 MG: 325 TABLET ORAL at 09:00

## 2025-03-01 RX ADMIN — TRAZODONE HYDROCHLORIDE 25 MG: 50 TABLET ORAL at 21:03

## 2025-03-01 RX ADMIN — HYDROXYZINE HYDROCHLORIDE 25 MG: 25 TABLET ORAL at 21:07

## 2025-03-01 RX ADMIN — SERTRALINE HYDROCHLORIDE 50 MG: 50 TABLET ORAL at 09:01

## 2025-03-01 RX ADMIN — INSULIN LISPRO 1 UNITS: 100 INJECTION, SOLUTION INTRAVENOUS; SUBCUTANEOUS at 21:03

## 2025-03-01 RX ADMIN — TEMAZEPAM 15 MG: 15 CAPSULE ORAL at 21:03

## 2025-03-01 ASSESSMENT — PAIN DESCRIPTION - PAIN TYPE
TYPE: CHRONIC PAIN

## 2025-03-01 ASSESSMENT — PAIN DESCRIPTION - DESCRIPTORS
DESCRIPTORS: ACHING

## 2025-03-01 ASSESSMENT — PAIN DESCRIPTION - ORIENTATION
ORIENTATION: RIGHT;LEFT
ORIENTATION: MID

## 2025-03-01 ASSESSMENT — PAIN SCALES - GENERAL
PAINLEVEL_OUTOF10: 0
PAINLEVEL_OUTOF10: 3
PAINLEVEL_OUTOF10: 5
PAINLEVEL_OUTOF10: 5
PAINLEVEL_OUTOF10: 0
PAINLEVEL_OUTOF10: 2
PAINLEVEL_OUTOF10: 3
PAINLEVEL_OUTOF10: 0

## 2025-03-01 ASSESSMENT — PAIN DESCRIPTION - FREQUENCY
FREQUENCY: CONTINUOUS

## 2025-03-01 ASSESSMENT — PAIN - FUNCTIONAL ASSESSMENT
PAIN_FUNCTIONAL_ASSESSMENT: ACTIVITIES ARE NOT PREVENTED

## 2025-03-01 ASSESSMENT — PAIN DESCRIPTION - LOCATION
LOCATION: KNEE

## 2025-03-01 ASSESSMENT — PAIN DESCRIPTION - ONSET
ONSET: ON-GOING

## 2025-03-02 LAB
ALBUMIN SERPL-MCNC: 2.9 G/DL (ref 3.4–5)
ANION GAP SERPL CALCULATED.3IONS-SCNC: 13 MMOL/L (ref 3–16)
BASOPHILS # BLD: 0 K/UL (ref 0–0.2)
BASOPHILS NFR BLD: 0 %
BUN SERPL-MCNC: 100 MG/DL (ref 7–20)
CALCIUM SERPL-MCNC: 7.9 MG/DL (ref 8.3–10.6)
CHLORIDE SERPL-SCNC: 95 MMOL/L (ref 99–110)
CO2 SERPL-SCNC: 30 MMOL/L (ref 21–32)
CREAT SERPL-MCNC: 2.7 MG/DL (ref 0.6–1.2)
DEPRECATED RDW RBC AUTO: 15.9 % (ref 12.4–15.4)
EOSINOPHIL # BLD: 0.1 K/UL (ref 0–0.6)
EOSINOPHIL NFR BLD: 1 %
GFR SERPLBLD CREATININE-BSD FMLA CKD-EPI: 19 ML/MIN/{1.73_M2}
GLUCOSE BLD-MCNC: 116 MG/DL (ref 70–99)
GLUCOSE BLD-MCNC: 155 MG/DL (ref 70–99)
GLUCOSE BLD-MCNC: 163 MG/DL (ref 70–99)
GLUCOSE BLD-MCNC: 174 MG/DL (ref 70–99)
GLUCOSE BLD-MCNC: 212 MG/DL (ref 70–99)
GLUCOSE BLD-MCNC: 336 MG/DL (ref 70–99)
GLUCOSE SERPL-MCNC: 142 MG/DL (ref 70–99)
HCT VFR BLD AUTO: 33.1 % (ref 36–48)
HGB BLD-MCNC: 10.8 G/DL (ref 12–16)
LYMPHOCYTES # BLD: 1.7 K/UL (ref 1–5.1)
LYMPHOCYTES NFR BLD: 15 %
MAGNESIUM SERPL-MCNC: 1.97 MG/DL (ref 1.8–2.4)
MCH RBC QN AUTO: 30 PG (ref 26–34)
MCHC RBC AUTO-ENTMCNC: 32.5 G/DL (ref 31–36)
MCV RBC AUTO: 92.3 FL (ref 80–100)
MONOCYTES # BLD: 0.6 K/UL (ref 0–1.3)
MONOCYTES NFR BLD: 5 %
MYELOCYTES NFR BLD MANUAL: 1 %
NEUTROPHILS # BLD: 8.8 K/UL (ref 1.7–7.7)
NEUTROPHILS NFR BLD: 77 %
NEUTS BAND NFR BLD MANUAL: 1 % (ref 0–7)
PATH INTERP BLD-IMP: NO
PERFORMED ON: ABNORMAL
PHOSPHATE SERPL-MCNC: 5 MG/DL (ref 2.5–4.9)
PLATELET # BLD AUTO: 253 K/UL (ref 135–450)
PLATELET BLD QL SMEAR: ADEQUATE
PMV BLD AUTO: 7.4 FL (ref 5–10.5)
POTASSIUM SERPL-SCNC: 3.5 MMOL/L (ref 3.5–5.1)
RBC # BLD AUTO: 3.59 M/UL (ref 4–5.2)
RBC MORPH BLD: NORMAL
SLIDE REVIEW: ABNORMAL
SODIUM SERPL-SCNC: 138 MMOL/L (ref 136–145)
WBC # BLD AUTO: 11.2 K/UL (ref 4–11)

## 2025-03-02 PROCEDURE — 99233 SBSQ HOSP IP/OBS HIGH 50: CPT | Performed by: INTERNAL MEDICINE

## 2025-03-02 PROCEDURE — 2700000000 HC OXYGEN THERAPY PER DAY

## 2025-03-02 PROCEDURE — 6370000000 HC RX 637 (ALT 250 FOR IP)

## 2025-03-02 PROCEDURE — 2500000003 HC RX 250 WO HCPCS: Performed by: INTERNAL MEDICINE

## 2025-03-02 PROCEDURE — 2060000000 HC ICU INTERMEDIATE R&B

## 2025-03-02 PROCEDURE — 94669 MECHANICAL CHEST WALL OSCILL: CPT

## 2025-03-02 PROCEDURE — 6370000000 HC RX 637 (ALT 250 FOR IP): Performed by: NURSE PRACTITIONER

## 2025-03-02 PROCEDURE — 6370000000 HC RX 637 (ALT 250 FOR IP): Performed by: INTERNAL MEDICINE

## 2025-03-02 PROCEDURE — 94761 N-INVAS EAR/PLS OXIMETRY MLT: CPT

## 2025-03-02 PROCEDURE — 6360000002 HC RX W HCPCS: Performed by: INTERNAL MEDICINE

## 2025-03-02 PROCEDURE — 94618 PULMONARY STRESS TESTING: CPT

## 2025-03-02 PROCEDURE — 94640 AIRWAY INHALATION TREATMENT: CPT

## 2025-03-02 PROCEDURE — 85025 COMPLETE CBC W/AUTO DIFF WBC: CPT

## 2025-03-02 PROCEDURE — 80069 RENAL FUNCTION PANEL: CPT

## 2025-03-02 PROCEDURE — 83735 ASSAY OF MAGNESIUM: CPT

## 2025-03-02 PROCEDURE — 99232 SBSQ HOSP IP/OBS MODERATE 35: CPT | Performed by: INTERNAL MEDICINE

## 2025-03-02 PROCEDURE — 2580000003 HC RX 258: Performed by: INTERNAL MEDICINE

## 2025-03-02 RX ORDER — INSULIN LISPRO 100 [IU]/ML
0-4 INJECTION, SOLUTION INTRAVENOUS; SUBCUTANEOUS
Status: DISCONTINUED | OUTPATIENT
Start: 2025-03-02 | End: 2025-03-03 | Stop reason: HOSPADM

## 2025-03-02 RX ADMIN — BUDESONIDE INHALATION 500 MCG: 0.5 SUSPENSION RESPIRATORY (INHALATION) at 21:03

## 2025-03-02 RX ADMIN — ACETAMINOPHEN 650 MG: 325 TABLET ORAL at 22:30

## 2025-03-02 RX ADMIN — IPRATROPIUM BROMIDE AND ALBUTEROL SULFATE 1 DOSE: .5; 2.5 SOLUTION RESPIRATORY (INHALATION) at 15:56

## 2025-03-02 RX ADMIN — HYDROXYZINE HYDROCHLORIDE 25 MG: 25 TABLET ORAL at 22:06

## 2025-03-02 RX ADMIN — SERTRALINE HYDROCHLORIDE 50 MG: 50 TABLET ORAL at 10:20

## 2025-03-02 RX ADMIN — PANTOPRAZOLE SODIUM 20 MG: 20 TABLET, DELAYED RELEASE ORAL at 05:31

## 2025-03-02 RX ADMIN — Medication: at 22:04

## 2025-03-02 RX ADMIN — LETROZOLE 2.5 MG: 2.5 TABLET ORAL at 10:24

## 2025-03-02 RX ADMIN — SODIUM CHLORIDE, PRESERVATIVE FREE 10 ML: 5 INJECTION INTRAVENOUS at 10:21

## 2025-03-02 RX ADMIN — ROPINIROLE HYDROCHLORIDE 0.25 MG: 0.25 TABLET, FILM COATED ORAL at 22:02

## 2025-03-02 RX ADMIN — INSULIN LISPRO 1 UNITS: 100 INJECTION, SOLUTION INTRAVENOUS; SUBCUTANEOUS at 22:02

## 2025-03-02 RX ADMIN — ARFORMOTEROL TARTRATE 15 MCG: 15 SOLUTION RESPIRATORY (INHALATION) at 20:59

## 2025-03-02 RX ADMIN — IPRATROPIUM BROMIDE AND ALBUTEROL SULFATE 1 DOSE: .5; 2.5 SOLUTION RESPIRATORY (INHALATION) at 12:16

## 2025-03-02 RX ADMIN — INSULIN LISPRO 3 UNITS: 100 INJECTION, SOLUTION INTRAVENOUS; SUBCUTANEOUS at 17:17

## 2025-03-02 RX ADMIN — BUDESONIDE INHALATION 500 MCG: 0.5 SUSPENSION RESPIRATORY (INHALATION) at 08:13

## 2025-03-02 RX ADMIN — SODIUM CHLORIDE 30 MG/ML INHALATION SOLUTION 4 ML: 30 SOLUTION INHALANT at 08:13

## 2025-03-02 RX ADMIN — SODIUM CHLORIDE 30 MG/ML INHALATION SOLUTION 4 ML: 30 SOLUTION INHALANT at 20:59

## 2025-03-02 RX ADMIN — IPRATROPIUM BROMIDE AND ALBUTEROL SULFATE 1 DOSE: .5; 2.5 SOLUTION RESPIRATORY (INHALATION) at 20:59

## 2025-03-02 RX ADMIN — APIXABAN 2.5 MG: 5 TABLET, FILM COATED ORAL at 22:02

## 2025-03-02 RX ADMIN — ARFORMOTEROL TARTRATE 15 MCG: 15 SOLUTION RESPIRATORY (INHALATION) at 08:13

## 2025-03-02 RX ADMIN — TRAZODONE HYDROCHLORIDE 25 MG: 50 TABLET ORAL at 22:02

## 2025-03-02 RX ADMIN — PREDNISONE 50 MG: 5 TABLET ORAL at 10:21

## 2025-03-02 RX ADMIN — Medication: at 10:22

## 2025-03-02 RX ADMIN — CARVEDILOL 6.25 MG: 6.25 TABLET, FILM COATED ORAL at 16:26

## 2025-03-02 RX ADMIN — IPRATROPIUM BROMIDE AND ALBUTEROL SULFATE 1 DOSE: .5; 2.5 SOLUTION RESPIRATORY (INHALATION) at 08:13

## 2025-03-02 RX ADMIN — TEMAZEPAM 15 MG: 15 CAPSULE ORAL at 22:02

## 2025-03-02 RX ADMIN — INSULIN GLARGINE 5 UNITS: 100 INJECTION, SOLUTION SUBCUTANEOUS at 22:03

## 2025-03-02 RX ADMIN — ACETAMINOPHEN 650 MG: 325 TABLET ORAL at 10:25

## 2025-03-02 RX ADMIN — ACETAMINOPHEN 650 MG: 325 TABLET ORAL at 16:26

## 2025-03-02 RX ADMIN — APIXABAN 2.5 MG: 5 TABLET, FILM COATED ORAL at 10:20

## 2025-03-02 RX ADMIN — CARVEDILOL 6.25 MG: 6.25 TABLET, FILM COATED ORAL at 10:21

## 2025-03-02 ASSESSMENT — PAIN - FUNCTIONAL ASSESSMENT: PAIN_FUNCTIONAL_ASSESSMENT: ACTIVITIES ARE NOT PREVENTED

## 2025-03-02 ASSESSMENT — PAIN DESCRIPTION - ORIENTATION
ORIENTATION: RIGHT;LEFT
ORIENTATION: RIGHT;LEFT

## 2025-03-02 ASSESSMENT — PAIN SCALES - GENERAL
PAINLEVEL_OUTOF10: 3
PAINLEVEL_OUTOF10: 0
PAINLEVEL_OUTOF10: 3
PAINLEVEL_OUTOF10: 5
PAINLEVEL_OUTOF10: 0
PAINLEVEL_OUTOF10: 0
PAINLEVEL_OUTOF10: 2

## 2025-03-02 ASSESSMENT — PAIN DESCRIPTION - LOCATION
LOCATION: KNEE

## 2025-03-02 ASSESSMENT — PAIN DESCRIPTION - DESCRIPTORS
DESCRIPTORS: ACHING
DESCRIPTORS: ACHING

## 2025-03-02 NOTE — PLAN OF CARE
Problem: Chronic Conditions and Co-morbidities  Goal: Patient's chronic conditions and co-morbidity symptoms are monitored and maintained or improved  2/24/2025 0108 by Domenica Ritchie RN  Outcome: Progressing  Problem: Discharge Planning  Goal: Discharge to home or other facility with appropriate resources  Outcome: Progressing  Flowsheets (Taken 2/24/2025 0108)  Discharge to home or other facility with appropriate resources:   Identify barriers to discharge with patient and caregiver   Arrange for needed discharge resources and transportation as appropriate   Identify discharge learning needs (meds, wound care, etc)   Arrange for interpreters to assist at discharge as needed   Refer to discharge planning if patient needs post-hospital services based on physician order or complex needs related to functional status, cognitive ability or social support system     Problem: ABCDS Injury Assessment  Goal: Absence of physical injury  Outcome: Progressing  Flowsheets (Taken 2/24/2025 0108)  Absence of Physical Injury: Implement safety measures based on patient assessment     Problem: Pain  Goal: Verbalizes/displays adequate comfort level or baseline comfort level  Outcome: Progressing  Flowsheets (Taken 2/24/2025 0108)  Verbalizes/displays adequate comfort level or baseline comfort level:   Assess pain using appropriate pain scale   Encourage patient to monitor pain and request assistance   Administer analgesics based on type and severity of pain and evaluate response   Implement non-pharmacological measures as appropriate and evaluate response   Consider cultural and social influences on pain and pain management   Notify Licensed Independent Practitioner if interventions unsuccessful or patient reports new pain     Problem: Safety - Adult  Goal: Free from fall injury  Flowsheets (Taken 2/24/2025 0108)  Free From Fall Injury: Instruct family/caregiver on patient safety     Flowsheets (Taken 2/24/2025 0108)  Care 
  Problem: Chronic Conditions and Co-morbidities  Goal: Patient's chronic conditions and co-morbidity symptoms are monitored and maintained or improved  2/24/2025 1051 by Aleida Thakkar RN  Outcome: Not Progressing  2/24/2025 0108 by Domenica Ritchie RN  Outcome: Progressing  Flowsheets (Taken 2/24/2025 0108)  Care Plan - Patient's Chronic Conditions and Co-Morbidity Symptoms are Monitored and Maintained or Improved:   Monitor and assess patient's chronic conditions and comorbid symptoms for stability, deterioration, or improvement   Collaborate with multidisciplinary team to address chronic and comorbid conditions and prevent exacerbation or deterioration   Update acute care plan with appropriate goals if chronic or comorbid symptoms are exacerbated and prevent overall improvement and discharge     Problem: Chronic Conditions and Co-morbidities  Goal: Patient's chronic conditions and co-morbidity symptoms are monitored and maintained or improved  2/24/2025 1051 by Aleida Thakkar RN  Outcome: Not Progressing  2/24/2025 0108 by Domenica Ritchie RN  Outcome: Progressing  Flowsheets (Taken 2/24/2025 0108)  Care Plan - Patient's Chronic Conditions and Co-Morbidity Symptoms are Monitored and Maintained or Improved:   Monitor and assess patient's chronic conditions and comorbid symptoms for stability, deterioration, or improvement   Collaborate with multidisciplinary team to address chronic and comorbid conditions and prevent exacerbation or deterioration   Update acute care plan with appropriate goals if chronic or comorbid symptoms are exacerbated and prevent overall improvement and discharge     
  Problem: Chronic Conditions and Co-morbidities  Goal: Patient's chronic conditions and co-morbidity symptoms are monitored and maintained or improved  2/26/2025 0209 by Malaika Coleman RN  Outcome: Progressing  Flowsheets (Taken 2/26/2025 0209)  Care Plan - Patient's Chronic Conditions and Co-Morbidity Symptoms are Monitored and Maintained or Improved:   Monitor and assess patient's chronic conditions and comorbid symptoms for stability, deterioration, or improvement   Collaborate with multidisciplinary team to address chronic and comorbid conditions and prevent exacerbation or deterioration   Update acute care plan with appropriate goals if chronic or comorbid symptoms are exacerbated and prevent overall improvement and discharge     Problem: Discharge Planning  Goal: Discharge to home or other facility with appropriate resources  Outcome: Progressing  Flowsheets (Taken 2/26/2025 0209)  Discharge to home or other facility with appropriate resources:   Identify barriers to discharge with patient and caregiver   Identify discharge learning needs (meds, wound care, etc)   Arrange for interpreters to assist at discharge as needed   Refer to discharge planning if patient needs post-hospital services based on physician order or complex needs related to functional status, cognitive ability or social support system     Problem: Safety - Adult  Goal: Free from fall injury  Outcome: Progressing  Flowsheets (Taken 2/26/2025 0209)  Free From Fall Injury: Instruct family/caregiver on patient safety  Note: Fall protocol in place      Problem: Pain  Goal: Verbalizes/displays adequate comfort level or baseline comfort level  2/26/2025 0209 by Malaika Coleman RN  Outcome: Progressing  Flowsheets (Taken 2/26/2025 0209)  Verbalizes/displays adequate comfort level or baseline comfort level:   Encourage patient to monitor pain and request assistance   Assess pain using appropriate pain scale   Administer 
  Problem: Chronic Conditions and Co-morbidities  Goal: Patient's chronic conditions and co-morbidity symptoms are monitored and maintained or improved  2/26/2025 1525 by Lindy Ng RN  Outcome: Progressing     Problem: Discharge Planning  Goal: Discharge to home or other facility with appropriate resources  2/26/2025 1525 by Lindy Ng RN  Outcome: Progressing     Problem: Safety - Adult  Goal: Free from fall injury  2/26/2025 1525 by Lindy Ng RN  Outcome: Progressing     Problem: ABCDS Injury Assessment  Goal: Absence of physical injury  Outcome: Progressing     Problem: Pain  Goal: Verbalizes/displays adequate comfort level or baseline comfort level  2/26/2025 1525 by Lindy Ng RN  Outcome: Progressing     Problem: Respiratory - Adult  Goal: Achieves optimal ventilation and oxygenation  2/26/2025 1525 by Lindy Ng RN  Outcome: Progressing  Flowsheets (Taken 2/26/2025 1345)  Achieves optimal ventilation and oxygenation: Assess for changes in respiratory status     Problem: Safety - Medical Restraint  Goal: Remains free of injury from restraints (Restraint for Interference with Medical Device)  Description: INTERVENTIONS:  1. Determine that other, less restrictive measures have been tried or would not be effective before applying the restraint  2. Evaluate the patient's condition at the time of restraint application  3. Inform patient/family regarding the reason for restraint  4. Q2H: Monitor safety, psychosocial status, comfort, nutrition and hydration  Outcome: Progressing  Flowsheets  Taken 2/26/2025 1400 by Kailey Barber RN  Remains free of injury from restraints (restraint for interference with medical device):   Determine that other, less restrictive measures have been tried or would not be effective before applying the restraint   Evaluate the patient's condition at the time of restraint application   Inform patient/family regarding the reason for restraint   Every 2 
  Problem: Chronic Conditions and Co-morbidities  Goal: Patient's chronic conditions and co-morbidity symptoms are monitored and maintained or improved  2/28/2025 0236 by Erica Albarado RN  Outcome: Progressing     Problem: Discharge Planning  Goal: Discharge to home or other facility with appropriate resources  Outcome: Progressing     Problem: Safety - Adult  Goal: Free from fall injury  2/28/2025 0236 by Erica Albarado, RN  Outcome: Progressing     Problem: ABCDS Injury Assessment  Goal: Absence of physical injury  2/28/2025 0236 by Erica Albarado, RN  Outcome: Progressing     Problem: Respiratory - Adult  Goal: Achieves optimal ventilation and oxygenation  2/28/2025 0236 by Erica Albarado RN  Outcome: Progressing  Flowsheets  Taken 2/28/2025 0000  Achieves optimal ventilation and oxygenation:   Assess for changes in respiratory status   Assess for changes in mentation and behavior   Position to facilitate oxygenation and minimize respiratory effort   Initiate smoking cessation protocol as indicated   Assess the need for suctioning and aspirate as needed   Respiratory therapy support as indicated   Oxygen supplementation based on oxygen saturation or arterial blood gases     Problem: Safety - Medical Restraint  Goal: Remains free of injury from restraints (Restraint for Interference with Medical Device)  Description: INTERVENTIONS:  1. Determine that other, less restrictive measures have been tried or would not be effective before applying the restraint  2. Evaluate the patient's condition at the time of restraint application  3. Inform patient/family regarding the reason for restraint  4. Q2H: Monitor safety, psychosocial status, comfort, nutrition and hydration  2/28/2025 0236 by Erica Albarado, RN  Outcome: Progressing     Problem: Skin/Tissue Integrity  Goal: Skin integrity remains intact  Description: 1.  Monitor for areas of redness and/or skin breakdown  2.  Assess vascular access sites hourly  3.  Every 4-6 hours 
  Problem: Chronic Conditions and Co-morbidities  Goal: Patient's chronic conditions and co-morbidity symptoms are monitored and maintained or improved  3/1/2025 0054 by Erica Albarado RN  Outcome: Progressing     Problem: Discharge Planning  Goal: Discharge to home or other facility with appropriate resources  3/1/2025 0054 by Erica Albarado RN  Outcome: Progressing    Problem: Safety - Adult  Goal: Free from fall injury  3/1/2025 0054 by Erica Albarado RN  Outcome: Progressing     Problem: ABCDS Injury Assessment  Goal: Absence of physical injury  3/1/2025 0054 by Erica Albarado RN  Outcome: Progressing     Problem: Pain  Goal: Verbalizes/displays adequate comfort level or baseline comfort level  3/1/2025 0054 by Erica Albarado RN  Outcome: Progressing     Problem: Respiratory - Adult  Goal: Achieves optimal ventilation and oxygenation  3/1/2025 0054 by Erica Albarado RN  Outcome: Progressing      Problem: Skin/Tissue Integrity  Goal: Skin integrity remains intact  Description: 1.  Monitor for areas of redness and/or skin breakdown  2.  Assess vascular access sites hourly  3.  Every 4-6 hours minimum:  Change oxygen saturation probe site  4.  Every 4-6 hours:  If on nasal continuous positive airway pressure, respiratory therapy assess nares and determine need for appliance change or resting period  3/1/2025 0054 by Erica Albarado RN  Outcome: Progressing  
  Problem: Chronic Conditions and Co-morbidities  Goal: Patient's chronic conditions and co-morbidity symptoms are monitored and maintained or improved  3/2/2025 1325 by Anna Shukla RN  Outcome: Progressing  3/2/2025 0303 by Subhash Britt RN  Outcome: Progressing  Flowsheets (Taken 3/1/2025 2000)  Care Plan - Patient's Chronic Conditions and Co-Morbidity Symptoms are Monitored and Maintained or Improved: Monitor and assess patient's chronic conditions and comorbid symptoms for stability, deterioration, or improvement     Problem: Discharge Planning  Goal: Discharge to home or other facility with appropriate resources  Outcome: Progressing     Problem: Safety - Adult  Goal: Free from fall injury  3/2/2025 1325 by Anna Shukla RN  Outcome: Progressing  3/2/2025 0303 by Subhash Britt RN  Outcome: Progressing  Flowsheets (Taken 3/1/2025 2000)  Free From Fall Injury: Instruct family/caregiver on patient safety     Problem: ABCDS Injury Assessment  Goal: Absence of physical injury  3/2/2025 1325 by Anna Shukla RN  Outcome: Progressing  3/2/2025 0303 by Subhash Britt RN  Outcome: Progressing  Flowsheets (Taken 3/1/2025 2000)  Absence of Physical Injury: Implement safety measures based on patient assessment     Problem: Pain  Goal: Verbalizes/displays adequate comfort level or baseline comfort level  3/2/2025 1325 by Anna Shukla RN  Outcome: Progressing  3/2/2025 0303 by Subhash Britt RN  Outcome: Progressing  Flowsheets  Taken 3/2/2025 0000 by Subhash Britt RN  Verbalizes/displays adequate comfort level or baseline comfort level: Encourage patient to monitor pain and request assistance  Taken 3/1/2025 1614 by Oumar Hudson RN  Verbalizes/displays adequate comfort level or baseline comfort level: Encourage patient to monitor pain and request assistance     Problem: Respiratory - Adult  Goal: Achieves optimal ventilation and oxygenation  3/2/2025 1325 by Anna Shukla RN  Outcome: 
  Problem: Chronic Conditions and Co-morbidities  Goal: Patient's chronic conditions and co-morbidity symptoms are monitored and maintained or improved  Outcome: Not Progressing     Problem: Chronic Conditions and Co-morbidities  Goal: Patient's chronic conditions and co-morbidity symptoms are monitored and maintained or improved  Outcome: Not Progressing     
  Problem: Chronic Conditions and Co-morbidities  Goal: Patient's chronic conditions and co-morbidity symptoms are monitored and maintained or improved  Outcome: Progressing  Flowsheets (Taken 3/1/2025 2000)  Care Plan - Patient's Chronic Conditions and Co-Morbidity Symptoms are Monitored and Maintained or Improved: Monitor and assess patient's chronic conditions and comorbid symptoms for stability, deterioration, or improvement     Problem: Safety - Adult  Goal: Free from fall injury  Outcome: Progressing  Flowsheets (Taken 3/1/2025 2000)  Free From Fall Injury: Instruct family/caregiver on patient safety     Problem: ABCDS Injury Assessment  Goal: Absence of physical injury  Outcome: Progressing  Flowsheets (Taken 3/1/2025 2000)  Absence of Physical Injury: Implement safety measures based on patient assessment     Problem: Respiratory - Adult  Goal: Achieves optimal ventilation and oxygenation  Outcome: Progressing  Flowsheets (Taken 3/1/2025 2000)  Achieves optimal ventilation and oxygenation: Assess for changes in respiratory status     Problem: Skin/Tissue Integrity  Goal: Skin integrity remains intact  Description: 1.  Monitor for areas of redness and/or skin breakdown  2.  Assess vascular access sites hourly  3.  Every 4-6 hours minimum:  Change oxygen saturation probe site  4.  Every 4-6 hours:  If on nasal continuous positive airway pressure, respiratory therapy assess nares and determine need for appliance change or resting period  Outcome: Progressing  Flowsheets (Taken 3/1/2025 2000)  Skin Integrity Remains Intact: Monitor for areas of redness and/or skin breakdown     Problem: Infection - Adult  Goal: Absence of infection at discharge  Outcome: Progressing  Goal: Absence of infection during hospitalization  Outcome: Progressing  Goal: Absence of fever/infection during anticipated neutropenic period  Outcome: Progressing     
  Problem: Pain  Goal: Verbalizes/displays adequate comfort level or baseline comfort level  Outcome: Progressing     Problem: ABCDS Injury Assessment  Goal: Absence of physical injury  Outcome: Progressing     Problem: Safety - Adult  Goal: Free from fall injury  Outcome: Progressing     Problem: Discharge Planning  Goal: Discharge to home or other facility with appropriate resources  Outcome: Progressing     Problem: Chronic Conditions and Co-morbidities  Goal: Patient's chronic conditions and co-morbidity symptoms are monitored and maintained or improved  Outcome: Progressing     
[facility] Hospitalist brief note  Consult received.  Case reviewed with ER physician    Full note to follow.    Lamar Mondragon MD    Thanks  Rosmery Polanco MD    
RN  Remains free of injury from restraints (restraint for interference with medical device): Determine that other, less restrictive measures have been tried or would not be effective before applying the restraint  Problem: Skin/Tissue Integrity  Goal: Skin integrity remains intact  Description: 1.  Monitor for areas of redness and/or skin breakdown  2.  Assess vascular access sites hourly  3.  Every 4-6 hours minimum:  Change oxygen saturation probe site  4.  Every 4-6 hours:  If on nasal continuous positive airway pressure, respiratory therapy assess nares and determine need for appliance change or resting period  2/27/2025 1544 by Jeanette Manzo, RN  Outcome: Progressing  Flowsheets (Taken 2/27/2025 0800)  Skin Integrity Remains Intact: Monitor for areas of redness and/or skin breakdown     
hygiene including cough, deep breathe, incentive spirometry   Assess and instruct to report shortness of breath or any respiratory difficulty     
tried or would not be effective before applying the restraint  Remains free of injury from restraints (restraint for interference with medical device): Every 2 hours: Monitor safety, psychosocial status, comfort, nutrition and hydration  Remains free of injury from restraints (restraint for interference with medical device): Every 2 hours: Monitor safety, psychosocial status, comfort, nutrition and hydration   Achieves optimal ventilation and oxygenation:   Assess for changes in respiratory status   Assess for changes in mentation and behavior   Position to facilitate oxygenation and minimize respiratory effort  Problem: Skin/Tissue Integrity  Goal: Skin integrity remains intact  Description: 1.  Monitor for areas of redness and/or skin breakdown  2.  Assess vascular access sites hourly  3.  Every 4-6 hours minimum:  Change oxygen saturation probe site  4.  Every 4-6 hours:  If on nasal continuous positive airway pressure, respiratory therapy assess nares and determine need for appliance change or resting period  2/28/2025 1111 by Jeanette Manzo, RN  Outcome: Progressing      Initiate smoking cessation protocol as indicated   Assess the need for suctioning and aspirate as needed   Respiratory therapy support as indicated   Oxygen supplementation based on oxygen saturation or arterial blood gases     
with Medical Device)  Description: INTERVENTIONS:  1. Determine that other, less restrictive measures have been tried or would not be effective before applying the restraint  2. Evaluate the patient's condition at the time of restraint application  3. Inform patient/family regarding the reason for restraint  4. Q2H: Monitor safety, psychosocial status, comfort, nutrition and hydration  Outcome: Progressing     Problem: Skin/Tissue Integrity  Goal: Skin integrity remains intact  Description: 1.  Monitor for areas of redness and/or skin breakdown  2.  Assess vascular access sites hourly  3.  Every 4-6 hours minimum:  Change oxygen saturation probe site  4.  Every 4-6 hours:  If on nasal continuous positive airway pressure, respiratory therapy assess nares and determine need for appliance change or resting period  3/1/2025 1037 by Oumar Hudson RN  Outcome: Progressing  Flowsheets  Taken 3/1/2025 1036  Skin Integrity Remains Intact: Monitor for areas of redness and/or skin breakdown  Taken 3/1/2025 0800  Skin Integrity Remains Intact: Monitor for areas of redness and/or skin breakdown     
hours: Monitor safety, psychosocial status, comfort, nutrition and hydration     Problem: Skin/Tissue Integrity  Goal: Skin integrity remains intact  Description: 1.  Monitor for areas of redness and/or skin breakdown  2.  Assess vascular access sites hourly  3.  Every 4-6 hours minimum:  Change oxygen saturation probe site  4.  Every 4-6 hours:  If on nasal continuous positive airway pressure, respiratory therapy assess nares and determine need for appliance change or resting period  2/27/2025 0508 by Alex Bailey, RN  Outcome: Progressing  2/26/2025 1525 by Lindy Ng, RN  Outcome: Progressing

## 2025-03-02 NOTE — DISCHARGE SUMMARY
respirations and required reintubation with continued monitoring in the ICU.  Her breathing status gradually improved and she was ultimately extubated the morning of 02/28 without complication, and transitioned to oxygen via nasal prongs at 5 to 8 L/min.  Ultimately her infectious workup was unremarkable, and no organism was isolated from respiratory panel PCR and bronchoalveolar lavage.  Prednisone was decreased to her home dose at 50 mg once daily as of 03/01.     Volume Overload    Patient presented with grossly edematous lower extremities and was gradually diuresed over the course of her hospitalization, ultimately resulting in close to 5 L net recorded fluid loss.  Diuresis was done via torsemide, and patient was able to tolerate this with minimal creatinine elevation.  As of 03/01 patient was determined to be close to her dry weight and resumed on home torsemide 20 mg daily from discharge.     She was medically cleared for discharge on 03/03/2025 with the following medication changes:     -Restart Torsemide 20mg once daily from 03/06/2025  -Start Nicotine patch once daily for smoking cessation       Physical Exam:  /61   Pulse (!) 103   Temp 98.2 °F (36.8 °C) (Axillary)   Resp 24   Ht 1.753 m (5' 9\")   Wt 103 kg (227 lb 1.2 oz)   LMP 03/09/2014   SpO2 96%   BMI 33.53 kg/m²     Physical Exam  Vitals and nursing note reviewed.   Constitutional:       Appearance: Normal appearance. She is obese.   HENT:      Head: Normocephalic and atraumatic.      Nose: Nose normal.      Mouth/Throat:      Mouth: Mucous membranes are moist.      Pharynx: Oropharynx is clear.   Eyes:      Extraocular Movements: Extraocular movements intact.      Conjunctiva/sclera: Conjunctivae normal.      Pupils: Pupils are equal, round, and reactive to light.   Cardiovascular:      Rate and Rhythm: Regular rhythm. Tachycardia present.      Pulses: Normal pulses.      Heart sounds: Normal heart sounds.   Pulmonary:      Effort:

## 2025-03-03 VITALS
RESPIRATION RATE: 24 BRPM | HEART RATE: 103 BPM | HEIGHT: 69 IN | SYSTOLIC BLOOD PRESSURE: 126 MMHG | DIASTOLIC BLOOD PRESSURE: 61 MMHG | WEIGHT: 227.07 LBS | OXYGEN SATURATION: 96 % | BODY MASS INDEX: 33.63 KG/M2 | TEMPERATURE: 98.2 F

## 2025-03-03 LAB
ALBUMIN SERPL-MCNC: 3.1 G/DL (ref 3.4–5)
ANION GAP SERPL CALCULATED.3IONS-SCNC: 9 MMOL/L (ref 3–16)
BASOPHILS # BLD: 0 K/UL (ref 0–0.2)
BASOPHILS NFR BLD: 0 %
BUN SERPL-MCNC: 93 MG/DL (ref 7–20)
CALCIUM SERPL-MCNC: 8.3 MG/DL (ref 8.3–10.6)
CHLORIDE SERPL-SCNC: 99 MMOL/L (ref 99–110)
CO2 SERPL-SCNC: 32 MMOL/L (ref 21–32)
CREAT SERPL-MCNC: 2.2 MG/DL (ref 0.6–1.2)
DEPRECATED RDW RBC AUTO: 16.1 % (ref 12.4–15.4)
EOSINOPHIL # BLD: 0 K/UL (ref 0–0.6)
EOSINOPHIL NFR BLD: 0.2 %
GFR SERPLBLD CREATININE-BSD FMLA CKD-EPI: 25 ML/MIN/{1.73_M2}
GLUCOSE BLD-MCNC: 114 MG/DL (ref 70–99)
GLUCOSE BLD-MCNC: 336 MG/DL (ref 70–99)
GLUCOSE SERPL-MCNC: 160 MG/DL (ref 70–99)
HCT VFR BLD AUTO: 32 % (ref 36–48)
HGB BLD-MCNC: 10.4 G/DL (ref 12–16)
LYMPHOCYTES # BLD: 1 K/UL (ref 1–5.1)
LYMPHOCYTES NFR BLD: 8.7 %
MAGNESIUM SERPL-MCNC: 2.11 MG/DL (ref 1.8–2.4)
MCH RBC QN AUTO: 30.1 PG (ref 26–34)
MCHC RBC AUTO-ENTMCNC: 32.4 G/DL (ref 31–36)
MCV RBC AUTO: 92.9 FL (ref 80–100)
MONOCYTES # BLD: 0.5 K/UL (ref 0–1.3)
MONOCYTES NFR BLD: 4.5 %
NEUTROPHILS # BLD: 10.2 K/UL (ref 1.7–7.7)
NEUTROPHILS NFR BLD: 86.6 %
PERFORMED ON: ABNORMAL
PERFORMED ON: ABNORMAL
PHOSPHATE SERPL-MCNC: 4.1 MG/DL (ref 2.5–4.9)
PLATELET # BLD AUTO: 258 K/UL (ref 135–450)
PMV BLD AUTO: 7.7 FL (ref 5–10.5)
POTASSIUM SERPL-SCNC: 4 MMOL/L (ref 3.5–5.1)
RBC # BLD AUTO: 3.45 M/UL (ref 4–5.2)
SLIDE REVIEW: ABNORMAL
SODIUM SERPL-SCNC: 140 MMOL/L (ref 136–145)
WBC # BLD AUTO: 11.8 K/UL (ref 4–11)

## 2025-03-03 PROCEDURE — 6370000000 HC RX 637 (ALT 250 FOR IP): Performed by: INTERNAL MEDICINE

## 2025-03-03 PROCEDURE — 97530 THERAPEUTIC ACTIVITIES: CPT

## 2025-03-03 PROCEDURE — 6370000000 HC RX 637 (ALT 250 FOR IP)

## 2025-03-03 PROCEDURE — 99232 SBSQ HOSP IP/OBS MODERATE 35: CPT | Performed by: INTERNAL MEDICINE

## 2025-03-03 PROCEDURE — 94761 N-INVAS EAR/PLS OXIMETRY MLT: CPT

## 2025-03-03 PROCEDURE — 97116 GAIT TRAINING THERAPY: CPT

## 2025-03-03 PROCEDURE — 94640 AIRWAY INHALATION TREATMENT: CPT

## 2025-03-03 PROCEDURE — 97162 PT EVAL MOD COMPLEX 30 MIN: CPT

## 2025-03-03 PROCEDURE — 6360000002 HC RX W HCPCS: Performed by: INTERNAL MEDICINE

## 2025-03-03 PROCEDURE — 2500000003 HC RX 250 WO HCPCS: Performed by: INTERNAL MEDICINE

## 2025-03-03 PROCEDURE — 36591 DRAW BLOOD OFF VENOUS DEVICE: CPT

## 2025-03-03 PROCEDURE — 94669 MECHANICAL CHEST WALL OSCILL: CPT

## 2025-03-03 PROCEDURE — 97166 OT EVAL MOD COMPLEX 45 MIN: CPT

## 2025-03-03 PROCEDURE — 2700000000 HC OXYGEN THERAPY PER DAY

## 2025-03-03 PROCEDURE — 97535 SELF CARE MNGMENT TRAINING: CPT

## 2025-03-03 PROCEDURE — 6370000000 HC RX 637 (ALT 250 FOR IP): Performed by: NURSE PRACTITIONER

## 2025-03-03 PROCEDURE — 83735 ASSAY OF MAGNESIUM: CPT

## 2025-03-03 PROCEDURE — 2580000003 HC RX 258: Performed by: INTERNAL MEDICINE

## 2025-03-03 PROCEDURE — 85025 COMPLETE CBC W/AUTO DIFF WBC: CPT

## 2025-03-03 PROCEDURE — 80069 RENAL FUNCTION PANEL: CPT

## 2025-03-03 RX ORDER — LOSARTAN POTASSIUM 25 MG/1
25 TABLET ORAL DAILY
Qty: 90 TABLET | Refills: 1 | Status: SHIPPED | OUTPATIENT
Start: 2025-03-03

## 2025-03-03 RX ORDER — BUDESONIDE 0.5 MG/2ML
0.5 INHALANT ORAL
Qty: 60 EACH | Refills: 3 | Status: CANCELLED | OUTPATIENT
Start: 2025-03-03

## 2025-03-03 RX ORDER — NICOTINE 21 MG/24HR
1 PATCH, TRANSDERMAL 24 HOURS TRANSDERMAL DAILY
Qty: 30 PATCH | Refills: 3 | Status: SHIPPED | OUTPATIENT
Start: 2025-03-03

## 2025-03-03 RX ORDER — ARFORMOTEROL TARTRATE 15 UG/2ML
15 SOLUTION RESPIRATORY (INHALATION)
Qty: 120 ML | Refills: 3 | Status: CANCELLED | OUTPATIENT
Start: 2025-03-03

## 2025-03-03 RX ORDER — AMLODIPINE BESYLATE 10 MG/1
10 TABLET ORAL DAILY
Qty: 90 TABLET | Refills: 0 | Status: SHIPPED | OUTPATIENT
Start: 2025-03-03

## 2025-03-03 RX ORDER — TORSEMIDE 20 MG/1
20 TABLET ORAL DAILY
Qty: 30 TABLET | Refills: 3 | Status: SHIPPED | OUTPATIENT
Start: 2025-03-06

## 2025-03-03 RX ORDER — PREDNISONE 50 MG/1
50 TABLET ORAL DAILY
Qty: 10 TABLET | Refills: 0 | Status: SHIPPED | OUTPATIENT
Start: 2025-03-03 | End: 2025-03-07 | Stop reason: SDUPTHER

## 2025-03-03 RX ADMIN — SERTRALINE HYDROCHLORIDE 50 MG: 50 TABLET ORAL at 10:00

## 2025-03-03 RX ADMIN — IPRATROPIUM BROMIDE AND ALBUTEROL SULFATE 1 DOSE: .5; 2.5 SOLUTION RESPIRATORY (INHALATION) at 11:41

## 2025-03-03 RX ADMIN — BUDESONIDE INHALATION 500 MCG: 0.5 SUSPENSION RESPIRATORY (INHALATION) at 08:13

## 2025-03-03 RX ADMIN — APIXABAN 2.5 MG: 5 TABLET, FILM COATED ORAL at 10:00

## 2025-03-03 RX ADMIN — Medication: at 08:45

## 2025-03-03 RX ADMIN — SODIUM CHLORIDE, PRESERVATIVE FREE 10 ML: 5 INJECTION INTRAVENOUS at 10:03

## 2025-03-03 RX ADMIN — CARVEDILOL 6.25 MG: 6.25 TABLET, FILM COATED ORAL at 10:00

## 2025-03-03 RX ADMIN — LETROZOLE 2.5 MG: 2.5 TABLET ORAL at 10:00

## 2025-03-03 RX ADMIN — IPRATROPIUM BROMIDE AND ALBUTEROL SULFATE 1 DOSE: .5; 2.5 SOLUTION RESPIRATORY (INHALATION) at 08:13

## 2025-03-03 RX ADMIN — INSULIN LISPRO 3 UNITS: 100 INJECTION, SOLUTION INTRAVENOUS; SUBCUTANEOUS at 12:44

## 2025-03-03 RX ADMIN — SODIUM CHLORIDE 30 MG/ML INHALATION SOLUTION 4 ML: 30 SOLUTION INHALANT at 08:13

## 2025-03-03 RX ADMIN — PREDNISONE 50 MG: 5 TABLET ORAL at 10:00

## 2025-03-03 RX ADMIN — ARFORMOTEROL TARTRATE 15 MCG: 15 SOLUTION RESPIRATORY (INHALATION) at 08:13

## 2025-03-03 ASSESSMENT — ENCOUNTER SYMPTOMS
COUGH: 1
WHEEZING: 1

## 2025-03-03 ASSESSMENT — PAIN SCALES - GENERAL: PAINLEVEL_OUTOF10: 2

## 2025-03-03 NOTE — CARE COORDINATION
Case Management Assessment  Initial Evaluation    Date/Time of Evaluation: 2/24/2025 11:42 AM  Assessment Completed by: REBECCA ESPINAL    If patient is discharged prior to next notation, then this note serves as note for discharge by case management.    Patient Name: Shoaib Rothman                   YOB: 1960  Diagnosis: COPD exacerbation (HCC) [J44.1]  Acute hypoxic on chronic hypercapnic respiratory failure (HCC) [J96.01, J96.12]                   Date / Time: 2/23/2025  4:21 AM    Patient Admission Status: Inpatient   Readmission Risk (Low < 19, Mod (19-27), High > 27): Readmission Risk Score: 25.2    Current PCP: Lamar Mondragon MD  PCP verified by CM? No    Chart Reviewed: Yes      History Provided by: Patient, Medical Record  Patient Orientation: Alert and Oriented    Patient Cognition: Alert    Hospitalization in the last 30 days (Readmission):  No    If yes, Readmission Assessment in CM Navigator will be completed.    Advance Directives:      Code Status: Full Code   Patient's Primary Decision Maker is: Legal Next of Kin      Discharge Planning:    Patient lives with: Other (Comment) (LTC staff) Type of Home: Long[facility]Term Care ([facility])  Primary Care Giver: Other (Comment) (LTC staff)  Patient Support Systems include: Family Members, Other (Comment) (LTC staff)   Current Financial resources: Medicaid  Current community resources: ECF/Home Care (LTC)  Current services prior to admission: Other (Comment) (LTC)            Current DME:              Type of Home Care services:  Aide Services, Nursing Services (LTC)    ADLS  Prior functional level: Assistance with the following:, Bathing, Dressing, Toileting, Cooking, Housework, Shopping, Mobility  Current functional level: Mobility, Shopping, Housework, Cooking, Toileting, Dressing, Bathing, Assistance with the following:    PT AM-PAC:   /24  OT AM-PAC:   /24    Family can provide assistance at DC: No  Would you like Case 
Chart review day 4- from [facility]. Plan return & no barriers. Bronch 2/26, intubated, attempting weaning trials. Pneumonia, resp failure. Cm will continue to follow for DCP needs.   
DISCHARGE PLANNING:  Chart reviewed.  Patient is from [facility] as a long term care resident. She  can return no pre-cert needed.    Discharge plan is to return when medically appropriate.    CM team will continue to follow.  Sandhya Lynch RN Case Manager  [phone]  
Discharge Planning:    CM continues to follow for DCP- transferred to ICU after requiring intubation post-bronch. Pt admitted from [facility]- pt can return upon discharge without need for precert. D/w attending MD- per Dr. Polanco, pt/family okay with intubation, and wish to remain full code at this time, however do not want trach. CM will continue to follow.     Thank you  Cat Abelardo RN, BSN,   ICU   [phone]    
Cost of Rx cannot be added to hospital bill. If financial assistance is needed, please contact unit  or ;  or  CANNOT provide pharmacy voucher for patients co-pays  5. Patients can then  the prescription on their way out of the hospital at discharge, or pharmacy can deliver to the bedside if staff is available. (payment due at time of pick-up or delivery - cash, check, or card accepted)     Able to afford home medications/ co-pay costs: Yes    ADLS:  Current PT AM-PAC Score: 20 /24  Current OT AM-PAC Score: 21 /24    DISCHARGE Disposition: Long Term Care Facility (LTC): [facility]  Phone: [phone]  Fax: [phone]    LOC at discharge: Long Term Care  JHOAN Completed: Yes    Notification completed in HENS/PAS?:  Not Applicable    IMM Completed:   Not Indicated due to: Medicaid         Transportation:  Transportation PLAN for discharge: EMS transportation   Mode of Transport: Ambulance stretcher - BLS  Reason for medical transport: Bed confined: Meets the following criteria 1) unable to get out of bed without assistance or ambulate, 2) unable to safely sit up in a wheelchair, 3) unable to maintain erect seating position in a chair for time needed for transport  Name of Transport Company: "[employer]" Transport  Phone: [phone]  Time of Transport: 1530    Transport form completed: Yes    Additional CM Notes: patient to dc back to LTC at [facility]. Patient aware and agreeable.     COVID Result:    Lab Results   Component Value Date/Time    COVID19 NOT DETECTED 02/23/2025 05:07 AM    COVID19 NOT DETECTED 09/22/2020 07:00 PM       The Plan for Transition of Care is related to the following treatment goals of COPD exacerbation (HCC) [J44.1]  Acute hypoxic on chronic hypercapnic respiratory failure (HCC) [J96.01, J96.12]    The Patient and/or patient representative Shoaib and her family were provided with a choice of provider

## 2025-03-03 NOTE — DISCHARGE INSTRUCTIONS
Dear Ms. Rothman,     You were brought to [facility] ER after having increased respiratory difficulty at your home facility, and you were found to have marked hypoxia with low oxygen saturation readings.  You were treated for a likely COPD exacerbation, and underwent a bronchoscopy which pulled out a large mucous plug.  He were briefly intubated for less than 2 days due to concern for respiratory difficulty after the bronchoscopy, and you were extubated on 02/28 successfully.  Since then you appear to be near your baseline respiratory status and you are medically cleared for discharge as of 03/03. We only made one adjustment to your home medications listed below    -HOLD the Torsemide 20mg once daily for 3 days (RESTART FROM THURSDAY 03/06/2025 AM)    -START Nicotine patch once daily for smoking cravings    We would also recommend you schedule a follow-up appointment with your PCP within a week .    If you experience an acute change please do not hesitate to visit our ER or call 911.     It was a pleasure to care for you,

## 2025-03-03 NOTE — PROGRESS NOTES
Pulmonary Followup Note    CC: Acute on chronic hypoxemic and hypercapnic respiratory failure, right lower lobe volume loss  Subjective:  Says she hasn't coughed anything up, but her O2 requirements are down to 30L vapotherm and 85% when she was on the AVAPS overnight.  Says the chest vest really helps her cough.    ROS:  Denies headache, nausea or chest pain.    24HR INTAKE/OUTPUT:    Intake/Output Summary (Last 24 hours) at 2025 0952  Last data filed at 2025 0538  Gross per 24 hour   Intake 570 ml   Output 1100 ml   Net -530 ml        sodium chloride (Inhalant)  4 mL Nebulization BID RT    bumetanide  2 mg IntraVENous BID    levofloxacin  750 mg IntraVENous Q48H    guaiFENesin  600 mg Oral BID    sodium chloride flush  5-40 mL IntraVENous 2 times per day    arformoterol tartrate  15 mcg Nebulization BID RT    budesonide  0.5 mg Nebulization BID RT    nicotine  1 patch TransDERmal Daily    [Held by provider] apixaban  2.5 mg Oral BID    carvedilol  6.25 mg Oral BID WC    rOPINIRole  0.25 mg Oral Nightly    sertraline  50 mg Oral Daily    temazepam  15 mg Oral Nightly    traZODone  25 mg Oral Nightly    insulin lispro  0-4 Units SubCUTAneous 4x Daily AC & HS    methylPREDNISolone  40 mg IntraVENous Q12H    ipratropium 0.5 mg-albuterol 2.5 mg  1 Dose Inhalation Q4H RT    letrozole  2.5 mg Oral Daily           PHYSICAL EXAMINATION:  BP (!) 156/69   Pulse 90   Temp 97.7 °F (36.5 °C) (Axillary)   Resp 18   Ht 1.753 m (5' 9\")   Wt 108.2 kg (238 lb 8.6 oz)   LMP 2014   SpO2 96%   BMI 35.23 kg/m²   CURRENT PULSE OXIMETRY:  SpO2: 96 %  24HR PULSE OXIMETRY RANGE:  SpO2  Av.7 %  Min: 87 %  Max: 100 %   O2 Flow Rate (L/min): (S) 30 L/min      Gen: Mild distress. Speaking in full sentences with trace accessory muscle use  HEENT: PERRL, EOMI, OP nl  Lung: Clear to auscultation on the left, decreased breath sounds on the right with egophony in the mid and lower 
                                       Pulmonary Followup Note    CC: Acute on chronic hypoxemic and hypercapnic respiratory failure, right lower lobe volume loss  Subjective:  Tolerated the transition to Vapotherm.  Says she feels better than when she came in but it is still far from baseline.  Cough is productive of sputum, but she still feels like she has a lot to get up.  Doing chest vest and hypertonic saline    ROS:  Denies headache, nausea or chest pain.    24HR INTAKE/OUTPUT:    Intake/Output Summary (Last 24 hours) at 2025 1132  Last data filed at 2025 1053  Gross per 24 hour   Intake 1310 ml   Output 2850 ml   Net -1540 ml        bumetanide  3 mg IntraVENous BID    [START ON 2025] levofloxacin  750 mg IntraVENous Q48H    guaiFENesin  600 mg Oral BID    acetylcysteine  600 mg Inhalation BID RT    sodium chloride flush  5-40 mL IntraVENous 2 times per day    arformoterol tartrate  15 mcg Nebulization BID RT    budesonide  0.5 mg Nebulization BID RT    nicotine  1 patch TransDERmal Daily    apixaban  2.5 mg Oral BID    carvedilol  6.25 mg Oral BID WC    rOPINIRole  0.25 mg Oral Nightly    sertraline  50 mg Oral Daily    temazepam  15 mg Oral Nightly    traZODone  25 mg Oral Nightly    insulin lispro  0-4 Units SubCUTAneous 4x Daily AC & HS    methylPREDNISolone  40 mg IntraVENous Q12H    ipratropium 0.5 mg-albuterol 2.5 mg  1 Dose Inhalation Q4H RT    sodium chloride (Inhalant)  4 mL Nebulization TID    letrozole  2.5 mg Oral Daily           PHYSICAL EXAMINATION:  BP (!) 164/100   Pulse (!) 107   Temp 97.9 °F (36.6 °C) (Oral)   Resp 20   Wt 114.3 kg (251 lb 15.8 oz)   LMP 2014   SpO2 95%   BMI 37.21 kg/m²   CURRENT PULSE OXIMETRY:  SpO2: 95 %  24HR PULSE OXIMETRY RANGE:  SpO2  Av.5 %  Min: 88 %  Max: 97 %   O2 Flow Rate (L/min): 40 L/min      Gen: Mild distress. Speaking in full sentences with trace accessory muscle use  HEENT: PERRL, EOMI, OP nl  Lung: Clear to auscultation 
     ICU Progress Note    Admit Date: 2/23/2025  ICU Day: 3  Vent Day: 3  IV Access:Peripheral  IV Fluids:None  Vasopressors:None                Antibiotics: none  Diet: Diet NPO    CC: respiratory failure    Interval history:   - no acute events overnight  - on fentanyl 50 and propofol 10 this morning, alert, moving all extremities and following commands  - vent: FiO2 50, PEEP 6, MV 12   - VSS, labs unremarkable  - Plan for SBT today     HPI:   64 year old female with PMHx of asthma/COPD (on 5L oxygen at home), PE on apixaban, FSGS with solitary kidney, CKD3, Type II DM, CHRISTA, breast cancer on hormone therapy who presented to the ED with shortness of breath, admitted for respiratory failure secondary to community acquired pneumonia and mucous plugging. Patient was initially 40 L vapotherm, CXR showed right sided lung collapse. Bronchoscopy done on 2/26 with therapeutic aspiration and lavage on the right. Post procedure, patient was hypoxic during extubation and was re intubated and transferred to the ICU floor for further monitoring.     Medications:     Scheduled Meds:   torsemide  100 mg Oral Daily    insulin lispro  0-4 Units SubCUTAneous Q6H    pantoprazole  40 mg IntraVENous Daily    balsum peru-castor oil   Topical BID    sodium chloride (Inhalant)  4 mL Nebulization BID RT    guaiFENesin  600 mg Oral BID    sodium chloride flush  5-40 mL IntraVENous 2 times per day    arformoterol tartrate  15 mcg Nebulization BID RT    budesonide  0.5 mg Nebulization BID RT    nicotine  1 patch TransDERmal Daily    apixaban  2.5 mg Oral BID    carvedilol  6.25 mg Oral BID WC    rOPINIRole  0.25 mg Oral Nightly    sertraline  50 mg Oral Daily    temazepam  15 mg Oral Nightly    traZODone  25 mg Oral Nightly    methylPREDNISolone  40 mg IntraVENous Q12H    ipratropium 0.5 mg-albuterol 2.5 mg  1 Dose Inhalation Q4H RT    letrozole  2.5 mg Oral Daily     Continuous Infusions:   propofol 15 mcg/kg/min (02/28/25 0600)    
     ICU Progress Note    Admit Date: 2/23/2025  ICU Day: 4  Vent Day: extubated 2/28  IV Access:Peripheral  IV Fluids:None  Vasopressors:None                Antibiotics: none  Diet: ADULT DIET; Regular    CC: respiratory failure    Interval history:   No acute events overnight. Patient is doing well post-extubation, she is looking quite comfortable on 10L HFNC. She denies dyspnea, ate her full breakfast. Will plan for transfer out of ICU today.     HPI:   64 year old female with PMHx of asthma/COPD (on 5L oxygen at home), PE on apixaban, FSGS with solitary kidney, CKD3, Type II DM, CHRISTA, breast cancer on hormone therapy who presented to the ED with shortness of breath, admitted for respiratory failure secondary to community acquired pneumonia and mucous plugging. Patient was initially 40 L vapotherm, CXR showed right sided lung collapse. Bronchoscopy done on 2/26 with therapeutic aspiration and lavage on the right. Post procedure, patient was hypoxic during extubation and was re intubated and transferred to the ICU floor for further monitoring.     Medications:     Scheduled Meds:   magnesium sulfate  2,000 mg IntraVENous Once    torsemide  20 mg Oral Daily    methylPREDNISolone  40 mg IntraVENous Once    pantoprazole  20 mg Oral QAM AC    ipratropium 0.5 mg-albuterol 2.5 mg  1 Dose Inhalation Q4H WA RT    insulin lispro  0-4 Units SubCUTAneous Q6H    balsum peru-castor oil   Topical BID    sodium chloride (Inhalant)  4 mL Nebulization BID RT    sodium chloride flush  5-40 mL IntraVENous 2 times per day    arformoterol tartrate  15 mcg Nebulization BID RT    budesonide  0.5 mg Nebulization BID RT    nicotine  1 patch TransDERmal Daily    apixaban  2.5 mg Oral BID    carvedilol  6.25 mg Oral BID WC    rOPINIRole  0.25 mg Oral Nightly    sertraline  50 mg Oral Daily    temazepam  15 mg Oral Nightly    traZODone  25 mg Oral Nightly    letrozole  2.5 mg Oral Daily     Continuous Infusions:   sodium chloride 25 mL/hr at 
    V2.0    [facility] Progress Note      Name:  Shoaib Rothman /Age/Sex: 1960  (64 y.o. female)   MRN & CSN:  [id number] & [id number] Encounter Date/Time: 2025 8:16 AM EST   Location:  [address] [place], [place] [address] PCP: Lamar Mondragon MD     Attending:Rosmery Polanco MD       Hospital Day: 6    Assessment and Recommendations   Shoaib Rothman is a 64 y.o. 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   FSGS and solitary kidney admitted for respiratory failure secondary to community-acquired pneumonia and COPD with acute exacerbation     Active Hospital Problems    Diagnosis Date Noted    Electrolyte imbalance [E87.8] 2025    Hypervolemia [E87.70] 2025    Acute hypoxic on chronic hypercapnic respiratory failure (HCC) [J96.01, J96.12] 2025     Acute on chronic respiratory with hypoxemia and hypercapnia due to COPD with acute exacerbation and community-acquired pneumonia.  Continue IV Solu-Medrol 40 mg twice daily, scheduled breathing treatments.    Weaned off BiPAP now on Vapotherm 40 L 100% FiO2  Dr. Tyler assisting in care of this patient, started on hypertonic saline and vest therapy  Continue Levaquin 750 mg   s/p bronchoscopy large amounts of mucous plugs were removed intubated sedated postprocedure, will wean this morning  Continue pulmonary toilet  , weaned off to 12 L high flow  Continue to wean off oxygen for SpO2 88% or above     CKD stage III, solitary kidney history of seizures on chronically prednisone  Volume overload suspected underlying nephrotic syndrome  Diuretics per nephrology     Type 2 diabetes  Insulin is high risk medication with narrow therapeutic index, reviewed BG trend and adjusted insulin doses  Hypoglycemia protocol in place  Carb-controlled diet     CHRISTA on CPAP    
    V2.0    [facility] Progress Note      Name:  Shoaib Rothman /Age/Sex: 1960  (64 y.o. female)   MRN & CSN:  [id number] & [id number] Encounter Date/Time: 2025 8:16 AM EST   Location:  [facility] PCP: Lamar Mondragon MD     Attending:Rosmery Polanco MD       Hospital Day: 2    Assessment and Recommendations   Shoaib Rothman is a 64 y.o. 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   FSGS and solitary kidney admitted for respiratory failure secondary to community-acquired pneumonia and COPD with acute exacerbation     Active Hospital Problems    Diagnosis Date Noted    Acute hypoxic on chronic hypercapnic respiratory failure (HCC) [J96.01, J96.12] 2025     Acute on chronic respiratory with hypoxemia and hypercapnia due to COPD with acute exacerbation and community-acquired pneumonia.  Continue IV Solu-Medrol 40 mg twice daily, scheduled breathing treatments.    Weaned off BiPAP now on Vapotherm 40 L 100% FiO2  Dr. Tyler assisting in care of this patient, started on hypertonic saline and vest therapy  Antibiotic changed, patient had some facial swelling with Rocephin, she has a prior allergy to penicillins and cefepime.  We will change to Levaquin 750 mg.     CKD stage III, solitary kidney history of seizures on chronically prednisone  Volume overload suspected underlying nephrotic syndrome, will ask nephrology to see the patient  Discussed with Dr. Chávez, continue bumetanide     Type 2 diabetes  Insulin is high risk medication with narrow therapeutic index, reviewed BG trend and adjusted insulin doses  Hypoglycemia protocol in place  Carb-controlled diet     CHRISTA on CPAP      History of DVT and PE on apixaban for anticoagulation      Diet ADULT DIET; Regular   DVT Prophylaxis [] Lovenox, []  Heparin, 
    V2.0    [facility] Progress Note      Name:  Shoaib Rothman /Age/Sex: 1960  (64 y.o. female)   MRN & CSN:  [id number] & [id number] Encounter Date/Time: 2025 8:16 AM EST   Location:  [facility] PCP: Lamar Mondragon MD     Attending:Rosmery Polanco MD       Hospital Day: 5    Assessment and Recommendations   Shoaib Rothman is a 64 y.o. 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   FSGS and solitary kidney admitted for respiratory failure secondary to community-acquired pneumonia and COPD with acute exacerbation     Active Hospital Problems    Diagnosis Date Noted    Electrolyte imbalance [E87.8] 2025    Hypervolemia [E87.70] 2025    Acute hypoxic on chronic hypercapnic respiratory failure (HCC) [J96.01, J96.12] 2025     Acute on chronic respiratory with hypoxemia and hypercapnia due to COPD with acute exacerbation and community-acquired pneumonia.  Continue IV Solu-Medrol 40 mg twice daily, scheduled breathing treatments.    Weaned off BiPAP now on Vapotherm 40 L 100% FiO2  Dr. Tyler assisting in care of this patient, started on hypertonic saline and vest therapy  Continue Levaquin 750 mg  S/p bronchoscopy large amounts of mucous plugs were removed intubated sedated postprocedure, will wean this morning  Continue pulmonary toilet  Possibility try to wean in a.m. once on lower sedation     CKD stage III, solitary kidney history of seizures on chronically prednisone  Volume overload suspected underlying nephrotic syndrome  Diuretics per nephrology     Type 2 diabetes  Insulin is high risk medication with narrow therapeutic index, reviewed BG trend and adjusted insulin doses  Hypoglycemia protocol in place  Carb-controlled diet     CHRISTA on CPAP      History of DVT and PE -okay to restart 
   02/25/25 1020   Oxygen Therapy/Pulse Ox   O2 Therapy Oxygen   O2 Device (S)  Non-rebreather mask  (Vapotherm)   O2 Flow Rate (L/min) 40 L/min   FiO2  100 %   Pulse (!) 111   Respirations 22   SpO2 (!) 88 %   Pulse Oximeter Device Mode Continuous   Pulse Oximeter Device Location Finger   Blood Gas  Performed? No     RT called to bedside for desaturation episode. RN witnessed a saturation of 84%. Pt at 88% with NRB mask and recovered back to 92% after about 10 minutes. Pt was sat upright and accessory muscle use was noted.   
   02/27/25 1515   Encounter Summary   Encounter Overview/Reason Follow-up   Service Provided For Patient and family together   Referral/Consult From Rounding   Support System Family members   Last Encounter  02/27/25  (GRD F/U Occasionally)   Complexity of Encounter Low   Begin Time 1510   End Time  1515   Total Time Calculated 5 min   Assessment/Intervention/Outcome   Assessment Calm;Coping   Intervention Active listening;Explored/Affirmed feelings, thoughts, concerns;Explored Coping Skills/Resources   Outcome Receptive   Plan and Referrals   Plan/Referrals Continue to visit, (comment)  (F/U occasionally for family and when extubated)     Spiritual Health History and Assessment/Progress Note  [facility]    (P) Follow-up,  ,  ,      Name: Shoaib Rothman MRN: [id number]    Age: 64 y.o.     Sex: female   Language: English   Mormon: Pentecostal   Acute hypoxic on chronic hypercapnic respiratory failure (HCC)     Date: 2/27/2025            Total Time Calculated: (P) 5 min              Spiritual Assessment continued in [facility] ICU        Referral/Consult From: (P) Rounding   Encounter Overview/Reason: (P) Follow-up  Service Provided For: (P) Patient and family together    Valentine, Belief, Meaning:   Patient is connected with a valentine tradition or spiritual practice and has beliefs or practices that help with coping during difficult times  Family/Friends are connected with a valentine tradition or spiritual practice and have beliefs or practices that help with coping during difficult times      Importance and Influence:  Patient has no beliefs influential to healthcare decision-making identified during this visit  Family/Friends have no beliefs influential to healthcare decision-making identified during this visit    Community:  Patient Other: Brother at bedside  Family/Friends feel well-supported. Support system includes: Extended family    Assessment and Plan of Care:     Patient Interventions include: 
   03/02/25 1436   Resting (On O2)   SpO2 87   O2 Device Nasal cannula   O2 Flow Rate (l/min) 5 l/min   Comments SpO2 was 88% on 6L, 90% on 7L   During Walk (On O2)   SpO2 86   O2 Device Nasal cannula   O2 Flow Rate (l/min) 5 l/min   Need Additional O2 Flow Rate Rows Yes   O2 Flow Rate (l/min) 6 l/min   O2 Saturation 87   O2 Flow Rate (l/min) 7 l/min   O2 Saturation 87   O2 Flow Rate (l/min) 8 l/min   O2 Saturation 89   Walk/Assistance Device   (Pt hasn't walked while at the hospital, so pt was excercised in bed.)   Rate of Dyspnea 0   After Walk   SpO2 89   O2 Device Nasal cannula   O2 Flow Rate (l/min) 8 l/min   Rate of Dyspnea 0   Does the Patient Qualify for Home O2 Yes   Liter Flow at Rest 7   Liter Flow on Exertion 8   Does the Patient Need Portable Oxygen Tanks Yes     Pt currently uses 5L O2 at home and states that her oxygen concentrator that can deliver up to 10L.  
4 Eyes Skin Assessment     NAME:  Shoaib Rothman  YOB: 1960  MEDICAL RECORD NUMBER:  [id number]    The patient is being assessed for  Admission    I agree that at least one RN has performed a thorough Head to Toe Skin Assessment on the patient. ALL assessment sites listed below have been assessed.      Areas assessed by both nurses:    Head, Face, Ears, Shoulders, Back, Chest, Arms, Elbows, Hands, Sacrum. Buttock, Coccyx, Ischium, and Legs. Feet and Heels        Does the Patient have a Wound? No noted wound(s)      Bilateral heels blanchable redness  Scattered bruising  Prior L mastectomy     Manan Prevention initiated by RN: No  Wound Care Orders initiated by RN: No    Pressure Injury (Stage 3,4, Unstageable, DTI, NWPT, and Complex wounds) if present, place Wound referral order by RN under : No    New Ostomies, if present place, Ostomy referral order under : No     Nurse 1 eSignature: Electronically signed by Tanya Bedolla RN on 2/23/25 at 12:12 PM EST    **SHARE this note so that the co-signing nurse can place an eSignature**    Nurse 2 eSignature: {Esignature:[id number]}    
4 Eyes Skin Assessment     NAME:  Shoaib Rothman  YOB: 1960  MEDICAL RECORD NUMBER:  [id number]    The patient is being assessed for  Admission    I agree that at least one RN has performed a thorough Head to Toe Skin Assessment on the patient. ALL assessment sites listed below have been assessed.      Areas assessed by both nurses:    Head, Face, Ears, Shoulders, Back, Chest, Arms, Elbows, Hands, Sacrum. Buttock, Coccyx, Ischium, Legs. Feet and Heels, and Under Medical Devices         Does the Patient have a Wound? No noted wound(s)       Manan Prevention initiated by RN: Yes  Wound Care Orders initiated by RN: No    Pressure Injury (Stage 3,4, Unstageable, DTI, NWPT, and Complex wounds) if present, place Wound referral order by RN under : No    New Ostomies, if present place, Ostomy referral order under : No     Pt has non-blanchable redness on sacrum, and in between buttocks. Redness also noted on bilateral heels. Redness marking the lines of a brief that was previously on that has since been removed. Scattered bruising. Venelex ordered.    Nurse 1 eSignature: Electronically signed by Lindy Ng RN on 2/26/25 at 3:09 PM EST    **SHARE this note so that the co-signing nurse can place an eSignature**    Nurse 2 eSignature: Electronically signed by Kailey Barber RN on 2/26/25 at 3:12 PM EST   
CC:  COPD    Subjectively improving.  Anxious to get up and going, out of bed.  Anticipates returning to her nursing facility.  Fairly minimal cough, no sputum production.  Afebrile  WBC unchanged 11.8  Completed antibiotic therapy.  Prednisone 50 mg daily, dosing for FSGS.  /72.  NSR.  S1, S2 diminished.  O2 saturation 97-99 %, O2 at 7 L  Breath sounds mildly reduced bilaterally.  No adventitious breath sounds  No peripheral edema.  Creatinine improved to 2.2.  Electrolytes normal.    A&P: COPD exacerbation, with mucoid impaction of airways.  Bronchoscopy has cleared proximal airways.  Continue treatment of COPD improving airways inflammation and function.  Anticipate this will continue with ongoing bronchodilator therapy.  She is on oral steroids for nephrotic disease, and this can only help her airways inflammation.  Hypoxemia improving.  She is very close to her baseline O2 requirement of 5 L.  Physical therapy will see this morning and make recommendations.  She is cleared to discharge to her facility from a pulmonary standpoint  
CC: COPD exacerbation    Feeling better, wants to get up and get moving.  Minimal cough.  No chest discomfort.  Afebrile  WBC 11.2  Prednisone at 50 mg daily.  Off antibiotics.  /108.  NSR.  Heart sounds diminished  Breath sounds mildly reduced.    O2 saturation 94%, O2 at 7 L/min  No peripheral edema.  Creatinine decreasing from apex of 3.0, today 2.7.    A&P: COPD exacerbation, with mucoid impaction of bronchi, improving after course of antibiotic therapy, bronchoscopy to relieve mucoid impactions.  Continues on aggressive respiratory therapy.  Maintained on prednisone at 50 mg for FSGS  Hypoxemia improving.  She is not far off from her baseline oxygen requirement of 5 L.  Anticipate working on ambulation with OT PT.  She points out she had been active with therapy at the nursing facility prior to her admission, and anticipates continuing this.  
ICU to Wards Transfer  Internal Medicine Wards Acceptance Note   [facility]        The ICU-PAUSE transfer documentation has been acknowledged and reviewed by the wards team.  Additionally, the wards team has received official sign-out from the ICU team with acceptance of care of the patient.    At the Time of transfer, patient was seen and examined by the wards team with the findings below:    Review of Systems   Constitutional:  Positive for fatigue.   Respiratory:  Positive for cough.    All other systems reviewed and are negative.        Physical Exam  Vitals and nursing note reviewed.   Constitutional:       Appearance: Normal appearance. She is obese.   HENT:      Head: Normocephalic and atraumatic.      Nose: Nose normal.      Mouth/Throat:      Mouth: Mucous membranes are moist.      Pharynx: Oropharynx is clear.   Eyes:      Extraocular Movements: Extraocular movements intact.      Conjunctiva/sclera: Conjunctivae normal.      Pupils: Pupils are equal, round, and reactive to light.   Cardiovascular:      Rate and Rhythm: Normal rate and regular rhythm.      Pulses: Normal pulses.      Heart sounds: Normal heart sounds.   Pulmonary:      Effort: Pulmonary effort is normal.      Breath sounds: Transmitted upper airway sounds present. Examination of the right-lower field reveals rhonchi. Examination of the left-lower field reveals rhonchi. Rhonchi present.   Abdominal:      General: Abdomen is flat. Bowel sounds are normal. There is distension.      Palpations: Abdomen is soft.   Musculoskeletal:         General: Normal range of motion.      Cervical back: Normal range of motion.   Skin:     General: Skin is warm and dry.      Capillary Refill: Capillary refill takes less than 2 seconds.   Neurological:      General: No focal deficit present.      Mental Status: She is alert and oriented to person, place, and time.   Psychiatric:         Mood and Affect: Mood normal.       Updates to 
Nephrology Consult Note                                                                                                                                                                                                                                                                                                                                                               Office: [phone]     Fax: [phone]    Patient's Name: Shoaib Rothman  9:17 AM  2/27/2025    Reason for Consult:  CKD 3 / volume overload   Requesting Physician:  Lamar Mondragon MD  Chief Complaint:    Chief Complaint   Patient presents with    Shortness of Breath     Pt coming from [facility] [facility].        Assessment/Plan     #Volume overload  #FSGS  Patient presents with acute hypoxic hypercapnic respiratory failure with increased swelling.   - Demadex 50mg daily   - UOP 1.0 L past 24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

## 2025-03-03 NOTE — DISCHARGE INSTR - COC
Continuity of Care Form    Patient Name: Shoaib Rothman   :  1960  MRN:  [id number]    Admit date:  2025  Discharge date:  ***    Code Status Order: Full Code   Advance Directives:   Advance Care Flowsheet Documentation        Date/Time Healthcare Directive Type of Healthcare Directive Copy in Chart Healthcare Agent Appointed Healthcare Agent's Name Healthcare Agent's Phone Number    [phone] Yes, patient has an advance directive for healthcare treatment  Health care treatment directive  Yes, copy in chart  --  Devante Belle (Brother)  [phone]                     Admitting Physician:  Babita Hassan MD  PCP: Lamar Mondragon MD    Discharging Nurse: ***  Discharging Hospital Unit/Room#: 4510/4510-01  Discharging Unit Phone Number: ***    Emergency Contact:   Extended Emergency Contact Information  Primary Emergency Contact: devante belle  Home Phone: [phone]  Work Phone: [phone]  Mobile Phone: [phone]  Relation: Brother/Sister   needed? No    Past Surgical History:  Past Surgical History:   Procedure Laterality Date    APPENDECTOMY      BREAST SURGERY      BRONCHOSCOPY N/A 2024    BRONCHOSCOPY performed by Fernando Dhillon MD at [facility] ENDOSCOPY    BRONCHOSCOPY N/A 2025    BRONCHOSCOPY ALVEOLAR LAVAGE / BX performed by Guille Mabry MD at [facility] ENDOSCOPY    BRONCHOSCOPY N/A 2025    BRONCHOSCOPY ALVEOLAR LAVAGE performed by Maximo Tyler MD at [facility] ENDOSCOPY    CHOLECYSTECTOMY      CT BIOPSY RENAL  2020    CT BIOPSY RENAL 2020 [facility] CT SCAN    CYSTOSCOPY N/A 2024    RIGID CYSTOSCOPY, BLADDER BIOPSY performed by Kris Kan MD at [facility] OR    MASTECTOMY Left 2019    LEFT MODIFIED RADICAL MASTECTOMY; EXPAREL INTERCOSTAL BLOCK performed by Rebecca Montesinos MD at [facility] OR    SKIN GRAFT Left 2019    COMPLEX CLOSURE OF LEFT BREAST, FULL THICKNESS SKIN GRAFT LEFT BREAST 4x3 performed by Moise Carlin MD at [facility] OR

## 2025-03-06 LAB
FUNGUS SPEC CULT: ABNORMAL
FUNGUS SPEC CULT: ABNORMAL
LOEFFLER MB STN SPEC: ABNORMAL
ORGANISM: ABNORMAL
ORGANISM: ABNORMAL

## 2025-03-10 LAB
FUNGUS SPEC CULT: ABNORMAL
FUNGUS SPEC CULT: NORMAL
LOEFFLER MB STN SPEC: ABNORMAL
LOEFFLER MB STN SPEC: NORMAL
ORGANISM: ABNORMAL

## 2025-03-13 ENCOUNTER — OFFICE VISIT (OUTPATIENT)
Dept: PULMONOLOGY | Age: 65
End: 2025-03-13
Payer: MEDICAID

## 2025-03-13 VITALS
HEART RATE: 85 BPM | DIASTOLIC BLOOD PRESSURE: 84 MMHG | OXYGEN SATURATION: 98 % | RESPIRATION RATE: 20 BRPM | WEIGHT: 242 LBS | HEIGHT: 70 IN | SYSTOLIC BLOOD PRESSURE: 130 MMHG | BODY MASS INDEX: 34.65 KG/M2

## 2025-03-13 DIAGNOSIS — N17.9 AKI (ACUTE KIDNEY INJURY): ICD-10-CM

## 2025-03-13 DIAGNOSIS — J47.9 BRONCHIECTASIS WITHOUT COMPLICATION (HCC): ICD-10-CM

## 2025-03-13 DIAGNOSIS — T17.500A MUCUS PLUGGING OF BRONCHI: Primary | ICD-10-CM

## 2025-03-13 DIAGNOSIS — B44.81 ABPA (ALLERGIC BRONCHOPULMONARY ASPERGILLOSIS) (HCC): ICD-10-CM

## 2025-03-13 DIAGNOSIS — J44.9 CHRONIC OBSTRUCTIVE PULMONARY DISEASE, UNSPECIFIED COPD TYPE (HCC): ICD-10-CM

## 2025-03-13 DIAGNOSIS — J96.21 ACUTE ON CHRONIC RESPIRATORY FAILURE WITH HYPOXIA (HCC): ICD-10-CM

## 2025-03-13 PROCEDURE — 99215 OFFICE O/P EST HI 40 MIN: CPT | Performed by: INTERNAL MEDICINE

## 2025-03-13 PROCEDURE — G2211 COMPLEX E/M VISIT ADD ON: HCPCS | Performed by: INTERNAL MEDICINE

## 2025-03-13 RX ORDER — PREDNISONE 20 MG/1
50 TABLET ORAL DAILY
Qty: 25 TABLET | Refills: 0 | Status: SHIPPED | OUTPATIENT
Start: 2025-03-13 | End: 2025-03-23

## 2025-03-13 NOTE — PROGRESS NOTES
bronchiectasis in her right middle and right lower lobes, then I will try to get her a chest vest.    2. Allergic bronchopulmonary aspergillosis: Aspergillus growth in mucus, other markers normal.  - Conduct IgE level test to further evaluate for ABPA.  - Already on treatment with steroids for another reason, so this would argue against ABPA being the diagnosis.      3. Chronic kidney disease: History of kidney issues, currently on diuretics and prednisone 50 mg. Reports swelling and puffiness, which may interfere with breathing and oxygen absorption.  She does have 3+ pitting edema in her lower extremities as well as some edema in her upper extremities.  Upcoming appointment with nephrologist on 03/18/2025.  May need dialysis in the near future  - Continue current medication regimen, including torsemide 20 mg daily.    4.  Chronic hypoxemic and hypercapnic respiratory failure: Currently on 8 L which hopefully we can wean when her kidney function is stable.  Also is on noninvasive ventilation at night following a previous admission.  Continue.  -In the hospital we used the trilogy AVAPS with a tidal volume of 500 IPAP min of 10 IPAP max of 20 and a PEEP of 8.    Follow-up  - Appointment with nephrologist on 03/18/2025.      Orders Placed This Encounter   Medications    predniSONE (DELTASONE) 20 MG tablet     Sig: Take 2.5 tablets by mouth daily for 10 days     Dispense:  25 tablet     Refill:  0         Diagnosis Orders   1. Mucus plugging of bronchi  CT CHEST HIGH RESOLUTION      2. 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 Bronchiectasis without complication ([facility])  CT CHEST HIGH RESOLUTION      4. Chronic obstructive pulmonary disease, unspecified COPD type ([facility])           - Tobacco use:  The patient is currently smoking.  Advised patient to quit and offered support.  Recommended nicotine replacement with patches, reviewed use and dosing.  Spent 3-10 minutes counseling patient on smoking

## 2025-03-14 LAB — MISCELLANEOUS LAB TEST ORDER: NORMAL

## 2025-03-17 LAB
FUNGUS SPEC CULT: NORMAL
LOEFFLER MB STN SPEC: NORMAL

## 2025-03-19 ENCOUNTER — APPOINTMENT (OUTPATIENT)
Dept: GENERAL RADIOLOGY | Age: 65
DRG: 143 | End: 2025-03-19
Payer: COMMERCIAL

## 2025-03-19 ENCOUNTER — APPOINTMENT (OUTPATIENT)
Dept: CT IMAGING | Age: 65
DRG: 143 | End: 2025-03-19
Payer: COMMERCIAL

## 2025-03-19 ENCOUNTER — HOSPITAL ENCOUNTER (INPATIENT)
Age: 65
LOS: 8 days | Discharge: NURSING, REHAB OR LONG-TERM CARE | DRG: 143 | End: 2025-03-27
Attending: EMERGENCY MEDICINE | Admitting: INTERNAL MEDICINE
Payer: COMMERCIAL

## 2025-03-19 DIAGNOSIS — J96.11 CHRONIC HYPOXEMIC RESPIRATORY FAILURE: ICD-10-CM

## 2025-03-19 DIAGNOSIS — J18.9 PNEUMONIA OF RIGHT LOWER LOBE DUE TO INFECTIOUS ORGANISM: Primary | ICD-10-CM

## 2025-03-19 DIAGNOSIS — J96.01 ACUTE RESPIRATORY FAILURE WITH HYPOXIA: ICD-10-CM

## 2025-03-19 DIAGNOSIS — I50.23 ACUTE ON CHRONIC SYSTOLIC CONGESTIVE HEART FAILURE (HCC): ICD-10-CM

## 2025-03-19 LAB
ANION GAP SERPL CALCULATED.3IONS-SCNC: 12 MMOL/L (ref 3–16)
BASE EXCESS BLDV CALC-SCNC: 12.6 MMOL/L (ref -2–3)
BASOPHILS # BLD: 0 K/UL (ref 0–0.2)
BASOPHILS NFR BLD: 0 %
BUN SERPL-MCNC: 59 MG/DL (ref 7–20)
CALCIUM SERPL-MCNC: 8.6 MG/DL (ref 8.3–10.6)
CHLORIDE SERPL-SCNC: 98 MMOL/L (ref 99–110)
CO2 BLDV-SCNC: 43 MMOL/L
CO2 SERPL-SCNC: 35 MMOL/L (ref 21–32)
COHGB MFR BLDV: 1.3 % (ref 0–1.5)
CREAT SERPL-MCNC: 1.7 MG/DL (ref 0.6–1.2)
DEPRECATED RDW RBC AUTO: 16.3 % (ref 12.4–15.4)
DO-HGB MFR BLDV: 31.3 %
EKG ATRIAL RATE: 90 BPM
EKG DIAGNOSIS: NORMAL
EKG P AXIS: 76 DEGREES
EKG P-R INTERVAL: 154 MS
EKG Q-T INTERVAL: 372 MS
EKG QRS DURATION: 96 MS
EKG QTC CALCULATION (BAZETT): 455 MS
EKG R AXIS: 66 DEGREES
EKG T AXIS: 83 DEGREES
EKG VENTRICULAR RATE: 90 BPM
EOSINOPHIL # BLD: 0.1 K/UL (ref 0–0.6)
EOSINOPHIL NFR BLD: 1 %
FLUAV RNA RESP QL NAA+PROBE: NOT DETECTED
FLUBV RNA RESP QL NAA+PROBE: NOT DETECTED
GFR SERPLBLD CREATININE-BSD FMLA CKD-EPI: 34 ML/MIN/{1.73_M2}
GLUCOSE BLD-MCNC: 179 MG/DL (ref 70–99)
GLUCOSE SERPL-MCNC: 236 MG/DL (ref 70–99)
HCO3 BLDV-SCNC: 40.5 MMOL/L (ref 24–28)
HCT VFR BLD AUTO: 33 % (ref 36–48)
HGB BLD-MCNC: 10.8 G/DL (ref 12–16)
LACTATE BLDV-SCNC: 1.9 MMOL/L (ref 0.4–2)
LYMPHOCYTES # BLD: 0.4 K/UL (ref 1–5.1)
LYMPHOCYTES NFR BLD: 5 %
MCH RBC QN AUTO: 30.7 PG (ref 26–34)
MCHC RBC AUTO-ENTMCNC: 32.7 G/DL (ref 31–36)
MCV RBC AUTO: 93.7 FL (ref 80–100)
METAMYELOCYTES NFR BLD MANUAL: 3 %
METHGB MFR BLDV: 0 % (ref 0–1.5)
MONOCYTES # BLD: 0.1 K/UL (ref 0–1.3)
MONOCYTES NFR BLD: 1 %
MYELOCYTES NFR BLD MANUAL: 1 %
NEUTROPHILS # BLD: 6.7 K/UL (ref 1.7–7.7)
NEUTROPHILS NFR BLD: 89 %
NT-PROBNP SERPL-MCNC: 879 PG/ML (ref 0–124)
PCO2 BLDV: 71.5 MMHG (ref 41–51)
PERFORMED ON: ABNORMAL
PH BLDV: 7.36 [PH] (ref 7.35–7.45)
PLATELET # BLD AUTO: 212 K/UL (ref 135–450)
PMV BLD AUTO: 7.7 FL (ref 5–10.5)
PO2 BLDV: 36.8 MMHG (ref 25–40)
POTASSIUM SERPL-SCNC: 3.6 MMOL/L (ref 3.5–5.1)
RBC # BLD AUTO: 3.52 M/UL (ref 4–5.2)
SAO2 % BLDV: 68 %
SARS-COV-2 RNA RESP QL NAA+PROBE: NOT DETECTED
SODIUM SERPL-SCNC: 145 MMOL/L (ref 136–145)
TARGETS BLD QL SMEAR: ABNORMAL
WBC # BLD AUTO: 7.2 K/UL (ref 4–11)

## 2025-03-19 PROCEDURE — 82803 BLOOD GASES ANY COMBINATION: CPT

## 2025-03-19 PROCEDURE — 83880 ASSAY OF NATRIURETIC PEPTIDE: CPT

## 2025-03-19 PROCEDURE — 83605 ASSAY OF LACTIC ACID: CPT

## 2025-03-19 PROCEDURE — 71250 CT THORAX DX C-: CPT

## 2025-03-19 PROCEDURE — 83036 HEMOGLOBIN GLYCOSYLATED A1C: CPT

## 2025-03-19 PROCEDURE — 36415 COLL VENOUS BLD VENIPUNCTURE: CPT

## 2025-03-19 PROCEDURE — 96375 TX/PRO/DX INJ NEW DRUG ADDON: CPT

## 2025-03-19 PROCEDURE — 6360000002 HC RX W HCPCS

## 2025-03-19 PROCEDURE — 99285 EMERGENCY DEPT VISIT HI MDM: CPT

## 2025-03-19 PROCEDURE — 80048 BASIC METABOLIC PNL TOTAL CA: CPT

## 2025-03-19 PROCEDURE — 2580000003 HC RX 258

## 2025-03-19 PROCEDURE — 99223 1ST HOSP IP/OBS HIGH 75: CPT | Performed by: HOSPITALIST

## 2025-03-19 PROCEDURE — 96367 TX/PROPH/DG ADDL SEQ IV INF: CPT

## 2025-03-19 PROCEDURE — 87305 ASPERGILLUS AG IA: CPT

## 2025-03-19 PROCEDURE — 71045 X-RAY EXAM CHEST 1 VIEW: CPT

## 2025-03-19 PROCEDURE — 6370000000 HC RX 637 (ALT 250 FOR IP)

## 2025-03-19 PROCEDURE — 96365 THER/PROPH/DIAG IV INF INIT: CPT

## 2025-03-19 PROCEDURE — 2700000000 HC OXYGEN THERAPY PER DAY

## 2025-03-19 PROCEDURE — 94640 AIRWAY INHALATION TREATMENT: CPT

## 2025-03-19 PROCEDURE — 87636 SARSCOV2 & INF A&B AMP PRB: CPT

## 2025-03-19 PROCEDURE — 2060000000 HC ICU INTERMEDIATE R&B

## 2025-03-19 PROCEDURE — 93005 ELECTROCARDIOGRAM TRACING: CPT | Performed by: EMERGENCY MEDICINE

## 2025-03-19 PROCEDURE — 85025 COMPLETE CBC W/AUTO DIFF WBC: CPT

## 2025-03-19 PROCEDURE — 94761 N-INVAS EAR/PLS OXIMETRY MLT: CPT

## 2025-03-19 PROCEDURE — 6370000000 HC RX 637 (ALT 250 FOR IP): Performed by: INTERNAL MEDICINE

## 2025-03-19 RX ORDER — ROPINIROLE 0.25 MG/1
0.25 TABLET, FILM COATED ORAL NIGHTLY
Status: DISCONTINUED | OUTPATIENT
Start: 2025-03-19 | End: 2025-03-27 | Stop reason: HOSPADM

## 2025-03-19 RX ORDER — IPRATROPIUM BROMIDE AND ALBUTEROL SULFATE 2.5; .5 MG/3ML; MG/3ML
1 SOLUTION RESPIRATORY (INHALATION) ONCE
Status: COMPLETED | OUTPATIENT
Start: 2025-03-19 | End: 2025-03-19

## 2025-03-19 RX ORDER — LOSARTAN POTASSIUM 25 MG/1
25 TABLET ORAL DAILY
Status: DISCONTINUED | OUTPATIENT
Start: 2025-03-20 | End: 2025-03-27 | Stop reason: HOSPADM

## 2025-03-19 RX ORDER — FUROSEMIDE 10 MG/ML
40 INJECTION INTRAMUSCULAR; INTRAVENOUS ONCE
Status: COMPLETED | OUTPATIENT
Start: 2025-03-19 | End: 2025-03-19

## 2025-03-19 RX ORDER — PREDNISONE 50 MG/1
50 TABLET ORAL DAILY
Status: DISCONTINUED | OUTPATIENT
Start: 2025-03-20 | End: 2025-03-27 | Stop reason: HOSPADM

## 2025-03-19 RX ORDER — FUROSEMIDE 10 MG/ML
40 INJECTION INTRAMUSCULAR; INTRAVENOUS ONCE
Status: DISCONTINUED | OUTPATIENT
Start: 2025-03-20 | End: 2025-03-19

## 2025-03-19 RX ORDER — SODIUM CHLORIDE 9 MG/ML
INJECTION, SOLUTION INTRAVENOUS PRN
Status: DISCONTINUED | OUTPATIENT
Start: 2025-03-19 | End: 2025-03-27 | Stop reason: HOSPADM

## 2025-03-19 RX ORDER — SODIUM CHLORIDE 0.9 % (FLUSH) 0.9 %
5-40 SYRINGE (ML) INJECTION PRN
Status: DISCONTINUED | OUTPATIENT
Start: 2025-03-19 | End: 2025-03-27 | Stop reason: HOSPADM

## 2025-03-19 RX ORDER — IPRATROPIUM BROMIDE AND ALBUTEROL SULFATE 2.5; .5 MG/3ML; MG/3ML
1 SOLUTION RESPIRATORY (INHALATION)
Status: DISCONTINUED | OUTPATIENT
Start: 2025-03-20 | End: 2025-03-24

## 2025-03-19 RX ORDER — GUAIFENESIN 600 MG/1
600 TABLET, EXTENDED RELEASE ORAL 2 TIMES DAILY
Status: DISCONTINUED | OUTPATIENT
Start: 2025-03-19 | End: 2025-03-27 | Stop reason: HOSPADM

## 2025-03-19 RX ORDER — LINEZOLID 2 MG/ML
600 INJECTION, SOLUTION INTRAVENOUS 2 TIMES DAILY
Status: DISCONTINUED | OUTPATIENT
Start: 2025-03-19 | End: 2025-03-19

## 2025-03-19 RX ORDER — ALBUTEROL SULFATE 0.83 MG/ML
2.5 SOLUTION RESPIRATORY (INHALATION) EVERY 6 HOURS PRN
Status: DISCONTINUED | OUTPATIENT
Start: 2025-03-19 | End: 2025-03-27 | Stop reason: HOSPADM

## 2025-03-19 RX ORDER — DEXTROSE MONOHYDRATE 100 MG/ML
INJECTION, SOLUTION INTRAVENOUS CONTINUOUS PRN
Status: DISCONTINUED | OUTPATIENT
Start: 2025-03-19 | End: 2025-03-20

## 2025-03-19 RX ORDER — IPRATROPIUM BROMIDE AND ALBUTEROL SULFATE 2.5; .5 MG/3ML; MG/3ML
1 SOLUTION RESPIRATORY (INHALATION)
Status: DISCONTINUED | OUTPATIENT
Start: 2025-03-19 | End: 2025-03-19

## 2025-03-19 RX ORDER — ACETAMINOPHEN 325 MG/1
650 TABLET ORAL EVERY 6 HOURS PRN
Status: DISCONTINUED | OUTPATIENT
Start: 2025-03-19 | End: 2025-03-27 | Stop reason: HOSPADM

## 2025-03-19 RX ORDER — GLUCAGON 1 MG/ML
1 KIT INJECTION PRN
Status: DISCONTINUED | OUTPATIENT
Start: 2025-03-19 | End: 2025-03-27 | Stop reason: HOSPADM

## 2025-03-19 RX ORDER — SODIUM CHLORIDE 0.9 % (FLUSH) 0.9 %
5-40 SYRINGE (ML) INJECTION EVERY 12 HOURS SCHEDULED
Status: DISCONTINUED | OUTPATIENT
Start: 2025-03-19 | End: 2025-03-27 | Stop reason: HOSPADM

## 2025-03-19 RX ORDER — FUROSEMIDE 10 MG/ML
40 INJECTION INTRAMUSCULAR; INTRAVENOUS 2 TIMES DAILY
Status: DISCONTINUED | OUTPATIENT
Start: 2025-03-20 | End: 2025-03-27 | Stop reason: HOSPADM

## 2025-03-19 RX ORDER — TEMAZEPAM 15 MG/1
15 CAPSULE ORAL ONCE
Status: COMPLETED | OUTPATIENT
Start: 2025-03-19 | End: 2025-03-19

## 2025-03-19 RX ORDER — ACETAMINOPHEN 650 MG/1
650 SUPPOSITORY RECTAL EVERY 6 HOURS PRN
Status: DISCONTINUED | OUTPATIENT
Start: 2025-03-19 | End: 2025-03-27 | Stop reason: HOSPADM

## 2025-03-19 RX ORDER — ONDANSETRON 2 MG/ML
4 INJECTION INTRAMUSCULAR; INTRAVENOUS EVERY 6 HOURS PRN
Status: DISCONTINUED | OUTPATIENT
Start: 2025-03-19 | End: 2025-03-27 | Stop reason: HOSPADM

## 2025-03-19 RX ORDER — TRAZODONE HYDROCHLORIDE 50 MG/1
25 TABLET ORAL NIGHTLY
Status: DISCONTINUED | OUTPATIENT
Start: 2025-03-19 | End: 2025-03-27 | Stop reason: HOSPADM

## 2025-03-19 RX ORDER — DIPHENHYDRAMINE HYDROCHLORIDE 50 MG/ML
25 INJECTION, SOLUTION INTRAMUSCULAR; INTRAVENOUS
Status: DISCONTINUED | OUTPATIENT
Start: 2025-03-19 | End: 2025-03-19

## 2025-03-19 RX ORDER — CARVEDILOL 6.25 MG/1
6.25 TABLET ORAL 2 TIMES DAILY
Status: DISCONTINUED | OUTPATIENT
Start: 2025-03-19 | End: 2025-03-25

## 2025-03-19 RX ORDER — GLUCAGON 1 MG/ML
1 KIT INJECTION PRN
Status: DISCONTINUED | OUTPATIENT
Start: 2025-03-19 | End: 2025-03-20

## 2025-03-19 RX ORDER — DEXTROSE MONOHYDRATE 100 MG/ML
INJECTION, SOLUTION INTRAVENOUS CONTINUOUS PRN
Status: DISCONTINUED | OUTPATIENT
Start: 2025-03-19 | End: 2025-03-27 | Stop reason: HOSPADM

## 2025-03-19 RX ORDER — INSULIN LISPRO 100 [IU]/ML
0-4 INJECTION, SOLUTION INTRAVENOUS; SUBCUTANEOUS
Status: DISCONTINUED | OUTPATIENT
Start: 2025-03-19 | End: 2025-03-27 | Stop reason: HOSPADM

## 2025-03-19 RX ORDER — MECOBALAMIN 5000 MCG
10 TABLET,DISINTEGRATING ORAL
Status: DISCONTINUED | OUTPATIENT
Start: 2025-03-19 | End: 2025-03-27 | Stop reason: HOSPADM

## 2025-03-19 RX ORDER — AMLODIPINE BESYLATE 10 MG/1
10 TABLET ORAL DAILY
Status: DISCONTINUED | OUTPATIENT
Start: 2025-03-20 | End: 2025-03-27 | Stop reason: HOSPADM

## 2025-03-19 RX ORDER — ONDANSETRON 4 MG/1
4 TABLET, ORALLY DISINTEGRATING ORAL EVERY 8 HOURS PRN
Status: DISCONTINUED | OUTPATIENT
Start: 2025-03-19 | End: 2025-03-27 | Stop reason: HOSPADM

## 2025-03-19 RX ORDER — SODIUM BICARBONATE 650 MG/1
650 TABLET ORAL 2 TIMES DAILY
Status: DISCONTINUED | OUTPATIENT
Start: 2025-03-19 | End: 2025-03-24

## 2025-03-19 RX ORDER — POLYETHYLENE GLYCOL 3350 17 G/17G
17 POWDER, FOR SOLUTION ORAL DAILY PRN
Status: DISCONTINUED | OUTPATIENT
Start: 2025-03-19 | End: 2025-03-27 | Stop reason: HOSPADM

## 2025-03-19 RX ADMIN — SODIUM BICARBONATE 650 MG: 650 TABLET ORAL at 22:09

## 2025-03-19 RX ADMIN — TEMAZEPAM 15 MG: 15 CAPSULE ORAL at 23:48

## 2025-03-19 RX ADMIN — ROPINIROLE HYDROCHLORIDE 0.25 MG: 0.25 TABLET, FILM COATED ORAL at 22:15

## 2025-03-19 RX ADMIN — IPRATROPIUM BROMIDE AND ALBUTEROL SULFATE 1 DOSE: .5; 2.5 SOLUTION RESPIRATORY (INHALATION) at 17:18

## 2025-03-19 RX ADMIN — MEROPENEM 1000 MG: 1 INJECTION INTRAVENOUS at 18:33

## 2025-03-19 RX ADMIN — TRAZODONE HYDROCHLORIDE 25 MG: 50 TABLET ORAL at 22:09

## 2025-03-19 RX ADMIN — FUROSEMIDE 40 MG: 10 INJECTION, SOLUTION INTRAMUSCULAR; INTRAVENOUS at 19:06

## 2025-03-19 RX ADMIN — CARVEDILOL 6.25 MG: 6.25 TABLET, FILM COATED ORAL at 22:09

## 2025-03-19 RX ADMIN — VANCOMYCIN HYDROCHLORIDE 2500 MG: 10 INJECTION, POWDER, LYOPHILIZED, FOR SOLUTION INTRAVENOUS at 19:10

## 2025-03-19 RX ADMIN — Medication 6 MG: at 22:09

## 2025-03-19 RX ADMIN — GUAIFENESIN 600 MG: 600 TABLET ORAL at 22:09

## 2025-03-19 NOTE — H&P
swelling bilaterally.  Patient has severe COPD and is on oxygen at home (8 L at rest and 10 L on exertion).  Patient was hospitalized 3 times over the past 3 months and has undergone bronchoscopy for recurrent mucous plugging.  Patient denies fever, chills, chest pain      Review of Systems:        Pertinent positives and negatives discussed in HPI     Objective:     Intake/Output Summary (Last 24 hours) at 3/19/2025 1955  Last data filed at 3/19/2025 1910  Gross per 24 hour   Intake 100 ml   Output --   Net 100 ml      Vitals:   Vitals:    03/19/25 1630 03/19/25 1718 03/19/25 1831 03/19/25 1906   BP: (!) 163/98  (!) 169/95 (!) 168/103   Pulse: 86 86 79    Resp: 20 22     Temp:       TempSrc:       SpO2: 99% 94% 96%        Personally Reviewed Medications Prior to Admission     Prior to Admission medications    Medication Sig Start Date End Date Taking? Authorizing Provider   predniSONE (DELTASONE) 20 MG tablet Take 2.5 tablets by mouth daily for 10 days 3/13/25 3/23/25  Maximo Tyler MD   predniSONE (DELTASONE) 50 MG tablet Take 1 tablet by mouth daily 3/7/25   Kacey Joe APRN - CNP   nicotine (NICODERM CQ) 21 MG/24HR Place 1 patch onto the skin daily 3/3/25   Saji Mays MD   amLODIPine (NORVASC) 10 MG tablet Take 1 tablet by mouth daily Hold if Blood pressure less than 110 systolic 3/3/25   Saji Mays MD   losartan (COZAAR) 25 MG tablet Take 1 tablet by mouth daily Hold if blood pressure is less than 110 systolic 3/3/25   Saji Mays MD   torsemide (DEMADEX) 20 MG tablet Take 1 tablet by mouth daily 3/6/25   Saji Mays MD   apixaban (ELIQUIS) 2.5 MG TABS tablet Take 1 tablet by mouth in the morning and at bedtime    Chago Mckeon MD   mineral oil-hydrophilic petrolatum (AQUAPHOR) ointment Apply topically to face every 6 hours as needed for dry skin.    Chago Mckeon MD   Calcium Carbonate-Vit D-Min (CALCIUM 600 + MINERALS) 600-200 MG-UNIT TABS Take 1 caplet by mouth daily
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
## 2025-03-19 NOTE — ED NOTES
Patient Name: Shoaib Rothman  : 1960 64 y.o.  MRN: [id number]  ED Room #: B18/B18-18     Chief complaint:   Chief Complaint   Patient presents with    Shortness of Breath     Pt reports worsening SOB since yesterday. Pt is on 15L HFNC, baseline is 10L. Duoneb was given enroute.      Hospital Problem/Diagnosis:   Hospital Problems           Last Modified POA    * (Principal) Acute on chronic respiratory failure with hypoxia and hypercapnia (HCC) 3/19/2025 Yes         O2 Flow Rate:O2 Device: High flow nasal cannula O2 Flow Rate (L/min): (S) 12 L/min (if applicable)  Cardiac Rhythm:   (if applicable)  Active LDA's:           How does patient ambulate? Front Wheeled Walker Rollator can use bedside commode    2. How does patient take pills? Whole with Water    3. Is patient alert? Alert    4. Is patient oriented? To Person, To Place, To Time, To Situation, and Follows Commands    5.   Patient arrived from:  nursing home  Facility Name: ___________________________________________    6. If patient is disoriented or from a Skill Nursing Facility has family been notified of admission? Yes-granddaughter    7. Patient belongings? Belongings: Clothing    Disposition of belongings? Kept with Patient     8. Any specific patient or family belongings/needs/dynamics?   a.     9. Miscellaneous comments/pending orders?  a.       If there are any additional questions please reach out to the Emergency Department.      Kelly Ramos RN  25

## 2025-03-20 LAB
ANION GAP SERPL CALCULATED.3IONS-SCNC: 8 MMOL/L (ref 3–16)
BUN SERPL-MCNC: 55 MG/DL (ref 7–20)
CALCIUM SERPL-MCNC: 8.3 MG/DL (ref 8.3–10.6)
CHLORIDE SERPL-SCNC: 101 MMOL/L (ref 99–110)
CO2 SERPL-SCNC: 40 MMOL/L (ref 21–32)
CREAT SERPL-MCNC: 1.8 MG/DL (ref 0.6–1.2)
EST. AVERAGE GLUCOSE BLD GHB EST-MCNC: 154.2 MG/DL
GFR SERPLBLD CREATININE-BSD FMLA CKD-EPI: 31 ML/MIN/{1.73_M2}
GLUCOSE BLD-MCNC: 111 MG/DL (ref 70–99)
GLUCOSE BLD-MCNC: 229 MG/DL (ref 70–99)
GLUCOSE BLD-MCNC: 242 MG/DL (ref 70–99)
GLUCOSE BLD-MCNC: 252 MG/DL (ref 70–99)
GLUCOSE SERPL-MCNC: 119 MG/DL (ref 70–99)
HBA1C MFR BLD: 7 %
MAGNESIUM SERPL-MCNC: 1.22 MG/DL (ref 1.8–2.4)
PERFORMED ON: ABNORMAL
POTASSIUM SERPL-SCNC: 3.5 MMOL/L (ref 3.5–5.1)
SODIUM SERPL-SCNC: 149 MMOL/L (ref 136–145)

## 2025-03-20 PROCEDURE — 99223 1ST HOSP IP/OBS HIGH 75: CPT | Performed by: INTERNAL MEDICINE

## 2025-03-20 PROCEDURE — 2060000000 HC ICU INTERMEDIATE R&B

## 2025-03-20 PROCEDURE — 6370000000 HC RX 637 (ALT 250 FOR IP): Performed by: INTERNAL MEDICINE

## 2025-03-20 PROCEDURE — 80048 BASIC METABOLIC PNL TOTAL CA: CPT

## 2025-03-20 PROCEDURE — 6360000002 HC RX W HCPCS: Performed by: INTERNAL MEDICINE

## 2025-03-20 PROCEDURE — 94640 AIRWAY INHALATION TREATMENT: CPT

## 2025-03-20 PROCEDURE — 2500000003 HC RX 250 WO HCPCS: Performed by: INTERNAL MEDICINE

## 2025-03-20 PROCEDURE — 6370000000 HC RX 637 (ALT 250 FOR IP): Performed by: NURSE PRACTITIONER

## 2025-03-20 PROCEDURE — 2580000003 HC RX 258: Performed by: HOSPITALIST

## 2025-03-20 PROCEDURE — 2700000000 HC OXYGEN THERAPY PER DAY

## 2025-03-20 PROCEDURE — 99233 SBSQ HOSP IP/OBS HIGH 50: CPT | Performed by: HOSPITALIST

## 2025-03-20 PROCEDURE — 83735 ASSAY OF MAGNESIUM: CPT

## 2025-03-20 PROCEDURE — 2580000003 HC RX 258: Performed by: INTERNAL MEDICINE

## 2025-03-20 PROCEDURE — 94761 N-INVAS EAR/PLS OXIMETRY MLT: CPT

## 2025-03-20 PROCEDURE — 6360000002 HC RX W HCPCS: Performed by: FAMILY MEDICINE

## 2025-03-20 RX ORDER — TEMAZEPAM 15 MG/1
15 CAPSULE ORAL NIGHTLY PRN
Status: DISCONTINUED | OUTPATIENT
Start: 2025-03-20 | End: 2025-03-27 | Stop reason: HOSPADM

## 2025-03-20 RX ORDER — LABETALOL HYDROCHLORIDE 5 MG/ML
5 INJECTION, SOLUTION INTRAVENOUS EVERY 6 HOURS PRN
Status: DISCONTINUED | OUTPATIENT
Start: 2025-03-20 | End: 2025-03-27 | Stop reason: HOSPADM

## 2025-03-20 RX ORDER — MAGNESIUM SULFATE IN WATER 40 MG/ML
2000 INJECTION, SOLUTION INTRAVENOUS PRN
Status: DISCONTINUED | OUTPATIENT
Start: 2025-03-20 | End: 2025-03-27 | Stop reason: HOSPADM

## 2025-03-20 RX ORDER — POTASSIUM CHLORIDE 7.45 MG/ML
10 INJECTION INTRAVENOUS PRN
Status: DISCONTINUED | OUTPATIENT
Start: 2025-03-20 | End: 2025-03-27 | Stop reason: HOSPADM

## 2025-03-20 RX ORDER — POTASSIUM CHLORIDE 1500 MG/1
40 TABLET, EXTENDED RELEASE ORAL PRN
Status: DISCONTINUED | OUTPATIENT
Start: 2025-03-20 | End: 2025-03-27 | Stop reason: HOSPADM

## 2025-03-20 RX ORDER — ACETYLCYSTEINE 200 MG/ML
600 SOLUTION ORAL; RESPIRATORY (INHALATION)
Status: DISCONTINUED | OUTPATIENT
Start: 2025-03-20 | End: 2025-03-20

## 2025-03-20 RX ORDER — SODIUM CHLORIDE FOR INHALATION 3 %
4 VIAL, NEBULIZER (ML) INHALATION 2 TIMES DAILY
Status: DISCONTINUED | OUTPATIENT
Start: 2025-03-20 | End: 2025-03-27 | Stop reason: HOSPADM

## 2025-03-20 RX ADMIN — MAGNESIUM SULFATE HEPTAHYDRATE 2000 MG: 40 INJECTION, SOLUTION INTRAVENOUS at 16:50

## 2025-03-20 RX ADMIN — MAGNESIUM SULFATE HEPTAHYDRATE 2000 MG: 40 INJECTION, SOLUTION INTRAVENOUS at 14:01

## 2025-03-20 RX ADMIN — SODIUM BICARBONATE 650 MG: 650 TABLET ORAL at 09:06

## 2025-03-20 RX ADMIN — FUROSEMIDE 40 MG: 10 INJECTION, SOLUTION INTRAMUSCULAR; INTRAVENOUS at 17:42

## 2025-03-20 RX ADMIN — MEROPENEM 1000 MG: 1 INJECTION INTRAVENOUS at 02:27

## 2025-03-20 RX ADMIN — CARVEDILOL 6.25 MG: 6.25 TABLET, FILM COATED ORAL at 21:14

## 2025-03-20 RX ADMIN — IPRATROPIUM BROMIDE AND ALBUTEROL SULFATE 1 DOSE: .5; 2.5 SOLUTION RESPIRATORY (INHALATION) at 11:35

## 2025-03-20 RX ADMIN — IPRATROPIUM BROMIDE AND ALBUTEROL SULFATE 1 DOSE: .5; 2.5 SOLUTION RESPIRATORY (INHALATION) at 21:56

## 2025-03-20 RX ADMIN — MEROPENEM 1000 MG: 1 INJECTION INTRAVENOUS at 14:00

## 2025-03-20 RX ADMIN — SODIUM CHLORIDE 30 MG/ML INHALATION SOLUTION 4 ML: 30 SOLUTION INHALANT at 21:56

## 2025-03-20 RX ADMIN — Medication 6 MG: at 21:14

## 2025-03-20 RX ADMIN — IPRATROPIUM BROMIDE AND ALBUTEROL SULFATE 1 DOSE: .5; 2.5 SOLUTION RESPIRATORY (INHALATION) at 09:20

## 2025-03-20 RX ADMIN — GUAIFENESIN 600 MG: 600 TABLET ORAL at 21:14

## 2025-03-20 RX ADMIN — VANCOMYCIN HYDROCHLORIDE 1000 MG: 1 INJECTION, POWDER, LYOPHILIZED, FOR SOLUTION INTRAVENOUS at 21:14

## 2025-03-20 RX ADMIN — FUROSEMIDE 40 MG: 10 INJECTION, SOLUTION INTRAMUSCULAR; INTRAVENOUS at 09:05

## 2025-03-20 RX ADMIN — SODIUM BICARBONATE 650 MG: 650 TABLET ORAL at 21:14

## 2025-03-20 RX ADMIN — TEMAZEPAM 15 MG: 15 CAPSULE ORAL at 21:15

## 2025-03-20 RX ADMIN — PREDNISONE 50 MG: 50 TABLET ORAL at 09:05

## 2025-03-20 RX ADMIN — GUAIFENESIN 600 MG: 600 TABLET ORAL at 09:06

## 2025-03-20 RX ADMIN — CARVEDILOL 6.25 MG: 6.25 TABLET, FILM COATED ORAL at 09:05

## 2025-03-20 RX ADMIN — TRAZODONE HYDROCHLORIDE 25 MG: 50 TABLET ORAL at 21:14

## 2025-03-20 RX ADMIN — INSULIN LISPRO 2 UNITS: 100 INJECTION, SOLUTION INTRAVENOUS; SUBCUTANEOUS at 12:45

## 2025-03-20 RX ADMIN — SODIUM CHLORIDE 30 MG/ML INHALATION SOLUTION 4 ML: 30 SOLUTION INHALANT at 09:20

## 2025-03-20 RX ADMIN — ACETAMINOPHEN 650 MG: 325 TABLET, FILM COATED ORAL at 22:57

## 2025-03-20 RX ADMIN — SERTRALINE 50 MG: 50 TABLET, FILM COATED ORAL at 09:05

## 2025-03-20 RX ADMIN — SODIUM CHLORIDE, PRESERVATIVE FREE 10 ML: 5 INJECTION INTRAVENOUS at 09:05

## 2025-03-20 RX ADMIN — ROPINIROLE HYDROCHLORIDE 0.25 MG: 0.25 TABLET, FILM COATED ORAL at 21:15

## 2025-03-20 RX ADMIN — INSULIN LISPRO 2 UNITS: 100 INJECTION, SOLUTION INTRAVENOUS; SUBCUTANEOUS at 17:00

## 2025-03-20 RX ADMIN — IPRATROPIUM BROMIDE AND ALBUTEROL SULFATE 1 DOSE: .5; 2.5 SOLUTION RESPIRATORY (INHALATION) at 15:59

## 2025-03-20 RX ADMIN — INSULIN LISPRO 1 UNITS: 100 INJECTION, SOLUTION INTRAVENOUS; SUBCUTANEOUS at 21:14

## 2025-03-20 ASSESSMENT — PAIN - FUNCTIONAL ASSESSMENT: PAIN_FUNCTIONAL_ASSESSMENT: ACTIVITIES ARE NOT PREVENTED

## 2025-03-20 ASSESSMENT — PAIN DESCRIPTION - LOCATION: LOCATION: KNEE

## 2025-03-20 ASSESSMENT — PAIN DESCRIPTION - ONSET: ONSET: ON-GOING

## 2025-03-20 ASSESSMENT — PAIN SCALES - GENERAL
PAINLEVEL_OUTOF10: 1
PAINLEVEL_OUTOF10: 3

## 2025-03-20 ASSESSMENT — PAIN DESCRIPTION - DESCRIPTORS: DESCRIPTORS: ACHING

## 2025-03-20 ASSESSMENT — PAIN DESCRIPTION - PAIN TYPE: TYPE: CHRONIC PAIN

## 2025-03-20 ASSESSMENT — PAIN DESCRIPTION - ORIENTATION: ORIENTATION: RIGHT;LEFT

## 2025-03-20 ASSESSMENT — PAIN SCALES - WONG BAKER: WONGBAKER_NUMERICALRESPONSE: NO HURT

## 2025-03-20 ASSESSMENT — PAIN DESCRIPTION - FREQUENCY: FREQUENCY: CONTINUOUS

## 2025-03-20 NOTE — CARE COORDINATION
Case Management Assessment  Initial Evaluation    Date/Time of Evaluation: 3/20/2025 5:05 PM  Assessment Completed by: Anna Zhou    If patient is discharged prior to next notation, then this note serves as note for discharge by case management.    Patient Name: Shoaib Rothman                   YOB: 1960  Diagnosis: Acute on chronic systolic congestive heart failure (HCC) [I50.23]  Pneumonia of right lower lobe due to infectious organism [J18.9]  Acute on chronic respiratory failure with hypoxia and hypercapnia (HCC) [J96.21, J96.22]                   Date / Time: 3/19/2025  3:42 PM    Patient Admission Status: Inpatient   Readmission Risk (Low < 19, Mod (19-27), High > 27): Readmission Risk Score: 25.4    Current PCP: Lamar Mondragon MD  PCP verified by CM? Yes    Chart Reviewed: Yes      History Provided by: Patient, Medical Record, Other (see comment) (ltc)  Patient Orientation: Alert and Oriented    Patient Cognition: Alert    Hospitalization in the last 30 days (Readmission):  Yes    If yes, Readmission Assessment in  Navigator will be completed.  Readmission Assessment  Number of Days since last admission?: 8-30 days  Previous Disposition: Long Term Care  Who is being Interviewed: Patient  What was the patient's/caregiver's perception as to why they think they needed to return back to the hospital?: Other (Comment) (SOB)  Did you visit your Primary Care Physician after you left the hospital, before you returned this time?: Yes (ltc)  Did you see a specialist, such as Cardiac, Pulmonary, Orthopedic Physician, etc. after you left the hospital?: Yes (pulm)  Who advised the patient to return to the hospital?: Other (Comment) (ltc)  Does the patient report anything that got in the way of taking their medications?: No  In our efforts to provide the best possible care to you and others like you, can you think of anything that we could have done to help you after you left the hospital the

## 2025-03-21 ENCOUNTER — ANESTHESIA EVENT (OUTPATIENT)
Dept: ENDOSCOPY | Age: 65
End: 2025-03-21
Payer: COMMERCIAL

## 2025-03-21 ENCOUNTER — ANESTHESIA (OUTPATIENT)
Dept: ENDOSCOPY | Age: 65
End: 2025-03-21
Payer: COMMERCIAL

## 2025-03-21 LAB
ALBUMIN SERPL-MCNC: 3.2 G/DL (ref 3.4–5)
ALBUMIN SERPL-MCNC: 3.2 G/DL (ref 3.4–5)
ALBUMIN/GLOB SERPL: 1 {RATIO} (ref 1.1–2.2)
ALBUMIN/GLOB SERPL: 1 {RATIO} (ref 1.1–2.2)
ALP SERPL-CCNC: 103 U/L (ref 40–129)
ALP SERPL-CCNC: 104 U/L (ref 40–129)
ALT SERPL-CCNC: 22 U/L (ref 10–40)
ALT SERPL-CCNC: 23 U/L (ref 10–40)
ANION GAP SERPL CALCULATED.3IONS-SCNC: 8 MMOL/L (ref 3–16)
ANION GAP SERPL CALCULATED.3IONS-SCNC: 9 MMOL/L (ref 3–16)
APPEARANCE FLUID: NORMAL
AST SERPL-CCNC: 16 U/L (ref 15–37)
AST SERPL-CCNC: 17 U/L (ref 15–37)
BASOPHILS # BLD: 0 K/UL (ref 0–0.2)
BASOPHILS NFR BLD: 0 %
BDY FLUID QUALITY: NORMAL
BILIRUB SERPL-MCNC: 0.3 MG/DL (ref 0–1)
BILIRUB SERPL-MCNC: 0.3 MG/DL (ref 0–1)
BUN SERPL-MCNC: 53 MG/DL (ref 7–20)
BUN SERPL-MCNC: 53 MG/DL (ref 7–20)
CALCIUM SERPL-MCNC: 8.9 MG/DL (ref 8.3–10.6)
CALCIUM SERPL-MCNC: 8.9 MG/DL (ref 8.3–10.6)
CELL COUNT FLUID TYPE: NORMAL
CHLORIDE SERPL-SCNC: 96 MMOL/L (ref 99–110)
CHLORIDE SERPL-SCNC: 96 MMOL/L (ref 99–110)
CO2 SERPL-SCNC: 43 MMOL/L (ref 21–32)
CO2 SERPL-SCNC: 43 MMOL/L (ref 21–32)
COLOR FLUID: NORMAL
CREAT SERPL-MCNC: 1.6 MG/DL (ref 0.6–1.2)
CREAT SERPL-MCNC: 1.6 MG/DL (ref 0.6–1.2)
DEPRECATED RDW RBC AUTO: 16.4 % (ref 12.4–15.4)
EOSINOPHIL # BLD: 0.1 K/UL (ref 0–0.6)
EOSINOPHIL NFR BLD: 1 %
GFR SERPLBLD CREATININE-BSD FMLA CKD-EPI: 36 ML/MIN/{1.73_M2}
GFR SERPLBLD CREATININE-BSD FMLA CKD-EPI: 36 ML/MIN/{1.73_M2}
GLUCOSE BLD-MCNC: 121 MG/DL (ref 70–99)
GLUCOSE BLD-MCNC: 223 MG/DL (ref 70–99)
GLUCOSE BLD-MCNC: 276 MG/DL (ref 70–99)
GLUCOSE SERPL-MCNC: 91 MG/DL (ref 70–99)
GLUCOSE SERPL-MCNC: 92 MG/DL (ref 70–99)
HCT VFR BLD AUTO: 33.2 % (ref 36–48)
HGB BLD-MCNC: 10.8 G/DL (ref 12–16)
LYMPHOCYTES # BLD: 1.6 K/UL (ref 1–5.1)
LYMPHOCYTES NFR BLD: 25 %
LYMPHOCYTES NFR FLD: 9 %
MACROPHAGES # FLD: 21 %
MAGNESIUM SERPL-MCNC: 1.84 MG/DL (ref 1.8–2.4)
MCH RBC QN AUTO: 30.4 PG (ref 26–34)
MCHC RBC AUTO-ENTMCNC: 32.6 G/DL (ref 31–36)
MCV RBC AUTO: 93.5 FL (ref 80–100)
MONOCYTES # BLD: 0.5 K/UL (ref 0–1.3)
MONOCYTES NFR BLD: 7 %
NEUTROPHIL, FLUID: 70 %
NEUTROPHILS # BLD: 4.4 K/UL (ref 1.7–7.7)
NEUTROPHILS NFR BLD: 65 %
NEUTS BAND NFR BLD MANUAL: 2 % (ref 0–7)
NT-PROBNP SERPL-MCNC: 992 PG/ML (ref 0–124)
NT-PROBNP SERPL-MCNC: 995 PG/ML (ref 0–124)
NUC CELL # FLD: 1670 /CUMM
PERFORMED ON: ABNORMAL
PLATELET # BLD AUTO: 224 K/UL (ref 135–450)
PMV BLD AUTO: 7.2 FL (ref 5–10.5)
POTASSIUM SERPL-SCNC: 3.7 MMOL/L (ref 3.5–5.1)
POTASSIUM SERPL-SCNC: 3.8 MMOL/L (ref 3.5–5.1)
PROT SERPL-MCNC: 6.3 G/DL (ref 6.4–8.2)
PROT SERPL-MCNC: 6.3 G/DL (ref 6.4–8.2)
RBC # BLD AUTO: 3.55 M/UL (ref 4–5.2)
RBC FLUID: NORMAL /CUMM
SODIUM SERPL-SCNC: 147 MMOL/L (ref 136–145)
SODIUM SERPL-SCNC: 148 MMOL/L (ref 136–145)
TOTAL CELLS COUNTED FLD: 100
VANCOMYCIN SERPL-MCNC: 22.6 UG/ML
WBC # BLD AUTO: 6.5 K/UL (ref 4–11)

## 2025-03-21 PROCEDURE — 0BCB8ZZ EXTIRPATION OF MATTER FROM LEFT LOWER LOBE BRONCHUS, VIA NATURAL OR ARTIFICIAL OPENING ENDOSCOPIC: ICD-10-PCS | Performed by: INTERNAL MEDICINE

## 2025-03-21 PROCEDURE — 31645 BRNCHSC W/THER ASPIR 1ST: CPT | Performed by: INTERNAL MEDICINE

## 2025-03-21 PROCEDURE — 84156 ASSAY OF PROTEIN URINE: CPT

## 2025-03-21 PROCEDURE — 87641 MR-STAPH DNA AMP PROBE: CPT

## 2025-03-21 PROCEDURE — 6360000002 HC RX W HCPCS: Performed by: FAMILY MEDICINE

## 2025-03-21 PROCEDURE — 2580000003 HC RX 258: Performed by: INTERNAL MEDICINE

## 2025-03-21 PROCEDURE — 94640 AIRWAY INHALATION TREATMENT: CPT

## 2025-03-21 PROCEDURE — 87305 ASPERGILLUS AG IA: CPT

## 2025-03-21 PROCEDURE — 87102 FUNGUS ISOLATION CULTURE: CPT

## 2025-03-21 PROCEDURE — 82043 UR ALBUMIN QUANTITATIVE: CPT

## 2025-03-21 PROCEDURE — 89051 BODY FLUID CELL COUNT: CPT

## 2025-03-21 PROCEDURE — 2580000003 HC RX 258: Performed by: FAMILY MEDICINE

## 2025-03-21 PROCEDURE — 80202 ASSAY OF VANCOMYCIN: CPT

## 2025-03-21 PROCEDURE — 6370000000 HC RX 637 (ALT 250 FOR IP): Performed by: NURSE PRACTITIONER

## 2025-03-21 PROCEDURE — 0BC68ZZ EXTIRPATION OF MATTER FROM RIGHT LOWER LOBE BRONCHUS, VIA NATURAL OR ARTIFICIAL OPENING ENDOSCOPIC: ICD-10-PCS | Performed by: INTERNAL MEDICINE

## 2025-03-21 PROCEDURE — 82570 ASSAY OF URINE CREATININE: CPT

## 2025-03-21 PROCEDURE — 83880 ASSAY OF NATRIURETIC PEPTIDE: CPT

## 2025-03-21 PROCEDURE — 2709999900 HC NON-CHARGEABLE SUPPLY: Performed by: INTERNAL MEDICINE

## 2025-03-21 PROCEDURE — 99233 SBSQ HOSP IP/OBS HIGH 50: CPT | Performed by: HOSPITALIST

## 2025-03-21 PROCEDURE — 87116 MYCOBACTERIA CULTURE: CPT

## 2025-03-21 PROCEDURE — 3609011400 HC BRONCHOSCOPY CRYOTHERAPY: Performed by: INTERNAL MEDICINE

## 2025-03-21 PROCEDURE — 2060000000 HC ICU INTERMEDIATE R&B

## 2025-03-21 PROCEDURE — 87106 FUNGI IDENTIFICATION YEAST: CPT

## 2025-03-21 PROCEDURE — 87633 RESP VIRUS 12-25 TARGETS: CPT

## 2025-03-21 PROCEDURE — 0TB03ZX EXCISION OF RIGHT KIDNEY, PERCUTANEOUS APPROACH, DIAGNOSTIC: ICD-10-PCS | Performed by: INTERNAL MEDICINE

## 2025-03-21 PROCEDURE — 2580000003 HC RX 258: Performed by: NURSE ANESTHETIST, CERTIFIED REGISTERED

## 2025-03-21 PROCEDURE — 6370000000 HC RX 637 (ALT 250 FOR IP): Performed by: FAMILY MEDICINE

## 2025-03-21 PROCEDURE — 87205 SMEAR GRAM STAIN: CPT

## 2025-03-21 PROCEDURE — 80053 COMPREHEN METABOLIC PANEL: CPT

## 2025-03-21 PROCEDURE — 6370000000 HC RX 637 (ALT 250 FOR IP): Performed by: ANESTHESIOLOGY

## 2025-03-21 PROCEDURE — 6370000000 HC RX 637 (ALT 250 FOR IP): Performed by: INTERNAL MEDICINE

## 2025-03-21 PROCEDURE — 87186 SC STD MICRODIL/AGAR DIL: CPT

## 2025-03-21 PROCEDURE — 87449 NOS EACH ORGANISM AG IA: CPT

## 2025-03-21 PROCEDURE — 2500000003 HC RX 250 WO HCPCS: Performed by: INTERNAL MEDICINE

## 2025-03-21 PROCEDURE — C2618 PROBE/NEEDLE, CRYO: HCPCS | Performed by: INTERNAL MEDICINE

## 2025-03-21 PROCEDURE — 6360000002 HC RX W HCPCS: Performed by: INTERNAL MEDICINE

## 2025-03-21 PROCEDURE — 88305 TISSUE EXAM BY PATHOLOGIST: CPT

## 2025-03-21 PROCEDURE — 6360000002 HC RX W HCPCS: Performed by: NURSE PRACTITIONER

## 2025-03-21 PROCEDURE — 7100000000 HC PACU RECOVERY - FIRST 15 MIN: Performed by: INTERNAL MEDICINE

## 2025-03-21 PROCEDURE — 2700000000 HC OXYGEN THERAPY PER DAY

## 2025-03-21 PROCEDURE — 94761 N-INVAS EAR/PLS OXIMETRY MLT: CPT

## 2025-03-21 PROCEDURE — 87206 SMEAR FLUORESCENT/ACID STAI: CPT

## 2025-03-21 PROCEDURE — 6360000002 HC RX W HCPCS: Performed by: NURSE ANESTHETIST, CERTIFIED REGISTERED

## 2025-03-21 PROCEDURE — 83735 ASSAY OF MAGNESIUM: CPT

## 2025-03-21 PROCEDURE — 2500000003 HC RX 250 WO HCPCS: Performed by: NURSE ANESTHETIST, CERTIFIED REGISTERED

## 2025-03-21 PROCEDURE — 85025 COMPLETE CBC W/AUTO DIFF WBC: CPT

## 2025-03-21 PROCEDURE — 36415 COLL VENOUS BLD VENIPUNCTURE: CPT

## 2025-03-21 PROCEDURE — 31635 BRONCHOSCOPY W/FB REMOVAL: CPT | Performed by: INTERNAL MEDICINE

## 2025-03-21 PROCEDURE — 7100000001 HC PACU RECOVERY - ADDTL 15 MIN: Performed by: INTERNAL MEDICINE

## 2025-03-21 RX ORDER — IPRATROPIUM BROMIDE AND ALBUTEROL SULFATE 2.5; .5 MG/3ML; MG/3ML
1 SOLUTION RESPIRATORY (INHALATION) ONCE
Status: COMPLETED | OUTPATIENT
Start: 2025-03-21 | End: 2025-03-21

## 2025-03-21 RX ORDER — SODIUM CHLORIDE 0.9 % (FLUSH) 0.9 %
5-40 SYRINGE (ML) INJECTION PRN
Status: DISCONTINUED | OUTPATIENT
Start: 2025-03-21 | End: 2025-03-21 | Stop reason: HOSPADM

## 2025-03-21 RX ORDER — SODIUM CHLORIDE, SODIUM LACTATE, POTASSIUM CHLORIDE, CALCIUM CHLORIDE 600; 310; 30; 20 MG/100ML; MG/100ML; MG/100ML; MG/100ML
INJECTION, SOLUTION INTRAVENOUS
Status: DISCONTINUED | OUTPATIENT
Start: 2025-03-21 | End: 2025-03-21 | Stop reason: SDUPTHER

## 2025-03-21 RX ORDER — PROPOFOL 10 MG/ML
INJECTION, EMULSION INTRAVENOUS
Status: DISCONTINUED | OUTPATIENT
Start: 2025-03-21 | End: 2025-03-21 | Stop reason: SDUPTHER

## 2025-03-21 RX ORDER — SODIUM CHLORIDE 9 MG/ML
INJECTION, SOLUTION INTRAVENOUS PRN
Status: DISCONTINUED | OUTPATIENT
Start: 2025-03-21 | End: 2025-03-21 | Stop reason: HOSPADM

## 2025-03-21 RX ORDER — SODIUM CHLORIDE 0.9 % (FLUSH) 0.9 %
5-40 SYRINGE (ML) INJECTION EVERY 12 HOURS SCHEDULED
Status: DISCONTINUED | OUTPATIENT
Start: 2025-03-21 | End: 2025-03-21 | Stop reason: HOSPADM

## 2025-03-21 RX ORDER — ONDANSETRON 2 MG/ML
INJECTION INTRAMUSCULAR; INTRAVENOUS
Status: DISCONTINUED | OUTPATIENT
Start: 2025-03-21 | End: 2025-03-21 | Stop reason: SDUPTHER

## 2025-03-21 RX ORDER — LIDOCAINE HCL/PF 100 MG/5ML
SYRINGE (ML) INJECTION
Status: DISCONTINUED | OUTPATIENT
Start: 2025-03-21 | End: 2025-03-21 | Stop reason: SDUPTHER

## 2025-03-21 RX ORDER — KETAMINE HCL IN NACL, ISO-OSM 20 MG/2 ML
SYRINGE (ML) INJECTION
Status: DISCONTINUED | OUTPATIENT
Start: 2025-03-21 | End: 2025-03-21 | Stop reason: SDUPTHER

## 2025-03-21 RX ORDER — NALOXONE HYDROCHLORIDE 0.4 MG/ML
INJECTION, SOLUTION INTRAMUSCULAR; INTRAVENOUS; SUBCUTANEOUS PRN
Status: DISCONTINUED | OUTPATIENT
Start: 2025-03-21 | End: 2025-03-21 | Stop reason: HOSPADM

## 2025-03-21 RX ORDER — LETROZOLE 2.5 MG/1
2.5 TABLET, FILM COATED ORAL DAILY
Status: DISCONTINUED | OUTPATIENT
Start: 2025-03-21 | End: 2025-03-27 | Stop reason: HOSPADM

## 2025-03-21 RX ORDER — ROCURONIUM BROMIDE 10 MG/ML
INJECTION, SOLUTION INTRAVENOUS
Status: DISCONTINUED | OUTPATIENT
Start: 2025-03-21 | End: 2025-03-21 | Stop reason: SDUPTHER

## 2025-03-21 RX ADMIN — LETROZOLE 2.5 MG: 2.5 TABLET ORAL at 15:37

## 2025-03-21 RX ADMIN — PROPOFOL 100 MG: 10 INJECTION, EMULSION INTRAVENOUS at 09:42

## 2025-03-21 RX ADMIN — SUGAMMADEX 200 MG: 100 INJECTION, SOLUTION INTRAVENOUS at 11:07

## 2025-03-21 RX ADMIN — ROCURONIUM BROMIDE 100 MG: 10 INJECTION, SOLUTION INTRAVENOUS at 09:43

## 2025-03-21 RX ADMIN — FUROSEMIDE 40 MG: 10 INJECTION, SOLUTION INTRAMUSCULAR; INTRAVENOUS at 12:51

## 2025-03-21 RX ADMIN — TEMAZEPAM 15 MG: 15 CAPSULE ORAL at 22:47

## 2025-03-21 RX ADMIN — IPRATROPIUM BROMIDE AND ALBUTEROL SULFATE 1 DOSE: .5; 2.5 SOLUTION RESPIRATORY (INHALATION) at 16:56

## 2025-03-21 RX ADMIN — Medication 6 MG: at 22:39

## 2025-03-21 RX ADMIN — ALTEPLASE 2 MG: 2.2 INJECTION, POWDER, LYOPHILIZED, FOR SOLUTION INTRAVENOUS at 06:02

## 2025-03-21 RX ADMIN — CARVEDILOL 6.25 MG: 6.25 TABLET, FILM COATED ORAL at 22:39

## 2025-03-21 RX ADMIN — PHENYLEPHRINE HYDROCHLORIDE 200 MCG: 10 INJECTION, SOLUTION INTRAMUSCULAR; INTRAVENOUS; SUBCUTANEOUS at 10:47

## 2025-03-21 RX ADMIN — SODIUM CHLORIDE, SODIUM LACTATE, POTASSIUM CHLORIDE, AND CALCIUM CHLORIDE: .6; .31; .03; .02 INJECTION, SOLUTION INTRAVENOUS at 09:35

## 2025-03-21 RX ADMIN — PHENYLEPHRINE HYDROCHLORIDE 200 MCG: 10 INJECTION, SOLUTION INTRAMUSCULAR; INTRAVENOUS; SUBCUTANEOUS at 10:02

## 2025-03-21 RX ADMIN — ACETAMINOPHEN 650 MG: 325 TABLET, FILM COATED ORAL at 22:42

## 2025-03-21 RX ADMIN — MEROPENEM 1000 MG: 1 INJECTION INTRAVENOUS at 15:26

## 2025-03-21 RX ADMIN — GUAIFENESIN 600 MG: 600 TABLET ORAL at 12:51

## 2025-03-21 RX ADMIN — VANCOMYCIN HYDROCHLORIDE 1000 MG: 1 INJECTION, POWDER, LYOPHILIZED, FOR SOLUTION INTRAVENOUS at 22:34

## 2025-03-21 RX ADMIN — SODIUM BICARBONATE 650 MG: 650 TABLET ORAL at 22:38

## 2025-03-21 RX ADMIN — INSULIN LISPRO 2 UNITS: 100 INJECTION, SOLUTION INTRAVENOUS; SUBCUTANEOUS at 17:43

## 2025-03-21 RX ADMIN — IPRATROPIUM BROMIDE AND ALBUTEROL SULFATE 1 DOSE: .5; 2.5 SOLUTION RESPIRATORY (INHALATION) at 09:25

## 2025-03-21 RX ADMIN — MEROPENEM 1000 MG: 1 INJECTION INTRAVENOUS at 02:58

## 2025-03-21 RX ADMIN — APIXABAN 2.5 MG: 2.5 TABLET, FILM COATED ORAL at 22:38

## 2025-03-21 RX ADMIN — IPRATROPIUM BROMIDE AND ALBUTEROL SULFATE 1 DOSE: .5; 2.5 SOLUTION RESPIRATORY (INHALATION) at 13:53

## 2025-03-21 RX ADMIN — IPRATROPIUM BROMIDE AND ALBUTEROL SULFATE 1 DOSE: .5; 2.5 SOLUTION RESPIRATORY (INHALATION) at 20:15

## 2025-03-21 RX ADMIN — ONDANSETRON 8 MG: 2 INJECTION INTRAMUSCULAR; INTRAVENOUS at 09:47

## 2025-03-21 RX ADMIN — ROPINIROLE HYDROCHLORIDE 0.25 MG: 0.25 TABLET, FILM COATED ORAL at 22:37

## 2025-03-21 RX ADMIN — SODIUM CHLORIDE, PRESERVATIVE FREE 10 ML: 5 INJECTION INTRAVENOUS at 22:39

## 2025-03-21 RX ADMIN — GUAIFENESIN 600 MG: 600 TABLET ORAL at 22:38

## 2025-03-21 RX ADMIN — SERTRALINE 50 MG: 50 TABLET, FILM COATED ORAL at 12:37

## 2025-03-21 RX ADMIN — INSULIN LISPRO 1 UNITS: 100 INJECTION, SOLUTION INTRAVENOUS; SUBCUTANEOUS at 22:53

## 2025-03-21 RX ADMIN — FUROSEMIDE 40 MG: 10 INJECTION, SOLUTION INTRAMUSCULAR; INTRAVENOUS at 17:43

## 2025-03-21 RX ADMIN — TRAZODONE HYDROCHLORIDE 25 MG: 50 TABLET ORAL at 22:36

## 2025-03-21 RX ADMIN — Medication 100 MG: at 09:41

## 2025-03-21 RX ADMIN — SODIUM BICARBONATE 650 MG: 650 TABLET ORAL at 12:37

## 2025-03-21 RX ADMIN — Medication 20 MG: at 09:40

## 2025-03-21 RX ADMIN — PREDNISONE 50 MG: 50 TABLET ORAL at 12:37

## 2025-03-21 RX ADMIN — SODIUM CHLORIDE, PRESERVATIVE FREE 10 ML: 5 INJECTION INTRAVENOUS at 12:38

## 2025-03-21 RX ADMIN — CARVEDILOL 6.25 MG: 6.25 TABLET, FILM COATED ORAL at 12:37

## 2025-03-21 RX ADMIN — SODIUM CHLORIDE 30 MG/ML INHALATION SOLUTION 4 ML: 30 SOLUTION INHALANT at 20:15

## 2025-03-21 ASSESSMENT — PAIN DESCRIPTION - LOCATION
LOCATION: KNEE
LOCATION: KNEE

## 2025-03-21 ASSESSMENT — PAIN SCALES - GENERAL
PAINLEVEL_OUTOF10: 0
PAINLEVEL_OUTOF10: 3
PAINLEVEL_OUTOF10: 0
PAINLEVEL_OUTOF10: 0
PAINLEVEL_OUTOF10: 6
PAINLEVEL_OUTOF10: 0

## 2025-03-21 ASSESSMENT — PAIN DESCRIPTION - DESCRIPTORS
DESCRIPTORS: ACHING
DESCRIPTORS: ACHING

## 2025-03-21 ASSESSMENT — PAIN DESCRIPTION - ORIENTATION
ORIENTATION: RIGHT;LEFT
ORIENTATION: RIGHT;LEFT

## 2025-03-21 ASSESSMENT — PAIN - FUNCTIONAL ASSESSMENT
PAIN_FUNCTIONAL_ASSESSMENT: NONE - DENIES PAIN
PAIN_FUNCTIONAL_ASSESSMENT: ACTIVITIES ARE NOT PREVENTED
PAIN_FUNCTIONAL_ASSESSMENT: ACTIVITIES ARE NOT PREVENTED

## 2025-03-21 ASSESSMENT — PAIN DESCRIPTION - PAIN TYPE
TYPE: CHRONIC PAIN
TYPE: CHRONIC PAIN

## 2025-03-21 ASSESSMENT — PAIN DESCRIPTION - FREQUENCY
FREQUENCY: CONTINUOUS
FREQUENCY: CONTINUOUS

## 2025-03-21 ASSESSMENT — PAIN DESCRIPTION - ONSET
ONSET: ON-GOING
ONSET: ON-GOING

## 2025-03-21 NOTE — PROCEDURES
[facility]   PULMONARY AND CRITICAL CARE MEDICINE    BRONCHOSCOPIC PROCEDURE NOTE       Procedure(s) performed:  05172 - Bronchoscopy w/Foreign body removal  75712 - Bronchoscopy w/Therapeutic aspiration - initial    Preprocedure diagnosis: Mucus plugging - plastic bronchitis  Post procedure diagnosis: Same    DESCRIPTION OF PROCEDURE:   - Consent:  Informed consent was obtained from the patient  after explaining the risks and benefits. A time out was taken.    - Sedation:  Bronchoscopy performed under GA, please refer to anesthesiology notes for details.     - Procedure details:  Bronchoscope was inserted into the main airway via the ETT.  Vocal cords were note assessed due to ETT presence. Lidocaine was was not used. The bronchial trees were examined to the subsegmental level.   Therapeutic aspiration performed at the bilateral airways primarily at the L sided airways with evidence of mucus plugging at the LL, the right sided airways were occluded with thick organized plugs that resembled airway casts.   Substantial difficulties were found while removing these organized material from the airways using NS and therapeutic scope, decided to proceed with plug removal using cryo-probe.     - Findings:  Airway mucosa: edematous after plug removal, extensive amount of organized plugs/casts were removed from the R sided airways primarily. Mucosa appeared erythematous and edematous after procedure, probably as a consequence of the procedure itself. Very thick yellow/greenish secretions were noted at the RML distal to lighter colored mucus plugs as noted on the last picture.   Endobronchial lesions: Not visualized  Lymphadenopathy: Not evaluated      - Samples:  Sent for Microbiology, Cytology, and Pathology    The patient tolerated the procedure well. No immediate complication    EBL: 5 ml.      Fernando Sánchez \"Rubén\" Nicole Horan MD  Pulmonary and Critical Care Medicine

## 2025-03-21 NOTE — ANESTHESIA PRE PROCEDURE
Department of Anesthesiology  Preprocedure Note       Name:  Shoaib Rothman   Age:  64 y.o.  :  1960                                          MRN:  [id number]         Date:  3/21/2025      Surgeon: Surgeon(s):  Fernando Dhillon MD    Procedure: Procedure(s):  BRONCHOSCOPY CRYOTHERAPY/LASER    Medications prior to admission:   Prior to Admission medications    Medication Sig Start Date End Date Taking? Authorizing Provider   predniSONE (DELTASONE) 50 MG tablet Take 1 tablet by mouth daily 3/7/25  Yes Kacey Joe APRN - CNP   nicotine (NICODERM CQ) 21 MG/24HR Place 1 patch onto the skin daily 3/3/25  Yes Saji Mays MD   amLODIPine (NORVASC) 10 MG tablet Take 1 tablet by mouth daily Hold if Blood pressure less than 110 systolic 3/3/25  Yes Saji Mays MD   losartan (COZAAR) 25 MG tablet Take 1 tablet by mouth daily Hold if blood pressure is less than 110 systolic 3/3/25  Yes Saji Mays MD   torsemide (DEMADEX) 20 MG tablet Take 1 tablet by mouth daily 3/6/25  Yes Saji Mays MD   apixaban (ELIQUIS) 2.5 MG TABS tablet Take 1 tablet by mouth in the morning and at bedtime   Yes Chago Mckeon MD   mineral oil-hydrophilic petrolatum (AQUAPHOR) ointment Apply topically to face every 6 hours as needed for dry skin.   Yes Chago Mckeon MD   Calcium Carbonate-Vit D-Min (CALCIUM 600 + MINERALS) 600-200 MG-UNIT TABS Take 1 caplet by mouth daily   Yes Chago Mckeon MD   ipratropium 0.5 mg-albuterol 2.5 mg (DUONEB) 0.5-2.5 (3) MG/3ML SOLN nebulizer solution Inhale 3 mLs into the lungs every 6 hours as needed for Shortness of Breath   Yes Chago Mckeon MD   pantoprazole (PROTONIX) 20 MG tablet Take 1 tablet by mouth daily   Yes Chago Mckeon MD   sodium bicarbonate 650 MG tablet Take 1 tablet by mouth 2 times daily 24 Yes Lm Lockett MD   carvedilol (COREG) 6.25 MG tablet Take 1 tablet by mouth in the morning and at bedtime Hold for BP < 100/60

## 2025-03-21 NOTE — ANESTHESIA POSTPROCEDURE EVALUATION
Department of Anesthesiology  Postprocedure Note    Patient: Shoaib Rothman  MRN: [id number]  YOB: 1960  Date of evaluation: 3/21/2025    Procedure Summary       Date: 03/21/25 Room / Location: [place] [address] / [facility]    Anesthesia Start: 0935 Anesthesia Stop:     Procedure: BRONCHOSCOPY CRYOTHERAPY / BIOPSY Diagnosis:       Acute respiratory failure with hypoxia      (Acute respiratory failure with hypoxia (HCC) [J96.01])    Surgeons: Fernando Dhillon MD Responsible Provider: Bo Aly MD    Anesthesia Type: general ASA Status: 3            Anesthesia Type: No value filed.    Carole Phase I: Carole Score: 9    Carole Phase II:      Anesthesia Post Evaluation    Patient location during evaluation: PACU  Patient participation: complete - patient participated  Level of consciousness: awake  Pain score: 0  Airway patency: patent  Nausea & Vomiting: no nausea  Cardiovascular status: hemodynamically stable  Respiratory status: acceptable  Hydration status: stable  Pain management: adequate    No notable events documented.

## 2025-03-22 PROBLEM — J43.1 PANLOBULAR EMPHYSEMA (HCC): Status: ACTIVE | Noted: 2025-03-22

## 2025-03-22 LAB
ACID FAST STN SPEC QL: NORMAL
ALBUMIN SERPL-MCNC: 2.9 G/DL (ref 3.4–5)
ALBUMIN/GLOB SERPL: 1 {RATIO} (ref 1.1–2.2)
ALP SERPL-CCNC: 115 U/L (ref 40–129)
ALT SERPL-CCNC: 20 U/L (ref 10–40)
ANION GAP SERPL CALCULATED.3IONS-SCNC: 7 MMOL/L (ref 3–16)
AST SERPL-CCNC: 16 U/L (ref 15–37)
BASOPHILS # BLD: 0 K/UL (ref 0–0.2)
BASOPHILS NFR BLD: 0 %
BILIRUB SERPL-MCNC: <0.2 MG/DL (ref 0–1)
BUN SERPL-MCNC: 56 MG/DL (ref 7–20)
CALCIUM SERPL-MCNC: 8.6 MG/DL (ref 8.3–10.6)
CHLORIDE SERPL-SCNC: 97 MMOL/L (ref 99–110)
CO2 SERPL-SCNC: 42 MMOL/L (ref 21–32)
CREAT SERPL-MCNC: 1.8 MG/DL (ref 0.6–1.2)
CREAT UR-MCNC: 43.6 MG/DL (ref 28–259)
CREAT UR-MCNC: 43.8 MG/DL (ref 28–259)
DEPRECATED RDW RBC AUTO: 16.1 % (ref 12.4–15.4)
EOSINOPHIL # BLD: 0 K/UL (ref 0–0.6)
EOSINOPHIL NFR BLD: 0 %
GFR SERPLBLD CREATININE-BSD FMLA CKD-EPI: 31 ML/MIN/{1.73_M2}
GLUCOSE BLD-MCNC: 208 MG/DL (ref 70–99)
GLUCOSE BLD-MCNC: 244 MG/DL (ref 70–99)
GLUCOSE BLD-MCNC: 257 MG/DL (ref 70–99)
GLUCOSE SERPL-MCNC: 189 MG/DL (ref 70–99)
HCT VFR BLD AUTO: 30.1 % (ref 36–48)
HGB BLD-MCNC: 9.8 G/DL (ref 12–16)
LYMPHOCYTES # BLD: 1.1 K/UL (ref 1–5.1)
LYMPHOCYTES NFR BLD: 15 %
MCH RBC QN AUTO: 30.8 PG (ref 26–34)
MCHC RBC AUTO-ENTMCNC: 32.7 G/DL (ref 31–36)
MCV RBC AUTO: 94.1 FL (ref 80–100)
MICROALBUMIN UR DL<=1MG/L-MCNC: 190 MG/DL
MICROALBUMIN/CREAT UR: 4357.8 MG/G (ref 0–30)
MONOCYTES # BLD: 0.5 K/UL (ref 0–1.3)
MONOCYTES NFR BLD: 7 %
MRSA DNA SPEC QL NAA+PROBE: NORMAL
NEUTROPHILS # BLD: 5.5 K/UL (ref 1.7–7.7)
NEUTROPHILS NFR BLD: 78 %
PERFORMED ON: ABNORMAL
PLATELET # BLD AUTO: 210 K/UL (ref 135–450)
PLATELET BLD QL SMEAR: ADEQUATE
PMV BLD AUTO: 7.2 FL (ref 5–10.5)
POTASSIUM SERPL-SCNC: 4 MMOL/L (ref 3.5–5.1)
PROT SERPL-MCNC: 5.7 G/DL (ref 6.4–8.2)
PROT UR-MCNC: 309 MG/DL
PROT/CREAT UR-RTO: 7.1 MG/DL
RBC # BLD AUTO: 3.2 M/UL (ref 4–5.2)
RBC MORPH BLD: NORMAL
SLIDE REVIEW: ABNORMAL
SODIUM SERPL-SCNC: 146 MMOL/L (ref 136–145)
WBC # BLD AUTO: 7.1 K/UL (ref 4–11)

## 2025-03-22 PROCEDURE — 6370000000 HC RX 637 (ALT 250 FOR IP): Performed by: FAMILY MEDICINE

## 2025-03-22 PROCEDURE — 94640 AIRWAY INHALATION TREATMENT: CPT

## 2025-03-22 PROCEDURE — 99233 SBSQ HOSP IP/OBS HIGH 50: CPT | Performed by: HOSPITALIST

## 2025-03-22 PROCEDURE — 6370000000 HC RX 637 (ALT 250 FOR IP): Performed by: INTERNAL MEDICINE

## 2025-03-22 PROCEDURE — 80053 COMPREHEN METABOLIC PANEL: CPT

## 2025-03-22 PROCEDURE — 6360000002 HC RX W HCPCS: Performed by: FAMILY MEDICINE

## 2025-03-22 PROCEDURE — 2580000003 HC RX 258: Performed by: FAMILY MEDICINE

## 2025-03-22 PROCEDURE — 6360000002 HC RX W HCPCS: Performed by: INTERNAL MEDICINE

## 2025-03-22 PROCEDURE — 2500000003 HC RX 250 WO HCPCS: Performed by: INTERNAL MEDICINE

## 2025-03-22 PROCEDURE — 2700000000 HC OXYGEN THERAPY PER DAY

## 2025-03-22 PROCEDURE — 2580000003 HC RX 258: Performed by: INTERNAL MEDICINE

## 2025-03-22 PROCEDURE — 85025 COMPLETE CBC W/AUTO DIFF WBC: CPT

## 2025-03-22 PROCEDURE — 94761 N-INVAS EAR/PLS OXIMETRY MLT: CPT

## 2025-03-22 PROCEDURE — 2060000000 HC ICU INTERMEDIATE R&B

## 2025-03-22 PROCEDURE — 99233 SBSQ HOSP IP/OBS HIGH 50: CPT | Performed by: INTERNAL MEDICINE

## 2025-03-22 PROCEDURE — 6370000000 HC RX 637 (ALT 250 FOR IP): Performed by: NURSE PRACTITIONER

## 2025-03-22 RX ADMIN — SERTRALINE 50 MG: 50 TABLET, FILM COATED ORAL at 08:19

## 2025-03-22 RX ADMIN — CARVEDILOL 6.25 MG: 6.25 TABLET, FILM COATED ORAL at 08:19

## 2025-03-22 RX ADMIN — INSULIN LISPRO 1 UNITS: 100 INJECTION, SOLUTION INTRAVENOUS; SUBCUTANEOUS at 12:33

## 2025-03-22 RX ADMIN — TRAZODONE HYDROCHLORIDE 25 MG: 50 TABLET ORAL at 21:57

## 2025-03-22 RX ADMIN — APIXABAN 2.5 MG: 2.5 TABLET, FILM COATED ORAL at 08:19

## 2025-03-22 RX ADMIN — SODIUM BICARBONATE 650 MG: 650 TABLET ORAL at 21:56

## 2025-03-22 RX ADMIN — ACETAMINOPHEN 650 MG: 325 TABLET, FILM COATED ORAL at 21:57

## 2025-03-22 RX ADMIN — VANCOMYCIN HYDROCHLORIDE 1000 MG: 1 INJECTION, POWDER, LYOPHILIZED, FOR SOLUTION INTRAVENOUS at 20:27

## 2025-03-22 RX ADMIN — Medication 6 MG: at 21:56

## 2025-03-22 RX ADMIN — IPRATROPIUM BROMIDE AND ALBUTEROL SULFATE 1 DOSE: .5; 2.5 SOLUTION RESPIRATORY (INHALATION) at 09:08

## 2025-03-22 RX ADMIN — LETROZOLE 2.5 MG: 2.5 TABLET ORAL at 08:20

## 2025-03-22 RX ADMIN — GUAIFENESIN 600 MG: 600 TABLET ORAL at 08:19

## 2025-03-22 RX ADMIN — IPRATROPIUM BROMIDE AND ALBUTEROL SULFATE 1 DOSE: .5; 2.5 SOLUTION RESPIRATORY (INHALATION) at 15:43

## 2025-03-22 RX ADMIN — IPRATROPIUM BROMIDE AND ALBUTEROL SULFATE 1 DOSE: .5; 2.5 SOLUTION RESPIRATORY (INHALATION) at 11:44

## 2025-03-22 RX ADMIN — IPRATROPIUM BROMIDE AND ALBUTEROL SULFATE 1 DOSE: .5; 2.5 SOLUTION RESPIRATORY (INHALATION) at 19:56

## 2025-03-22 RX ADMIN — SODIUM BICARBONATE 650 MG: 650 TABLET ORAL at 08:19

## 2025-03-22 RX ADMIN — CARVEDILOL 6.25 MG: 6.25 TABLET, FILM COATED ORAL at 21:56

## 2025-03-22 RX ADMIN — INSULIN LISPRO 1 UNITS: 100 INJECTION, SOLUTION INTRAVENOUS; SUBCUTANEOUS at 17:19

## 2025-03-22 RX ADMIN — SODIUM CHLORIDE 30 MG/ML INHALATION SOLUTION 4 ML: 30 SOLUTION INHALANT at 19:56

## 2025-03-22 RX ADMIN — MEROPENEM 1000 MG: 1 INJECTION INTRAVENOUS at 14:31

## 2025-03-22 RX ADMIN — FUROSEMIDE 40 MG: 10 INJECTION, SOLUTION INTRAMUSCULAR; INTRAVENOUS at 08:19

## 2025-03-22 RX ADMIN — SODIUM CHLORIDE, PRESERVATIVE FREE 10 ML: 5 INJECTION INTRAVENOUS at 08:20

## 2025-03-22 RX ADMIN — ROPINIROLE HYDROCHLORIDE 0.25 MG: 0.25 TABLET, FILM COATED ORAL at 21:57

## 2025-03-22 RX ADMIN — TEMAZEPAM 15 MG: 15 CAPSULE ORAL at 21:57

## 2025-03-22 RX ADMIN — MEROPENEM 1000 MG: 1 INJECTION INTRAVENOUS at 04:45

## 2025-03-22 RX ADMIN — FUROSEMIDE 40 MG: 10 INJECTION, SOLUTION INTRAMUSCULAR; INTRAVENOUS at 17:19

## 2025-03-22 RX ADMIN — GUAIFENESIN 600 MG: 600 TABLET ORAL at 21:57

## 2025-03-22 RX ADMIN — SODIUM CHLORIDE 30 MG/ML INHALATION SOLUTION 4 ML: 30 SOLUTION INHALANT at 09:08

## 2025-03-22 RX ADMIN — APIXABAN 2.5 MG: 2.5 TABLET, FILM COATED ORAL at 21:57

## 2025-03-22 RX ADMIN — PREDNISONE 50 MG: 50 TABLET ORAL at 08:19

## 2025-03-22 RX ADMIN — INSULIN LISPRO 2 UNITS: 100 INJECTION, SOLUTION INTRAVENOUS; SUBCUTANEOUS at 20:27

## 2025-03-22 ASSESSMENT — PAIN DESCRIPTION - DESCRIPTORS: DESCRIPTORS: ACHING

## 2025-03-22 ASSESSMENT — PAIN SCALES - WONG BAKER: WONGBAKER_NUMERICALRESPONSE: NO HURT

## 2025-03-22 ASSESSMENT — PAIN DESCRIPTION - ORIENTATION: ORIENTATION: RIGHT;LEFT

## 2025-03-22 ASSESSMENT — PAIN SCALES - GENERAL
PAINLEVEL_OUTOF10: 0
PAINLEVEL_OUTOF10: 1
PAINLEVEL_OUTOF10: 0
PAINLEVEL_OUTOF10: 3

## 2025-03-22 ASSESSMENT — PAIN DESCRIPTION - LOCATION: LOCATION: KNEE

## 2025-03-22 ASSESSMENT — PAIN - FUNCTIONAL ASSESSMENT: PAIN_FUNCTIONAL_ASSESSMENT: ACTIVITIES ARE NOT PREVENTED

## 2025-03-23 LAB
(1,3)-BETA-D-GLUCAN (FUNGITELL) INTERPRETATION: NEGATIVE
1,3 BETA GLUCAN SER-MCNC: <31 PG/ML
ALBUMIN SERPL-MCNC: 3 G/DL (ref 3.4–5)
ALBUMIN/GLOB SERPL: 1.2 {RATIO} (ref 1.1–2.2)
ALP SERPL-CCNC: 136 U/L (ref 40–129)
ALT SERPL-CCNC: 29 U/L (ref 10–40)
ANION GAP SERPL CALCULATED.3IONS-SCNC: 6 MMOL/L (ref 3–16)
AST SERPL-CCNC: 26 U/L (ref 15–37)
BASOPHILS # BLD: 0 K/UL (ref 0–0.2)
BASOPHILS NFR BLD: 0 %
BILIRUB SERPL-MCNC: <0.2 MG/DL (ref 0–1)
BUN SERPL-MCNC: 61 MG/DL (ref 7–20)
CALCIUM SERPL-MCNC: 8.9 MG/DL (ref 8.3–10.6)
CHLORIDE SERPL-SCNC: 95 MMOL/L (ref 99–110)
CO2 SERPL-SCNC: 45 MMOL/L (ref 21–32)
CREAT SERPL-MCNC: 2.1 MG/DL (ref 0.6–1.2)
DEPRECATED RDW RBC AUTO: 15.8 % (ref 12.4–15.4)
EOSINOPHIL # BLD: 0 K/UL (ref 0–0.6)
EOSINOPHIL NFR BLD: 0 %
GALACTOMANNAN AG SERPL IA-ACNC: 0.03
GALACTOMANNAN AG SERPL QL IA: NEGATIVE
GFR SERPLBLD CREATININE-BSD FMLA CKD-EPI: 26 ML/MIN/{1.73_M2}
GLUCOSE BLD-MCNC: 222 MG/DL (ref 70–99)
GLUCOSE BLD-MCNC: 228 MG/DL (ref 70–99)
GLUCOSE BLD-MCNC: 266 MG/DL (ref 70–99)
GLUCOSE BLD-MCNC: 97 MG/DL (ref 70–99)
GLUCOSE SERPL-MCNC: 139 MG/DL (ref 70–99)
HCT VFR BLD AUTO: 30.2 % (ref 36–48)
HGB BLD-MCNC: 9.7 G/DL (ref 12–16)
LYMPHOCYTES # BLD: 0.9 K/UL (ref 1–5.1)
LYMPHOCYTES NFR BLD: 11 %
MCH RBC QN AUTO: 30.1 PG (ref 26–34)
MCHC RBC AUTO-ENTMCNC: 32.1 G/DL (ref 31–36)
MCV RBC AUTO: 93.6 FL (ref 80–100)
MONOCYTES # BLD: 0.5 K/UL (ref 0–1.3)
MONOCYTES NFR BLD: 6 %
NEUTROPHILS # BLD: 6.6 K/UL (ref 1.7–7.7)
NEUTROPHILS NFR BLD: 81 %
NEUTS BAND NFR BLD MANUAL: 2 % (ref 0–7)
PERFORMED ON: ABNORMAL
PERFORMED ON: NORMAL
PLATELET # BLD AUTO: 205 K/UL (ref 135–450)
PLATELET BLD QL SMEAR: ADEQUATE
PMV BLD AUTO: 6.9 FL (ref 5–10.5)
POTASSIUM SERPL-SCNC: 3.6 MMOL/L (ref 3.5–5.1)
PROT SERPL-MCNC: 5.6 G/DL (ref 6.4–8.2)
RBC # BLD AUTO: 3.23 M/UL (ref 4–5.2)
RBC MORPH BLD: NORMAL
SLIDE REVIEW: ABNORMAL
SODIUM SERPL-SCNC: 146 MMOL/L (ref 136–145)
VANCOMYCIN SERPL-MCNC: 30.2 UG/ML
WBC # BLD AUTO: 7.9 K/UL (ref 4–11)

## 2025-03-23 PROCEDURE — 6360000002 HC RX W HCPCS: Performed by: INTERNAL MEDICINE

## 2025-03-23 PROCEDURE — 99233 SBSQ HOSP IP/OBS HIGH 50: CPT | Performed by: INTERNAL MEDICINE

## 2025-03-23 PROCEDURE — 6370000000 HC RX 637 (ALT 250 FOR IP): Performed by: NURSE PRACTITIONER

## 2025-03-23 PROCEDURE — 2580000003 HC RX 258: Performed by: FAMILY MEDICINE

## 2025-03-23 PROCEDURE — 2700000000 HC OXYGEN THERAPY PER DAY

## 2025-03-23 PROCEDURE — 80202 ASSAY OF VANCOMYCIN: CPT

## 2025-03-23 PROCEDURE — 2500000003 HC RX 250 WO HCPCS: Performed by: INTERNAL MEDICINE

## 2025-03-23 PROCEDURE — 2060000000 HC ICU INTERMEDIATE R&B

## 2025-03-23 PROCEDURE — 99233 SBSQ HOSP IP/OBS HIGH 50: CPT | Performed by: HOSPITALIST

## 2025-03-23 PROCEDURE — 94761 N-INVAS EAR/PLS OXIMETRY MLT: CPT

## 2025-03-23 PROCEDURE — 80053 COMPREHEN METABOLIC PANEL: CPT

## 2025-03-23 PROCEDURE — 85025 COMPLETE CBC W/AUTO DIFF WBC: CPT

## 2025-03-23 PROCEDURE — 2580000003 HC RX 258: Performed by: HOSPITALIST

## 2025-03-23 PROCEDURE — 6370000000 HC RX 637 (ALT 250 FOR IP): Performed by: INTERNAL MEDICINE

## 2025-03-23 PROCEDURE — 94640 AIRWAY INHALATION TREATMENT: CPT

## 2025-03-23 PROCEDURE — 2580000003 HC RX 258: Performed by: INTERNAL MEDICINE

## 2025-03-23 PROCEDURE — 6360000002 HC RX W HCPCS: Performed by: FAMILY MEDICINE

## 2025-03-23 PROCEDURE — 6360000002 HC RX W HCPCS: Performed by: HOSPITALIST

## 2025-03-23 PROCEDURE — 6370000000 HC RX 637 (ALT 250 FOR IP): Performed by: FAMILY MEDICINE

## 2025-03-23 RX ORDER — HEPARIN SODIUM 5000 [USP'U]/ML
5000 INJECTION, SOLUTION INTRAVENOUS; SUBCUTANEOUS EVERY 8 HOURS SCHEDULED
Status: DISCONTINUED | OUTPATIENT
Start: 2025-03-23 | End: 2025-03-27 | Stop reason: HOSPADM

## 2025-03-23 RX ADMIN — FUROSEMIDE 40 MG: 10 INJECTION, SOLUTION INTRAMUSCULAR; INTRAVENOUS at 18:02

## 2025-03-23 RX ADMIN — MEROPENEM 1000 MG: 1 INJECTION INTRAVENOUS at 14:14

## 2025-03-23 RX ADMIN — INSULIN LISPRO 2 UNITS: 100 INJECTION, SOLUTION INTRAVENOUS; SUBCUTANEOUS at 21:35

## 2025-03-23 RX ADMIN — TRAZODONE HYDROCHLORIDE 25 MG: 50 TABLET ORAL at 21:34

## 2025-03-23 RX ADMIN — INSULIN LISPRO 1 UNITS: 100 INJECTION, SOLUTION INTRAVENOUS; SUBCUTANEOUS at 13:21

## 2025-03-23 RX ADMIN — SODIUM CHLORIDE, PRESERVATIVE FREE 10 ML: 5 INJECTION INTRAVENOUS at 10:22

## 2025-03-23 RX ADMIN — GUAIFENESIN 600 MG: 600 TABLET ORAL at 10:13

## 2025-03-23 RX ADMIN — SODIUM BICARBONATE 650 MG: 650 TABLET ORAL at 10:13

## 2025-03-23 RX ADMIN — MEROPENEM 1000 MG: 1 INJECTION INTRAVENOUS at 02:29

## 2025-03-23 RX ADMIN — SODIUM CHLORIDE 30 MG/ML INHALATION SOLUTION 4 ML: 30 SOLUTION INHALANT at 21:08

## 2025-03-23 RX ADMIN — Medication 6 MG: at 21:33

## 2025-03-23 RX ADMIN — CARVEDILOL 6.25 MG: 6.25 TABLET, FILM COATED ORAL at 10:13

## 2025-03-23 RX ADMIN — INSULIN LISPRO 1 UNITS: 100 INJECTION, SOLUTION INTRAVENOUS; SUBCUTANEOUS at 17:07

## 2025-03-23 RX ADMIN — SODIUM BICARBONATE 650 MG: 650 TABLET ORAL at 21:33

## 2025-03-23 RX ADMIN — SERTRALINE 50 MG: 50 TABLET, FILM COATED ORAL at 10:13

## 2025-03-23 RX ADMIN — IPRATROPIUM BROMIDE AND ALBUTEROL SULFATE 1 DOSE: .5; 2.5 SOLUTION RESPIRATORY (INHALATION) at 12:36

## 2025-03-23 RX ADMIN — FUROSEMIDE 40 MG: 10 INJECTION, SOLUTION INTRAMUSCULAR; INTRAVENOUS at 10:13

## 2025-03-23 RX ADMIN — APIXABAN 2.5 MG: 2.5 TABLET, FILM COATED ORAL at 10:13

## 2025-03-23 RX ADMIN — HEPARIN SODIUM 5000 UNITS: 5000 INJECTION INTRAVENOUS; SUBCUTANEOUS at 21:35

## 2025-03-23 RX ADMIN — IPRATROPIUM BROMIDE AND ALBUTEROL SULFATE 1 DOSE: .5; 2.5 SOLUTION RESPIRATORY (INHALATION) at 08:07

## 2025-03-23 RX ADMIN — LETROZOLE 2.5 MG: 2.5 TABLET ORAL at 10:13

## 2025-03-23 RX ADMIN — IPRATROPIUM BROMIDE AND ALBUTEROL SULFATE 1 DOSE: .5; 2.5 SOLUTION RESPIRATORY (INHALATION) at 15:33

## 2025-03-23 RX ADMIN — ROPINIROLE HYDROCHLORIDE 0.25 MG: 0.25 TABLET, FILM COATED ORAL at 21:33

## 2025-03-23 RX ADMIN — IPRATROPIUM BROMIDE AND ALBUTEROL SULFATE 1 DOSE: .5; 2.5 SOLUTION RESPIRATORY (INHALATION) at 21:07

## 2025-03-23 RX ADMIN — CARVEDILOL 6.25 MG: 6.25 TABLET, FILM COATED ORAL at 21:34

## 2025-03-23 RX ADMIN — ACETAMINOPHEN 650 MG: 325 TABLET, FILM COATED ORAL at 21:33

## 2025-03-23 RX ADMIN — TEMAZEPAM 15 MG: 15 CAPSULE ORAL at 21:34

## 2025-03-23 RX ADMIN — ACETAMINOPHEN 650 MG: 325 TABLET, FILM COATED ORAL at 10:28

## 2025-03-23 RX ADMIN — GUAIFENESIN 600 MG: 600 TABLET ORAL at 21:34

## 2025-03-23 RX ADMIN — SODIUM CHLORIDE 30 MG/ML INHALATION SOLUTION 4 ML: 30 SOLUTION INHALANT at 08:07

## 2025-03-23 RX ADMIN — PREDNISONE 50 MG: 50 TABLET ORAL at 10:13

## 2025-03-23 ASSESSMENT — PAIN DESCRIPTION - LOCATION
LOCATION: KNEE
LOCATION: KNEE

## 2025-03-23 ASSESSMENT — PAIN DESCRIPTION - FREQUENCY
FREQUENCY: INTERMITTENT
FREQUENCY: INTERMITTENT

## 2025-03-23 ASSESSMENT — PAIN SCALES - GENERAL
PAINLEVEL_OUTOF10: 0
PAINLEVEL_OUTOF10: 3
PAINLEVEL_OUTOF10: 0
PAINLEVEL_OUTOF10: 3
PAINLEVEL_OUTOF10: 0
PAINLEVEL_OUTOF10: 0

## 2025-03-23 ASSESSMENT — PAIN DESCRIPTION - ONSET
ONSET: ON-GOING
ONSET: ON-GOING

## 2025-03-23 ASSESSMENT — PAIN DESCRIPTION - PAIN TYPE
TYPE: CHRONIC PAIN
TYPE: CHRONIC PAIN

## 2025-03-23 ASSESSMENT — PAIN SCALES - WONG BAKER: WONGBAKER_NUMERICALRESPONSE: NO HURT

## 2025-03-23 ASSESSMENT — PAIN DESCRIPTION - DESCRIPTORS
DESCRIPTORS: ACHING;SORE
DESCRIPTORS: ACHING

## 2025-03-23 ASSESSMENT — PAIN DESCRIPTION - ORIENTATION
ORIENTATION: RIGHT;LEFT
ORIENTATION: RIGHT;LEFT;ANTERIOR

## 2025-03-23 NOTE — RT PROTOCOL NOTE
RT Nebulizer Bronchodilator Protocol Note    There is a bronchodilator order in the chart from a provider indicating to follow the RT Bronchodilator Protocol and there is an “Initiate RT Bronchodilator Protocol” order as well (see protocol at bottom of note).    CXR Findings:  No results found.    The findings from the last RT Protocol Assessment were as follows:  Smoking: Chronic pulmonary disease  Respiratory Pattern: Dyspnea on exertion or RR 21-25 bpm  Breath Sounds: Inspiratory and expiratory or bilateral wheezing and/or rhonchi  Cough: Strong, productive  Indication for Bronchodilator Therapy: Wheezing associated with pulm disorder  Bronchodilator Assessment Score: 11    Patient to remain on Q4WA nebs. I asked her if she felt she needed to be woken up overnight for treatments and she declined.    Aerosolized bronchodilator medication orders have been revised according to the RT Nebulizer Bronchodilator Protocol below.    Respiratory Therapist to perform RT Therapy Protocol Assessment initially then follow the protocol.  Repeat RT Therapy Protocol Assessment PRN for score 0-3 or on second treatment, BID, and PRN for scores above 3.    No Indications - adjust the frequency to every 6 hours PRN wheezing or bronchospasm, if no treatments needed after 48 hours then discontinue using Per Protocol order mode.     If indication present, adjust the RT bronchodilator orders based on the Bronchodilator Assessment Score as indicated below.  If a patient is on this medication at home then do not decrease Frequency below that used at home.    0-3 - enter or revise RT bronchodilator order(s) to equivalent RT Bronchodilator order with Frequency of every 4 hours PRN for wheezing or increased work of breathing using Per Protocol order mode.       4-6 - enter or revise RT Bronchodilator order(s) to two equivalent RT bronchodilator orders with one order with BID Frequency and one order with Frequency of every 4 hours PRN wheezing

## 2025-03-24 LAB
ALBUMIN SERPL-MCNC: 3.1 G/DL (ref 3.4–5)
ALBUMIN/GLOB SERPL: 1 {RATIO} (ref 1.1–2.2)
ALP SERPL-CCNC: 119 U/L (ref 40–129)
ALT SERPL-CCNC: 28 U/L (ref 10–40)
ANION GAP SERPL CALCULATED.3IONS-SCNC: 7 MMOL/L (ref 3–16)
ANISOCYTOSIS BLD QL SMEAR: ABNORMAL
AST SERPL-CCNC: 17 U/L (ref 15–37)
BASOPHILS # BLD: 0 K/UL (ref 0–0.2)
BASOPHILS NFR BLD: 0 %
BILIRUB SERPL-MCNC: 0.3 MG/DL (ref 0–1)
BUN SERPL-MCNC: 61 MG/DL (ref 7–20)
CALCIUM SERPL-MCNC: 9.2 MG/DL (ref 8.3–10.6)
CHLORIDE SERPL-SCNC: 94 MMOL/L (ref 99–110)
CO2 SERPL-SCNC: 43 MMOL/L (ref 21–32)
CREAT SERPL-MCNC: 1.5 MG/DL (ref 0.6–1.2)
DEPRECATED RDW RBC AUTO: 15.9 % (ref 12.4–15.4)
EOSINOPHIL # BLD: 0 K/UL (ref 0–0.6)
EOSINOPHIL NFR BLD: 0 %
FUNGUS SPEC CULT: NORMAL
GFR SERPLBLD CREATININE-BSD FMLA CKD-EPI: 39 ML/MIN/{1.73_M2}
GLUCOSE BLD-MCNC: 121 MG/DL (ref 70–99)
GLUCOSE BLD-MCNC: 143 MG/DL (ref 70–99)
GLUCOSE BLD-MCNC: 268 MG/DL (ref 70–99)
GLUCOSE BLD-MCNC: 316 MG/DL (ref 70–99)
GLUCOSE SERPL-MCNC: 103 MG/DL (ref 70–99)
HCT VFR BLD AUTO: 32.4 % (ref 36–48)
HGB BLD-MCNC: 10.7 G/DL (ref 12–16)
LOEFFLER MB STN SPEC: NORMAL
LYMPHOCYTES # BLD: 1.4 K/UL (ref 1–5.1)
LYMPHOCYTES NFR BLD: 13 %
MCH RBC QN AUTO: 30.8 PG (ref 26–34)
MCHC RBC AUTO-ENTMCNC: 33 G/DL (ref 31–36)
MCV RBC AUTO: 93.3 FL (ref 80–100)
METAMYELOCYTES NFR BLD MANUAL: 2 %
MONOCYTES # BLD: 0.6 K/UL (ref 0–1.3)
MONOCYTES NFR BLD: 7 %
NEUTROPHILS # BLD: 6.6 K/UL (ref 1.7–7.7)
NEUTROPHILS NFR BLD: 75 %
PERFORMED ON: ABNORMAL
PLATELET # BLD AUTO: 243 K/UL (ref 135–450)
PMV BLD AUTO: 7.5 FL (ref 5–10.5)
POTASSIUM SERPL-SCNC: 4.3 MMOL/L (ref 3.5–5.1)
PROT SERPL-MCNC: 6.2 G/DL (ref 6.4–8.2)
RBC # BLD AUTO: 3.47 M/UL (ref 4–5.2)
RHEUMATOID FACT SER IA-ACNC: <10 IU/ML
SODIUM SERPL-SCNC: 144 MMOL/L (ref 136–145)
STOMATOCYTES BLD QL SMEAR: ABNORMAL
VANCOMYCIN SERPL-MCNC: 14.3 UG/ML
VARIANT LYMPHS NFR BLD MANUAL: 3 % (ref 0–6)
WBC # BLD AUTO: 8.6 K/UL (ref 4–11)

## 2025-03-24 PROCEDURE — 6370000000 HC RX 637 (ALT 250 FOR IP): Performed by: INTERNAL MEDICINE

## 2025-03-24 PROCEDURE — 94761 N-INVAS EAR/PLS OXIMETRY MLT: CPT

## 2025-03-24 PROCEDURE — 82785 ASSAY OF IGE: CPT

## 2025-03-24 PROCEDURE — 86431 RHEUMATOID FACTOR QUANT: CPT

## 2025-03-24 PROCEDURE — 86331 IMMUNODIFFUSION OUCHTERLONY: CPT

## 2025-03-24 PROCEDURE — 80053 COMPREHEN METABOLIC PANEL: CPT

## 2025-03-24 PROCEDURE — 6360000002 HC RX W HCPCS: Performed by: INTERNAL MEDICINE

## 2025-03-24 PROCEDURE — 85025 COMPLETE CBC W/AUTO DIFF WBC: CPT

## 2025-03-24 PROCEDURE — 2580000003 HC RX 258: Performed by: INTERNAL MEDICINE

## 2025-03-24 PROCEDURE — 83516 IMMUNOASSAY NONANTIBODY: CPT

## 2025-03-24 PROCEDURE — 2700000000 HC OXYGEN THERAPY PER DAY

## 2025-03-24 PROCEDURE — 2500000003 HC RX 250 WO HCPCS: Performed by: INTERNAL MEDICINE

## 2025-03-24 PROCEDURE — 86606 ASPERGILLUS ANTIBODY: CPT

## 2025-03-24 PROCEDURE — 80202 ASSAY OF VANCOMYCIN: CPT

## 2025-03-24 PROCEDURE — 2060000000 HC ICU INTERMEDIATE R&B

## 2025-03-24 PROCEDURE — 94640 AIRWAY INHALATION TREATMENT: CPT

## 2025-03-24 PROCEDURE — 86005 ALLG SPEC IGE MULTIALLG SCR: CPT

## 2025-03-24 PROCEDURE — 6360000002 HC RX W HCPCS: Performed by: FAMILY MEDICINE

## 2025-03-24 PROCEDURE — 99233 SBSQ HOSP IP/OBS HIGH 50: CPT | Performed by: HOSPITALIST

## 2025-03-24 PROCEDURE — 2580000003 HC RX 258: Performed by: FAMILY MEDICINE

## 2025-03-24 PROCEDURE — 6370000000 HC RX 637 (ALT 250 FOR IP): Performed by: FAMILY MEDICINE

## 2025-03-24 PROCEDURE — 6360000002 HC RX W HCPCS: Performed by: HOSPITALIST

## 2025-03-24 PROCEDURE — 6370000000 HC RX 637 (ALT 250 FOR IP): Performed by: NURSE PRACTITIONER

## 2025-03-24 PROCEDURE — 86003 ALLG SPEC IGE CRUDE XTRC EA: CPT

## 2025-03-24 PROCEDURE — 86038 ANTINUCLEAR ANTIBODIES: CPT

## 2025-03-24 PROCEDURE — 99233 SBSQ HOSP IP/OBS HIGH 50: CPT | Performed by: INTERNAL MEDICINE

## 2025-03-24 RX ORDER — IPRATROPIUM BROMIDE AND ALBUTEROL SULFATE 2.5; .5 MG/3ML; MG/3ML
1 SOLUTION RESPIRATORY (INHALATION)
Status: DISCONTINUED | OUTPATIENT
Start: 2025-03-24 | End: 2025-03-27 | Stop reason: HOSPADM

## 2025-03-24 RX ADMIN — MEROPENEM 1000 MG: 1 INJECTION INTRAVENOUS at 01:39

## 2025-03-24 RX ADMIN — SODIUM CHLORIDE, PRESERVATIVE FREE 10 ML: 5 INJECTION INTRAVENOUS at 09:17

## 2025-03-24 RX ADMIN — INSULIN LISPRO 3 UNITS: 100 INJECTION, SOLUTION INTRAVENOUS; SUBCUTANEOUS at 21:17

## 2025-03-24 RX ADMIN — PREDNISONE 50 MG: 50 TABLET ORAL at 09:14

## 2025-03-24 RX ADMIN — TRAZODONE HYDROCHLORIDE 25 MG: 50 TABLET ORAL at 20:54

## 2025-03-24 RX ADMIN — SODIUM CHLORIDE 30 MG/ML INHALATION SOLUTION 4 ML: 30 SOLUTION INHALANT at 19:58

## 2025-03-24 RX ADMIN — ACETAMINOPHEN 650 MG: 325 TABLET, FILM COATED ORAL at 09:25

## 2025-03-24 RX ADMIN — TEMAZEPAM 15 MG: 15 CAPSULE ORAL at 22:48

## 2025-03-24 RX ADMIN — ROPINIROLE HYDROCHLORIDE 0.25 MG: 0.25 TABLET, FILM COATED ORAL at 20:56

## 2025-03-24 RX ADMIN — CARVEDILOL 6.25 MG: 6.25 TABLET, FILM COATED ORAL at 09:16

## 2025-03-24 RX ADMIN — HEPARIN SODIUM 5000 UNITS: 5000 INJECTION INTRAVENOUS; SUBCUTANEOUS at 14:00

## 2025-03-24 RX ADMIN — HEPARIN SODIUM 5000 UNITS: 5000 INJECTION INTRAVENOUS; SUBCUTANEOUS at 21:17

## 2025-03-24 RX ADMIN — SERTRALINE 50 MG: 50 TABLET, FILM COATED ORAL at 09:17

## 2025-03-24 RX ADMIN — CARVEDILOL 6.25 MG: 6.25 TABLET, FILM COATED ORAL at 20:54

## 2025-03-24 RX ADMIN — MEROPENEM 1000 MG: 1 INJECTION INTRAVENOUS at 13:42

## 2025-03-24 RX ADMIN — SODIUM CHLORIDE 30 MG/ML INHALATION SOLUTION 4 ML: 30 SOLUTION INHALANT at 08:25

## 2025-03-24 RX ADMIN — GUAIFENESIN 600 MG: 600 TABLET ORAL at 09:16

## 2025-03-24 RX ADMIN — FUROSEMIDE 40 MG: 10 INJECTION, SOLUTION INTRAMUSCULAR; INTRAVENOUS at 09:16

## 2025-03-24 RX ADMIN — HEPARIN SODIUM 5000 UNITS: 5000 INJECTION INTRAVENOUS; SUBCUTANEOUS at 05:10

## 2025-03-24 RX ADMIN — SODIUM CHLORIDE, PRESERVATIVE FREE 10 ML: 5 INJECTION INTRAVENOUS at 20:53

## 2025-03-24 RX ADMIN — FUROSEMIDE 40 MG: 10 INJECTION, SOLUTION INTRAMUSCULAR; INTRAVENOUS at 16:49

## 2025-03-24 RX ADMIN — IPRATROPIUM BROMIDE AND ALBUTEROL SULFATE 1 DOSE: .5; 2.5 SOLUTION RESPIRATORY (INHALATION) at 19:59

## 2025-03-24 RX ADMIN — INSULIN LISPRO 2 UNITS: 100 INJECTION, SOLUTION INTRAVENOUS; SUBCUTANEOUS at 16:49

## 2025-03-24 RX ADMIN — IPRATROPIUM BROMIDE AND ALBUTEROL SULFATE 1 DOSE: .5; 2.5 SOLUTION RESPIRATORY (INHALATION) at 08:25

## 2025-03-24 RX ADMIN — GUAIFENESIN 600 MG: 600 TABLET ORAL at 20:54

## 2025-03-24 RX ADMIN — LETROZOLE 2.5 MG: 2.5 TABLET ORAL at 09:16

## 2025-03-24 RX ADMIN — Medication 6 MG: at 22:49

## 2025-03-24 RX ADMIN — MEROPENEM 1000 MG: 1 INJECTION INTRAVENOUS at 21:15

## 2025-03-24 ASSESSMENT — PAIN SCALES - WONG BAKER: WONGBAKER_NUMERICALRESPONSE: NO HURT

## 2025-03-24 ASSESSMENT — PAIN SCALES - GENERAL: PAINLEVEL_OUTOF10: 0
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
## 2025-03-25 LAB
ACID FAST STN SPEC QL: NORMAL
ACID FAST STN SPEC QL: NORMAL
ALBUMIN SERPL-MCNC: 3.2 G/DL (ref 3.4–5)
ALBUMIN/GLOB SERPL: 1 {RATIO} (ref 1.1–2.2)
ALP SERPL-CCNC: 214 U/L (ref 40–129)
ALT SERPL-CCNC: 27 U/L (ref 10–40)
ANA SER QL IA: NEGATIVE
ANION GAP SERPL CALCULATED.3IONS-SCNC: 8 MMOL/L (ref 3–16)
AST SERPL-CCNC: 20 U/L (ref 15–37)
BASOPHILS # BLD: 0 K/UL (ref 0–0.2)
BASOPHILS NFR BLD: 0 %
BILIRUB SERPL-MCNC: <0.2 MG/DL (ref 0–1)
BUN SERPL-MCNC: 62 MG/DL (ref 7–20)
CALCIUM SERPL-MCNC: 9.4 MG/DL (ref 8.3–10.6)
CHLORIDE SERPL-SCNC: 97 MMOL/L (ref 99–110)
CO2 SERPL-SCNC: 38 MMOL/L (ref 21–32)
CREAT SERPL-MCNC: 1.8 MG/DL (ref 0.6–1.2)
DEPRECATED RDW RBC AUTO: 16 % (ref 12.4–15.4)
EOSINOPHIL # BLD: 0 K/UL (ref 0–0.6)
EOSINOPHIL NFR BLD: 0 %
GFR SERPLBLD CREATININE-BSD FMLA CKD-EPI: 31 ML/MIN/{1.73_M2}
GLUCOSE BLD-MCNC: 136 MG/DL (ref 70–99)
GLUCOSE BLD-MCNC: 232 MG/DL (ref 70–99)
GLUCOSE BLD-MCNC: 96 MG/DL (ref 70–99)
GLUCOSE SERPL-MCNC: 94 MG/DL (ref 70–99)
HCT VFR BLD AUTO: 34.3 % (ref 36–48)
HGB BLD-MCNC: 11.1 G/DL (ref 12–16)
LYMPHOCYTES # BLD: 1.2 K/UL (ref 1–5.1)
LYMPHOCYTES NFR BLD: 12 %
MCH RBC QN AUTO: 30.3 PG (ref 26–34)
MCHC RBC AUTO-ENTMCNC: 32.4 G/DL (ref 31–36)
MCV RBC AUTO: 93.5 FL (ref 80–100)
MONOCYTES # BLD: 0.5 K/UL (ref 0–1.3)
MONOCYTES NFR BLD: 5 %
MYCOBACTERIUM SPEC CULT: NORMAL
MYCOBACTERIUM SPEC CULT: NORMAL
MYELOCYTES NFR BLD MANUAL: 1 %
NEUTROPHILS # BLD: 8.6 K/UL (ref 1.7–7.7)
NEUTROPHILS NFR BLD: 82 %
PERFORMED ON: ABNORMAL
PERFORMED ON: ABNORMAL
PERFORMED ON: NORMAL
PLATELET # BLD AUTO: 294 K/UL (ref 135–450)
PLATELET BLD QL SMEAR: ADEQUATE
PMV BLD AUTO: 7.9 FL (ref 5–10.5)
POTASSIUM SERPL-SCNC: 4.4 MMOL/L (ref 3.5–5.1)
PROT SERPL-MCNC: 6.5 G/DL (ref 6.4–8.2)
RBC # BLD AUTO: 3.66 M/UL (ref 4–5.2)
SODIUM SERPL-SCNC: 143 MMOL/L (ref 136–145)
STOMATOCYTES BLD QL SMEAR: ABNORMAL
WBC # BLD AUTO: 10.4 K/UL (ref 4–11)

## 2025-03-25 PROCEDURE — 2060000000 HC ICU INTERMEDIATE R&B

## 2025-03-25 PROCEDURE — 6360000002 HC RX W HCPCS: Performed by: INTERNAL MEDICINE

## 2025-03-25 PROCEDURE — 6370000000 HC RX 637 (ALT 250 FOR IP): Performed by: NURSE PRACTITIONER

## 2025-03-25 PROCEDURE — 2580000003 HC RX 258: Performed by: INTERNAL MEDICINE

## 2025-03-25 PROCEDURE — 87633 RESP VIRUS 12-25 TARGETS: CPT

## 2025-03-25 PROCEDURE — 6370000000 HC RX 637 (ALT 250 FOR IP): Performed by: INTERNAL MEDICINE

## 2025-03-25 PROCEDURE — 2500000003 HC RX 250 WO HCPCS: Performed by: INTERNAL MEDICINE

## 2025-03-25 PROCEDURE — 6370000000 HC RX 637 (ALT 250 FOR IP): Performed by: HOSPITALIST

## 2025-03-25 PROCEDURE — 80053 COMPREHEN METABOLIC PANEL: CPT

## 2025-03-25 PROCEDURE — 2700000000 HC OXYGEN THERAPY PER DAY

## 2025-03-25 PROCEDURE — 99233 SBSQ HOSP IP/OBS HIGH 50: CPT | Performed by: INTERNAL MEDICINE

## 2025-03-25 PROCEDURE — 94640 AIRWAY INHALATION TREATMENT: CPT

## 2025-03-25 PROCEDURE — 94761 N-INVAS EAR/PLS OXIMETRY MLT: CPT

## 2025-03-25 PROCEDURE — 6370000000 HC RX 637 (ALT 250 FOR IP): Performed by: PHYSICIAN ASSISTANT

## 2025-03-25 PROCEDURE — 6360000002 HC RX W HCPCS: Performed by: HOSPITALIST

## 2025-03-25 PROCEDURE — 99233 SBSQ HOSP IP/OBS HIGH 50: CPT | Performed by: HOSPITALIST

## 2025-03-25 PROCEDURE — 6370000000 HC RX 637 (ALT 250 FOR IP): Performed by: FAMILY MEDICINE

## 2025-03-25 PROCEDURE — 85025 COMPLETE CBC W/AUTO DIFF WBC: CPT

## 2025-03-25 PROCEDURE — 36591 DRAW BLOOD OFF VENOUS DEVICE: CPT

## 2025-03-25 RX ORDER — CARVEDILOL 12.5 MG/1
12.5 TABLET ORAL 2 TIMES DAILY
Status: DISCONTINUED | OUTPATIENT
Start: 2025-03-25 | End: 2025-03-27 | Stop reason: HOSPADM

## 2025-03-25 RX ADMIN — SODIUM CHLORIDE, PRESERVATIVE FREE 10 ML: 5 INJECTION INTRAVENOUS at 09:44

## 2025-03-25 RX ADMIN — MEROPENEM 1000 MG: 1 INJECTION INTRAVENOUS at 17:39

## 2025-03-25 RX ADMIN — FUROSEMIDE 40 MG: 10 INJECTION, SOLUTION INTRAMUSCULAR; INTRAVENOUS at 17:40

## 2025-03-25 RX ADMIN — HEPARIN SODIUM 5000 UNITS: 5000 INJECTION INTRAVENOUS; SUBCUTANEOUS at 06:30

## 2025-03-25 RX ADMIN — PREDNISONE 50 MG: 50 TABLET ORAL at 09:40

## 2025-03-25 RX ADMIN — GUAIFENESIN 600 MG: 600 TABLET ORAL at 09:40

## 2025-03-25 RX ADMIN — LETROZOLE 2.5 MG: 2.5 TABLET ORAL at 09:43

## 2025-03-25 RX ADMIN — HEPARIN SODIUM 5000 UNITS: 5000 INJECTION INTRAVENOUS; SUBCUTANEOUS at 20:53

## 2025-03-25 RX ADMIN — ACETAMINOPHEN 650 MG: 325 TABLET, FILM COATED ORAL at 19:57

## 2025-03-25 RX ADMIN — CARVEDILOL 6.25 MG: 6.25 TABLET, FILM COATED ORAL at 09:41

## 2025-03-25 RX ADMIN — TRAZODONE HYDROCHLORIDE 25 MG: 50 TABLET ORAL at 21:40

## 2025-03-25 RX ADMIN — TEMAZEPAM 15 MG: 15 CAPSULE ORAL at 21:40

## 2025-03-25 RX ADMIN — INSULIN LISPRO 1 UNITS: 100 INJECTION, SOLUTION INTRAVENOUS; SUBCUTANEOUS at 20:53

## 2025-03-25 RX ADMIN — AMLODIPINE BESYLATE 10 MG: 10 TABLET ORAL at 10:09

## 2025-03-25 RX ADMIN — SODIUM CHLORIDE, PRESERVATIVE FREE 10 ML: 5 INJECTION INTRAVENOUS at 20:53

## 2025-03-25 RX ADMIN — CARVEDILOL 12.5 MG: 12.5 TABLET, FILM COATED ORAL at 20:42

## 2025-03-25 RX ADMIN — MEROPENEM 1000 MG: 1 INJECTION INTRAVENOUS at 04:45

## 2025-03-25 RX ADMIN — ALBUTEROL SULFATE 2.5 MG: 2.5 SOLUTION RESPIRATORY (INHALATION) at 16:52

## 2025-03-25 RX ADMIN — SODIUM CHLORIDE 30 MG/ML INHALATION SOLUTION 4 ML: 30 SOLUTION INHALANT at 22:03

## 2025-03-25 RX ADMIN — IPRATROPIUM BROMIDE AND ALBUTEROL SULFATE 1 DOSE: .5; 2.5 SOLUTION RESPIRATORY (INHALATION) at 22:03

## 2025-03-25 RX ADMIN — ROPINIROLE HYDROCHLORIDE 0.25 MG: 0.25 TABLET, FILM COATED ORAL at 20:43

## 2025-03-25 RX ADMIN — HEPARIN SODIUM 5000 UNITS: 5000 INJECTION INTRAVENOUS; SUBCUTANEOUS at 14:35

## 2025-03-25 RX ADMIN — Medication 6 MG: at 21:41

## 2025-03-25 RX ADMIN — GUAIFENESIN 600 MG: 600 TABLET ORAL at 20:42

## 2025-03-25 RX ADMIN — SERTRALINE 50 MG: 50 TABLET, FILM COATED ORAL at 09:40

## 2025-03-25 RX ADMIN — SODIUM CHLORIDE 30 MG/ML INHALATION SOLUTION 4 ML: 30 SOLUTION INHALANT at 08:46

## 2025-03-25 RX ADMIN — FUROSEMIDE 40 MG: 10 INJECTION, SOLUTION INTRAMUSCULAR; INTRAVENOUS at 09:40

## 2025-03-25 RX ADMIN — IPRATROPIUM BROMIDE AND ALBUTEROL SULFATE 1 DOSE: .5; 2.5 SOLUTION RESPIRATORY (INHALATION) at 08:47

## 2025-03-25 ASSESSMENT — PAIN DESCRIPTION - PAIN TYPE
TYPE: ACUTE PAIN

## 2025-03-25 ASSESSMENT — PAIN DESCRIPTION - LOCATION
LOCATION: FLANK

## 2025-03-25 ASSESSMENT — PAIN DESCRIPTION - ORIENTATION
ORIENTATION: RIGHT

## 2025-03-25 ASSESSMENT — PAIN SCALES - WONG BAKER
WONGBAKER_NUMERICALRESPONSE: NO HURT

## 2025-03-25 ASSESSMENT — PAIN DESCRIPTION - ONSET
ONSET: GRADUAL
ONSET: ON-GOING
ONSET: ON-GOING

## 2025-03-25 ASSESSMENT — PAIN DESCRIPTION - FREQUENCY
FREQUENCY: INTERMITTENT

## 2025-03-25 ASSESSMENT — PAIN SCALES - GENERAL
PAINLEVEL_OUTOF10: 3
PAINLEVEL_OUTOF10: 2

## 2025-03-25 ASSESSMENT — PAIN - FUNCTIONAL ASSESSMENT
PAIN_FUNCTIONAL_ASSESSMENT: ACTIVITIES ARE NOT PREVENTED

## 2025-03-25 ASSESSMENT — PAIN DESCRIPTION - DESCRIPTORS
DESCRIPTORS: ACHING
DESCRIPTORS: DISCOMFORT;ACHING
DESCRIPTORS: TENDER

## 2025-03-26 ENCOUNTER — APPOINTMENT (OUTPATIENT)
Dept: CT IMAGING | Age: 65
DRG: 143 | End: 2025-03-26
Payer: COMMERCIAL

## 2025-03-26 LAB
ALBUMIN SERPL-MCNC: 3.2 G/DL (ref 3.4–5)
ALBUMIN/GLOB SERPL: 1.1 {RATIO} (ref 1.1–2.2)
ALP SERPL-CCNC: 198 U/L (ref 40–129)
ALT SERPL-CCNC: 26 U/L (ref 10–40)
ANION GAP SERPL CALCULATED.3IONS-SCNC: 11 MMOL/L (ref 3–16)
ANISOCYTOSIS BLD QL SMEAR: ABNORMAL
AST SERPL-CCNC: 18 U/L (ref 15–37)
BASOPHILS # BLD: 0 K/UL (ref 0–0.2)
BASOPHILS NFR BLD: 0 %
BILIRUB SERPL-MCNC: <0.2 MG/DL (ref 0–1)
BUN SERPL-MCNC: 65 MG/DL (ref 7–20)
CALCIUM SERPL-MCNC: 9.3 MG/DL (ref 8.3–10.6)
CHLORIDE SERPL-SCNC: 96 MMOL/L (ref 99–110)
CO2 SERPL-SCNC: 35 MMOL/L (ref 21–32)
CREAT SERPL-MCNC: 1.7 MG/DL (ref 0.6–1.2)
DEPRECATED RDW RBC AUTO: 16.1 % (ref 12.4–15.4)
EOSINOPHIL # BLD: 0 K/UL (ref 0–0.6)
EOSINOPHIL NFR BLD: 0 %
GFR SERPLBLD CREATININE-BSD FMLA CKD-EPI: 34 ML/MIN/{1.73_M2}
GLUCOSE BLD-MCNC: 124 MG/DL (ref 70–99)
GLUCOSE BLD-MCNC: 142 MG/DL (ref 70–99)
GLUCOSE BLD-MCNC: 164 MG/DL (ref 70–99)
GLUCOSE BLD-MCNC: 277 MG/DL (ref 70–99)
GLUCOSE SERPL-MCNC: 109 MG/DL (ref 70–99)
HCT VFR BLD AUTO: 32 % (ref 36–48)
HGB BLD-MCNC: 10.8 G/DL (ref 12–16)
IGE SERPL-ACNC: 102 KU/L
INR PPP: 0.92 (ref 0.85–1.15)
LYMPHOCYTES # BLD: 1.3 K/UL (ref 1–5.1)
LYMPHOCYTES NFR BLD: 12 %
Lab: NORMAL
MCH RBC QN AUTO: 30.8 PG (ref 26–34)
MCHC RBC AUTO-ENTMCNC: 33.7 G/DL (ref 31–36)
MCV RBC AUTO: 91.3 FL (ref 80–100)
METAMYELOCYTES NFR BLD MANUAL: 3 %
MONOCYTES # BLD: 0.2 K/UL (ref 0–1.3)
MONOCYTES NFR BLD: 2 %
MYELOPEROXIDASE AB SER-ACNC: 0 AU/ML (ref 0–19)
NEUTROPHILS # BLD: 9.5 K/UL (ref 1.7–7.7)
NEUTROPHILS NFR BLD: 83 %
ORGANISM: ABNORMAL
PERFORMED ON: ABNORMAL
PLATELET # BLD AUTO: 300 K/UL (ref 135–450)
PMV BLD AUTO: 7.7 FL (ref 5–10.5)
POTASSIUM SERPL-SCNC: 4.2 MMOL/L (ref 3.5–5.1)
PROT SERPL-MCNC: 6.1 G/DL (ref 6.4–8.2)
PROTEINASE3 AB SER-ACNC: 0 AU/ML (ref 0–19)
PROTHROMBIN TIME: 12.6 SEC (ref 11.9–14.9)
RBC # BLD AUTO: 3.5 M/UL (ref 4–5.2)
REPORT: NORMAL
REPORT: NORMAL
RESP PATH DNA+RNA PNL L RESP NAA+NON-PRB: ABNORMAL
SODIUM SERPL-SCNC: 142 MMOL/L (ref 136–145)
STOMATOCYTES BLD QL SMEAR: ABNORMAL
THIS TEST SENT TO: NORMAL
WBC # BLD AUTO: 11 K/UL (ref 4–11)

## 2025-03-26 PROCEDURE — 6370000000 HC RX 637 (ALT 250 FOR IP): Performed by: PHYSICIAN ASSISTANT

## 2025-03-26 PROCEDURE — 2580000003 HC RX 258: Performed by: HOSPITALIST

## 2025-03-26 PROCEDURE — 6370000000 HC RX 637 (ALT 250 FOR IP): Performed by: INTERNAL MEDICINE

## 2025-03-26 PROCEDURE — 2580000003 HC RX 258: Performed by: INTERNAL MEDICINE

## 2025-03-26 PROCEDURE — 97161 PT EVAL LOW COMPLEX 20 MIN: CPT

## 2025-03-26 PROCEDURE — 87449 NOS EACH ORGANISM AG IA: CPT

## 2025-03-26 PROCEDURE — 6370000000 HC RX 637 (ALT 250 FOR IP): Performed by: FAMILY MEDICINE

## 2025-03-26 PROCEDURE — 97535 SELF CARE MNGMENT TRAINING: CPT

## 2025-03-26 PROCEDURE — 2500000003 HC RX 250 WO HCPCS: Performed by: INTERNAL MEDICINE

## 2025-03-26 PROCEDURE — 85025 COMPLETE CBC W/AUTO DIFF WBC: CPT

## 2025-03-26 PROCEDURE — 94640 AIRWAY INHALATION TREATMENT: CPT

## 2025-03-26 PROCEDURE — 2709999900 CT BIOPSY RENAL

## 2025-03-26 PROCEDURE — 94761 N-INVAS EAR/PLS OXIMETRY MLT: CPT

## 2025-03-26 PROCEDURE — 6360000002 HC RX W HCPCS: Performed by: INTERNAL MEDICINE

## 2025-03-26 PROCEDURE — 2060000000 HC ICU INTERMEDIATE R&B

## 2025-03-26 PROCEDURE — 80053 COMPREHEN METABOLIC PANEL: CPT

## 2025-03-26 PROCEDURE — 6370000000 HC RX 637 (ALT 250 FOR IP): Performed by: HOSPITALIST

## 2025-03-26 PROCEDURE — 97116 GAIT TRAINING THERAPY: CPT

## 2025-03-26 PROCEDURE — 6360000002 HC RX W HCPCS: Performed by: RADIOLOGY

## 2025-03-26 PROCEDURE — 6370000000 HC RX 637 (ALT 250 FOR IP): Performed by: NURSE PRACTITIONER

## 2025-03-26 PROCEDURE — 97165 OT EVAL LOW COMPLEX 30 MIN: CPT

## 2025-03-26 PROCEDURE — 2700000000 HC OXYGEN THERAPY PER DAY

## 2025-03-26 PROCEDURE — 85610 PROTHROMBIN TIME: CPT

## 2025-03-26 PROCEDURE — 6360000002 HC RX W HCPCS: Performed by: HOSPITALIST

## 2025-03-26 PROCEDURE — 36591 DRAW BLOOD OFF VENOUS DEVICE: CPT

## 2025-03-26 RX ORDER — SPIRONOLACTONE 25 MG/1
25 TABLET ORAL DAILY
Status: DISCONTINUED | OUTPATIENT
Start: 2025-03-26 | End: 2025-03-27 | Stop reason: HOSPADM

## 2025-03-26 RX ORDER — FENTANYL CITRATE 50 UG/ML
INJECTION, SOLUTION INTRAMUSCULAR; INTRAVENOUS PRN
Status: COMPLETED | OUTPATIENT
Start: 2025-03-26 | End: 2025-03-26

## 2025-03-26 RX ORDER — LIDOCAINE HYDROCHLORIDE 10 MG/ML
INJECTION, SOLUTION EPIDURAL; INFILTRATION; INTRACAUDAL; PERINEURAL PRN
Status: COMPLETED | OUTPATIENT
Start: 2025-03-26 | End: 2025-03-26

## 2025-03-26 RX ORDER — LORAZEPAM 1 MG/1
1 TABLET ORAL ONCE
Status: DISCONTINUED | OUTPATIENT
Start: 2025-03-26 | End: 2025-03-26

## 2025-03-26 RX ORDER — LORAZEPAM 2 MG/ML
1 INJECTION INTRAMUSCULAR ONCE
Status: DISCONTINUED | OUTPATIENT
Start: 2025-03-26 | End: 2025-03-27 | Stop reason: HOSPADM

## 2025-03-26 RX ORDER — MIDAZOLAM HYDROCHLORIDE 1 MG/ML
INJECTION, SOLUTION INTRAMUSCULAR; INTRAVENOUS PRN
Status: COMPLETED | OUTPATIENT
Start: 2025-03-26 | End: 2025-03-26

## 2025-03-26 RX ADMIN — SODIUM CHLORIDE, PRESERVATIVE FREE 10 ML: 5 INJECTION INTRAVENOUS at 21:40

## 2025-03-26 RX ADMIN — HEPARIN SODIUM 5000 UNITS: 5000 INJECTION INTRAVENOUS; SUBCUTANEOUS at 22:39

## 2025-03-26 RX ADMIN — GUAIFENESIN 600 MG: 600 TABLET ORAL at 08:29

## 2025-03-26 RX ADMIN — LETROZOLE 2.5 MG: 2.5 TABLET ORAL at 08:30

## 2025-03-26 RX ADMIN — FENTANYL CITRATE 50 MCG: 50 INJECTION, SOLUTION INTRAMUSCULAR; INTRAVENOUS at 13:26

## 2025-03-26 RX ADMIN — FUROSEMIDE 40 MG: 10 INJECTION, SOLUTION INTRAMUSCULAR; INTRAVENOUS at 08:29

## 2025-03-26 RX ADMIN — SERTRALINE 50 MG: 50 TABLET, FILM COATED ORAL at 08:32

## 2025-03-26 RX ADMIN — TRAZODONE HYDROCHLORIDE 25 MG: 50 TABLET ORAL at 21:41

## 2025-03-26 RX ADMIN — MIDAZOLAM HYDROCHLORIDE 1 MG: 1 INJECTION, SOLUTION INTRAMUSCULAR; INTRAVENOUS at 13:26

## 2025-03-26 RX ADMIN — CARVEDILOL 12.5 MG: 12.5 TABLET, FILM COATED ORAL at 08:29

## 2025-03-26 RX ADMIN — Medication 6 MG: at 21:39

## 2025-03-26 RX ADMIN — MEROPENEM 1000 MG: 1 INJECTION INTRAVENOUS at 04:50

## 2025-03-26 RX ADMIN — IPRATROPIUM BROMIDE AND ALBUTEROL SULFATE 1 DOSE: .5; 2.5 SOLUTION RESPIRATORY (INHALATION) at 09:49

## 2025-03-26 RX ADMIN — INSULIN LISPRO 2 UNITS: 100 INJECTION, SOLUTION INTRAVENOUS; SUBCUTANEOUS at 17:29

## 2025-03-26 RX ADMIN — LIDOCAINE HYDROCHLORIDE 30 ML: 10 INJECTION, SOLUTION EPIDURAL; INFILTRATION; INTRACAUDAL; PERINEURAL at 13:29

## 2025-03-26 RX ADMIN — SODIUM CHLORIDE 30 MG/ML INHALATION SOLUTION 4 ML: 30 SOLUTION INHALANT at 09:49

## 2025-03-26 RX ADMIN — ACETAMINOPHEN 650 MG: 325 TABLET, FILM COATED ORAL at 19:58

## 2025-03-26 RX ADMIN — MEROPENEM 1000 MG: 1 INJECTION INTRAVENOUS at 17:32

## 2025-03-26 RX ADMIN — SODIUM CHLORIDE 30 MG/ML INHALATION SOLUTION 4 ML: 30 SOLUTION INHALANT at 20:06

## 2025-03-26 RX ADMIN — SPIRONOLACTONE 25 MG: 25 TABLET ORAL at 09:01

## 2025-03-26 RX ADMIN — PREDNISONE 50 MG: 50 TABLET ORAL at 08:28

## 2025-03-26 RX ADMIN — FUROSEMIDE 40 MG: 10 INJECTION, SOLUTION INTRAMUSCULAR; INTRAVENOUS at 18:30

## 2025-03-26 RX ADMIN — ROPINIROLE HYDROCHLORIDE 0.25 MG: 0.25 TABLET, FILM COATED ORAL at 19:54

## 2025-03-26 RX ADMIN — ALBUTEROL SULFATE 2.5 MG: 2.5 SOLUTION RESPIRATORY (INHALATION) at 20:06

## 2025-03-26 RX ADMIN — AMLODIPINE BESYLATE 10 MG: 10 TABLET ORAL at 08:28

## 2025-03-26 RX ADMIN — TEMAZEPAM 15 MG: 15 CAPSULE ORAL at 21:40

## 2025-03-26 RX ADMIN — SODIUM CHLORIDE, PRESERVATIVE FREE 10 ML: 5 INJECTION INTRAVENOUS at 08:30

## 2025-03-26 RX ADMIN — GUAIFENESIN 600 MG: 600 TABLET ORAL at 19:52

## 2025-03-26 RX ADMIN — HEPARIN SODIUM 5000 UNITS: 5000 INJECTION INTRAVENOUS; SUBCUTANEOUS at 06:33

## 2025-03-26 RX ADMIN — CARVEDILOL 12.5 MG: 12.5 TABLET, FILM COATED ORAL at 19:52

## 2025-03-26 ASSESSMENT — PAIN SCALES - GENERAL
PAINLEVEL_OUTOF10: 0
PAINLEVEL_OUTOF10: 3
PAINLEVEL_OUTOF10: 3
PAINLEVEL_OUTOF10: 0
PAINLEVEL_OUTOF10: 0

## 2025-03-26 ASSESSMENT — PAIN DESCRIPTION - LOCATION
LOCATION: ABDOMEN
LOCATION: BACK

## 2025-03-26 ASSESSMENT — PAIN DESCRIPTION - FREQUENCY: FREQUENCY: INTERMITTENT

## 2025-03-26 ASSESSMENT — PAIN DESCRIPTION - ORIENTATION
ORIENTATION: RIGHT
ORIENTATION: RIGHT

## 2025-03-26 ASSESSMENT — PAIN DESCRIPTION - ONSET: ONSET: GRADUAL

## 2025-03-26 ASSESSMENT — PAIN DESCRIPTION - PAIN TYPE: TYPE: ACUTE PAIN

## 2025-03-26 ASSESSMENT — PAIN DESCRIPTION - DESCRIPTORS
DESCRIPTORS: ACHING
DESCRIPTORS: DULL

## 2025-03-26 NOTE — CARE COORDINATION
Discharge Planning:    Pt from [facility] L[facility] - facility states she continues to smoke heavily at facility. [place] in admissions states d/t her new insurance, she need precert for LTC return, but that they can take her back even if it is still pending once she is medically ready to DC. CM informed them  on 3/20 to go ahead and start precert. CM will continue to follow and arrange DC once medically cleared.    Thank you  Cat Abelardo RN, BSN, CM  PCU   [phone]

## 2025-03-26 NOTE — ANESTHESIA PRE-OP
IR  H & P      Patient:  Shoaib Rothman   :   1960      Relevant patient history reviewed and discussed.    CC: Renal failure in need of renal biopsy.    The procedure including risks and benefits was discussed at length with the patient (or designated family member) and all questions were answered.  Informed consent to proceed with the procedure was given.      Heart : regular rate and rhythm  Lungs : clear, breathing easily  Airway Assessment: Mallampati 2  Condition : stable    Carole Scale:  Activity:  2 - Able to move 4 extremities voluntarily on command  Respiration:  2 - Able to breathe deeply and cough freely  Circulation:  2 - BP+/- 20mmHg of normal  Consciousness:  2 - Fully awake  Oxygen Saturation (color):  2 - Able to maintain oxygen saturation >92% on room air      [facility] and agreed.  Medications reviewed.  No current facility-administered medications on file prior to encounter.     Current Outpatient Medications on File Prior to Encounter   Medication Sig Dispense Refill    predniSONE (DELTASONE) 50 MG tablet Take 1 tablet by mouth daily 30 tablet 0    nicotine (NICODERM CQ) 21 MG/24HR Place 1 patch onto the skin daily 30 patch 3    amLODIPine (NORVASC) 10 MG tablet Take 1 tablet by mouth daily Hold if Blood pressure less than 110 systolic 90 tablet 0    losartan (COZAAR) 25 MG tablet Take 1 tablet by mouth daily Hold if blood pressure is less than 110 systolic 90 tablet 1    torsemide (DEMADEX) 20 MG tablet Take 1 tablet by mouth daily 30 tablet 3    apixaban (ELIQUIS) 2.5 MG TABS tablet Take 1 tablet by mouth in the morning and at bedtime      mineral oil-hydrophilic petrolatum (AQUAPHOR) ointment Apply topically to face every 6 hours as needed for dry skin.      Calcium Carbonate-Vit D-Min (CALCIUM 600 + MINERALS) 600-200 MG-UNIT TABS Take 1 caplet by mouth daily      ipratropium 0.5 mg-albuterol 2.5 mg (DUONEB) 0.5-2.5 (3) MG/3ML SOLN nebulizer solution Inhale

## 2025-03-26 NOTE — BRIEF OP NOTE
Brief Postoperative Note    Shoaib Rothman  YOB: 1960  [id number]    Pre-operative Diagnosis: Renal failure in need of renal biopsy.    Post-operative Diagnosis: Same    Procedure: CT guided native kidney biopsy    Anesthesia: Moderate    Surgeons/Assistants: Charles Berrios    Estimated Blood Loss: Minimal    Complications: none    Specimens: were obtained      Charles Berrios MD MD  3/26/2025

## 2025-03-27 VITALS
TEMPERATURE: 98.3 F | DIASTOLIC BLOOD PRESSURE: 80 MMHG | OXYGEN SATURATION: 93 % | RESPIRATION RATE: 20 BRPM | BODY MASS INDEX: 35.33 KG/M2 | HEART RATE: 87 BPM | HEIGHT: 69 IN | SYSTOLIC BLOOD PRESSURE: 124 MMHG | WEIGHT: 238.54 LBS

## 2025-03-27 LAB
GLUCOSE BLD-MCNC: 217 MG/DL (ref 70–99)
GLUCOSE BLD-MCNC: 97 MG/DL (ref 70–99)
PERFORMED ON: ABNORMAL
PERFORMED ON: NORMAL

## 2025-03-27 PROCEDURE — 94761 N-INVAS EAR/PLS OXIMETRY MLT: CPT

## 2025-03-27 PROCEDURE — 2580000003 HC RX 258: Performed by: HOSPITALIST

## 2025-03-27 PROCEDURE — 6360000002 HC RX W HCPCS: Performed by: HOSPITALIST

## 2025-03-27 PROCEDURE — 2580000003 HC RX 258: Performed by: INTERNAL MEDICINE

## 2025-03-27 PROCEDURE — 6370000000 HC RX 637 (ALT 250 FOR IP): Performed by: INTERNAL MEDICINE

## 2025-03-27 PROCEDURE — 6360000002 HC RX W HCPCS: Performed by: INTERNAL MEDICINE

## 2025-03-27 PROCEDURE — 94669 MECHANICAL CHEST WALL OSCILL: CPT

## 2025-03-27 PROCEDURE — 2500000003 HC RX 250 WO HCPCS: Performed by: INTERNAL MEDICINE

## 2025-03-27 PROCEDURE — 2700000000 HC OXYGEN THERAPY PER DAY

## 2025-03-27 PROCEDURE — 94640 AIRWAY INHALATION TREATMENT: CPT

## 2025-03-27 PROCEDURE — 6370000000 HC RX 637 (ALT 250 FOR IP): Performed by: PHYSICIAN ASSISTANT

## 2025-03-27 PROCEDURE — 99232 SBSQ HOSP IP/OBS MODERATE 35: CPT | Performed by: INTERNAL MEDICINE

## 2025-03-27 PROCEDURE — 6370000000 HC RX 637 (ALT 250 FOR IP): Performed by: HOSPITALIST

## 2025-03-27 PROCEDURE — 6370000000 HC RX 637 (ALT 250 FOR IP): Performed by: FAMILY MEDICINE

## 2025-03-27 RX ORDER — GUAIFENESIN 600 MG/1
600 TABLET, EXTENDED RELEASE ORAL 2 TIMES DAILY
Qty: 10 TABLET | Refills: 0 | Status: SHIPPED | OUTPATIENT
Start: 2025-03-27

## 2025-03-27 RX ORDER — ALBUTEROL SULFATE 0.83 MG/ML
2.5 SOLUTION RESPIRATORY (INHALATION) 3 TIMES DAILY
Qty: 120 EACH | Refills: 3 | Status: SHIPPED | OUTPATIENT
Start: 2025-03-27

## 2025-03-27 RX ORDER — SODIUM CHLORIDE FOR INHALATION 3 %
4 VIAL, NEBULIZER (ML) INHALATION 3 TIMES DAILY
Qty: 15 ML | Refills: 4 | Status: SHIPPED | OUTPATIENT
Start: 2025-03-27

## 2025-03-27 RX ORDER — SPIRONOLACTONE 25 MG/1
25 TABLET ORAL DAILY
Qty: 30 TABLET | Refills: 3 | Status: SHIPPED | OUTPATIENT
Start: 2025-03-28

## 2025-03-27 RX ORDER — CARVEDILOL 12.5 MG/1
12.5 TABLET ORAL 2 TIMES DAILY
Qty: 60 TABLET | Refills: 3 | Status: SHIPPED | OUTPATIENT
Start: 2025-03-27

## 2025-03-27 RX ORDER — ACETAMINOPHEN 500 MG
500 TABLET ORAL EVERY 6 HOURS PRN
COMMUNITY
Start: 2025-03-27

## 2025-03-27 RX ADMIN — IPRATROPIUM BROMIDE AND ALBUTEROL SULFATE 1 DOSE: .5; 2.5 SOLUTION RESPIRATORY (INHALATION) at 10:36

## 2025-03-27 RX ADMIN — SPIRONOLACTONE 25 MG: 25 TABLET ORAL at 09:27

## 2025-03-27 RX ADMIN — MEROPENEM 1000 MG: 1 INJECTION INTRAVENOUS at 04:21

## 2025-03-27 RX ADMIN — GUAIFENESIN 600 MG: 600 TABLET ORAL at 09:27

## 2025-03-27 RX ADMIN — SERTRALINE 50 MG: 50 TABLET, FILM COATED ORAL at 09:27

## 2025-03-27 RX ADMIN — AMLODIPINE BESYLATE 10 MG: 10 TABLET ORAL at 09:27

## 2025-03-27 RX ADMIN — HEPARIN SODIUM 5000 UNITS: 5000 INJECTION INTRAVENOUS; SUBCUTANEOUS at 06:27

## 2025-03-27 RX ADMIN — SODIUM CHLORIDE 30 MG/ML INHALATION SOLUTION 4 ML: 30 SOLUTION INHALANT at 10:36

## 2025-03-27 RX ADMIN — SODIUM CHLORIDE, PRESERVATIVE FREE 10 ML: 5 INJECTION INTRAVENOUS at 09:35

## 2025-03-27 RX ADMIN — CARVEDILOL 12.5 MG: 12.5 TABLET, FILM COATED ORAL at 09:27

## 2025-03-27 RX ADMIN — LETROZOLE 2.5 MG: 2.5 TABLET ORAL at 09:34

## 2025-03-27 RX ADMIN — INSULIN LISPRO 1 UNITS: 100 INJECTION, SOLUTION INTRAVENOUS; SUBCUTANEOUS at 12:26

## 2025-03-27 RX ADMIN — FUROSEMIDE 40 MG: 10 INJECTION, SOLUTION INTRAMUSCULAR; INTRAVENOUS at 09:28

## 2025-03-27 RX ADMIN — PREDNISONE 50 MG: 50 TABLET ORAL at 09:26

## 2025-03-27 ASSESSMENT — PAIN SCALES - GENERAL: PAINLEVEL_OUTOF10: 0

## 2025-03-27 NOTE — CARE COORDINATION
Case Management Assessment            Discharge Note                    Date / Time of Note: 3/27/2025 1:25 PM                  Discharge Note Completed by: Anna Zhou    Patient Name: Shoaib Rothman   YOB: 1960  Diagnosis: Acute on chronic systolic congestive heart failure (HCC) [I50.23]  Pneumonia of right lower lobe due to infectious organism [J18.9]  Acute on chronic respiratory failure with hypoxia and hypercapnia [J96.21, J96.22]   Date / Time: 3/19/2025  3:42 PM    Current PCP: Lamar Mondragon MD  Clinic patient: No    Hospitalization in the last 30 days: Yes  Readmission Assessment  Number of Days since last admission?: 8-30 days  Previous Disposition: Long Term Care  Who is being Interviewed: Patient  What was the patient's/caregiver's perception as to why they think they needed to return back to the hospital?: Other (Comment) (SOB)  Did you visit your Primary Care Physician after you left the hospital, before you returned this time?: Yes (ltc)  Did you see a specialist, such as Cardiac, Pulmonary, Orthopedic Physician, etc. after you left the hospital?: Yes (pulm)  Who advised the patient to return to the hospital?: Other (Comment) (ltc)  Does the patient report anything that got in the way of taking their medications?: No  In our efforts to provide the best possible care to you and others like you, can you think of anything that we could have done to help you after you left the hospital the first time, so that you might not have needed to return so soon?: Other (Comment) (n/a)    Advance Directives:  Code Status: Full Code  [place] DNR form completed and on chart: Not Indicated    Financial:  Payor: [employer] [place] / Plan: [employer] [place] / Product Type: *No Product type* /      [facility] [place] [place] [facility] [address] -  [phone] - F [phone] [address] [place] [address]  Phone: [phone] Fax:

## 2025-03-27 NOTE — PROGRESS NOTES
[facility]/[place]   PULMONARY AND CRITICAL CARE MEDICINE  PULMONARY MEDICINE PROGRESS NOTE           Indication for visit: Respiratory failure, recurrent mucous plugging    Subjective:    Shoaib had bronchoscopy Friday with removal of extensive amount of organized plug/cast predominantly from the right sided airways.    Shoaib remains afebrile and currently is on 8 L with oxygen saturation of 96%, although yesterday got as low as 4 L.  She does not know why it was increased.  She states her breathing is moderately improved since admission and feels close to her baseline     meropenem  1,000 mg IntraVENous Q12H    ipratropium 0.5 mg-albuterol 2.5 mg  1 Dose Inhalation BID RT    heparin (porcine)  5,000 Units SubCUTAneous 3 times per day    letrozole  2.5 mg Oral Daily    sodium chloride (Inhalant)  4 mL Nebulization BID    amLODIPine  10 mg Oral Daily    [Held by provider] apixaban  2.5 mg Oral BID    carvedilol  6.25 mg Oral BID    [Held by provider] losartan  25 mg Oral Daily    melatonin  6 mg Oral Nightly    rOPINIRole  0.25 mg Oral Nightly    sertraline  50 mg Oral QAM    traZODone  25 mg Oral Nightly    sodium chloride flush  5-40 mL IntraVENous 2 times per day    guaiFENesin  600 mg Oral BID    insulin lispro  0-4 Units SubCUTAneous 4x Daily AC & HS    predniSONE  50 mg Oral Daily    furosemide  40 mg IntraVENous BID         PHYSICAL EXAMINATION:  BP (!) 166/98   Pulse 98   Temp 97.9 °F (36.6 °C) (Oral)   Resp 24   Ht 1.753 m (5' 9\")   Wt 108.7 kg (239 lb 11.2 oz)   LMP 03/09/2014   SpO2 96%   BMI 35.40 kg/m²     Gen: Well developed, well nourished female in no acute distress. Speaking in full sentences with out using accessory resp muscles  Eyes: PERRL, EOMI, normal conjunctivae  Ears, Nose, Mouth and Throat: Hearing is normal. Oropharynx is normal  Neck: No lymphadenopathy  Respiratory: CTA  CV: RRR without M/R/R  Abd: +BS, soft, 
                                        [facility]   PULMONARY AND CRITICAL CARE MEDICINE  PULMONARY MEDICINE PROGRESS NOTE           Indication for visit: Respiratory failure, recurrent mucous plugging    Subjective:    Shoaib had bronchoscopy Friday with removal of extensive amount of organized plug/cast predominantly from the right sided airways.    Shoaib remains afebrile and currently is on 8 L with oxygen saturation of 95%.  She states her breathing is moderately improved since bronchoscopy     vancomycin (VANCOCIN) intermittent dosing (placeholder)   Other RX Placeholder    letrozole  2.5 mg Oral Daily    sodium chloride (Inhalant)  4 mL Nebulization BID    [Held by provider] amLODIPine  10 mg Oral Daily    apixaban  2.5 mg Oral BID    carvedilol  6.25 mg Oral BID    [Held by provider] losartan  25 mg Oral Daily    melatonin  6 mg Oral Nightly    rOPINIRole  0.25 mg Oral Nightly    sertraline  50 mg Oral QAM    traZODone  25 mg Oral Nightly    sodium chloride flush  5-40 mL IntraVENous 2 times per day    meropenem  1,000 mg IntraVENous Q12H    ipratropium 0.5 mg-albuterol 2.5 mg  1 Dose Inhalation Q4H WA RT    guaiFENesin  600 mg Oral BID    insulin lispro  0-4 Units SubCUTAneous 4x Daily AC & HS    predniSONE  50 mg Oral Daily    sodium bicarbonate  650 mg Oral BID    furosemide  40 mg IntraVENous BID         PHYSICAL EXAMINATION:  BP (!) 147/94   Pulse 75   Temp 97.9 °F (36.6 °C) (Axillary)   Resp 18   Ht 1.753 m (5' 9\")   Wt 109.4 kg (241 lb 1.6 oz)   LMP 03/09/2014   SpO2 95%   BMI 35.60 kg/m²     Gen: Well developed, well nourished female in no acute distress. Speaking in full sentences with out using accessory resp muscles  Eyes: PERRL, EOMI, normal conjunctivae  Ears, Nose, Mouth and Throat: Hearing is normal. Oropharynx is normal  Neck: No lymphadenopathy  Respiratory:  CV: RRR without M/R/R  Abd: +BS, soft, NT/ND  Musculoskeletal: No cyanosis, clubbing, or 
                                        [facility]   PULMONARY AND CRITICAL CARE MEDICINE  PULMONARY MEDICINE PROGRESS NOTE           Indication for visit: Respiratory failure, recurrent mucous plugging    Subjective:    Shoaib had bronchoscopy Friday with removal of extensive amount of organized plug/cast predominantly from the right sided airways.    Ever since Fredi has had gradual improvement in her dyspnea and hypoxemia.  FiO2 requirement is down to 4 L with an O2 sat of 92%.  Last week she was requiring 10 L.  She has a nebulizer at her facility and uses DuoNeb but no flutter valve     spironolactone  25 mg Oral Daily    LORazepam  1 mg IntraVENous Once    carvedilol  12.5 mg Oral BID    ipratropium 0.5 mg-albuterol 2.5 mg  1 Dose Inhalation BID RT    heparin (porcine)  5,000 Units SubCUTAneous 3 times per day    letrozole  2.5 mg Oral Daily    sodium chloride (Inhalant)  4 mL Nebulization BID    amLODIPine  10 mg Oral Daily    [Held by provider] apixaban  2.5 mg Oral BID    [Held by provider] losartan  25 mg Oral Daily    melatonin  6 mg Oral Nightly    rOPINIRole  0.25 mg Oral Nightly    sertraline  50 mg Oral QAM    traZODone  25 mg Oral Nightly    sodium chloride flush  5-40 mL IntraVENous 2 times per day    guaiFENesin  600 mg Oral BID    insulin lispro  0-4 Units SubCUTAneous 4x Daily AC & HS    predniSONE  50 mg Oral Daily    furosemide  40 mg IntraVENous BID         PHYSICAL EXAMINATION:  BP (!) 128/92   Pulse 86   Temp 98 °F (36.7 °C) (Oral)   Resp 20   Ht 1.753 m (5' 9\")   Wt 108.2 kg (238 lb 8.6 oz)   LMP 03/09/2014   SpO2 97%   BMI 35.23 kg/m²     Gen: Well developed, well nourished female in no acute distress. Speaking in full sentences with out using accessory resp muscles  Eyes: PERRL, EOMI, normal conjunctivae  Ears, Nose, Mouth and Throat: Hearing is normal. Oropharynx is normal  Neck: No lymphadenopathy  Respiratory: CTA  CV: RRR without M/R/R  Abd: +BS, 
      Hospitalist Progress Note      Name:  Shoaib Rothman /Age/Sex: 1960  (64 y.o. female)   MRN & CSN:  [id number] & [id number] Encounter Date/Time: 3/23/2025 7:48 AM EDT    Location:  [facility]- PCP: Lamar Mondragon MD       Hospital Day: 5         Date of Admission: 3/19/2025    Chief Complaint: Shortness of breath    Hospital Course:  64 y.o. 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  Pt states she is normally on 8 LPM at baseline, 10 LPM with exertion. She was requiring 15 LPM/Vapo therm and is now down to 12 LPM/Vapo therm. CT Chest: 1. Mucous plugging with occluded right middle and lower lobe bronchi. Moderate secretions in right main bronchus and right bronchus intermedius. 2. Dense airspace consolidation in right middle and lower lobes. Moderate patchy groundglass opacities in left lower lobe. 3. Mild patchy airspace densities in bilateral upper lobes. 4. Moderate upper lobe predominant centrilobular and paraseptal emphysema. 5. Small right pleural effusion. 6. Interval progression of disease compared to prior CT 2025. CXR: 1. Cardiomegaly with evidence of fluid overload 2. Right basal consolidation as before 3. Right-sided port appears coiled in the right axilla, correlate clinically 4. Emphysema. Magnesium 1.22. Pt has undergone bronchoscopy x 3 over the past 3 months; Continued on Vapo-therm, bronchodilator therapy, steroids; NEG Influenza/COVID. Lactic 1.9, WBC 7.2. Pro  -> 995 (3/21); was given IV lasix in ED. IV Lasix continued BID with monitoring of diuresis. (3/19) VBG: pH 7.362, pCO2 71.5, pO2 36.8, HCO3 40.5. Pulmonology consulted; 
      Hospitalist Progress Note      Name:  Shoaib Rothman /Age/Sex: 1960  (64 y.o. female)   MRN & CSN:  [id number] & [id number] Encounter Date/Time: 3/25/2025 12:29 PM EDT    Location:  [facility]-01 PCP: Lamar Mondragon MD       Hospital Day: 7         Date of Admission: 3/19/2025    Chief Complaint: Shortness of breath     Hospital Course: 64 y.o. 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  Pt states she is normally on 3-10 LPM at baseline, 10 LPM with exertion. She was requiring 15 LPM/Vapo therm and is now down to 12 LPM/Vapo therm.   Initially Continued on Vapo-therm, bronchodilator therapy, steroids; NEG Influenza/COVID.  IV lasix in ED. IV Lasix continued BID with monitoring of diuresis. IV ABX were started (Vanc/Merrem).  (3/19); Vanco was discontinued on 3/23 after MRSA nasal negative ;       Subjective: Denies complaints overnight; now on 8 L oxygen s/p bronchoscopy with recurrent mucous plugging; yesterday O2 was down to 4 L; patient states that she feels that she is back to her based    Assessment and Recommendations:    Acute on Chronic Respiratory Failure with Hypoxia and Hypercarbia  End-Stage COPD, bronchiectasis with recurrent mucous plugging  HCAP; RIGHT lower lobe (present on admit)  Emphysema  H/O recurrent mucous plugging requiring bronchoscopy  Previous bronchoscopy over the past 3 months (last admit 25)                CT Chest:                          1. Mucous plugging with occluded right middle and lower lobe bronchi.   Moderate secretions in right main bronchus and right bronchus intermedius.   2. Dense airspace consolidation in right 
      Hospitalist Progress Note      Name:  Shoaib Rothman /Age/Sex: 1960  (64 y.o. female)   MRN & CSN:  [id number] & [id number] Encounter Date/Time: 3/22/2025 7:36 AM EDT    [facility] PCP: Lamar Mondragon MD       Hospital Day: 4         Date of Admission: 3/19/2025    Chief Complaint: Shortness of breath     Hospital Course: 64 y.o. 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  Pt states she is normally on 8 LPM at baseline, 10 LPM with exertion. She was requiring 15 LPM/Vapo therm and is now down to 12 LPM/Vapo therm. CT Chest: 1. Mucous plugging with occluded right middle and lower lobe bronchi. Moderate secretions in right main bronchus and right bronchus intermedius. 2. Dense airspace consolidation in right middle and lower lobes. Moderate patchy groundglass opacities in left lower lobe. 3. Mild patchy airspace densities in bilateral upper lobes. 4. Moderate upper lobe predominant centrilobular and paraseptal emphysema. 5. Small right pleural effusion. 6. Interval progression of disease compared to prior CT 2025. CXR: 1. Cardiomegaly with evidence of fluid overload 2. Right basal consolidation as before 3. Right-sided port appears coiled in the right axilla, correlate clinically 4. Emphysema. Magnesium 1.22. Pt has undergone bronchoscopy x 3 over the past 3 months; Continued on Vapo-therm, bronchodilator therapy, steroids; NEG Influenza/COVID. Lactic 1.9, WBC 7.2. Pro  -> 995 (3/21); was given IV lasix in ED. IV Lasix continued BID with monitoring of diuresis. VBG: pH 7.362, pCO2 71.5, pO2 36.8, HCO3 40.5. Pulmonology consulted; 
      Hospitalist Progress Note      Name:  Shoaib Rothman /Age/Sex: 1960  (64 y.o. female)   MRN & CSN:  [id number] & [id number] Encounter Date/Time: 3/20/2025 8:26 AM EDT    Location:  [facility]/4459-01 PCP: Lamar Mondragon MD       Hospital Day: 2         Date of Admission: 3/19/2025    Chief Complaint: Shortness of breath    Hospital Course: 64 y.o. 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  Pt states she is normally on 8 LPM at baseline, 10 LPM with exertion. She was requiring 15 LPM/Vapo therm and is now down to 12 LPM/Vapo therm. CT Chest: 1. Mucous plugging with occluded right middle and lower lobe bronchi. Moderate secretions in right main bronchus and right bronchus intermedius. 2. Dense airspace consolidation in right middle and lower lobes. Moderate patchy groundglass opacities in left lower lobe. 3. Mild patchy airspace densities in bilateral upper lobes. 4. Moderate upper lobe predominant centrilobular and paraseptal emphysema. 5. Small right pleural effusion. 6. Interval progression of disease compared to prior CT 2025. CXR: 1. Cardiomegaly with evidence of fluid overload 2. Right basal consolidation as before 3. Right-sided port appears coiled in the right axilla, correlate clinically 4. Emphysema. Magnesium 1.22. Pt has undergone bronchoscopy x 3 over the past 3 months; Continued on Vapo-therm, bronchodilator therapy, steroids; NEG Influenza/COVID. Lactic 1.9, WBC 7.2. Pro ; was given IV lasix in ED. VBG: pH 7.362, pCO2 71.5, pO2 36.8, HCO3 40.5. Pulmonology consulted; NPO at midnight for pending bronchoscopy. Nephrology consulted for CKDIII and 
      Hospitalist Progress Note      Name:  Shoaib Rothman /Age/Sex: 1960  (64 y.o. female)   MRN & CSN:  [id number] & [id number] Encounter Date/Time: 3/24/2025 12:29 PM EDT    Location:  [facility]- PCP: Lamra Mondragon MD       Hospital Day: 6         Date of Admission: 3/19/2025    Chief Complaint: Shortness of breath     Hospital Course: 64 y.o. 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  Pt states she is normally on 8 LPM at baseline, 10 LPM with exertion. She was requiring 15 LPM/Vapo therm and is now down to 12 LPM/Vapo therm. CT Chest: 1. Mucous plugging with occluded right middle and lower lobe bronchi. Moderate secretions in right main bronchus and right bronchus intermedius. 2. Dense airspace consolidation in right middle and lower lobes. Moderate patchy groundglass opacities in left lower lobe. 3. Mild patchy airspace densities in bilateral upper lobes. 4. Moderate upper lobe predominant centrilobular and paraseptal emphysema. 5. Small right pleural effusion. 6. Interval progression of disease compared to prior CT 2025. CXR: 1. Cardiomegaly with evidence of fluid overload 2. Right basal consolidation as before 3. Right-sided port appears coiled in the right axilla, correlate clinically 4. Emphysema. Magnesium 1.22. Pt has undergone bronchoscopy x 3 over the past 3 months; Continued on Vapo-therm, bronchodilator therapy, steroids; NEG Influenza/COVID. Lactic 1.9, WBC 7.2. Pro  -> 995 (3/21); was given IV lasix in ED. IV Lasix continued BID with monitoring of diuresis. IV ABX were started (Vanc/Merrem).  (3/19) VBG: pH 7.362, pCO2 71.5, pO2 36.8, 
   IMAGING SERVICES NURSING PROGRESS NOTE    Procedure:  kidney biopsy  March 26, 2025  Shoiab Rothman      Allergies:    Allergies   Allergen Reactions    Pcn [Penicillins] Anaphylaxis    Cefepime Swelling and Other (See Comments)     Facial swelling and erythema    Hydralazine Other (See Comments)     Unknown reaction       Vitals:    03/26/25 1112   BP: (!) 147/91   Pulse:    Resp: 18   Temp: 97.6 °F (36.4 °C)   SpO2:        Recent lab work reviewed with MD: yes   Procedure explained to patient by MD: yes   Informed consent obtained:yes  Family with patient:no    Mental Status:  Normal  Readiness to learn:  Yes  Barriers to learning: No    Pain Assessment Pre-Procedure:  Pain Present:  no  Pain Score:  0  Pain Quality/Description:      Time out Procedure Verification with:  [x] RN  [x] Physician  [x] Patient  [x] Other: CT Technologist  Procedure site marked, if applicable:  Yes    Note: Patient arrived A & O x 4, denies pain, breathing easily on room air, Spoke to Charles Bowen prior to procedure.  Procedural sedation:  Fentanyl:  50mcg  Versed:    1mg  Post Procedureal Note:  Patient tolerated procedure well.  Breathing easily on room air.  Report given to MORRIS Sevilla.  Patient transported in stable conditon to room 4459.    Pain Assessment Post-Procedure:  Pain Present:  no  Pain Score:  0  Pain Quality/Description:      Plan of Care Goals:  Safety measures met:  Yes  Patient understands explanation of procedure:  Yes    Time in:  1325  Time out:  1351  Mary Burns RN.  R.N. 3/26/2025                
4 Eyes Skin Assessment     NAME:  Shoaib Rothman  YOB: 1960  MEDICAL RECORD NUMBER:  [id number]    The patient is being assessed for  Admission    I agree that at least one RN has performed a thorough Head to Toe Skin Assessment on the patient. ALL assessment sites listed below have been assessed.      Areas assessed by both nurses:    Head, Face, Ears, Shoulders, Back, Chest, Arms, Elbows, Hands, Sacrum. Buttock, Coccyx, Ischium, Legs. Feet and Heels, and Under Medical Devices     Scattered bruising  Old incision scars on knees        Does the Patient have a Wound? No noted wound(s)       Manan Prevention initiated by RN: No  Wound Care Orders initiated by RN: No    Pressure Injury (Stage 3,4, Unstageable, DTI, NWPT, and Complex wounds) if present, place Wound referral order by RN under : No    New Ostomies, if present place, Ostomy referral order under : No     Nurse 1 eSignature: Electronically signed by LAYO TRUONG RN on 3/20/25 at 2:47 AM EDT    **SHARE this note so that the co-signing nurse can place an eSignature**    Nurse 2 eSignature: Electronically signed by Kalpana Rendon RN on 3/20/25 at 2:48 AM EDT    
Called to give report. No answer.   
Discussed with Dr. Palomo, would wait to see if fungal studies come positive again, if so then I would have ID on board to start therapy. Ordered Fungitell and galactomannan.   
Nephrology Note                                                                                                                                                                                                                                                                                                                                                               Office : [phone]     Fax :[phone]    Patient's Name: Shoaib Rothman  11:27 AM  3/20/2025    Reason for Consult:  CKD  Requesting Physician:  Lamar Mondragon MD  Chief Complaint:    Chief Complaint   Patient presents with    Shortness of Breath     Pt reports worsening SOB since yesterday. Pt is on 15L HFNC, baseline is 10L. Duoneb was given enroute.        Assessment/Plan     # CKD 3b   - Secondary to FSGS and solitary kidney  - On 50 mg prednisone SINCE 1/16/2025  - Check UPCR 8 gram (January)--> 6 gram (February) --> pending   - Creatinine at baseline  - Check urine studies   - Monitor volume status. IV Lasix 40 mg BID   - Avoid nephrotoxins  - Monitor renal labs     # Acute on chronic respiratory failure with hypoxia and hypercarbia   - H/o end-stage COPD, bronchiectasis and recurrent mucous plugging   - CT chest with mucous plugging and groundglass opacities LLL  - Pulmonary on board  - Plan for bronchoscopy    # HTN  - Home meds resumed  - Monitor     # Acid- base/ Electrolyte imbalance   # Hypernatremia  - Currently NPO for procedure  - Encourage fluids when PO resumed  - Monitor     # DM 2  - Insulin management per primary      History of Present Ilness:    Shoaib Rothman is a 64 y.o. female with a PMH of end-stage COPD, history of left nephrectomy, CKD 3b, FSGS, chronic respiratory failure with recurrent mucous plugging, and tobacco abuse disorder, who presents to the ER with shortness of breath. We are consulted for CKD management.     Interval hx:    Creatinine appears at baseline  Na up 149  Magnesium low  K low end of normal  On 
Nephrology Note                                                                                                                                                                                                                                                                                                                                                               Office : [phone]     Fax :[phone]    Patient's Name: Shoaib Rothman  1:34 PM  3/22/2025    Reason for Consult:  CKD  Requesting Physician:  Lamar Mondragon MD  Chief Complaint:    Chief Complaint   Patient presents with    Shortness of Breath     Pt reports worsening SOB since yesterday. Pt is on 15L HFNC, baseline is 10L. Duoneb was given enroute.        Assessment/Plan     # CKD 3b   - Secondary to FSGS and solitary kidney  - On 50 mg prednisone SINCE 1/16/2025  - UPCR 8 gram (January) --> 6 gram (February) --> 7 gram (March)  - Creatinine at baseline  - Check urine studies   - Monitor volume status. IV Lasix 40 mg BID   - Avoid nephrotoxins  - Monitor renal labs     # Acute on chronic respiratory failure with hypoxia and hypercarbia   - H/o end-stage COPD, bronchiectasis and recurrent mucous plugging   - CT chest with mucous plugging and groundglass opacities LLL  - Pulmonary on board  - bronchoscopy    # HTN  - Home meds resumed  - Monitor     # Acid- base/ Electrolyte imbalance   # Hypernatremia    - Encourage fluids  - Monitor     # DM 2  - Insulin management per primary      History of Present Ilness:    Shoaib Rothman is a 64 y.o. female with a PMH of end-stage COPD, history of left nephrectomy, CKD 3b, FSGS, chronic respiratory failure with recurrent mucous plugging, and tobacco abuse disorder, who presents to the ER with shortness of breath. We are consulted for CKD management.     Interval hx:    Creatinine appears at baseline  On oxygen   S/p bronchoscopy      Past Medical History:   Diagnosis Date    Asthma     Breast cancer 
Nephrology Note                                                                                                                                                                                                                                                                                                                                                               Office : [phone]     Fax :[phone]    Patient's Name: Shoaib Rothman  8:34 PM  3/23/2025    Reason for Consult:  CKD  Requesting Physician:  Lamar Mondragon MD  Chief Complaint:    Chief Complaint   Patient presents with    Shortness of Breath     Pt reports worsening SOB since yesterday. Pt is on 15L HFNC, baseline is 10L. Duoneb was given enroute.        Assessment/Plan     # Severe nephrotic range proteinuria   - h/o Biopsy proven FSGS in the past (9/2020)  - Resistant to steroids   - We will plan kidney biopsy with IR   - Hold Eliquis     # CKD 3b   - Creatinine 2.1 today, close to baseline   - Secondary to FSGS and solitary kidney  - On 50 mg prednisone SINCE 1/16/2025  - UPCR 8 gram (January) --> 6 gram (February) --> 7 gram (March)  - Creatinine at baseline  - Check urine studies   - Monitor volume status. IV Lasix 40 mg BID   - Avoid nephrotoxins  - Monitor renal labs     # Acute on chronic respiratory failure with hypoxia and hypercarbia   - H/o end-stage COPD, bronchiectasis and recurrent mucous plugging   - CT chest with mucous plugging and groundglass opacities LLL  - Pulmonary on board  - bronchoscopy    # HTN  - Home meds resumed  - Monitor     # Acid- base/ Electrolyte imbalance   # Hypernatremia    - Encourage fluids  - Monitor     # DM 2  - Insulin management per primary      History of Present Ilness:    Shoaib Rothman is a 64 y.o. female with a PMH of end-stage COPD, history of left nephrectomy, CKD 3b, FSGS, chronic respiratory failure with recurrent mucous plugging, and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 (36.4 °C) (Axillary)   Resp 18   Ht 1.753 m (5' 9\")   Wt 108.2 kg (238 lb 8.6 oz)   LMP 03/09/2014   SpO2 96%   BMI 35.23 kg/m²   Constitutional:  OAA X3 NAD Yes  Skin: no rash, turgor wnl  Heent:  eomi, mmm  Neck: no bruits or jvd noted  Cardiovascular:  S1, S2 without m/r/g  Respiratory: CTA B without w/r/r  Abdomen:  , soft, nt, nd  Ext:  lower extremity edema Yes  Psychiatric: mood and affect stable   Musculoskeletal:  Rom, muscular strength intact    Data:   Labs:  CBC:   Recent Labs     03/24/25  0500 03/25/25  0420 03/26/25  0450   WBC 8.6 10.4 11.0   HGB 10.7* 11.1* 10.8*    294 300     BMP:    Recent Labs     03/24/25  0500 03/25/25  0420 03/26/25  0450    143 142   K 4.3 4.4 4.2   CL 94* 97* 96*   CO2 43* 38* 35*   BUN 61* 62* 65*   CREATININE 1.5* 1.8* 1.7*   GLUCOSE 103* 94 109*     Ca/Mg/Phos:   Recent Labs     03/24/25  0500 03/25/25  0420 03/26/25  0450   CALCIUM 9.2 9.4 9.3     Hepatic:   Recent Labs     03/24/25  0500 03/25/25  0420 03/26/25  0450   AST 17 20 18   ALT 28 27 26   BILITOT 0.3 <0.2 <0.2   ALKPHOS 119 214* 198*     Troponin: No results for input(s): \"TROPONINI\" in the last 72 hours.  BNP: No results for input(s): \"BNP\" in the last 72 hours.  Lipids: No results for input(s): \"CHOL\", \"TRIG\", \"HDL\" in the last 72 hours.    Invalid 
on Chronic Respiratory Failure with Hypoxia and Hypercarbia; End-Stage COPD, bronchiectasis with recurrent mucous plugging; HCAP; FVO; CKD3 B     DISPO: LTC   MDM [] Low, [x] Moderate,[]  High  Patient's risk as above due to:    MDM:  Acute on Chronic Respiratory Failure with Hypoxia and Hypercarbia; End-Stage COPD, bronchiectasis with recurrent mucous plugging; HCAP; FVO; CKD3 B     [] High (any 2)    A. Problems (any 1)  [x] Acute/Chronic Illness/injury posing threat to life or bodily function:    [] Severe exacerbation of chronic illness:    ---------------------------------------------------------------------  B. Risk of Treatment (any 1)   [x] Drugs/treatments that require intensive monitoring for toxicity include: VANC   [] Change in code status:    [] Decision to escalate care:    [x] Major surgery/procedure with associated risk factors:  S/P bronch  ----------------------------------------------------------------------  C. Data (any 2)  [x] Discussed current management and discharge planning options with Case Management.  [] Discussed management of the case with:    [x] Telemetry personally reviewed and interpreted as documented above    [x] Imaging personally reviewed and interpreted, includes:  Bronch  [x] Data Review (any 3)  [x] Collateral history obtained from: EMR  [x] All available Consultant notes from yesterday/today were reviewed  [x] All current labs were reviewed and interpreted for clinical significance   [x] Appropriate follow-up labs were ordered       Personally reviewed Lab Studies and Imaging   Personally reviewed consults/notes from: Pulmonology, Nephrology, Nursing notes    Medications:  Reviewed    Infusion Medications    sodium chloride      dextrose       Scheduled Medications    letrozole  2.5 mg Oral Daily    sodium chloride (Inhalant)  4 mL Nebulization BID    [Held by provider] amLODIPine  10 mg Oral Daily    apixaban  2.5 mg Oral BID    carvedilol  6.25 mg Oral BID    [Held by 
right middle and lower lobes. Moderate patchy groundglass opacities in left lower lobe.   3. Mild patchy airspace densities in bilateral upper lobes.   4. Moderate upper lobe predominant centrilobular and paraseptal emphysema.   5. Small right pleural effusion.   6. Interval progression of disease compared to prior CT 01/14/2025.      Electronically signed by Gene Lopez MD      XR CHEST PORTABLE   Final Result         1. Cardiomegaly with evidence of fluid overload   2. Right basal consolidation as before   3. Right-sided port appears coiled in the right axilla, correlate clinically   4. Emphysema      Electronically signed by Ronald Daniels      CT BIOPSY RENAL    (Results Pending)       Karley Jasso, APRN - CNP    NOTE:  This report was transcribed using voice recognition software.  Every effort was made to ensure accuracy; however, inadvertent computerized transcription errors may be present.

## 2025-03-27 NOTE — PLAN OF CARE
Problem: Chronic Conditions and Co-morbidities  Goal: Patient's chronic conditions and co-morbidity symptoms are monitored and maintained or improved     Problem: Safety - Adult  Goal: Free from fall injury     Problem: Pain  Goal: Verbalizes/displays adequate comfort level or baseline comfort level     
  Problem: Chronic Conditions and Co-morbidities  Goal: Patient's chronic conditions and co-morbidity symptoms are monitored and maintained or improved  3/20/2025 1406 by Di Vallecillo RN  Outcome: Progressing     Problem: Safety - Adult  Goal: Free from fall injury  3/20/2025 1406 by Di Vallecillo RN  Outcome: Progressing       Problem: Metabolic/Fluid and Electrolytes - Adult  Goal: Glucose maintained within prescribed range  3/20/2025 1406 by Di Vallecillo RN  Outcome: Progressing       
  Problem: Chronic Conditions and Co-morbidities  Goal: Patient's chronic conditions and co-morbidity symptoms are monitored and maintained or improved  3/21/2025 0216 by Samy Sun, RN  Outcome: Progressing  Flowsheets (Taken 3/21/2025 0216)  Care Plan - Patient's Chronic Conditions and Co-Morbidity Symptoms are Monitored and Maintained or Improved:   Collaborate with multidisciplinary team to address chronic and comorbid conditions and prevent exacerbation or deterioration   Monitor and assess patient's chronic conditions and comorbid symptoms for stability, deterioration, or improvement   Update acute care plan with appropriate goals if chronic or comorbid symptoms are exacerbated and prevent overall improvement and discharge     Problem: Discharge Planning  Goal: Discharge to home or other facility with appropriate resources  3/21/2025 0216 by Samy Sun, RN  Outcome: Progressing  Flowsheets (Taken 3/21/2025 0216)  Discharge to home or other facility with appropriate resources:   Refer to discharge planning if patient needs post-hospital services based on physician order or complex needs related to functional status, cognitive ability or social support system   Arrange for interpreters to assist at discharge as needed   Identify discharge learning needs (meds, wound care, etc)   Arrange for needed discharge resources and transportation as appropriate   Identify barriers to discharge with patient and caregiver     Problem: Skin/Tissue Integrity  Goal: Skin integrity remains intact  Description: 1.  Monitor for areas of redness and/or skin breakdown  2.  Assess vascular access sites hourly  3.  Every 4-6 hours minimum:  Change oxygen saturation probe site  4.  Every 4-6 hours:  If on nasal continuous positive airway pressure, respiratory therapy assess nares and determine need for appliance change or resting period  3/21/2025 0216 by Samy Sun, RN  Outcome: Progressing  Flowsheets (Taken 
  Problem: Chronic Conditions and Co-morbidities  Goal: Patient's chronic conditions and co-morbidity symptoms are monitored and maintained or improved  3/24/2025 1210 by Cielo Cerrato, RN  Outcome: Progressing  Flowsheets (Taken 3/24/2025 1210)  Care Plan - Patient's Chronic Conditions and Co-Morbidity Symptoms are Monitored and Maintained or Improved:   Monitor and assess patient's chronic conditions and comorbid symptoms for stability, deterioration, or improvement   Collaborate with multidisciplinary team to address chronic and comorbid conditions and prevent exacerbation or deterioration  Note: Patient provided education as needed for co-morbidities       Problem: Discharge Planning  Goal: Discharge to home or other facility with appropriate resources  3/24/2025 1210 by Cielo Cerrato, RN  Outcome: Progressing  Flowsheets (Taken 3/24/2025 1210)  Discharge to home or other facility with appropriate resources:   Identify barriers to discharge with patient and caregiver   Identify discharge learning needs (meds, wound care, etc)   Refer to discharge planning if patient needs post-hospital services based on physician order or complex needs related to functional status, cognitive ability or social support system  Note: No plan for discharge this shift. IR planning for kidney biopsy     Problem: Safety - Adult  Goal: Free from fall injury  3/24/2025 1210 by Cielo Cerrato, RN  Outcome: Progressing  Flowsheets (Taken 3/24/2025 1210)  Free From Fall Injury: Instruct family/caregiver on patient safety  Note: Pt will remain free from accidental injury this shift. Pt has fall risk measures (fall risk bracelet, fall risk sign, fall risk cloth, non-slip socks, dome light on) in place. Pt bed is in low position, bed alarm on, bed wheels locked, call light within reach, bedside table within reach, chair wheels locked, chair alarm on.       Problem: Respiratory - Adult  Goal: Achieves optimal ventilation and oxygenation  3/24/2025 
  Problem: Chronic Conditions and Co-morbidities  Goal: Patient's chronic conditions and co-morbidity symptoms are monitored and maintained or improved  3/26/2025 0124 by Lawrence Red RN  Outcome: Progressing  Flowsheets (Taken 3/26/2025 0124)  Care Plan - Patient's Chronic Conditions and Co-Morbidity Symptoms are Monitored and Maintained or Improved:   Monitor and assess patient's chronic conditions and comorbid symptoms for stability, deterioration, or improvement   Collaborate with multidisciplinary team to address chronic and comorbid conditions and prevent exacerbation or deterioration   Update acute care plan with appropriate goals if chronic or comorbid symptoms are exacerbated and prevent overall improvement and discharge     Problem: Discharge Planning  Goal: Discharge to home or other facility with appropriate resources  Outcome: Progressing  Flowsheets (Taken 3/26/2025 0124)  Discharge to home or other facility with appropriate resources:   Identify barriers to discharge with patient and caregiver   Identify discharge learning needs (meds, wound care, etc)   Refer to discharge planning if patient needs post-hospital services based on physician order or complex needs related to functional status, cognitive ability or social support system     Problem: Skin/Tissue Integrity  Goal: Skin integrity remains intact  Description: 1.  Monitor for areas of redness and/or skin breakdown  2.  Assess vascular access sites hourly  3.  Every 4-6 hours minimum:  Change oxygen saturation probe site  4.  Every 4-6 hours:  If on nasal continuous positive airway pressure, respiratory therapy assess nares and determine need for appliance change or resting period  Outcome: Progressing  Flowsheets (Taken 3/26/2025 0124)  Skin Integrity Remains Intact:   Monitor for areas of redness and/or skin breakdown   Assess vascular access sites hourly     Problem: Safety - Adult  Goal: Free from fall injury  3/26/2025 0124 by Lawrence Red, 
  Problem: Chronic Conditions and Co-morbidities  Goal: Patient's chronic conditions and co-morbidity symptoms are monitored and maintained or improved  3/27/2025 0647 by Kelly Botello RN  Outcome: Progressing  Flowsheets (Taken 3/27/2025 0500)  Care Plan - Patient's Chronic Conditions and Co-Morbidity Symptoms are Monitored and Maintained or Improved:   Monitor and assess patient's chronic conditions and comorbid symptoms for stability, deterioration, or improvement   Collaborate with multidisciplinary team to address chronic and comorbid conditions and prevent exacerbation or deterioration     Problem: Discharge Planning  Goal: Discharge to home or other facility with appropriate resources  3/27/2025 0647 by Kelly Botello, RN  Outcome: Progressing  Flowsheets (Taken 3/27/2025 0500)  Discharge to home or other facility with appropriate resources:   Identify barriers to discharge with patient and caregiver   Identify discharge learning needs (meds, wound care, etc)   Refer to discharge planning if patient needs post-hospital services based on physician order or complex needs related to functional status, cognitive ability or social support system   Arrange for interpreters to assist at discharge as needed     Problem: Respiratory - Adult  Goal: Achieves optimal ventilation and oxygenation  3/27/2025 0647 by Kelly Botello, RN  Flowsheets (Taken 3/27/2025 0500)  Achieves optimal ventilation and oxygenation:   Assess for changes in respiratory status   Position to facilitate oxygenation and minimize respiratory effort   Assess the need for suctioning and aspirate as needed   Respiratory therapy support as indicated   Assess for changes in mentation and behavior   Oxygen supplementation based on oxygen saturation or arterial blood gases   Encourage broncho-pulmonary hygiene including cough, deep breathe, incentive spirometry   Assess and instruct to report shortness of breath or any respiratory difficulty   
  Problem: Chronic Conditions and Co-morbidities  Goal: Patient's chronic conditions and co-morbidity symptoms are monitored and maintained or improved  Outcome: Progressing     Problem: Safety - Adult  Goal: Free from fall injury  Outcome: Progressing     Problem: Pain  Goal: Verbalizes/displays adequate comfort level or baseline comfort level  Outcome: Progressing     
  Problem: Chronic Conditions and Co-morbidities  Goal: Patient's chronic conditions and co-morbidity symptoms are monitored and maintained or improved  Outcome: Progressing  Flowsheets (Taken 3/23/2025 0525)  Care Plan - Patient's Chronic Conditions and Co-Morbidity Symptoms are Monitored and Maintained or Improved:   Update acute care plan with appropriate goals if chronic or comorbid symptoms are exacerbated and prevent overall improvement and discharge   Collaborate with multidisciplinary team to address chronic and comorbid conditions and prevent exacerbation or deterioration   Monitor and assess patient's chronic conditions and comorbid symptoms for stability, deterioration, or improvement     Problem: Discharge Planning  Goal: Discharge to home or other facility with appropriate resources  Outcome: Progressing  Flowsheets (Taken 3/23/2025 0525)  Discharge to home or other facility with appropriate resources:   Refer to discharge planning if patient needs post-hospital services based on physician order or complex needs related to functional status, cognitive ability or social support system   Arrange for interpreters to assist at discharge as needed   Identify discharge learning needs (meds, wound care, etc)   Arrange for needed discharge resources and transportation as appropriate   Identify barriers to discharge with patient and caregiver     Problem: Skin/Tissue Integrity  Goal: Skin integrity remains intact  Description: 1.  Monitor for areas of redness and/or skin breakdown  2.  Assess vascular access sites hourly  3.  Every 4-6 hours minimum:  Change oxygen saturation probe site  4.  Every 4-6 hours:  If on nasal continuous positive airway pressure, respiratory therapy assess nares and determine need for appliance change or resting period  Outcome: Progressing  Flowsheets (Taken 3/23/2025 0525)  Skin Integrity Remains Intact:   Every 4-6 hours: If on nasal continuous positive airway pressure, respiratory 
  Problem: Chronic Conditions and Co-morbidities  Goal: Patient's chronic conditions and co-morbidity symptoms are monitored and maintained or improved  Outcome: Progressing  Flowsheets (Taken 3/24/2025 0323)  Care Plan - Patient's Chronic Conditions and Co-Morbidity Symptoms are Monitored and Maintained or Improved:   Monitor and assess patient's chronic conditions and comorbid symptoms for stability, deterioration, or improvement   Collaborate with multidisciplinary team to address chronic and comorbid conditions and prevent exacerbation or deterioration   Update acute care plan with appropriate goals if chronic or comorbid symptoms are exacerbated and prevent overall improvement and discharge     Problem: Discharge Planning  Goal: Discharge to home or other facility with appropriate resources  Outcome: Progressing  Flowsheets (Taken 3/24/2025 0323)  Discharge to home or other facility with appropriate resources:   Refer to discharge planning if patient needs post-hospital services based on physician order or complex needs related to functional status, cognitive ability or social support system   Arrange for interpreters to assist at discharge as needed   Identify discharge learning needs (meds, wound care, etc)   Arrange for needed discharge resources and transportation as appropriate   Identify barriers to discharge with patient and caregiver     Problem: Skin/Tissue Integrity  Goal: Skin integrity remains intact  Description: 1.  Monitor for areas of redness and/or skin breakdown  2.  Assess vascular access sites hourly  3.  Every 4-6 hours minimum:  Change oxygen saturation probe site  4.  Every 4-6 hours:  If on nasal continuous positive airway pressure, respiratory therapy assess nares and determine need for appliance change or resting period  Outcome: Progressing  Flowsheets (Taken 3/24/2025 0323)  Skin Integrity Remains Intact:   Every 4-6 hours: If on nasal continuous positive airway pressure, respiratory 
  Problem: Chronic Conditions and Co-morbidities  Goal: Patient's chronic conditions and co-morbidity symptoms are monitored and maintained or improved  Outcome: Progressing  Flowsheets (Taken 3/25/2025 0253)  Care Plan - Patient's Chronic Conditions and Co-Morbidity Symptoms are Monitored and Maintained or Improved:   Monitor and assess patient's chronic conditions and comorbid symptoms for stability, deterioration, or improvement   Collaborate with multidisciplinary team to address chronic and comorbid conditions and prevent exacerbation or deterioration   Update acute care plan with appropriate goals if chronic or comorbid symptoms are exacerbated and prevent overall improvement and discharge     Problem: Discharge Planning  Goal: Discharge to home or other facility with appropriate resources  Outcome: Progressing  Flowsheets (Taken 3/25/2025 0253)  Discharge to home or other facility with appropriate resources:   Identify barriers to discharge with patient and caregiver   Identify discharge learning needs (meds, wound care, etc)   Arrange for needed discharge resources and transportation as appropriate   Arrange for interpreters to assist at discharge as needed     Problem: Skin/Tissue Integrity  Goal: Skin integrity remains intact  Description: 1.  Monitor for areas of redness and/or skin breakdown  2.  Assess vascular access sites hourly  3.  Every 4-6 hours minimum:  Change oxygen saturation probe site  4.  Every 4-6 hours:  If on nasal continuous positive airway pressure, respiratory therapy assess nares and determine need for appliance change or resting period  Outcome: Progressing  Flowsheets (Taken 3/25/2025 0253)  Skin Integrity Remains Intact:   Monitor for areas of redness and/or skin breakdown   Every 4-6 hours minimum: Change oxygen saturation probe site   Assess vascular access sites hourly     Problem: Safety - Adult  Goal: Free from fall injury  Outcome: Progressing  Flowsheets (Taken 3/25/2025 
  Problem: Respiratory - Adult  Goal: Achieves optimal ventilation and oxygenation  3/22/2025 0921 by Katie Chopra, RN  Outcome: Progressing  Flowsheets (Taken 3/22/2025 0921)  Achieves optimal ventilation and oxygenation:   Assess for changes in respiratory status   Position to facilitate oxygenation and minimize respiratory effort   Initiate smoking cessation protocol as indicated   Assess for changes in mentation and behavior  Note: Pt's SPO2>92% on 10L HFNC. Pt's home dose 8-10L. Will continue to monitor.      Problem: Safety - Adult  Goal: Free from fall injury  3/22/2025 0921 by Katie Chopra, RN  Outcome: Progressing  Flowsheets (Taken 3/22/2025 0921)  Free From Fall Injury: Instruct family/caregiver on patient safety  Note: Fall precautions are in place. Bed alarm is on and in lowest position. Pt is using call light appropriately. Will continue to monitor.       Problem: Skin/Tissue Integrity  Goal: Skin integrity remains intact  Description: 1.  Monitor for areas of redness and/or skin breakdown  2.  Assess vascular access sites hourly  3.  Every 4-6 hours minimum:  Change oxygen saturation probe site  4.  Every 4-6 hours:  If on nasal continuous positive airway pressure, respiratory therapy assess nares and determine need for appliance change or resting period  3/21/2025 1931 by Megan Nagel, RN  Outcome: Progressing  Flowsheets (Taken 3/21/2025 1931)  Skin Integrity Remains Intact: Monitor for areas of redness and/or skin breakdown  Note: No open areas to note. Pt turns self in bed. Continent of bowel and bladder.     
  Problem: Respiratory - Adult  Goal: Achieves optimal ventilation and oxygenation  Flowsheets (Taken 3/23/2025 2029)  Achieves optimal ventilation and oxygenation:   Assess for changes in respiratory status   Assess for changes in mentation and behavior   Position to facilitate oxygenation and minimize respiratory effort   Oxygen supplementation based on oxygen saturation or arterial blood gases  Note: Pt's O2 requirements weaned from 8L HFNC to 6L HFNC in order to maintain SpO2 >  88%. Pt does experience dyspnea with exertion that is relived with rest.      Problem: Pain  Goal: Verbalizes/displays adequate comfort level or baseline comfort level  Outcome: Progressing  Flowsheets (Taken 3/23/2025 2029)  Verbalizes/displays adequate comfort level or baseline comfort level:   Encourage patient to monitor pain and request assistance   Assess pain using appropriate pain scale   Administer analgesics based on type and severity of pain and evaluate response   Implement non-pharmacological measures as appropriate and evaluate response  Note: Pt complained of pain to bilateral knees. PRN Acetaminophen administered per MD order.      
  Problem: Skin/Tissue Integrity  Goal: Skin integrity remains intact  Description: 1.  Monitor for areas of redness and/or skin breakdown  2.  Assess vascular access sites hourly  3.  Every 4-6 hours minimum:  Change oxygen saturation probe site  4.  Every 4-6 hours:  If on nasal continuous positive airway pressure, respiratory therapy assess nares and determine need for appliance change or resting period  Outcome: Progressing  Flowsheets (Taken 3/21/2025 1931)  Skin Integrity Remains Intact: Monitor for areas of redness and/or skin breakdown  Note: No open areas to note. Pt turns self in bed. Continent of bowel and bladder.     Problem: Safety - Adult  Goal: Free from fall injury  Outcome: Progressing  Flowsheets (Taken 3/21/2025 1931)  Free From Fall Injury: Instruct family/caregiver on patient safety  Note: Pt is alert and oriented x 4. No attempts to get up on own. Bed alarm on, call light within reach. Pt calls appropriately for assistance.     Problem: Respiratory - Adult  Goal: Achieves optimal ventilation and oxygenation  Outcome: Progressing  Flowsheets (Taken 3/21/2025 1931)  Achieves optimal ventilation and oxygenation:   Assess for changes in respiratory status   Oxygen supplementation based on oxygen saturation or arterial blood gases  Note: SpO2 94% on 12L. Dyspnea with exertion. Lungs with expiratory wheezes. Bronch completed today, pt reports her breathing is better.     
  Problem: Skin/Tissue Integrity  Goal: Skin integrity remains intact  Description: 1.  Monitor for areas of redness and/or skin breakdown  2.  Assess vascular access sites hourly  3.  Every 4-6 hours minimum:  Change oxygen saturation probe site  4.  Every 4-6 hours:  If on nasal continuous positive airway pressure, respiratory therapy assess nares and determine need for appliance change or resting period  Outcome: Progressing  Flowsheets (Taken 3/22/2025 0943)  Skin Integrity Remains Intact: Monitor for areas of redness and/or skin breakdown  Note: No open skin areas to note. Pt continent of bowel and bladder. Turns self in bed.     Problem: Safety - Adult  Goal: Free from fall injury  3/22/2025 0947 by Megan Nagel RN  Outcome: Progressing  Flowsheets (Taken 3/22/2025 0943)  Free From Fall Injury: Instruct family/caregiver on patient safety  Note: Pt is alert and oriented x 4. No attempts to get up on own. Bed alarm on, call light within reach. Pt calls approprietly for assistance.     Problem: Respiratory - Adult  Goal: Achieves optimal ventilation and oxygenation  3/22/2025 0947 by Megan Nagel RN  Outcome: Progressing  Flowsheets (Taken 3/22/2025 0943)  Achieves optimal ventilation and oxygenation:   Assess for changes in respiratory status   Oxygen supplementation based on oxygen saturation or arterial blood gases  Note: SpO2 100% on 10L, weaned to 8L. Lungs diminished. Dyspnea with exertion     Problem: Pain  Goal: Verbalizes/displays adequate comfort level or baseline comfort level  3/22/2025 0947 by Megan Nagel RN  Outcome: Progressing  Flowsheets (Taken 3/22/2025 0947)  Verbalizes/displays adequate comfort level or baseline comfort level: Assess pain using appropriate pain scale  Note: No c/o pain.     
3/20/2025 0141)  Skin Integrity Remains Intact:   Every 4-6 hours: If on nasal continuous positive airway pressure, respiratory therapy assesses nares and determine need for appliance change or resting period   Every 4-6 hours minimum: Change oxygen saturation probe site   Assess vascular access sites hourly   Monitor for areas of redness and/or skin breakdown     Problem: Safety - Adult  Goal: Free from fall injury  3/20/2025 0141 by Samy Sun RN  Outcome: Progressing  Flowsheets (Taken 3/20/2025 0141)  Free From Fall Injury:   Based on caregiver fall risk screen, instruct family/caregiver to ask for assistance with transferring infant if caregiver noted to have fall risk factors   Instruct family/caregiver on patient safety     Problem: Respiratory - Adult  Goal: Achieves optimal ventilation and oxygenation  Outcome: Progressing  Flowsheets (Taken 3/20/2025 0141)  Achieves optimal ventilation and oxygenation:   Respiratory therapy support as indicated   Assess and instruct to report shortness of breath or any respiratory difficulty   Assess the need for suctioning and aspirate as needed   Encourage broncho-pulmonary hygiene including cough, deep breathe, incentive spirometry   Initiate smoking cessation protocol as indicated   Oxygen supplementation based on oxygen saturation or arterial blood gases   Position to facilitate oxygenation and minimize respiratory effort   Assess for changes in mentation and behavior   Assess for changes in respiratory status     Problem: Infection - Adult  Goal: Absence of infection at discharge  Outcome: Progressing  Flowsheets (Taken 3/20/2025 0141)  Absence of infection at discharge:   Identify and instruct in appropriate isolation precautions for identified infection/condition   Instruct and encourage patient and family to use good hand hygiene technique   Administer medications as ordered   [place] appropriate cooling/warming therapies per order   Monitor endotracheal

## 2025-03-27 NOTE — DISCHARGE INSTR - COC
Continuity of Care Form    Patient Name: Shoaib Rothman   :  1960  MRN:  [id number]    Admit date:  3/19/2025  Discharge date:  3/27/2025    Code Status Order: Full Code   Advance Directives:    Date/Time Healthcare Directive Type of Healthcare Directive Copy in Chart Healthcare Agent Appointed Healthcare Agent's Name Healthcare Agent's Phone Number    [phone] No, patient does not have an advance directive for healthcare treatment  --  --  --  --  --             Admitting Physician:  Balta Maldonado MD  PCP: Lamar Mondragon MD    Discharging Nurse: Miguel  [facility] Unit/Room#: 4459/4459-01  Discharging Unit Phone Number: [phone]    Emergency Contact:   Extended Emergency Contact Information  Primary Emergency Contact: patria,jason  Home Phone: [phone]  Work Phone: [phone]  Mobile Phone: [phone]  Relation: Brother/Sister   needed? No    Past Surgical History:  Past Surgical History:   Procedure Laterality Date    APPENDECTOMY      BREAST SURGERY      BRONCHOSCOPY N/A 2024    BRONCHOSCOPY performed by Fernando Dhillon MD at [facility] ENDOSCOPY    BRONCHOSCOPY N/A 2025    BRONCHOSCOPY ALVEOLAR LAVAGE / BX performed by Guille Mabry MD at [facility] ENDOSCOPY    BRONCHOSCOPY N/A 2025    BRONCHOSCOPY ALVEOLAR LAVAGE performed by Maximo Tyler MD at [facility] ENDOSCOPY    BRONCHOSCOPY N/A 3/21/2025    BRONCHOSCOPY CRYOTHERAPY / BIOPSY performed by Fernando Dhillon MD at [facility] ENDOSCOPY    CHOLECYSTECTOMY      CT BIOPSY RENAL  2020    CT BIOPSY RENAL 2020 [facility] CT SCAN    CT BIOPSY RENAL  3/26/2025    CT BIOPSY RENAL 3/26/2025 [facility] CT SCAN    CYSTOSCOPY N/A 2024    RIGID CYSTOSCOPY, BLADDER BIOPSY performed by Kris Kan MD at [facility] OR    MASTECTOMY Left 2019    LEFT MODIFIED RADICAL MASTECTOMY; EXPAREL INTERCOSTAL BLOCK performed by Rebecca Montesinos MD at [facility] OR    SKIN GRAFT Left 2019    COMPLEX CLOSURE OF

## 2025-03-29 LAB
GALACTOMANNAN AG SERPL IA-ACNC: 0.05
GALACTOMANNAN AG SERPL QL IA: NEGATIVE

## 2025-03-30 LAB
A FLAVUS AB SER QL ID: NORMAL
A FUMIGATUS1 AB SER QL ID: NORMAL
A FUMIGATUS2 AB SER QL: NORMAL
A FUMIGATUS3 AB SER QL ID: NORMAL
A FUMIGATUS6 AB SER QL ID: NORMAL
A PULLULANS AB SER QL ID: NORMAL
BEEF IGE QN: <0.1 KU/L
DEPRECATED MISC ALLERGEN IGE RAST QL: NORMAL
EPID ALLERG MIX73 IGE QN: NEGATIVE KU/L
P BETAE IGE QN: <0.1 KU/L
PIGEON SERUM AB QL ID: NORMAL
PORK IGE QN: <0.1 KU/L
S RECTIVIRGULA AB SER QL ID: NORMAL
S VIRIDIS AB SER QL: NORMAL
T CANDIDUS AB SER QL: NORMAL

## 2025-03-31 LAB
FUNGUS SPEC CULT: ABNORMAL
FUNGUS SPEC CULT: NORMAL
LOEFFLER MB STN SPEC: ABNORMAL
LOEFFLER MB STN SPEC: ABNORMAL
LOEFFLER MB STN SPEC: NORMAL
ORGANISM: ABNORMAL

## 2025-04-01 LAB
ACID FAST STN SPEC QL: NORMAL
MYCOBACTERIUM SPEC CULT: NORMAL

## 2025-04-08 LAB
ACID FAST STN SPEC QL: NORMAL
MYCOBACTERIUM SPEC CULT: NORMAL

## 2025-04-15 LAB
ACID FAST STN SPEC QL: NORMAL
MYCOBACTERIUM SPEC CULT: NORMAL

## 2025-04-22 LAB
ACID FAST STN SPEC QL: NORMAL
MYCOBACTERIUM SPEC CULT: NORMAL

## 2025-04-24 ENCOUNTER — APPOINTMENT (OUTPATIENT)
Dept: GENERAL RADIOLOGY | Age: 65
DRG: 133 | End: 2025-04-24
Payer: COMMERCIAL

## 2025-04-24 ENCOUNTER — HOSPITAL ENCOUNTER (INPATIENT)
Age: 65
LOS: 4 days | Discharge: NURSING, REHAB OR LONG-TERM CARE | DRG: 133 | End: 2025-04-28
Attending: EMERGENCY MEDICINE | Admitting: INTERNAL MEDICINE
Payer: COMMERCIAL

## 2025-04-24 DIAGNOSIS — J96.21 ACUTE ON CHRONIC RESPIRATORY FAILURE WITH HYPOXIA (HCC): ICD-10-CM

## 2025-04-24 DIAGNOSIS — J44.1 COPD EXACERBATION (HCC): Primary | ICD-10-CM

## 2025-04-24 DIAGNOSIS — T17.998A MUCUS PLUG IN RESPIRATORY TRACT: ICD-10-CM

## 2025-04-24 DIAGNOSIS — J43.1 PANLOBULAR EMPHYSEMA (HCC): ICD-10-CM

## 2025-04-24 DIAGNOSIS — N05.1 FSGS (FOCAL SEGMENTAL GLOMERULOSCLEROSIS): ICD-10-CM

## 2025-04-24 DIAGNOSIS — J18.9 PNEUMONIA OF RIGHT LUNG DUE TO INFECTIOUS ORGANISM, UNSPECIFIED PART OF LUNG: ICD-10-CM

## 2025-04-24 DIAGNOSIS — R06.02 SOB (SHORTNESS OF BREATH) ON EXERTION: ICD-10-CM

## 2025-04-24 LAB
ANION GAP SERPL CALCULATED.3IONS-SCNC: 11 MMOL/L (ref 3–16)
ANISOCYTOSIS BLD QL SMEAR: ABNORMAL
BACTERIA URNS QL MICRO: ABNORMAL /HPF
BASE EXCESS BLDV CALC-SCNC: -4.1 MMOL/L (ref -2–3)
BASE EXCESS BLDV CALC-SCNC: -4.7 MMOL/L (ref -2–3)
BASE EXCESS BLDV CALC-SCNC: -5.3 MMOL/L (ref -2–3)
BASOPHILS # BLD: 0 K/UL (ref 0–0.2)
BASOPHILS NFR BLD: 0 %
BILIRUB UR QL STRIP.AUTO: NEGATIVE
BUN SERPL-MCNC: 71 MG/DL (ref 7–20)
CALCIUM SERPL-MCNC: 8.7 MG/DL (ref 8.3–10.6)
CHLORIDE SERPL-SCNC: 106 MMOL/L (ref 99–110)
CLARITY UR: CLEAR
CO2 BLDV-SCNC: 27 MMOL/L
CO2 SERPL-SCNC: 22 MMOL/L (ref 21–32)
COHGB MFR BLDV: 1.6 % (ref 0–1.5)
COHGB MFR BLDV: 1.8 % (ref 0–1.5)
COHGB MFR BLDV: 2.1 % (ref 0–1.5)
COLOR UR: YELLOW
CREAT SERPL-MCNC: 2.4 MG/DL (ref 0.6–1.2)
DEPRECATED RDW RBC AUTO: 17.2 % (ref 12.4–15.4)
DO-HGB MFR BLDV: 17.8 %
DO-HGB MFR BLDV: 22.2 %
DO-HGB MFR BLDV: 24.3 %
EKG ATRIAL RATE: 90 BPM
EKG DIAGNOSIS: NORMAL
EKG P AXIS: 73 DEGREES
EKG P-R INTERVAL: 154 MS
EKG Q-T INTERVAL: 368 MS
EKG QRS DURATION: 94 MS
EKG QTC CALCULATION (BAZETT): 450 MS
EKG R AXIS: 81 DEGREES
EKG T AXIS: 78 DEGREES
EKG VENTRICULAR RATE: 90 BPM
EOSINOPHIL # BLD: 0 K/UL (ref 0–0.6)
EOSINOPHIL NFR BLD: 0 %
FLUAV RNA RESP QL NAA+PROBE: NOT DETECTED
FLUBV RNA RESP QL NAA+PROBE: NOT DETECTED
GFR SERPLBLD CREATININE-BSD FMLA CKD-EPI: 22 ML/MIN/{1.73_M2}
GLUCOSE SERPL-MCNC: 243 MG/DL (ref 70–99)
GLUCOSE UR STRIP.AUTO-MCNC: NEGATIVE MG/DL
HCO3 BLDV-SCNC: 24.4 MMOL/L (ref 24–28)
HCO3 BLDV-SCNC: 24.6 MMOL/L (ref 24–28)
HCO3 BLDV-SCNC: 24.7 MMOL/L (ref 24–28)
HCT VFR BLD AUTO: 31.4 % (ref 36–48)
HGB BLD-MCNC: 10.3 G/DL (ref 12–16)
HGB UR QL STRIP.AUTO: NEGATIVE
KETONES UR STRIP.AUTO-MCNC: NEGATIVE MG/DL
LEUKOCYTE ESTERASE UR QL STRIP.AUTO: ABNORMAL
LYMPHOCYTES # BLD: 0.9 K/UL (ref 1–5.1)
LYMPHOCYTES NFR BLD: 9 %
MCH RBC QN AUTO: 31 PG (ref 26–34)
MCHC RBC AUTO-ENTMCNC: 32.8 G/DL (ref 31–36)
MCV RBC AUTO: 94.3 FL (ref 80–100)
METHGB MFR BLDV: 0.2 % (ref 0–1.5)
METHGB MFR BLDV: 0.5 % (ref 0–1.5)
METHGB MFR BLDV: 0.5 % (ref 0–1.5)
MONOCYTES # BLD: 0.3 K/UL (ref 0–1.3)
MONOCYTES NFR BLD: 3 %
MYELOCYTES NFR BLD MANUAL: 1 %
NEUTROPHILS # BLD: 8.4 K/UL (ref 1.7–7.7)
NEUTROPHILS NFR BLD: 87 %
NITRITE UR QL STRIP.AUTO: NEGATIVE
NT-PROBNP SERPL-MCNC: 795 PG/ML (ref 0–124)
OVALOCYTES BLD QL SMEAR: ABNORMAL
PCO2 BLDV: 64.4 MMHG (ref 41–51)
PCO2 BLDV: 69 MMHG (ref 41–51)
PCO2 BLDV: 69.9 MMHG (ref 41–51)
PH BLDV: 7.15 [PH] (ref 7.35–7.45)
PH BLDV: 7.16 [PH] (ref 7.35–7.45)
PH BLDV: 7.19 [PH] (ref 7.35–7.45)
PH UR STRIP.AUTO: 6 [PH] (ref 5–8)
PLATELET # BLD AUTO: 295 K/UL (ref 135–450)
PMV BLD AUTO: 7.1 FL (ref 5–10.5)
PO2 BLDV: 47 MMHG (ref 25–40)
PO2 BLDV: 47.5 MMHG (ref 25–40)
PO2 BLDV: 50.3 MMHG (ref 25–40)
POLYCHROMASIA BLD QL SMEAR: ABNORMAL
POTASSIUM SERPL-SCNC: 5.1 MMOL/L (ref 3.5–5.1)
PROCALCITONIN SERPL IA-MCNC: 0.14 NG/ML (ref 0–0.15)
PROT UR STRIP.AUTO-MCNC: 100 MG/DL
RBC # BLD AUTO: 3.33 M/UL (ref 4–5.2)
RBC #/AREA URNS HPF: ABNORMAL /HPF (ref 0–4)
RBC MORPH BLD: NORMAL
SAO2 % BLDV: 75 %
SAO2 % BLDV: 77 %
SAO2 % BLDV: 82 %
SARS-COV-2 RNA RESP QL NAA+PROBE: NOT DETECTED
SODIUM SERPL-SCNC: 139 MMOL/L (ref 136–145)
SP GR UR STRIP.AUTO: 1.02 (ref 1–1.03)
TARGETS BLD QL SMEAR: ABNORMAL
TROPONIN, HIGH SENSITIVITY: 40 NG/L (ref 0–14)
TROPONIN, HIGH SENSITIVITY: 42 NG/L (ref 0–14)
UA DIPSTICK W REFLEX MICRO PNL UR: YES
URN SPEC COLLECT METH UR: ABNORMAL
UROBILINOGEN UR STRIP-ACNC: 0.2 E.U./DL
WBC # BLD AUTO: 9.5 K/UL (ref 4–11)
WBC #/AREA URNS HPF: ABNORMAL /HPF (ref 0–5)

## 2025-04-24 PROCEDURE — 6370000000 HC RX 637 (ALT 250 FOR IP)

## 2025-04-24 PROCEDURE — 82803 BLOOD GASES ANY COMBINATION: CPT

## 2025-04-24 PROCEDURE — 84145 PROCALCITONIN (PCT): CPT

## 2025-04-24 PROCEDURE — 87636 SARSCOV2 & INF A&B AMP PRB: CPT

## 2025-04-24 PROCEDURE — 87641 MR-STAPH DNA AMP PROBE: CPT

## 2025-04-24 PROCEDURE — 99223 1ST HOSP IP/OBS HIGH 75: CPT | Performed by: HOSPITALIST

## 2025-04-24 PROCEDURE — 87088 URINE BACTERIA CULTURE: CPT

## 2025-04-24 PROCEDURE — 2580000003 HC RX 258

## 2025-04-24 PROCEDURE — 80048 BASIC METABOLIC PNL TOTAL CA: CPT

## 2025-04-24 PROCEDURE — 99285 EMERGENCY DEPT VISIT HI MDM: CPT

## 2025-04-24 PROCEDURE — 6360000002 HC RX W HCPCS

## 2025-04-24 PROCEDURE — 2500000003 HC RX 250 WO HCPCS

## 2025-04-24 PROCEDURE — 1200000000 HC SEMI PRIVATE

## 2025-04-24 PROCEDURE — 85025 COMPLETE CBC W/AUTO DIFF WBC: CPT

## 2025-04-24 PROCEDURE — 94640 AIRWAY INHALATION TREATMENT: CPT

## 2025-04-24 PROCEDURE — 83880 ASSAY OF NATRIURETIC PEPTIDE: CPT

## 2025-04-24 PROCEDURE — 93005 ELECTROCARDIOGRAM TRACING: CPT

## 2025-04-24 PROCEDURE — 71045 X-RAY EXAM CHEST 1 VIEW: CPT

## 2025-04-24 PROCEDURE — 94761 N-INVAS EAR/PLS OXIMETRY MLT: CPT

## 2025-04-24 PROCEDURE — 87086 URINE CULTURE/COLONY COUNT: CPT

## 2025-04-24 PROCEDURE — 87186 SC STD MICRODIL/AGAR DIL: CPT

## 2025-04-24 PROCEDURE — 2700000000 HC OXYGEN THERAPY PER DAY

## 2025-04-24 PROCEDURE — 87449 NOS EACH ORGANISM AG IA: CPT

## 2025-04-24 PROCEDURE — 81001 URINALYSIS AUTO W/SCOPE: CPT

## 2025-04-24 PROCEDURE — 84484 ASSAY OF TROPONIN QUANT: CPT

## 2025-04-24 PROCEDURE — 96374 THER/PROPH/DIAG INJ IV PUSH: CPT

## 2025-04-24 PROCEDURE — 87077 CULTURE AEROBIC IDENTIFY: CPT

## 2025-04-24 RX ORDER — SODIUM CHLORIDE, SODIUM LACTATE, POTASSIUM CHLORIDE, CALCIUM CHLORIDE 600; 310; 30; 20 MG/100ML; MG/100ML; MG/100ML; MG/100ML
INJECTION, SOLUTION INTRAVENOUS CONTINUOUS
Status: ACTIVE | OUTPATIENT
Start: 2025-04-24 | End: 2025-04-25

## 2025-04-24 RX ORDER — MAGNESIUM SULFATE IN WATER 40 MG/ML
2000 INJECTION, SOLUTION INTRAVENOUS PRN
Status: DISCONTINUED | OUTPATIENT
Start: 2025-04-25 | End: 2025-04-28 | Stop reason: HOSPADM

## 2025-04-24 RX ORDER — LETROZOLE 2.5 MG/1
2.5 TABLET, FILM COATED ORAL DAILY
Status: DISCONTINUED | OUTPATIENT
Start: 2025-04-25 | End: 2025-04-28 | Stop reason: HOSPADM

## 2025-04-24 RX ORDER — CARVEDILOL 6.25 MG/1
6.25 TABLET ORAL 2 TIMES DAILY
Status: DISCONTINUED | OUTPATIENT
Start: 2025-04-24 | End: 2025-04-28 | Stop reason: HOSPADM

## 2025-04-24 RX ORDER — ONDANSETRON 2 MG/ML
4 INJECTION INTRAMUSCULAR; INTRAVENOUS EVERY 6 HOURS PRN
Status: DISCONTINUED | OUTPATIENT
Start: 2025-04-24 | End: 2025-04-28 | Stop reason: HOSPADM

## 2025-04-24 RX ORDER — HYDROXYZINE HYDROCHLORIDE 25 MG/1
25 TABLET, FILM COATED ORAL NIGHTLY
Status: DISCONTINUED | OUTPATIENT
Start: 2025-04-24 | End: 2025-04-24

## 2025-04-24 RX ORDER — SODIUM CHLORIDE FOR INHALATION 3 %
4 VIAL, NEBULIZER (ML) INHALATION 3 TIMES DAILY
Status: DISCONTINUED | OUTPATIENT
Start: 2025-04-24 | End: 2025-04-25

## 2025-04-24 RX ORDER — GLUCAGON 1 MG/ML
1 KIT INJECTION PRN
Status: DISCONTINUED | OUTPATIENT
Start: 2025-04-24 | End: 2025-04-28 | Stop reason: HOSPADM

## 2025-04-24 RX ORDER — CARVEDILOL 6.25 MG/1
6.25 TABLET ORAL 2 TIMES DAILY
COMMUNITY

## 2025-04-24 RX ORDER — POLYETHYLENE GLYCOL 3350 17 G/17G
17 POWDER, FOR SOLUTION ORAL DAILY PRN
Status: DISCONTINUED | OUTPATIENT
Start: 2025-04-24 | End: 2025-04-28 | Stop reason: HOSPADM

## 2025-04-24 RX ORDER — GUAIFENESIN 600 MG/1
600 TABLET, EXTENDED RELEASE ORAL 2 TIMES DAILY
Status: DISCONTINUED | OUTPATIENT
Start: 2025-04-24 | End: 2025-04-28 | Stop reason: HOSPADM

## 2025-04-24 RX ORDER — PREDNISONE 20 MG/1
20 TABLET ORAL DAILY
Status: DISCONTINUED | OUTPATIENT
Start: 2025-04-25 | End: 2025-04-25

## 2025-04-24 RX ORDER — ACETAMINOPHEN 650 MG/1
650 SUPPOSITORY RECTAL EVERY 6 HOURS PRN
Status: DISCONTINUED | OUTPATIENT
Start: 2025-04-24 | End: 2025-04-24 | Stop reason: SDUPTHER

## 2025-04-24 RX ORDER — POTASSIUM CHLORIDE 7.45 MG/ML
10 INJECTION INTRAVENOUS PRN
Status: DISCONTINUED | OUTPATIENT
Start: 2025-04-25 | End: 2025-04-28 | Stop reason: HOSPADM

## 2025-04-24 RX ORDER — SPIRONOLACTONE 25 MG/1
25 TABLET ORAL DAILY
Status: DISCONTINUED | OUTPATIENT
Start: 2025-04-24 | End: 2025-04-28 | Stop reason: HOSPADM

## 2025-04-24 RX ORDER — ACETAMINOPHEN 325 MG/1
650 TABLET ORAL EVERY 6 HOURS PRN
Status: DISCONTINUED | OUTPATIENT
Start: 2025-04-24 | End: 2025-04-24 | Stop reason: SDUPTHER

## 2025-04-24 RX ORDER — ONDANSETRON 4 MG/1
4 TABLET, ORALLY DISINTEGRATING ORAL EVERY 8 HOURS PRN
Status: DISCONTINUED | OUTPATIENT
Start: 2025-04-24 | End: 2025-04-28 | Stop reason: HOSPADM

## 2025-04-24 RX ORDER — INSULIN LISPRO 100 [IU]/ML
0-4 INJECTION, SOLUTION INTRAVENOUS; SUBCUTANEOUS
Status: DISCONTINUED | OUTPATIENT
Start: 2025-04-24 | End: 2025-04-25

## 2025-04-24 RX ORDER — TRAZODONE HYDROCHLORIDE 50 MG/1
25 TABLET ORAL NIGHTLY
Status: DISCONTINUED | OUTPATIENT
Start: 2025-04-24 | End: 2025-04-28 | Stop reason: HOSPADM

## 2025-04-24 RX ORDER — ROPINIROLE 0.25 MG/1
0.25 TABLET, FILM COATED ORAL NIGHTLY
Status: DISCONTINUED | OUTPATIENT
Start: 2025-04-24 | End: 2025-04-28 | Stop reason: HOSPADM

## 2025-04-24 RX ORDER — ACETAMINOPHEN 650 MG/1
650 SUPPOSITORY RECTAL EVERY 6 HOURS PRN
Status: DISCONTINUED | OUTPATIENT
Start: 2025-04-24 | End: 2025-04-28 | Stop reason: HOSPADM

## 2025-04-24 RX ORDER — PANTOPRAZOLE SODIUM 20 MG/1
20 TABLET, DELAYED RELEASE ORAL DAILY
Status: DISCONTINUED | OUTPATIENT
Start: 2025-04-25 | End: 2025-04-28 | Stop reason: HOSPADM

## 2025-04-24 RX ORDER — POTASSIUM CHLORIDE 1500 MG/1
40 TABLET, EXTENDED RELEASE ORAL PRN
Status: DISCONTINUED | OUTPATIENT
Start: 2025-04-25 | End: 2025-04-28 | Stop reason: HOSPADM

## 2025-04-24 RX ORDER — SODIUM CHLORIDE 9 MG/ML
INJECTION, SOLUTION INTRAVENOUS PRN
Status: DISCONTINUED | OUTPATIENT
Start: 2025-04-24 | End: 2025-04-28 | Stop reason: HOSPADM

## 2025-04-24 RX ORDER — SODIUM CHLORIDE 0.9 % (FLUSH) 0.9 %
5-40 SYRINGE (ML) INJECTION PRN
Status: DISCONTINUED | OUTPATIENT
Start: 2025-04-24 | End: 2025-04-24 | Stop reason: SDUPTHER

## 2025-04-24 RX ORDER — AMLODIPINE BESYLATE 5 MG/1
5 TABLET ORAL DAILY
Status: DISCONTINUED | OUTPATIENT
Start: 2025-04-24 | End: 2025-04-28 | Stop reason: HOSPADM

## 2025-04-24 RX ORDER — ACETAMINOPHEN 325 MG/1
650 TABLET ORAL EVERY 6 HOURS PRN
Status: DISCONTINUED | OUTPATIENT
Start: 2025-04-24 | End: 2025-04-28 | Stop reason: HOSPADM

## 2025-04-24 RX ORDER — DEXTROSE MONOHYDRATE 100 MG/ML
INJECTION, SOLUTION INTRAVENOUS CONTINUOUS PRN
Status: DISCONTINUED | OUTPATIENT
Start: 2025-04-24 | End: 2025-04-28 | Stop reason: HOSPADM

## 2025-04-24 RX ORDER — ALBUTEROL SULFATE 0.83 MG/ML
2.5 SOLUTION RESPIRATORY (INHALATION) 3 TIMES DAILY
Status: DISCONTINUED | OUTPATIENT
Start: 2025-04-24 | End: 2025-04-25

## 2025-04-24 RX ORDER — POLYETHYLENE GLYCOL 3350 17 G/17G
17 POWDER, FOR SOLUTION ORAL DAILY PRN
Status: DISCONTINUED | OUTPATIENT
Start: 2025-04-24 | End: 2025-04-24 | Stop reason: SDUPTHER

## 2025-04-24 RX ORDER — SODIUM CHLORIDE 9 MG/ML
INJECTION, SOLUTION INTRAVENOUS PRN
Status: DISCONTINUED | OUTPATIENT
Start: 2025-04-24 | End: 2025-04-24 | Stop reason: SDUPTHER

## 2025-04-24 RX ORDER — SODIUM CHLORIDE 0.9 % (FLUSH) 0.9 %
5-40 SYRINGE (ML) INJECTION EVERY 12 HOURS SCHEDULED
Status: DISCONTINUED | OUTPATIENT
Start: 2025-04-24 | End: 2025-04-24 | Stop reason: SDUPTHER

## 2025-04-24 RX ORDER — HYDROXYZINE HYDROCHLORIDE 25 MG/1
25 TABLET, FILM COATED ORAL NIGHTLY PRN
Status: DISCONTINUED | OUTPATIENT
Start: 2025-04-24 | End: 2025-04-28 | Stop reason: HOSPADM

## 2025-04-24 RX ORDER — ONDANSETRON 2 MG/ML
4 INJECTION INTRAMUSCULAR; INTRAVENOUS EVERY 6 HOURS PRN
Status: DISCONTINUED | OUTPATIENT
Start: 2025-04-24 | End: 2025-04-24 | Stop reason: SDUPTHER

## 2025-04-24 RX ORDER — SODIUM CHLORIDE 0.9 % (FLUSH) 0.9 %
5-40 SYRINGE (ML) INJECTION PRN
Status: DISCONTINUED | OUTPATIENT
Start: 2025-04-24 | End: 2025-04-28 | Stop reason: HOSPADM

## 2025-04-24 RX ORDER — ONDANSETRON 4 MG/1
4 TABLET, ORALLY DISINTEGRATING ORAL EVERY 8 HOURS PRN
Status: DISCONTINUED | OUTPATIENT
Start: 2025-04-24 | End: 2025-04-24 | Stop reason: SDUPTHER

## 2025-04-24 RX ORDER — TORSEMIDE 20 MG/1
20 TABLET ORAL DAILY
Status: DISCONTINUED | OUTPATIENT
Start: 2025-04-24 | End: 2025-04-28 | Stop reason: HOSPADM

## 2025-04-24 RX ORDER — SODIUM CHLORIDE 0.9 % (FLUSH) 0.9 %
5-40 SYRINGE (ML) INJECTION EVERY 12 HOURS SCHEDULED
Status: DISCONTINUED | OUTPATIENT
Start: 2025-04-24 | End: 2025-04-28 | Stop reason: HOSPADM

## 2025-04-24 RX ORDER — GUAIFENESIN/DEXTROMETHORPHAN 100-10MG/5
5 SYRUP ORAL EVERY 4 HOURS PRN
Status: DISCONTINUED | OUTPATIENT
Start: 2025-04-24 | End: 2025-04-28 | Stop reason: HOSPADM

## 2025-04-24 RX ADMIN — GUAIFENESIN 600 MG: 600 TABLET ORAL at 23:44

## 2025-04-24 RX ADMIN — Medication 6 MG: at 23:44

## 2025-04-24 RX ADMIN — CARVEDILOL 6.25 MG: 6.25 TABLET, FILM COATED ORAL at 23:44

## 2025-04-24 RX ADMIN — ALBUTEROL SULFATE 2.5 MG: 2.5 SOLUTION RESPIRATORY (INHALATION) at 20:45

## 2025-04-24 RX ADMIN — SODIUM CHLORIDE, PRESERVATIVE FREE 10 ML: 5 INJECTION INTRAVENOUS at 23:45

## 2025-04-24 RX ADMIN — ROPINIROLE HYDROCHLORIDE 0.25 MG: 0.25 TABLET, FILM COATED ORAL at 23:45

## 2025-04-24 RX ADMIN — TRAZODONE HYDROCHLORIDE 25 MG: 50 TABLET ORAL at 23:45

## 2025-04-24 RX ADMIN — VANCOMYCIN HYDROCHLORIDE 2500 MG: 10 INJECTION, POWDER, LYOPHILIZED, FOR SOLUTION INTRAVENOUS at 14:46

## 2025-04-24 RX ADMIN — MEROPENEM 1000 MG: 1 INJECTION INTRAVENOUS at 17:21

## 2025-04-24 RX ADMIN — HYDROXYZINE HYDROCHLORIDE 25 MG: 25 TABLET ORAL at 23:44

## 2025-04-24 RX ADMIN — Medication 4 ML: at 20:45

## 2025-04-24 RX ADMIN — ALBUTEROL SULFATE 2.5 MG: 2.5 SOLUTION RESPIRATORY (INHALATION) at 16:37

## 2025-04-24 ASSESSMENT — PULMONARY FUNCTION TESTS: PEFR_L/MIN: 99

## 2025-04-24 ASSESSMENT — PAIN - FUNCTIONAL ASSESSMENT: PAIN_FUNCTIONAL_ASSESSMENT: NONE - DENIES PAIN

## 2025-04-24 NOTE — PROGRESS NOTES
Spoke w/ pt. States she wears 4L baseline, however for the past few weeks she been wearing closer to 6L. The past few days she has bumped her home concentrator to 9L/min. States her SOB has been really bad lately, and her breathing txs are not as effective as they normally are.      04/24/25 1757   Oxygen Therapy/Pulse Ox   O2 Therapy Oxygen humidified   $Oxygen $Daily Charge   O2 Device High flow nasal cannula   O2 Flow Rate (L/min) 11 L/min   Pulse 96   Respirations (!) 31   SpO2 95 %   Skin Assessment Clean, dry, & intact   Skin Protection for O2 Device Yes   Orientation Bilateral   Location Ear   Intervention(s) Ear Cushion   Pulse Oximeter Device Mode Continuous   Pulse Oximeter Device Location Finger   $Pulse Oximeter $Spot check (multiple/continuous)

## 2025-04-24 NOTE — CONSULTS
Nephrology Consult Note                                                                                                                                                                                                                                                                                                                                                               Office : [phone]     Fax :[phone]    Patient's Name: Shoaib Rothman  5:09 PM  4/24/2025    Reason for Consult:  CHANTELLE on CKD 3  Requesting Physician:  Lamar Mondragon MD  Chief Complaint:    Chief Complaint   Patient presents with    Shortness of Breath     Patient presents to the ED from facility d/t sudden onset of shortness of breath. Hx of COPD. Wears 6L O2 at baseline. EMS reports pt was satting at 59% on 6L and was cyanotic in the face upon their arrival. 1 albuterol tx and 1 duoneb tx given prior to ED arrival.       Assessment/Plan     # Severe nephrotic range proteinuria   - UPCR 8 gram (January) --> 6 gram (February) --> 7 gram (March)  - H/o Biopsy proven FSGS in the past (9/2020)  - Repeat biopsy 03/26 with extensive scarring  - On 50 mg prednisone SINCE 1/16/2025  - We will slowly taper down steroids to eventually STOP     # CHANTELLE on CKD 3b   - Baseline Creatinine ~ 1.7. Creatinine elevated at 2.4 this admission.   - CHANTELLE likely 2/2 acute respiratory failure   - CKD secondary to FSGS and solitary kidney  - Avoid nephrotoxins  - Monitor renal labs      # Acute on chronic respiratory failure  - H/o end-stage COPD, bronchiectasis and recurrent mucous plugging   - CXR consistent with airspace consolidation in bilateral mid to lower lungs   - abx per primary  - pulm consulted      # HTN  - Amlodipine, Spironolactone & Torsemide held  - Bps on soft side on Coreg      # Acid- base/ Electrolyte imbalance   - Lytes stable   - Monitor      # DM 2  - Insulin management per primary    #Hx Breast cancer   - s/p mastectomy, chemo and  spironolactone (ALDACTONE) tablet 25 mg, Daily  sodium chloride (Inhalant) 3 % nebulizer solution 4 mL, TID  [Held by provider] torsemide (DEMADEX) tablet 20 mg, Daily  traZODone (DESYREL) tablet 25 mg, Nightly  [START ON 4/25/2025] potassium chloride (KLOR-CON M) extended release tablet 40 mEq, PRN   Or  [START ON 4/25/2025] potassium bicarb-citric acid (EFFER-K) effervescent tablet 40 mEq, PRN   Or  [START ON 4/25/2025] potassium chloride 10 mEq/100 mL IVPB (Peripheral Line), PRN  [START ON 4/25/2025] magnesium sulfate 2000 mg in 50 mL IVPB premix, PRN  meropenem (MERREM) 1,000 mg in sodium chloride 0.9 % 100 mL IVPB (addEASE), Once   Followed by  [START ON 4/25/2025] meropenem (MERREM) 1,000 mg in sodium chloride 0.9 % 100 mL IVPB (addEASE), Q12H  guaiFENesin-dextromethorphan (ROBITUSSIN DM) 100-10 MG/5ML syrup 5 mL, Q4H PRN  glucose chewable tablet 16 g, PRN  dextrose bolus 10% 125 mL, PRN   Or  dextrose bolus 10% 250 mL, PRN  glucagon injection 1 mg, PRN  dextrose 10 % infusion, Continuous PRN  insulin lispro (HUMALOG,ADMELOG) injection vial 0-4 Units, 4x Daily AC & HS        Review of Systems:   14 point ROS obtained but were negative except mentioned in HPI      Physical exam:     Vitals:  /74   Pulse 90   Temp 97.5 °F (36.4 °C) (Oral)   Resp 19   Ht 1.727 m (5' 8\")   Wt 111.6 kg (246 lb)   LMP 03/09/2014   SpO2 98%   BMI 37.40 kg/m²   Constitutional:  OAA X3 NAD Yes  Skin: no rash, turgor wnl  Heent:  eomi, mmm  Neck: no bruits or jvd noted  Cardiovascular:  S1, S2 without m/r/g  Respiratory: CTA B without w/r/r  Abdomen:  , soft, nt, nd  Ext:  lower extremity edema Yes trace   Psychiatric: mood and affect stable   Musculoskeletal:  Rom, muscular strength intact    Data:   Labs:  CBC:   Recent Labs     04/24/25  1301   WBC 9.5   HGB 10.3*        BMP:    Recent Labs     04/24/25  1301      K 5.1      CO2 22   BUN 71*   CREATININE 2.4*   GLUCOSE 243*     Ca/Mg/Phos:   Recent

## 2025-04-24 NOTE — PROGRESS NOTES
RT called around 1630 and notified pt was on NRB and breathing txs were ordered. Asked RN when pt had been put on NRB and why RT is just now being informed. Pt arrived in dept on NRB several hours prior while ER RT was in arrest. No calls to other RTs to my knowledge.    Pulled meds and gave breathing tx. Pt was given breathing tx. Hypertonic saline not available yet. Will have to be pulled from other floors.

## 2025-04-24 NOTE — PROGRESS NOTES
Clinical Pharmacy Note  Medication History     Admit Date: 4/24/2025    List of current medications patient is taking is complete. Home Medication list in Epic updated to reflect changes noted below.    Source of information: Med list provided by [facility] [place]    Changes made to medication list:   Medications removed: (include reason, ex: therapy completed, patient no longer taking, etc.)  Nicotine Patch, pt no longer using   Humalog, pt no longer taking  Diphenhydramine, pt no longer taking   Medications added:   None  Medication doses adjusted:   Acetaminophen - adjusted from q6h PRN to TID  Carvedilol - adjusted from 12.5 mg BID to 6.25 mg BID  Sodium Chloride - adjusted from TID to BID  Other notes:   None    Current Outpatient Medications   Medication Instructions    acetaminophen (TYLENOL) 500 mg, Oral, EVERY 6 HOURS PRN    albuterol (PROVENTIL) 2.5 mg, Nebulization, 3 times daily, With 3% saline and flutter valve    amLODIPine (NORVASC) 5 mg, Oral, DAILY, Hold if Blood pressure less than 110 systolic    apixaban (ELIQUIS) 2.5 MG TABS tablet 1 tablet, 2 times daily    Calcium Carbonate-Vit D-Min (CALCIUM 600 + MINERALS) 600-200 MG-UNIT TABS 1 caplet, DAILY    carvedilol (COREG) 6.25 mg, 2 TIMES DAILY    guaiFENesin (MUCINEX) 600 mg, Oral, 2 TIMES DAILY    hydrOXYzine HCl (ATARAX) 25 mg, Nightly    ipratropium 0.5 mg-albuterol 2.5 mg (DUONEB) 0.5-2.5 (3) MG/3ML SOLN nebulizer solution 1 vial, EVERY 6 HOURS PRN    letrozole (FEMARA) 2.5 mg, DAILY    loperamide (IMODIUM) 2 mg, EVERY 8 HOURS PRN    melatonin 6 mg, Nightly    mineral oil-hydrophilic petrolatum (AQUAPHOR) ointment Apply topically to face every 6 hours as needed for dry skin.    mometasone-formoterol (DULERA) 200-5 MCG/ACT inhaler 2 puffs, Inhalation, 2 TIMES DAILY RESP    ondansetron (ZOFRAN) 4 mg, EVERY 6 HOURS PRN    pantoprazole (PROTONIX) 20 MG tablet 1 tablet, DAILY    predniSONE (DELTASONE) 20 mg, Oral, DAILY    rOPINIRole (REQUIP)

## 2025-04-24 NOTE — ED PROVIDER NOTES
ED Attending Attestation Note     Date of evaluation: 4/24/2025    This patient was seen by the advance practice provider.  I have seen and examined the patient, agree with the workup, evaluation, management and diagnosis. The care plan has been discussed.  I have reviewed the ECG and concur with the HORTENCIA's interpretation.  My assessment reveals an ill appearing F with respiratory distress. Better on HFNC. Stable VBG.        García Burns MD  04/24/25 1700

## 2025-04-24 NOTE — PROGRESS NOTES
Pt on NRB. Pt tx ran through traditional neb w/ mouthpiece. No change in SpO2. Will pull a HFNC to wean. Pt had mild end expiratory wheezes with crackles throughout. Post tx end exp wheezing not heard, however pt breath sounds are very fluid-y. Will contact provider re: hypertonic saline and concerns re fluid. Pt seems stable and comfortable.       04/24/25 1637   Treatment   Treatment Type HHN   $Treatment Type $Inhaled Therapy/Meds   Medications Albuterol/Ipratropium   Pre-Tx Pulse 94   Pre-Tx Resps 21   Peak Flow 99   Breath Sounds Pre-Tx NEGRO Coarse crackles;Scattered wheezes;End expiratory wheezes   Breath Sounds Pre-Tx LLL Coarse crackles;Scattered wheezes;End expiratory wheezes   Breath Sounds Pre-Tx RUL Coarse crackles;Scattered wheezes;End Expiratory wheezes   Breath Sounds Pre-Tx RML Coarse crackles;Scattered wheezes;End expiratory wheezes   Breath Sounds Pre-Tx RLL Coarse crackles;Scattered wheezes;End expiratory wheezes   Breath Sounds Post-Tx NEGRO Coarse crackles;Wet   Breath Sounds Post-Tx LLL Coarse crackles;Wet   Breath Sounds Post-Tx RUL Coarse crackles;Wet   Breath Sounds Post-Tx RML Coarse crackles;Wet   Breath Sounds Post-Tx RLL Coarse crackles;Wet   Delivery Source Oxygen   Position Semi-Elkins's   Treatment Tolerance Well   Duration 7   Is patient on O2? Y
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
## 2025-04-24 NOTE — ED PROVIDER NOTES
[facility]  EMERGENCY DEPARTMENT ENCOUNTER          PHYSICIAN ASSISTANT NOTE       Date of evaluation: 4/24/2025    Chief Complaint     Shortness of Breath (Patient presents to the ED from facility d/t sudden onset of shortness of breath. Hx of COPD. Wears 6L O2 at baseline. EMS reports pt was satting at 59% on 6L and was cyanotic in the face upon their arrival. 1 albuterol tx and 1 duoneb tx given prior to ED arrival.)      History of Present Illness     Shoaib Rothman is a 64 y.o. female with PMHx of COPD on oxygen and prednisone, respiratory failure, and recent admission for pneumonia presenting with worsening shortness of breath that started today. She denies new cough, increased mucus production, or chest pain. She denies worsening lower extremity edema. Her oxygen requirement has changed from baseline of 3-10L.     ASSESSMENT / PLAN  (MEDICAL DECISION MAKING)     INITIAL VITALS: BP: (!) 152/82, Temp: 97.5 °F (36.4 °C), Pulse: 93,  , SpO2: 90 %    Shoaib Rothman is a 64 y.o. female with PMHx of COPD on oxygen and prednisone, respiratory failure, and recent admission for pneumonia presenting with worsening shortness of breath that started today. She denies new cough, increased mucus production, or chest pain. She denies worsening lower extremity edema. Her oxygen requirement has changed from baseline of 3-10L.      On arrival patient with elevated respirations which improved to normal along with O2 of 96% with placement of high flow oxygen. She does have crackles on auscultation with CXR findings of persistent airspace consolidation in the bilateral mid to lower lungs, particularly in the right base, similar in appearance to the prior study which is concerning for ongoing pneumonia. Initial VBG does show respiratory acidosis however patient is not in respiratory distress currently. Will treat for ongoing pneumonia. Patient will be admitted for further management.           Is this patient to be

## 2025-04-25 ENCOUNTER — APPOINTMENT (OUTPATIENT)
Dept: GENERAL RADIOLOGY | Age: 65
DRG: 133 | End: 2025-04-25
Payer: COMMERCIAL

## 2025-04-25 ENCOUNTER — ANESTHESIA EVENT (OUTPATIENT)
Dept: ENDOSCOPY | Age: 65
End: 2025-04-25
Payer: COMMERCIAL

## 2025-04-25 ENCOUNTER — ANESTHESIA (OUTPATIENT)
Dept: ENDOSCOPY | Age: 65
End: 2025-04-25
Payer: COMMERCIAL

## 2025-04-25 ENCOUNTER — APPOINTMENT (OUTPATIENT)
Dept: ENDOSCOPY | Age: 65
End: 2025-04-25
Attending: INTERNAL MEDICINE
Payer: COMMERCIAL

## 2025-04-25 LAB
ANION GAP SERPL CALCULATED.3IONS-SCNC: 10 MMOL/L (ref 3–16)
ANISOCYTOSIS BLD QL SMEAR: ABNORMAL
BASE EXCESS BLDV CALC-SCNC: -1.1 MMOL/L (ref -2–3)
BASOPHILS # BLD: 0.1 K/UL (ref 0–0.2)
BASOPHILS NFR BLD: 1 %
BUN SERPL-MCNC: 64 MG/DL (ref 7–20)
CALCIUM SERPL-MCNC: 8.9 MG/DL (ref 8.3–10.6)
CHLORIDE SERPL-SCNC: 111 MMOL/L (ref 99–110)
CO2 BLDV-SCNC: 29 MMOL/L
CO2 SERPL-SCNC: 25 MMOL/L (ref 21–32)
COHGB MFR BLDV: 1.4 % (ref 0–1.5)
CREAT SERPL-MCNC: 2.1 MG/DL (ref 0.6–1.2)
DACRYOCYTES BLD QL SMEAR: ABNORMAL
DEPRECATED RDW RBC AUTO: 17 % (ref 12.4–15.4)
DO-HGB MFR BLDV: 28.6 %
EKG ATRIAL RATE: 92 BPM
EKG DIAGNOSIS: NORMAL
EKG P AXIS: 60 DEGREES
EKG P-R INTERVAL: 158 MS
EKG Q-T INTERVAL: 368 MS
EKG QRS DURATION: 94 MS
EKG QTC CALCULATION (BAZETT): 455 MS
EKG R AXIS: 68 DEGREES
EKG T AXIS: 66 DEGREES
EKG VENTRICULAR RATE: 92 BPM
EOSINOPHIL # BLD: 0.1 K/UL (ref 0–0.6)
EOSINOPHIL NFR BLD: 2 %
GFR SERPLBLD CREATININE-BSD FMLA CKD-EPI: 26 ML/MIN/{1.73_M2}
GLUCOSE BLD-MCNC: 113 MG/DL (ref 70–99)
GLUCOSE BLD-MCNC: 134 MG/DL (ref 70–99)
GLUCOSE BLD-MCNC: 289 MG/DL (ref 70–99)
GLUCOSE BLD-MCNC: 325 MG/DL (ref 70–99)
GLUCOSE BLD-MCNC: 97 MG/DL (ref 70–99)
GLUCOSE SERPL-MCNC: 95 MG/DL (ref 70–99)
HCO3 BLDV-SCNC: 26.7 MMOL/L (ref 24–28)
HCT VFR BLD AUTO: 29 % (ref 36–48)
HGB BLD-MCNC: 9.6 G/DL (ref 12–16)
LEGIONELLA AG UR QL: NORMAL
LYMPHOCYTES # BLD: 1 K/UL (ref 1–5.1)
LYMPHOCYTES NFR BLD: 14 %
MCH RBC QN AUTO: 30.7 PG (ref 26–34)
MCHC RBC AUTO-ENTMCNC: 33 G/DL (ref 31–36)
MCV RBC AUTO: 92.8 FL (ref 80–100)
METAMYELOCYTES NFR BLD MANUAL: 1 %
METHGB MFR BLDV: 0.7 % (ref 0–1.5)
MONOCYTES # BLD: 0.5 K/UL (ref 0–1.3)
MONOCYTES NFR BLD: 7 %
MRSA DNA SPEC QL NAA+PROBE: NORMAL
NEUTROPHILS # BLD: 5.2 K/UL (ref 1.7–7.7)
NEUTROPHILS NFR BLD: 75 %
PCO2 BLDV: 59.4 MMHG (ref 41–51)
PERFORMED ON: ABNORMAL
PERFORMED ON: NORMAL
PH BLDV: 7.26 [PH] (ref 7.35–7.45)
PLATELET # BLD AUTO: 275 K/UL (ref 135–450)
PMV BLD AUTO: 6.9 FL (ref 5–10.5)
PO2 BLDV: 39.4 MMHG (ref 25–40)
POTASSIUM SERPL-SCNC: 5.1 MMOL/L (ref 3.5–5.1)
RBC # BLD AUTO: 3.13 M/UL (ref 4–5.2)
S PNEUM AG UR QL: NORMAL
SAO2 % BLDV: 71 %
SODIUM SERPL-SCNC: 146 MMOL/L (ref 136–145)
STOMATOCYTES BLD QL SMEAR: ABNORMAL
VANCOMYCIN SERPL-MCNC: 18 UG/ML
WBC # BLD AUTO: 6.8 K/UL (ref 4–11)

## 2025-04-25 PROCEDURE — 7100000000 HC PACU RECOVERY - FIRST 15 MIN: Performed by: INTERNAL MEDICINE

## 2025-04-25 PROCEDURE — 31641 BRONCHOSCOPY TREAT BLOCKAGE: CPT | Performed by: INTERNAL MEDICINE

## 2025-04-25 PROCEDURE — 80048 BASIC METABOLIC PNL TOTAL CA: CPT

## 2025-04-25 PROCEDURE — 2580000003 HC RX 258: Performed by: HOSPITALIST

## 2025-04-25 PROCEDURE — 0BD48ZX EXTRACTION OF RIGHT UPPER LOBE BRONCHUS, VIA NATURAL OR ARTIFICIAL OPENING ENDOSCOPIC, DIAGNOSTIC: ICD-10-PCS | Performed by: INTERNAL MEDICINE

## 2025-04-25 PROCEDURE — 2500000003 HC RX 250 WO HCPCS

## 2025-04-25 PROCEDURE — 2580000003 HC RX 258

## 2025-04-25 PROCEDURE — 80202 ASSAY OF VANCOMYCIN: CPT

## 2025-04-25 PROCEDURE — 99255 IP/OBS CONSLTJ NEW/EST HI 80: CPT | Performed by: INTERNAL MEDICINE

## 2025-04-25 PROCEDURE — 0W3Q8ZZ CONTROL BLEEDING IN RESPIRATORY TRACT, VIA NATURAL OR ARTIFICIAL OPENING ENDOSCOPIC: ICD-10-PCS | Performed by: INTERNAL MEDICINE

## 2025-04-25 PROCEDURE — 6370000000 HC RX 637 (ALT 250 FOR IP)

## 2025-04-25 PROCEDURE — 36415 COLL VENOUS BLD VENIPUNCTURE: CPT

## 2025-04-25 PROCEDURE — 6360000002 HC RX W HCPCS

## 2025-04-25 PROCEDURE — 99223 1ST HOSP IP/OBS HIGH 75: CPT | Performed by: INTERNAL MEDICINE

## 2025-04-25 PROCEDURE — 3700000000 HC ANESTHESIA ATTENDED CARE: Performed by: INTERNAL MEDICINE

## 2025-04-25 PROCEDURE — 87633 RESP VIRUS 12-25 TARGETS: CPT

## 2025-04-25 PROCEDURE — 31645 BRNCHSC W/THER ASPIR 1ST: CPT | Performed by: INTERNAL MEDICINE

## 2025-04-25 PROCEDURE — 87150 DNA/RNA AMPLIFIED PROBE: CPT

## 2025-04-25 PROCEDURE — 82803 BLOOD GASES ANY COMBINATION: CPT

## 2025-04-25 PROCEDURE — C2618 PROBE/NEEDLE, CRYO: HCPCS | Performed by: INTERNAL MEDICINE

## 2025-04-25 PROCEDURE — 71045 X-RAY EXAM CHEST 1 VIEW: CPT

## 2025-04-25 PROCEDURE — 99233 SBSQ HOSP IP/OBS HIGH 50: CPT | Performed by: HOSPITALIST

## 2025-04-25 PROCEDURE — 94761 N-INVAS EAR/PLS OXIMETRY MLT: CPT

## 2025-04-25 PROCEDURE — 85025 COMPLETE CBC W/AUTO DIFF WBC: CPT

## 2025-04-25 PROCEDURE — 3609011400 HC BRONCHOSCOPY CRYOTHERAPY: Performed by: INTERNAL MEDICINE

## 2025-04-25 PROCEDURE — 87070 CULTURE OTHR SPECIMN AEROBIC: CPT

## 2025-04-25 PROCEDURE — 6360000002 HC RX W HCPCS: Performed by: HOSPITALIST

## 2025-04-25 PROCEDURE — 3700000001 HC ADD 15 MINUTES (ANESTHESIA): Performed by: INTERNAL MEDICINE

## 2025-04-25 PROCEDURE — 2700000000 HC OXYGEN THERAPY PER DAY

## 2025-04-25 PROCEDURE — 87205 SMEAR GRAM STAIN: CPT

## 2025-04-25 PROCEDURE — 2500000003 HC RX 250 WO HCPCS: Performed by: HOSPITALIST

## 2025-04-25 PROCEDURE — 87040 BLOOD CULTURE FOR BACTERIA: CPT

## 2025-04-25 PROCEDURE — 7100000001 HC PACU RECOVERY - ADDTL 15 MIN: Performed by: INTERNAL MEDICINE

## 2025-04-25 PROCEDURE — 87102 FUNGUS ISOLATION CULTURE: CPT

## 2025-04-25 PROCEDURE — 94640 AIRWAY INHALATION TREATMENT: CPT

## 2025-04-25 PROCEDURE — 2720000010 HC SURG SUPPLY STERILE: Performed by: INTERNAL MEDICINE

## 2025-04-25 PROCEDURE — 0BD68ZX EXTRACTION OF RIGHT LOWER LOBE BRONCHUS, VIA NATURAL OR ARTIFICIAL OPENING ENDOSCOPIC, DIAGNOSTIC: ICD-10-PCS | Performed by: INTERNAL MEDICINE

## 2025-04-25 PROCEDURE — 0BD58ZX EXTRACTION OF RIGHT MIDDLE LOBE BRONCHUS, VIA NATURAL OR ARTIFICIAL OPENING ENDOSCOPIC, DIAGNOSTIC: ICD-10-PCS | Performed by: INTERNAL MEDICINE

## 2025-04-25 PROCEDURE — 0BD88ZX EXTRACTION OF LEFT UPPER LOBE BRONCHUS, VIA NATURAL OR ARTIFICIAL OPENING ENDOSCOPIC, DIAGNOSTIC: ICD-10-PCS | Performed by: INTERNAL MEDICINE

## 2025-04-25 PROCEDURE — 6370000000 HC RX 637 (ALT 250 FOR IP): Performed by: HOSPITALIST

## 2025-04-25 PROCEDURE — 1200000000 HC SEMI PRIVATE

## 2025-04-25 PROCEDURE — 0BDB8ZX EXTRACTION OF LEFT LOWER LOBE BRONCHUS, VIA NATURAL OR ARTIFICIAL OPENING ENDOSCOPIC, DIAGNOSTIC: ICD-10-PCS | Performed by: INTERNAL MEDICINE

## 2025-04-25 RX ORDER — ESMOLOL HYDROCHLORIDE 10 MG/ML
INJECTION INTRAVENOUS
Status: DISCONTINUED | OUTPATIENT
Start: 2025-04-25 | End: 2025-04-25 | Stop reason: SDUPTHER

## 2025-04-25 RX ORDER — NALOXONE HYDROCHLORIDE 0.4 MG/ML
INJECTION, SOLUTION INTRAMUSCULAR; INTRAVENOUS; SUBCUTANEOUS PRN
Status: DISCONTINUED | OUTPATIENT
Start: 2025-04-25 | End: 2025-04-25 | Stop reason: HOSPADM

## 2025-04-25 RX ORDER — SODIUM CHLORIDE 9 MG/ML
INJECTION, SOLUTION INTRAVENOUS PRN
Status: DISCONTINUED | OUTPATIENT
Start: 2025-04-25 | End: 2025-04-25 | Stop reason: HOSPADM

## 2025-04-25 RX ORDER — IPRATROPIUM BROMIDE AND ALBUTEROL SULFATE 2.5; .5 MG/3ML; MG/3ML
1 SOLUTION RESPIRATORY (INHALATION)
Status: DISCONTINUED | OUTPATIENT
Start: 2025-04-25 | End: 2025-04-27

## 2025-04-25 RX ORDER — LORAZEPAM 2 MG/ML
0.5 INJECTION INTRAMUSCULAR ONCE
Status: COMPLETED | OUTPATIENT
Start: 2025-04-25 | End: 2025-04-25

## 2025-04-25 RX ORDER — ROCURONIUM BROMIDE 10 MG/ML
INJECTION, SOLUTION INTRAVENOUS
Status: DISCONTINUED | OUTPATIENT
Start: 2025-04-25 | End: 2025-04-25 | Stop reason: SDUPTHER

## 2025-04-25 RX ORDER — TEMAZEPAM 15 MG/1
15 CAPSULE ORAL NIGHTLY PRN
Status: DISCONTINUED | OUTPATIENT
Start: 2025-04-25 | End: 2025-04-28 | Stop reason: HOSPADM

## 2025-04-25 RX ORDER — DEXAMETHASONE SODIUM PHOSPHATE 4 MG/ML
INJECTION, SOLUTION INTRA-ARTICULAR; INTRALESIONAL; INTRAMUSCULAR; INTRAVENOUS; SOFT TISSUE
Status: DISCONTINUED | OUTPATIENT
Start: 2025-04-25 | End: 2025-04-25 | Stop reason: SDUPTHER

## 2025-04-25 RX ORDER — ONDANSETRON 2 MG/ML
INJECTION INTRAMUSCULAR; INTRAVENOUS
Status: DISCONTINUED | OUTPATIENT
Start: 2025-04-25 | End: 2025-04-25 | Stop reason: SDUPTHER

## 2025-04-25 RX ORDER — SODIUM CHLORIDE 0.9 % (FLUSH) 0.9 %
5-40 SYRINGE (ML) INJECTION EVERY 12 HOURS SCHEDULED
Status: DISCONTINUED | OUTPATIENT
Start: 2025-04-25 | End: 2025-04-25 | Stop reason: HOSPADM

## 2025-04-25 RX ORDER — SUCCINYLCHOLINE CHLORIDE 20 MG/ML
INJECTION INTRAMUSCULAR; INTRAVENOUS
Status: DISCONTINUED | OUTPATIENT
Start: 2025-04-25 | End: 2025-04-25 | Stop reason: SDUPTHER

## 2025-04-25 RX ORDER — ALBUTEROL SULFATE 90 UG/1
INHALANT RESPIRATORY (INHALATION)
Status: DISCONTINUED | OUTPATIENT
Start: 2025-04-25 | End: 2025-04-25 | Stop reason: SDUPTHER

## 2025-04-25 RX ORDER — INSULIN GLARGINE 100 [IU]/ML
10 INJECTION, SOLUTION SUBCUTANEOUS NIGHTLY
Status: DISCONTINUED | OUTPATIENT
Start: 2025-04-25 | End: 2025-04-28 | Stop reason: HOSPADM

## 2025-04-25 RX ORDER — PROPOFOL 10 MG/ML
INJECTION, EMULSION INTRAVENOUS
Status: DISCONTINUED | OUTPATIENT
Start: 2025-04-25 | End: 2025-04-25 | Stop reason: SDUPTHER

## 2025-04-25 RX ORDER — FENTANYL CITRATE 50 UG/ML
12.5 INJECTION, SOLUTION INTRAMUSCULAR; INTRAVENOUS ONCE
Status: DISCONTINUED | OUTPATIENT
Start: 2025-04-25 | End: 2025-04-25

## 2025-04-25 RX ORDER — SODIUM CHLORIDE 0.9 % (FLUSH) 0.9 %
5-40 SYRINGE (ML) INJECTION PRN
Status: DISCONTINUED | OUTPATIENT
Start: 2025-04-25 | End: 2025-04-25 | Stop reason: HOSPADM

## 2025-04-25 RX ORDER — SODIUM CHLORIDE FOR INHALATION 3 %
4 VIAL, NEBULIZER (ML) INHALATION
Status: DISCONTINUED | OUTPATIENT
Start: 2025-04-25 | End: 2025-04-28 | Stop reason: HOSPADM

## 2025-04-25 RX ORDER — SODIUM CHLORIDE, SODIUM LACTATE, POTASSIUM CHLORIDE, CALCIUM CHLORIDE 600; 310; 30; 20 MG/100ML; MG/100ML; MG/100ML; MG/100ML
INJECTION, SOLUTION INTRAVENOUS
Status: DISCONTINUED | OUTPATIENT
Start: 2025-04-25 | End: 2025-04-25 | Stop reason: SDUPTHER

## 2025-04-25 RX ORDER — LIDOCAINE HYDROCHLORIDE 20 MG/ML
INJECTION, SOLUTION INTRAVENOUS
Status: DISCONTINUED | OUTPATIENT
Start: 2025-04-25 | End: 2025-04-25 | Stop reason: SDUPTHER

## 2025-04-25 RX ORDER — INSULIN LISPRO 100 [IU]/ML
0-16 INJECTION, SOLUTION INTRAVENOUS; SUBCUTANEOUS
Status: DISCONTINUED | OUTPATIENT
Start: 2025-04-25 | End: 2025-04-28 | Stop reason: HOSPADM

## 2025-04-25 RX ADMIN — SODIUM CHLORIDE, SODIUM LACTATE, POTASSIUM CHLORIDE, AND CALCIUM CHLORIDE: .6; .31; .03; .02 INJECTION, SOLUTION INTRAVENOUS at 14:01

## 2025-04-25 RX ADMIN — SUCCINYLCHOLINE CHLORIDE 120 MG: 20 INJECTION, SOLUTION INTRAMUSCULAR; INTRAVENOUS at 14:03

## 2025-04-25 RX ADMIN — SERTRALINE HYDROCHLORIDE 50 MG: 50 TABLET ORAL at 19:14

## 2025-04-25 RX ADMIN — GUAIFENESIN 600 MG: 600 TABLET ORAL at 19:54

## 2025-04-25 RX ADMIN — INSULIN LISPRO 2 UNITS: 100 INJECTION, SOLUTION INTRAVENOUS; SUBCUTANEOUS at 18:20

## 2025-04-25 RX ADMIN — LIDOCAINE HYDROCHLORIDE 100 MG: 20 INJECTION, SOLUTION INTRAVENOUS at 14:03

## 2025-04-25 RX ADMIN — ESMOLOL HYDROCHLORIDE 20 MG: 10 INJECTION, SOLUTION INTRAVENOUS at 14:37

## 2025-04-25 RX ADMIN — SODIUM CHLORIDE, SODIUM LACTATE, POTASSIUM CHLORIDE, AND CALCIUM CHLORIDE: .6; .31; .03; .02 INJECTION, SOLUTION INTRAVENOUS at 00:16

## 2025-04-25 RX ADMIN — PROPOFOL 30 MG: 10 INJECTION, EMULSION INTRAVENOUS at 15:04

## 2025-04-25 RX ADMIN — DEXAMETHASONE SODIUM PHOSPHATE 4 MG: 4 INJECTION INTRA-ARTICULAR; INTRALESIONAL; INTRAMUSCULAR; INTRAVENOUS; SOFT TISSUE at 14:07

## 2025-04-25 RX ADMIN — SUGAMMADEX 200 MG: 100 INJECTION, SOLUTION INTRAVENOUS at 15:14

## 2025-04-25 RX ADMIN — SUGAMMADEX 200 MG: 100 INJECTION, SOLUTION INTRAVENOUS at 15:25

## 2025-04-25 RX ADMIN — ALBUTEROL SULFATE 2.5 MG: 2.5 SOLUTION RESPIRATORY (INHALATION) at 07:44

## 2025-04-25 RX ADMIN — SODIUM CHLORIDE 30 MG/ML INHALATION SOLUTION 4 ML: 30 SOLUTION INHALANT at 20:01

## 2025-04-25 RX ADMIN — INSULIN GLARGINE 10 UNITS: 100 INJECTION, SOLUTION SUBCUTANEOUS at 20:23

## 2025-04-25 RX ADMIN — WATER 60 MG: 1 INJECTION INTRAMUSCULAR; INTRAVENOUS; SUBCUTANEOUS at 19:17

## 2025-04-25 RX ADMIN — PROPOFOL 30 MG: 10 INJECTION, EMULSION INTRAVENOUS at 14:36

## 2025-04-25 RX ADMIN — ACETAMINOPHEN 650 MG: 325 TABLET, FILM COATED ORAL at 19:16

## 2025-04-25 RX ADMIN — TEMAZEPAM 15 MG: 15 CAPSULE ORAL at 19:53

## 2025-04-25 RX ADMIN — CARVEDILOL 6.25 MG: 6.25 TABLET, FILM COATED ORAL at 19:53

## 2025-04-25 RX ADMIN — ALBUTEROL SULFATE 4 PUFF: 90 AEROSOL, METERED RESPIRATORY (INHALATION) at 15:15

## 2025-04-25 RX ADMIN — PROPOFOL 20 MG: 10 INJECTION, EMULSION INTRAVENOUS at 15:16

## 2025-04-25 RX ADMIN — INSULIN LISPRO 3 UNITS: 100 INJECTION, SOLUTION INTRAVENOUS; SUBCUTANEOUS at 19:54

## 2025-04-25 RX ADMIN — LORAZEPAM 0.5 MG: 2 INJECTION INTRAMUSCULAR; INTRAVENOUS at 09:43

## 2025-04-25 RX ADMIN — PHENYLEPHRINE HYDROCHLORIDE 100 MCG: 10 INJECTION, SOLUTION INTRAMUSCULAR; INTRAVENOUS; SUBCUTANEOUS at 14:24

## 2025-04-25 RX ADMIN — PROPOFOL 120 MG: 10 INJECTION, EMULSION INTRAVENOUS at 14:03

## 2025-04-25 RX ADMIN — IPRATROPIUM BROMIDE AND ALBUTEROL SULFATE 1 DOSE: .5; 2.5 SOLUTION RESPIRATORY (INHALATION) at 20:01

## 2025-04-25 RX ADMIN — ROCURONIUM BROMIDE 40 MG: 10 INJECTION, SOLUTION INTRAVENOUS at 14:27

## 2025-04-25 RX ADMIN — ESMOLOL HYDROCHLORIDE 10 MG: 10 INJECTION, SOLUTION INTRAVENOUS at 15:09

## 2025-04-25 RX ADMIN — TRAZODONE HYDROCHLORIDE 25 MG: 50 TABLET ORAL at 19:54

## 2025-04-25 RX ADMIN — SODIUM CHLORIDE, PRESERVATIVE FREE 10 ML: 5 INJECTION INTRAVENOUS at 19:54

## 2025-04-25 RX ADMIN — PANTOPRAZOLE SODIUM 20 MG: 20 TABLET, DELAYED RELEASE ORAL at 19:15

## 2025-04-25 RX ADMIN — IPRATROPIUM BROMIDE AND ALBUTEROL SULFATE 1 DOSE: .5; 2.5 SOLUTION RESPIRATORY (INHALATION) at 12:24

## 2025-04-25 RX ADMIN — ROPINIROLE HYDROCHLORIDE 0.25 MG: 0.25 TABLET, FILM COATED ORAL at 19:53

## 2025-04-25 RX ADMIN — ONDANSETRON 4 MG: 2 INJECTION INTRAMUSCULAR; INTRAVENOUS at 14:07

## 2025-04-25 RX ADMIN — WATER 125 MG: 1 INJECTION INTRAMUSCULAR; INTRAVENOUS; SUBCUTANEOUS at 09:44

## 2025-04-25 RX ADMIN — PHENYLEPHRINE HYDROCHLORIDE 100 MCG: 10 INJECTION, SOLUTION INTRAMUSCULAR; INTRAVENOUS; SUBCUTANEOUS at 14:15

## 2025-04-25 RX ADMIN — MEROPENEM 1000 MG: 1 INJECTION INTRAVENOUS at 04:07

## 2025-04-25 RX ADMIN — IPRATROPIUM BROMIDE AND ALBUTEROL SULFATE 1 DOSE: .5; 2.5 SOLUTION RESPIRATORY (INHALATION) at 15:49

## 2025-04-25 RX ADMIN — Medication 4 ML: at 07:46

## 2025-04-25 RX ADMIN — Medication 6 MG: at 19:54

## 2025-04-25 ASSESSMENT — PAIN SCALES - GENERAL
PAINLEVEL_OUTOF10: 0
PAINLEVEL_OUTOF10: 3

## 2025-04-25 ASSESSMENT — PAIN DESCRIPTION - ORIENTATION: ORIENTATION: MID;POSTERIOR

## 2025-04-25 ASSESSMENT — PULMONARY FUNCTION TESTS: PEFR_L/MIN: 93

## 2025-04-25 ASSESSMENT — PAIN DESCRIPTION - DESCRIPTORS: DESCRIPTORS: ACHING

## 2025-04-25 ASSESSMENT — PAIN - FUNCTIONAL ASSESSMENT: PAIN_FUNCTIONAL_ASSESSMENT: ACTIVITIES ARE NOT PREVENTED

## 2025-04-25 ASSESSMENT — ENCOUNTER SYMPTOMS: SHORTNESS OF BREATH: 1

## 2025-04-25 ASSESSMENT — PAIN DESCRIPTION - LOCATION: LOCATION: BACK

## 2025-04-25 NOTE — PROGRESS NOTES
4 Eyes Skin Assessment     NAME:  Shoaib Rothman  YOB: 1960  MEDICAL RECORD NUMBER:  [id number]    The patient is being assessed for  Admission    I agree that at least one RN has performed a thorough Head to Toe Skin Assessment on the patient. ALL assessment sites listed below have been assessed.      Areas assessed by both nurses:    Head, Face, Ears, Shoulders, Back, Chest, Arms, Elbows, Hands, Sacrum. Buttock, Coccyx, Ischium, Legs. Feet and Heels, and Under Medical Devices         Does the Patient have a Wound? No noted wound(s)  Blanchable redness to bilateral heels., Redness to manan area/groin/abdominal pannus     Manan Prevention initiated by RN: Yes  Wound Care Orders initiated by RN: No    Pressure Injury (Stage 3,4, Unstageable, DTI, NWPT, and Complex wounds) if present, place Wound referral order by RN under : No    New Ostomies, if present place, Ostomy referral order under : No     Nurse 1 eSignature: Electronically signed by Maximo Perez RN on 4/25/25 at 5:30 PM EDT    **SHARE this note so that the co-signing nurse can place an eSignature**    Nurse 2 eSignature: Electronically signed by Darvin Alcaraz RN on 4/25/25 at 4:52 PM EDT

## 2025-04-25 NOTE — ED NOTES
Pt. Refusing any of her evening medications until her sleeping medications are ordered. Messaged resident coverage for patient's  temazepam 15mg nightly and her atarax 25mg nightly. Orders placed for PRN atarax, but not for temazepam. Patient is upset, stating \"this is bullshit, I want my medication.\"  MD asked to come see patient at bedside.      Chris Matamoros RN  04/24/25 6266      MD responded that they will \"come down when I get a chance.\"      Chris Matamoros, RN  04/24/25 4344

## 2025-04-25 NOTE — PROCEDURES
[facility]   PULMONARY AND CRITICAL CARE MEDICINE    BRONCHOSCOPIC PROCEDURE NOTE       Procedure(s) performed:  84403 - Bronchoscopy with airway debulking using Cryo or APC  35283 - Bronchoscopy w/Therapeutic aspiration - initial    Preprocedure diagnosis: Mucus plugging  Post procedure diagnosis: Same    DESCRIPTION OF PROCEDURE:   - Consent:  Informed consent was obtained from the patient  after explaining the risks and benefits. A time out was taken.    - Sedation:  Bronchoscopy performed under GA, please refer to anesthesiology notes for details.     - Procedure details:  Bronchoscope was inserted into the main airway via the ETT.  Vocal cords were note assessed due to ETT presence. Lidocaine was was not used. The bronchial trees were examined to the subsegmental level.   Extensive amount of secretions with obstructive and non-obstructive mucus plugging were noted at the RMB, RUL takeoff, BI, RML, RLL, and LLL, therapeutic aspiration was performed at the mentioned segments,  ml was used to aid secretion management.   Erbe 1.1 flexible cryoprobe was used to assist with mucus plug removal, multiple passes were done with 1.1 mm probe followed by saline instillation as above and later decided to change for the 1.7 mm probe and was able to remove bulkier pieces with it.   Total removal of obstructing plugs as achieved.   There was a small bleeding  area at the RUL as the patient's mucosa made contact with 1.1 cryoprobe, bleeding was controlled with cold saline.     - Findings:  Airway mucosa: normal after plug removal, there was RUL bleeding due to cryoprobe touching the mucosa.   Endobronchial lesions: Not visualized  Lymphadenopathy: Not evaluated        - Samples:  Sent for Microbiology    The patient tolerated the procedure well. No immediate complication    EBL: 5 ml     Carlin \"Rubén\" Nicole Horan MD  Pulmonary and Critical Care Medicine

## 2025-04-25 NOTE — ANESTHESIA POSTPROCEDURE EVALUATION
Department of Anesthesiology  Postprocedure Note    Patient: Shoaib Rothman  MRN: [id number]  YOB: 1960  Date of evaluation: 4/25/2025    Procedure Summary       Date: 04/25/25 Room / Location: [facility] ENDO 01 / [facility]    Anesthesia Start: 1401 Anesthesia Stop: 1535    Procedure: BRONCHOSCOPY CRYOTHERAPY (Bronchus) Diagnosis:       Mucus plug in respiratory tract      (Mucus plug in respiratory tract [T17.998A])    Surgeons: Fernando Dhillon MD Responsible Provider: Bo Aly MD    Anesthesia Type: General ASA Status: 4            Anesthesia Type: General    Carole Phase I: Carole Score: 9    Carole Phase II:      Anesthesia Post Evaluation    Patient location during evaluation: PACU  Patient participation: complete - patient participated  Level of consciousness: awake  Pain score: 2  Airway patency: patent  Nausea & Vomiting: no nausea  Cardiovascular status: blood pressure returned to baseline  Respiratory status: pt on HFNC and sats are 92%.  Hydration status: euvolemic  Comments: Overall, pt has improved and is stable for transfer to PCU  Pain management: adequate    No notable events documented.
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
## 2025-04-25 NOTE — CONSULTS
Clinical Pharmacy Consult Note     Vancomycin has been discontinued. Pharmacy will sign off on dosing at this time.      If resumed, please re-consult pharmacy.     Thank you for consulting pharmacy,    Lindy Monae, PharmD, [facility]  Clinical Pharmacy Specialist - Emergency Dept  Wireless: [phone]  4/25/2025 2:57 PM

## 2025-04-25 NOTE — PROGRESS NOTES
Internal Medicine Progress Note    Patient: Shoaib Rothman   : 1960   Admit Date: 2025     Date: 2025     Interval History     NAEON. On 15L O2 with sats 88-92.     Pt has no acute complaints this morning. She was anxious earlier but received ativan before I saw her. She does not feel short of breath but requiring 15L. Only complaint is being NPO    Exam similar to presentation    pCO2 ~60 and pH ~ 7.26 which is improving compared to presentation. Fungal workup from last bronch negative aside from candida. Possible colonization vs. Infection.     Plan:  Bronch today, appreciate pulm input. Nephro consulted to assist with proteinuria and FSGS. Received 125 solumedrol. Restarted home benzo for anxiety tonight. Cont ABX pending infectious workup.     HPI:   Ms. Shoaib Rothman is a 64 y.o. female with a PMHx significant for, Acute on Chronic Respiratory Failure, COPD, FSGS, L nephrectomy, CKDIII, Breast Cancer S/p Mastectomy, Chemo, Radiation, DMII, DVT/PE on Eliquis, who presented to the ED with dyspnea. She states it was sudden onset this AM. She is unable to walk due to the shortness of breath. She has no chest tightness, pain, or cough. No sputum production. She states she has been afebrile and has had no chills. She endorses having 3-4 bowel movements this AM, which is unusual for her. Usually she has 1 movement a day. She also has had urinary frequency today. She has soiled herself because she is too short of breath to get up.     ROS     Review of Systems   Per Interval     Objective     I/Os:  I/O last 3 completed shifts:  In: -   Out: 600 [Urine:600]    Vital Signs:  Patient Vitals for the past 5 hrs:   Pulse Resp SpO2   25 1224 (!) 108 20 93 %   25 1104 (!) 107 26 95 %   25 1100 (!) 122 (!) 31 (!) 84 %       Physical Exam:  Physical Exam  Vitals reviewed.   Constitutional:       Appearance: She is obese.   HENT:      Head: Normocephalic.   Eyes:      Extraocular

## 2025-04-25 NOTE — PROGRESS NOTES
Nephrology Progress Note                                                                                                                                                                                                                                                                                                                                                               Office : [phone]     Fax :[phone]    Patient's Name: Shoaib Rothman  1:13 PM  4/25/2025    Reason for Consult:  CHANTELLE on CKD 3  Requesting Physician:  Lamar Mondragon MD  Chief Complaint:    Chief Complaint   Patient presents with    Shortness of Breath     Patient presents to the ED from facility d/t sudden onset of shortness of breath. Hx of COPD. Wears 6L O2 at baseline. EMS reports pt was satting at 59% on 6L and was cyanotic in the face upon their arrival. 1 albuterol tx and 1 duoneb tx given prior to ED arrival.       Assessment/Plan     # Severe nephrotic range proteinuria   - UPCR 8 gram (January) --> 6 gram (February) --> 7 gram (March)  - H/o Biopsy proven FSGS in the past (9/2020)  - Repeat biopsy 03/26 with extensive scarring  - On 50 mg prednisone SINCE 1/16/2025  - We will slowly taper down steroids to eventually STOP     # CHANTELLE on CKD 3b   - Baseline Creatinine ~ 1.7  - Creatinine elevated at 2.4 on admit --> 2.1 today  - CHANTELLE likely 2/2 acute respiratory failure   - CKD secondary to FSGS and solitary kidney  - Avoid nephrotoxins  - Monitor renal labs      # Acute on chronic respiratory failure  - H/o end-stage COPD, bronchiectasis and recurrent mucous plugging   - CXR consistent with airspace consolidation in bilateral mid to lower lungs   - Abx per primary  - Pulmonary consulted    # HTN  - Amlodipine, Spironolactone & Torsemide held  - BP controlled on Coreg      # Acid- base/ Electrolyte imbalance   - Lytes stable   - Monitor      # DM 2  - Insulin management per primary        History of Present Ilness:    Shoaib SALGADO  input(s): \"ALB\"  Troponin: No results for input(s): \"TROPONINI\" in the last 72 hours.  BNP: No results for input(s): \"BNP\" in the last 72 hours.  Lipids: No results for input(s): \"CHOL\", \"TRIG\", \"HDL\" in the last 72 hours.    Invalid input(s): \"LDLCALC\", \"LABVLDL\"  ABGs: No results for input(s): \"PHART\", \"PO2ART\", \"LLD8TYE\" in the last 72 hours.  INR: No results for input(s): \"INR\" in the last 72 hours.  UA:  Recent Labs     04/24/25  1752   COLORU Yellow   CLARITYU Clear   GLUCOSEU Negative   BILIRUBINUR Negative   KETUA Negative   BLOODU Negative   PHUR 6.0   PROTEINU 100*   UROBILINOGEN 0.2   NITRU Negative   LEUKOCYTESUR TRACE*   URINETYPE Voided      Urine Microscopic:   Recent Labs     04/24/25  1752   BACTERIA 4+*   WBCUA 3-5   RBCUA None seen     Urine Culture: No results for input(s): \"LABURIN\" in the last 72 hours.  Urine Chemistry: No results for input(s): \"CLUR\", \"LABCREA\", \"PROTEINUR\", \"NAUR\" in the last 72 hours.      IMAGING:  XR CHEST PORTABLE   Final Result      Persistent airspace consolidation in the bilateral mid to lower lungs, particularly in the right base, similar in appearance to the prior study.      Unchanged pleural thickening or effusion in the right base.      No other significant change in overall appearance of the chest from the prior exam.      Electronically signed by Lilly Rubalcava MD          Medical Decision Making:  The following items were considered in medical decision making:  Discussion of patient care with other providers  Reviewed clinical lab tests  Reviewed radiology tests  Reviewed other diagnostic tests/interventions    Sukhdev Chávez MD   [facility]  Office : [phone] or through [employer]  Fax :[phone]

## 2025-04-25 NOTE — FLOWSHEET NOTE
PACU Transfer Note    Vitals:    04/25/25 1615   BP: (!) 150/84   Pulse: (!) 106   Resp: 24   Temp: 97.2   SpO2: 94%       No intake/output data recorded.    Pain assessment:  none   pt transported to 4310 on bed with belongings per this RN and Hospital transport        Report given to Receiving unit RN.    4/25/2025 4:53 PM
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
## 2025-04-25 NOTE — CONSULTS
Clinical Pharmacy Consult Note    Vancomycin has been discontinued. Pharmacy will sign off on dosing at this time.     If resumed, please re-consult pharmacy.    Thank you for consulting pharmacy,    Lindy Monae, PharmD, [facility]  Clinical Pharmacy Specialist - Emergency Dept  Wireless: [phone]  4/25/2025 10:27 AM
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
## 2025-04-25 NOTE — ANESTHESIA PRE PROCEDURE
04:50 AM    ALT 26 03/26/2025 04:50 AM       POC Tests:   Recent Labs     04/25/25  1138   POCGLU 113*       Coags:   Lab Results   Component Value Date/Time    PROTIME 12.6 03/26/2025 05:28 AM    INR 0.92 03/26/2025 05:28 AM    APTT 35.4 04/30/2021 07:59 PM       HCG (If Applicable): No results found for: \"PREGTESTUR\", \"PREGSERUM\", \"HCG\", \"HCGQUANT\"     ABGs:   Lab Results   Component Value Date/Time    PHART 7.416 02/28/2025 09:30 AM    PO2ART 76.6 02/28/2025 09:30 AM    LDT9ISC 62.2 02/28/2025 09:30 AM    GOR5MCJ 40.0 02/28/2025 09:30 AM    BEART 15 02/28/2025 09:30 AM    F3OPBMMZ 95 02/28/2025 09:30 AM        Type & Screen (If Applicable):  Lab Results   Component Value Date    ABORH O POS 07/02/2019    LABANTI NEG 07/02/2019       Drug/Infectious Status (If Applicable):  Lab Results   Component Value Date/Time    HIV Non-Reactive 12/09/2020 04:14 PM       COVID-19 Screening (If Applicable):   Lab Results   Component Value Date/Time    COVID19 NOT DETECTED 04/24/2025 01:00 PM    COVID19 NOT DETECTED 09/22/2020 07:00 PM           Anesthesia Evaluation  Patient summary reviewed and Nursing notes reviewed  Airway: Mallampati: III  TM distance: >3 FB   Neck ROM: full  Mouth opening: > = 3 FB   Dental:    (+) edentulous  Comment: 2 teeth bottom    Pulmonary:   (+) pneumonia:  COPD (currently on breathing tx and steroids):  shortness of breath:    rhonchi  wheezes rales  asthma:                            Cardiovascular:  Exercise tolerance: poor (<4 METS)  (+) hypertension:, CHF: diastolic, VENTURA:      ECG reviewed  Rhythm: regular    Echocardiogram reviewed               ROS comment:    1/25/25  Left Ventricle: Hyperdynamic left ventricular systolic function. EF by 2D Simpsons Biplane is 69%. Left ventricle size is normal. Mildly increased wall thickness. Normal wall motion. Normal diastolic function.  ·  Aortic Valve: Trileaflet valve. No stenosis.  ·  IVC/SVC: IVC diameter is less than or equal to 21 mm and

## 2025-04-25 NOTE — CONSULTS
Infectious Diseases Inpatient Consult Note    Reason for Consult:   Hx of Aspergillus infection, recurrent infection  Requesting Physician:   Rafa Morales  Primary Care Physician:  Lamar Mondragon MD  History Obtained From:   Pt, EPIC    Admit Date: 4/24/2025  Hospital Day: 2    CHIEF COMPLAINT:       Chief Complaint   Patient presents with    Shortness of Breath     Patient presents to the ED from facility d/t sudden onset of shortness of breath. Hx of COPD. Wears 6L O2 at baseline. EMS reports pt was satting at 59% on 6L and was cyanotic in the face upon their arrival. 1 albuterol tx and 1 duoneb tx given prior to ED arrival.       HISTORY OF PRESENT ILLNESS:      Shoaib Rothman is a 64 y.o. female who presented with with a pmh of end-stage COPD, Sleep apnea on CPAP,  DMII, mood disorder, DVT/PE on apixaban, breast cancer, status post left-sided mastectomy, chemo and radiation therapy, history of left nephrectomy, recurrent mucous plugging requiring bronchoscopy ABPA, bronchiectasis, CKD stage III, FSGS, H/O ESBL, tobacco use disorder who presents with SOB     Patient was recently discharged from the hospital due to similar complained. She had bronch done which showed mucous plugs, and cultures grew candida. Patient had multiple similar presentations in the past and every time she got bronc in which patient had a lot of mucus and every time it only grew some candidiasis but never any other bacteria.    During encounter patient reports that she was lying in the bed when she started noticing that she had been having shortness of breath and it has been progressively getting worse.  She did not report any fever, chills, lethargy or any other signs symptom of infection.  Patient also denies any cough or any sputum production.  She reports that she has pulse ox at home and it was reading in 70s when she was having shortness of breath even though she was on oxygen.      In ED she was normotensive, afebrile, saturating

## 2025-04-25 NOTE — PROGRESS NOTES
RN called during RT report stating pt seemed SOB w/ increase O2 need. Stated they think pt needs breathing tx. Tx schduled for AM med pass. Rounded on Shoaib first. Pt had insp/exp wheezes pre tx, w/ vry dim bases bilaterally. Hypertonic given w/ bronchodilator. Positive response to bronchodilator tx. Increased aeration bilaterally. Pt still showing increase in WOB, and rhonchi now heard most prominently in Left lung. RN notified of concerns and messaged provider.     04/25/25 7148   Treatment   Treatment Type HHN   $Treatment Type $Inhaled Therapy/Meds   Medications Albuterol   Pre-Tx Pulse 92   Pre-Tx Resps 19   Breath Sounds Pre-Tx NEGRO Inspiratory wheezes;Expiratory wheezes   Breath Sounds Pre-Tx LLL Diminished   Breath Sounds Pre-Tx RUL Inspiratory wheezes;Expiratory wheezes   Breath Sounds Pre-Tx RML Diminished   Breath Sounds Pre-Tx RLL Diminished   Delivery Source Oxygen;Mask   Position Semi-Elkins's   Treatment Tolerance Well   Duration 14   Is patient on O2? Y   Breath Sounds   Respiratory Pattern Regular  (shallow)   Oxygen Therapy/Pulse Ox   O2 Therapy Oxygen humidified   $Oxygen $Daily Charge   O2 Device High flow nasal cannula   O2 Flow Rate (L/min) 15 L/min  (Found on)   Pulse 95   Respirations 18   SpO2 94 %   Skin Assessment Clean, dry, & intact   Pulse Oximeter Device Mode Continuous   Pulse Oximeter Device Location Finger   $Pulse Oximeter $Spot check (multiple/continuous)

## 2025-04-25 NOTE — PROGRESS NOTES
Pt desat during bed transfer. Placed on HFNC +NRB until SpO2 returned to normal range. Back on HFNC   04/25/25 1100   Oxygen Therapy/Pulse Ox   O2 Therapy Oxygen humidified   O2 Device High flow nasal cannula  (+NRB)   Pulse (!) 122   Respirations (!) 31   SpO2 (!) 84 %   Pulse Oximeter Device Mode Continuous   Pulse Oximeter Device Location Finger

## 2025-04-25 NOTE — PROGRESS NOTES
Pt arrived in pacu on bed after having bronch and washing .   Report from CRNA and Dr. Aly was impressive for stating VS were baseline.   , RR 30, and SPO2 88. And /88.   Pt alert and oriented.   Port in place and accessed.   Procedures  BRONCHOSCOPY CRYOTHERAPY  Fernando Dhillon MD

## 2025-04-26 LAB
ANION GAP SERPL CALCULATED.3IONS-SCNC: 10 MMOL/L (ref 3–16)
ANISOCYTOSIS BLD QL SMEAR: ABNORMAL
BASOPHILS # BLD: 0 K/UL (ref 0–0.2)
BASOPHILS NFR BLD: 0 %
BUN SERPL-MCNC: 58 MG/DL (ref 7–20)
CALCIUM SERPL-MCNC: 9 MG/DL (ref 8.3–10.6)
CHLORIDE SERPL-SCNC: 108 MMOL/L (ref 99–110)
CO2 SERPL-SCNC: 26 MMOL/L (ref 21–32)
CREAT SERPL-MCNC: 2 MG/DL (ref 0.6–1.2)
DEPRECATED RDW RBC AUTO: 17.3 % (ref 12.4–15.4)
EOSINOPHIL # BLD: 0 K/UL (ref 0–0.6)
EOSINOPHIL NFR BLD: 0 %
GFR SERPLBLD CREATININE-BSD FMLA CKD-EPI: 28 ML/MIN/{1.73_M2}
GLUCOSE BLD-MCNC: 142 MG/DL (ref 70–99)
GLUCOSE BLD-MCNC: 174 MG/DL (ref 70–99)
GLUCOSE BLD-MCNC: 178 MG/DL (ref 70–99)
GLUCOSE BLD-MCNC: 185 MG/DL (ref 70–99)
GLUCOSE BLD-MCNC: 191 MG/DL (ref 70–99)
GLUCOSE SERPL-MCNC: 188 MG/DL (ref 70–99)
HCT VFR BLD AUTO: 30.7 % (ref 36–48)
HGB BLD-MCNC: 9.9 G/DL (ref 12–16)
LYMPHOCYTES # BLD: 0.4 K/UL (ref 1–5.1)
LYMPHOCYTES NFR BLD: 6 %
MCH RBC QN AUTO: 30.8 PG (ref 26–34)
MCHC RBC AUTO-ENTMCNC: 32.3 G/DL (ref 31–36)
MCV RBC AUTO: 95.3 FL (ref 80–100)
METAMYELOCYTES NFR BLD MANUAL: 1 %
MONOCYTES # BLD: 0.4 K/UL (ref 0–1.3)
MONOCYTES NFR BLD: 6 %
NEUTROPHILS # BLD: 5.3 K/UL (ref 1.7–7.7)
NEUTROPHILS NFR BLD: 87 %
PERFORMED ON: ABNORMAL
PLATELET # BLD AUTO: 308 K/UL (ref 135–450)
PMV BLD AUTO: 7.5 FL (ref 5–10.5)
POTASSIUM SERPL-SCNC: 5.7 MMOL/L (ref 3.5–5.1)
RBC # BLD AUTO: 3.23 M/UL (ref 4–5.2)
SODIUM SERPL-SCNC: 144 MMOL/L (ref 136–145)
STOMATOCYTES BLD QL SMEAR: ABNORMAL
WBC # BLD AUTO: 6 K/UL (ref 4–11)

## 2025-04-26 PROCEDURE — 6370000000 HC RX 637 (ALT 250 FOR IP)

## 2025-04-26 PROCEDURE — 94640 AIRWAY INHALATION TREATMENT: CPT

## 2025-04-26 PROCEDURE — 6370000000 HC RX 637 (ALT 250 FOR IP): Performed by: HOSPITALIST

## 2025-04-26 PROCEDURE — 93005 ELECTROCARDIOGRAM TRACING: CPT

## 2025-04-26 PROCEDURE — 94761 N-INVAS EAR/PLS OXIMETRY MLT: CPT

## 2025-04-26 PROCEDURE — 80048 BASIC METABOLIC PNL TOTAL CA: CPT

## 2025-04-26 PROCEDURE — 2580000003 HC RX 258: Performed by: HOSPITALIST

## 2025-04-26 PROCEDURE — 85025 COMPLETE CBC W/AUTO DIFF WBC: CPT

## 2025-04-26 PROCEDURE — 6360000002 HC RX W HCPCS

## 2025-04-26 PROCEDURE — 2500000003 HC RX 250 WO HCPCS

## 2025-04-26 PROCEDURE — 99233 SBSQ HOSP IP/OBS HIGH 50: CPT | Performed by: HOSPITALIST

## 2025-04-26 PROCEDURE — 1200000000 HC SEMI PRIVATE

## 2025-04-26 PROCEDURE — 2700000000 HC OXYGEN THERAPY PER DAY

## 2025-04-26 RX ADMIN — CARVEDILOL 6.25 MG: 6.25 TABLET, FILM COATED ORAL at 21:55

## 2025-04-26 RX ADMIN — CARVEDILOL 6.25 MG: 6.25 TABLET, FILM COATED ORAL at 08:26

## 2025-04-26 RX ADMIN — SODIUM ZIRCONIUM CYCLOSILICATE 5 G: 5 POWDER, FOR SUSPENSION ORAL at 10:41

## 2025-04-26 RX ADMIN — IPRATROPIUM BROMIDE AND ALBUTEROL SULFATE 1 DOSE: .5; 2.5 SOLUTION RESPIRATORY (INHALATION) at 13:20

## 2025-04-26 RX ADMIN — TRAZODONE HYDROCHLORIDE 25 MG: 50 TABLET ORAL at 21:55

## 2025-04-26 RX ADMIN — PANTOPRAZOLE SODIUM 20 MG: 20 TABLET, DELAYED RELEASE ORAL at 08:26

## 2025-04-26 RX ADMIN — INSULIN LISPRO 4 UNITS: 100 INJECTION, SOLUTION INTRAVENOUS; SUBCUTANEOUS at 08:24

## 2025-04-26 RX ADMIN — IPRATROPIUM BROMIDE AND ALBUTEROL SULFATE 1 DOSE: .5; 2.5 SOLUTION RESPIRATORY (INHALATION) at 21:39

## 2025-04-26 RX ADMIN — INSULIN LISPRO 4 UNITS: 100 INJECTION, SOLUTION INTRAVENOUS; SUBCUTANEOUS at 17:10

## 2025-04-26 RX ADMIN — SODIUM CHLORIDE, PRESERVATIVE FREE 10 ML: 5 INJECTION INTRAVENOUS at 08:25

## 2025-04-26 RX ADMIN — TEMAZEPAM 15 MG: 15 CAPSULE ORAL at 21:56

## 2025-04-26 RX ADMIN — IPRATROPIUM BROMIDE AND ALBUTEROL SULFATE 1 DOSE: .5; 2.5 SOLUTION RESPIRATORY (INHALATION) at 16:16

## 2025-04-26 RX ADMIN — Medication 6 MG: at 21:55

## 2025-04-26 RX ADMIN — GUAIFENESIN 600 MG: 600 TABLET ORAL at 21:56

## 2025-04-26 RX ADMIN — SODIUM CHLORIDE 30 MG/ML INHALATION SOLUTION 4 ML: 30 SOLUTION INHALANT at 21:39

## 2025-04-26 RX ADMIN — SODIUM CHLORIDE, PRESERVATIVE FREE 10 ML: 5 INJECTION INTRAVENOUS at 21:56

## 2025-04-26 RX ADMIN — WATER 60 MG: 1 INJECTION INTRAMUSCULAR; INTRAVENOUS; SUBCUTANEOUS at 18:32

## 2025-04-26 RX ADMIN — INSULIN LISPRO 4 UNITS: 100 INJECTION, SOLUTION INTRAVENOUS; SUBCUTANEOUS at 12:16

## 2025-04-26 RX ADMIN — IPRATROPIUM BROMIDE AND ALBUTEROL SULFATE 1 DOSE: .5; 2.5 SOLUTION RESPIRATORY (INHALATION) at 08:12

## 2025-04-26 RX ADMIN — ACETAMINOPHEN 650 MG: 325 TABLET, FILM COATED ORAL at 16:11

## 2025-04-26 RX ADMIN — SERTRALINE HYDROCHLORIDE 50 MG: 50 TABLET ORAL at 08:25

## 2025-04-26 RX ADMIN — INSULIN GLARGINE 10 UNITS: 100 INJECTION, SOLUTION SUBCUTANEOUS at 22:13

## 2025-04-26 RX ADMIN — AMLODIPINE BESYLATE 5 MG: 5 TABLET ORAL at 08:25

## 2025-04-26 RX ADMIN — ROPINIROLE HYDROCHLORIDE 0.25 MG: 0.25 TABLET, FILM COATED ORAL at 21:55

## 2025-04-26 RX ADMIN — GUAIFENESIN 600 MG: 600 TABLET ORAL at 08:25

## 2025-04-26 RX ADMIN — SODIUM CHLORIDE 30 MG/ML INHALATION SOLUTION 4 ML: 30 SOLUTION INHALANT at 08:12

## 2025-04-26 RX ADMIN — WATER 60 MG: 1 INJECTION INTRAMUSCULAR; INTRAVENOUS; SUBCUTANEOUS at 06:34

## 2025-04-26 RX ADMIN — LETROZOLE 2.5 MG: 2.5 TABLET ORAL at 08:26

## 2025-04-26 ASSESSMENT — PAIN DESCRIPTION - DESCRIPTORS: DESCRIPTORS: ACHING

## 2025-04-26 ASSESSMENT — PAIN DESCRIPTION - ONSET: ONSET: GRADUAL

## 2025-04-26 ASSESSMENT — PAIN DESCRIPTION - FREQUENCY: FREQUENCY: INTERMITTENT

## 2025-04-26 ASSESSMENT — PAIN - FUNCTIONAL ASSESSMENT: PAIN_FUNCTIONAL_ASSESSMENT: ACTIVITIES ARE NOT PREVENTED

## 2025-04-26 ASSESSMENT — PAIN DESCRIPTION - ORIENTATION: ORIENTATION: RIGHT;LEFT;MID

## 2025-04-26 ASSESSMENT — PAIN DESCRIPTION - LOCATION: LOCATION: KNEE

## 2025-04-26 ASSESSMENT — PAIN SCALES - GENERAL
PAINLEVEL_OUTOF10: 3
PAINLEVEL_OUTOF10: 0

## 2025-04-26 ASSESSMENT — PAIN DESCRIPTION - PAIN TYPE: TYPE: ACUTE PAIN

## 2025-04-26 NOTE — PROGRESS NOTES
Nephrology Progress Note                                                                                                                                                                                                                                                                                                                                                               Office : [phone]     Fax :[phone]    Patient's Name: Shoaib Rothman  1:26 PM  4/26/2025    Reason for Consult:  CHANTELLE on CKD 3  Requesting Physician:  Lamar Mondragon MD  Chief Complaint:    Chief Complaint   Patient presents with    Shortness of Breath     Patient presents to the ED from facility d/t sudden onset of shortness of breath. Hx of COPD. Wears 6L O2 at baseline. EMS reports pt was satting at 59% on 6L and was cyanotic in the face upon their arrival. 1 albuterol tx and 1 duoneb tx given prior to ED arrival.       Assessment/Plan     # CHANTELLE on CKD 3b   - Baseline Creatinine ~ 1.7  - Creatinine elevated at 2.4 on admit --> 2.0 today  - CHANTELLE likely 2/2 acute respiratory failure   - CKD secondary to FSGS and solitary kidney  - Avoid nephrotoxins  - Monitor renal labs     #Hyperkalemia  - low K diet  - lokelma daily  - monitor    # Severe nephrotic range proteinuria   - UPCR 8 gram (January) --> 6 gram (February) --> 7 gram (March)  - H/o Biopsy proven FSGS in the past (9/2020)  - Repeat biopsy 03/26 with extensive scarring  - On 50 mg prednisone SINCE 1/16/2025  - We will slowly taper down steroids to eventually STOP  - received 125 mg IV solumedrol yesterday. Now on 60 mg BID     # Acute on chronic respiratory failure  - H/o end-stage COPD, bronchiectasis and recurrent mucous plugging   - CXR consistent with airspace consolidation in bilateral mid to lower lungs   - Abx per primary  - Pulmonary on board  - s/p bronch + washing yesterday    # HTN  - Amlodipine, Spironolactone & Torsemide held  - BP controlled on Coreg      # Acid-  Q6H PRN  amLODIPine (NORVASC) tablet 5 mg, Daily  [Held by provider] apixaban (ELIQUIS) tablet 2.5 mg, BID  carvedilol (COREG) tablet 6.25 mg, BID  guaiFENesin (MUCINEX) extended release tablet 600 mg, BID  letrozole (FEMARA) tablet 2.5 mg, Daily  pantoprazole (PROTONIX) tablet 20 mg, Daily  rOPINIRole (REQUIP) tablet 0.25 mg, Nightly  sertraline (ZOLOFT) tablet 50 mg, QAM  [Held by provider] spironolactone (ALDACTONE) tablet 25 mg, Daily  [Held by provider] torsemide (DEMADEX) tablet 20 mg, Daily  traZODone (DESYREL) tablet 25 mg, Nightly  potassium chloride (KLOR-CON M) extended release tablet 40 mEq, PRN   Or  potassium bicarb-citric acid (EFFER-K) effervescent tablet 40 mEq, PRN   Or  potassium chloride 10 mEq/100 mL IVPB (Peripheral Line), PRN  magnesium sulfate 2000 mg in 50 mL IVPB premix, PRN  guaiFENesin-dextromethorphan (ROBITUSSIN DM) 100-10 MG/5ML syrup 5 mL, Q4H PRN  glucose chewable tablet 16 g, PRN  dextrose bolus 10% 125 mL, PRN   Or  dextrose bolus 10% 250 mL, PRN  glucagon injection 1 mg, PRN  dextrose 10 % infusion, Continuous PRN  melatonin tablet 6 mg, Nightly  hydrOXYzine HCl (ATARAX) tablet 25 mg, Nightly PRN      Physical exam:     Vitals:  /85   Pulse 97   Temp 97.8 °F (36.6 °C) (Oral)   Resp 16   Ht 1.727 m (5' 8\")   Wt 109.5 kg (241 lb 6.5 oz)   LMP 03/09/2014   SpO2 (!) 89%   BMI 36.71 kg/m²   Constitutional:  OAA X3 NAD Yes  Skin: no rash, turgor wnl  Heent:  eomi, mmm  Neck: no bruits or jvd noted  Cardiovascular:  S1, S2 without m/r/g  Respiratory: CTA B without w/r/r  Abdomen:  , soft, nt, nd  Ext:  lower extremity edema Yes trace   Psychiatric: mood and affect stable   Musculoskeletal:  Rom, muscular strength intact    Data:   Labs:  CBC:   Recent Labs     04/24/25  1301 04/25/25  0615 04/26/25  0630   WBC 9.5 6.8 6.0   HGB 10.3* 9.6* 9.9*    275 308     BMP:    Recent Labs     04/24/25  1301 04/25/25  0615 04/26/25  0630    146* 144   K 5.1 5.1 5.7*   CL

## 2025-04-26 NOTE — PLAN OF CARE
Problem: Chronic Conditions and Co-morbidities  Goal: Patient's chronic conditions and co-morbidity symptoms are monitored and maintained or improved  Outcome: Progressing  Flowsheets (Taken 4/26/2025 1402)  Care Plan - Patient's Chronic Conditions and Co-Morbidity Symptoms are Monitored and Maintained or Improved:   Monitor and assess patient's chronic conditions and comorbid symptoms for stability, deterioration, or improvement   Collaborate with multidisciplinary team to address chronic and comorbid conditions and prevent exacerbation or deterioration   Update acute care plan with appropriate goals if chronic or comorbid symptoms are exacerbated and prevent overall improvement and discharge     Problem: Discharge Planning  Goal: Discharge to home or other facility with appropriate resources  Outcome: Progressing  Flowsheets (Taken 4/26/2025 1402)  Discharge to home or other facility with appropriate resources:   Identify barriers to discharge with patient and caregiver   Identify discharge learning needs (meds, wound care, etc)   Refer to discharge planning if patient needs post-hospital services based on physician order or complex needs related to functional status, cognitive ability or social support system   Arrange for interpreters to assist at discharge as needed   Arrange for needed discharge resources and transportation as appropriate     Problem: Safety - Adult  Goal: Free from fall injury  Outcome: Progressing  Flowsheets (Taken 4/26/2025 1402)  Free From Fall Injury:   Based on caregiver fall risk screen, instruct family/caregiver to ask for assistance with transferring infant if caregiver noted to have fall risk factors   Instruct family/caregiver on patient safety     Problem: Pain  Goal: Verbalizes/displays adequate comfort level or baseline comfort level  Outcome: Progressing  Flowsheets (Taken 4/26/2025 1402)  Verbalizes/displays adequate comfort level or baseline comfort level:   Consider

## 2025-04-26 NOTE — PROGRESS NOTES
Internal Medicine Progress Note    Patient: Shoaib Rothman   : 1960   Admit Date: 2025     Date: 2025     Interval History     No acute events overnight.  Vital signs stable.  Patient was on 12 L of oxygen this morning.  Decreased to 8 L before leaving the room with saturation between 91-93.    Pt feeling much better today. Breathing is improved. No abd pain.     Physical exam changes notable for airway movement in the right lung.    Plan:  Follow up recs from pulm. Titrate O2 to >88%. Continue nebs. Steroids per pulm. Start lokelma for hyperK, no changes on EKG. E coli on UA but patient not having any SS of infection, no abx started, will monitor.    HPI:   Ms. Shoaib Rothman is a 64 y.o. female with a PMHx significant for, Acute on Chronic Respiratory Failure, COPD, FSGS, L nephrectomy, CKDIII, Breast Cancer S/p Mastectomy, Chemo, Radiation, DMII, DVT/PE on Eliquis, who presented to the ED with dyspnea. She states it was sudden onset this AM. She is unable to walk due to the shortness of breath. She has no chest tightness, pain, or cough. No sputum production. She states she has been afebrile and has had no chills. She endorses having 3-4 bowel movements this AM, which is unusual for her. Usually she has 1 movement a day. She also has had urinary frequency today. She has soiled herself because she is too short of breath to get up.     ROS     Review of Systems   Per Interval     Objective     I/Os:  I/O last 3 completed shifts:  In: 355.1 [P.O.:120; I.V.:135; IV Piggyback:100.1]  Out: 1300 [Urine:1300]    Vital Signs:  Patient Vitals for the past 5 hrs:   BP Temp Temp src Pulse Resp SpO2   25 0819 (!) 151/90 97.5 °F (36.4 °C) Oral 92 18 93 %   25 0813 -- -- -- -- -- 92 %       Physical Exam:  Physical Exam  Vitals reviewed.   Constitutional:       Appearance: She is obese.   HENT:      Head: Normocephalic.   Eyes:      Extraocular Movements: Extraocular movements intact.

## 2025-04-26 NOTE — RT PROTOCOL NOTE
RT Inhaler-Nebulizer Bronchodilator Protocol Note    There is a bronchodilator order in the chart from a provider indicating to follow the RT Bronchodilator Protocol and there is an “Initiate RT Inhaler-Nebulizer Bronchodilator Protocol” order as well (see protocol at bottom of note).    CXR Findings:  XR CHEST PORTABLE  Result Date: 4/25/2025  1. Diffuse airspace disease bilaterally, greatest in the right lung base, not significantly changed. 2. Cardiomegaly. Electronically signed by Jim Wilkins MD    XR CHEST PORTABLE  Result Date: 4/24/2025  Persistent airspace consolidation in the bilateral mid to lower lungs, particularly in the right base, similar in appearance to the prior study. Unchanged pleural thickening or effusion in the right base. No other significant change in overall appearance of the chest from the prior exam. Electronically signed by Lilly Rubalcava MD      The findings from the last RT Protocol Assessment were as follows:   History Pulmonary Disease: Chronic pulmonary disease  Respiratory Pattern: Regular pattern and RR 12-20 bpm  Breath Sounds: Inspiratory and expiratory or bilateral wheezing and/or rhonchi  Cough: Strong, spontaneous, non-productive  Indication for Bronchodilator Therapy: On home bronchodilators  Bronchodilator Assessment Score: 8    Aerosolized bronchodilator medication orders have been revised according to the RT Inhaler-Nebulizer Bronchodilator Protocol below.    Respiratory Therapist to perform RT Therapy Protocol Assessment initially then follow the protocol.  Repeat RT Therapy Protocol Assessment PRN for score 0-3 or on second treatment, BID, and PRN for scores above 3.    No Indications - adjust the frequency to every 6 hours PRN wheezing or bronchospasm, if no treatments needed after 48 hours then discontinue using Per Protocol order mode.     If indication present, adjust the RT bronchodilator orders based on the Bronchodilator Assessment Score as indicated below.

## 2025-04-27 LAB
ANION GAP SERPL CALCULATED.3IONS-SCNC: 8 MMOL/L (ref 3–16)
BACTERIA UR CULT: ABNORMAL
BASOPHILS # BLD: 0 K/UL (ref 0–0.2)
BASOPHILS NFR BLD: 0 %
BUN SERPL-MCNC: 74 MG/DL (ref 7–20)
CALCIUM SERPL-MCNC: 8.9 MG/DL (ref 8.3–10.6)
CHLORIDE SERPL-SCNC: 106 MMOL/L (ref 99–110)
CO2 SERPL-SCNC: 26 MMOL/L (ref 21–32)
CREAT SERPL-MCNC: 2 MG/DL (ref 0.6–1.2)
DEPRECATED RDW RBC AUTO: 16.7 % (ref 12.4–15.4)
EKG ATRIAL RATE: 93 BPM
EKG DIAGNOSIS: NORMAL
EKG P AXIS: 69 DEGREES
EKG P-R INTERVAL: 154 MS
EKG Q-T INTERVAL: 336 MS
EKG QRS DURATION: 96 MS
EKG QTC CALCULATION (BAZETT): 417 MS
EKG R AXIS: 79 DEGREES
EKG T AXIS: 82 DEGREES
EKG VENTRICULAR RATE: 93 BPM
EOSINOPHIL # BLD: 0 K/UL (ref 0–0.6)
EOSINOPHIL NFR BLD: 0 %
GFR SERPLBLD CREATININE-BSD FMLA CKD-EPI: 28 ML/MIN/{1.73_M2}
GLUCOSE BLD-MCNC: 150 MG/DL (ref 70–99)
GLUCOSE BLD-MCNC: 178 MG/DL (ref 70–99)
GLUCOSE BLD-MCNC: 240 MG/DL (ref 70–99)
GLUCOSE BLD-MCNC: 271 MG/DL (ref 70–99)
GLUCOSE SERPL-MCNC: 289 MG/DL (ref 70–99)
HCT VFR BLD AUTO: 29.1 % (ref 36–48)
HGB BLD-MCNC: 9.7 G/DL (ref 12–16)
LYMPHOCYTES # BLD: 0.7 K/UL (ref 1–5.1)
LYMPHOCYTES NFR BLD: 7 %
MCH RBC QN AUTO: 31.1 PG (ref 26–34)
MCHC RBC AUTO-ENTMCNC: 33.3 G/DL (ref 31–36)
MCV RBC AUTO: 93.4 FL (ref 80–100)
MONOCYTES # BLD: 0.3 K/UL (ref 0–1.3)
MONOCYTES NFR BLD: 3 %
NEUTROPHILS # BLD: 8.4 K/UL (ref 1.7–7.7)
NEUTROPHILS NFR BLD: 90 %
ORGANISM: ABNORMAL
PERFORMED ON: ABNORMAL
PLATELET # BLD AUTO: 286 K/UL (ref 135–450)
PLATELET BLD QL SMEAR: ADEQUATE
PMV BLD AUTO: 7 FL (ref 5–10.5)
POTASSIUM SERPL-SCNC: 5.5 MMOL/L (ref 3.5–5.1)
RBC # BLD AUTO: 3.12 M/UL (ref 4–5.2)
RBC MORPH BLD: NORMAL
SODIUM SERPL-SCNC: 140 MMOL/L (ref 136–145)
WBC # BLD AUTO: 9.3 K/UL (ref 4–11)

## 2025-04-27 PROCEDURE — 85025 COMPLETE CBC W/AUTO DIFF WBC: CPT

## 2025-04-27 PROCEDURE — 6370000000 HC RX 637 (ALT 250 FOR IP)

## 2025-04-27 PROCEDURE — 6370000000 HC RX 637 (ALT 250 FOR IP): Performed by: HOSPITALIST

## 2025-04-27 PROCEDURE — 1200000000 HC SEMI PRIVATE

## 2025-04-27 PROCEDURE — 94640 AIRWAY INHALATION TREATMENT: CPT

## 2025-04-27 PROCEDURE — 94761 N-INVAS EAR/PLS OXIMETRY MLT: CPT

## 2025-04-27 PROCEDURE — 80048 BASIC METABOLIC PNL TOTAL CA: CPT

## 2025-04-27 PROCEDURE — 6360000002 HC RX W HCPCS

## 2025-04-27 PROCEDURE — 2580000003 HC RX 258: Performed by: HOSPITALIST

## 2025-04-27 PROCEDURE — 2500000003 HC RX 250 WO HCPCS: Performed by: HOSPITALIST

## 2025-04-27 PROCEDURE — 93010 ELECTROCARDIOGRAM REPORT: CPT | Performed by: INTERNAL MEDICINE

## 2025-04-27 PROCEDURE — 99233 SBSQ HOSP IP/OBS HIGH 50: CPT | Performed by: HOSPITALIST

## 2025-04-27 PROCEDURE — 6360000002 HC RX W HCPCS: Performed by: HOSPITALIST

## 2025-04-27 PROCEDURE — 2500000003 HC RX 250 WO HCPCS

## 2025-04-27 PROCEDURE — 2700000000 HC OXYGEN THERAPY PER DAY

## 2025-04-27 RX ORDER — IPRATROPIUM BROMIDE AND ALBUTEROL SULFATE 2.5; .5 MG/3ML; MG/3ML
1 SOLUTION RESPIRATORY (INHALATION)
Status: DISCONTINUED | OUTPATIENT
Start: 2025-04-27 | End: 2025-04-28 | Stop reason: HOSPADM

## 2025-04-27 RX ORDER — ALBUTEROL SULFATE 0.83 MG/ML
2.5 SOLUTION RESPIRATORY (INHALATION) EVERY 4 HOURS PRN
Status: DISCONTINUED | OUTPATIENT
Start: 2025-04-27 | End: 2025-04-28 | Stop reason: HOSPADM

## 2025-04-27 RX ADMIN — WATER 60 MG: 1 INJECTION INTRAMUSCULAR; INTRAVENOUS; SUBCUTANEOUS at 06:52

## 2025-04-27 RX ADMIN — GUAIFENESIN 600 MG: 600 TABLET ORAL at 08:01

## 2025-04-27 RX ADMIN — ACETAMINOPHEN 650 MG: 325 TABLET, FILM COATED ORAL at 08:20

## 2025-04-27 RX ADMIN — AMLODIPINE BESYLATE 5 MG: 5 TABLET ORAL at 08:02

## 2025-04-27 RX ADMIN — HYDROXYZINE HYDROCHLORIDE 25 MG: 25 TABLET ORAL at 21:12

## 2025-04-27 RX ADMIN — SODIUM ZIRCONIUM CYCLOSILICATE 5 G: 5 POWDER, FOR SUSPENSION ORAL at 08:02

## 2025-04-27 RX ADMIN — IPRATROPIUM BROMIDE AND ALBUTEROL SULFATE 1 DOSE: .5; 2.5 SOLUTION RESPIRATORY (INHALATION) at 13:45

## 2025-04-27 RX ADMIN — Medication 6 MG: at 19:53

## 2025-04-27 RX ADMIN — TEMAZEPAM 15 MG: 15 CAPSULE ORAL at 21:12

## 2025-04-27 RX ADMIN — APIXABAN 2.5 MG: 2.5 TABLET, FILM COATED ORAL at 19:53

## 2025-04-27 RX ADMIN — PANTOPRAZOLE SODIUM 20 MG: 20 TABLET, DELAYED RELEASE ORAL at 08:01

## 2025-04-27 RX ADMIN — CARVEDILOL 6.25 MG: 6.25 TABLET, FILM COATED ORAL at 19:53

## 2025-04-27 RX ADMIN — SERTRALINE HYDROCHLORIDE 50 MG: 50 TABLET ORAL at 08:02

## 2025-04-27 RX ADMIN — ROPINIROLE HYDROCHLORIDE 0.25 MG: 0.25 TABLET, FILM COATED ORAL at 19:53

## 2025-04-27 RX ADMIN — SODIUM CHLORIDE, PRESERVATIVE FREE 10 ML: 5 INJECTION INTRAVENOUS at 19:54

## 2025-04-27 RX ADMIN — INSULIN GLARGINE 10 UNITS: 100 INJECTION, SOLUTION SUBCUTANEOUS at 19:53

## 2025-04-27 RX ADMIN — LETROZOLE 2.5 MG: 2.5 TABLET ORAL at 08:02

## 2025-04-27 RX ADMIN — APIXABAN 2.5 MG: 2.5 TABLET, FILM COATED ORAL at 11:18

## 2025-04-27 RX ADMIN — IPRATROPIUM BROMIDE AND ALBUTEROL SULFATE 1 DOSE: .5; 2.5 SOLUTION RESPIRATORY (INHALATION) at 20:26

## 2025-04-27 RX ADMIN — TRAZODONE HYDROCHLORIDE 25 MG: 50 TABLET ORAL at 19:53

## 2025-04-27 RX ADMIN — SODIUM CHLORIDE 30 MG/ML INHALATION SOLUTION 4 ML: 30 SOLUTION INHALANT at 08:40

## 2025-04-27 RX ADMIN — IPRATROPIUM BROMIDE AND ALBUTEROL SULFATE 1 DOSE: .5; 2.5 SOLUTION RESPIRATORY (INHALATION) at 08:40

## 2025-04-27 RX ADMIN — GUAIFENESIN 600 MG: 600 TABLET ORAL at 19:53

## 2025-04-27 RX ADMIN — SODIUM ZIRCONIUM CYCLOSILICATE 5 G: 5 POWDER, FOR SUSPENSION ORAL at 11:18

## 2025-04-27 RX ADMIN — INSULIN LISPRO 4 UNITS: 100 INJECTION, SOLUTION INTRAVENOUS; SUBCUTANEOUS at 19:53

## 2025-04-27 RX ADMIN — METHYLPREDNISOLONE SODIUM SUCCINATE 40 MG: 40 INJECTION, POWDER, LYOPHILIZED, FOR SOLUTION INTRAMUSCULAR; INTRAVENOUS at 18:48

## 2025-04-27 RX ADMIN — TORSEMIDE 20 MG: 20 TABLET ORAL at 11:18

## 2025-04-27 RX ADMIN — CARVEDILOL 6.25 MG: 6.25 TABLET, FILM COATED ORAL at 08:01

## 2025-04-27 RX ADMIN — INSULIN LISPRO 8 UNITS: 100 INJECTION, SOLUTION INTRAVENOUS; SUBCUTANEOUS at 12:22

## 2025-04-27 RX ADMIN — SODIUM CHLORIDE, PRESERVATIVE FREE 10 ML: 5 INJECTION INTRAVENOUS at 08:02

## 2025-04-27 ASSESSMENT — PAIN DESCRIPTION - DESCRIPTORS: DESCRIPTORS: ACHING

## 2025-04-27 ASSESSMENT — PAIN SCALES - GENERAL
PAINLEVEL_OUTOF10: 0
PAINLEVEL_OUTOF10: 0
PAINLEVEL_OUTOF10: 3

## 2025-04-27 ASSESSMENT — PAIN DESCRIPTION - FREQUENCY: FREQUENCY: INTERMITTENT

## 2025-04-27 ASSESSMENT — PAIN - FUNCTIONAL ASSESSMENT: PAIN_FUNCTIONAL_ASSESSMENT: ACTIVITIES ARE NOT PREVENTED

## 2025-04-27 ASSESSMENT — PAIN DESCRIPTION - ORIENTATION: ORIENTATION: RIGHT;LEFT;MID

## 2025-04-27 ASSESSMENT — PAIN DESCRIPTION - ONSET: ONSET: GRADUAL

## 2025-04-27 ASSESSMENT — PAIN DESCRIPTION - LOCATION: LOCATION: KNEE

## 2025-04-27 ASSESSMENT — PAIN DESCRIPTION - PAIN TYPE: TYPE: ACUTE PAIN

## 2025-04-27 NOTE — PROGRESS NOTES
Internal Medicine Progress Note    Patient: Shoaib Rothman   : 1960   Admit Date: 2025     Date: 2025     Interval History     No acute events overnight.  Vital signs stable.  Patient on 6L. Desaturates with ambulation or movement.     Pt feeling much better today. Breathing is improved. No abd pain.     Physical exam unchanged.    Plan:  Cont lokelma. Demedex per nephro. Likely DC tomorrow. Will do 40 mg IV methylpred and start PO tomorrow.    HPI:   Ms. Shoaib Rothman is a 64 y.o. female with a PMHx significant for, Acute on Chronic Respiratory Failure, COPD, FSGS, L nephrectomy, CKDIII, Breast Cancer S/p Mastectomy, Chemo, Radiation, DMII, DVT/PE on Eliquis, who presented to the ED with dyspnea. She states it was sudden onset this AM. She is unable to walk due to the shortness of breath. She has no chest tightness, pain, or cough. No sputum production. She states she has been afebrile and has had no chills. She endorses having 3-4 bowel movements this AM, which is unusual for her. Usually she has 1 movement a day. She also has had urinary frequency today. She has soiled herself because she is too short of breath to get up.     ROS     Review of Systems   Per Interval     Objective     I/Os:  I/O last 3 completed shifts:  In: 1595.1 [P.O.:1350; I.V.:145; IV Piggyback:100.1]  Out: 3350 [Urine:3350]    Vital Signs:  Patient Vitals for the past 5 hrs:   BP Temp Temp src Pulse Resp SpO2 Weight   25 0600 -- -- -- 98 -- 96 % --   25 0400 -- -- -- 95 -- -- --   25 0336 -- -- -- -- -- -- 108.9 kg (240 lb 1.3 oz)   25 0315 132/87 98.2 °F (36.8 °C) Oral 84 19 94 % --       Physical Exam:  Physical Exam  Vitals reviewed.   Constitutional:       Appearance: She is obese.   HENT:      Head: Normocephalic.   Eyes:      Extraocular Movements: Extraocular movements intact.      Pupils: Pupils are equal, round, and reactive to light.   Cardiovascular:      Rate and Rhythm: Normal  infections caused  by ESBL producing organisms.  Other antibiotic classes are  likely to result in treatment failure, even for organisms  demonstrating in vitro susceptibility.      Narrative:      ORDER#: [id number]                          ORDERED BY: ROXI UNGER  SOURCE: Urine Clean Catch  Clean catch     COLLECTED:  04/24/25 17:52  ANTIBIOTICS AT [facility].:                      RECEIVED :  04/25/25 00:32  CALL  [facility] [place] tel. [phone],  Microbiology results called to and read back by Rudolph Morales RN, 04/26/2025  06:49, by KIRJO    Susceptibility        Escherichia coli ESBL      BACTERIAL SUSCEPTIBILITY PANEL BY DIANE      ampicillin >=32 mcg/mL Resistant      ampicillin-sulbactam 16 mcg/mL Intermediate      ceFAZolin >=32 mcg/mL Resistant      cefepime  Resistant      cefTRIAXone >=64 mcg/mL Resistant      ciprofloxacin >=4 mcg/mL Resistant      ertapenem <=0.12 mcg/mL Sensitive      gentamicin >=16 mcg/mL Resistant      levofloxacin >=8 mcg/mL Resistant      meropenem <=0.25 mcg/mL Sensitive      nitrofurantoin <=16 mcg/mL Sensitive      trimethoprim-sulfamethoxazole <=20 mcg/mL Sensitive                           Legionella antigen, urine [[id number]] Collected: 04/24/25 1752    Order Status: Completed Specimen: Urine, clean catch Updated: 04/25/25 1115     L. pneumophila Serogp 1 Ur Ag --     Presumptive Negative  No Legionella pneumophila serogroup 1 antigens detected.  A negative result does not exclude infection with  Legionella pneumophila serogroup 1 nor does it rule out  other microbial-caused respiratory infections or  disease caused by other serogroups of  Legionella pneumophila.  Normal Range: Presumptive Negative      Narrative:      ORDER#: [id number]                          ORDERED BY: SHAY DOUGLAS  SOURCE: Urine Clean Catch                  COLLECTED:  04/24/25 17:52  ANTIBIOTICS AT [facility].:                      RECEIVED :  04/25/25 01:50    Strep Pneumoniae Antigen [[id number]] Collected:

## 2025-04-27 NOTE — PLAN OF CARE
Problem: Chronic Conditions and Co-morbidities  Goal: Patient's chronic conditions and co-morbidity symptoms are monitored and maintained or improved  4/27/2025 1407 by Brandee Ratliff RN  Outcome: Progressing  Flowsheets (Taken 4/27/2025 0142 by Domenica Ritchie, RN)  Care Plan - Patient's Chronic Conditions and Co-Morbidity Symptoms are Monitored and Maintained or Improved:   Monitor and assess patient's chronic conditions and comorbid symptoms for stability, deterioration, or improvement   Collaborate with multidisciplinary team to address chronic and comorbid conditions and prevent exacerbation or deterioration   Update acute care plan with appropriate goals if chronic or comorbid symptoms are exacerbated and prevent overall improvement and discharge     Problem: Discharge Planning  Goal: Discharge to home or other facility with appropriate resources  4/27/2025 1407 by Brandee Ratliff RN  Outcome: Progressing  Flowsheets (Taken 4/27/2025 0142 by Domenica Ritchie, RN)  Discharge to home or other facility with appropriate resources:   Identify barriers to discharge with patient and caregiver   Identify discharge learning needs (meds, wound care, etc)   Refer to discharge planning if patient needs post-hospital services based on physician order or complex needs related to functional status, cognitive ability or social support system   Arrange for needed discharge resources and transportation as appropriate   Arrange for interpreters to assist at discharge as needed     Problem: Safety - Adult  Goal: Free from fall injury  4/27/2025 1407 by Brandee Ratliff, RN  Outcome: Progressing  Flowsheets (Taken 4/27/2025 1407)  Free From Fall Injury:   Instruct family/caregiver on patient safety   Based on caregiver fall risk screen, instruct family/caregiver to ask for assistance with transferring infant if caregiver noted to have fall risk factors     Problem: Pain  Goal: Verbalizes/displays adequate comfort level

## 2025-04-27 NOTE — RT PROTOCOL NOTE
RT Inhaler-Nebulizer Bronchodilator Protocol Note    There is a bronchodilator order in the chart from a provider indicating to follow the RT Bronchodilator Protocol and there is an “Initiate RT Inhaler-Nebulizer Bronchodilator Protocol” order as well (see protocol at bottom of note).    CXR Findings:  XR CHEST PORTABLE  Result Date: 4/25/2025  1. Diffuse airspace disease bilaterally, greatest in the right lung base, not significantly changed. 2. Cardiomegaly. Electronically signed by Jim Wilkins MD      The findings from the last RT Protocol Assessment were as follows:   History Pulmonary Disease: Chronic pulmonary disease  Respiratory Pattern: Mild dyspnea at rest, irregular pattern, or RR 21-25 bpm  Breath Sounds: Intermittent or unilateral wheezes  Cough: Strong, spontaneous, non-productive  Indication for Bronchodilator Therapy: On home bronchodilators  Bronchodilator Assessment Score: 10    Pt scores as TID     Aerosolized bronchodilator medication orders have been revised according to the RT Inhaler-Nebulizer Bronchodilator Protocol below.    Respiratory Therapist to perform RT Therapy Protocol Assessment initially then follow the protocol.  Repeat RT Therapy Protocol Assessment PRN for score 0-3 or on second treatment, BID, and PRN for scores above 3.    No Indications - adjust the frequency to every 6 hours PRN wheezing or bronchospasm, if no treatments needed after 48 hours then discontinue using Per Protocol order mode.     If indication present, adjust the RT bronchodilator orders based on the Bronchodilator Assessment Score as indicated below.  Use Inhaler orders unless patient has one or more of the following: on home nebulizer, not able to hold breath for 10 seconds, is not alert and oriented, cannot activate and use MDI correctly, or respiratory rate 25 breaths per minute or more, then use the equivalent nebulizer order(s) with same Frequency and PRN reasons based on the score.  If a patient is

## 2025-04-27 NOTE — PLAN OF CARE
Problem: Chronic Conditions and Co-morbidities  Goal: Patient's chronic conditions and co-morbidity symptoms are monitored and maintained or improved  4/27/2025 0142 by Domenica Ritchie RN  Outcome: Progressing  Flowsheets (Taken 4/27/2025 0142)  Care Plan - Patient's Chronic Conditions and Co-Morbidity Symptoms are Monitored and Maintained or Improved:   Monitor and assess patient's chronic conditions and comorbid symptoms for stability, deterioration, or improvement   Collaborate with multidisciplinary team to address chronic and comorbid conditions and prevent exacerbation or deterioration   Update acute care plan with appropriate goals if chronic or comorbid symptoms are exacerbated and prevent overall improvement and discharge     Problem: Discharge Planning  Goal: Discharge to home or other facility with appropriate resources  4/27/2025 0142 by Domenica Rithcie RN  Outcome: Progressing  Flowsheets (Taken 4/27/2025 0142)  Discharge to home or other facility with appropriate resources:   Identify barriers to discharge with patient and caregiver   Identify discharge learning needs (meds, wound care, etc)   Refer to discharge planning if patient needs post-hospital services based on physician order or complex needs related to functional status, cognitive ability or social support system   Arrange for needed discharge resources and transportation as appropriate   Arrange for interpreters to assist at discharge as needed     Problem: Safety - Adult  Goal: Free from fall injury  4/27/2025 0142 by Domenica Ritchie, RN  Outcome: Progressing  Flowsheets (Taken 4/27/2025 0142)  Free From Fall Injury: Instruct family/caregiver on patient safety     Problem: Pain  Goal: Verbalizes/displays adequate comfort level or baseline comfort level  4/27/2025 0142 by Domenica Ritchie, RN  Outcome: Progressing  Flowsheets (Taken 4/27/2025 0142)  Verbalizes/displays adequate comfort level or baseline comfort level:    Encourage patient to monitor pain and request assistance   Administer analgesics based on type and severity of pain and evaluate response   Consider cultural and social influences on pain and pain management   Assess pain using appropriate pain scale   Implement non-pharmacological measures as appropriate and evaluate response   Notify Licensed Independent Practitioner if interventions unsuccessful or patient reports new pain     Problem: Respiratory - Adult  Goal: Achieves optimal ventilation and oxygenation  Outcome: Progressing  Flowsheets (Taken 4/27/2025 0142)  Achieves optimal ventilation and oxygenation:   Assess for changes in respiratory status   Position to facilitate oxygenation and minimize respiratory effort   Initiate smoking cessation protocol as indicated   Assess the need for suctioning and aspirate as needed   Respiratory therapy support as indicated   Assess for changes in mentation and behavior   Oxygen supplementation based on oxygen saturation or arterial blood gases   Encourage broncho-pulmonary hygiene including cough, deep breathe, incentive spirometry   Assess and instruct to report shortness of breath or any respiratory difficulty

## 2025-04-27 NOTE — PROGRESS NOTES
Nephrology Progress Note                                                                                                                                                                                                                                                                                                                                                               Office : [phone]     Fax :[phone]    Patient's Name: Shoaib Rothman  12:10 PM  4/27/2025    Reason for Consult:  CHANTELLE on CKD 3  Requesting Physician:  Lamar Mondragon MD  Chief Complaint:    Chief Complaint   Patient presents with    Shortness of Breath     Patient presents to the ED from facility d/t sudden onset of shortness of breath. Hx of COPD. Wears 6L O2 at baseline. EMS reports pt was satting at 59% on 6L and was cyanotic in the face upon their arrival. 1 albuterol tx and 1 duoneb tx given prior to ED arrival.       Assessment/Plan     # CHANTELLE on CKD 3b   - Baseline Creatinine ~ 1.7  - Creatinine elevated at 2.4 on admit --> 2.0 today  - CHANTELLE likely 2/2 acute respiratory failure   - CKD secondary to FSGS and solitary kidney  - Avoid nephrotoxins  - Monitor renal labs     #Hyperkalemia  - low K diet  - lokelma daily  - Torsemide resumed   - monitor    # Severe nephrotic range proteinuria   - UPCR 8 gram (January) --> 6 gram (February) --> 7 gram (March)  - H/o Biopsy proven FSGS in the past (9/2020)  - Repeat biopsy 03/26 with extensive scarring  - On 50 mg prednisone SINCE 1/16/2025  - We will slowly taper down steroids to eventually STOP  - on 60 mg solu-medrol BID     # Acute on chronic respiratory failure  - H/o end-stage COPD, bronchiectasis and recurrent mucous plugging   - CXR consistent with airspace consolidation in bilateral mid to lower lungs   - Abx per primary  - Pulmonary on board  - s/p bronch + washing 04/25    # HTN  - Spironolactone held  - BP controlled on Coreg + amlodipine + Torsemide      # Acid- base/  the right base, similar in appearance to the prior study.      Unchanged pleural thickening or effusion in the right base.      No other significant change in overall appearance of the chest from the prior exam.      Electronically signed by Lilly Rubalcava MD          Medical Decision Making:  The following items were considered in medical decision making:  Discussion of patient care with other providers  Reviewed clinical lab tests  Reviewed radiology tests  Reviewed other diagnostic tests/interventions    Sukhdev Chávez MD   [facility]  Office : [phone] or through [employer]  Fax :[phone]

## 2025-04-28 VITALS
BODY MASS INDEX: 36.09 KG/M2 | WEIGHT: 238.1 LBS | DIASTOLIC BLOOD PRESSURE: 99 MMHG | HEART RATE: 91 BPM | OXYGEN SATURATION: 90 % | RESPIRATION RATE: 20 BRPM | HEIGHT: 68 IN | TEMPERATURE: 97.4 F | SYSTOLIC BLOOD PRESSURE: 157 MMHG

## 2025-04-28 LAB
ANION GAP SERPL CALCULATED.3IONS-SCNC: 8 MMOL/L (ref 3–16)
BASOPHILS # BLD: 0 K/UL (ref 0–0.2)
BASOPHILS NFR BLD: 0 %
BUN SERPL-MCNC: 71 MG/DL (ref 7–20)
CALCIUM SERPL-MCNC: 9 MG/DL (ref 8.3–10.6)
CHLORIDE SERPL-SCNC: 105 MMOL/L (ref 99–110)
CO2 SERPL-SCNC: 28 MMOL/L (ref 21–32)
CREAT SERPL-MCNC: 1.9 MG/DL (ref 0.6–1.2)
DEPRECATED RDW RBC AUTO: 16.6 % (ref 12.4–15.4)
EOSINOPHIL # BLD: 0 K/UL (ref 0–0.6)
EOSINOPHIL NFR BLD: 0 %
GFR SERPLBLD CREATININE-BSD FMLA CKD-EPI: 29 ML/MIN/{1.73_M2}
GLUCOSE BLD-MCNC: 182 MG/DL (ref 70–99)
GLUCOSE BLD-MCNC: 268 MG/DL (ref 70–99)
GLUCOSE SERPL-MCNC: 170 MG/DL (ref 70–99)
HCT VFR BLD AUTO: 30.2 % (ref 36–48)
HGB BLD-MCNC: 10.2 G/DL (ref 12–16)
LYMPHOCYTES # BLD: 0.3 K/UL (ref 1–5.1)
LYMPHOCYTES NFR BLD: 3 %
MCH RBC QN AUTO: 30.9 PG (ref 26–34)
MCHC RBC AUTO-ENTMCNC: 33.6 G/DL (ref 31–36)
MCV RBC AUTO: 91.9 FL (ref 80–100)
MONOCYTES # BLD: 0 K/UL (ref 0–1.3)
MONOCYTES NFR BLD: 0 %
MYELOCYTES NFR BLD MANUAL: 1 %
NEUTROPHILS # BLD: 9.2 K/UL (ref 1.7–7.7)
NEUTROPHILS NFR BLD: 94 %
NEUTS BAND NFR BLD MANUAL: 2 % (ref 0–7)
PERFORMED ON: ABNORMAL
PERFORMED ON: ABNORMAL
PLATELET # BLD AUTO: 305 K/UL (ref 135–450)
PMV BLD AUTO: 7.2 FL (ref 5–10.5)
POTASSIUM SERPL-SCNC: 5 MMOL/L (ref 3.5–5.1)
RBC # BLD AUTO: 3.29 M/UL (ref 4–5.2)
REPORT: NORMAL
SODIUM SERPL-SCNC: 141 MMOL/L (ref 136–145)
WBC # BLD AUTO: 9.5 K/UL (ref 4–11)

## 2025-04-28 PROCEDURE — 6370000000 HC RX 637 (ALT 250 FOR IP): Performed by: HOSPITALIST

## 2025-04-28 PROCEDURE — 6370000000 HC RX 637 (ALT 250 FOR IP)

## 2025-04-28 PROCEDURE — 94640 AIRWAY INHALATION TREATMENT: CPT

## 2025-04-28 PROCEDURE — 2500000003 HC RX 250 WO HCPCS

## 2025-04-28 PROCEDURE — 80048 BASIC METABOLIC PNL TOTAL CA: CPT

## 2025-04-28 PROCEDURE — 6360000002 HC RX W HCPCS: Performed by: HOSPITALIST

## 2025-04-28 PROCEDURE — 99232 SBSQ HOSP IP/OBS MODERATE 35: CPT | Performed by: INTERNAL MEDICINE

## 2025-04-28 PROCEDURE — 85025 COMPLETE CBC W/AUTO DIFF WBC: CPT

## 2025-04-28 PROCEDURE — 94761 N-INVAS EAR/PLS OXIMETRY MLT: CPT

## 2025-04-28 PROCEDURE — 2580000003 HC RX 258: Performed by: HOSPITALIST

## 2025-04-28 PROCEDURE — 2700000000 HC OXYGEN THERAPY PER DAY

## 2025-04-28 PROCEDURE — 2500000003 HC RX 250 WO HCPCS: Performed by: HOSPITALIST

## 2025-04-28 PROCEDURE — 99233 SBSQ HOSP IP/OBS HIGH 50: CPT | Performed by: HOSPITALIST

## 2025-04-28 RX ORDER — PREDNISONE 20 MG/1
20 TABLET ORAL DAILY
Status: DISCONTINUED | OUTPATIENT
Start: 2025-04-29 | End: 2025-04-28 | Stop reason: HOSPADM

## 2025-04-28 RX ADMIN — CARVEDILOL 6.25 MG: 6.25 TABLET, FILM COATED ORAL at 08:57

## 2025-04-28 RX ADMIN — GUAIFENESIN 600 MG: 600 TABLET ORAL at 08:57

## 2025-04-28 RX ADMIN — SERTRALINE HYDROCHLORIDE 50 MG: 50 TABLET ORAL at 08:57

## 2025-04-28 RX ADMIN — SODIUM CHLORIDE 30 MG/ML INHALATION SOLUTION 4 ML: 30 SOLUTION INHALANT at 08:34

## 2025-04-28 RX ADMIN — PANTOPRAZOLE SODIUM 20 MG: 20 TABLET, DELAYED RELEASE ORAL at 08:57

## 2025-04-28 RX ADMIN — METHYLPREDNISOLONE SODIUM SUCCINATE 40 MG: 40 INJECTION, POWDER, LYOPHILIZED, FOR SOLUTION INTRAMUSCULAR; INTRAVENOUS at 05:42

## 2025-04-28 RX ADMIN — SODIUM CHLORIDE, PRESERVATIVE FREE 10 ML: 5 INJECTION INTRAVENOUS at 08:57

## 2025-04-28 RX ADMIN — LETROZOLE 2.5 MG: 2.5 TABLET ORAL at 08:57

## 2025-04-28 RX ADMIN — IPRATROPIUM BROMIDE AND ALBUTEROL SULFATE 1 DOSE: .5; 2.5 SOLUTION RESPIRATORY (INHALATION) at 08:34

## 2025-04-28 RX ADMIN — INSULIN LISPRO 8 UNITS: 100 INJECTION, SOLUTION INTRAVENOUS; SUBCUTANEOUS at 11:21

## 2025-04-28 RX ADMIN — APIXABAN 2.5 MG: 2.5 TABLET, FILM COATED ORAL at 08:57

## 2025-04-28 RX ADMIN — AMLODIPINE BESYLATE 5 MG: 5 TABLET ORAL at 08:57

## 2025-04-28 RX ADMIN — TORSEMIDE 20 MG: 20 TABLET ORAL at 08:57

## 2025-04-28 RX ADMIN — INSULIN LISPRO 4 UNITS: 100 INJECTION, SOLUTION INTRAVENOUS; SUBCUTANEOUS at 08:56

## 2025-04-28 NOTE — DISCHARGE INSTRUCTIONS
1. You were seen in the hospital for shortness of breath.    2. Please make an appointment to see your primary care doctor in 1 week. Please make an appointment to see your pulmonologist in 1 week and nephrologist in 1 week.     3. START taking lokelma 1 packet every other day. CONTINUE your other medications as prescribed. CONTINUE prednisone 20mg daily.    4. Please get blood work in 5-7 days.

## 2025-04-28 NOTE — PROGRESS NOTES
Nephrology Progress Note                                                                                                                                                                                                                                                                                                                                                               Office : [phone]     Fax :[phone]    Patient's Name: Shoaib Rothman  10:26 AM  4/28/2025    Reason for Consult:  CHANTELLE on CKD 3  Requesting Physician:  Lamar Mondragon MD  Chief Complaint:    Chief Complaint   Patient presents with    Shortness of Breath     Patient presents to the ED from facility d/t sudden onset of shortness of breath. Hx of COPD. Wears 6L O2 at baseline. EMS reports pt was satting at 59% on 6L and was cyanotic in the face upon their arrival. 1 albuterol tx and 1 duoneb tx given prior to ED arrival.       Assessment/Plan     # Hyperkalemia  - Better   - Low K diet  - Lokelma every other day on discharge  - Torsemide 20 daily on discharge     # CHANTELLE on CKD 3b   - Baseline Creatinine ~ 1.7  - Creatinine elevated at 2.4 on admit --> 1.9 today  - CHANTELLE likely 2/2 acute respiratory failure   - CKD secondary to FSGS and solitary kidney  - Avoid nephrotoxins  - Monitor renal labs     # Severe nephrotic range proteinuria   - UPCR 8 gram (January) --> 6 gram (February) --> 7 gram (March)  - H/o Biopsy proven FSGS in the past (9/2020)  - Repeat biopsy 03/26 with extensive scarring  - On 50 mg prednisone SINCE 1/16/2025 --> 20 mg daily starting 4/29  - We will slowly taper down steroids to eventually STOP     # Acute on chronic respiratory failure  - H/o end-stage COPD, bronchiectasis and recurrent mucous plugging   - CXR consistent with airspace consolidation in bilateral mid to lower lungs   - Abx per primary  - Pulmonary on board  - S/p bronch + washing 04/25    # HTN  - Spironolactone held  - BP controlled on Coreg + amlodipine

## 2025-04-28 NOTE — CARE COORDINATION
DISCHARGE PLANNING:  Chart review shows patient is from [facility] as a long term care resident.  Last admission she needed pre-cert to return but they were able to take her with it pending.    I called admission liaisonPaola ([phone]) to confirm the above and was unable to reach her.  left with callback number.    CM team will continue to follow.  Sandhya Lynch RN Case Manager  [phone]

## 2025-04-28 NOTE — PROGRESS NOTES
Internal Medicine Progress Note    Patient: Shoaib Rothman   : 1960   Admit Date: 2025     Date: 2025     Interval History     No acute events overnight.  Vital signs stable.  Patient on 4L.     Pt feeling much better today. Breathing is improved. No abd pain.     Physical exam unchanged.    Plan:  Cont lokelma 10 every other day. Demedex per nephro. Likely DC tomorrow. Will do 40 mg IV methylpred and start PO tomorrow.    HPI:   Ms. Shoaib Rothman is a 64 y.o. female with a PMHx significant for, Acute on Chronic Respiratory Failure, COPD, FSGS, L nephrectomy, CKDIII, Breast Cancer S/p Mastectomy, Chemo, Radiation, DMII, DVT/PE on Eliquis, who presented to the ED with dyspnea. She states it was sudden onset this AM. She is unable to walk due to the shortness of breath. She has no chest tightness, pain, or cough. No sputum production. She states she has been afebrile and has had no chills. She endorses having 3-4 bowel movements this AM, which is unusual for her. Usually she has 1 movement a day. She also has had urinary frequency today. She has soiled herself because she is too short of breath to get up.     ROS     Review of Systems   Per Interval     Objective     I/Os:  I/O last 3 completed shifts:  In: 1629 [P.O.:1629]  Out: 5450 [Urine:5450]    Vital Signs:  Patient Vitals for the past 5 hrs:   BP Temp Temp src Pulse Resp SpO2   25 0854 (!) 150/86 97.6 °F (36.4 °C) Oral 87 18 93 %   25 0834 -- -- -- 84 -- 97 %   25 0600 -- -- -- 90 -- --       Physical Exam:  Physical Exam  Vitals reviewed.   Constitutional:       Appearance: She is obese.   HENT:      Head: Normocephalic.   Eyes:      Extraocular Movements: Extraocular movements intact.      Pupils: Pupils are equal, round, and reactive to light.   Cardiovascular:      Rate and Rhythm: Normal rate and regular rhythm.   Pulmonary:      Effort: Pulmonary effort is normal. No respiratory distress.      Comments:  nephrectomy, CKDIII, Breast Cancer S/p Mastectomy, Chemo, Radiation, DMII, DVT/PE on Eliquis, who presented to the ED with dyspnea     Acute on Chronic Hypoxic Respiratory Failure  Chronic Hypercarbic Respiratory Failure  Possible COPD Exacerbation  Possible Plastic Bronchitis  Likely Bronch Plugging  Rule out PNA  CXR shows \"Persistent airspace consolidation in the bilateral mid to lower lungs, particularly in the right base, similar in appearance to the prior study.   Unchanged pleural thickening or effusion in the right base.   No other significant change in overall appearance of the chest from the prior exam.\" Last bronch showed plugs of fibrin, mucus and epithelial cells. Hx of mastectomy with radiation. This possible cause of lymph disruption --> Plastic bronchitis.   -Vancomycin  -Merrem (Hx of ESBL)  -infx workup (including GI pathogens due to associated diarrhea with SOB, in addition to respiratory)  -Hx of candida in cultures -> has not seen ID but will consult this time  -breathing treatments  -Pulm Consult   - Bronch 4/25     Hx of FSGS  Hx of L nephrectomy  CHANTELLE on CKDIII  -On 50 mg prednisone SINCE 1/16/2025    - 125 mg solumedrol 4/25/25   - 60 mg solumedrol BID -- 40 mg  BID.   - Switch PO on DC  -Consult Nephrology  -hold nephrotoxic medications   -Monitor Kidney functions  -Strict IO  -Pr/Cr ratio    Hyperkalemia  No EKG changes or symptoms. Pt refusing low K  diet. Explained risks of normal diet. Pt still refusing.  - Lokelma  - Demedex  - Monitor    ESBL E.Coli Positive Culture  Urine culture grew E.coli. Pt asymptomatic, afebrile, labs not indicative of infection.   - Hold Abx unless pt develops symptoms  - Monitor     Hx Breast Cancer S/p Mastectomy, Chemo, Radiation  -continue home medications      DMII  -Hypoglycemia protocol  -POCT glucose checks  -LDSS     DVT/PE  -continue Eliquis      DVT PPx: Eliquis  Diet:  ADULT DIET; Regular   Code status:  Full Code     ELOS: 5  Barriers to discharge:

## 2025-04-28 NOTE — CARE COORDINATION
Case Management Assessment            Discharge Note                    Date / Time of Note: 4/28/2025 9:02 AM                  Discharge Note Completed by: REBECCA ESPINAL    Patient Name: Shoaib Rothman   YOB: 1960  Diagnosis: COPD exacerbation (HCC) [J44.1]  SOB (shortness of breath) on exertion [R06.02]  Pneumonia of right lung due to infectious organism, unspecified part of lung [J18.9]   Date / Time: 4/24/2025 12:25 PM    Current PCP: Lamar Mondragon MD  Clinic patient: No    Hospitalization in the last 30 days: Yes  Readmission Assessment  Number of Days since last admission?: 8-30 days  Previous Disposition: Long Term Care  Who is being Interviewed: Patient  What was the patient's/caregiver's perception as to why they think they needed to return back to the hospital?: Other (Comment) (SOB)  Did you visit your Primary Care Physician after you left the hospital, before you returned this time?: Yes (LTC staff)  Did you see a specialist, such as Cardiac, Pulmonary, Orthopedic Physician, etc. after you left the hospital?: Yes (nephrology)  Who advised the patient to return to the hospital?: Other Nurse (Navigator, CTN) (LTC staff)  Does the patient report anything that got in the way of taking their medications?: No  In our efforts to provide the best possible care to you and others like you, can you think of anything that we could have done to help you after you left the hospital the first time, so that you might not have needed to return so soon?: Other (Comment) (n/a)    Advance Directives:  Code Status: Full Code  [place] DNR form completed and on chart: Not Indicated    Financial:  Payor: [employer] [place] / Plan: [employer] [place] / Product Type: *No Product type* /      Pharmacy:    [facility] [place] - [place], [place] - [address] -  [phone] - F [phone]  [address] [address]  Phone: [phone] Fax: [phone]      Assistance

## 2025-04-28 NOTE — PROGRESS NOTES
Patient discharged to [facility] via EMS. AVS given and sent with patient. Patient verbalized understanding. Right subclavian port de accessed. No complications noted. All patient belonging sent with patient. Multiple attempts to call report to [facility], yet no response.

## 2025-04-28 NOTE — PLAN OF CARE
Problem: Chronic Conditions and Co-morbidities  Goal: Patient's chronic conditions and co-morbidity symptoms are monitored and maintained or improved  4/27/2025 1407 by Brandee Ratliff RN  Outcome: Progressing  Flowsheets (Taken 4/27/2025 0142 by Domenica Ritchie, RN)  Care Plan - Patient's Chronic Conditions and Co-Morbidity Symptoms are Monitored and Maintained or Improved:   Monitor and assess patient's chronic conditions and comorbid symptoms for stability, deterioration, or improvement   Collaborate with multidisciplinary team to address chronic and comorbid conditions and prevent exacerbation or deterioration   Update acute care plan with appropriate goals if chronic or comorbid symptoms are exacerbated and prevent overall improvement and discharge     Problem: Discharge Planning  Goal: Discharge to home or other facility with appropriate resources  4/27/2025 2012 by Nir Rojo RN  Outcome: Progressing  Flowsheets (Taken 4/27/2025 2012)  Discharge to home or other facility with appropriate resources:   Identify discharge learning needs (meds, wound care, etc)   Refer to discharge planning if patient needs post-hospital services based on physician order or complex needs related to functional status, cognitive ability or social support system     Problem: Safety - Adult  Goal: Free from fall injury  4/27/2025 2012 by Nir Rojo RN  Outcome: Progressing  Flowsheets (Taken 4/27/2025 2012)  Free From Fall Injury:   Instruct family/caregiver on patient safety   Based on caregiver fall risk screen, instruct family/caregiver to ask for assistance with transferring infant if caregiver noted to have fall risk factors

## 2025-04-28 NOTE — DISCHARGE SUMMARY
INTERNAL MEDICINE DEPARTMENT  DISCHARGE SUMMARY    Patient ID: Shoaib Rothman                                             Discharge Date: 4/28/2025   Patient's PCP: Lamar Mondragon MD                                          Discharge Physician: Rafa Morales MD  Admit Date: 4/24/2025   Admitting Physician: Balta Maldonado MD    PROBLEMS DURING HOSPITALIZATION:  Present on Admission:   SOB (shortness of breath) on exertion      DISCHARGE DIAGNOSES:  Acute on Chronic Hypoxic Respiratory Failure  Acute Kidney Injury on CKD 3B  Nephrotic range proteinuria  Hyperkalemia  Asymptomatic Bacteruria    Hospital Course:      HPI:  Patient is a 62-year-old female with end-stage COPD, history of DVT/PE on Eliquis, breast cancer status post left mastectomy, lymphadenectomy chemo and radiation, history of left nephrectomy, FSGS and chronic kidney disease of solitary right kidney.  She presented the hospital due to shortness of breath.  Of note she has had 5 admissions with bronchoscopies in the past year.    The following issues were addressed during hospitalization:    Acute on Chronic Hypoxic Respiratory Failure  Chronic Hypercarbic Respiratory Failure  Possible Plastic Bronchitis  R Bronchus and Bronchiole Obstruction  Ruled out PNA  Patient noted several times in the past for recurrent plugging of the right lung.  Last bronch showed plugs of fibrin, mucus and epithelial cells. Hx of mastectomy with radiation. This possible cause of lymph disruption --> Plastic bronchitis.   -Started on Merrem and Vanco on presentation.  Infectious workup negative for pneumonia.  Antibiotics DC'd  -Pulm Consult with therapeutic bronch 4/25  -Given high-dose Solu-Medrol post bronchoscopy.    - Inpatient nebulizer, switch to home nebulizers/Inhalers at discharge  -On 4 L of oxygen at discharge.  Patient's baseline is 3 to 9 L.  Has concentrator set up at assisted living  -Consider outpatient pulm follow-up.  Patient seen prior at [facility]

## 2025-04-28 NOTE — PLAN OF CARE
Problem: Chronic Conditions and Co-morbidities  Goal: Patient's chronic conditions and co-morbidity symptoms are monitored and maintained or improved  Outcome: Progressing  Flowsheets (Taken 4/28/2025 0948)  Care Plan - Patient's Chronic Conditions and Co-Morbidity Symptoms are Monitored and Maintained or Improved: Monitor and assess patient's chronic conditions and comorbid symptoms for stability, deterioration, or improvement     Problem: Discharge Planning  Goal: Discharge to home or other facility with appropriate resources  4/28/2025 0948 by Grisel Guzman RN  Outcome: Progressing  Flowsheets (Taken 4/28/2025 0948)  Discharge to home or other facility with appropriate resources:   Identify barriers to discharge with patient and caregiver   Arrange for needed discharge resources and transportation as appropriate   Identify discharge learning needs (meds, wound care, etc)  Note: Plan to discharge back to [facility] later this afternoon.      Problem: Safety - Adult  Goal: Free from fall injury  4/28/2025 0948 by Grisel Guzman RN  Outcome: Progressing  Flowsheets (Taken 4/28/2025 0948)  Free From Fall Injury: Instruct family/caregiver on patient safety  Note: Patient in lowest position, bed breaks locked, bed alarm in placed, call light within reach, and encouraged to call for assistance.      Problem: Pain  Goal: Verbalizes/displays adequate comfort level or baseline comfort level  4/28/2025 0948 by Grisel Guzman RN  Outcome: Progressing  Flowsheets (Taken 4/28/2025 0948)  Verbalizes/displays adequate comfort level or baseline comfort level:   Encourage patient to monitor pain and request assistance   Assess pain using appropriate pain scale     Problem: Respiratory - Adult  Goal: Achieves optimal ventilation and oxygenation  Outcome: Progressing  Flowsheets (Taken 4/28/2025 0948)  Achieves optimal ventilation and oxygenation:   Assess for changes in respiratory status   Assess for changes in mentation

## 2025-04-28 NOTE — PROGRESS NOTES
ID Follow-up NOTE    CC:   Shortness of breath  Antibiotics: None    Admit Date: 4/24/2025  Hospital Day: 5    Subjective:     Patient saturating well on 4 L nasal cannula which is around her baseline, she uses anywhere from 3 to 10 L at baseline.  Denies any cough.      Objective:     Patient Vitals for the past 8 hrs:   BP Temp Temp src Pulse Resp SpO2 Weight   04/28/25 0854 (!) 150/86 97.6 °F (36.4 °C) Oral 87 18 93 % --   04/28/25 0834 -- -- -- 84 -- 97 % --   04/28/25 0600 -- -- -- 90 -- -- --   04/28/25 0406 -- -- -- -- -- -- 108 kg (238 lb 1.6 oz)   04/28/25 0315 (!) 151/69 97.9 °F (36.6 °C) Oral 80 20 97 % --   04/28/25 0313 -- -- -- 79 18 94 % --     I/O last 3 completed shifts:  In: 1629 [P.O.:1629]  Out: 5450 [Urine:5450]  I/O this shift:  In: 300 [P.O.:300]  Out: 400 [Urine:400]    EXAM:  GENERAL: No apparent distress.    HEENT: Membranes moist, no oral lesion  NECK:  Supple, no lymphadenopathy  LUNGS: Clear b/l, no rales, no dullness, currently on 6 L nasal cannula and saturating well.  CARDIAC: RRR, no murmur appreciated  ABD:  + BS, soft / NT, external urinary catheter in place with clear yellow urine  EXT:  No rash, trace bilateral lower extremity edema, no lesions  NEURO: No focal neurologic findings  PSYCH: Orientation, sensorium, mood normal  LINES:  Peripheral iv, right subclavian port in place without induration       Data Review:  Lab Results   Component Value Date    WBC 9.5 04/28/2025    HGB 10.2 (L) 04/28/2025    HCT 30.2 (L) 04/28/2025    MCV 91.9 04/28/2025     04/28/2025     Lab Results   Component Value Date    CREATININE 1.9 (H) 04/28/2025    BUN 71 (H) 04/28/2025     04/28/2025    K 5.0 04/28/2025     04/28/2025    CO2 28 04/28/2025       Hepatic Function Panel:   Lab Results   Component Value Date/Time    ALKPHOS 198 03/26/2025 04:50 AM    ALT 26 03/26/2025 04:50 AM    AST 18 03/26/2025 04:50 AM    BILITOT <0.2 03/26/2025 04:50 AM    BILIDIR <0.2 11/28/2021 12:14 PM  acetaminophen, potassium chloride **OR** potassium alternative oral replacement **OR** potassium chloride, magnesium sulfate, guaiFENesin-dextromethorphan, glucose, dextrose bolus **OR** dextrose bolus, glucagon (rDNA), dextrose, hydrOXYzine HCl      Assessment:       Patient Active Problem List   Diagnosis    Malignant neoplasm of upper-inner quadrant of left breast in female, estrogen receptor positive (HCC)    Breast wound, left, sequela    Bilateral pulmonary embolism (HCC)    Acute on chronic respiratory failure with hypoxia and hypercapnia (HCC)    Acute deep vein thrombosis (DVT) of lower extremity (HCC)    Centrilobular emphysema (HCC)    Hypoxemia    Pneumonia due to infectious organism    Nephrotic syndrome    CHANTELLE (acute kidney injury)    Chest pain    Hematuria    COPD with acute exacerbation (HCC)    Hypoxia    Metabolic acidosis    Sepsis with acute renal failure and septic shock (HCC)    Right middle lobe pneumonia    Severe sepsis (HCC)    Chronic hypoxemic respiratory failure (HCC)    Tobacco abuse    FSGS (focal segmental glomerulosclerosis)    Acute on chronic respiratory failure with hypoxia (HCC)    COPD exacerbation (HCC)    Stage 3 chronic kidney disease (HCC)    Nephrotic range proteinuria    Mucus plug in respiratory tract    Acute hypoxic on chronic hypercapnic respiratory failure (HCC)    Electrolyte imbalance    Hypervolemia    Panlobular emphysema (HCC)    SOB (shortness of breath) on exertion        Plan:   64-year-old female with history of end-stage COPD, sleep apnea on CPAP, DM2, DVT/PE on apixaban, breast cancer status post left mastectomy, chemo and radiation therapy, history of left nephrectomy and recurrent mucous plugging requiring bronchoscopy, CKD stage III, FSGS that presented on 4/24 with complaints of shortness of breath.      Acute hypoxic and hypercapnic respiratory failure:  - She states she wears 6 L O2 at baseline and presented with need to be on 15 L.   - Upon

## 2025-04-28 NOTE — DISCHARGE INSTR - COC
Continuity of Care Form    Patient Name: Shoaib Rothman   :  1960  MRN:  [id number]    Admit date:  2025  Discharge date:  25    Code Status Order: Full Code   Advance Directives:    Date/Time Healthcare Directive Type of Healthcare Directive Copy in Chart Healthcare Agent Appointed Healthcare Agent's Name Healthcare Agent's Phone Number    25 [phone] No, patient does not have an advance directive for healthcare treatment  --  --  --  --  --             Admitting Physician:  Balta Maldonado MD  PCP: Lamar Mondragon MD    Discharging Nurse: MORRIS Recinos  Discharging Hospital Unit/Room#: 4310/4310-01  Discharging Unit Phone Number: [phone]    Emergency Contact:   Extended Emergency Contact Information  Primary Emergency Contact: devante españa  Home Phone: [phone]  Work Phone: [phone]  Mobile Phone: [phone]  Relation: Brother/Sister   needed? No    Past Surgical History:  Past Surgical History:   Procedure Laterality Date    APPENDECTOMY      BREAST SURGERY      BRONCHOSCOPY N/A 2024    BRONCHOSCOPY performed by Fernando Dhillon MD at [facility] ENDOSCOPY    BRONCHOSCOPY N/A 2025    BRONCHOSCOPY ALVEOLAR LAVAGE / BX performed by Guille Mabry MD at [facility] ENDOSCOPY    BRONCHOSCOPY N/A 2025    BRONCHOSCOPY ALVEOLAR LAVAGE performed by Maximo Tyler MD at [facility] ENDOSCOPY    BRONCHOSCOPY N/A 3/21/2025    BRONCHOSCOPY CRYOTHERAPY / BIOPSY performed by Fernando Dhillon MD at [facility] ENDOSCOPY    BRONCHOSCOPY N/A 2025    BRONCHOSCOPY CRYOTHERAPY performed by Fernando Dhillon MD at [facility] ENDOSCOPY    CHOLECYSTECTOMY      CT BIOPSY RENAL  2020    CT BIOPSY RENAL 2020 [facility] CT SCAN    CT BIOPSY RENAL  3/26/2025    CT BIOPSY RENAL 3/26/2025 [facility] CT SCAN    CYSTOSCOPY N/A 2024    RIGID CYSTOSCOPY, BLADDER BIOPSY performed by Kris Kan MD at [facility] [place]    MASTECTOMY Left 2019    LEFT MODIFIED RADICAL MASTECTOMY;    At Risk for Falls    Impairments/Disabilities:      None    Nutrition Therapy:  Current Nutrition Therapy:   - Oral Diet:  General    Routes of Feeding: Oral  Liquids: No Restrictions  Daily Fluid Restriction: no  Last Modified Barium Swallow with Video (Video Swallowing Test): not done    Treatments at the Time of Hospital Discharge:   Respiratory Treatments:   ipratropium 0.5 mg-albuterol 2.5 mg (DUONEB) nebulizer solution 1 Dose  3 times daily;   sodium chloride (Inhalant) 3 % nebulizer solution 4 mL  2 times daily  Oxygen Therapy:  is on oxygen at 6 L/min per nasal cannula.  Ventilator:    - No ventilator support    Rehab Therapies: GOGO  Weight Bearing Status/Restrictions: No weight bearing restrictions  Other Medical Equipment (for information only, NOT a DME order):  n/a  Other Treatments: n/a    Patient's personal belongings (please select all that are sent with patient):  Purse, clothes,     RN SIGNATURE:  Electronically signed by Grisel Guzman RN on 4/28/25 at 9:58 AM EDT    CASE MANAGEMENT/SOCIAL WORK SECTION    Inpatient Status Date: 4/24/25    Readmission Risk Assessment Score:  [facility] RISK OF UNPLANNED READMISSION 2.0             26.8 Total Score        Discharging to Facility/ Agency   Name: [facility]  [address] [id number]  Phone: [phone]  Fax: [phone]     / signature: Electronically signed by REBECCA ESPINAL on 4/28/25 at 8:57 AM EDT    PHYSICIAN SECTION    Prognosis: Good    Condition at Discharge: Stable    Rehab Potential (if transferring to Rehab): Good    Recommended Labs or Other Treatments After Discharge:   CBC/RFP in 5-7 days  1. You were seen in the hospital for shortness of breath.    2. Please make an appointment to see your primary care doctor in 1 week. Please make an appointment to see your pulmonologist in 1 week and nephrologist in 1 week.     3. START taking lokelma 1 packet every other day. CONTINUE your

## 2025-04-29 LAB
ACID FAST STN SPEC QL: NORMAL
BACTERIA BLD CULT ORG #2: ABNORMAL
BACTERIA BLD CULT ORG #2: ABNORMAL
BACTERIA BLD CULT: NORMAL
MYCOBACTERIUM SPEC CULT: NORMAL
ORGANISM: ABNORMAL

## 2025-04-29 NOTE — PROGRESS NOTES
Physician Progress Note      PATIENT:               MALISSA DEE  [facility] #:                  [id number]  :                       1960  ADMIT DATE:       2025 12:25 PM  DISCH DATE:        2025 1:37 PM  RESPONDING  PROVIDER #:        Sukhdev Chávez MD          QUERY TEXT:    Acute Kidney Injury is documented in the medical record by Sukhdev Chávez MD   at 2025. However, patient is having creatinine level of 2.4 during the   inpatient stay. Please provide additional clinical indicators supportive of   your documentation. Or please document if the diagnosis of CHANTELLE has been ruled   out after study.    The clinical indicators include:  This is a 64 y.o. female presenting with worsening shortness of breath that   started today - ED Provider Notes by Keisha Herrmann PA-C at 2025    CHANTELLE on CKD stage III (history of left nephrectomy and FSGS) - H&P by Porfirio Fitzgerald MD at 2025    CHANTELLE likely 2/2 acute respiratory failure, CKD secondary to FSGS and solitary   kidney - Consults by Sukhdev Chávez MD at 2025    CHANTELLE on CKD 3 b - Progress Notes by Dom Kenyon DO at 2025    Labs from [facility] Results review    - Creatinine/BUN/GFR: 2.4/71/22 on     - Creatinine/BUN/GFR: 2.1/64/26 on     - Creatinine/BUN/GFR: 2.0/58/28 on     sodium chloride flush 0.9 % injection 5-40 mL, 0.9 % sodium chloride infusion   - EPIC MAR  Options provided:  -- Acute kidney injury evidenced by, Please document evidence.  -- Acute kidney injury ruled out after study  -- Other - I will add my own diagnosis  -- Disagree - Not applicable / Not valid  -- Disagree - Clinically unable to determine / Unknown  -- Refer to Clinical Documentation Reviewer    PROVIDER RESPONSE TEXT:    This patient has an acute kidney injury as evidenced by elevated creatinine   from baseline    Query created by: Corey Whitehead on 2025 9:29 AM      Electronically signed by:  Sukhdev Chávez MD 2025  9:12 AM

## 2025-05-06 LAB
ACID FAST STN SPEC QL: NORMAL
MYCOBACTERIUM SPEC CULT: NORMAL

## 2025-05-19 ENCOUNTER — APPOINTMENT (OUTPATIENT)
Dept: GENERAL RADIOLOGY | Age: 65
DRG: 140 | End: 2025-05-19
Payer: COMMERCIAL

## 2025-05-19 ENCOUNTER — HOSPITAL ENCOUNTER (INPATIENT)
Age: 65
LOS: 8 days | Discharge: NURSING, REHAB OR LONG-TERM CARE | DRG: 140 | End: 2025-05-27
Attending: EMERGENCY MEDICINE | Admitting: STUDENT IN AN ORGANIZED HEALTH CARE EDUCATION/TRAINING PROGRAM
Payer: COMMERCIAL

## 2025-05-19 DIAGNOSIS — J44.1 COPD EXACERBATION (HCC): Primary | ICD-10-CM

## 2025-05-19 DIAGNOSIS — N17.9 AKI (ACUTE KIDNEY INJURY): ICD-10-CM

## 2025-05-19 DIAGNOSIS — J96.21 ACUTE ON CHRONIC RESPIRATORY FAILURE WITH HYPOXIA AND HYPERCAPNIA (HCC): ICD-10-CM

## 2025-05-19 DIAGNOSIS — J96.22 ACUTE ON CHRONIC RESPIRATORY FAILURE WITH HYPOXIA AND HYPERCAPNIA (HCC): ICD-10-CM

## 2025-05-19 DIAGNOSIS — R06.02 SHORTNESS OF BREATH: ICD-10-CM

## 2025-05-19 PROBLEM — N18.4 CKD (CHRONIC KIDNEY DISEASE) STAGE 4, GFR 15-29 ML/MIN (HCC): Status: ACTIVE | Noted: 2025-05-19

## 2025-05-19 PROBLEM — N06.0 ISOLATED PROTEINURIA WITH MINOR GLOMERULAR ABNORMALITY: Status: ACTIVE | Noted: 2020-03-05

## 2025-05-19 LAB
ALBUMIN SERPL-MCNC: 3.3 G/DL (ref 3.4–5)
ALBUMIN/GLOB SERPL: 1 {RATIO} (ref 1.1–2.2)
ALP SERPL-CCNC: 98 U/L (ref 40–129)
ALT SERPL-CCNC: 19 U/L (ref 10–40)
ANION GAP SERPL CALCULATED.3IONS-SCNC: 10 MMOL/L (ref 3–16)
AST SERPL-CCNC: 14 U/L (ref 15–37)
BASE EXCESS BLDV CALC-SCNC: -0.5 MMOL/L (ref -2–3)
BASE EXCESS BLDV CALC-SCNC: -0.6 MMOL/L (ref -2–3)
BASE EXCESS BLDV CALC-SCNC: -1.9 MMOL/L (ref -2–3)
BASE EXCESS BLDV CALC-SCNC: 1.1 MMOL/L (ref -2–3)
BASOPHILS # BLD: 0 K/UL (ref 0–0.2)
BASOPHILS NFR BLD: 0.3 %
BILIRUB SERPL-MCNC: <0.2 MG/DL (ref 0–1)
BUN SERPL-MCNC: 68 MG/DL (ref 7–20)
CALCIUM SERPL-MCNC: 8.5 MG/DL (ref 8.3–10.6)
CHLORIDE SERPL-SCNC: 105 MMOL/L (ref 99–110)
CO2 BLDV-SCNC: 28 MMOL/L
CO2 BLDV-SCNC: 31 MMOL/L
CO2 BLDV-SCNC: 32 MMOL/L
CO2 BLDV-SCNC: 32 MMOL/L
CO2 SERPL-SCNC: 26 MMOL/L (ref 21–32)
COHGB MFR BLDV: 1.3 % (ref 0–1.5)
COHGB MFR BLDV: 1.3 % (ref 0–1.5)
COHGB MFR BLDV: 1.4 % (ref 0–1.5)
COHGB MFR BLDV: 1.5 % (ref 0–1.5)
CREAT SERPL-MCNC: 2.5 MG/DL (ref 0.6–1.2)
DEPRECATED RDW RBC AUTO: 17.3 % (ref 12.4–15.4)
DO-HGB MFR BLDV: 16.5 %
DO-HGB MFR BLDV: 28.8 %
DO-HGB MFR BLDV: 36.3 %
DO-HGB MFR BLDV: 7.8 %
EKG ATRIAL RATE: 105 BPM
EKG DIAGNOSIS: NORMAL
EKG P AXIS: 66 DEGREES
EKG P-R INTERVAL: 150 MS
EKG Q-T INTERVAL: 348 MS
EKG QRS DURATION: 96 MS
EKG QTC CALCULATION (BAZETT): 459 MS
EKG R AXIS: 63 DEGREES
EKG T AXIS: 76 DEGREES
EKG VENTRICULAR RATE: 105 BPM
EOSINOPHIL # BLD: 0.1 K/UL (ref 0–0.6)
EOSINOPHIL NFR BLD: 0.7 %
GFR SERPLBLD CREATININE-BSD FMLA CKD-EPI: 21 ML/MIN/{1.73_M2}
GLUCOSE SERPL-MCNC: 112 MG/DL (ref 70–99)
HCO3 BLDV-SCNC: 26.4 MMOL/L (ref 24–28)
HCO3 BLDV-SCNC: 28.9 MMOL/L (ref 24–28)
HCO3 BLDV-SCNC: 29.5 MMOL/L (ref 24–28)
HCO3 BLDV-SCNC: 29.7 MMOL/L (ref 24–28)
HCT VFR BLD AUTO: 33.2 % (ref 36–48)
HGB BLD-MCNC: 10.8 G/DL (ref 12–16)
LYMPHOCYTES # BLD: 1 K/UL (ref 1–5.1)
LYMPHOCYTES NFR BLD: 11.6 %
MCH RBC QN AUTO: 31 PG (ref 26–34)
MCHC RBC AUTO-ENTMCNC: 32.7 G/DL (ref 31–36)
MCV RBC AUTO: 94.8 FL (ref 80–100)
METHGB MFR BLDV: 0.3 % (ref 0–1.5)
METHGB MFR BLDV: 0.4 % (ref 0–1.5)
MONOCYTES # BLD: 0.3 K/UL (ref 0–1.3)
MONOCYTES NFR BLD: 3.4 %
NEUTROPHILS # BLD: 7 K/UL (ref 1.7–7.7)
NEUTROPHILS NFR BLD: 84 %
NT-PROBNP SERPL-MCNC: 522 PG/ML (ref 0–124)
PCO2 BLDV: 62.8 MMHG (ref 41–51)
PCO2 BLDV: 67.1 MMHG (ref 41–51)
PCO2 BLDV: 75 MMHG (ref 41–51)
PCO2 BLDV: 82.5 MMHG (ref 41–51)
PH BLDV: 7.16 [PH] (ref 7.35–7.45)
PH BLDV: 7.2 [PH] (ref 7.35–7.45)
PH BLDV: 7.23 [PH] (ref 7.35–7.45)
PH BLDV: 7.25 [PH] (ref 7.35–7.45)
PLATELET # BLD AUTO: 246 K/UL (ref 135–450)
PMV BLD AUTO: 7.7 FL (ref 5–10.5)
PO2 BLDV: 38.6 MMHG (ref 25–40)
PO2 BLDV: 39.8 MMHG (ref 25–40)
PO2 BLDV: 52 MMHG (ref 25–40)
PO2 BLDV: 70.6 MMHG (ref 25–40)
POTASSIUM SERPL-SCNC: 4.4 MMOL/L (ref 3.5–5.1)
PROT SERPL-MCNC: 6.6 G/DL (ref 6.4–8.2)
RBC # BLD AUTO: 3.5 M/UL (ref 4–5.2)
SAO2 % BLDV: 63 %
SAO2 % BLDV: 71 %
SAO2 % BLDV: 83 %
SAO2 % BLDV: 92 %
SODIUM SERPL-SCNC: 141 MMOL/L (ref 136–145)
TROPONIN, HIGH SENSITIVITY: 48 NG/L (ref 0–14)
TROPONIN, HIGH SENSITIVITY: 49 NG/L (ref 0–14)
WBC # BLD AUTO: 8.3 K/UL (ref 4–11)

## 2025-05-19 PROCEDURE — 94640 AIRWAY INHALATION TREATMENT: CPT

## 2025-05-19 PROCEDURE — 2060000000 HC ICU INTERMEDIATE R&B

## 2025-05-19 PROCEDURE — 6370000000 HC RX 637 (ALT 250 FOR IP): Performed by: STUDENT IN AN ORGANIZED HEALTH CARE EDUCATION/TRAINING PROGRAM

## 2025-05-19 PROCEDURE — 71045 X-RAY EXAM CHEST 1 VIEW: CPT

## 2025-05-19 PROCEDURE — 80053 COMPREHEN METABOLIC PANEL: CPT

## 2025-05-19 PROCEDURE — 94660 CPAP INITIATION&MGMT: CPT

## 2025-05-19 PROCEDURE — 83880 ASSAY OF NATRIURETIC PEPTIDE: CPT

## 2025-05-19 PROCEDURE — 99223 1ST HOSP IP/OBS HIGH 75: CPT | Performed by: INTERNAL MEDICINE

## 2025-05-19 PROCEDURE — 87040 BLOOD CULTURE FOR BACTERIA: CPT

## 2025-05-19 PROCEDURE — 99285 EMERGENCY DEPT VISIT HI MDM: CPT

## 2025-05-19 PROCEDURE — 6360000002 HC RX W HCPCS

## 2025-05-19 PROCEDURE — 84484 ASSAY OF TROPONIN QUANT: CPT

## 2025-05-19 PROCEDURE — 82803 BLOOD GASES ANY COMBINATION: CPT

## 2025-05-19 PROCEDURE — 6360000002 HC RX W HCPCS: Performed by: EMERGENCY MEDICINE

## 2025-05-19 PROCEDURE — 94761 N-INVAS EAR/PLS OXIMETRY MLT: CPT

## 2025-05-19 PROCEDURE — 93005 ELECTROCARDIOGRAM TRACING: CPT | Performed by: EMERGENCY MEDICINE

## 2025-05-19 PROCEDURE — 2700000000 HC OXYGEN THERAPY PER DAY

## 2025-05-19 PROCEDURE — 36415 COLL VENOUS BLD VENIPUNCTURE: CPT

## 2025-05-19 PROCEDURE — 6360000002 HC RX W HCPCS: Performed by: INTERNAL MEDICINE

## 2025-05-19 PROCEDURE — 85025 COMPLETE CBC W/AUTO DIFF WBC: CPT

## 2025-05-19 PROCEDURE — 2500000003 HC RX 250 WO HCPCS: Performed by: EMERGENCY MEDICINE

## 2025-05-19 PROCEDURE — 2500000003 HC RX 250 WO HCPCS: Performed by: STUDENT IN AN ORGANIZED HEALTH CARE EDUCATION/TRAINING PROGRAM

## 2025-05-19 PROCEDURE — 96374 THER/PROPH/DIAG INJ IV PUSH: CPT

## 2025-05-19 RX ORDER — SODIUM CHLORIDE 0.9 % (FLUSH) 0.9 %
5-40 SYRINGE (ML) INJECTION EVERY 12 HOURS SCHEDULED
Status: DISCONTINUED | OUTPATIENT
Start: 2025-05-19 | End: 2025-05-27 | Stop reason: HOSPADM

## 2025-05-19 RX ORDER — SODIUM CHLORIDE 0.9 % (FLUSH) 0.9 %
5-40 SYRINGE (ML) INJECTION PRN
Status: DISCONTINUED | OUTPATIENT
Start: 2025-05-19 | End: 2025-05-27 | Stop reason: HOSPADM

## 2025-05-19 RX ORDER — ALBUTEROL SULFATE 0.83 MG/ML
2.5 SOLUTION RESPIRATORY (INHALATION) 4 TIMES DAILY
Status: DISCONTINUED | OUTPATIENT
Start: 2025-05-19 | End: 2025-05-19

## 2025-05-19 RX ORDER — BUDESONIDE 0.5 MG/2ML
0.5 INHALANT ORAL
Status: DISCONTINUED | OUTPATIENT
Start: 2025-05-19 | End: 2025-05-19 | Stop reason: SDUPTHER

## 2025-05-19 RX ORDER — POLYETHYLENE GLYCOL 3350 17 G/17G
17 POWDER, FOR SOLUTION ORAL DAILY PRN
Status: DISCONTINUED | OUTPATIENT
Start: 2025-05-19 | End: 2025-05-27 | Stop reason: HOSPADM

## 2025-05-19 RX ORDER — POTASSIUM CHLORIDE 7.45 MG/ML
10 INJECTION INTRAVENOUS PRN
Status: DISCONTINUED | OUTPATIENT
Start: 2025-05-19 | End: 2025-05-27 | Stop reason: HOSPADM

## 2025-05-19 RX ORDER — AMLODIPINE BESYLATE 5 MG/1
5 TABLET ORAL DAILY
Status: DISCONTINUED | OUTPATIENT
Start: 2025-05-19 | End: 2025-05-27 | Stop reason: HOSPADM

## 2025-05-19 RX ORDER — ROPINIROLE 1 MG/1
0.5 TABLET, FILM COATED ORAL 2 TIMES DAILY
Status: DISCONTINUED | OUTPATIENT
Start: 2025-05-19 | End: 2025-05-27 | Stop reason: HOSPADM

## 2025-05-19 RX ORDER — TRAZODONE HYDROCHLORIDE 50 MG/1
50 TABLET ORAL NIGHTLY
Status: DISCONTINUED | OUTPATIENT
Start: 2025-05-19 | End: 2025-05-27 | Stop reason: HOSPADM

## 2025-05-19 RX ORDER — MECOBALAMIN 5000 MCG
10 TABLET,DISINTEGRATING ORAL NIGHTLY PRN
Status: DISCONTINUED | OUTPATIENT
Start: 2025-05-19 | End: 2025-05-27 | Stop reason: HOSPADM

## 2025-05-19 RX ORDER — CARVEDILOL 6.25 MG/1
6.25 TABLET ORAL 2 TIMES DAILY WITH MEALS
Status: DISCONTINUED | OUTPATIENT
Start: 2025-05-19 | End: 2025-05-27 | Stop reason: HOSPADM

## 2025-05-19 RX ORDER — ACETAMINOPHEN 325 MG/1
650 TABLET ORAL EVERY 6 HOURS PRN
Status: DISCONTINUED | OUTPATIENT
Start: 2025-05-19 | End: 2025-05-27 | Stop reason: HOSPADM

## 2025-05-19 RX ORDER — MAGNESIUM SULFATE IN WATER 40 MG/ML
2000 INJECTION, SOLUTION INTRAVENOUS PRN
Status: DISCONTINUED | OUTPATIENT
Start: 2025-05-19 | End: 2025-05-27 | Stop reason: HOSPADM

## 2025-05-19 RX ORDER — ALBUTEROL SULFATE 0.83 MG/ML
2.5 SOLUTION RESPIRATORY (INHALATION)
Status: DISCONTINUED | OUTPATIENT
Start: 2025-05-19 | End: 2025-05-21

## 2025-05-19 RX ORDER — LETROZOLE 2.5 MG/1
2.5 TABLET, FILM COATED ORAL DAILY
Status: DISCONTINUED | OUTPATIENT
Start: 2025-05-19 | End: 2025-05-27 | Stop reason: HOSPADM

## 2025-05-19 RX ORDER — PREDNISONE 10 MG/1
10 TABLET ORAL DAILY
Status: DISCONTINUED | OUTPATIENT
Start: 2025-05-20 | End: 2025-05-27

## 2025-05-19 RX ORDER — ACETAMINOPHEN 650 MG/1
650 SUPPOSITORY RECTAL EVERY 6 HOURS PRN
Status: DISCONTINUED | OUTPATIENT
Start: 2025-05-19 | End: 2025-05-27 | Stop reason: HOSPADM

## 2025-05-19 RX ORDER — PREDNISONE 20 MG/1
20 TABLET ORAL DAILY
Status: DISCONTINUED | OUTPATIENT
Start: 2025-05-19 | End: 2025-05-19

## 2025-05-19 RX ORDER — ONDANSETRON 2 MG/ML
4 INJECTION INTRAMUSCULAR; INTRAVENOUS EVERY 6 HOURS PRN
Status: DISCONTINUED | OUTPATIENT
Start: 2025-05-19 | End: 2025-05-27 | Stop reason: HOSPADM

## 2025-05-19 RX ORDER — LOSARTAN POTASSIUM 25 MG/1
25 TABLET ORAL DAILY
Status: DISCONTINUED | OUTPATIENT
Start: 2025-05-19 | End: 2025-05-26

## 2025-05-19 RX ORDER — POTASSIUM CHLORIDE 1500 MG/1
40 TABLET, EXTENDED RELEASE ORAL PRN
Status: DISCONTINUED | OUTPATIENT
Start: 2025-05-19 | End: 2025-05-27 | Stop reason: HOSPADM

## 2025-05-19 RX ORDER — HYDROXYZINE HYDROCHLORIDE 25 MG/1
25 TABLET, FILM COATED ORAL 3 TIMES DAILY PRN
Status: DISCONTINUED | OUTPATIENT
Start: 2025-05-19 | End: 2025-05-27 | Stop reason: HOSPADM

## 2025-05-19 RX ORDER — IPRATROPIUM BROMIDE AND ALBUTEROL SULFATE 2.5; .5 MG/3ML; MG/3ML
1 SOLUTION RESPIRATORY (INHALATION)
Status: DISCONTINUED | OUTPATIENT
Start: 2025-05-19 | End: 2025-05-19

## 2025-05-19 RX ORDER — ARFORMOTEROL TARTRATE 15 UG/2ML
15 SOLUTION RESPIRATORY (INHALATION)
Status: DISCONTINUED | OUTPATIENT
Start: 2025-05-19 | End: 2025-05-19

## 2025-05-19 RX ORDER — SODIUM CHLORIDE 9 MG/ML
INJECTION, SOLUTION INTRAVENOUS PRN
Status: DISCONTINUED | OUTPATIENT
Start: 2025-05-19 | End: 2025-05-27 | Stop reason: HOSPADM

## 2025-05-19 RX ORDER — TORSEMIDE 20 MG/1
20 TABLET ORAL DAILY
Status: DISCONTINUED | OUTPATIENT
Start: 2025-05-19 | End: 2025-05-27 | Stop reason: HOSPADM

## 2025-05-19 RX ORDER — TEMAZEPAM 15 MG/1
15 CAPSULE ORAL NIGHTLY PRN
Status: DISCONTINUED | OUTPATIENT
Start: 2025-05-19 | End: 2025-05-27 | Stop reason: HOSPADM

## 2025-05-19 RX ORDER — ALBUTEROL SULFATE 0.83 MG/ML
2.5 SOLUTION RESPIRATORY (INHALATION) EVERY 6 HOURS PRN
Status: DISCONTINUED | OUTPATIENT
Start: 2025-05-19 | End: 2025-05-27 | Stop reason: HOSPADM

## 2025-05-19 RX ORDER — PANTOPRAZOLE SODIUM 20 MG/1
20 TABLET, DELAYED RELEASE ORAL
Status: DISCONTINUED | OUTPATIENT
Start: 2025-05-20 | End: 2025-05-27 | Stop reason: HOSPADM

## 2025-05-19 RX ORDER — SPIRONOLACTONE 25 MG/1
25 TABLET ORAL DAILY
Status: DISCONTINUED | OUTPATIENT
Start: 2025-05-19 | End: 2025-05-20

## 2025-05-19 RX ORDER — ONDANSETRON 4 MG/1
4 TABLET, ORALLY DISINTEGRATING ORAL EVERY 8 HOURS PRN
Status: DISCONTINUED | OUTPATIENT
Start: 2025-05-19 | End: 2025-05-27 | Stop reason: HOSPADM

## 2025-05-19 RX ADMIN — IPRATROPIUM BROMIDE AND ALBUTEROL SULFATE 1 DOSE: .5; 3 SOLUTION RESPIRATORY (INHALATION) at 16:00

## 2025-05-19 RX ADMIN — ACETAMINOPHEN 650 MG: 325 TABLET ORAL at 20:26

## 2025-05-19 RX ADMIN — WATER 125 MG: 1 INJECTION INTRAMUSCULAR; INTRAVENOUS; SUBCUTANEOUS at 08:13

## 2025-05-19 RX ADMIN — SODIUM CHLORIDE, PRESERVATIVE FREE 5 ML: 5 INJECTION INTRAVENOUS at 21:26

## 2025-05-19 RX ADMIN — ALBUTEROL SULFATE 2.5 MG: 2.5 SOLUTION RESPIRATORY (INHALATION) at 05:33

## 2025-05-19 RX ADMIN — ALBUTEROL SULFATE 2.5 MG: 2.5 SOLUTION RESPIRATORY (INHALATION) at 18:42

## 2025-05-19 RX ADMIN — SODIUM CHLORIDE, PRESERVATIVE FREE 10 ML: 5 INJECTION INTRAVENOUS at 11:06

## 2025-05-19 RX ADMIN — SPIRONOLACTONE 25 MG: 25 TABLET ORAL at 16:08

## 2025-05-19 RX ADMIN — ARFORMOTEROL TARTRATE: 15 SOLUTION RESPIRATORY (INHALATION) at 20:34

## 2025-05-19 RX ADMIN — AMLODIPINE BESYLATE 5 MG: 5 TABLET ORAL at 16:08

## 2025-05-19 RX ADMIN — TRAZODONE HYDROCHLORIDE 50 MG: 100 TABLET ORAL at 20:28

## 2025-05-19 RX ADMIN — PREDNISONE 20 MG: 20 TABLET ORAL at 16:08

## 2025-05-19 RX ADMIN — IPRATROPIUM BROMIDE AND ALBUTEROL SULFATE 1 DOSE: .5; 3 SOLUTION RESPIRATORY (INHALATION) at 20:34

## 2025-05-19 RX ADMIN — SERTRALINE HYDROCHLORIDE 50 MG: 50 TABLET ORAL at 16:07

## 2025-05-19 RX ADMIN — TORSEMIDE 20 MG: 20 TABLET ORAL at 16:07

## 2025-05-19 RX ADMIN — ROPINIROLE HYDROCHLORIDE 0.5 MG: 1 TABLET, FILM COATED ORAL at 20:28

## 2025-05-19 RX ADMIN — LETROZOLE 2.5 MG: 2.5 TABLET ORAL at 16:08

## 2025-05-19 RX ADMIN — CARVEDILOL 6.25 MG: 6.25 TABLET, FILM COATED ORAL at 17:11

## 2025-05-19 RX ADMIN — ALBUTEROL SULFATE 2.5 MG: 2.5 SOLUTION RESPIRATORY (INHALATION) at 15:15

## 2025-05-19 RX ADMIN — ALBUTEROL SULFATE 2.5 MG: 2.5 SOLUTION RESPIRATORY (INHALATION) at 11:09

## 2025-05-19 RX ADMIN — TEMAZEPAM 15 MG: 15 CAPSULE ORAL at 20:28

## 2025-05-19 RX ADMIN — HYDROXYZINE HYDROCHLORIDE 25 MG: 25 TABLET ORAL at 20:28

## 2025-05-19 ASSESSMENT — PAIN SCALES - GENERAL
PAINLEVEL_OUTOF10: 3
PAINLEVEL_OUTOF10: 3

## 2025-05-19 ASSESSMENT — PAIN DESCRIPTION - ONSET: ONSET: ON-GOING

## 2025-05-19 ASSESSMENT — PAIN DESCRIPTION - ORIENTATION: ORIENTATION: RIGHT;LEFT

## 2025-05-19 ASSESSMENT — PAIN DESCRIPTION - LOCATION: LOCATION: KNEE

## 2025-05-19 ASSESSMENT — PAIN DESCRIPTION - DESCRIPTORS: DESCRIPTORS: ACHING

## 2025-05-19 NOTE — ED NOTES
Report called to floor. All questions answered. No concerns noted. Safe handoff complete. Pt is alert and oriented, resp even and unlabored on BiPaP, skin natural in color, no signs of acute distress.        Emily Flores RN  05/19/25 [id number]

## 2025-05-19 NOTE — ED NOTES
Patient Name: Shoaib Rothman  : 1960 64 y.o.  MRN: [id number]  ED Room #: A09/A09-09     Chief complaint:   Chief Complaint   Patient presents with    Shortness of Breath     Patient coming from [facility] with c/o SOB. 2 Duonebs given en route. Pt has hx of COPD and emphysema. Baseline on 6-10 liters of oxygen.        Hospital Problem/Diagnosis:   Hospital Problems           Last Modified POA    * (Principal) COPD exacerbation (HCC) 2025 Yes         O2 Flow Rate:O2 Device: PAP (positive airway pressure) O2 Flow Rate (L/min): 15 L/min (if applicable)  Cardiac Rhythm:   (if applicable)  Active LDA's:           How does patient ambulate? Front Wheeled Walker    2. How does patient take pills? Unknown, no oral medications were given in the Emergency Department    3. Is patient alert? Alert    4. Is patient oriented? To Person, To Place, To Time, To Situation, and Follows Commands    5.   Patient arrived from:  nursing home  Facility Name: [facility]    6. If patient is disoriented or from a Sk[facility] Nursing Facility has family been notified of admission?       7. Patient belongings?     Disposition of belongings?         8. Any specific patient or family belongings/needs/dynamics?   a. BIPAP    9. Miscellaneous comments/pending orders?  a. N/A      If there are any additional questions please reach out to the Emergency Department.   -Patient is a 64 year old female who presented to the emergency room with a chief complaint of Shortness of Breath. Patient is arriving from [facility], received 2 duonebs en route from EMS. Patient reports the SOB has been increasing over the past couple of days reports that it feels like a COPD exacerbation. Patient is currently on the BIPAP. Patient has her port accessed. In the ED, patient has received 125 mg of solumedrol via port.      Jaime Alejandre RN  25 [phone]

## 2025-05-19 NOTE — H&P
V2.0  History and Physical      Name:  Shoaib Rothman /Age/Sex: 1960  (64 y.o. female)   MRN & CSN:  [id number] & [id number] Encounter Date/Time: 2025 1:41 PM EDT   Location:  [facility]- PCP: Lamar Mondragon MD       Hospital Day: 1    Assessment and Plan:     63yo female with history of end stage COPD, obesity, probable CHRISTA, frequent mucous plugging requiring frequent bronchoscopies, MDD, PIPPA, presenting with worsening SOB. Being admitted for acute on chronic hypercapnic RF in the setting of end-stage COPD and possible mucous plugging.     COPD exacerbation  Hx of frequent mucous plugging  End stage COPD  Acute on chronic hypercapnic RF  -Pulmonology Consult. .  -Continue to encourage NIV to correct acidosis.   -Nebs as ordered.   -Continue chronic prednisone.   -Encourage pulmonary toilet    Obesity    MDD  PIPPA  -Continue chronic home meds    HTN  -Continue chronic home meds(ordered).     Hx of breat CA  -Femara as ordered.     Hx of Pe's  -Resume Eliquis after Bronchoscopy tomorrow.     CHANTELLE on CKD stage 3  Hx of FSGS  -Nephrology consult. InOs as ordered.       Disposition:   Current Living situation: Placement  Expected Disposition: Same  Estimated D/C: 2-3 days.    Diet ADULT DIET; Regular; 4 carb choices (60 gm/meal); Low Fat/Low Chol/High Fiber/2 gm Na; Low Sodium (2 gm)   DVT Prophylaxis [] Lovenox, []  Heparin, [x] SCDs, [] Ambulation,  [x] Eliquis, [] Xarelto, [] Coumadin   Code Status Full Code   Surrogate Decision Maker/ POA On file     Personally reviewed Lab Studies and Imaging     Discussed management of the case with case management and Pulmonology services. I reviewed Pulmonology's consult note.         History from:     patient, electronic medical record    History of Present Illness:     63yo female with history of end stage COPD, obesity, probable CHRISTA, frequent mucous plugging requiring frequent bronchoscopies, MDD, PIPPA, presenting with worsening SOB. Internal medicince

## 2025-05-19 NOTE — ED PROVIDER NOTES
[facility]  EMERGENCY DEPARTMENT ENCOUNTER          ATTENDING PHYSICIAN NOTE       Date of evaluation: 5/19/2025    Chief Complaint     Shortness of Breath (Patient coming from [facility] with c/o SOB. 2 Duonebs given en route. Pt has hx of COPD and emphysema. Baseline on 6-10 liters of oxygen.   )      History of Present Illness     Shoaib Rothman is a 64 y.o. female who presents with this shortness of breath who.  The patient has a history of end-stage COPD, typically on 16 L of oxygen at home.  Says that this shortness of breath has gradually been building up over time, and reports that she basically has been getting bronched once a month since January.  She says this feels like her exacerbations of COPD similar to what she is gotten in the past.  Denies abdominal pain or nausea vomiting.  Reports swelling in bilateral feet    ASSESSMENT / PLAN  (MEDICAL DECISION MAKING)     INITIAL VITALS: BP: (!) 160/80, Temp: 97.5 °F (36.4 °C), Pulse: (!) 103, Respirations: 16, SpO2: (!) 88 %      Shoaib Rothman is a 64 y.o. female who presents with shortness of breath.  She received 2 DuoNebs with EMS, was started on albuterol here and BiPAP was initiated as the patient continues to have oxygen saturations in the low 80s despite 15 L oxygen via nonrebreather.  She does however speak in relatively full sentences and does not appear in acute distress despite the hypoxia.  Basic labs were obtained, demonstrating recurrence of CHANTELLE with creatinine 2.5 up from baseline of around 1.8-2.  She has a venous blood gas that appears stable with her previous if a little bit improved, though still significantly acidotic and hypercarbic.  Cardiac enzymes stable.  Chest x-ray is read as demonstrating the same moderate diffuse airspace disease as previous, and similarly stable.  Overall the patient's clinical picture suggests essentially similar situation as during her last admission in the hospital, not tremendously worse,

## 2025-05-20 ENCOUNTER — ANESTHESIA EVENT (OUTPATIENT)
Dept: ENDOSCOPY | Age: 65
End: 2025-05-20
Payer: COMMERCIAL

## 2025-05-20 ENCOUNTER — APPOINTMENT (OUTPATIENT)
Dept: ENDOSCOPY | Age: 65
DRG: 140 | End: 2025-05-20
Attending: INTERNAL MEDICINE
Payer: COMMERCIAL

## 2025-05-20 ENCOUNTER — ANESTHESIA (OUTPATIENT)
Dept: ENDOSCOPY | Age: 65
End: 2025-05-20
Payer: COMMERCIAL

## 2025-05-20 LAB
ANION GAP SERPL CALCULATED.3IONS-SCNC: 10 MMOL/L (ref 3–16)
BASOPHILS # BLD: 0 K/UL (ref 0–0.2)
BASOPHILS NFR BLD: 0 %
BUN SERPL-MCNC: 68 MG/DL (ref 7–20)
CALCIUM SERPL-MCNC: 8.5 MG/DL (ref 8.3–10.6)
CHLORIDE SERPL-SCNC: 103 MMOL/L (ref 99–110)
CO2 SERPL-SCNC: 27 MMOL/L (ref 21–32)
CREAT SERPL-MCNC: 2.4 MG/DL (ref 0.6–1.2)
CREAT UR-MCNC: 77.7 MG/DL (ref 28–259)
CREAT UR-MCNC: 77.9 MG/DL (ref 28–259)
DEPRECATED RDW RBC AUTO: 17 % (ref 12.4–15.4)
EOSINOPHIL # BLD: 0 K/UL (ref 0–0.6)
EOSINOPHIL NFR BLD: 0 %
GFR SERPLBLD CREATININE-BSD FMLA CKD-EPI: 22 ML/MIN/{1.73_M2}
GLUCOSE SERPL-MCNC: 188 MG/DL (ref 70–99)
HCT VFR BLD AUTO: 29.2 % (ref 36–48)
HGB BLD-MCNC: 9.6 G/DL (ref 12–16)
LYMPHOCYTES # BLD: 0.5 K/UL (ref 1–5.1)
LYMPHOCYTES NFR BLD: 7 %
MCH RBC QN AUTO: 31.1 PG (ref 26–34)
MCHC RBC AUTO-ENTMCNC: 32.8 G/DL (ref 31–36)
MCV RBC AUTO: 94.9 FL (ref 80–100)
MONOCYTES # BLD: 0.4 K/UL (ref 0–1.3)
MONOCYTES NFR BLD: 5.5 %
NEUTROPHILS # BLD: 6.7 K/UL (ref 1.7–7.7)
NEUTROPHILS NFR BLD: 87.5 %
PHOSPHATE SERPL-MCNC: 4.9 MG/DL (ref 2.5–4.9)
PLATELET # BLD AUTO: 227 K/UL (ref 135–450)
PMV BLD AUTO: 7.7 FL (ref 5–10.5)
POTASSIUM SERPL-SCNC: 5.3 MMOL/L (ref 3.5–5.1)
PROCALCITONIN SERPL IA-MCNC: 0.78 NG/ML (ref 0–0.15)
PROT UR-MCNC: 280 MG/DL
PROT/CREAT UR-RTO: 3.6 MG/DL
RBC # BLD AUTO: 3.08 M/UL (ref 4–5.2)
SODIUM SERPL-SCNC: 140 MMOL/L (ref 136–145)
UUN UR-MCNC: 572 MG/DL (ref 800–1666)
WBC # BLD AUTO: 7.7 K/UL (ref 4–11)

## 2025-05-20 PROCEDURE — 94660 CPAP INITIATION&MGMT: CPT

## 2025-05-20 PROCEDURE — 89051 BODY FLUID CELL COUNT: CPT

## 2025-05-20 PROCEDURE — 31625 BRONCHOSCOPY W/BIOPSY(S): CPT | Performed by: INTERNAL MEDICINE

## 2025-05-20 PROCEDURE — 94761 N-INVAS EAR/PLS OXIMETRY MLT: CPT

## 2025-05-20 PROCEDURE — 2580000003 HC RX 258

## 2025-05-20 PROCEDURE — 2720000010 HC SURG SUPPLY STERILE: Performed by: INTERNAL MEDICINE

## 2025-05-20 PROCEDURE — 80048 BASIC METABOLIC PNL TOTAL CA: CPT

## 2025-05-20 PROCEDURE — 87206 SMEAR FLUORESCENT/ACID STAI: CPT

## 2025-05-20 PROCEDURE — 31624 DX BRONCHOSCOPE/LAVAGE: CPT | Performed by: INTERNAL MEDICINE

## 2025-05-20 PROCEDURE — 3700000001 HC ADD 15 MINUTES (ANESTHESIA): Performed by: INTERNAL MEDICINE

## 2025-05-20 PROCEDURE — 85025 COMPLETE CBC W/AUTO DIFF WBC: CPT

## 2025-05-20 PROCEDURE — 7100000000 HC PACU RECOVERY - FIRST 15 MIN: Performed by: INTERNAL MEDICINE

## 2025-05-20 PROCEDURE — 6360000002 HC RX W HCPCS

## 2025-05-20 PROCEDURE — 87305 ASPERGILLUS AG IA: CPT

## 2025-05-20 PROCEDURE — 0BBC8ZX EXCISION OF RIGHT UPPER LUNG LOBE, VIA NATURAL OR ARTIFICIAL OPENING ENDOSCOPIC, DIAGNOSTIC: ICD-10-PCS | Performed by: INTERNAL MEDICINE

## 2025-05-20 PROCEDURE — 84145 PROCALCITONIN (PCT): CPT

## 2025-05-20 PROCEDURE — 84100 ASSAY OF PHOSPHORUS: CPT

## 2025-05-20 PROCEDURE — 6370000000 HC RX 637 (ALT 250 FOR IP)

## 2025-05-20 PROCEDURE — 88305 TISSUE EXAM BY PATHOLOGIST: CPT

## 2025-05-20 PROCEDURE — 3609010800 HC BRONCHOSCOPY ALVEOLAR LAVAGE: Performed by: INTERNAL MEDICINE

## 2025-05-20 PROCEDURE — 3609011300 HC BRONCHOSCOPY BRONCHIAL/ENDOBRNCL BX 1+ SITES: Performed by: INTERNAL MEDICINE

## 2025-05-20 PROCEDURE — 6370000000 HC RX 637 (ALT 250 FOR IP): Performed by: STUDENT IN AN ORGANIZED HEALTH CARE EDUCATION/TRAINING PROGRAM

## 2025-05-20 PROCEDURE — 6360000002 HC RX W HCPCS: Performed by: INTERNAL MEDICINE

## 2025-05-20 PROCEDURE — 87070 CULTURE OTHR SPECIMN AEROBIC: CPT

## 2025-05-20 PROCEDURE — 87633 RESP VIRUS 12-25 TARGETS: CPT

## 2025-05-20 PROCEDURE — 2709999900 HC NON-CHARGEABLE SUPPLY: Performed by: INTERNAL MEDICINE

## 2025-05-20 PROCEDURE — 0B9C8ZX DRAINAGE OF RIGHT UPPER LUNG LOBE, VIA NATURAL OR ARTIFICIAL OPENING ENDOSCOPIC, DIAGNOSTIC: ICD-10-PCS | Performed by: INTERNAL MEDICINE

## 2025-05-20 PROCEDURE — 3609011400 HC BRONCHOSCOPY CRYOTHERAPY: Performed by: INTERNAL MEDICINE

## 2025-05-20 PROCEDURE — 87102 FUNGUS ISOLATION CULTURE: CPT

## 2025-05-20 PROCEDURE — 3700000000 HC ANESTHESIA ATTENDED CARE: Performed by: INTERNAL MEDICINE

## 2025-05-20 PROCEDURE — 87116 MYCOBACTERIA CULTURE: CPT

## 2025-05-20 PROCEDURE — 82570 ASSAY OF URINE CREATININE: CPT

## 2025-05-20 PROCEDURE — 0BCB8ZZ EXTIRPATION OF MATTER FROM LEFT LOWER LOBE BRONCHUS, VIA NATURAL OR ARTIFICIAL OPENING ENDOSCOPIC: ICD-10-PCS | Performed by: INTERNAL MEDICINE

## 2025-05-20 PROCEDURE — 2060000000 HC ICU INTERMEDIATE R&B

## 2025-05-20 PROCEDURE — 7100000001 HC PACU RECOVERY - ADDTL 15 MIN: Performed by: INTERNAL MEDICINE

## 2025-05-20 PROCEDURE — 94640 AIRWAY INHALATION TREATMENT: CPT

## 2025-05-20 PROCEDURE — 2500000003 HC RX 250 WO HCPCS: Performed by: STUDENT IN AN ORGANIZED HEALTH CARE EDUCATION/TRAINING PROGRAM

## 2025-05-20 PROCEDURE — 99233 SBSQ HOSP IP/OBS HIGH 50: CPT | Performed by: INTERNAL MEDICINE

## 2025-05-20 PROCEDURE — 84540 ASSAY OF URINE/UREA-N: CPT

## 2025-05-20 PROCEDURE — 87205 SMEAR GRAM STAIN: CPT

## 2025-05-20 PROCEDURE — 31645 BRNCHSC W/THER ASPIR 1ST: CPT | Performed by: INTERNAL MEDICINE

## 2025-05-20 PROCEDURE — 0BC68ZZ EXTIRPATION OF MATTER FROM RIGHT LOWER LOBE BRONCHUS, VIA NATURAL OR ARTIFICIAL OPENING ENDOSCOPIC: ICD-10-PCS | Performed by: INTERNAL MEDICINE

## 2025-05-20 PROCEDURE — 84156 ASSAY OF PROTEIN URINE: CPT

## 2025-05-20 PROCEDURE — 2500000003 HC RX 250 WO HCPCS

## 2025-05-20 PROCEDURE — 6370000000 HC RX 637 (ALT 250 FOR IP): Performed by: PHYSICIAN ASSISTANT

## 2025-05-20 PROCEDURE — 6370000000 HC RX 637 (ALT 250 FOR IP): Performed by: INTERNAL MEDICINE

## 2025-05-20 PROCEDURE — 2700000000 HC OXYGEN THERAPY PER DAY

## 2025-05-20 RX ORDER — ALBUTEROL SULFATE 90 UG/1
INHALANT RESPIRATORY (INHALATION)
Status: DISCONTINUED | OUTPATIENT
Start: 2025-05-20 | End: 2025-05-20 | Stop reason: SDUPTHER

## 2025-05-20 RX ORDER — LABETALOL HYDROCHLORIDE 5 MG/ML
10 INJECTION, SOLUTION INTRAVENOUS
Status: DISCONTINUED | OUTPATIENT
Start: 2025-05-20 | End: 2025-05-20 | Stop reason: HOSPADM

## 2025-05-20 RX ORDER — NALOXONE HYDROCHLORIDE 0.4 MG/ML
INJECTION, SOLUTION INTRAMUSCULAR; INTRAVENOUS; SUBCUTANEOUS PRN
Status: DISCONTINUED | OUTPATIENT
Start: 2025-05-20 | End: 2025-05-20 | Stop reason: HOSPADM

## 2025-05-20 RX ORDER — HYDRALAZINE HYDROCHLORIDE 20 MG/ML
10 INJECTION INTRAMUSCULAR; INTRAVENOUS
Status: DISCONTINUED | OUTPATIENT
Start: 2025-05-20 | End: 2025-05-20 | Stop reason: HOSPADM

## 2025-05-20 RX ORDER — ONDANSETRON 2 MG/ML
INJECTION INTRAMUSCULAR; INTRAVENOUS
Status: DISCONTINUED | OUTPATIENT
Start: 2025-05-20 | End: 2025-05-20 | Stop reason: SDUPTHER

## 2025-05-20 RX ORDER — SODIUM CHLORIDE 0.9 % (FLUSH) 0.9 %
5-40 SYRINGE (ML) INJECTION PRN
Status: DISCONTINUED | OUTPATIENT
Start: 2025-05-20 | End: 2025-05-20 | Stop reason: HOSPADM

## 2025-05-20 RX ORDER — MEPERIDINE HYDROCHLORIDE 25 MG/ML
12.5 INJECTION INTRAMUSCULAR; INTRAVENOUS; SUBCUTANEOUS EVERY 5 MIN PRN
Status: DISCONTINUED | OUTPATIENT
Start: 2025-05-20 | End: 2025-05-20 | Stop reason: HOSPADM

## 2025-05-20 RX ORDER — HYDROMORPHONE HYDROCHLORIDE 1 MG/ML
0.5 INJECTION, SOLUTION INTRAMUSCULAR; INTRAVENOUS; SUBCUTANEOUS EVERY 5 MIN PRN
Status: DISCONTINUED | OUTPATIENT
Start: 2025-05-20 | End: 2025-05-20 | Stop reason: HOSPADM

## 2025-05-20 RX ORDER — PHENYLEPHRINE HCL IN 0.9% NACL 1 MG/10 ML
SYRINGE (ML) INTRAVENOUS
Status: DISCONTINUED | OUTPATIENT
Start: 2025-05-20 | End: 2025-05-20 | Stop reason: SDUPTHER

## 2025-05-20 RX ORDER — SODIUM CHLORIDE 9 MG/ML
INJECTION, SOLUTION INTRAVENOUS PRN
Status: DISCONTINUED | OUTPATIENT
Start: 2025-05-20 | End: 2025-05-20 | Stop reason: HOSPADM

## 2025-05-20 RX ORDER — SODIUM CHLORIDE 0.9 % (FLUSH) 0.9 %
5-40 SYRINGE (ML) INJECTION EVERY 12 HOURS SCHEDULED
Status: DISCONTINUED | OUTPATIENT
Start: 2025-05-20 | End: 2025-05-20 | Stop reason: HOSPADM

## 2025-05-20 RX ORDER — ROCURONIUM BROMIDE 10 MG/ML
INJECTION, SOLUTION INTRAVENOUS
Status: DISCONTINUED | OUTPATIENT
Start: 2025-05-20 | End: 2025-05-20 | Stop reason: SDUPTHER

## 2025-05-20 RX ORDER — SODIUM CHLORIDE, SODIUM LACTATE, POTASSIUM CHLORIDE, CALCIUM CHLORIDE 600; 310; 30; 20 MG/100ML; MG/100ML; MG/100ML; MG/100ML
INJECTION, SOLUTION INTRAVENOUS
Status: DISCONTINUED | OUTPATIENT
Start: 2025-05-20 | End: 2025-05-20 | Stop reason: SDUPTHER

## 2025-05-20 RX ORDER — PROPOFOL 10 MG/ML
INJECTION, EMULSION INTRAVENOUS
Status: DISCONTINUED | OUTPATIENT
Start: 2025-05-20 | End: 2025-05-20 | Stop reason: SDUPTHER

## 2025-05-20 RX ORDER — HYDROMORPHONE HYDROCHLORIDE 1 MG/ML
0.5 INJECTION, SOLUTION INTRAMUSCULAR; INTRAVENOUS; SUBCUTANEOUS EVERY 10 MIN PRN
Status: DISCONTINUED | OUTPATIENT
Start: 2025-05-20 | End: 2025-05-20 | Stop reason: HOSPADM

## 2025-05-20 RX ORDER — DEXAMETHASONE SODIUM PHOSPHATE 4 MG/ML
INJECTION, SOLUTION INTRA-ARTICULAR; INTRALESIONAL; INTRAMUSCULAR; INTRAVENOUS; SOFT TISSUE
Status: DISCONTINUED | OUTPATIENT
Start: 2025-05-20 | End: 2025-05-20 | Stop reason: SDUPTHER

## 2025-05-20 RX ORDER — DIPHENHYDRAMINE HYDROCHLORIDE 50 MG/ML
12.5 INJECTION, SOLUTION INTRAMUSCULAR; INTRAVENOUS
Status: DISCONTINUED | OUTPATIENT
Start: 2025-05-20 | End: 2025-05-20 | Stop reason: HOSPADM

## 2025-05-20 RX ORDER — METOCLOPRAMIDE HYDROCHLORIDE 5 MG/ML
10 INJECTION INTRAMUSCULAR; INTRAVENOUS
Status: DISCONTINUED | OUTPATIENT
Start: 2025-05-20 | End: 2025-05-20 | Stop reason: HOSPADM

## 2025-05-20 RX ORDER — LIDOCAINE HYDROCHLORIDE 20 MG/ML
INJECTION, SOLUTION INTRAVENOUS
Status: DISCONTINUED | OUTPATIENT
Start: 2025-05-20 | End: 2025-05-20 | Stop reason: SDUPTHER

## 2025-05-20 RX ADMIN — ARFORMOTEROL TARTRATE: 15 SOLUTION RESPIRATORY (INHALATION) at 08:05

## 2025-05-20 RX ADMIN — ALBUTEROL SULFATE 2.5 MG: 2.5 SOLUTION RESPIRATORY (INHALATION) at 16:44

## 2025-05-20 RX ADMIN — PREDNISONE 10 MG: 10 TABLET ORAL at 09:08

## 2025-05-20 RX ADMIN — ROCURONIUM BROMIDE 80 MG: 10 INJECTION, SOLUTION INTRAVENOUS at 12:29

## 2025-05-20 RX ADMIN — TRAZODONE HYDROCHLORIDE 50 MG: 100 TABLET ORAL at 19:50

## 2025-05-20 RX ADMIN — ROPINIROLE HYDROCHLORIDE 0.5 MG: 1 TABLET, FILM COATED ORAL at 19:50

## 2025-05-20 RX ADMIN — PANTOPRAZOLE SODIUM 20 MG: 20 TABLET, DELAYED RELEASE ORAL at 06:20

## 2025-05-20 RX ADMIN — PROPOFOL 150 MG: 10 INJECTION, EMULSION INTRAVENOUS at 12:29

## 2025-05-20 RX ADMIN — ALBUTEROL SULFATE 2 PUFF: 90 AEROSOL, METERED RESPIRATORY (INHALATION) at 12:26

## 2025-05-20 RX ADMIN — LIDOCAINE HYDROCHLORIDE 100 MG: 20 INJECTION, SOLUTION INTRAVENOUS at 12:29

## 2025-05-20 RX ADMIN — ACETAMINOPHEN 650 MG: 325 TABLET ORAL at 19:50

## 2025-05-20 RX ADMIN — SERTRALINE HYDROCHLORIDE 50 MG: 50 TABLET ORAL at 09:11

## 2025-05-20 RX ADMIN — CARVEDILOL 6.25 MG: 6.25 TABLET, FILM COATED ORAL at 16:49

## 2025-05-20 RX ADMIN — SUGAMMADEX 100 MG: 100 INJECTION, SOLUTION INTRAVENOUS at 13:13

## 2025-05-20 RX ADMIN — LETROZOLE 2.5 MG: 2.5 TABLET ORAL at 09:23

## 2025-05-20 RX ADMIN — Medication 10 MG: at 19:50

## 2025-05-20 RX ADMIN — ARFORMOTEROL TARTRATE: 15 SOLUTION RESPIRATORY (INHALATION) at 20:45

## 2025-05-20 RX ADMIN — HYDROXYZINE HYDROCHLORIDE 25 MG: 25 TABLET ORAL at 19:50

## 2025-05-20 RX ADMIN — Medication 100 MCG: at 12:36

## 2025-05-20 RX ADMIN — ONDANSETRON 4 MG: 2 INJECTION INTRAMUSCULAR; INTRAVENOUS at 12:25

## 2025-05-20 RX ADMIN — SODIUM ZIRCONIUM CYCLOSILICATE 10 G: 10 POWDER, FOR SUSPENSION ORAL at 10:36

## 2025-05-20 RX ADMIN — SODIUM CHLORIDE, SODIUM LACTATE, POTASSIUM CHLORIDE, AND CALCIUM CHLORIDE: .6; .31; .03; .02 INJECTION, SOLUTION INTRAVENOUS at 12:09

## 2025-05-20 RX ADMIN — TEMAZEPAM 15 MG: 15 CAPSULE ORAL at 20:25

## 2025-05-20 RX ADMIN — TORSEMIDE 20 MG: 20 TABLET ORAL at 10:36

## 2025-05-20 RX ADMIN — CARVEDILOL 6.25 MG: 6.25 TABLET, FILM COATED ORAL at 09:08

## 2025-05-20 RX ADMIN — ROPINIROLE HYDROCHLORIDE 0.5 MG: 1 TABLET, FILM COATED ORAL at 09:08

## 2025-05-20 RX ADMIN — ALBUTEROL SULFATE 2.5 MG: 2.5 SOLUTION RESPIRATORY (INHALATION) at 20:45

## 2025-05-20 RX ADMIN — AMLODIPINE BESYLATE 5 MG: 5 TABLET ORAL at 09:08

## 2025-05-20 RX ADMIN — SODIUM CHLORIDE, PRESERVATIVE FREE 10 ML: 5 INJECTION INTRAVENOUS at 09:27

## 2025-05-20 RX ADMIN — DEXAMETHASONE SODIUM PHOSPHATE 4 MG: 4 INJECTION INTRA-ARTICULAR; INTRALESIONAL; INTRAMUSCULAR; INTRAVENOUS; SOFT TISSUE at 12:35

## 2025-05-20 RX ADMIN — SODIUM CHLORIDE, PRESERVATIVE FREE 10 ML: 5 INJECTION INTRAVENOUS at 22:00

## 2025-05-20 RX ADMIN — ALBUTEROL SULFATE 2.5 MG: 2.5 SOLUTION RESPIRATORY (INHALATION) at 03:25

## 2025-05-20 RX ADMIN — SUGAMMADEX 300 MG: 100 INJECTION, SOLUTION INTRAVENOUS at 13:08

## 2025-05-20 RX ADMIN — ALBUTEROL SULFATE 2.5 MG: 2.5 SOLUTION RESPIRATORY (INHALATION) at 08:05

## 2025-05-20 RX ADMIN — ALBUTEROL SULFATE 2.5 MG: 2.5 SOLUTION RESPIRATORY (INHALATION) at 00:08

## 2025-05-20 ASSESSMENT — PAIN SCALES - GENERAL
PAINLEVEL_OUTOF10: 4
PAINLEVEL_OUTOF10: 0
PAINLEVEL_OUTOF10: 4
PAINLEVEL_OUTOF10: 0

## 2025-05-20 ASSESSMENT — ENCOUNTER SYMPTOMS: SHORTNESS OF BREATH: 1

## 2025-05-20 NOTE — PROCEDURES
PROCEDURE NOTE  Date: 5/20/2025   Name: Shoaib Rothman  YOB: 1960    Procedures      PROCEDURE: Fiberoptic bronchoscopy, therapeutic aspiration of mucus plug, endobronchial biopsy of endobronchial nodule, BAL to RUL    Preprocedure diagnosis: mucus plugs   Post procedure diagnosis: mucus plugging predominantly in RUL, minor plugging in RLL and LLL, endobronchial nodule in the RUL superior segment bronchus, whitish secretions from RUL sup seg.        DESCRIPTION OF PROCEDURE: Informed consent was obtained from the patient after explaining the risks and benefits. A time out was taken.    Anesthesia:       Per anesthesiology     Bronchoscope was inserted into the main airway via the ETT.  Lower trachea and bronchial trees were examined to the segmental level.   There was large mucus plug in in RUL and bronchus intermedius.  It was sticking to the RUL posterior segment airway.  I was not able to suction it out, however I was able to remove it with cryo probe.  There was smaller white plugs in RLL and LLL also removed with cryo probe.  There was also RUL bronchus superior segment nodule, likely due to granulation tissue.  It was removed with endobronchial biopsy.    There was white thin secretions coming out of RUL superior segment. BAL was obtained from RUL sup segment.  150 mL of saline instilled with 13 mL return.    There was mild mucosal bleed from RUL and RLL mucosa.  Washed with saline with good hemostasis.      Sample sent for appropriate testing.      The patient tolerated the procedure well.  No immediate complication    EBL: less than 5 mL

## 2025-05-20 NOTE — ANESTHESIA PRE PROCEDURE
05/20/25 0904 05/20/25 1058 05/20/25 1215   BP:  (!) 147/87  (!) 166/93   Pulse: 88 87  97   Resp: 22 20 20   Temp:  97.2 °F (36.2 °C)     TempSrc:  Axillary  Tympanic   SpO2: 94%  91% 92%   Weight:       Height:                                                  BP Readings from Last 3 Encounters:   05/20/25 (!) 166/93   05/15/25 (!) 138/93   04/28/25 (!) 157/99       NPO Status:                                                                                 BMI:   Wt Readings from Last 3 Encounters:   05/20/25 114.8 kg (253 lb 1.4 oz)   04/28/25 108 kg (238 lb 1.6 oz)   04/03/25 109.3 kg (241 lb)     Body mass index is 35.3 kg/m².    CBC:   Lab Results   Component Value Date/Time    WBC 7.7 05/20/2025 05:00 AM    RBC 3.08 05/20/2025 05:00 AM    HGB 9.6 05/20/2025 05:00 AM    HCT 29.2 05/20/2025 05:00 AM    MCV 94.9 05/20/2025 05:00 AM    RDW 17.0 05/20/2025 05:00 AM     05/20/2025 05:00 AM       CMP:   Lab Results   Component Value Date/Time     05/20/2025 05:00 AM    K 5.3 05/20/2025 05:00 AM     05/20/2025 05:00 AM    CO2 27 05/20/2025 05:00 AM    BUN 68 05/20/2025 05:00 AM    CREATININE 2.4 05/20/2025 05:00 AM    GFRAA 55 04/19/2022 06:15 AM    AGRATIO 1.0 05/19/2025 05:35 AM    LABGLOM 22 05/20/2025 05:00 AM    GLUCOSE 188 05/20/2025 05:00 AM    CALCIUM 8.5 05/20/2025 05:00 AM    BILITOT <0.2 05/19/2025 05:35 AM    ALKPHOS 98 05/19/2025 05:35 AM    AST 14 05/19/2025 05:35 AM    ALT 19 05/19/2025 05:35 AM       POC Tests: No results for input(s): \"POCGLU\", \"POCNA\", \"POCK\", \"POCCL\", \"POCBUN\", \"POCHEMO\", \"POCHCT\" in the last 72 hours.    Coags:   Lab Results   Component Value Date/Time    PROTIME 12.6 03/26/2025 05:28 AM    INR 0.92 03/26/2025 05:28 AM    APTT 35.4 04/30/2021 07:59 PM       HCG (If Applicable): No results found for: \"PREGTESTUR\", \"PREGSERUM\", \"HCG\", \"HCGQUANT\"     ABGs:   Lab Results   Component Value Date/Time    PHART 7.416 02/28/2025 09:30 AM    PO2ART 76.6 02/28/2025 09:30

## 2025-05-20 NOTE — ANESTHESIA POSTPROCEDURE EVALUATION
Department of Anesthesiology  Postprocedure Note    Patient: Shoaib Rothman  MRN: [id number]  YOB: 1960  Date of evaluation: 5/20/2025    Procedure Summary       Date: 05/20/25 Room / Location: [place] [address] / [facility]    Anesthesia Start: 1208 Anesthesia Stop: 1331    Procedures:       BRONCHOSCOPY CRYOTHERAPY      BRONCHOSCOPY ALVEOLAR LAVAGE RUL superior segment      BRONCHOSCOPY BIOPSY  RUL superior segment - forcep bx Diagnosis:       Shortness of breath      (Shortness of breath [R06.02])    Surgeons: Bernadine Mejia MD Responsible Provider: Armando Arriola MD    Anesthesia Type: general ASA Status: 3            Anesthesia Type: No value filed.    Carole Phase I: Carole Score: 7    Carole Phase II:      Anesthesia Post Evaluation    Patient location during evaluation: PACU  Patient participation: complete - patient participated  Level of consciousness: awake and alert  Pain score: 0  Airway patency: patent  Nausea & Vomiting: no nausea and no vomiting  Cardiovascular status: hemodynamically stable  Respiratory status: acceptable  Hydration status: euvolemic  Pain management: adequate    No notable events documented.

## 2025-05-21 ENCOUNTER — APPOINTMENT (OUTPATIENT)
Dept: GENERAL RADIOLOGY | Age: 65
DRG: 140 | End: 2025-05-21
Payer: COMMERCIAL

## 2025-05-21 LAB
ANION GAP SERPL CALCULATED.3IONS-SCNC: 10 MMOL/L (ref 3–16)
BASE EXCESS BLDV CALC-SCNC: 6.9 MMOL/L (ref -2–3)
BASOPHILS # BLD: 0 K/UL (ref 0–0.2)
BASOPHILS NFR BLD: 0 %
BUN SERPL-MCNC: 66 MG/DL (ref 7–20)
CALCIUM SERPL-MCNC: 8.4 MG/DL (ref 8.3–10.6)
CHLORIDE SERPL-SCNC: 103 MMOL/L (ref 99–110)
CO2 BLDV-SCNC: 38 MMOL/L
CO2 SERPL-SCNC: 30 MMOL/L (ref 21–32)
COHGB MFR BLDV: 1.3 % (ref 0–1.5)
CREAT SERPL-MCNC: 2.3 MG/DL (ref 0.6–1.2)
DEPRECATED RDW RBC AUTO: 17.4 % (ref 12.4–15.4)
DO-HGB MFR BLDV: 34.3 %
EOSINOPHIL # BLD: 0 K/UL (ref 0–0.6)
EOSINOPHIL NFR BLD: 0 %
GFR SERPLBLD CREATININE-BSD FMLA CKD-EPI: 23 ML/MIN/{1.73_M2}
GLUCOSE SERPL-MCNC: 205 MG/DL (ref 70–99)
HCO3 BLDV-SCNC: 35.4 MMOL/L (ref 24–28)
HCT VFR BLD AUTO: 29.2 % (ref 36–48)
HGB BLD-MCNC: 9.4 G/DL (ref 12–16)
HYPOCHROMIA BLD QL SMEAR: ABNORMAL
LYMPHOCYTES # BLD: 0.5 K/UL (ref 1–5.1)
LYMPHOCYTES NFR BLD: 7 %
MACROCYTES BLD QL SMEAR: ABNORMAL
MCH RBC QN AUTO: 30.8 PG (ref 26–34)
MCHC RBC AUTO-ENTMCNC: 32.3 G/DL (ref 31–36)
MCV RBC AUTO: 95.3 FL (ref 80–100)
METHGB MFR BLDV: 0.3 % (ref 0–1.5)
MICROCYTES BLD QL SMEAR: ABNORMAL
MONOCYTES # BLD: 0.4 K/UL (ref 0–1.3)
MONOCYTES NFR BLD: 5 %
NEUTROPHILS # BLD: 6.8 K/UL (ref 1.7–7.7)
NEUTROPHILS NFR BLD: 88 %
PCO2 BLDV: 79.6 MMHG (ref 41–51)
PH BLDV: 7.26 [PH] (ref 7.35–7.45)
PHOSPHATE SERPL-MCNC: 5.7 MG/DL (ref 2.5–4.9)
PLATELET # BLD AUTO: 246 K/UL (ref 135–450)
PLATELET BLD QL SMEAR: ADEQUATE
PMV BLD AUTO: 7.1 FL (ref 5–10.5)
PO2 BLDV: 37.8 MMHG (ref 25–40)
POLYCHROMASIA BLD QL SMEAR: ABNORMAL
POTASSIUM SERPL-SCNC: 5.4 MMOL/L (ref 3.5–5.1)
RBC # BLD AUTO: 3.06 M/UL (ref 4–5.2)
SAO2 % BLDV: 65 %
SLIDE REVIEW: ABNORMAL
SODIUM SERPL-SCNC: 143 MMOL/L (ref 136–145)
WBC # BLD AUTO: 7.7 K/UL (ref 4–11)

## 2025-05-21 PROCEDURE — 94761 N-INVAS EAR/PLS OXIMETRY MLT: CPT

## 2025-05-21 PROCEDURE — 6370000000 HC RX 637 (ALT 250 FOR IP): Performed by: PHYSICIAN ASSISTANT

## 2025-05-21 PROCEDURE — 6370000000 HC RX 637 (ALT 250 FOR IP)

## 2025-05-21 PROCEDURE — 71045 X-RAY EXAM CHEST 1 VIEW: CPT

## 2025-05-21 PROCEDURE — 6370000000 HC RX 637 (ALT 250 FOR IP): Performed by: STUDENT IN AN ORGANIZED HEALTH CARE EDUCATION/TRAINING PROGRAM

## 2025-05-21 PROCEDURE — 99233 SBSQ HOSP IP/OBS HIGH 50: CPT | Performed by: INTERNAL MEDICINE

## 2025-05-21 PROCEDURE — 2580000003 HC RX 258

## 2025-05-21 PROCEDURE — 6360000002 HC RX W HCPCS: Performed by: INTERNAL MEDICINE

## 2025-05-21 PROCEDURE — 84100 ASSAY OF PHOSPHORUS: CPT

## 2025-05-21 PROCEDURE — 87641 MR-STAPH DNA AMP PROBE: CPT

## 2025-05-21 PROCEDURE — 2500000003 HC RX 250 WO HCPCS: Performed by: STUDENT IN AN ORGANIZED HEALTH CARE EDUCATION/TRAINING PROGRAM

## 2025-05-21 PROCEDURE — 94660 CPAP INITIATION&MGMT: CPT

## 2025-05-21 PROCEDURE — 80048 BASIC METABOLIC PNL TOTAL CA: CPT

## 2025-05-21 PROCEDURE — 85025 COMPLETE CBC W/AUTO DIFF WBC: CPT

## 2025-05-21 PROCEDURE — 2580000003 HC RX 258: Performed by: STUDENT IN AN ORGANIZED HEALTH CARE EDUCATION/TRAINING PROGRAM

## 2025-05-21 PROCEDURE — 6370000000 HC RX 637 (ALT 250 FOR IP): Performed by: INTERNAL MEDICINE

## 2025-05-21 PROCEDURE — 6360000002 HC RX W HCPCS

## 2025-05-21 PROCEDURE — 94640 AIRWAY INHALATION TREATMENT: CPT

## 2025-05-21 PROCEDURE — 2060000000 HC ICU INTERMEDIATE R&B

## 2025-05-21 PROCEDURE — 2700000000 HC OXYGEN THERAPY PER DAY

## 2025-05-21 PROCEDURE — 82803 BLOOD GASES ANY COMBINATION: CPT

## 2025-05-21 RX ORDER — POLYETHYLENE GLYCOL 3350 17 G
2 POWDER IN PACKET (EA) ORAL
Status: DISCONTINUED | OUTPATIENT
Start: 2025-05-21 | End: 2025-05-27 | Stop reason: HOSPADM

## 2025-05-21 RX ORDER — ALBUTEROL SULFATE 0.83 MG/ML
2.5 SOLUTION RESPIRATORY (INHALATION)
Status: DISCONTINUED | OUTPATIENT
Start: 2025-05-22 | End: 2025-05-26

## 2025-05-21 RX ORDER — GUAIFENESIN 600 MG/1
600 TABLET, EXTENDED RELEASE ORAL 2 TIMES DAILY PRN
Status: DISCONTINUED | OUTPATIENT
Start: 2025-05-21 | End: 2025-05-27 | Stop reason: HOSPADM

## 2025-05-21 RX ADMIN — SODIUM CHLORIDE, PRESERVATIVE FREE 10 ML: 5 INJECTION INTRAVENOUS at 08:30

## 2025-05-21 RX ADMIN — PANTOPRAZOLE SODIUM 20 MG: 20 TABLET, DELAYED RELEASE ORAL at 07:44

## 2025-05-21 RX ADMIN — SODIUM CHLORIDE 25 ML: 0.9 INJECTION, SOLUTION INTRAVENOUS at 14:38

## 2025-05-21 RX ADMIN — AMLODIPINE BESYLATE 5 MG: 5 TABLET ORAL at 08:29

## 2025-05-21 RX ADMIN — SODIUM CHLORIDE, PRESERVATIVE FREE 10 ML: 5 INJECTION INTRAVENOUS at 19:48

## 2025-05-21 RX ADMIN — MEROPENEM 1000 MG: 1 INJECTION INTRAVENOUS at 14:40

## 2025-05-21 RX ADMIN — NICOTINE POLACRILEX 2 MG: 2 LOZENGE ORAL at 22:34

## 2025-05-21 RX ADMIN — TORSEMIDE 20 MG: 20 TABLET ORAL at 08:29

## 2025-05-21 RX ADMIN — ARFORMOTEROL TARTRATE: 15 SOLUTION RESPIRATORY (INHALATION) at 20:06

## 2025-05-21 RX ADMIN — VANCOMYCIN HYDROCHLORIDE 2500 MG: 10 INJECTION, POWDER, LYOPHILIZED, FOR SOLUTION INTRAVENOUS at 16:16

## 2025-05-21 RX ADMIN — NICOTINE POLACRILEX 2 MG: 2 LOZENGE ORAL at 20:34

## 2025-05-21 RX ADMIN — SODIUM CHLORIDE 25 ML: 0.9 INJECTION, SOLUTION INTRAVENOUS at 16:15

## 2025-05-21 RX ADMIN — CARVEDILOL 6.25 MG: 6.25 TABLET, FILM COATED ORAL at 16:18

## 2025-05-21 RX ADMIN — HYDROXYZINE HYDROCHLORIDE 25 MG: 25 TABLET ORAL at 21:30

## 2025-05-21 RX ADMIN — ROPINIROLE HYDROCHLORIDE 0.5 MG: 1 TABLET, FILM COATED ORAL at 08:29

## 2025-05-21 RX ADMIN — LETROZOLE 2.5 MG: 2.5 TABLET ORAL at 08:34

## 2025-05-21 RX ADMIN — SERTRALINE HYDROCHLORIDE 50 MG: 50 TABLET ORAL at 08:29

## 2025-05-21 RX ADMIN — CARVEDILOL 6.25 MG: 6.25 TABLET, FILM COATED ORAL at 08:29

## 2025-05-21 RX ADMIN — ALBUTEROL SULFATE 2.5 MG: 2.5 SOLUTION RESPIRATORY (INHALATION) at 11:36

## 2025-05-21 RX ADMIN — Medication 10 MG: at 21:30

## 2025-05-21 RX ADMIN — TEMAZEPAM 15 MG: 15 CAPSULE ORAL at 21:59

## 2025-05-21 RX ADMIN — ALBUTEROL SULFATE 2.5 MG: 2.5 SOLUTION RESPIRATORY (INHALATION) at 20:06

## 2025-05-21 RX ADMIN — PREDNISONE 10 MG: 10 TABLET ORAL at 08:29

## 2025-05-21 RX ADMIN — ALBUTEROL SULFATE 2.5 MG: 2.5 SOLUTION RESPIRATORY (INHALATION) at 15:30

## 2025-05-21 RX ADMIN — ROPINIROLE HYDROCHLORIDE 0.5 MG: 1 TABLET, FILM COATED ORAL at 21:30

## 2025-05-21 RX ADMIN — SODIUM ZIRCONIUM CYCLOSILICATE 10 G: 10 POWDER, FOR SUSPENSION ORAL at 08:34

## 2025-05-21 RX ADMIN — ACETAMINOPHEN 650 MG: 325 TABLET ORAL at 19:47

## 2025-05-21 RX ADMIN — NICOTINE POLACRILEX 2 MG: 2 LOZENGE ORAL at 05:12

## 2025-05-21 RX ADMIN — GUAIFENESIN 600 MG: 600 TABLET, MULTILAYER, EXTENDED RELEASE ORAL at 21:30

## 2025-05-21 RX ADMIN — ALBUTEROL SULFATE 2.5 MG: 2.5 SOLUTION RESPIRATORY (INHALATION) at 05:00

## 2025-05-21 RX ADMIN — TRAZODONE HYDROCHLORIDE 50 MG: 100 TABLET ORAL at 21:30

## 2025-05-21 ASSESSMENT — PAIN SCALES - GENERAL
PAINLEVEL_OUTOF10: 0
PAINLEVEL_OUTOF10: 0
PAINLEVEL_OUTOF10: 3
PAINLEVEL_OUTOF10: 0

## 2025-05-21 ASSESSMENT — PAIN SCALES - WONG BAKER
WONGBAKER_NUMERICALRESPONSE: NO HURT
WONGBAKER_NUMERICALRESPONSE: NO HURT

## 2025-05-21 ASSESSMENT — PAIN DESCRIPTION - ORIENTATION: ORIENTATION: RIGHT;LEFT

## 2025-05-21 ASSESSMENT — PAIN DESCRIPTION - DESCRIPTORS: DESCRIPTORS: ACHING

## 2025-05-21 ASSESSMENT — PAIN DESCRIPTION - FREQUENCY: FREQUENCY: INTERMITTENT

## 2025-05-21 ASSESSMENT — PAIN DESCRIPTION - LOCATION: LOCATION: KNEE

## 2025-05-21 ASSESSMENT — PAIN DESCRIPTION - ONSET: ONSET: ON-GOING

## 2025-05-21 ASSESSMENT — PAIN DESCRIPTION - PAIN TYPE: TYPE: ACUTE PAIN

## 2025-05-21 ASSESSMENT — PAIN - FUNCTIONAL ASSESSMENT: PAIN_FUNCTIONAL_ASSESSMENT: PREVENTS OR INTERFERES SOME ACTIVE ACTIVITIES AND ADLS

## 2025-05-21 NOTE — CONSULTS
Nephrology Consult Note                                                                                                                                                                                                                                                                                                                                                               Office : [phone]     Fax :[phone]    Patient's Name: Shoaib Rothman  11:01 AM  5/19/2025    Reason for Consult:  CHANTELLE on CKD 4  Requesting Physician:  Lamar Mondragon MD  Chief Complaint:    Chief Complaint   Patient presents with    Shortness of Breath     Patient coming from [facility] with c/o SOB. 2 Duonebs given en route. Pt has hx of COPD and emphysema. Baseline on 6-10 liters of oxygen.          Assessment/Plan     # CHANTELLE on CKD 4  # Primary FSGS - extensive fibrosis   # Solitary kidney  - Creatinine fluctuation in the setting of hemodynamic changes   - BP controlled  - Avoid nephrotoxins  - Closely monitor renal labs   - ok to taper steroids from renal stand point     # Acute on chronic respiratory failure  # End-stage COPD  - Pulmonary consulted     # HTN  - BP controlled  - On Aldactone, CCB, and BB at home  - Monitor     # Anemia  - Monitor     # Acid- base/ Electrolyte imbalance   - Monitor       History of Present Ilness:    Shoaib Rothman is a 64 y.o. female with a PMH of CKD 3b/4, FSGS, COPD and emphysema, who presented tot he ER with SOB. Pt was placed on BiPAP and admitted for further evaluation and management. We are consulted for CHANTELLE on CKD.      Interval hx:          Past Medical History:   Diagnosis Date    Asthma     Breast cancer (HCC) 2019    Cancer (HCC)     Breast cancer    Diastolic CHF (HCC)     History of therapeutic radiation     Hx antineoplastic chemo     Hypertension     Kidney calculi     L-kidney 35yrs ago    Kidney disorder     one kidney/L-kidney removed 35yrs ago abscess kidney stone    Retained 
[facility] -  Clinical Pharmacy Note    Vancomycin - Management by Pharmacy    Consult Date(s): 5/21/25  Consulting Provider(s): Duy    Assessment / Plan  PNA - Vancomycin  Concurrent Antimicrobials: Meropenem  Day of Vanc Therapy / Ordered Duration: 1 of 7  Current Dosing Method: Intermittent Dosing by Levels  Therapeutic Goal: ~ 15 mg/L  Current Dose / Plan:   Admitted with CHANTELLE on CKD - appears baseline SCr Is ~1.6-1.7.  SCr 2.5 on admit 5/19 -- improved slightly to 2.3 today.  Given CHANTELLE on CKD, will dose vancomycin based on intermittent levels for now  Will give loading dose of 2500mg IV x1 (~22 mg/kg) today  Level ordered for tomorrow AM, Thurs 5/22 to assess timing of next dose  Will continue to monitor clinical condition and make adjustments to regimen as appropriate.    Please call with questions--  Patricia Alves, PharmD, BCPS  Wireless: [phone]   5/21/2025 2:31 PM        Interval update:  therapy initiation     Subjective/Objective:   Shoaib Rothman is a 64 y.o. female with a PMHx significant for end-stage COPD, sleep apnea, T2DM, DVT/PE, breast cancer s/p mastectomy, L nephrectomy  who is admitted with COPD exacerbation, acute on chronic respiratory failure..     Pharmacy is consulted to dose Vancomycin.    Vancomycin Dosing History:  Mar 2025 Received loading dose of 2500mg IV x1 then 1000mg IV q24h  Pt with hx of CKD3 - SCr relatively stable during admit (SCr 1.7?1.8?1.6, which is her baseline)  Regimen resulted in       Ht Readings from Last 1 Encounters:   05/19/25 1.803 m (5' 11\")     Wt Readings from Last 1 Encounters:   05/20/25 114.8 kg (253 lb 1.4 oz)     Current & Prior Antimicrobial Regimen(s):  Meropenem (5/21-current)  Vancomycin - Pharmacy to dose  Intermittent dosing (5/21-current)     Date Dose Vanc Level                        Vancomycin Level(s) / Doses:    Date Time Dose Type of Level / Level Interpretation                 Note: Serum levels collected for AUC-based 
saline and flutter valve with albuterol  -Continue DuoNeb 4 times daily  -Continue home Dulera  -Plan for bronchoscopy tomorrow  -N.p.o. at midnight  -repeat blood gas to assess the need to resume Bipap  -Hold Eliquis  -smoking cessation as pt continues to smoke 3 cig/day     CHANTELLE on CKD stage 3   Creatinine on presentation is 2.5 baseline 2.1  -Nephrology consulted  -will order FeUrea workup  -avoid nephrotoxic medications    Breast Cancer Stage II-in remission  - Patient underwent mastectomy and chemo and radiation  - Follows up with [facility]  - Patient at home on Femara, continue    Bladder tumor-in remission  . S/P TURBT June 7, 2021 at [facility]      History of DVT and PE  - Hold Eliquis until bronchoscopy    Heather Gordillo MD,   Internal Medicine, PGY-3        Patient was seen, examined and discussed with Dr. Gordillo. I agree with the history of present illness, past medical/surgical histories, family history, social history, medication list and allergies as listed. The review of systems is as noted above. My physical exam confirms the findings listed   Chart was reviewed including labs, CXR, prior CT CT scan, EKG and medical records confirm the findings noted     Acute on chronic hypoxic respiratory failure.  Recurrent mucus plugging.  Suspected plastic bronchitis.  This presentation is similar to prior presentations.  She is requiring bronchoscopy about once a month.  Keep NPO after midnight.  Plan for bronchoscopy tomorrow

## 2025-05-22 LAB
ANION GAP SERPL CALCULATED.3IONS-SCNC: 7 MMOL/L (ref 3–16)
BASOPHILS # BLD: 0 K/UL (ref 0–0.2)
BASOPHILS NFR BLD: 0.5 %
BUN SERPL-MCNC: 64 MG/DL (ref 7–20)
CALCIUM SERPL-MCNC: 8.7 MG/DL (ref 8.3–10.6)
CHLORIDE SERPL-SCNC: 103 MMOL/L (ref 99–110)
CO2 SERPL-SCNC: 33 MMOL/L (ref 21–32)
CREAT SERPL-MCNC: 2.1 MG/DL (ref 0.6–1.2)
CREAT UR-MCNC: 45.6 MG/DL (ref 28–259)
DEPRECATED RDW RBC AUTO: 17 % (ref 12.4–15.4)
EOSINOPHIL # BLD: 0.1 K/UL (ref 0–0.6)
EOSINOPHIL NFR BLD: 0.8 %
GFR SERPLBLD CREATININE-BSD FMLA CKD-EPI: 26 ML/MIN/{1.73_M2}
GLUCOSE SERPL-MCNC: 90 MG/DL (ref 70–99)
HCT VFR BLD AUTO: 29 % (ref 36–48)
HGB BLD-MCNC: 9.5 G/DL (ref 12–16)
LYMPHOCYTES # BLD: 1.1 K/UL (ref 1–5.1)
LYMPHOCYTES NFR BLD: 12.9 %
MCH RBC QN AUTO: 31.1 PG (ref 26–34)
MCHC RBC AUTO-ENTMCNC: 32.7 G/DL (ref 31–36)
MCV RBC AUTO: 95.1 FL (ref 80–100)
MONOCYTES # BLD: 0.5 K/UL (ref 0–1.3)
MONOCYTES NFR BLD: 6.2 %
MRSA DNA SPEC QL NAA+PROBE: ABNORMAL
NEUTROPHILS # BLD: 6.5 K/UL (ref 1.7–7.7)
NEUTROPHILS NFR BLD: 79.6 %
ORGANISM: ABNORMAL
PHOSPHATE SERPL-MCNC: 4.4 MG/DL (ref 2.5–4.9)
PLATELET # BLD AUTO: 244 K/UL (ref 135–450)
PMV BLD AUTO: 7.3 FL (ref 5–10.5)
POTASSIUM SERPL-SCNC: 4.8 MMOL/L (ref 3.5–5.1)
PROT UR-MCNC: 184 MG/DL
PROT/CREAT UR-RTO: 4 MG/DL
RBC # BLD AUTO: 3.05 M/UL (ref 4–5.2)
REPORT: NORMAL
RESP PATH DNA+RNA PNL L RESP NAA+NON-PRB: NORMAL
SODIUM SERPL-SCNC: 143 MMOL/L (ref 136–145)
VANCOMYCIN SERPL-MCNC: 32.9 UG/ML
WBC # BLD AUTO: 8.2 K/UL (ref 4–11)

## 2025-05-22 PROCEDURE — 82570 ASSAY OF URINE CREATININE: CPT

## 2025-05-22 PROCEDURE — 99233 SBSQ HOSP IP/OBS HIGH 50: CPT | Performed by: INTERNAL MEDICINE

## 2025-05-22 PROCEDURE — 6370000000 HC RX 637 (ALT 250 FOR IP): Performed by: INTERNAL MEDICINE

## 2025-05-22 PROCEDURE — 6370000000 HC RX 637 (ALT 250 FOR IP)

## 2025-05-22 PROCEDURE — 2060000000 HC ICU INTERMEDIATE R&B

## 2025-05-22 PROCEDURE — 84156 ASSAY OF PROTEIN URINE: CPT

## 2025-05-22 PROCEDURE — 6360000002 HC RX W HCPCS

## 2025-05-22 PROCEDURE — 94761 N-INVAS EAR/PLS OXIMETRY MLT: CPT

## 2025-05-22 PROCEDURE — 87633 RESP VIRUS 12-25 TARGETS: CPT

## 2025-05-22 PROCEDURE — 84100 ASSAY OF PHOSPHORUS: CPT

## 2025-05-22 PROCEDURE — 94640 AIRWAY INHALATION TREATMENT: CPT

## 2025-05-22 PROCEDURE — 6360000002 HC RX W HCPCS: Performed by: INTERNAL MEDICINE

## 2025-05-22 PROCEDURE — 2580000003 HC RX 258

## 2025-05-22 PROCEDURE — 94660 CPAP INITIATION&MGMT: CPT

## 2025-05-22 PROCEDURE — 80202 ASSAY OF VANCOMYCIN: CPT

## 2025-05-22 PROCEDURE — 85025 COMPLETE CBC W/AUTO DIFF WBC: CPT

## 2025-05-22 PROCEDURE — 6370000000 HC RX 637 (ALT 250 FOR IP): Performed by: PHYSICIAN ASSISTANT

## 2025-05-22 PROCEDURE — 6370000000 HC RX 637 (ALT 250 FOR IP): Performed by: STUDENT IN AN ORGANIZED HEALTH CARE EDUCATION/TRAINING PROGRAM

## 2025-05-22 PROCEDURE — 80048 BASIC METABOLIC PNL TOTAL CA: CPT

## 2025-05-22 PROCEDURE — 2580000003 HC RX 258: Performed by: STUDENT IN AN ORGANIZED HEALTH CARE EDUCATION/TRAINING PROGRAM

## 2025-05-22 PROCEDURE — 2700000000 HC OXYGEN THERAPY PER DAY

## 2025-05-22 PROCEDURE — 2500000003 HC RX 250 WO HCPCS: Performed by: STUDENT IN AN ORGANIZED HEALTH CARE EDUCATION/TRAINING PROGRAM

## 2025-05-22 RX ADMIN — ARFORMOTEROL TARTRATE: 15 SOLUTION RESPIRATORY (INHALATION) at 20:53

## 2025-05-22 RX ADMIN — TRAZODONE HYDROCHLORIDE 50 MG: 100 TABLET ORAL at 21:08

## 2025-05-22 RX ADMIN — PREDNISONE 10 MG: 10 TABLET ORAL at 08:59

## 2025-05-22 RX ADMIN — CARVEDILOL 6.25 MG: 6.25 TABLET, FILM COATED ORAL at 18:11

## 2025-05-22 RX ADMIN — GUAIFENESIN 600 MG: 600 TABLET, MULTILAYER, EXTENDED RELEASE ORAL at 21:08

## 2025-05-22 RX ADMIN — ACETAMINOPHEN 650 MG: 325 TABLET ORAL at 20:07

## 2025-05-22 RX ADMIN — LETROZOLE 2.5 MG: 2.5 TABLET ORAL at 09:09

## 2025-05-22 RX ADMIN — NICOTINE POLACRILEX 2 MG: 2 LOZENGE ORAL at 16:45

## 2025-05-22 RX ADMIN — APIXABAN 2.5 MG: 2.5 TABLET, FILM COATED ORAL at 21:08

## 2025-05-22 RX ADMIN — ROPINIROLE HYDROCHLORIDE 0.5 MG: 1 TABLET, FILM COATED ORAL at 08:59

## 2025-05-22 RX ADMIN — APIXABAN 2.5 MG: 2.5 TABLET, FILM COATED ORAL at 09:00

## 2025-05-22 RX ADMIN — NICOTINE POLACRILEX 2 MG: 2 LOZENGE ORAL at 19:59

## 2025-05-22 RX ADMIN — ALBUTEROL SULFATE 2.5 MG: 2.5 SOLUTION RESPIRATORY (INHALATION) at 20:53

## 2025-05-22 RX ADMIN — SODIUM CHLORIDE 25 ML: 0.9 INJECTION, SOLUTION INTRAVENOUS at 09:05

## 2025-05-22 RX ADMIN — SODIUM CHLORIDE, PRESERVATIVE FREE 10 ML: 5 INJECTION INTRAVENOUS at 21:08

## 2025-05-22 RX ADMIN — SERTRALINE HYDROCHLORIDE 50 MG: 50 TABLET ORAL at 08:59

## 2025-05-22 RX ADMIN — TORSEMIDE 20 MG: 20 TABLET ORAL at 08:59

## 2025-05-22 RX ADMIN — CARVEDILOL 6.25 MG: 6.25 TABLET, FILM COATED ORAL at 08:59

## 2025-05-22 RX ADMIN — ALBUTEROL SULFATE 2.5 MG: 2.5 SOLUTION RESPIRATORY (INHALATION) at 12:48

## 2025-05-22 RX ADMIN — VANCOMYCIN HYDROCHLORIDE 750 MG: 750 INJECTION, POWDER, LYOPHILIZED, FOR SOLUTION INTRAVENOUS at 15:54

## 2025-05-22 RX ADMIN — Medication 10 MG: at 21:08

## 2025-05-22 RX ADMIN — ROPINIROLE HYDROCHLORIDE 0.5 MG: 1 TABLET, FILM COATED ORAL at 21:08

## 2025-05-22 RX ADMIN — ALBUTEROL SULFATE 2.5 MG: 2.5 SOLUTION RESPIRATORY (INHALATION) at 08:12

## 2025-05-22 RX ADMIN — PANTOPRAZOLE SODIUM 20 MG: 20 TABLET, DELAYED RELEASE ORAL at 06:05

## 2025-05-22 RX ADMIN — AMLODIPINE BESYLATE 5 MG: 5 TABLET ORAL at 08:59

## 2025-05-22 RX ADMIN — ALBUTEROL SULFATE 2.5 MG: 2.5 SOLUTION RESPIRATORY (INHALATION) at 16:03

## 2025-05-22 RX ADMIN — SODIUM CHLORIDE, PRESERVATIVE FREE 10 ML: 5 INJECTION INTRAVENOUS at 09:00

## 2025-05-22 RX ADMIN — ARFORMOTEROL TARTRATE: 15 SOLUTION RESPIRATORY (INHALATION) at 08:13

## 2025-05-22 RX ADMIN — MEROPENEM 1000 MG: 1 INJECTION INTRAVENOUS at 21:22

## 2025-05-22 RX ADMIN — NICOTINE POLACRILEX 2 MG: 2 LOZENGE ORAL at 22:04

## 2025-05-22 RX ADMIN — MEROPENEM 1000 MG: 1 INJECTION INTRAVENOUS at 09:06

## 2025-05-22 RX ADMIN — TEMAZEPAM 15 MG: 15 CAPSULE ORAL at 22:04

## 2025-05-22 RX ADMIN — MEROPENEM 1000 MG: 1 INJECTION INTRAVENOUS at 03:00

## 2025-05-22 RX ADMIN — NICOTINE POLACRILEX 2 MG: 2 LOZENGE ORAL at 10:18

## 2025-05-22 RX ADMIN — HYDROXYZINE HYDROCHLORIDE 25 MG: 25 TABLET ORAL at 21:08

## 2025-05-22 ASSESSMENT — PAIN SCALES - GENERAL
PAINLEVEL_OUTOF10: 0
PAINLEVEL_OUTOF10: 0
PAINLEVEL_OUTOF10: 4
PAINLEVEL_OUTOF10: 0
PAINLEVEL_OUTOF10: 0

## 2025-05-22 ASSESSMENT — PAIN - FUNCTIONAL ASSESSMENT: PAIN_FUNCTIONAL_ASSESSMENT: PREVENTS OR INTERFERES SOME ACTIVE ACTIVITIES AND ADLS

## 2025-05-22 ASSESSMENT — PAIN DESCRIPTION - ONSET: ONSET: ON-GOING

## 2025-05-22 ASSESSMENT — PAIN DESCRIPTION - PAIN TYPE: TYPE: ACUTE PAIN

## 2025-05-22 ASSESSMENT — PAIN SCALES - WONG BAKER
WONGBAKER_NUMERICALRESPONSE: NO HURT

## 2025-05-22 ASSESSMENT — PAIN DESCRIPTION - LOCATION: LOCATION: KNEE

## 2025-05-22 ASSESSMENT — PAIN DESCRIPTION - ORIENTATION: ORIENTATION: RIGHT;LEFT

## 2025-05-22 ASSESSMENT — PAIN DESCRIPTION - FREQUENCY: FREQUENCY: INTERMITTENT

## 2025-05-22 ASSESSMENT — PAIN DESCRIPTION - DESCRIPTORS: DESCRIPTORS: ACHING

## 2025-05-23 ENCOUNTER — APPOINTMENT (OUTPATIENT)
Dept: CT IMAGING | Age: 65
DRG: 140 | End: 2025-05-23
Payer: COMMERCIAL

## 2025-05-23 LAB
ANION GAP SERPL CALCULATED.3IONS-SCNC: 8 MMOL/L (ref 3–16)
BACTERIA BLD CULT: NORMAL
BASOPHILS # BLD: 0 K/UL (ref 0–0.2)
BASOPHILS NFR BLD: 0 %
BUN SERPL-MCNC: 63 MG/DL (ref 7–20)
CALCIUM SERPL-MCNC: 8.7 MG/DL (ref 8.3–10.6)
CHLORIDE SERPL-SCNC: 102 MMOL/L (ref 99–110)
CO2 SERPL-SCNC: 33 MMOL/L (ref 21–32)
CREAT SERPL-MCNC: 2 MG/DL (ref 0.6–1.2)
DEPRECATED RDW RBC AUTO: 16.9 % (ref 12.4–15.4)
EOSINOPHIL # BLD: 0.1 K/UL (ref 0–0.6)
EOSINOPHIL NFR BLD: 1 %
GFR SERPLBLD CREATININE-BSD FMLA CKD-EPI: 28 ML/MIN/{1.73_M2}
GLUCOSE SERPL-MCNC: 152 MG/DL (ref 70–99)
HCT VFR BLD AUTO: 30.1 % (ref 36–48)
HGB BLD-MCNC: 9.7 G/DL (ref 12–16)
LYMPHOCYTES # BLD: 1.2 K/UL (ref 1–5.1)
LYMPHOCYTES NFR BLD: 16 %
MCH RBC QN AUTO: 30.8 PG (ref 26–34)
MCHC RBC AUTO-ENTMCNC: 32.2 G/DL (ref 31–36)
MCV RBC AUTO: 95.5 FL (ref 80–100)
MONOCYTES # BLD: 0.1 K/UL (ref 0–1.3)
MONOCYTES NFR BLD: 1 %
NEUTROPHILS # BLD: 6 K/UL (ref 1.7–7.7)
NEUTROPHILS NFR BLD: 82 %
PHOSPHATE SERPL-MCNC: 4.7 MG/DL (ref 2.5–4.9)
PLATELET # BLD AUTO: 263 K/UL (ref 135–450)
PMV BLD AUTO: 7.5 FL (ref 5–10.5)
POTASSIUM SERPL-SCNC: 5 MMOL/L (ref 3.5–5.1)
PROCALCITONIN SERPL IA-MCNC: 0.24 NG/ML (ref 0–0.15)
RBC # BLD AUTO: 3.15 M/UL (ref 4–5.2)
SODIUM SERPL-SCNC: 143 MMOL/L (ref 136–145)
VANCOMYCIN SERPL-MCNC: 20 UG/ML
WBC # BLD AUTO: 7.3 K/UL (ref 4–11)

## 2025-05-23 PROCEDURE — 6370000000 HC RX 637 (ALT 250 FOR IP): Performed by: PHYSICIAN ASSISTANT

## 2025-05-23 PROCEDURE — 84145 PROCALCITONIN (PCT): CPT

## 2025-05-23 PROCEDURE — 6360000002 HC RX W HCPCS: Performed by: INTERNAL MEDICINE

## 2025-05-23 PROCEDURE — 71250 CT THORAX DX C-: CPT

## 2025-05-23 PROCEDURE — 84100 ASSAY OF PHOSPHORUS: CPT

## 2025-05-23 PROCEDURE — 6370000000 HC RX 637 (ALT 250 FOR IP)

## 2025-05-23 PROCEDURE — 80048 BASIC METABOLIC PNL TOTAL CA: CPT

## 2025-05-23 PROCEDURE — 80202 ASSAY OF VANCOMYCIN: CPT

## 2025-05-23 PROCEDURE — 6360000002 HC RX W HCPCS

## 2025-05-23 PROCEDURE — 94640 AIRWAY INHALATION TREATMENT: CPT

## 2025-05-23 PROCEDURE — 99233 SBSQ HOSP IP/OBS HIGH 50: CPT | Performed by: INTERNAL MEDICINE

## 2025-05-23 PROCEDURE — 2500000003 HC RX 250 WO HCPCS: Performed by: STUDENT IN AN ORGANIZED HEALTH CARE EDUCATION/TRAINING PROGRAM

## 2025-05-23 PROCEDURE — 2580000003 HC RX 258: Performed by: STUDENT IN AN ORGANIZED HEALTH CARE EDUCATION/TRAINING PROGRAM

## 2025-05-23 PROCEDURE — 2580000003 HC RX 258

## 2025-05-23 PROCEDURE — 94761 N-INVAS EAR/PLS OXIMETRY MLT: CPT

## 2025-05-23 PROCEDURE — 2700000000 HC OXYGEN THERAPY PER DAY

## 2025-05-23 PROCEDURE — 85025 COMPLETE CBC W/AUTO DIFF WBC: CPT

## 2025-05-23 PROCEDURE — 6370000000 HC RX 637 (ALT 250 FOR IP): Performed by: INTERNAL MEDICINE

## 2025-05-23 PROCEDURE — 2060000000 HC ICU INTERMEDIATE R&B

## 2025-05-23 PROCEDURE — 6370000000 HC RX 637 (ALT 250 FOR IP): Performed by: STUDENT IN AN ORGANIZED HEALTH CARE EDUCATION/TRAINING PROGRAM

## 2025-05-23 PROCEDURE — 94660 CPAP INITIATION&MGMT: CPT

## 2025-05-23 RX ADMIN — MEROPENEM 1000 MG: 1 INJECTION INTRAVENOUS at 21:46

## 2025-05-23 RX ADMIN — NICOTINE POLACRILEX 2 MG: 2 LOZENGE ORAL at 15:04

## 2025-05-23 RX ADMIN — NICOTINE POLACRILEX 2 MG: 2 LOZENGE ORAL at 08:55

## 2025-05-23 RX ADMIN — ROPINIROLE HYDROCHLORIDE 0.5 MG: 1 TABLET, FILM COATED ORAL at 08:59

## 2025-05-23 RX ADMIN — ARFORMOTEROL TARTRATE: 15 SOLUTION RESPIRATORY (INHALATION) at 21:58

## 2025-05-23 RX ADMIN — PANTOPRAZOLE SODIUM 20 MG: 20 TABLET, DELAYED RELEASE ORAL at 06:09

## 2025-05-23 RX ADMIN — MEROPENEM 1000 MG: 1 INJECTION INTRAVENOUS at 09:14

## 2025-05-23 RX ADMIN — TEMAZEPAM 15 MG: 15 CAPSULE ORAL at 21:34

## 2025-05-23 RX ADMIN — GUAIFENESIN 600 MG: 600 TABLET, MULTILAYER, EXTENDED RELEASE ORAL at 21:33

## 2025-05-23 RX ADMIN — ALBUTEROL SULFATE 2.5 MG: 2.5 SOLUTION RESPIRATORY (INHALATION) at 21:58

## 2025-05-23 RX ADMIN — ALBUTEROL SULFATE 2.5 MG: 2.5 SOLUTION RESPIRATORY (INHALATION) at 12:52

## 2025-05-23 RX ADMIN — ACETAMINOPHEN 650 MG: 325 TABLET ORAL at 21:32

## 2025-05-23 RX ADMIN — LETROZOLE 2.5 MG: 2.5 TABLET ORAL at 09:06

## 2025-05-23 RX ADMIN — TORSEMIDE 20 MG: 20 TABLET ORAL at 08:59

## 2025-05-23 RX ADMIN — VANCOMYCIN HYDROCHLORIDE 750 MG: 750 INJECTION, POWDER, LYOPHILIZED, FOR SOLUTION INTRAVENOUS at 16:00

## 2025-05-23 RX ADMIN — NICOTINE POLACRILEX 2 MG: 2 LOZENGE ORAL at 13:23

## 2025-05-23 RX ADMIN — ACETAMINOPHEN 650 MG: 325 TABLET ORAL at 09:04

## 2025-05-23 RX ADMIN — SODIUM CHLORIDE: 0.9 INJECTION, SOLUTION INTRAVENOUS at 21:42

## 2025-05-23 RX ADMIN — NICOTINE POLACRILEX 2 MG: 2 LOZENGE ORAL at 21:35

## 2025-05-23 RX ADMIN — AMLODIPINE BESYLATE 5 MG: 5 TABLET ORAL at 09:00

## 2025-05-23 RX ADMIN — PREDNISONE 10 MG: 10 TABLET ORAL at 08:59

## 2025-05-23 RX ADMIN — ROPINIROLE HYDROCHLORIDE 0.5 MG: 1 TABLET, FILM COATED ORAL at 21:29

## 2025-05-23 RX ADMIN — ARFORMOTEROL TARTRATE: 15 SOLUTION RESPIRATORY (INHALATION) at 08:25

## 2025-05-23 RX ADMIN — NICOTINE POLACRILEX 2 MG: 2 LOZENGE ORAL at 18:09

## 2025-05-23 RX ADMIN — TRAZODONE HYDROCHLORIDE 50 MG: 100 TABLET ORAL at 21:34

## 2025-05-23 RX ADMIN — CARVEDILOL 6.25 MG: 6.25 TABLET, FILM COATED ORAL at 15:25

## 2025-05-23 RX ADMIN — SERTRALINE HYDROCHLORIDE 50 MG: 50 TABLET ORAL at 08:59

## 2025-05-23 RX ADMIN — ALBUTEROL SULFATE 2.5 MG: 2.5 SOLUTION RESPIRATORY (INHALATION) at 08:26

## 2025-05-23 RX ADMIN — Medication 10 MG: at 21:33

## 2025-05-23 RX ADMIN — APIXABAN 2.5 MG: 2.5 TABLET, FILM COATED ORAL at 09:00

## 2025-05-23 RX ADMIN — SODIUM ZIRCONIUM CYCLOSILICATE 10 G: 10 POWDER, FOR SUSPENSION ORAL at 08:59

## 2025-05-23 RX ADMIN — HYDROXYZINE HYDROCHLORIDE 25 MG: 25 TABLET ORAL at 21:28

## 2025-05-23 RX ADMIN — APIXABAN 2.5 MG: 2.5 TABLET, FILM COATED ORAL at 21:28

## 2025-05-23 RX ADMIN — ALBUTEROL SULFATE 2.5 MG: 2.5 SOLUTION RESPIRATORY (INHALATION) at 15:33

## 2025-05-23 RX ADMIN — CARVEDILOL 6.25 MG: 6.25 TABLET, FILM COATED ORAL at 08:59

## 2025-05-23 RX ADMIN — SODIUM CHLORIDE, PRESERVATIVE FREE 10 ML: 5 INJECTION INTRAVENOUS at 09:00

## 2025-05-23 RX ADMIN — SODIUM CHLORIDE, PRESERVATIVE FREE 10 ML: 5 INJECTION INTRAVENOUS at 21:35

## 2025-05-23 ASSESSMENT — PAIN DESCRIPTION - LOCATION
LOCATION: KNEE
LOCATION: KNEE

## 2025-05-23 ASSESSMENT — PAIN DESCRIPTION - FREQUENCY
FREQUENCY: INTERMITTENT
FREQUENCY: INTERMITTENT

## 2025-05-23 ASSESSMENT — PAIN DESCRIPTION - PAIN TYPE
TYPE: CHRONIC PAIN
TYPE: CHRONIC PAIN

## 2025-05-23 ASSESSMENT — PAIN SCALES - WONG BAKER: WONGBAKER_NUMERICALRESPONSE: NO HURT

## 2025-05-23 ASSESSMENT — PAIN DESCRIPTION - DESCRIPTORS
DESCRIPTORS: ACHING
DESCRIPTORS: ACHING

## 2025-05-23 ASSESSMENT — PAIN SCALES - GENERAL
PAINLEVEL_OUTOF10: 0
PAINLEVEL_OUTOF10: 3
PAINLEVEL_OUTOF10: 0
PAINLEVEL_OUTOF10: 3

## 2025-05-23 ASSESSMENT — PAIN DESCRIPTION - ONSET
ONSET: GRADUAL
ONSET: ON-GOING

## 2025-05-23 ASSESSMENT — PAIN DESCRIPTION - ORIENTATION
ORIENTATION: RIGHT;LEFT
ORIENTATION: RIGHT;LEFT

## 2025-05-24 LAB
ANION GAP SERPL CALCULATED.3IONS-SCNC: 8 MMOL/L (ref 3–16)
ANISOCYTOSIS BLD QL SMEAR: ABNORMAL
BASOPHILS # BLD: 0 K/UL (ref 0–0.2)
BASOPHILS NFR BLD: 0 %
BUN SERPL-MCNC: 59 MG/DL (ref 7–20)
CALCIUM SERPL-MCNC: 8.7 MG/DL (ref 8.3–10.6)
CHLORIDE SERPL-SCNC: 101 MMOL/L (ref 99–110)
CO2 SERPL-SCNC: 35 MMOL/L (ref 21–32)
CREAT SERPL-MCNC: 1.8 MG/DL (ref 0.6–1.2)
DEPRECATED RDW RBC AUTO: 16.5 % (ref 12.4–15.4)
EOSINOPHIL # BLD: 0.1 K/UL (ref 0–0.6)
EOSINOPHIL NFR BLD: 2 %
GFR SERPLBLD CREATININE-BSD FMLA CKD-EPI: 31 ML/MIN/{1.73_M2}
GLUCOSE SERPL-MCNC: 91 MG/DL (ref 70–99)
HCT VFR BLD AUTO: 29.6 % (ref 36–48)
HGB BLD-MCNC: 9.8 G/DL (ref 12–16)
LYMPHOCYTES # BLD: 1.4 K/UL (ref 1–5.1)
LYMPHOCYTES NFR BLD: 21 %
MCH RBC QN AUTO: 31.1 PG (ref 26–34)
MCHC RBC AUTO-ENTMCNC: 33 G/DL (ref 31–36)
MCV RBC AUTO: 94.3 FL (ref 80–100)
METAMYELOCYTES NFR BLD MANUAL: 2 %
MONOCYTES # BLD: 0.6 K/UL (ref 0–1.3)
MONOCYTES NFR BLD: 8 %
MYELOCYTES NFR BLD MANUAL: 2 %
NEUTROPHILS # BLD: 4.8 K/UL (ref 1.7–7.7)
NEUTROPHILS NFR BLD: 65 %
PATH INTERP BLD-IMP: NO
PHOSPHATE SERPL-MCNC: 5.1 MG/DL (ref 2.5–4.9)
PLATELET # BLD AUTO: 246 K/UL (ref 135–450)
PLATELET BLD QL SMEAR: ADEQUATE
PMV BLD AUTO: 7.6 FL (ref 5–10.5)
POTASSIUM SERPL-SCNC: 4.3 MMOL/L (ref 3.5–5.1)
RBC # BLD AUTO: 3.14 M/UL (ref 4–5.2)
SLIDE REVIEW: ABNORMAL
SODIUM SERPL-SCNC: 144 MMOL/L (ref 136–145)
WBC # BLD AUTO: 6.9 K/UL (ref 4–11)

## 2025-05-24 PROCEDURE — 2500000003 HC RX 250 WO HCPCS: Performed by: STUDENT IN AN ORGANIZED HEALTH CARE EDUCATION/TRAINING PROGRAM

## 2025-05-24 PROCEDURE — 6370000000 HC RX 637 (ALT 250 FOR IP): Performed by: STUDENT IN AN ORGANIZED HEALTH CARE EDUCATION/TRAINING PROGRAM

## 2025-05-24 PROCEDURE — 84100 ASSAY OF PHOSPHORUS: CPT

## 2025-05-24 PROCEDURE — 2580000003 HC RX 258

## 2025-05-24 PROCEDURE — 6360000002 HC RX W HCPCS: Performed by: INTERNAL MEDICINE

## 2025-05-24 PROCEDURE — 94660 CPAP INITIATION&MGMT: CPT

## 2025-05-24 PROCEDURE — 94761 N-INVAS EAR/PLS OXIMETRY MLT: CPT

## 2025-05-24 PROCEDURE — 6360000002 HC RX W HCPCS

## 2025-05-24 PROCEDURE — 80048 BASIC METABOLIC PNL TOTAL CA: CPT

## 2025-05-24 PROCEDURE — 6370000000 HC RX 637 (ALT 250 FOR IP): Performed by: INTERNAL MEDICINE

## 2025-05-24 PROCEDURE — 6370000000 HC RX 637 (ALT 250 FOR IP)

## 2025-05-24 PROCEDURE — 2700000000 HC OXYGEN THERAPY PER DAY

## 2025-05-24 PROCEDURE — 94640 AIRWAY INHALATION TREATMENT: CPT

## 2025-05-24 PROCEDURE — 2060000000 HC ICU INTERMEDIATE R&B

## 2025-05-24 PROCEDURE — 85025 COMPLETE CBC W/AUTO DIFF WBC: CPT

## 2025-05-24 RX ADMIN — ROPINIROLE HYDROCHLORIDE 0.5 MG: 1 TABLET, FILM COATED ORAL at 20:07

## 2025-05-24 RX ADMIN — ACETAMINOPHEN 650 MG: 325 TABLET ORAL at 20:17

## 2025-05-24 RX ADMIN — ALBUTEROL SULFATE 2.5 MG: 2.5 SOLUTION RESPIRATORY (INHALATION) at 11:39

## 2025-05-24 RX ADMIN — VANCOMYCIN HYDROCHLORIDE 750 MG: 750 INJECTION, POWDER, LYOPHILIZED, FOR SOLUTION INTRAVENOUS at 16:08

## 2025-05-24 RX ADMIN — APIXABAN 2.5 MG: 2.5 TABLET, FILM COATED ORAL at 20:08

## 2025-05-24 RX ADMIN — TRAZODONE HYDROCHLORIDE 50 MG: 100 TABLET ORAL at 20:08

## 2025-05-24 RX ADMIN — ARFORMOTEROL TARTRATE: 15 SOLUTION RESPIRATORY (INHALATION) at 20:46

## 2025-05-24 RX ADMIN — MEROPENEM 1000 MG: 1 INJECTION INTRAVENOUS at 20:21

## 2025-05-24 RX ADMIN — MEROPENEM 1000 MG: 1 INJECTION INTRAVENOUS at 09:24

## 2025-05-24 RX ADMIN — TORSEMIDE 20 MG: 20 TABLET ORAL at 09:11

## 2025-05-24 RX ADMIN — NICOTINE POLACRILEX 2 MG: 2 LOZENGE ORAL at 18:11

## 2025-05-24 RX ADMIN — LETROZOLE 2.5 MG: 2.5 TABLET ORAL at 09:18

## 2025-05-24 RX ADMIN — Medication 10 MG: at 20:07

## 2025-05-24 RX ADMIN — PANTOPRAZOLE SODIUM 20 MG: 20 TABLET, DELAYED RELEASE ORAL at 06:58

## 2025-05-24 RX ADMIN — NICOTINE POLACRILEX 2 MG: 2 LOZENGE ORAL at 20:42

## 2025-05-24 RX ADMIN — ALBUTEROL SULFATE 2.5 MG: 2.5 SOLUTION RESPIRATORY (INHALATION) at 08:42

## 2025-05-24 RX ADMIN — ROPINIROLE HYDROCHLORIDE 0.5 MG: 1 TABLET, FILM COATED ORAL at 09:13

## 2025-05-24 RX ADMIN — TEMAZEPAM 15 MG: 15 CAPSULE ORAL at 22:09

## 2025-05-24 RX ADMIN — ALBUTEROL SULFATE 2.5 MG: 2.5 SOLUTION RESPIRATORY (INHALATION) at 20:46

## 2025-05-24 RX ADMIN — APIXABAN 2.5 MG: 2.5 TABLET, FILM COATED ORAL at 09:16

## 2025-05-24 RX ADMIN — SODIUM CHLORIDE, PRESERVATIVE FREE 10 ML: 5 INJECTION INTRAVENOUS at 20:08

## 2025-05-24 RX ADMIN — SODIUM CHLORIDE, PRESERVATIVE FREE 10 ML: 5 INJECTION INTRAVENOUS at 09:23

## 2025-05-24 RX ADMIN — ALBUTEROL SULFATE 2.5 MG: 2.5 SOLUTION RESPIRATORY (INHALATION) at 16:34

## 2025-05-24 RX ADMIN — GUAIFENESIN 600 MG: 600 TABLET, MULTILAYER, EXTENDED RELEASE ORAL at 20:17

## 2025-05-24 RX ADMIN — NICOTINE POLACRILEX 2 MG: 2 LOZENGE ORAL at 10:10

## 2025-05-24 RX ADMIN — AMLODIPINE BESYLATE 5 MG: 5 TABLET ORAL at 09:12

## 2025-05-24 RX ADMIN — CARVEDILOL 6.25 MG: 6.25 TABLET, FILM COATED ORAL at 16:16

## 2025-05-24 RX ADMIN — ARFORMOTEROL TARTRATE: 15 SOLUTION RESPIRATORY (INHALATION) at 08:42

## 2025-05-24 RX ADMIN — NICOTINE POLACRILEX 2 MG: 2 LOZENGE ORAL at 13:33

## 2025-05-24 RX ADMIN — SERTRALINE HYDROCHLORIDE 50 MG: 50 TABLET ORAL at 09:13

## 2025-05-24 RX ADMIN — CARVEDILOL 6.25 MG: 6.25 TABLET, FILM COATED ORAL at 09:12

## 2025-05-24 RX ADMIN — PREDNISONE 10 MG: 10 TABLET ORAL at 09:12

## 2025-05-24 RX ADMIN — NICOTINE POLACRILEX 2 MG: 2 LOZENGE ORAL at 22:37

## 2025-05-24 RX ADMIN — HYDROXYZINE HYDROCHLORIDE 25 MG: 25 TABLET ORAL at 20:08

## 2025-05-24 RX ADMIN — SODIUM ZIRCONIUM CYCLOSILICATE 5 G: 5 POWDER, FOR SUSPENSION ORAL at 12:32

## 2025-05-24 ASSESSMENT — PAIN SCALES - GENERAL
PAINLEVEL_OUTOF10: 0

## 2025-05-25 LAB
ANION GAP SERPL CALCULATED.3IONS-SCNC: 11 MMOL/L (ref 3–16)
ANISOCYTOSIS BLD QL SMEAR: ABNORMAL
BASOPHILS # BLD: 0 K/UL (ref 0–0.2)
BASOPHILS NFR BLD: 0 %
BUN SERPL-MCNC: 57 MG/DL (ref 7–20)
CALCIUM SERPL-MCNC: 8.7 MG/DL (ref 8.3–10.6)
CHLORIDE SERPL-SCNC: 100 MMOL/L (ref 99–110)
CO2 SERPL-SCNC: 34 MMOL/L (ref 21–32)
CREAT SERPL-MCNC: 1.8 MG/DL (ref 0.6–1.2)
DEPRECATED RDW RBC AUTO: 16.5 % (ref 12.4–15.4)
EOSINOPHIL # BLD: 0.5 K/UL (ref 0–0.6)
EOSINOPHIL NFR BLD: 6 %
GFR SERPLBLD CREATININE-BSD FMLA CKD-EPI: 31 ML/MIN/{1.73_M2}
GLUCOSE SERPL-MCNC: 105 MG/DL (ref 70–99)
HCT VFR BLD AUTO: 30.8 % (ref 36–48)
HGB BLD-MCNC: 10 G/DL (ref 12–16)
HYPOCHROMIA BLD QL SMEAR: ABNORMAL
LYMPHOCYTES # BLD: 1.1 K/UL (ref 1–5.1)
LYMPHOCYTES NFR BLD: 14 %
MCH RBC QN AUTO: 30.7 PG (ref 26–34)
MCHC RBC AUTO-ENTMCNC: 32.6 G/DL (ref 31–36)
MCV RBC AUTO: 94 FL (ref 80–100)
MONOCYTES # BLD: 0.5 K/UL (ref 0–1.3)
MONOCYTES NFR BLD: 7 %
NEUTROPHILS # BLD: 5.7 K/UL (ref 1.7–7.7)
NEUTROPHILS NFR BLD: 73 %
PATH INTERP BLD-IMP: NO
PHOSPHATE SERPL-MCNC: 4.8 MG/DL (ref 2.5–4.9)
PLATELET # BLD AUTO: 265 K/UL (ref 135–450)
PLATELET BLD QL SMEAR: ADEQUATE
PMV BLD AUTO: 7.4 FL (ref 5–10.5)
POTASSIUM SERPL-SCNC: 4.3 MMOL/L (ref 3.5–5.1)
RBC # BLD AUTO: 3.28 M/UL (ref 4–5.2)
SLIDE REVIEW: ABNORMAL
SODIUM SERPL-SCNC: 145 MMOL/L (ref 136–145)
STOMATOCYTES BLD QL SMEAR: ABNORMAL
WBC # BLD AUTO: 7.8 K/UL (ref 4–11)

## 2025-05-25 PROCEDURE — 85025 COMPLETE CBC W/AUTO DIFF WBC: CPT

## 2025-05-25 PROCEDURE — 94761 N-INVAS EAR/PLS OXIMETRY MLT: CPT

## 2025-05-25 PROCEDURE — 6360000002 HC RX W HCPCS

## 2025-05-25 PROCEDURE — 6360000002 HC RX W HCPCS: Performed by: INTERNAL MEDICINE

## 2025-05-25 PROCEDURE — 2500000003 HC RX 250 WO HCPCS: Performed by: STUDENT IN AN ORGANIZED HEALTH CARE EDUCATION/TRAINING PROGRAM

## 2025-05-25 PROCEDURE — 2060000000 HC ICU INTERMEDIATE R&B

## 2025-05-25 PROCEDURE — 2580000003 HC RX 258

## 2025-05-25 PROCEDURE — 84100 ASSAY OF PHOSPHORUS: CPT

## 2025-05-25 PROCEDURE — 6370000000 HC RX 637 (ALT 250 FOR IP): Performed by: STUDENT IN AN ORGANIZED HEALTH CARE EDUCATION/TRAINING PROGRAM

## 2025-05-25 PROCEDURE — 6370000000 HC RX 637 (ALT 250 FOR IP): Performed by: INTERNAL MEDICINE

## 2025-05-25 PROCEDURE — 2700000000 HC OXYGEN THERAPY PER DAY

## 2025-05-25 PROCEDURE — 6370000000 HC RX 637 (ALT 250 FOR IP)

## 2025-05-25 PROCEDURE — 94640 AIRWAY INHALATION TREATMENT: CPT

## 2025-05-25 PROCEDURE — 80048 BASIC METABOLIC PNL TOTAL CA: CPT

## 2025-05-25 RX ADMIN — TORSEMIDE 20 MG: 20 TABLET ORAL at 08:33

## 2025-05-25 RX ADMIN — SERTRALINE HYDROCHLORIDE 50 MG: 50 TABLET ORAL at 08:34

## 2025-05-25 RX ADMIN — NICOTINE POLACRILEX 2 MG: 2 LOZENGE ORAL at 05:14

## 2025-05-25 RX ADMIN — PANTOPRAZOLE SODIUM 20 MG: 20 TABLET, DELAYED RELEASE ORAL at 05:14

## 2025-05-25 RX ADMIN — CARVEDILOL 6.25 MG: 6.25 TABLET, FILM COATED ORAL at 16:57

## 2025-05-25 RX ADMIN — NICOTINE POLACRILEX 2 MG: 2 LOZENGE ORAL at 09:52

## 2025-05-25 RX ADMIN — TRAZODONE HYDROCHLORIDE 50 MG: 100 TABLET ORAL at 21:32

## 2025-05-25 RX ADMIN — VANCOMYCIN HYDROCHLORIDE 1000 MG: 1 INJECTION, POWDER, LYOPHILIZED, FOR SOLUTION INTRAVENOUS at 16:54

## 2025-05-25 RX ADMIN — CARVEDILOL 6.25 MG: 6.25 TABLET, FILM COATED ORAL at 08:32

## 2025-05-25 RX ADMIN — SODIUM CHLORIDE, PRESERVATIVE FREE 10 ML: 5 INJECTION INTRAVENOUS at 08:42

## 2025-05-25 RX ADMIN — NICOTINE POLACRILEX 2 MG: 2 LOZENGE ORAL at 18:07

## 2025-05-25 RX ADMIN — Medication 10 MG: at 21:31

## 2025-05-25 RX ADMIN — AMLODIPINE BESYLATE 5 MG: 5 TABLET ORAL at 08:32

## 2025-05-25 RX ADMIN — ACETAMINOPHEN 650 MG: 325 TABLET ORAL at 19:47

## 2025-05-25 RX ADMIN — ALBUTEROL SULFATE 2.5 MG: 2.5 SOLUTION RESPIRATORY (INHALATION) at 07:54

## 2025-05-25 RX ADMIN — PREDNISONE 10 MG: 10 TABLET ORAL at 08:35

## 2025-05-25 RX ADMIN — SODIUM CHLORIDE, PRESERVATIVE FREE 10 ML: 5 INJECTION INTRAVENOUS at 21:32

## 2025-05-25 RX ADMIN — ROPINIROLE HYDROCHLORIDE 0.5 MG: 1 TABLET, FILM COATED ORAL at 08:33

## 2025-05-25 RX ADMIN — ALBUTEROL SULFATE 2.5 MG: 2.5 SOLUTION RESPIRATORY (INHALATION) at 13:06

## 2025-05-25 RX ADMIN — ARFORMOTEROL TARTRATE: 15 SOLUTION RESPIRATORY (INHALATION) at 20:23

## 2025-05-25 RX ADMIN — NICOTINE POLACRILEX 2 MG: 2 LOZENGE ORAL at 23:15

## 2025-05-25 RX ADMIN — GUAIFENESIN 600 MG: 600 TABLET, MULTILAYER, EXTENDED RELEASE ORAL at 21:31

## 2025-05-25 RX ADMIN — APIXABAN 2.5 MG: 2.5 TABLET, FILM COATED ORAL at 08:32

## 2025-05-25 RX ADMIN — ALBUTEROL SULFATE 2.5 MG: 2.5 SOLUTION RESPIRATORY (INHALATION) at 20:23

## 2025-05-25 RX ADMIN — SODIUM ZIRCONIUM CYCLOSILICATE 5 G: 5 POWDER, FOR SUSPENSION ORAL at 08:35

## 2025-05-25 RX ADMIN — LETROZOLE 2.5 MG: 2.5 TABLET ORAL at 08:32

## 2025-05-25 RX ADMIN — MEROPENEM 1000 MG: 1 INJECTION INTRAVENOUS at 08:41

## 2025-05-25 RX ADMIN — ROPINIROLE HYDROCHLORIDE 0.5 MG: 1 TABLET, FILM COATED ORAL at 21:31

## 2025-05-25 RX ADMIN — APIXABAN 2.5 MG: 2.5 TABLET, FILM COATED ORAL at 21:31

## 2025-05-25 RX ADMIN — ALBUTEROL SULFATE 2.5 MG: 2.5 SOLUTION RESPIRATORY (INHALATION) at 15:00

## 2025-05-25 RX ADMIN — ARFORMOTEROL TARTRATE: 15 SOLUTION RESPIRATORY (INHALATION) at 07:54

## 2025-05-25 RX ADMIN — MEROPENEM 1000 MG: 1 INJECTION INTRAVENOUS at 22:29

## 2025-05-25 RX ADMIN — TEMAZEPAM 15 MG: 15 CAPSULE ORAL at 21:35

## 2025-05-25 RX ADMIN — HYDROXYZINE HYDROCHLORIDE 25 MG: 25 TABLET ORAL at 21:31

## 2025-05-25 RX ADMIN — NICOTINE POLACRILEX 2 MG: 2 LOZENGE ORAL at 14:45

## 2025-05-25 ASSESSMENT — PAIN - FUNCTIONAL ASSESSMENT: PAIN_FUNCTIONAL_ASSESSMENT: ACTIVITIES ARE NOT PREVENTED

## 2025-05-25 ASSESSMENT — PAIN SCALES - GENERAL
PAINLEVEL_OUTOF10: 5
PAINLEVEL_OUTOF10: 3
PAINLEVEL_OUTOF10: 0
PAINLEVEL_OUTOF10: 0

## 2025-05-25 ASSESSMENT — PAIN DESCRIPTION - FREQUENCY: FREQUENCY: INTERMITTENT

## 2025-05-25 ASSESSMENT — PAIN SCALES - WONG BAKER
WONGBAKER_NUMERICALRESPONSE: NO HURT
WONGBAKER_NUMERICALRESPONSE: NO HURT

## 2025-05-25 ASSESSMENT — PAIN DESCRIPTION - DESCRIPTORS: DESCRIPTORS: ACHING

## 2025-05-25 ASSESSMENT — PAIN DESCRIPTION - PAIN TYPE: TYPE: CHRONIC PAIN

## 2025-05-25 ASSESSMENT — PAIN DESCRIPTION - ONSET: ONSET: ON-GOING

## 2025-05-25 ASSESSMENT — PAIN DESCRIPTION - LOCATION: LOCATION: KNEE

## 2025-05-25 ASSESSMENT — PAIN DESCRIPTION - ORIENTATION: ORIENTATION: RIGHT;LEFT

## 2025-05-26 LAB
ANION GAP SERPL CALCULATED.3IONS-SCNC: 8 MMOL/L (ref 3–16)
BASOPHILS # BLD: 0 K/UL (ref 0–0.2)
BASOPHILS NFR BLD: 0 %
BUN SERPL-MCNC: 54 MG/DL (ref 7–20)
CALCIUM SERPL-MCNC: 8.6 MG/DL (ref 8.3–10.6)
CHLORIDE SERPL-SCNC: 97 MMOL/L (ref 99–110)
CO2 SERPL-SCNC: 37 MMOL/L (ref 21–32)
CREAT SERPL-MCNC: 1.8 MG/DL (ref 0.6–1.2)
DEPRECATED RDW RBC AUTO: 16.4 % (ref 12.4–15.4)
EOSINOPHIL # BLD: 0.1 K/UL (ref 0–0.6)
EOSINOPHIL NFR BLD: 1 %
GFR SERPLBLD CREATININE-BSD FMLA CKD-EPI: 31 ML/MIN/{1.73_M2}
GLUCOSE SERPL-MCNC: 134 MG/DL (ref 70–99)
HCT VFR BLD AUTO: 29.7 % (ref 36–48)
HGB BLD-MCNC: 9.9 G/DL (ref 12–16)
LYMPHOCYTES # BLD: 0.9 K/UL (ref 1–5.1)
LYMPHOCYTES NFR BLD: 11 %
MCH RBC QN AUTO: 31.4 PG (ref 26–34)
MCHC RBC AUTO-ENTMCNC: 33.4 G/DL (ref 31–36)
MCV RBC AUTO: 94.2 FL (ref 80–100)
MICROCYTES BLD QL SMEAR: ABNORMAL
MONOCYTES # BLD: 0.3 K/UL (ref 0–1.3)
MONOCYTES NFR BLD: 4 %
MYELOCYTES NFR BLD MANUAL: 3 %
NEUTROPHILS # BLD: 7.2 K/UL (ref 1.7–7.7)
NEUTROPHILS NFR BLD: 81 %
OVALOCYTES BLD QL SMEAR: ABNORMAL
PHOSPHATE SERPL-MCNC: 4.9 MG/DL (ref 2.5–4.9)
PLATELET # BLD AUTO: 237 K/UL (ref 135–450)
PMV BLD AUTO: 7.6 FL (ref 5–10.5)
POTASSIUM SERPL-SCNC: 4 MMOL/L (ref 3.5–5.1)
RBC # BLD AUTO: 3.16 M/UL (ref 4–5.2)
SODIUM SERPL-SCNC: 142 MMOL/L (ref 136–145)
STOMATOCYTES BLD QL SMEAR: ABNORMAL
VANCOMYCIN SERPL-MCNC: 23.2 UG/ML
WBC # BLD AUTO: 8.6 K/UL (ref 4–11)

## 2025-05-26 PROCEDURE — 6370000000 HC RX 637 (ALT 250 FOR IP): Performed by: STUDENT IN AN ORGANIZED HEALTH CARE EDUCATION/TRAINING PROGRAM

## 2025-05-26 PROCEDURE — 80202 ASSAY OF VANCOMYCIN: CPT

## 2025-05-26 PROCEDURE — 6370000000 HC RX 637 (ALT 250 FOR IP)

## 2025-05-26 PROCEDURE — 94761 N-INVAS EAR/PLS OXIMETRY MLT: CPT

## 2025-05-26 PROCEDURE — 6360000002 HC RX W HCPCS

## 2025-05-26 PROCEDURE — 2060000000 HC ICU INTERMEDIATE R&B

## 2025-05-26 PROCEDURE — 2500000003 HC RX 250 WO HCPCS: Performed by: STUDENT IN AN ORGANIZED HEALTH CARE EDUCATION/TRAINING PROGRAM

## 2025-05-26 PROCEDURE — 6370000000 HC RX 637 (ALT 250 FOR IP): Performed by: INTERNAL MEDICINE

## 2025-05-26 PROCEDURE — 84100 ASSAY OF PHOSPHORUS: CPT

## 2025-05-26 PROCEDURE — 2580000003 HC RX 258

## 2025-05-26 PROCEDURE — 80048 BASIC METABOLIC PNL TOTAL CA: CPT

## 2025-05-26 PROCEDURE — 6360000002 HC RX W HCPCS: Performed by: INTERNAL MEDICINE

## 2025-05-26 PROCEDURE — 85025 COMPLETE CBC W/AUTO DIFF WBC: CPT

## 2025-05-26 PROCEDURE — 2700000000 HC OXYGEN THERAPY PER DAY

## 2025-05-26 PROCEDURE — 94640 AIRWAY INHALATION TREATMENT: CPT

## 2025-05-26 RX ORDER — ALBUTEROL SULFATE 0.83 MG/ML
2.5 SOLUTION RESPIRATORY (INHALATION)
Status: DISCONTINUED | OUTPATIENT
Start: 2025-05-26 | End: 2025-05-27 | Stop reason: HOSPADM

## 2025-05-26 RX ADMIN — ARFORMOTEROL TARTRATE: 15 SOLUTION RESPIRATORY (INHALATION) at 20:55

## 2025-05-26 RX ADMIN — HYDROXYZINE HYDROCHLORIDE 25 MG: 25 TABLET ORAL at 21:39

## 2025-05-26 RX ADMIN — TORSEMIDE 20 MG: 20 TABLET ORAL at 08:44

## 2025-05-26 RX ADMIN — TEMAZEPAM 15 MG: 15 CAPSULE ORAL at 21:42

## 2025-05-26 RX ADMIN — NICOTINE POLACRILEX 2 MG: 2 LOZENGE ORAL at 15:12

## 2025-05-26 RX ADMIN — CARVEDILOL 6.25 MG: 6.25 TABLET, FILM COATED ORAL at 08:45

## 2025-05-26 RX ADMIN — APIXABAN 2.5 MG: 2.5 TABLET, FILM COATED ORAL at 08:45

## 2025-05-26 RX ADMIN — NICOTINE POLACRILEX 2 MG: 2 LOZENGE ORAL at 08:47

## 2025-05-26 RX ADMIN — ALBUTEROL SULFATE 2.5 MG: 2.5 SOLUTION RESPIRATORY (INHALATION) at 08:02

## 2025-05-26 RX ADMIN — SODIUM CHLORIDE, PRESERVATIVE FREE 10 ML: 5 INJECTION INTRAVENOUS at 08:52

## 2025-05-26 RX ADMIN — ROPINIROLE HYDROCHLORIDE 0.5 MG: 1 TABLET, FILM COATED ORAL at 08:44

## 2025-05-26 RX ADMIN — ACETAMINOPHEN 650 MG: 325 TABLET ORAL at 21:38

## 2025-05-26 RX ADMIN — VANCOMYCIN HYDROCHLORIDE 1000 MG: 1 INJECTION, POWDER, LYOPHILIZED, FOR SOLUTION INTRAVENOUS at 16:44

## 2025-05-26 RX ADMIN — MEROPENEM 1000 MG: 1 INJECTION INTRAVENOUS at 21:45

## 2025-05-26 RX ADMIN — TRAZODONE HYDROCHLORIDE 50 MG: 100 TABLET ORAL at 21:38

## 2025-05-26 RX ADMIN — LETROZOLE 2.5 MG: 2.5 TABLET ORAL at 08:45

## 2025-05-26 RX ADMIN — PREDNISONE 10 MG: 10 TABLET ORAL at 08:45

## 2025-05-26 RX ADMIN — NICOTINE POLACRILEX 2 MG: 2 LOZENGE ORAL at 04:17

## 2025-05-26 RX ADMIN — NICOTINE POLACRILEX 2 MG: 2 LOZENGE ORAL at 02:03

## 2025-05-26 RX ADMIN — GUAIFENESIN 600 MG: 600 TABLET, MULTILAYER, EXTENDED RELEASE ORAL at 21:38

## 2025-05-26 RX ADMIN — NICOTINE POLACRILEX 2 MG: 2 LOZENGE ORAL at 19:05

## 2025-05-26 RX ADMIN — AMLODIPINE BESYLATE 5 MG: 5 TABLET ORAL at 08:45

## 2025-05-26 RX ADMIN — Medication 10 MG: at 21:38

## 2025-05-26 RX ADMIN — ROPINIROLE HYDROCHLORIDE 0.5 MG: 1 TABLET, FILM COATED ORAL at 21:38

## 2025-05-26 RX ADMIN — ARFORMOTEROL TARTRATE: 15 SOLUTION RESPIRATORY (INHALATION) at 08:02

## 2025-05-26 RX ADMIN — SERTRALINE HYDROCHLORIDE 50 MG: 50 TABLET ORAL at 08:44

## 2025-05-26 RX ADMIN — MEROPENEM 1000 MG: 1 INJECTION INTRAVENOUS at 08:51

## 2025-05-26 RX ADMIN — PANTOPRAZOLE SODIUM 20 MG: 20 TABLET, DELAYED RELEASE ORAL at 06:48

## 2025-05-26 RX ADMIN — CARVEDILOL 6.25 MG: 6.25 TABLET, FILM COATED ORAL at 16:38

## 2025-05-26 RX ADMIN — NICOTINE POLACRILEX 2 MG: 2 LOZENGE ORAL at 21:37

## 2025-05-26 RX ADMIN — APIXABAN 2.5 MG: 2.5 TABLET, FILM COATED ORAL at 21:38

## 2025-05-26 ASSESSMENT — PAIN - FUNCTIONAL ASSESSMENT: PAIN_FUNCTIONAL_ASSESSMENT: PREVENTS OR INTERFERES SOME ACTIVE ACTIVITIES AND ADLS

## 2025-05-26 ASSESSMENT — PAIN SCALES - GENERAL
PAINLEVEL_OUTOF10: 3
PAINLEVEL_OUTOF10: 1

## 2025-05-26 ASSESSMENT — PAIN SCALES - WONG BAKER: WONGBAKER_NUMERICALRESPONSE: NO HURT

## 2025-05-26 ASSESSMENT — PAIN DESCRIPTION - ORIENTATION: ORIENTATION: RIGHT;LEFT

## 2025-05-26 ASSESSMENT — PAIN DESCRIPTION - DESCRIPTORS: DESCRIPTORS: ACHING

## 2025-05-26 ASSESSMENT — PAIN DESCRIPTION - LOCATION: LOCATION: KNEE

## 2025-05-27 VITALS
OXYGEN SATURATION: 95 % | TEMPERATURE: 97.8 F | DIASTOLIC BLOOD PRESSURE: 79 MMHG | HEIGHT: 71 IN | WEIGHT: 250.66 LBS | SYSTOLIC BLOOD PRESSURE: 133 MMHG | BODY MASS INDEX: 35.09 KG/M2 | RESPIRATION RATE: 20 BRPM | HEART RATE: 98 BPM

## 2025-05-27 LAB
ALBUMIN SERPL-MCNC: 2.9 G/DL (ref 3.4–5)
ALBUMIN/GLOB SERPL: 1 {RATIO} (ref 1.1–2.2)
ALP SERPL-CCNC: 120 U/L (ref 40–129)
ALT SERPL-CCNC: 18 U/L (ref 10–40)
ANION GAP SERPL CALCULATED.3IONS-SCNC: 8 MMOL/L (ref 3–16)
AST SERPL-CCNC: 14 U/L (ref 15–37)
BASOPHILS # BLD: 0 K/UL (ref 0–0.2)
BASOPHILS NFR BLD: 0 %
BILIRUB SERPL-MCNC: <0.2 MG/DL (ref 0–1)
BUN SERPL-MCNC: 54 MG/DL (ref 7–20)
CALCIUM SERPL-MCNC: 8.3 MG/DL (ref 8.3–10.6)
CHLORIDE SERPL-SCNC: 101 MMOL/L (ref 99–110)
CO2 SERPL-SCNC: 34 MMOL/L (ref 21–32)
CREAT SERPL-MCNC: 1.8 MG/DL (ref 0.6–1.2)
DEPRECATED RDW RBC AUTO: 16.4 % (ref 12.4–15.4)
EOSINOPHIL # BLD: 0.1 K/UL (ref 0–0.6)
EOSINOPHIL NFR BLD: 1 %
GFR SERPLBLD CREATININE-BSD FMLA CKD-EPI: 31 ML/MIN/{1.73_M2}
GLUCOSE SERPL-MCNC: 102 MG/DL (ref 70–99)
HCT VFR BLD AUTO: 28.1 % (ref 36–48)
HGB BLD-MCNC: 9.2 G/DL (ref 12–16)
HYPOCHROMIA BLD QL SMEAR: ABNORMAL
LYMPHOCYTES # BLD: 1 K/UL (ref 1–5.1)
LYMPHOCYTES NFR BLD: 12 %
MCH RBC QN AUTO: 30.8 PG (ref 26–34)
MCHC RBC AUTO-ENTMCNC: 32.8 G/DL (ref 31–36)
MCV RBC AUTO: 93.8 FL (ref 80–100)
METAMYELOCYTES NFR BLD MANUAL: 1 %
MICROCYTES BLD QL SMEAR: ABNORMAL
MONOCYTES # BLD: 0.5 K/UL (ref 0–1.3)
MONOCYTES NFR BLD: 6 %
MYELOCYTES NFR BLD MANUAL: 2 %
NEUTROPHILS # BLD: 6.6 K/UL (ref 1.7–7.7)
NEUTROPHILS NFR BLD: 78 %
PLATELET # BLD AUTO: 221 K/UL (ref 135–450)
PMV BLD AUTO: 7.3 FL (ref 5–10.5)
POLYCHROMASIA BLD QL SMEAR: ABNORMAL
POTASSIUM SERPL-SCNC: 4.3 MMOL/L (ref 3.5–5.1)
PROT SERPL-MCNC: 5.7 G/DL (ref 6.4–8.2)
RBC # BLD AUTO: 3 M/UL (ref 4–5.2)
RBC MORPH BLD: NORMAL
SODIUM SERPL-SCNC: 143 MMOL/L (ref 136–145)
STOMATOCYTES BLD QL SMEAR: ABNORMAL
WBC # BLD AUTO: 8.1 K/UL (ref 4–11)

## 2025-05-27 PROCEDURE — 97116 GAIT TRAINING THERAPY: CPT

## 2025-05-27 PROCEDURE — 97530 THERAPEUTIC ACTIVITIES: CPT

## 2025-05-27 PROCEDURE — 6360000002 HC RX W HCPCS

## 2025-05-27 PROCEDURE — 6370000000 HC RX 637 (ALT 250 FOR IP): Performed by: INTERNAL MEDICINE

## 2025-05-27 PROCEDURE — 94761 N-INVAS EAR/PLS OXIMETRY MLT: CPT

## 2025-05-27 PROCEDURE — 97162 PT EVAL MOD COMPLEX 30 MIN: CPT

## 2025-05-27 PROCEDURE — 85025 COMPLETE CBC W/AUTO DIFF WBC: CPT

## 2025-05-27 PROCEDURE — 6370000000 HC RX 637 (ALT 250 FOR IP): Performed by: STUDENT IN AN ORGANIZED HEALTH CARE EDUCATION/TRAINING PROGRAM

## 2025-05-27 PROCEDURE — 6360000002 HC RX W HCPCS: Performed by: INTERNAL MEDICINE

## 2025-05-27 PROCEDURE — 2500000003 HC RX 250 WO HCPCS: Performed by: STUDENT IN AN ORGANIZED HEALTH CARE EDUCATION/TRAINING PROGRAM

## 2025-05-27 PROCEDURE — 2700000000 HC OXYGEN THERAPY PER DAY

## 2025-05-27 PROCEDURE — 6370000000 HC RX 637 (ALT 250 FOR IP)

## 2025-05-27 PROCEDURE — 97166 OT EVAL MOD COMPLEX 45 MIN: CPT

## 2025-05-27 PROCEDURE — 80053 COMPREHEN METABOLIC PANEL: CPT

## 2025-05-27 PROCEDURE — 97535 SELF CARE MNGMENT TRAINING: CPT

## 2025-05-27 PROCEDURE — 2580000003 HC RX 258

## 2025-05-27 PROCEDURE — 94640 AIRWAY INHALATION TREATMENT: CPT

## 2025-05-27 PROCEDURE — 2580000003 HC RX 258: Performed by: STUDENT IN AN ORGANIZED HEALTH CARE EDUCATION/TRAINING PROGRAM

## 2025-05-27 RX ORDER — PREDNISONE 5 MG/1
5 TABLET ORAL DAILY
Status: DISCONTINUED | OUTPATIENT
Start: 2025-05-28 | End: 2025-05-27 | Stop reason: HOSPADM

## 2025-05-27 RX ADMIN — LETROZOLE 2.5 MG: 2.5 TABLET ORAL at 11:22

## 2025-05-27 RX ADMIN — NICOTINE POLACRILEX 2 MG: 2 LOZENGE ORAL at 11:25

## 2025-05-27 RX ADMIN — SERTRALINE HYDROCHLORIDE 50 MG: 50 TABLET ORAL at 08:25

## 2025-05-27 RX ADMIN — ROPINIROLE HYDROCHLORIDE 0.5 MG: 1 TABLET, FILM COATED ORAL at 08:24

## 2025-05-27 RX ADMIN — MEROPENEM 1000 MG: 1 INJECTION INTRAVENOUS at 08:35

## 2025-05-27 RX ADMIN — ACETAMINOPHEN 650 MG: 325 TABLET ORAL at 11:10

## 2025-05-27 RX ADMIN — SODIUM CHLORIDE: 0.9 INJECTION, SOLUTION INTRAVENOUS at 08:34

## 2025-05-27 RX ADMIN — NICOTINE POLACRILEX 2 MG: 2 LOZENGE ORAL at 09:25

## 2025-05-27 RX ADMIN — PREDNISONE 10 MG: 10 TABLET ORAL at 08:25

## 2025-05-27 RX ADMIN — SODIUM CHLORIDE, PRESERVATIVE FREE 10 ML: 5 INJECTION INTRAVENOUS at 08:35

## 2025-05-27 RX ADMIN — APIXABAN 2.5 MG: 2.5 TABLET, FILM COATED ORAL at 08:25

## 2025-05-27 RX ADMIN — NICOTINE POLACRILEX 2 MG: 2 LOZENGE ORAL at 15:17

## 2025-05-27 RX ADMIN — AMLODIPINE BESYLATE 5 MG: 5 TABLET ORAL at 08:25

## 2025-05-27 RX ADMIN — PANTOPRAZOLE SODIUM 20 MG: 20 TABLET, DELAYED RELEASE ORAL at 06:20

## 2025-05-27 RX ADMIN — VANCOMYCIN HYDROCHLORIDE 1000 MG: 1 INJECTION, POWDER, LYOPHILIZED, FOR SOLUTION INTRAVENOUS at 17:06

## 2025-05-27 RX ADMIN — TORSEMIDE 20 MG: 20 TABLET ORAL at 08:25

## 2025-05-27 RX ADMIN — CARVEDILOL 6.25 MG: 6.25 TABLET, FILM COATED ORAL at 08:25

## 2025-05-27 RX ADMIN — CARVEDILOL 6.25 MG: 6.25 TABLET, FILM COATED ORAL at 16:57

## 2025-05-27 RX ADMIN — ARFORMOTEROL TARTRATE: 15 SOLUTION RESPIRATORY (INHALATION) at 09:03

## 2025-05-27 RX ADMIN — ALBUTEROL SULFATE 2.5 MG: 2.5 SOLUTION RESPIRATORY (INHALATION) at 15:40

## 2025-05-27 RX ADMIN — ALBUTEROL SULFATE 2.5 MG: 2.5 SOLUTION RESPIRATORY (INHALATION) at 09:02

## 2025-05-27 ASSESSMENT — PAIN DESCRIPTION - FREQUENCY: FREQUENCY: INTERMITTENT

## 2025-05-27 ASSESSMENT — PAIN SCALES - GENERAL
PAINLEVEL_OUTOF10: 0
PAINLEVEL_OUTOF10: 5

## 2025-05-27 ASSESSMENT — ENCOUNTER SYMPTOMS
SHORTNESS OF BREATH: 1
ABDOMINAL DISTENTION: 0
WHEEZING: 1

## 2025-05-27 ASSESSMENT — PAIN DESCRIPTION - PAIN TYPE: TYPE: ACUTE PAIN

## 2025-05-27 ASSESSMENT — PAIN DESCRIPTION - DESCRIPTORS: DESCRIPTORS: ACHING

## 2025-05-27 ASSESSMENT — PAIN DESCRIPTION - ONSET: ONSET: GRADUAL

## 2025-05-27 ASSESSMENT — PAIN DESCRIPTION - ORIENTATION: ORIENTATION: RIGHT;LEFT;MID

## 2025-05-27 ASSESSMENT — PAIN - FUNCTIONAL ASSESSMENT: PAIN_FUNCTIONAL_ASSESSMENT: ACTIVITIES ARE NOT PREVENTED

## 2025-05-27 ASSESSMENT — PAIN DESCRIPTION - LOCATION: LOCATION: KNEE;NECK

## 2025-05-27 NOTE — DISCHARGE INSTR - COC
Continuity of Care Form    Patient Name: Shoaib Rothman   :  1960  MRN:  [id number]    Admit date:  2025  Discharge date:  25    Code Status Order: Full Code   Advance Directives:    Date/Time Healthcare Directive Type of Healthcare Directive Copy in Chart Healthcare Agent Appointed Healthcare Agent's Name Healthcare Agent's Phone Number    25 1218 No, patient does not have an advance directive for healthcare treatment  --  --  --  --  --             Admitting Physician:  No admitting provider for patient encounter.  PCP: Lamar Mondragon MD    Discharging Nurse: Darvin   [facility] Hospital Unit/Room#: 4458/4458-01  Discharging Unit Phone Number: [phone]    Emergency Contact:   Extended Emergency Contact Information  Primary Emergency Contact: [email or web address]  Home Phone: [phone]  Work Phone: [phone]  Mobile Phone: [phone]  Relation: Brother/Sister   needed? No    Past Surgical History:  Past Surgical History:   Procedure Laterality Date    APPENDECTOMY      BREAST SURGERY      BRONCHOSCOPY N/A 2024    BRONCHOSCOPY performed by Fernando Dhillon MD at [facility] ENDOSCOPY    BRONCHOSCOPY N/A 2025    BRONCHOSCOPY ALVEOLAR LAVAGE / BX performed by Guille Mabry MD at [facility] ENDOSCOPY    BRONCHOSCOPY N/A 2025    BRONCHOSCOPY ALVEOLAR LAVAGE performed by Maximo Tyler MD at [facility] ENDOSCOPY    BRONCHOSCOPY N/A 3/21/2025    BRONCHOSCOPY CRYOTHERAPY / BIOPSY performed by Fernando Dhillon MD at [facility] ENDOSCOPY    BRONCHOSCOPY N/A 2025    BRONCHOSCOPY CRYOTHERAPY performed by Fernando Dhillon MD at [facility] ENDOSCOPY    BRONCHOSCOPY  2025    BRONCHOSCOPY CRYOTHERAPY performed by Bernadine Mejia MD at [facility] ENDOSCOPY    BRONCHOSCOPY N/A 2025    BRONCHOSCOPY ALVEOLAR LAVAGE RUL superior segment performed by Bernadine Mejia MD at [facility] ENDOSCOPY    BRONCHOSCOPY N/A 2025    BRONCHOSCOPY BIOPSY  RUL superior segment

## 2025-05-27 NOTE — RT PROTOCOL NOTE
RT Inhaler-Nebulizer Bronchodilator Protocol Note    There is a bronchodilator order in the chart from a provider indicating to follow the RT Bronchodilator Protocol and there is an “Initiate RT Inhaler-Nebulizer Bronchodilator Protocol” order as well (see protocol at bottom of note).    CXR Findings:  No results found.    The findings from the last RT Protocol Assessment were as follows:   History Pulmonary Disease: Chronic pulmonary disease  Respiratory Pattern: Regular pattern and RR 12-20 bpm  Breath Sounds: Intermittent or unilateral wheezes  Cough: Strong, spontaneous, non-productive  Indication for Bronchodilator Therapy: Wheezing associated with pulm disorder, On home bronchodilators  Bronchodilator Assessment Score: 6  Will convert to TiD.    Aerosolized bronchodilator medication orders have been revised according to the RT Inhaler-Nebulizer Bronchodilator Protocol below.    Respiratory Therapist to perform RT Therapy Protocol Assessment initially then follow the protocol.  Repeat RT Therapy Protocol Assessment PRN for score 0-3 or on second treatment, BID, and PRN for scores above 3.    No Indications - adjust the frequency to every 6 hours PRN wheezing or bronchospasm, if no treatments needed after 48 hours then discontinue using Per Protocol order mode.     If indication present, adjust the RT bronchodilator orders based on the Bronchodilator Assessment Score as indicated below.  Use Inhaler orders unless patient has one or more of the following: on home nebulizer, not able to hold breath for 10 seconds, is not alert and oriented, cannot activate and use MDI correctly, or respiratory rate 25 breaths per minute or more, then use the equivalent nebulizer order(s) with same Frequency and PRN reasons based on the score.  If a patient is on this medication at home then do not decrease Frequency below that used at home.    0-3 - enter or revise RT bronchodilator order(s) to equivalent RT Bronchodilator order 
RT Inhaler-Nebulizer Bronchodilator Protocol Note    There is a bronchodilator order in the chart from a provider indicating to follow the RT Bronchodilator Protocol and there is an “Initiate RT Inhaler-Nebulizer Bronchodilator Protocol” order as well (see protocol at bottom of note).    CXR Findings:  XR CHEST PORTABLE  Result Date: 5/21/2025  No significant interval change. Electronically signed by Ronald Price MD      The findings from the last RT Protocol Assessment were as follows:   History Pulmonary Disease: Chronic pulmonary disease  Respiratory Pattern: Regular pattern and RR 12-20 bpm  Breath Sounds: Intermittent or unilateral wheezes  Cough: Strong, spontaneous, non-productive  Indication for Bronchodilator Therapy: Wheezing associated with pulm disorder, On home bronchodilators  Bronchodilator Assessment Score: 6  Will continue with QID pt would benefit.    Aerosolized bronchodilator medication orders have been revised according to the RT Inhaler-Nebulizer Bronchodilator Protocol below.    Respiratory Therapist to perform RT Therapy Protocol Assessment initially then follow the protocol.  Repeat RT Therapy Protocol Assessment PRN for score 0-3 or on second treatment, BID, and PRN for scores above 3.    No Indications - adjust the frequency to every 6 hours PRN wheezing or bronchospasm, if no treatments needed after 48 hours then discontinue using Per Protocol order mode.     If indication present, adjust the RT bronchodilator orders based on the Bronchodilator Assessment Score as indicated below.  Use Inhaler orders unless patient has one or more of the following: on home nebulizer, not able to hold breath for 10 seconds, is not alert and oriented, cannot activate and use MDI correctly, or respiratory rate 25 breaths per minute or more, then use the equivalent nebulizer order(s) with same Frequency and PRN reasons based on the score.  If a patient is on this medication at home then do not decrease 
RT Nebulizer Bronchodilator Protocol Note    There is a bronchodilator order in the chart from a provider indicating to follow the RT Bronchodilator Protocol and there is an “Initiate RT Bronchodilator Protocol” order as well (see protocol at bottom of note).    CXR Findings:  XR CHEST PORTABLE  Result Date: 5/21/2025  No significant interval change. Electronically signed by Ronald Price MD      The findings from the last RT Protocol Assessment were as follows:  Smoking: Chronic pulmonary disease  Respiratory Pattern: Regular pattern and RR 12-20 bpm  Breath Sounds: Inspiratory and expiratory or bilateral wheezing and/or rhonchi  Cough: Strong, productive  Indication for Bronchodilator Therapy: Wheezing associated with pulm disorder, On home bronchodilators  Bronchodilator Assessment Score: 9    Aerosolized bronchodilator medication orders have been revised according to the RT Nebulizer Bronchodilator Protocol below.    Respiratory Therapist to perform RT Therapy Protocol Assessment initially then follow the protocol.  Repeat RT Therapy Protocol Assessment PRN for score 0-3 or on second treatment, BID, and PRN for scores above 3.    No Indications - adjust the frequency to every 6 hours PRN wheezing or bronchospasm, if no treatments needed after 48 hours then discontinue using Per Protocol order mode.     If indication present, adjust the RT bronchodilator orders based on the Bronchodilator Assessment Score as indicated below.  If a patient is on this medication at home then do not decrease Frequency below that used at home.    0-3 - enter or revise RT bronchodilator order(s) to equivalent RT Bronchodilator order with Frequency of every 4 hours PRN for wheezing or increased work of breathing using Per Protocol order mode.       4-6 - enter or revise RT Bronchodilator order(s) to two equivalent RT bronchodilator orders with one order with BID Frequency and one order with Frequency of every 4 hours PRN wheezing or 
or more, then use the equivalent nebulizer order(s) with same Frequency and PRN reasons based on the score.  If a patient is on this medication at home then do not decrease Frequency below that used at home.    0-3 - enter or revise RT bronchodilator order(s) to equivalent RT Bronchodilator order with Frequency of every 4 hours PRN for wheezing or increased work of breathing using Per Protocol order mode.        4-6 - enter or revise RT Bronchodilator order(s) to two equivalent RT bronchodilator orders with one order with BID Frequency and one order with Frequency of every 4 hours PRN wheezing or increased work of breathing using Per Protocol order mode.        7-10 - enter or revise RT Bronchodilator order(s) to two equivalent RT bronchodilator orders with one order with TID Frequency and one order with Frequency of every 4 hours PRN wheezing or increased work of breathing using Per Protocol order mode.       11-13 - enter or revise RT Bronchodilator order(s) to one equivalent RT bronchodilator order with QID Frequency and an Albuterol order with Frequency of every 4 hours PRN wheezing or increased work of breathing using Per Protocol order mode.      Greater than 13 - enter or revise RT Bronchodilator order(s) to one equivalent RT bronchodilator order with every 4 hours Frequency and an Albuterol order with Frequency of every 2 hours PRN wheezing or increased work of breathing using Per Protocol order mode.     RT to enter RT Home Evaluation for COPD & MDI Assessment order using Per Protocol order mode.    Electronically signed by Fuad Rueda RCP on 5/19/2025 at 1:27 PM  
work of breathing using Per Protocol order mode.        4-6 - enter or revise RT Bronchodilator order(s) to two equivalent RT bronchodilator orders with one order with BID Frequency and one order with Frequency of every 4 hours PRN wheezing or increased work of breathing using Per Protocol order mode.        7-10 - enter or revise RT Bronchodilator order(s) to two equivalent RT bronchodilator orders with one order with TID Frequency and one order with Frequency of every 4 hours PRN wheezing or increased work of breathing using Per Protocol order mode.       11-13 - enter or revise RT Bronchodilator order(s) to one equivalent RT bronchodilator order with QID Frequency and an Albuterol order with Frequency of every 4 hours PRN wheezing or increased work of breathing using Per Protocol order mode.      Greater than 13 - enter or revise RT Bronchodilator order(s) to one equivalent RT bronchodilator order with every 4 hours Frequency and an Albuterol order with Frequency of every 2 hours PRN wheezing or increased work of breathing using Per Protocol order mode.     RT to enter RT Home Evaluation for COPD & MDI Assessment order using Per Protocol order mode.    Electronically signed by Kwasi Wilson RCP on 5/26/2025 at 9:06 PM

## 2025-05-27 NOTE — DISCHARGE SUMMARY
TRACE 04/24/2025 05:52 PM    UROBILINOGEN 0.2 04/24/2025 05:52 PM    BILIRUBINUR Negative 04/24/2025 05:52 PM    BLOODU Negative 04/24/2025 05:52 PM    GLUCOSEU Negative 04/24/2025 05:52 PM    KETUA Negative 04/24/2025 05:52 PM    AMORPHOUS 2+ 12/03/2019 04:30 AM     Urine Cultures:   Lab Results   Component Value Date/Time    LABURIN  04/24/2025 05:52 PM     >100,000 CFU/ml  CONTACT PRECAUTIONS INDICATED  Carbapenem antibiotics are the best option for infections caused  by ESBL producing organisms.  Other antibiotic classes are  likely to result in treatment failure, even for organisms  demonstrating in vitro susceptibility.       Blood Cultures:   Lab Results   Component Value Date/Time    BC No Growth after 4 days of incubation. 05/19/2025 05:35 AM     Lab Results   Component Value Date/Time    BLOODCULT2  04/25/2025 05:33 PM     Organism identification not detected by PCR (Film Array)  See additional report for complete BCID panel  The gram variable rods seen on the gram stain  are consistent with the gram variable staining  of a Nutritionally deficient Streptococcus  THIS IS NOT A CORRECTED REPORT      BLOODCULT2  04/25/2025 05:33 PM     POSITIVE for  This organism was isolated in one set.  Nutrionally deficient, no further workup  No further work up possible  Susceptibility testing is not routinely done as this  organism frequently represents skin contamination.       Organism:   Lab Results   Component Value Date/Time    ORG Staph aureus MRSA 05/21/2025 04:00 PM       Time Spent Discharging patient 31 minutes    Electronically signed by Bo Marte MD on 5/27/2025 at 3:02 PM

## 2025-05-27 NOTE — PLAN OF CARE
Problem: Chronic Conditions and Co-morbidities  Goal: Patient's chronic conditions and co-morbidity symptoms are monitored and maintained or improved  5/23/2025 0117 by Lawrence Red RN  Outcome: Progressing  Flowsheets (Taken 5/23/2025 0117)  Care Plan - Patient's Chronic Conditions and Co-Morbidity Symptoms are Monitored and Maintained or Improved:   Monitor and assess patient's chronic conditions and comorbid symptoms for stability, deterioration, or improvement   Collaborate with multidisciplinary team to address chronic and comorbid conditions and prevent exacerbation or deterioration   Update acute care plan with appropriate goals if chronic or comorbid symptoms are exacerbated and prevent overall improvement and discharge     Problem: Discharge Planning  Goal: Discharge to home or other facility with appropriate resources  Outcome: Progressing  Flowsheets (Taken 5/23/2025 0117)  Discharge to home or other facility with appropriate resources:   Identify barriers to discharge with patient and caregiver   Identify discharge learning needs (meds, wound care, etc)   Arrange for needed discharge resources and transportation as appropriate   Refer to discharge planning if patient needs post-hospital services based on physician order or complex needs related to functional status, cognitive ability or social support system     Problem: Safety - Adult  Goal: Free from fall injury  5/23/2025 0117 by Lawrence Red, RN  Outcome: Progressing  Flowsheets (Taken 5/23/2025 0117)  Free From Fall Injury:   Instruct family/caregiver on patient safety   Based on caregiver fall risk screen, instruct family/caregiver to ask for assistance with transferring infant if caregiver noted to have fall risk factors     Problem: Pain  Goal: Verbalizes/displays adequate comfort level or baseline comfort level  5/23/2025 0117 by Lawrence Red, RN  Outcome: Progressing  Flowsheets (Taken 5/23/2025 0117)  Verbalizes/displays adequate comfort level or 
  Problem: Chronic Conditions and Co-morbidities  Goal: Patient's chronic conditions and co-morbidity symptoms are monitored and maintained or improved  5/24/2025 2036 by Nir Rojo RN  Outcome: Progressing  Flowsheets (Taken 5/24/2025 2036)  Care Plan - Patient's Chronic Conditions and Co-Morbidity Symptoms are Monitored and Maintained or Improved:   Monitor and assess patient's chronic conditions and comorbid symptoms for stability, deterioration, or improvement   Collaborate with multidisciplinary team to address chronic and comorbid conditions and prevent exacerbation or deterioration   Update acute care plan with appropriate goals if chronic or comorbid symptoms are exacerbated and prevent overall improvement and discharge     Problem: Discharge Planning  Goal: Discharge to home or other facility with appropriate resources  5/24/2025 2036 by Nir Rojo RN  Outcome: Progressing  Flowsheets (Taken 5/24/2025 2036)  Discharge to home or other facility with appropriate resources:   Identify barriers to discharge with patient and caregiver   Identify discharge learning needs (meds, wound care, etc)   Refer to discharge planning if patient needs post-hospital services based on physician order or complex needs related to functional status, cognitive ability or social support system     Problem: Safety - Adult  Goal: Free from fall injury  5/24/2025 2036 by Nir Rojo RN  Outcome: Progressing  Flowsheets (Taken 5/24/2025 2036)  Free From Fall Injury:   Instruct family/caregiver on patient safety   Based on caregiver fall risk screen, instruct family/caregiver to ask for assistance with transferring infant if caregiver noted to have fall risk factors     Problem: Pain  Goal: Verbalizes/displays adequate comfort level or baseline comfort level  5/24/2025 2036 by Nir Rojo RN  Outcome: Progressing  Flowsheets (Taken 5/24/2025 2036)  Verbalizes/displays adequate comfort level or baseline comfort 
  Problem: Chronic Conditions and Co-morbidities  Goal: Patient's chronic conditions and co-morbidity symptoms are monitored and maintained or improved  5/25/2025 1008 by Narcisa Connors RN  Outcome: Progressing  Flowsheets (Taken 5/25/2025 1008)  Care Plan - Patient's Chronic Conditions and Co-Morbidity Symptoms are Monitored and Maintained or Improved:   Monitor and assess patient's chronic conditions and comorbid symptoms for stability, deterioration, or improvement   Collaborate with multidisciplinary team to address chronic and comorbid conditions and prevent exacerbation or deterioration   Update acute care plan with appropriate goals if chronic or comorbid symptoms are exacerbated and prevent overall improvement and discharge  Note: V/S stable.  SpO2>88% on 8L O2 high flow.  6-10 L Baseline per report.     Problem: Discharge Planning  Goal: Discharge to home or other facility with appropriate resources  5/25/2025 1008 by Narcisa Connors RN  Outcome: Progressing  Flowsheets (Taken 5/25/2025 1008)  Discharge to home or other facility with appropriate resources:   Identify barriers to discharge with patient and caregiver   Arrange for needed discharge resources and transportation as appropriate   Identify discharge learning needs (meds, wound care, etc)     Problem: Safety - Adult  Goal: Free from fall injury  5/25/2025 1008 by Narcisa Cononrs RN  Outcome: Progressing  Flowsheets (Taken 5/25/2025 1008)  Free From Fall Injury: Instruct family/caregiver on patient safety  Note: Pt is a Fall Risk. See Perez Fall Risk Score. Pt bed in low position and side rails up. Call light and belongings in reach. Pt encouraged to call for assistance. Will continue with hourly rounds for PO intake, pain needs, toileting, and repositioning as needed.         Problem: Pain  Goal: Verbalizes/displays adequate comfort level or baseline comfort level  5/25/2025 1008 by Narcisa Connors, RN  Outcome: Progressing  Flowsheets 
  Problem: Chronic Conditions and Co-morbidities  Goal: Patient's chronic conditions and co-morbidity symptoms are monitored and maintained or improved  5/26/2025 0959 by Tho Baker RN  Outcome: Progressing  Flowsheets (Taken 5/26/2025 0959)  Care Plan - Patient's Chronic Conditions and Co-Morbidity Symptoms are Monitored and Maintained or Improved:   Monitor and assess patient's chronic conditions and comorbid symptoms for stability, deterioration, or improvement   Collaborate with multidisciplinary team to address chronic and comorbid conditions and prevent exacerbation or deterioration   Update acute care plan with appropriate goals if chronic or comorbid symptoms are exacerbated and prevent overall improvement and discharge  5/26/2025 0503 by Lawrence Red RN  Outcome: Progressing  Flowsheets (Taken 5/26/2025 0503)  Care Plan - Patient's Chronic Conditions and Co-Morbidity Symptoms are Monitored and Maintained or Improved:   Monitor and assess patient's chronic conditions and comorbid symptoms for stability, deterioration, or improvement   Collaborate with multidisciplinary team to address chronic and comorbid conditions and prevent exacerbation or deterioration   Update acute care plan with appropriate goals if chronic or comorbid symptoms are exacerbated and prevent overall improvement and discharge     Problem: Discharge Planning  Goal: Discharge to home or other facility with appropriate resources  5/26/2025 0959 by Tho Bakre RN  Outcome: Progressing  Flowsheets (Taken 5/26/2025 0959)  Discharge to home or other facility with appropriate resources:   Identify barriers to discharge with patient and caregiver   Arrange for needed discharge resources and transportation as appropriate   Identify discharge learning needs (meds, wound care, etc)  5/26/2025 0503 by Lawrence Red RN  Outcome: Progressing  Flowsheets (Taken 5/26/2025 0503)  Discharge to home or other facility with appropriate resources:   Identify 
  Problem: Chronic Conditions and Co-morbidities  Goal: Patient's chronic conditions and co-morbidity symptoms are monitored and maintained or improved  5/27/2025 1258 by Darvin Alcaraz RN  Outcome: Progressing  Flowsheets (Taken 5/27/2025 1258)  Care Plan - Patient's Chronic Conditions and Co-Morbidity Symptoms are Monitored and Maintained or Improved:   Monitor and assess patient's chronic conditions and comorbid symptoms for stability, deterioration, or improvement   Collaborate with multidisciplinary team to address chronic and comorbid conditions and prevent exacerbation or deterioration     Problem: Discharge Planning  Goal: Discharge to home or other facility with appropriate resources  5/27/2025 1258 by Darvin Alcaraz, RN  Outcome: Progressing  Flowsheets (Taken 5/27/2025 1258)  Discharge to home or other facility with appropriate resources:   Identify barriers to discharge with patient and caregiver   Arrange for needed discharge resources and transportation as appropriate     Problem: Safety - Adult  Goal: Free from fall injury  5/27/2025 1258 by Darvin Alcaraz RN  Outcome: Progressing  Flowsheets (Taken 5/27/2025 1258)  Free From Fall Injury: Instruct family/caregiver on patient safety     Problem: Pain  Goal: Verbalizes/displays adequate comfort level or baseline comfort level  5/27/2025 1258 by Darvin Alcaraz RN  Outcome: Progressing  Flowsheets (Taken 5/27/2025 1258)  Verbalizes/displays adequate comfort level or baseline comfort level:   Encourage patient to monitor pain and request assistance   Assess pain using appropriate pain scale   Administer analgesics based on type and severity of pain and evaluate response   Implement non-pharmacological measures as appropriate and evaluate response     Problem: Skin/Tissue Integrity  Goal: Skin integrity remains intact  Description: 1.  Monitor for areas of redness and/or skin breakdown2.  Assess vascular access sites hourly3.  Every 4-6 hours minimum:  
  Problem: Chronic Conditions and Co-morbidities  Goal: Patient's chronic conditions and co-morbidity symptoms are monitored and maintained or improved  Outcome: Progressing     Problem: Discharge Planning  Goal: Discharge to home or other facility with appropriate resources  Outcome: Progressing     Problem: Safety - Adult  Goal: Free from fall injury  Outcome: Progressing     
  Problem: Chronic Conditions and Co-morbidities  Goal: Patient's chronic conditions and co-morbidity symptoms are monitored and maintained or improved  Outcome: Progressing  Flowsheets (Taken 5/20/2025 1639)  Care Plan - Patient's Chronic Conditions and Co-Morbidity Symptoms are Monitored and Maintained or Improved:   Monitor and assess patient's chronic conditions and comorbid symptoms for stability, deterioration, or improvement   Collaborate with multidisciplinary team to address chronic and comorbid conditions and prevent exacerbation or deterioration   Update acute care plan with appropriate goals if chronic or comorbid symptoms are exacerbated and prevent overall improvement and discharge     Problem: Discharge Planning  Goal: Discharge to home or other facility with appropriate resources  Outcome: Progressing  Flowsheets (Taken 5/20/2025 1639)  Discharge to home or other facility with appropriate resources:   Identify discharge learning needs (meds, wound care, etc)   Identify barriers to discharge with patient and caregiver   Arrange for needed discharge resources and transportation as appropriate     Problem: Safety - Adult  Goal: Free from fall injury  Outcome: Progressing  Flowsheets (Taken 5/20/2025 1639)  Free From Fall Injury:   Instruct family/caregiver on patient safety   Based on caregiver fall risk screen, instruct family/caregiver to ask for assistance with transferring infant if caregiver noted to have fall risk factors     Problem: Pain  Goal: Verbalizes/displays adequate comfort level or baseline comfort level  Outcome: Progressing  Flowsheets (Taken 5/20/2025 1639)  Verbalizes/displays adequate comfort level or baseline comfort level:   Encourage patient to monitor pain and request assistance   Assess pain using appropriate pain scale   Administer analgesics based on type and severity of pain and evaluate response  Note: Patient has reported no pain throughout this shift     
  Problem: Chronic Conditions and Co-morbidities  Goal: Patient's chronic conditions and co-morbidity symptoms are monitored and maintained or improved  Outcome: Progressing  Flowsheets (Taken 5/20/2025 1639)  Care Plan - Patient's Chronic Conditions and Co-Morbidity Symptoms are Monitored and Maintained or Improved:   Monitor and assess patient's chronic conditions and comorbid symptoms for stability, deterioration, or improvement   Collaborate with multidisciplinary team to address chronic and comorbid conditions and prevent exacerbation or deterioration   Update acute care plan with appropriate goals if chronic or comorbid symptoms are exacerbated and prevent overall improvement and discharge     Problem: Discharge Planning  Goal: Discharge to home or other facility with appropriate resources  Outcome: Progressing  Flowsheets (Taken 5/20/2025 1639)  Discharge to home or other facility with appropriate resources:   Identify discharge learning needs (meds, wound care, etc)   Identify barriers to discharge with patient and caregiver   Arrange for needed discharge resources and transportation as appropriate     Problem: Safety - Adult  Goal: Free from fall injury  Outcome: Progressing  Flowsheets (Taken 5/20/2025 1639)  Free From Fall Injury:   Instruct family/caregiver on patient safety   Based on caregiver fall risk screen, instruct family/caregiver to ask for assistance with transferring infant if caregiver noted to have fall risk factors  Note: Patient has remained free of falls throughout admission     Problem: Pain  Goal: Verbalizes/displays adequate comfort level or baseline comfort level  Outcome: Progressing  Flowsheets (Taken 5/20/2025 1639)  Verbalizes/displays adequate comfort level or baseline comfort level:   Encourage patient to monitor pain and request assistance   Assess pain using appropriate pain scale   Administer analgesics based on type and severity of pain and evaluate response  Note: Patient 
  Problem: Chronic Conditions and Co-morbidities  Goal: Patient's chronic conditions and co-morbidity symptoms are monitored and maintained or improved  Outcome: Progressing  Flowsheets (Taken 5/22/2025 1606)  Care Plan - Patient's Chronic Conditions and Co-Morbidity Symptoms are Monitored and Maintained or Improved:   Update acute care plan with appropriate goals if chronic or comorbid symptoms are exacerbated and prevent overall improvement and discharge   Collaborate with multidisciplinary team to address chronic and comorbid conditions and prevent exacerbation or deterioration   Monitor and assess patient's chronic conditions and comorbid symptoms for stability, deterioration, or improvement     Problem: Safety - Adult  Goal: Free from fall injury  Outcome: Progressing  Flowsheets (Taken 5/22/2025 1606)  Free From Fall Injury:   Based on caregiver fall risk screen, instruct family/caregiver to ask for assistance with transferring infant if caregiver noted to have fall risk factors   Instruct family/caregiver on patient safety     Problem: Pain  Goal: Verbalizes/displays adequate comfort level or baseline comfort level  Outcome: Progressing  Flowsheets (Taken 5/22/2025 1606)  Verbalizes/displays adequate comfort level or baseline comfort level:   Encourage patient to monitor pain and request assistance   Administer analgesics based on type and severity of pain and evaluate response   Assess pain using appropriate pain scale  Note: No complaints of pain from pt this shift.      
  Problem: Chronic Conditions and Co-morbidities  Goal: Patient's chronic conditions and co-morbidity symptoms are monitored and maintained or improved  Outcome: Progressing  Flowsheets (Taken 5/24/2025 1311)  Care Plan - Patient's Chronic Conditions and Co-Morbidity Symptoms are Monitored and Maintained or Improved:   Monitor and assess patient's chronic conditions and comorbid symptoms for stability, deterioration, or improvement   Collaborate with multidisciplinary team to address chronic and comorbid conditions and prevent exacerbation or deterioration   Update acute care plan with appropriate goals if chronic or comorbid symptoms are exacerbated and prevent overall improvement and discharge  Note: Pt spO2>88% on 15 L O2.  Pt endorses dyspnea with exertion.  V/S stable.       Problem: Discharge Planning  Goal: Discharge to home or other facility with appropriate resources  Outcome: Progressing  Flowsheets (Taken 5/24/2025 1311)  Discharge to home or other facility with appropriate resources:   Identify barriers to discharge with patient and caregiver   Arrange for needed discharge resources and transportation as appropriate   Identify discharge learning needs (meds, wound care, etc)     Problem: Safety - Adult  Goal: Free from fall injury  5/24/2025 1311 by Narcisa Connors RN  Outcome: Progressing  Flowsheets (Taken 5/24/2025 1311)  Free From Fall Injury: Instruct family/caregiver on patient safety  Note: Pt is a Fall Risk. See Perez Fall Risk Score. Pt bed in low position and side rails up. Call light and belongings in reach. Pt encouraged to call for assistance. Will continue with hourly rounds for PO intake, pain needs, toileting, and repositioning as needed.         Problem: Pain  Goal: Verbalizes/displays adequate comfort level or baseline comfort level  5/24/2025 1311 by Narcisa Connors RN  Outcome: Progressing  Flowsheets (Taken 5/24/2025 1311)  Verbalizes/displays adequate comfort level or baseline 
  Problem: Chronic Conditions and Co-morbidities  Goal: Patient's chronic conditions and co-morbidity symptoms are monitored and maintained or improved  Outcome: Progressing  Flowsheets (Taken 5/26/2025 0503)  Care Plan - Patient's Chronic Conditions and Co-Morbidity Symptoms are Monitored and Maintained or Improved:   Monitor and assess patient's chronic conditions and comorbid symptoms for stability, deterioration, or improvement   Collaborate with multidisciplinary team to address chronic and comorbid conditions and prevent exacerbation or deterioration   Update acute care plan with appropriate goals if chronic or comorbid symptoms are exacerbated and prevent overall improvement and discharge     Problem: Discharge Planning  Goal: Discharge to home or other facility with appropriate resources  Outcome: Progressing  Flowsheets (Taken 5/26/2025 0503)  Discharge to home or other facility with appropriate resources:   Identify barriers to discharge with patient and caregiver   Identify discharge learning needs (meds, wound care, etc)   Refer to discharge planning if patient needs post-hospital services based on physician order or complex needs related to functional status, cognitive ability or social support system     Problem: Safety - Adult  Goal: Free from fall injury  Outcome: Progressing  Flowsheets (Taken 5/26/2025 0503)  Free From Fall Injury:   Based on caregiver fall risk screen, instruct family/caregiver to ask for assistance with transferring infant if caregiver noted to have fall risk factors   Instruct family/caregiver on patient safety     Problem: Pain  Goal: Verbalizes/displays adequate comfort level or baseline comfort level  Outcome: Progressing  Flowsheets (Taken 5/26/2025 0503)  Verbalizes/displays adequate comfort level or baseline comfort level:   Encourage patient to monitor pain and request assistance   Administer analgesics based on type and severity of pain and evaluate response   Consider 
  Problem: Chronic Conditions and Co-morbidities  Goal: Patient's chronic conditions and co-morbidity symptoms are monitored and maintained or improved  Outcome: Progressing  Flowsheets (Taken 5/27/2025 0138)  Care Plan - Patient's Chronic Conditions and Co-Morbidity Symptoms are Monitored and Maintained or Improved:   Monitor and assess patient's chronic conditions and comorbid symptoms for stability, deterioration, or improvement   Collaborate with multidisciplinary team to address chronic and comorbid conditions and prevent exacerbation or deterioration   Update acute care plan with appropriate goals if chronic or comorbid symptoms are exacerbated and prevent overall improvement and discharge     Problem: Discharge Planning  Goal: Discharge to home or other facility with appropriate resources  Outcome: Progressing  Flowsheets (Taken 5/27/2025 0138)  Discharge to home or other facility with appropriate resources:   Identify barriers to discharge with patient and caregiver   Identify discharge learning needs (meds, wound care, etc)   Refer to discharge planning if patient needs post-hospital services based on physician order or complex needs related to functional status, cognitive ability or social support system   Arrange for needed discharge resources and transportation as appropriate     Problem: Safety - Adult  Goal: Free from fall injury  Outcome: Progressing  Flowsheets (Taken 5/27/2025 0138)  Free From Fall Injury:   Instruct family/caregiver on patient safety   Based on caregiver fall risk screen, instruct family/caregiver to ask for assistance with transferring infant if caregiver noted to have fall risk factors     Problem: Pain  Goal: Verbalizes/displays adequate comfort level or baseline comfort level  Outcome: Progressing  Flowsheets (Taken 5/27/2025 0138)  Verbalizes/displays adequate comfort level or baseline comfort level:   Encourage patient to monitor pain and request assistance   Administer 
  Problem: Respiratory - Adult  Goal: Achieves optimal ventilation and oxygenation  Outcome: Progressing  Flowsheets (Taken 5/24/2025 0017)  Achieves optimal ventilation and oxygenation:   Assess for changes in respiratory status   Position to facilitate oxygenation and minimize respiratory effort   Initiate smoking cessation protocol as indicated   Assess for changes in mentation and behavior   Oxygen supplementation based on oxygen saturation or arterial blood gases  Note: Pt is on 15L O2 100% FIO2 via vapotherm. SPO2>90%. Pt has 6-10L baseline. Will continue to monitor.      Problem: Safety - Adult  Goal: Free from fall injury  Outcome: Progressing  Flowsheets (Taken 5/24/2025 0011)  Free From Fall Injury: Instruct family/caregiver on patient safety  Note: Fall precautions are in place. Bed alarm is on and in lowest position. Pt is using call light appropriately. Will continue to monitor.       Problem: Pain  Goal: Verbalizes/displays adequate comfort level or baseline comfort level  Outcome: Progressing  Flowsheets (Taken 5/24/2025 0011)  Verbalizes/displays adequate comfort level or baseline comfort level:   Encourage patient to monitor pain and request assistance   Assess pain using appropriate pain scale   Administer analgesics based on type and severity of pain and evaluate response   Implement non-pharmacological measures as appropriate and evaluate response   Consider cultural and social influences on pain and pain management  Note: Pt has chronic bilateral knee pain. Pain medication given per MD order.     
0127)  Verbalizes/displays adequate comfort level or baseline comfort level:   Administer analgesics based on type and severity of pain and evaluate response   Consider cultural and social influences on pain and pain management   Encourage patient to monitor pain and request assistance   Assess pain using appropriate pain scale   Implement non-pharmacological measures as appropriate and evaluate response   Notify Licensed Independent Practitioner if interventions unsuccessful or patient reports new pain

## 2025-05-27 NOTE — CARE COORDINATION
Case Management Assessment           Daily Note                 Date/ Time of Note: 5/22/2025 11:41 AM         Note completed by: Chris Cortes RN    Patient Name: Shoaib Rothman  YOB: 1960    Diagnosis:COPD exacerbation (HCC) [J44.1]  CHANTELLE (acute kidney injury) [N17.9]  Acute on chronic respiratory failure with hypoxia and hypercapnia (HCC) [J96.21, J96.22]  Patient Admission Status: Inpatient  Date of Admission:5/19/2025  4:58 AM    Length of Stay: 3 GLOS: GMLOS: 3.5 Readmission Risk Score: Readmission Risk Score: 30.8    Current Plan of Care: 40 L O2; pulm cons; bronch cx pending,   ________________________________________________________________________________________    Would benefit from evals prior to dc when medically appropriate  ________________________________________________________________________________________  Discharge Plan: [facility] ([facility]): [facility]  Pre-cert required for SNF: NO  COVID Result:    Lab Results   Component Value Date/Time    COVID19 NOT DETECTED 04/24/2025 01:00 PM    COVID19 NOT DETECTED 09/22/2020 07:00 PM       Transportation PLAN for discharge: EMS transportation    Tentative discharge date: tbd    Potential assistance Purchasing Medications: Potential Assistance Purchasing Medications: No  Does Patient want to participate in local refill/ meds to beds program?:      Current barriers to discharge: Medical complications    Referrals completed:     Resources/ information provided:   ________________________________________________________________________________________  Case Management Notes: remains on 40L at this time.Message out to [facility] to see if pt needs cert for return, awaiting response.      12:10 PM  No cert needed for return    Shoaib and her family were provided with choice of provider; she and her family are in agreement with the discharge plan.    Emergency Contacts:  Extended Emergency Contact Information  Primary 
        Case Management Assessment           Daily Note                 Date/ Time of Note: 5/23/2025 12:34 PM         Note completed by: Val Perez    Patient Name: Shoaib Rothman  YOB: 1960    Diagnosis:COPD exacerbation (HCC) [J44.1]  CHANTELLE (acute kidney injury) [N17.9]  Acute on chronic respiratory failure with hypoxia and hypercapnia (HCC) [J96.21, J96.22]  Patient Admission Status: Inpatient  Date of Admission:5/19/2025  4:58 AM    Length of Stay: 4 GLOS: GMLOS: 3.5 Readmission Risk Score: Readmission Risk Score: 30.2    ___________________________________________________  Discharge Plan: Long Term Care (LTC): ***  Pre-cert required for SNF: {SNF PRECERT:56996}  COVID Result:    Lab Results   Component Value Date/Time    COVID19 NOT DETECTED 04/24/2025 01:00 PM    COVID19 NOT DETECTED 09/22/2020 07:00 PM       Transportation PLAN for discharge: {EMS:82134}    Tentative discharge date: ***    Potential assistance Purchasing Medications: Potential Assistance Purchasing Medications: No  Does Patient want to participate in local refill/ meds to beds program?: No    Current barriers to discharge: {Barriers:518439382}    Referrals completed: { Referrals Complete:88531}    Resources/ information provided: { Resources:20114}  ________________________________________________________________________________________  Case Management Notes: ***    Shoaib and her family were provided with choice of provider; she and her family are in agreement with the discharge plan.    Emergency Contacts:  Extended Emergency Contact Information  Primary Emergency Contact: devante españa  Home Phone: [phone]  Work Phone: [phone]  Mobile Phone: [phone]  Relation: Brother/Sister   needed? No    Care Transition Patient: {Yes/No:83319}    IMM Status:      Val Perez  [facility]  Case Management Department  Ph: ***  Fax: ***   
Case Management Assessment  Initial Evaluation    Date/Time of Evaluation: 5/20/2025 4:11 PM  Assessment Completed by: Val Perez    If patient is discharged prior to next notation, then this note serves as note for discharge by case management.    Patient Name: Shoaib Rothman                   YOB: 1960  Diagnosis: COPD exacerbation (HCC) [J44.1]  CHANTELLE (acute kidney injury) [N17.9]  Acute on chronic respiratory failure with hypoxia and hypercapnia (HCC) [J96.21, J96.22]                   Date / Time: 5/19/2025  4:58 AM    Patient Admission Status: Inpatient   Readmission Risk (Low < 19, Mod (19-27), High > 27): Readmission Risk Score: 32  Readmission Assessment  Number of Days since last admission?: 8-30 days  Previous Disposition: Long Term Care  Who is being Interviewed: Patient  What was the patient's/caregiver's perception as to why they think they needed to return back to the hospital?: Other (Comment) (respiratory issues)  Did you see a specialist, such as Cardiac, Pulmonary, Orthopedic Physician, etc. after you left the hospital?: No  Who advised the patient to return to the hospital?: Other Nurse (Navigator, CTN) (LTC staff)  Does the patient report anything that got in the way of taking their medications?: No  In our efforts to provide the best possible care to you and others like you, can you think of anything that we could have done to help you after you left the hospital the first time, so that you might not have needed to return so soon?: Other (Comment) (N/A)   Current PCP: Lamar Mondragon MD  PCP verified by ? Yes    Chart Reviewed: Yes      History Provided by: Patient, Medical Record  Patient Orientation: Alert and Oriented    Patient Cognition: Alert    Hospitalization in the last 30 days (Readmission):  Yes    If yes, Readmission Assessment in  Navigator will be completed.    Advance Directives:      Code Status: Full Code   Patient's Primary Decision Maker is: Legal Next 
patient want to participate in local refill/ meds to beds program?: No    Meds To Beds General Rules:  1. Can ONLY be done Monday- Friday between 8:30am-5pm  2. Prescription(s) must be in pharmacy by 3pm to be filled same day  3.Copy of patient's insurance/ prescription drug card and patient face sheet must be sent along with the prescription(s)  4. Cost of Rx cannot be added to hospital bill. If financial assistance is needed, please contact unit  or ;  or  CANNOT provide pharmacy voucher for patients co-pays  5. Patients can then  the prescription on their way out of the hospital at discharge, or pharmacy can deliver to the bedside if staff is available. (payment due at time of pick-up or delivery - cash, check, or card accepted)     Able to afford home medications/ co-pay costs: Yes    ADLS:  Current PT AM-PAC Score: 18 /24  Current OT AM-PAC Score: 17 /24    DISCHARGE Disposition: Long Term Care Facility (LTC): [facility]  Phone: [phone]  Fax: [phone]    LOC at discharge: Long Term Care  JHOAN Completed: No    Notification completed in HENS/PAS?:  Not Applicable    IMM Completed:   Not Indicated due to: medicaid ins         Transportation:  Transportation PLAN for discharge: EMS transportation   Mode of Transport: Ambulance stretcher - BLS  Reason for medical transport: Bed confined: Meets the following criteria 1) unable to get out of bed without assistance or ambulate, 2) unable to safely sit up in a wheelchair, 3) unable to maintain erect seating position in a chair for time needed for transport  Name of Transport Company: [facility]  Phone: [phone]  Time of Transport: 19:30    Transport form completed: Yes      COVID Result:    Lab Results   Component Value Date/Time    COVID19 NOT DETECTED 04/24/2025 01:00 PM    COVID19 NOT DETECTED 09/22/2020 07:00 PM       The Plan for Transition of Care is related to the

## 2025-05-27 NOTE — PROGRESS NOTES
Office : [phone]     Fax :[phone]         Renal Progress Note  Subjective:   Admit Date: 5/19/2025     HPI   Shoaib Rothman is a 64 y.o. female with a PMH of CKD 3b/4, FSGS, COPD and emphysema, who presented tot he ER with SOB. Pt was placed on BiPAP and admitted for further evaluation and management. We are consulted for CHANTELLE on CKD.      Interval History: Patient reports no improvement in her SOB but has a decreased oxygen requirement, now on 20 L vapotherm. She requests transfer to have her lymphatic study done. Will need lower oxygen requirement for transfer.    Interval history:    Now on 15 L vapotherm  Cr trending down   Lytes stable  Bps in fair control  Good UOP  Resting in bed- states she feels well    DIET ADULT DIET; Regular  Medications:   Scheduled Meds:   vancomycin  750 mg IntraVENous Q24H    meropenem  1,000 mg IntraVENous Q12H    albuterol  2.5 mg Nebulization 4x Daily RT    sodium zirconium cyclosilicate  10 g Oral Daily    sodium chloride flush  5-40 mL IntraVENous 2 times per day    arformoterol 15 mcg-budesonide 0.5 mg neb solution   Nebulization BID RT    traZODone  50 mg Oral Nightly    torsemide  20 mg Oral Daily    sertraline  50 mg Oral Daily    rOPINIRole  0.5 mg Oral BID    pantoprazole  20 mg Oral QAM AC    losartan  25 mg Oral Daily    letrozole  2.5 mg Oral Daily    carvedilol  6.25 mg Oral BID WC    apixaban  2.5 mg Oral BID    amLODIPine  5 mg Oral Daily    predniSONE  10 mg Oral Daily     Continuous Infusions:   sodium chloride 25 mL/hr at 05/23/25 2142       Labs:  CBC:   Recent Labs     05/22/25  0300 05/23/25  0324 
                                                                                                                                                                                                                                                                                                                                                                  Office : [phone]     Fax :[phone]         Renal Progress Note  Subjective:   Admit Date: 5/19/2025     HPI   Shoaib Rothman is a 64 y.o. female with a PMH of CKD 3b/4, FSGS, COPD and emphysema, who presented tot he ER with SOB. Pt was placed on BiPAP and admitted for further evaluation and management. We are consulted for CHANTELLE on CKD.      Interval History: Patient reports no improvement in her SOB but has a decreased oxygen requirement, now on 20 L vapotherm. She requests transfer to have her lymphatic study done. Will need lower oxygen requirement for transfer.    Interval history:    No acute events overnight  On 8 L HFNC  Cr stable and Lytes stable  Bps in fair control  Great UOP    DIET ADULT DIET; Regular  Medications:   Scheduled Meds:   vancomycin  1,000 mg IntraVENous Q24H    sodium zirconium cyclosilicate  5 g Oral Daily    meropenem  1,000 mg IntraVENous Q12H    albuterol  2.5 mg Nebulization 4x Daily RT    sodium chloride flush  5-40 mL IntraVENous 2 times per day    arformoterol 15 mcg-budesonide 0.5 mg neb solution   Nebulization BID RT    traZODone  50 mg Oral Nightly    torsemide  20 mg Oral Daily    sertraline  50 mg Oral Daily    rOPINIRole  0.5 mg Oral BID    pantoprazole  20 mg Oral QAM AC    losartan  25 mg Oral Daily    letrozole  2.5 mg Oral Daily    carvedilol  6.25 mg Oral BID WC    apixaban  2.5 mg Oral BID    amLODIPine  5 mg Oral Daily    predniSONE  10 mg Oral Daily     Continuous Infusions:   sodium chloride Stopped (05/24/25 0140)       Labs:  CBC:   Recent Labs     05/24/25  0525 05/25/25  0500 05/26/25  0310   WBC 6.9 7.8 8.6 
                                                                                                                                                                                                                                                                                                                                                                  Office : [phone]     Fax :[phone]         Renal Progress Note  Subjective:   Admit Date: 5/19/2025     HPI   Shoaib Rothman is a 64 y.o. female with a PMH of CKD 3b/4, FSGS, COPD and emphysema, who presented tot he ER with SOB. Pt was placed on BiPAP and admitted for further evaluation and management. We are consulted for CHANTELLE on CKD.      Interval History: Patient underwent bronchoscopy yesterday with removal of mucus plug. Patient placed on antibiotics for possible pneumonia. Patient reports no improvement in her breathing today. Satting mid 80s on vapotherm.      DIET ADULT DIET; Regular  Medications:   Scheduled Meds:   vancomycin  750 mg IntraVENous Q24H    meropenem  1,000 mg IntraVENous Q12H    albuterol  2.5 mg Nebulization 4x Daily RT    sodium zirconium cyclosilicate  10 g Oral Daily    sodium chloride flush  5-40 mL IntraVENous 2 times per day    arformoterol 15 mcg-budesonide 0.5 mg neb solution   Nebulization BID RT    traZODone  50 mg Oral Nightly    torsemide  20 mg Oral Daily    sertraline  50 mg Oral Daily    rOPINIRole  0.5 mg Oral BID    pantoprazole  20 mg Oral QAM AC    losartan  25 mg Oral Daily    letrozole  2.5 mg Oral Daily    carvedilol  6.25 mg Oral BID WC    apixaban  2.5 mg Oral BID    amLODIPine  5 mg Oral Daily    predniSONE  10 mg Oral Daily     Continuous Infusions:   sodium chloride 25 mL (05/22/25 0905)       Labs:  CBC:   Recent Labs     05/20/25  0500 05/21/25  0215 05/22/25  0300   WBC 7.7 7.7 8.2   HGB 9.6* 9.4* 9.5*    246 244     BMP:    Recent Labs     05/20/25  0500 05/21/25  0215 05/22/25  0300    143 
                                                                                                                                                                                                                                                                                                                                                                  Office : [phone]     Fax :[phone]         Renal Progress Note  Subjective:   Admit Date: 5/19/2025     HPI   Shoaib Rothman is a 64 y.o. female with a PMH of CKD 3b/4, FSGS, COPD and emphysema, who presented tot he ER with SOB. Pt was placed on BiPAP and admitted for further evaluation and management. We are consulted for CHANTELLE on CKD.      Interval History: Patient reports no improvement in her SOB but has a decreased oxygen requirement, now on 20 L vapotherm. She requests transfer to have her lymphatic study done. Will need lower oxygen requirement for transfer.    Interval history:    On high oxygen req   Cr stable   Lytes stable  Bps in fair control  Good UOP  Resting in bed- states she feels well    DIET ADULT DIET; Regular  Medications:   Scheduled Meds:   vancomycin  1,000 mg IntraVENous Q24H    sodium zirconium cyclosilicate  5 g Oral Daily    meropenem  1,000 mg IntraVENous Q12H    albuterol  2.5 mg Nebulization 4x Daily RT    sodium chloride flush  5-40 mL IntraVENous 2 times per day    arformoterol 15 mcg-budesonide 0.5 mg neb solution   Nebulization BID RT    traZODone  50 mg Oral Nightly    torsemide  20 mg Oral Daily    sertraline  50 mg Oral Daily    rOPINIRole  0.5 mg Oral BID    pantoprazole  20 mg Oral QAM AC    losartan  25 mg Oral Daily    letrozole  2.5 mg Oral Daily    carvedilol  6.25 mg Oral BID WC    apixaban  2.5 mg Oral BID    amLODIPine  5 mg Oral Daily    predniSONE  10 mg Oral Daily     Continuous Infusions:   sodium chloride 25 mL/hr at 05/23/25 2142       Labs:  CBC:   Recent Labs     05/23/25  0324 05/24/25  0525 
                                                                                                                                                                                                                                                                                                                                                                  Office : [phone]     Fax :[phone]         Renal Progress Note  Subjective:   Admit Date: 5/19/2025     Assessment/Plan     # CHANTELLE on CKD 4  # Primary FSGS - extensive fibrosis   # Solitary kidney  - Creatinine fluctuation in the setting of hemodynamic changes  - Creatinine 2.5 --> 2.4 --> 2.1--> 1.8 STABLE  - BP controlled  - On Prednisone 10 mg daily with plan to taper  - UPCR: 4.0 mg  - Avoid nephrotoxins  - Closely monitor renal labs   - OK to taper steroids from renal standpoint      # Acute on chronic respiratory failure  # End-stage COPD  # Recurrent mucus plugging   - Pulmonary on board  - S/p bronchoscopy 5/20  - Vanc and Merrem for possible pneumonia     # HTN  - BP controlled  - On CCB, ARB & BB  - Monitor      # Anemia  - Monitor      # Acid- base/ Electrolyte imbalance   # Hyperkalemia  - Lokelma PRN  - Aldactone stopped   - Monitor       HPI   Shoaib Rothman is a 64 y.o. female with a PMH of CKD 3b/4, FSGS, COPD and emphysema, who presented tot he ER with SOB. Pt was placed on BiPAP and admitted for further evaluation and management. We are consulted for CHANTELLE on CKD.      Interval History: Patient reports no improvement in her SOB but has a decreased oxygen requirement, now on 20 L vapotherm. She requests transfer to have her lymphatic study done. Will need lower oxygen requirement for transfer.    Interval history:    On 5L HFNC  BP's in good range  Creatinine stable  Electrolytes stable       DIET ADULT DIET; Regular  Medications:   Scheduled Meds:   [START ON 5/28/2025] predniSONE  5 mg Oral Daily    albuterol  2.5 mg Nebulization TID RT    
    Low Risk Nutrition Note       Nutrition Assessment:  LOS. Pt admitted for COPD exacerbation. PO intake adequate since admit at % majority of meals. EMR showing wt gain, no loss. On steroids. No nutrition concerns at present, deemed to be at low nutrition risk. Will continue to monitor intake adequacy through admit.     Current Nutrition Therapies:    ADULT DIET; Regular    Anthropometrics:   Current Height: 180.3 cm (5' 11\")  Current Weight - Scale: 113.7 kg (250 lb 10.6 oz)      Monitoring and Evaluation:  No nutrition diagnosis. Patient will be monitored per nutrition standards of care.     Consult Dietitian if nutrition intervention essential to patient care is needed.     Discharge Planning:  No needs    Breezy Nair RD  Isai:  [phone]      
    Pulmonary Progress Note    Patient Name: Shoaib Rothman   Patient : 1960   Date: 2025   Admit Date: 2025     CC::Shortness of Breath       Interval History:Pt seen at bedside this am. She expressed her frustration over not feeling better.She thinks her bronchoscopy hasn't helped her feel better and she is requesting to be moved to somewhere where she can get her lymphatic scan. He does not report fever, chills, changes in cough or phlegm and continues to be on 40L Vapotherm with intermittent need for non rebreather on ambulation.     HPI: This is a 64-year-old female with past medical history significant for end-stage COPD(baseline 3-15), sleep apnea on CPAP, type 2 diabetes, DVT, PE on Eliquis, breast cancer s/p mastectomy, chemo and radiation, left nephrectomy recurrent mucous plugging requiring bronchoscopy HTN, bronchiectasis stage III CKD FSGS who has presented with shortness of breath.      Patient has ongoing shortness of breath going on for the past few days.  She reports some phlegm production.  At home she uses 3-15 L however she felt that she needed nocturnal oxygen concentrator and oxygen tank at the same time so 25 L of oxygen to help her breathe better.  She does not report any fever chills or any lower extremity swelling.  No reports of sick contact as well.     Pt was recently admitted for SOB due to mucus plugging and required bronchoscopy 25.     On presentation  to ED, vitals were significant for temperature 96.6, heart rate elevated 106.  Labs were significant for creatinine of 2.5(baseline 2.0-2.1), BUN 68, GFR 21, troponin 49 follow-up 48, bnp: 522, blood gas is significant for PH 7.252, CO2: 67, HCO3: 29.5. CXR significant for Diffuse airspace disease bilaterally, greatest within the right base, not significantly changed and cardiomegaly.         MEDICATIONS   vancomycin  750 mg IntraVENous Q24H    meropenem  1,000 mg IntraVENous Q12H    albuterol  2.5 mg 
    Pulmonary Progress Note    Patient Name: Shoaib Rothman   Patient : 1960   Date: 2025   Admit Date: 2025     CC:Shortness of Breath       Interval History: Patient seen at bedside this a.m.  She is down to 20 L of oxygen on Vapotherm, we turned it down to 15 L.  Overall feeling well, on examination did have some expiratory wheezes despite having received breathing treatment roughly an hour before.  Continues to desat on ambulation.    HPI: This is a 64-year-old female with past medical history significant for end-stage COPD(baseline 3-15), sleep apnea on CPAP, type 2 diabetes, DVT, PE on Eliquis, breast cancer s/p mastectomy, chemo and radiation, left nephrectomy recurrent mucous plugging requiring bronchoscopy HTN, bronchiectasis stage III CKD FSGS who has presented with shortness of breath.      Patient has ongoing shortness of breath going on for the past few days.  She reports some phlegm production.  At home she uses 3-15 L however she felt that she needed nocturnal oxygen concentrator and oxygen tank at the same time so 25 L of oxygen to help her breathe better.  She does not report any fever chills or any lower extremity swelling.  No reports of sick contact as well.     Pt was recently admitted for SOB due to mucus plugging and required bronchoscopy 25.     On presentation  to ED, vitals were significant for temperature 96.6, heart rate elevated 106.  Labs were significant for creatinine of 2.5(baseline 2.0-2.1), BUN 68, GFR 21, troponin 49 follow-up 48, bnp: 522, blood gas is significant for PH 7.252, CO2: 67, HCO3: 29.5. CXR significant for Diffuse airspace disease bilaterally, greatest within the right base, not significantly changed and cardiomegaly.      MEDICATIONS   vancomycin  750 mg IntraVENous Q24H    meropenem  1,000 mg IntraVENous Q12H    albuterol  2.5 mg Nebulization 4x Daily RT    sodium zirconium cyclosilicate  10 g Oral Daily    sodium chloride flush  5-40 mL 
    Pulmonary Progress Note    Patient Name: Shoaib Rothman   Patient : 1960   Date: 2025   Admit Date: 2025     CC:Shortness of Breath       Interval History: Patient seen at bedside this a.m. She is currently on 5L of oxygen. She reports overeall feeling well, still has some expiratory wheezing. She attempted to get out of bed but got SOB and stopped. Recommended that she try this again later.     HPI: This is a 64-year-old female with past medical history significant for end-stage COPD(baseline 3-15), sleep apnea on CPAP, type 2 diabetes, DVT, PE on Eliquis, breast cancer s/p mastectomy, chemo and radiation, left nephrectomy recurrent mucous plugging requiring bronchoscopy HTN, bronchiectasis stage III CKD FSGS who has presented with shortness of breath.      Patient has ongoing shortness of breath going on for the past few days.  She reports some phlegm production.  At home she uses 3-15 L however she felt that she needed nocturnal oxygen concentrator and oxygen tank at the same time so 25 L of oxygen to help her breathe better.  She does not report any fever chills or any lower extremity swelling.  No reports of sick contact as well.     Pt was recently admitted for SOB due to mucus plugging and required bronchoscopy 25.     On presentation  to ED, vitals were significant for temperature 96.6, heart rate elevated 106.  Labs were significant for creatinine of 2.5(baseline 2.0-2.1), BUN 68, GFR 21, troponin 49 follow-up 48, bnp: 522, blood gas is significant for PH 7.252, CO2: 67, HCO3: 29.5. CXR significant for Diffuse airspace disease bilaterally, greatest within the right base, not significantly changed and cardiomegaly.      MEDICATIONS   albuterol  2.5 mg Nebulization TID RT    vancomycin  1,000 mg IntraVENous Q24H    meropenem  1,000 mg IntraVENous Q12H    sodium chloride flush  5-40 mL IntraVENous 2 times per day    arformoterol 15 mcg-budesonide 0.5 mg neb solution   Nebulization 
    Pulmonary Progress Note    Patient Name: Shoaib Rothman   Patient : 1960   Date: 2025   Admit Date: 2025     CC:Shortness of Breath       Interval History: Patient seen at bedside this a.m. currently on 40 L O2.  Patient underwent bronchoscopy yesterday tolerated well.  She had some mucous plugging in right upper lobe and bronchus intermedius there were also evident white thin secretions coming out of the right upper lobe superior segment.  Patient reports feeling the same as she did on presentation reports ongoing shortness of breath.  Continues to report cough with productive phlegm.    HPI:    This is a 64-year-old female with past medical history significant for end-stage COPD(baseline 3-15), sleep apnea on CPAP, type 2 diabetes, DVT, PE on Eliquis, breast cancer s/p mastectomy, chemo and radiation, left nephrectomy recurrent mucous plugging requiring bronchoscopy HTN, bronchiectasis stage III CKD FSGS who has presented with shortness of breath.      Patient has ongoing shortness of breath going on for the past few days.  She reports some phlegm production.  At home she uses 3-15 L however she felt that she needed nocturnal oxygen concentrator and oxygen tank at the same time so 25 L of oxygen to help her breathe better.  She does not report any fever chills or any lower extremity swelling.  No reports of sick contact as well.     Pt was recently admitted for SOB due to mucus plugging and required bronchoscopy 25.     On presentation  to ED, vitals were significant for temperature 96.6, heart rate elevated 106.  Labs were significant for creatinine of 2.5(baseline 2.0-2.1), BUN 68, GFR 21, troponin 49 follow-up 48, bnp: 522, blood gas is significant for PH 7.252, CO2: 67, HCO3: 29.5. CXR significant for Diffuse airspace disease bilaterally, greatest within the right base, not significantly changed and cardiomegaly.      MEDICATIONS   sodium zirconium cyclosilicate  10 g Oral Daily    
    V2.0    [facility] [place] Progress Note      Name:  Shoaib Rothman /Age/Sex: 1960  (64 y.o. female)   MRN & CSN:  [id number] & [id number] Encounter Date/Time: 2025 1:28 PM EDT   Location:  [facility]4458-01 PCP: Lamar Mondragon MD     Attending:Liliya Kirk MD       Hospital Day: 8    Assessment and Recommendations   Shoaib Rothman is a 64 y.o. female who presents with COPD exacerbation (HCC)      Plan:     COPD exacerbation  Hx of frequent mucous plugging  End stage COPD  Acute on chronic hypercapnic RF  -Pulmonology Consult. .  Has had bronchoscopies pretty much every month.  -Underwent bronchoscopy on ..  -Nebs as ordered.  Given concern for pneumonia as per pulmonology started on vancomycin and meropenem.  Awaiting cultures from bronchoscopy.  -Continue chronic prednisone.   -Encourage pulmonary toilet  - Repeat CT chest on 2025 showing perihilar and lingular/right middle lobe groundglass opacities, moderate right pleural effusion with associated compressive atelectasis of the right lower lobe  -Oxygen requirements have been improving.  Previously on Vapotherm, now transition to high flow nasal cannula, requiring 6 L in the morning.  Patient usual requirements are 3 to 5 L at home.       Obesity     MDD  PIPPA  -Continue chronic home meds     HTN  -Continue chronic home meds(ordered).      Hx of breat CA  -Femara as ordered.      Hx of PE's  - Currently on Eliquis, continue    CHANTELLE on CKD stage 3  Hx of FSGS  -Nephrology consult. InOs as ordered.   Discussed with nephrology.  FSGS stable on repeat biopsy.  They recommended tapering of steroid      Diet ADULT DIET; Regular   DVT Prophylaxis [] Lovenox, []  Heparin, [x] SCDs, [] Ambulation,  [] Eliquis, [] Xarelto  [] Coumadin   Code Status Full Code   Disposition From:.  Long-term care  Expected Disposition: Long-term care  Estimated Date of Discharge: 1 to 2 days  Patient requires continued admission due to respiratory failure needs bronchoscopy. 
    V2.0    [facility] [place] Progress Note      Name:  Shoaib Rothman /Age/Sex: 1960  (64 y.o. female)   MRN & CSN:  [id number] & [id number] Encounter Date/Time: 2025 1:28 PM EDT   Location:  Endo Pool/NONE PCP: Lamar Mondragon MD     Attending:Jordan Garcia MD       Hospital Day: 2    Assessment and Recommendations   Shaoib Rothman is a 64 y.o. female who presents with COPD exacerbation (HCC)      Plan:     COPD exacerbation  Hx of frequent mucous plugging  End stage COPD  Acute on chronic hypercapnic RF  -Pulmonology Consult. .  Plan for bronchoscopy today.  Has had bronchoscopies pretty much every month.  -Nebs as ordered.   -Continue chronic prednisone.   -Encourage pulmonary toilet     Obesity     MDD  PIPPA  -Continue chronic home meds     HTN  -Continue chronic home meds(ordered).      Hx of breat CA  -Femara as ordered.      Hx of Pe's  - Holding Eliquis for bronchoscopy.  Will      CHANTELLE on CKD stage 3  Hx of FSGS  -Nephrology consult. InOs as ordered.   Discussed with nephrology.  FSGS stable on repeat biopsy.  They recommended tapering of steroid      Diet Diet NPO   DVT Prophylaxis [] Lovenox, []  Heparin, [x] SCDs, [] Ambulation,  [] Eliquis, [] Xarelto  [] Coumadin   Code Status Full Code   Disposition From:.  Long-term care  Expected Disposition: Long-term care  Estimated Date of Discharge: 1 to 2 days  Patient requires continued admission due to respiratory failure needs bronchoscopy.   Surrogate Decision Maker/ devante Pozo (Brother/Sister)        Personally reviewed Lab Studies and Imaging             Subjective:     Chief Complaint:   Chief Complaint   Patient presents with    Shortness of Breath     Patient coming from [facility] with c/o SOB. 2 Duonebs given en route. Pt has hx of COPD and emphysema. Baseline on 6-10 liters of oxygen.        Reports she feels okay.  Reports she is awaiting her bronchoscopy and is really hungry.  Denies any chest pain, nausea and 
    V2.0    [facility] [place] Progress Note      Name:  Shoaib Rothman /Age/Sex: 1960  (64 y.o. female)   MRN & CSN:  [id number] & [id number] Encounter Date/Time: 2025 1:28 PM EDT   Location:  [facility]4458-01 PCP: Lamar Mondragon MD     Attending:Jordan Garcia MD       Hospital Day: 3    Assessment and Recommendations   Shoaib Rothman is a 64 y.o. female who presents with COPD exacerbation (HCC)      Plan:     COPD exacerbation  Hx of frequent mucous plugging  End stage COPD  Acute on chronic hypercapnic RF  -Pulmonology Consult. .  Has had bronchoscopies pretty much every month.  -Underwent bronchoscopy on .  Awaiting improvement in oxygen requirement.  -Nebs as ordered.   -Continue chronic prednisone.   -Encourage pulmonary toilet     Obesity     MDD  PIPPA  -Continue chronic home meds     HTN  -Continue chronic home meds(ordered).      Hx of breat CA  -Femara as ordered.      Hx of Pe's  - Underwent bronchoscopy 8.  Oxygen requirement has not improved.  Will await 1 more day before resuming Eliquis if cleared by pulmonology if prolonged then will place on heparin drip.     CHANTELLE on CKD stage 3  Hx of FSGS  -Nephrology consult. InOs as ordered.   Discussed with nephrology.  FSGS stable on repeat biopsy.  They recommended tapering of steroid      Diet ADULT DIET; Regular   DVT Prophylaxis [] Lovenox, []  Heparin, [x] SCDs, [] Ambulation,  [] Eliquis, [] Xarelto  [] Coumadin   Code Status Full Code   Disposition From:.  Long-term care  Expected Disposition: Long-term care  Estimated Date of Discharge: 1 to 2 days  Patient requires continued admission due to respiratory failure needs bronchoscopy.   Surrogate Decision Maker/ devante Pozo (Brother/Sister)        Personally reviewed Lab Studies and Imaging             Subjective:     Chief Complaint:   Chief Complaint   Patient presents with    Shortness of Breath     Patient coming from [facility] with c/o SOB. 2 Duonebs given en route. Pt has hx 
    V2.0    [facility] [place]      Name:  Shoaib Rothman /Age/Sex: 1960  (64 y.o. female)   MRN & CSN:  [id number] & [id number] Encounter Date/Time: 2025 1:28 PM EDT   Location:  [id number]- PCP: Lamar Mondragon MD     Attending:Jordan Garcia MD       Hospital Day: 4    Assessment and Recommendations   Shoaib Rothman is a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 level likely not indicated as patient is due to complete 7 days of therapy tomorrow. Will continue to monitor and order repeat if clinically indicated/ therapy extended  Will continue to monitor clinical condition and make adjustments to regimen as appropriate.    Please call with any questions,  Marie Hutchinson, PharmD  PGY1 Pharmacy Resident  Wireless: [phone]  5/26/2025 7:58 AM        Interval update: NAEON. O2 requirements continues to decrease, now at 6L HFNC (baseline 3-5 L). Pulm following.     Subjective/Objective:   Shoaib Rothman is a 64 y.o. female with a PMHx significant for end-stage COPD, sleep apnea, T2DM, DVT/PE, breast cancer s/p mastectomy, L nephrectomy  who is admitted with COPD exacerbation, acute on chronic respiratory failure..     Pharmacy is consulted to dose Vancomycin.    Vancomycin Dosing History:  Mar 2025 Received loading dose of 2500mg IV x1 then 1000mg IV q24h  Pt with hx of CKD3 - SCr relatively stable during admit (SCr 1.7?1.8?1.6, which is her baseline)  Regimen resulted in       Ht Readings from Last 1 Encounters:   05/19/25 1.803 m (5' 11\")     Wt Readings from Last 1 
[facility] -  Clinical Pharmacy Note    Vancomycin - Management by Pharmacy    Consult Date(s): 5/21/25  Consulting Provider(s): Duy    Assessment / Plan  PNA - Vancomycin  Concurrent Antimicrobials: Meropenem  Day of Vanc Therapy / Ordered Duration: 5 of 7  Current Dosing Method:  Bayesian AUC dosing  Therapeutic Goal: -600  Current Dose / Plan:   Scr remains stable at 1.8 mg/dL this AM. Baseline Scr appears to be ~1.6 to 1.7 mg/dL. UOP documented to be ~1.3 mL/kg/hr in past 24 hours, improved and adequate. Continue monitoring.   Current dose: 750mg IV q24h   Regimen predicts AUCss of 362 mg/L*hr with troughss of 9.9 mg/L   Plan: will increase dose to 1000 mg IV q24h and plan to obtain a random level for tomorrow AM to assess kinetics   New regimen predicts AUCss of 477 mg/L*hr with troughss of 13.1 mg/L  Will continue to monitor clinical condition and make adjustments to regimen as appropriate.    Please call with questions--  Juan José Viveros, PharmD  PGY1 Pharmacy Resident   Wireless: [phone]  05/25/25       Interval update: NAEON. O2 requirements continues to decrease, now at 10L HFNC. Pulm following - potential MRI lympangiogram (would require facility transfer).    Subjective/Objective:   Shoaib Rothman is a 64 y.o. female with a PMHx significant for end-stage COPD, sleep apnea, T2DM, DVT/PE, breast cancer s/p mastectomy, L nephrectomy  who is admitted with COPD exacerbation, acute on chronic respiratory failure..     Pharmacy is consulted to dose Vancomycin.    Vancomycin Dosing History:  Mar 2025 Received loading dose of 2500mg IV x1 then 1000mg IV q24h  Pt with hx of CKD3 - SCr relatively stable during admit (SCr 1.7?1.8?1.6, which is her baseline)  Regimen resulted in       Ht Readings from Last 1 Encounters:   05/19/25 1.803 m (5' 11\")     Wt Readings from Last 1 Encounters:   05/25/25 114 kg (251 lb 5.2 oz)     Current & Prior Antimicrobial Regimen(s):  Meropenem 
[facility] -  Clinical Pharmacy Note    Vancomycin - Management by Pharmacy    Consult Date(s): 5/21/25  Consulting Provider(s): Duy    Assessment / Plan  PNA - Vancomycin  Concurrent Antimicrobials: Meropenem  Day of Vanc Therapy / Ordered Duration: 3of 7  Current Dosing Method:  Bayesian AUC dosing  Therapeutic Goal: -600  Current Dose / Plan:   Admitted with CHANTELLE on CKD - appears baseline SCr Is ~1.6-1.7.  SCr improving, 2.1?2 today  On 750mg IV q24h.  Level today = 20 mg/L - drawn ~11.5h after prior dose  Calculated AUC = 498 and trough = 14.9  Continue current dose for now.  Repeat levels will be ordered as appropriate  Will continue to monitor clinical condition and make adjustments to regimen as appropriate.    Please call with questions--  Patricia Alves, PharmD, BCPS  Wireless: [phone]   5/23/2025 8:08 AM        Interval update:  Down to 20L O2 HFNC. Pulm following - discussing MRI lympangiogram but would require transfer to another facility.    Subjective/Objective:   Shoaib Rothman is a 64 y.o. female with a PMHx significant for end-stage COPD, sleep apnea, T2DM, DVT/PE, breast cancer s/p mastectomy, L nephrectomy  who is admitted with COPD exacerbation, acute on chronic respiratory failure..     Pharmacy is consulted to dose Vancomycin.    Vancomycin Dosing History:  Mar 2025 Received loading dose of 2500mg IV x1 then 1000mg IV q24h  Pt with hx of CKD3 - SCr relatively stable during admit (SCr 1.7?1.8?1.6, which is her baseline)  Regimen resulted in       Ht Readings from Last 1 Encounters:   05/19/25 1.803 m (5' 11\")     Wt Readings from Last 1 Encounters:   05/23/25 112.1 kg (247 lb 2.2 oz)     Current & Prior Antimicrobial Regimen(s):  Meropenem (5/21-current)  Vancomycin - Pharmacy to dose  2500mg IV x1 5/21  750mg IV q24h (5/22-current)      Vancomycin Level(s) / Doses:    Date Time Dose Type of Level / Level Interpretation   5/21 1616 2500mg IV x1 -- Loading dose; ~22 
            Subjective:     Chief Complaint:   Chief Complaint   Patient presents with    Shortness of Breath     Patient coming from [facility] with c/o SOB. 2 Duonebs given en route. Pt has hx of COPD and emphysema. Baseline on 6-10 liters of oxygen.        Seen and examined in the morning.  Remains on Vapotherm but requirements are improving.    Review of Systems:      Pertinent positives and negatives discussed in HPI    Objective:     Intake/Output Summary (Last 24 hours) at 5/23/2025 1300  Last data filed at 5/23/2025 1202  Gross per 24 hour   Intake 2855.4 ml   Output 3550 ml   Net -694.6 ml      Vitals:   Vitals:    05/23/25 0857 05/23/25 1015 05/23/25 1202 05/23/25 1252   BP: 121/85  (!) 143/84    Pulse: 91 96 95 93   Resp: 24  22 20   Temp: 97.8 °F (36.6 °C)  98 °F (36.7 °C)    TempSrc: Axillary  Oral    SpO2: 95% 93% 90% (!) 89%   Weight:       Height:             Physical Exam:      General appearance: alert and cooperative with exam  Lungs: Not in respiratory distress.  Able to speak full sentences.  Decreased breath sounds at bases.  On Vapotherm.  Heart: regular rate and rhythm, S1, S2 normal, no murmur, click, rub or gallop  Abdomen: soft, non-tender; bowel sounds normal; no masses,  no organomegaly  Extremities: extremities normal, atraumatic, no color change, cyanosis or edema  Neurologic: Alert and oriented to time place and person.  Strength 5 out of 5 in bilateral upper and lower extremities.  Sensation to light touch is intact and equal in bilateral upper and lower extremities.          Medications:   Medications:    vancomycin  750 mg IntraVENous Q24H    meropenem  1,000 mg IntraVENous Q12H    albuterol  2.5 mg Nebulization 4x Daily RT    sodium zirconium cyclosilicate  10 g Oral Daily    sodium chloride flush  5-40 mL IntraVENous 2 times per day    arformoterol 15 mcg-budesonide 0.5 mg neb solution   Nebulization BID RT    traZODone  50 mg Oral Nightly    torsemide  20 mg Oral Daily    
  Surrogate Decision Maker/ devante Pozo (Brother/Sister)        Personally reviewed Lab Studies and Imaging             Subjective:     Chief Complaint:   Chief Complaint   Patient presents with    Shortness of Breath     Patient coming from [facility] with c/o SOB. 2 Duonebs given en route. Pt has hx of COPD and emphysema. Baseline on 6-10 liters of oxygen.        Seen and examined in the morning.  Remains on Vapotherm.  On 15 L/min and 100% FiO2.  Feels dyspneic even with slightest movement on the bed.    Review of Systems:      Pertinent positives and negatives discussed in HPI    Objective:     Intake/Output Summary (Last 24 hours) at 5/24/2025 1147  Last data filed at 5/24/2025 1106  Gross per 24 hour   Intake 1270 ml   Output 2750 ml   Net -1480 ml      Vitals:   Vitals:    05/24/25 0843 05/24/25 0909 05/24/25 1106 05/24/25 1140   BP:  (!) 163/93     Pulse:  98 85 91   Resp:  22  18   Temp:  98.1 °F (36.7 °C)     TempSrc:  Oral     SpO2: 95% 96%  98%   Weight:       Height:             Physical Exam:      General appearance: alert and cooperative with exam  Lungs: Not in respiratory distress.  Able to speak full sentences.  Decreased breath sounds at bases.  On Vapotherm.  Heart: regular rate and rhythm, S1, S2 normal, no murmur, click, rub or gallop  Abdomen: soft, non-tender; bowel sounds normal; no masses,  no organomegaly  Extremities: extremities normal, atraumatic, no color change, cyanosis or edema  Neurologic: Alert and oriented to time place and person.  Strength 5 out of 5 in bilateral upper and lower extremities.  Sensation to light touch is intact and equal in bilateral upper and lower extremities.          Medications:   Medications:    sodium zirconium cyclosilicate  5 g Oral Daily    vancomycin  750 mg IntraVENous Q24H    meropenem  1,000 mg IntraVENous Q12H    albuterol  2.5 mg Nebulization 4x Daily RT    sodium chloride flush  5-40 mL IntraVENous 2 times per day    arformoterol 
102 101 100   CO2 33* 35* 34*   PHOS 4.7 5.1* 4.8   BUN 63* 59* 57*   CREATININE 2.0* 1.8* 1.8*     No results for input(s): \"PHART\", \"IWX4HVX\", \"PO2ART\" in the last 72 hours.  LIVER PROFILE: No results for input(s): \"AST\", \"ALT\", \"LIPASE\", \"AMYLASE\", \"BILIDIR\", \"BILITOT\", \"ALKPHOS\" in the last 72 hours.    Invalid input(s): \"ALB\"  Procalcitonin:    Lab Results   Component Value Date/Time    PROCAL 0.24 05/23/2025 03:24 AM       CXR REVIEWED BY ME AND SHOWED:  CT CHEST WO CONTRAST   Final Result   Perihilar and lingular/right middle lobe groundglass opacities,   infectious/inflammatory.      Moderate right pleural effusion with associated compressive atelectasis of the   right lower lobe.      Right hilar lymph node, most likely reactive.      Electronically signed by Colten Wheeler      XR CHEST PORTABLE   Final Result      No significant interval change.      Electronically signed by Ronald Price MD      XR CHEST PORTABLE   Final Result   No appreciable interval change.      Right-sided port remains coiled in the right axilla. Clinical follow-up recommended.      Diffuse airspace disease bilaterally, greatest within the right base, not significantly changed.      Cardiomegaly.      Electronically signed by Fely Aly DO           ASSESSMENT:  Acute on Chronic hypoxic respiratory failure in the setting of COPD likely secondary to mucous plugging with features of plastic bronchitis  CHANTELLE on CKD  Hx Breast Cancer  Hx DVT/PE on Eliquis  FSGS on prednisone 0    PLAN:  Provide O2 supplementation to keep SpO2 between 88-92% if needed.    Continue Brovana/Pulmicort and Duonebs   Continue broad spectrum antibiotics   Continue Prednisone   Pulmonary toilet: Incentive inspirometer and Flutter valve / vibraPAP device   Out of bed and up to chair as tolerated     The note was completed using EMR and Dragon dictation system. Every effort was made to ensure accuracy; however, inadvertent computerized transcription errors may 
NEPHRECTOMY Left 1980's    TUBAL LIGATION      TUNNELED VENOUS PORT PLACEMENT  2019    still in     US BREAST BIOPSY W LOC DEVICE EACH ADDL LESION LEFT  11/9/2020    US BREAST BIOPSY NEEDLE ADDITIONAL LEFT 11/9/2020 Steven J Perlman, MD [facility]TR[facility]       Family History   Problem Relation Age of Onset    Other Brother         spina bifida    Breast Cancer Mother         reports that she quit smoking about 2 months ago. Her smoking use included cigarettes. She started smoking about 40 years ago. She has a 35.6 pack-year smoking history. She has never used smokeless tobacco. She reports current drug use. Drug: Marijuana (Weed). She reports that she does not drink alcohol.    Allergies:  Pcn [penicillins], Cefepime, and Hydralazine    Current Medications:    nicotine polacrilex (COMMIT) lozenge 2 mg, Q2H PRN  sodium zirconium cyclosilicate (LOKELMA) oral suspension 10 g, Daily  albuterol (PROVENTIL) (2.5 MG/3ML) 0.083% nebulizer solution 2.5 mg, Q6H PRN  sodium chloride flush 0.9 % injection 5-40 mL, 2 times per day  sodium chloride flush 0.9 % injection 5-40 mL, PRN  0.9 % sodium chloride infusion, PRN  potassium chloride (KLOR-CON M) extended release tablet 40 mEq, PRN   Or  potassium bicarb-citric acid (EFFER-K) effervescent tablet 40 mEq, PRN   Or  potassium chloride 10 mEq/100 mL IVPB (Peripheral Line), PRN  magnesium sulfate 2000 mg in 50 mL IVPB premix, PRN  ondansetron (ZOFRAN-ODT) disintegrating tablet 4 mg, Q8H PRN   Or  ondansetron (ZOFRAN) injection 4 mg, Q6H PRN  polyethylene glycol (GLYCOLAX) packet 17 g, Daily PRN  acetaminophen (TYLENOL) tablet 650 mg, Q6H PRN   Or  acetaminophen (TYLENOL) suppository 650 mg, Q6H PRN  albuterol (PROVENTIL) (2.5 MG/3ML) 0.083% nebulizer solution 2.5 mg, Q4H RT  arformoterol 15 mcg-budesonide 0.5 mg neb solution, BID RT  traZODone (DESYREL) tablet 50 mg, Nightly  torsemide (DEMADEX) tablet 20 mg, Daily  temazepam (RESTORIL) capsule 15 mg, Nightly PRN  sertraline 
m/r/g  Respiratory:  CTA B without w/r/r  Abdomen: +bs, soft, nt  Ext: + LE edema    Assessment and Plan:       IMAGING:  XR CHEST PORTABLE   Final Result      No significant interval change.      Electronically signed by Ronald Price MD      XR CHEST PORTABLE   Final Result   No appreciable interval change.      Right-sided port remains coiled in the right axilla. Clinical follow-up recommended.      Diffuse airspace disease bilaterally, greatest within the right base, not significantly changed.      Cardiomegaly.      Electronically signed by Fely Aly DO      CT CHEST WO CONTRAST    (Results Pending)       Assessment/Plan     # CHANTELLE on CKD 4  # Primary FSGS - extensive fibrosis   # Solitary kidney  - Creatinine fluctuation in the setting of hemodynamic changes  - Creatinine 2.5 --> 2.4 --> 2.3 --> 2.1  - BP controlled  - On Prednisone 10 mg daily with plan to taper  - Avoid nephrotoxins  - Closely monitor renal labs   - ok to taper steroids from renal stand point      # Acute on chronic respiratory failure  # End-stage COPD  # Recurrent mucus plugging   - Pulmonary consulted   - S/p bronchoscopy 5/20  - Vanc and Merrem for possible pneumonia     # HTN  - BP controlled  - On CCB, ARB & BB  - Monitor      # Anemia  - Monitor      # Acid- base/ Electrolyte imbalance   # Hyperkalemia  - Lokelma daily for now   - Aldactone stopped   - Monitor       Mae Hammond DO  Internal Medicine Resident , PGY-2  9:22 AM    Patient will be discussed with attending, Dr. Lm Lockett.    [facility]  Office -[phone]  Fxp-[phone]        I have seen the patient independently from the resident .I discussed the care with the resident. I personally reviewed the HPI, PH, FH, SH, ROS and medications. I repeated pertinent portions of the examination and reviewed the relevant imaging and laboratory data. I agree with the findings, assessment and plan as documented, with the following 
left hemithorax.     No appreciable interval change. Right-sided port remains coiled in the right axilla. Clinical follow-up recommended. Diffuse airspace disease bilaterally, greatest within the right base, not significantly changed. Cardiomegaly. Electronically signed by Fely Aly DO      CBC:   Recent Labs     05/23/25  0324 05/24/25  0525 05/25/25  0500   WBC 7.3 6.9 7.8   HGB 9.7* 9.8* 10.0*    246 265     BMP:    Recent Labs     05/23/25  0324 05/24/25  0525 05/25/25  0500    144 145   K 5.0 4.3 4.3    101 100   CO2 33* 35* 34*   BUN 63* 59* 57*   CREATININE 2.0* 1.8* 1.8*   GLUCOSE 152* 91 105*     Hepatic:   No results for input(s): \"AST\", \"ALT\", \"BILITOT\", \"ALKPHOS\" in the last 72 hours.    Invalid input(s): \"ALB\"    Lipids:   Lab Results   Component Value Date/Time    CHOL 216 03/06/2020 05:40 AM    HDL 26 03/06/2020 05:40 AM    TRIG 559 02/28/2025 03:52 AM     Hemoglobin A1C:   Lab Results   Component Value Date/Time    LABA1C 7.0 03/19/2025 05:00 PM     TSH:   Lab Results   Component Value Date/Time    TSH 2.29 08/30/2019 11:40 AM     Troponin: No results found for: \"TROPONINT\"  Lactic Acid: No results for input(s): \"LACTA\" in the last 72 hours.  BNP:   No results for input(s): \"PROBNP\" in the last 72 hours.    UA:  Lab Results   Component Value Date/Time    NITRU Negative 04/24/2025 05:52 PM    COLORU Yellow 04/24/2025 05:52 PM    PHUR 6.0 04/24/2025 05:52 PM    PHUR 6.0 04/15/2022 01:17 PM    WBCUA 3-5 04/24/2025 05:52 PM    RBCUA None seen 04/24/2025 05:52 PM    MUCUS Rare 12/03/2019 04:30 AM    TRICHOMONAS None Seen 09/22/2020 03:09 PM    BACTERIA 4+ 04/24/2025 05:52 PM    CLARITYU Clear 04/24/2025 05:52 PM    LEUKOCYTESUR TRACE 04/24/2025 05:52 PM    UROBILINOGEN 0.2 04/24/2025 05:52 PM    BILIRUBINUR Negative 04/24/2025 05:52 PM    BLOODU Negative 04/24/2025 05:52 PM    GLUCOSEU Negative 04/24/2025 05:52 PM    KETUA Negative 04/24/2025 05:52 PM    AMORPHOUS 2+ 12/03/2019 04:30

## 2025-06-03 DIAGNOSIS — J40 PLASTIC BRONCHITIS: Primary | ICD-10-CM

## 2025-06-03 DIAGNOSIS — I89.8 PLASTIC BRONCHITIS: Primary | ICD-10-CM

## 2025-06-03 NOTE — PROGRESS NOTES
Encounter to place Interventional Radiology referral for lymphangiogram for evaluation of plastic bronchitis.

## 2025-06-09 ENCOUNTER — TELEPHONE (OUTPATIENT)
Dept: GENERAL RADIOLOGY | Age: 65
End: 2025-06-09

## 2025-06-09 NOTE — TELEPHONE ENCOUNTER
Attempted to call Shoaib to schedule consult with IR per order from pulmonology for lymphatic angiogram. Patient's friend answered the phone, asked to relay message to call me back at [phone] as this is regarding a medical procedure. No patient or medical information given.

## 2025-06-10 ENCOUNTER — APPOINTMENT (OUTPATIENT)
Dept: GENERAL RADIOLOGY | Age: 65
DRG: 137 | End: 2025-06-10
Payer: COMMERCIAL

## 2025-06-10 ENCOUNTER — HOSPITAL ENCOUNTER (INPATIENT)
Age: 65
LOS: 14 days | Discharge: OTHER INSTITUTION | DRG: 137 | End: 2025-06-24
Attending: STUDENT IN AN ORGANIZED HEALTH CARE EDUCATION/TRAINING PROGRAM | Admitting: INTERNAL MEDICINE
Payer: COMMERCIAL

## 2025-06-10 DIAGNOSIS — J96.01 ACUTE RESPIRATORY FAILURE WITH HYPOXIA (HCC): ICD-10-CM

## 2025-06-10 DIAGNOSIS — J44.1 COPD EXACERBATION (HCC): Primary | ICD-10-CM

## 2025-06-10 DIAGNOSIS — J96.01 ACUTE RESPIRATORY FAILURE WITH HYPOXEMIA (HCC): ICD-10-CM

## 2025-06-10 DIAGNOSIS — U07.1 COVID-19: ICD-10-CM

## 2025-06-10 LAB
ALBUMIN SERPL-MCNC: 3.3 G/DL (ref 3.4–5)
ALBUMIN/GLOB SERPL: 1.1 {RATIO} (ref 1.1–2.2)
ALP SERPL-CCNC: 80 U/L (ref 40–129)
ALT SERPL-CCNC: 32 U/L (ref 10–40)
ANION GAP SERPL CALCULATED.3IONS-SCNC: 10 MMOL/L (ref 3–16)
AST SERPL-CCNC: 24 U/L (ref 15–37)
BASE EXCESS BLDV CALC-SCNC: 1.5 MMOL/L (ref -2–3)
BASO STIPL BLD QL SMEAR: ABNORMAL
BASOPHILS # BLD: 0 K/UL (ref 0–0.2)
BASOPHILS NFR BLD: 0 %
BILIRUB SERPL-MCNC: <0.2 MG/DL (ref 0–1)
BUN SERPL-MCNC: 67 MG/DL (ref 7–20)
CALCIUM SERPL-MCNC: 8.7 MG/DL (ref 8.3–10.6)
CHLORIDE SERPL-SCNC: 100 MMOL/L (ref 99–110)
CO2 BLDV-SCNC: 33 MMOL/L
CO2 SERPL-SCNC: 30 MMOL/L (ref 21–32)
COHGB MFR BLDV: 1.4 % (ref 0–1.5)
CREAT SERPL-MCNC: 2.3 MG/DL (ref 0.6–1.2)
DEPRECATED RDW RBC AUTO: 16.3 % (ref 12.4–15.4)
DO-HGB MFR BLDV: 50.1 %
EKG ATRIAL RATE: 86 BPM
EKG DIAGNOSIS: NORMAL
EKG P AXIS: 78 DEGREES
EKG P-R INTERVAL: 176 MS
EKG Q-T INTERVAL: 382 MS
EKG QRS DURATION: 100 MS
EKG QTC CALCULATION (BAZETT): 457 MS
EKG R AXIS: 67 DEGREES
EKG T AXIS: 81 DEGREES
EKG VENTRICULAR RATE: 86 BPM
EOSINOPHIL # BLD: 0 K/UL (ref 0–0.6)
EOSINOPHIL NFR BLD: 0 %
FLUAV RNA RESP QL NAA+PROBE: NOT DETECTED
FLUBV RNA RESP QL NAA+PROBE: NOT DETECTED
GFR SERPLBLD CREATININE-BSD FMLA CKD-EPI: 23 ML/MIN/{1.73_M2}
GLUCOSE SERPL-MCNC: 225 MG/DL (ref 70–99)
HCO3 BLDV-SCNC: 30.4 MMOL/L (ref 24–28)
HCT VFR BLD AUTO: 31.1 % (ref 36–48)
HGB BLD-MCNC: 10.1 G/DL (ref 12–16)
LACTATE BLDV-SCNC: 1.9 MMOL/L (ref 0.4–1.9)
LYMPHOCYTES # BLD: 0.7 K/UL (ref 1–5.1)
LYMPHOCYTES NFR BLD: 7 %
MCH RBC QN AUTO: 30.6 PG (ref 26–34)
MCHC RBC AUTO-ENTMCNC: 32.4 G/DL (ref 31–36)
MCV RBC AUTO: 94.4 FL (ref 80–100)
METAMYELOCYTES NFR BLD MANUAL: 1 %
METHGB MFR BLDV: 0.4 % (ref 0–1.5)
MONOCYTES # BLD: 0.1 K/UL (ref 0–1.3)
MONOCYTES NFR BLD: 1 %
NEUTROPHILS # BLD: 9 K/UL (ref 1.7–7.7)
NEUTROPHILS NFR BLD: 88 %
NEUTS BAND NFR BLD MANUAL: 3 % (ref 0–7)
NT-PROBNP SERPL-MCNC: 1026 PG/ML (ref 0–124)
OVALOCYTES BLD QL SMEAR: ABNORMAL
PCO2 BLDV: 72.7 MMHG (ref 41–51)
PH BLDV: 7.23 [PH] (ref 7.35–7.45)
PLATELET # BLD AUTO: 260 K/UL (ref 135–450)
PLATELET BLD QL SMEAR: ADEQUATE
PMV BLD AUTO: 7.5 FL (ref 5–10.5)
PO2 BLDV: 30.9 MMHG (ref 25–40)
POIKILOCYTOSIS BLD QL SMEAR: ABNORMAL
POTASSIUM SERPL-SCNC: 4.2 MMOL/L (ref 3.5–5.1)
PROT SERPL-MCNC: 6.4 G/DL (ref 6.4–8.2)
RBC # BLD AUTO: 3.29 M/UL (ref 4–5.2)
SAO2 % BLDV: 49 %
SARS-COV-2 RNA RESP QL NAA+PROBE: DETECTED
SLIDE REVIEW: ABNORMAL
SODIUM SERPL-SCNC: 140 MMOL/L (ref 136–145)
STOMATOCYTES BLD QL SMEAR: ABNORMAL
TROPONIN, HIGH SENSITIVITY: 42 NG/L (ref 0–14)
TROPONIN, HIGH SENSITIVITY: 42 NG/L (ref 0–14)
WBC # BLD AUTO: 9.8 K/UL (ref 4–11)

## 2025-06-10 PROCEDURE — 2060000000 HC ICU INTERMEDIATE R&B

## 2025-06-10 PROCEDURE — 84145 PROCALCITONIN (PCT): CPT

## 2025-06-10 PROCEDURE — 84484 ASSAY OF TROPONIN QUANT: CPT

## 2025-06-10 PROCEDURE — 93005 ELECTROCARDIOGRAM TRACING: CPT

## 2025-06-10 PROCEDURE — 80053 COMPREHEN METABOLIC PANEL: CPT

## 2025-06-10 PROCEDURE — 96374 THER/PROPH/DIAG INJ IV PUSH: CPT

## 2025-06-10 PROCEDURE — 83605 ASSAY OF LACTIC ACID: CPT

## 2025-06-10 PROCEDURE — 94640 AIRWAY INHALATION TREATMENT: CPT

## 2025-06-10 PROCEDURE — 83880 ASSAY OF NATRIURETIC PEPTIDE: CPT

## 2025-06-10 PROCEDURE — 2500000003 HC RX 250 WO HCPCS

## 2025-06-10 PROCEDURE — 94761 N-INVAS EAR/PLS OXIMETRY MLT: CPT

## 2025-06-10 PROCEDURE — 71045 X-RAY EXAM CHEST 1 VIEW: CPT

## 2025-06-10 PROCEDURE — 99285 EMERGENCY DEPT VISIT HI MDM: CPT

## 2025-06-10 PROCEDURE — 6360000002 HC RX W HCPCS

## 2025-06-10 PROCEDURE — 6370000000 HC RX 637 (ALT 250 FOR IP)

## 2025-06-10 PROCEDURE — 87636 SARSCOV2 & INF A&B AMP PRB: CPT

## 2025-06-10 PROCEDURE — 2700000000 HC OXYGEN THERAPY PER DAY

## 2025-06-10 PROCEDURE — 82550 ASSAY OF CK (CPK): CPT

## 2025-06-10 PROCEDURE — 82803 BLOOD GASES ANY COMBINATION: CPT

## 2025-06-10 PROCEDURE — 85025 COMPLETE CBC W/AUTO DIFF WBC: CPT

## 2025-06-10 RX ORDER — HYDROXYZINE HYDROCHLORIDE 25 MG/1
25 TABLET, FILM COATED ORAL NIGHTLY
Status: DISCONTINUED | OUTPATIENT
Start: 2025-06-11 | End: 2025-06-24 | Stop reason: HOSPADM

## 2025-06-10 RX ORDER — CARVEDILOL 6.25 MG/1
6.25 TABLET ORAL 2 TIMES DAILY
Status: DISCONTINUED | OUTPATIENT
Start: 2025-06-11 | End: 2025-06-24 | Stop reason: HOSPADM

## 2025-06-10 RX ORDER — SODIUM CHLORIDE FOR INHALATION 3 %
4 VIAL, NEBULIZER (ML) INHALATION 3 TIMES DAILY
Status: DISCONTINUED | OUTPATIENT
Start: 2025-06-11 | End: 2025-06-24 | Stop reason: HOSPADM

## 2025-06-10 RX ORDER — INSULIN LISPRO 100 [IU]/ML
0-8 INJECTION, SOLUTION INTRAVENOUS; SUBCUTANEOUS
Status: DISCONTINUED | OUTPATIENT
Start: 2025-06-11 | End: 2025-06-11

## 2025-06-10 RX ORDER — GUAIFENESIN 600 MG/1
600 TABLET, EXTENDED RELEASE ORAL 2 TIMES DAILY
Status: DISCONTINUED | OUTPATIENT
Start: 2025-06-11 | End: 2025-06-24 | Stop reason: HOSPADM

## 2025-06-10 RX ORDER — SPIRONOLACTONE 25 MG/1
25 TABLET ORAL DAILY
Status: DISCONTINUED | OUTPATIENT
Start: 2025-06-11 | End: 2025-06-24 | Stop reason: HOSPADM

## 2025-06-10 RX ORDER — TEMAZEPAM 15 MG/1
15 CAPSULE ORAL NIGHTLY
Status: DISCONTINUED | OUTPATIENT
Start: 2025-06-11 | End: 2025-06-24 | Stop reason: HOSPADM

## 2025-06-10 RX ORDER — PANTOPRAZOLE SODIUM 20 MG/1
20 TABLET, DELAYED RELEASE ORAL DAILY
Status: DISCONTINUED | OUTPATIENT
Start: 2025-06-11 | End: 2025-06-24 | Stop reason: HOSPADM

## 2025-06-10 RX ORDER — IPRATROPIUM BROMIDE AND ALBUTEROL SULFATE 2.5; .5 MG/3ML; MG/3ML
1 SOLUTION RESPIRATORY (INHALATION) ONCE
Status: COMPLETED | OUTPATIENT
Start: 2025-06-10 | End: 2025-06-10

## 2025-06-10 RX ORDER — AMLODIPINE BESYLATE 5 MG/1
5 TABLET ORAL DAILY
Status: DISCONTINUED | OUTPATIENT
Start: 2025-06-11 | End: 2025-06-24 | Stop reason: HOSPADM

## 2025-06-10 RX ORDER — SODIUM CHLORIDE 9 MG/ML
INJECTION, SOLUTION INTRAVENOUS PRN
Status: DISCONTINUED | OUTPATIENT
Start: 2025-06-10 | End: 2025-06-24 | Stop reason: HOSPADM

## 2025-06-10 RX ORDER — LEVALBUTEROL INHALATION SOLUTION 0.63 MG/3ML
0.63 SOLUTION RESPIRATORY (INHALATION) EVERY 4 HOURS PRN
Status: DISCONTINUED | OUTPATIENT
Start: 2025-06-10 | End: 2025-06-11 | Stop reason: SDUPTHER

## 2025-06-10 RX ORDER — ONDANSETRON 4 MG/1
4 TABLET, ORALLY DISINTEGRATING ORAL EVERY 8 HOURS PRN
Status: DISCONTINUED | OUTPATIENT
Start: 2025-06-10 | End: 2025-06-24 | Stop reason: HOSPADM

## 2025-06-10 RX ORDER — ONDANSETRON 2 MG/ML
4 INJECTION INTRAMUSCULAR; INTRAVENOUS EVERY 6 HOURS PRN
Status: DISCONTINUED | OUTPATIENT
Start: 2025-06-10 | End: 2025-06-24 | Stop reason: HOSPADM

## 2025-06-10 RX ORDER — POLYETHYLENE GLYCOL 3350 17 G/17G
17 POWDER, FOR SOLUTION ORAL DAILY PRN
Status: DISCONTINUED | OUTPATIENT
Start: 2025-06-10 | End: 2025-06-24 | Stop reason: HOSPADM

## 2025-06-10 RX ORDER — ACETAMINOPHEN 650 MG/1
650 SUPPOSITORY RECTAL EVERY 6 HOURS PRN
Status: DISCONTINUED | OUTPATIENT
Start: 2025-06-10 | End: 2025-06-21

## 2025-06-10 RX ORDER — TORSEMIDE 20 MG/1
20 TABLET ORAL DAILY
Status: DISCONTINUED | OUTPATIENT
Start: 2025-06-11 | End: 2025-06-24 | Stop reason: HOSPADM

## 2025-06-10 RX ORDER — LETROZOLE 2.5 MG/1
2.5 TABLET, FILM COATED ORAL DAILY
Status: DISCONTINUED | OUTPATIENT
Start: 2025-06-11 | End: 2025-06-24 | Stop reason: HOSPADM

## 2025-06-10 RX ORDER — TRAZODONE HYDROCHLORIDE 50 MG/1
25 TABLET ORAL NIGHTLY
Status: DISCONTINUED | OUTPATIENT
Start: 2025-06-11 | End: 2025-06-24 | Stop reason: HOSPADM

## 2025-06-10 RX ORDER — ROPINIROLE 0.25 MG/1
0.25 TABLET, FILM COATED ORAL NIGHTLY
Status: DISCONTINUED | OUTPATIENT
Start: 2025-06-11 | End: 2025-06-24 | Stop reason: HOSPADM

## 2025-06-10 RX ORDER — SODIUM CHLORIDE 0.9 % (FLUSH) 0.9 %
5-40 SYRINGE (ML) INJECTION PRN
Status: DISCONTINUED | OUTPATIENT
Start: 2025-06-10 | End: 2025-06-24 | Stop reason: HOSPADM

## 2025-06-10 RX ORDER — ACETAMINOPHEN 325 MG/1
650 TABLET ORAL EVERY 6 HOURS PRN
Status: DISCONTINUED | OUTPATIENT
Start: 2025-06-10 | End: 2025-06-21

## 2025-06-10 RX ORDER — MINERAL OIL/HYDROPHIL PETROLAT
OINTMENT (GRAM) TOPICAL PRN
Status: DISCONTINUED | OUTPATIENT
Start: 2025-06-10 | End: 2025-06-24 | Stop reason: HOSPADM

## 2025-06-10 RX ORDER — SODIUM CHLORIDE 0.9 % (FLUSH) 0.9 %
5-40 SYRINGE (ML) INJECTION EVERY 12 HOURS SCHEDULED
Status: DISCONTINUED | OUTPATIENT
Start: 2025-06-11 | End: 2025-06-24 | Stop reason: HOSPADM

## 2025-06-10 RX ADMIN — IPRATROPIUM BROMIDE AND ALBUTEROL SULFATE 1 DOSE: 2.5; .5 SOLUTION RESPIRATORY (INHALATION) at 17:45

## 2025-06-10 RX ADMIN — METHYLPREDNISOLONE SODIUM SUCCINATE 125 MG: 125 INJECTION INTRAMUSCULAR; INTRAVENOUS at 19:24

## 2025-06-10 ASSESSMENT — PAIN - FUNCTIONAL ASSESSMENT: PAIN_FUNCTIONAL_ASSESSMENT: NONE - DENIES PAIN

## 2025-06-10 NOTE — ED PROVIDER NOTES
ED Attending Attestation Note     Date of evaluation: 6/10/2025    This patient was seen by the advance practice provider.  I have seen and examined the patient, agree with the workup, evaluation, management and diagnosis. The care plan has been discussed.  I have reviewed the ECG and concur with the HORTENCIA's interpretation.  My assessment reveals a chronically ill appearing 64-year-old female presenting with chief complaint of shortness of breath.  Patient reports symptoms over the past 2 days.  Patient has baseline oxygen requirement of 8 to 10 L.  Patient reports feeling \"smothered \"over the past day.  No change in cough, no fevers, no change in extremity edema.  On exam she does have predominantly left-sided rhonchi.  No pitting edema.  Patient was placed on 15 L high flow nasal cannula with saturations in the low 90s, which is within her goal due to her COPD.  Laboratory workup notable for positive COVID-19.  Patient was treated for COPD exacerbation with steroids and bronchodilators.  She has a mild CHANTELLE with creatinine of 2.3 up from 1.8.  Given increased oxygen requirement, will plan to admit for further management.    Critical Care:  Due to the immediate potential for life-threatening deterioration due to hypoxemic respiratory failure, I spent 40 minutes providing critical care.  Thistime excludes time spent performing procedures but includes time spent on direct patient care, history retrieval, review of the chart, and discussions with patient, family, and consultant(s).       Aman Monae MD  06/10/25 2029

## 2025-06-10 NOTE — ED PROVIDER NOTES
[facility]  EMERGENCY DEPARTMENT ENCOUNTER          PHYSICIAN ASSISTANT NOTE       Date of evaluation: 6/10/2025    Chief Complaint     Shortness of Breath (Pt brought in by EMS from [facility] for SOB x 2 days that is worse than her normal SOB. Hx of COPD, normally wears 5 L oxygen PRN but has been increasing oxygen levels x 2 days. Wearing non-rebreather upon arrival, EMS states initial sat was 75% but came up to 95%. Received 2 duonebs en route. Denies chest pain )      History of Present Illness     Shoaib Rothman is a 64 y.o. female past medical history of COPD, frequent hospitalizations who is presenting with COPD exacerbation.  She states she was discharged home from the hospital around the middle of May.  She was discharged on 5 L of oxygen.  She states that over the last weeks she has slowly been increasing oxygen due to feeling short of breath and her oxygen dropping.  She states she was recently on 9 or 10 L at home without improvement of her symptoms.  She states that she wanted to come in to get evaluated for possible COPD exacerbation.  She denies any chest pain.  Denies any abdominal pain.  Denies any nausea or vomiting.  She was treated with 2 DuoNebs prior to arrival by EMS.    ASSESSMENT / PLAN  (MEDICAL DECISION MAKING)     INITIAL VITALS: BP: (!) 141/78, Temp: 97.4 °F (36.3 °C) (pt requested to have temp taken under arm), Pulse: 96, Respirations: 24, SpO2: 97 %    Shoaib Rothman is a 64 y.o. female who is presenting with shortness of breath.  On exam she is ill-appearing and mildly tachypneic and mild distress..  Vital signs remarkable for tachypnea of 24, otherwise reassuring vitals.  On exam lungs with rales in the bilateral lower bases.  No wheezing.  Diminished air sounds throughout.  Heart is regular rate and rhythm.  Abdomen soft nontender.  Patient is tachypneic.  Patient is 15 L nonrebreather initially and subsequently placed on high flow.  Workup remarkable for BNP

## 2025-06-11 ENCOUNTER — APPOINTMENT (OUTPATIENT)
Dept: CT IMAGING | Age: 65
DRG: 137 | End: 2025-06-11
Payer: COMMERCIAL

## 2025-06-11 ENCOUNTER — HOSPITAL ENCOUNTER (INPATIENT)
Dept: VASCULAR LAB | Age: 65
Discharge: HOME | DRG: 137 | End: 2025-06-13
Payer: COMMERCIAL

## 2025-06-11 ENCOUNTER — TELEPHONE (OUTPATIENT)
Dept: INTERVENTIONAL RADIOLOGY/VASCULAR | Age: 65
End: 2025-06-11

## 2025-06-11 PROBLEM — U07.1 COVID-19: Status: ACTIVE | Noted: 2025-06-11

## 2025-06-11 LAB
ALBUMIN SERPL-MCNC: 3.3 G/DL (ref 3.4–5)
ALBUMIN SERPL-MCNC: 3.3 G/DL (ref 3.4–5)
ANION GAP SERPL CALCULATED.3IONS-SCNC: 10 MMOL/L (ref 3–16)
ANION GAP SERPL CALCULATED.3IONS-SCNC: 11 MMOL/L (ref 3–16)
BASOPHILS # BLD: 0 K/UL (ref 0–0.2)
BASOPHILS # BLD: 0 K/UL (ref 0–0.2)
BASOPHILS NFR BLD: 0 %
BASOPHILS NFR BLD: 0.2 %
BUN SERPL-MCNC: 65 MG/DL (ref 7–20)
BUN SERPL-MCNC: 70 MG/DL (ref 7–20)
CALCIUM SERPL-MCNC: 8.6 MG/DL (ref 8.3–10.6)
CALCIUM SERPL-MCNC: 8.8 MG/DL (ref 8.3–10.6)
CHLORIDE SERPL-SCNC: 98 MMOL/L (ref 99–110)
CHLORIDE SERPL-SCNC: 99 MMOL/L (ref 99–110)
CK SERPL-CCNC: 17 U/L (ref 26–192)
CO2 SERPL-SCNC: 29 MMOL/L (ref 21–32)
CO2 SERPL-SCNC: 30 MMOL/L (ref 21–32)
CREAT SERPL-MCNC: 2.4 MG/DL (ref 0.6–1.2)
CREAT SERPL-MCNC: 2.5 MG/DL (ref 0.6–1.2)
CRP SERPL-MCNC: 93.9 MG/L (ref 0–5.1)
DEPRECATED RDW RBC AUTO: 16 % (ref 12.4–15.4)
DEPRECATED RDW RBC AUTO: 16.4 % (ref 12.4–15.4)
EOSINOPHIL # BLD: 0 K/UL (ref 0–0.6)
EOSINOPHIL # BLD: 0 K/UL (ref 0–0.6)
EOSINOPHIL NFR BLD: 0 %
EOSINOPHIL NFR BLD: 0.1 %
ERYTHROCYTE [SEDIMENTATION RATE] IN BLOOD BY WESTERGREN METHOD: 56 MM/HR (ref 0–30)
GFR SERPLBLD CREATININE-BSD FMLA CKD-EPI: 21 ML/MIN/{1.73_M2}
GFR SERPLBLD CREATININE-BSD FMLA CKD-EPI: 22 ML/MIN/{1.73_M2}
GLUCOSE BLD-MCNC: 152 MG/DL (ref 70–99)
GLUCOSE BLD-MCNC: 168 MG/DL (ref 70–99)
GLUCOSE BLD-MCNC: 255 MG/DL (ref 70–99)
GLUCOSE BLD-MCNC: 280 MG/DL (ref 70–99)
GLUCOSE BLD-MCNC: 374 MG/DL (ref 70–99)
GLUCOSE SERPL-MCNC: 262 MG/DL (ref 70–99)
GLUCOSE SERPL-MCNC: 343 MG/DL (ref 70–99)
HCT VFR BLD AUTO: 30.7 % (ref 36–48)
HCT VFR BLD AUTO: 31.2 % (ref 36–48)
HGB BLD-MCNC: 10 G/DL (ref 12–16)
HGB BLD-MCNC: 10.1 G/DL (ref 12–16)
LACTATE BLDV-SCNC: 1.7 MMOL/L (ref 0.4–2)
LACTATE BLDV-SCNC: 2.2 MMOL/L (ref 0.4–2)
LYMPHOCYTES # BLD: 0.4 K/UL (ref 1–5.1)
LYMPHOCYTES # BLD: 0.6 K/UL (ref 1–5.1)
LYMPHOCYTES NFR BLD: 4.4 %
LYMPHOCYTES NFR BLD: 7 %
MAGNESIUM SERPL-MCNC: 1.52 MG/DL (ref 1.8–2.4)
MCH RBC QN AUTO: 30.7 PG (ref 26–34)
MCH RBC QN AUTO: 30.9 PG (ref 26–34)
MCHC RBC AUTO-ENTMCNC: 32.3 G/DL (ref 31–36)
MCHC RBC AUTO-ENTMCNC: 32.6 G/DL (ref 31–36)
MCV RBC AUTO: 94.7 FL (ref 80–100)
MCV RBC AUTO: 94.8 FL (ref 80–100)
MONOCYTES # BLD: 0.1 K/UL (ref 0–1.3)
MONOCYTES # BLD: 0.2 K/UL (ref 0–1.3)
MONOCYTES NFR BLD: 0.9 %
MONOCYTES NFR BLD: 3 %
NEUTROPHILS # BLD: 7.3 K/UL (ref 1.7–7.7)
NEUTROPHILS # BLD: 7.8 K/UL (ref 1.7–7.7)
NEUTROPHILS NFR BLD: 90 %
NEUTROPHILS NFR BLD: 94.4 %
PERFORMED ON: ABNORMAL
PHOSPHATE SERPL-MCNC: 4.2 MG/DL (ref 2.5–4.9)
PHOSPHATE SERPL-MCNC: 4.4 MG/DL (ref 2.5–4.9)
PLATELET # BLD AUTO: 251 K/UL (ref 135–450)
PLATELET # BLD AUTO: 254 K/UL (ref 135–450)
PMV BLD AUTO: 7.8 FL (ref 5–10.5)
PMV BLD AUTO: 7.8 FL (ref 5–10.5)
POTASSIUM SERPL-SCNC: 4.6 MMOL/L (ref 3.5–5.1)
POTASSIUM SERPL-SCNC: 4.6 MMOL/L (ref 3.5–5.1)
PROCALCITONIN SERPL IA-MCNC: 0.14 NG/ML (ref 0–0.15)
RBC # BLD AUTO: 3.24 M/UL (ref 4–5.2)
RBC # BLD AUTO: 3.29 M/UL (ref 4–5.2)
SODIUM SERPL-SCNC: 138 MMOL/L (ref 136–145)
SODIUM SERPL-SCNC: 139 MMOL/L (ref 136–145)
SPHEROCYTES BLD QL SMEAR: ABNORMAL
WBC # BLD AUTO: 8.1 K/UL (ref 4–11)
WBC # BLD AUTO: 8.3 K/UL (ref 4–11)

## 2025-06-11 PROCEDURE — 87040 BLOOD CULTURE FOR BACTERIA: CPT

## 2025-06-11 PROCEDURE — 83735 ASSAY OF MAGNESIUM: CPT

## 2025-06-11 PROCEDURE — 71250 CT THORAX DX C-: CPT

## 2025-06-11 PROCEDURE — 94761 N-INVAS EAR/PLS OXIMETRY MLT: CPT

## 2025-06-11 PROCEDURE — 93970 EXTREMITY STUDY: CPT

## 2025-06-11 PROCEDURE — 2700000000 HC OXYGEN THERAPY PER DAY

## 2025-06-11 PROCEDURE — 83605 ASSAY OF LACTIC ACID: CPT

## 2025-06-11 PROCEDURE — 6360000002 HC RX W HCPCS

## 2025-06-11 PROCEDURE — 85652 RBC SED RATE AUTOMATED: CPT

## 2025-06-11 PROCEDURE — 86140 C-REACTIVE PROTEIN: CPT

## 2025-06-11 PROCEDURE — 99223 1ST HOSP IP/OBS HIGH 75: CPT | Performed by: HOSPITALIST

## 2025-06-11 PROCEDURE — 6370000000 HC RX 637 (ALT 250 FOR IP)

## 2025-06-11 PROCEDURE — 36415 COLL VENOUS BLD VENIPUNCTURE: CPT

## 2025-06-11 PROCEDURE — 3E0DX3Z INTRODUCTION OF ANTI-INFLAMMATORY INTO MOUTH AND PHARYNX, EXTERNAL APPROACH: ICD-10-PCS | Performed by: INTERNAL MEDICINE

## 2025-06-11 PROCEDURE — 94640 AIRWAY INHALATION TREATMENT: CPT

## 2025-06-11 PROCEDURE — 5A09457 ASSISTANCE WITH RESPIRATORY VENTILATION, 24-96 CONSECUTIVE HOURS, CONTINUOUS POSITIVE AIRWAY PRESSURE: ICD-10-PCS | Performed by: INTERNAL MEDICINE

## 2025-06-11 PROCEDURE — 80069 RENAL FUNCTION PANEL: CPT

## 2025-06-11 PROCEDURE — 85025 COMPLETE CBC W/AUTO DIFF WBC: CPT

## 2025-06-11 PROCEDURE — 2060000000 HC ICU INTERMEDIATE R&B

## 2025-06-11 PROCEDURE — 2580000003 HC RX 258

## 2025-06-11 PROCEDURE — 2500000003 HC RX 250 WO HCPCS

## 2025-06-11 PROCEDURE — 94660 CPAP INITIATION&MGMT: CPT

## 2025-06-11 PROCEDURE — 99223 1ST HOSP IP/OBS HIGH 75: CPT | Performed by: INTERNAL MEDICINE

## 2025-06-11 RX ORDER — DEXTROSE MONOHYDRATE 100 MG/ML
INJECTION, SOLUTION INTRAVENOUS CONTINUOUS PRN
Status: DISCONTINUED | OUTPATIENT
Start: 2025-06-11 | End: 2025-06-24 | Stop reason: HOSPADM

## 2025-06-11 RX ORDER — DIPHENHYDRAMINE HYDROCHLORIDE 50 MG/ML
25 INJECTION, SOLUTION INTRAMUSCULAR; INTRAVENOUS ONCE
Status: COMPLETED | OUTPATIENT
Start: 2025-06-11 | End: 2025-06-11

## 2025-06-11 RX ORDER — DEXAMETHASONE SODIUM PHOSPHATE 4 MG/ML
6 INJECTION, SOLUTION INTRA-ARTICULAR; INTRALESIONAL; INTRAMUSCULAR; INTRAVENOUS; SOFT TISSUE DAILY
Status: COMPLETED | OUTPATIENT
Start: 2025-06-11 | End: 2025-06-20

## 2025-06-11 RX ORDER — LEVALBUTEROL 1.25 MG/.5ML
SOLUTION, CONCENTRATE RESPIRATORY (INHALATION)
Status: COMPLETED
Start: 2025-06-11 | End: 2025-06-11

## 2025-06-11 RX ORDER — MAGNESIUM SULFATE IN WATER 40 MG/ML
2000 INJECTION, SOLUTION INTRAVENOUS ONCE
Status: COMPLETED | OUTPATIENT
Start: 2025-06-11 | End: 2025-06-11

## 2025-06-11 RX ORDER — INSULIN GLARGINE 100 [IU]/ML
5 INJECTION, SOLUTION SUBCUTANEOUS NIGHTLY
Status: DISCONTINUED | OUTPATIENT
Start: 2025-06-11 | End: 2025-06-24 | Stop reason: HOSPADM

## 2025-06-11 RX ORDER — LEVALBUTEROL 1.25 MG/.5ML
1.25 SOLUTION, CONCENTRATE RESPIRATORY (INHALATION) 2 TIMES DAILY
Status: DISCONTINUED | OUTPATIENT
Start: 2025-06-11 | End: 2025-06-11

## 2025-06-11 RX ORDER — POLYETHYLENE GLYCOL 3350 17 G
2 POWDER IN PACKET (EA) ORAL
Status: DISCONTINUED | OUTPATIENT
Start: 2025-06-11 | End: 2025-06-15

## 2025-06-11 RX ORDER — GLUCAGON 1 MG/ML
1 KIT INJECTION PRN
Status: DISCONTINUED | OUTPATIENT
Start: 2025-06-11 | End: 2025-06-24 | Stop reason: HOSPADM

## 2025-06-11 RX ORDER — LEVALBUTEROL 1.25 MG/.5ML
1.25 SOLUTION, CONCENTRATE RESPIRATORY (INHALATION) PRN
Status: DISCONTINUED | OUTPATIENT
Start: 2025-06-11 | End: 2025-06-24 | Stop reason: HOSPADM

## 2025-06-11 RX ORDER — LEVALBUTEROL 1.25 MG/.5ML
1.25 SOLUTION, CONCENTRATE RESPIRATORY (INHALATION) 3 TIMES DAILY
Status: DISCONTINUED | OUTPATIENT
Start: 2025-06-11 | End: 2025-06-11

## 2025-06-11 RX ORDER — INSULIN LISPRO 100 [IU]/ML
0-16 INJECTION, SOLUTION INTRAVENOUS; SUBCUTANEOUS
Status: DISCONTINUED | OUTPATIENT
Start: 2025-06-11 | End: 2025-06-24 | Stop reason: HOSPADM

## 2025-06-11 RX ADMIN — NICOTINE POLACRILEX 2 MG: 2 LOZENGE ORAL at 13:35

## 2025-06-11 RX ADMIN — HYDROXYZINE HYDROCHLORIDE 25 MG: 25 TABLET, FILM COATED ORAL at 20:05

## 2025-06-11 RX ADMIN — APIXABAN 2.5 MG: 2.5 TABLET, FILM COATED ORAL at 01:00

## 2025-06-11 RX ADMIN — TRAZODONE HYDROCHLORIDE 25 MG: 50 TABLET ORAL at 20:05

## 2025-06-11 RX ADMIN — LETROZOLE 2.5 MG: 2.5 TABLET ORAL at 09:02

## 2025-06-11 RX ADMIN — HYDROXYZINE HYDROCHLORIDE 25 MG: 25 TABLET, FILM COATED ORAL at 01:00

## 2025-06-11 RX ADMIN — NICOTINE POLACRILEX 2 MG: 2 LOZENGE ORAL at 20:04

## 2025-06-11 RX ADMIN — IPRATROPIUM BROMIDE 0.5 MG: 0.5 SOLUTION RESPIRATORY (INHALATION) at 09:00

## 2025-06-11 RX ADMIN — DIPHENHYDRAMINE HYDROCHLORIDE 25 MG: 50 INJECTION INTRAMUSCULAR; INTRAVENOUS at 03:29

## 2025-06-11 RX ADMIN — MAGNESIUM SULFATE HEPTAHYDRATE 2000 MG: 40 INJECTION, SOLUTION INTRAVENOUS at 03:31

## 2025-06-11 RX ADMIN — APIXABAN 2.5 MG: 2.5 TABLET, FILM COATED ORAL at 20:05

## 2025-06-11 RX ADMIN — SODIUM CHLORIDE, PRESERVATIVE FREE 10 ML: 5 INJECTION INTRAVENOUS at 20:07

## 2025-06-11 RX ADMIN — SODIUM CHLORIDE 30 MG/ML INHALATION SOLUTION 4 ML: 30 SOLUTION INHALANT at 16:41

## 2025-06-11 RX ADMIN — Medication 6 MG: at 20:05

## 2025-06-11 RX ADMIN — IPRATROPIUM BROMIDE 0.5 MG: 0.5 SOLUTION RESPIRATORY (INHALATION) at 19:50

## 2025-06-11 RX ADMIN — LEVALBUTEROL 1.25 MG: 1.25 SOLUTION, CONCENTRATE RESPIRATORY (INHALATION) at 09:00

## 2025-06-11 RX ADMIN — ACETAMINOPHEN 650 MG: 325 TABLET ORAL at 21:49

## 2025-06-11 RX ADMIN — LEVALBUTEROL 1.25 MG: 1.25 SOLUTION, CONCENTRATE RESPIRATORY (INHALATION) at 16:38

## 2025-06-11 RX ADMIN — ROPINIROLE HYDROCHLORIDE 0.25 MG: 0.25 TABLET, FILM COATED ORAL at 01:01

## 2025-06-11 RX ADMIN — ARFORMOTEROL TARTRATE: 15 SOLUTION RESPIRATORY (INHALATION) at 08:58

## 2025-06-11 RX ADMIN — SODIUM CHLORIDE, PRESERVATIVE FREE 10 ML: 5 INJECTION INTRAVENOUS at 08:57

## 2025-06-11 RX ADMIN — TEMAZEPAM 15 MG: 15 CAPSULE ORAL at 21:49

## 2025-06-11 RX ADMIN — IPRATROPIUM BROMIDE 0.5 MG: 0.5 SOLUTION RESPIRATORY (INHALATION) at 16:39

## 2025-06-11 RX ADMIN — ROPINIROLE HYDROCHLORIDE 0.25 MG: 0.25 TABLET, FILM COATED ORAL at 20:06

## 2025-06-11 RX ADMIN — CARVEDILOL 6.25 MG: 6.25 TABLET, FILM COATED ORAL at 20:05

## 2025-06-11 RX ADMIN — SPIRONOLACTONE 25 MG: 25 TABLET ORAL at 08:55

## 2025-06-11 RX ADMIN — NICOTINE POLACRILEX 2 MG: 2 LOZENGE ORAL at 18:15

## 2025-06-11 RX ADMIN — GUAIFENESIN 600 MG: 600 TABLET, MULTILAYER, EXTENDED RELEASE ORAL at 01:00

## 2025-06-11 RX ADMIN — AMLODIPINE BESYLATE 5 MG: 5 TABLET ORAL at 08:52

## 2025-06-11 RX ADMIN — INSULIN LISPRO 8 UNITS: 100 INJECTION, SOLUTION INTRAVENOUS; SUBCUTANEOUS at 20:06

## 2025-06-11 RX ADMIN — Medication 6 MG: at 01:00

## 2025-06-11 RX ADMIN — GUAIFENESIN 600 MG: 600 TABLET, MULTILAYER, EXTENDED RELEASE ORAL at 08:56

## 2025-06-11 RX ADMIN — IPRATROPIUM BROMIDE 0.5 MG: 0.5 SOLUTION RESPIRATORY (INHALATION) at 12:43

## 2025-06-11 RX ADMIN — NICOTINE POLACRILEX 2 MG: 2 LOZENGE ORAL at 21:50

## 2025-06-11 RX ADMIN — PANTOPRAZOLE SODIUM 20 MG: 20 TABLET, DELAYED RELEASE ORAL at 09:02

## 2025-06-11 RX ADMIN — BARICITINIB 1 MG: 2 TABLET, FILM COATED ORAL at 09:03

## 2025-06-11 RX ADMIN — LEVALBUTEROL 1.25 MG: 1.25 SOLUTION, CONCENTRATE RESPIRATORY (INHALATION) at 19:50

## 2025-06-11 RX ADMIN — DEXAMETHASONE SODIUM PHOSPHATE 6 MG: 4 INJECTION, SOLUTION INTRAMUSCULAR; INTRAVENOUS at 08:56

## 2025-06-11 RX ADMIN — CARVEDILOL 6.25 MG: 6.25 TABLET, FILM COATED ORAL at 08:56

## 2025-06-11 RX ADMIN — INSULIN LISPRO 16 UNITS: 100 INJECTION, SOLUTION INTRAVENOUS; SUBCUTANEOUS at 01:02

## 2025-06-11 RX ADMIN — SERTRALINE 50 MG: 50 TABLET, FILM COATED ORAL at 08:55

## 2025-06-11 RX ADMIN — GUAIFENESIN 600 MG: 600 TABLET, MULTILAYER, EXTENDED RELEASE ORAL at 20:05

## 2025-06-11 RX ADMIN — TEMAZEPAM 15 MG: 15 CAPSULE ORAL at 01:01

## 2025-06-11 RX ADMIN — CARVEDILOL 6.25 MG: 6.25 TABLET, FILM COATED ORAL at 01:01

## 2025-06-11 RX ADMIN — APIXABAN 2.5 MG: 2.5 TABLET, FILM COATED ORAL at 08:55

## 2025-06-11 RX ADMIN — SODIUM CHLORIDE 30 MG/ML INHALATION SOLUTION 4 ML: 30 SOLUTION INHALANT at 19:50

## 2025-06-11 RX ADMIN — INSULIN GLARGINE 5 UNITS: 100 INJECTION, SOLUTION SUBCUTANEOUS at 01:02

## 2025-06-11 RX ADMIN — INSULIN GLARGINE 5 UNITS: 100 INJECTION, SOLUTION SUBCUTANEOUS at 20:06

## 2025-06-11 RX ADMIN — INSULIN LISPRO 8 UNITS: 100 INJECTION, SOLUTION INTRAVENOUS; SUBCUTANEOUS at 12:23

## 2025-06-11 RX ADMIN — TRAZODONE HYDROCHLORIDE 25 MG: 50 TABLET ORAL at 01:00

## 2025-06-11 ASSESSMENT — PAIN SCALES - WONG BAKER
WONGBAKER_NUMERICALRESPONSE: HURTS A LITTLE BIT
WONGBAKER_NUMERICALRESPONSE: NO HURT

## 2025-06-11 ASSESSMENT — PAIN DESCRIPTION - FREQUENCY: FREQUENCY: INTERMITTENT

## 2025-06-11 ASSESSMENT — PAIN DESCRIPTION - DESCRIPTORS: DESCRIPTORS: DISCOMFORT

## 2025-06-11 ASSESSMENT — PAIN SCALES - GENERAL
PAINLEVEL_OUTOF10: 0

## 2025-06-11 ASSESSMENT — PAIN DESCRIPTION - ORIENTATION: ORIENTATION: RIGHT;LEFT

## 2025-06-11 ASSESSMENT — PAIN DESCRIPTION - LOCATION: LOCATION: OTHER (COMMENT)

## 2025-06-11 ASSESSMENT — PAIN - FUNCTIONAL ASSESSMENT: PAIN_FUNCTIONAL_ASSESSMENT: ACTIVITIES ARE NOT PREVENTED

## 2025-06-11 ASSESSMENT — PAIN DESCRIPTION - PAIN TYPE: TYPE: ACUTE PAIN

## 2025-06-11 ASSESSMENT — PAIN DESCRIPTION - ONSET: ONSET: ON-GOING

## 2025-06-11 NOTE — CONSULTS
Nephrology Consult Note                                                                                                                                                                                                                                                                                                                                                               Office : [phone]     Fax :[phone]    Patient's Name: Shoaib Rothman  6/11/2025    Reason for Consult:  CHANTELLE on CKD  Requesting Physician:  Lamar Mondragon MD  Chief Complaint:    Chief Complaint   Patient presents with    Shortness of Breath     Pt brought in by EMS from [facility] for SOB x 2 days that is worse than her normal SOB. Hx of COPD, normally wears 5 L oxygen PRN but has been increasing oxygen levels x 2 days. Wearing non-rebreather upon arrival, EMS states initial sat was 75% but came up to 95%. Received 2 duonebs en route. Denies chest pain        Assessment/Plan     # CHANTELLE on CKD 4  # Primary FSGS - extensive fibrosis   # Solitary kidney  - Creatinine fluctuation in the setting of COVID  - UPCR: 4.0 mg  - Avoid nephrotoxins  - Closely monitor renal labs      # Acute on chronic respiratory failure  # COVID pneumonia   # End-stage COPD  # Recurrent mucus plugging   - Started on Decadron & Baricitinib   - Pulmonary consulted     # HTN  - BP controlled  - On CCB, BB, & Spironolactone   - Monitor      # Anemia  - Monitor      # Acid- base/ Electrolyte imbalance   # Hyperkalemia  - Lokelma PRN  - Monitor         History of Present Ilness:    Shoaib Rothman is a 64 y.o. female with a PMH of end-stage COPD, frequent mucous plugging, breast cancer, HTN, CKD 4, who presented to the ER with complaints of shortness of breath. Reports over the past several days she has had to increase her supplemental oxygen. In the ER, she presented with hypoxia requiring 15L supplemental O2 and was eventually placed on HFC then vapotherm. She was

## 2025-06-11 NOTE — TELEPHONE ENCOUNTER
IR TELEPHONE CONTACT NOTE:     I called Ms Rothman with regards to the referral for lymphangiogram and thoracic duct embolization for plastic bronchitis. She reports that she is inpatient at the [facility].  I am currently at the [place] location so the IR NP will visit Ms. Rothman and discuss the procedure with the patient.       I will be available for answering any questions or provide supplemental information as necessary.     Ronald Arambula MD  Interventional Radiology
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
## 2025-06-11 NOTE — CARE COORDINATION
Case Management Assessment  Initial Evaluation    Date/Time of Evaluation: 6/11/2025 9:15 AM  Assessment Completed by: Chris Cortes RN    If patient is discharged prior to next notation, then this note serves as note for discharge by case management.    Patient Name: Shoaib Rothman                   YOB: 1960  Diagnosis: COPD exacerbation (HCC) [J44.1]  Acute respiratory failure with hypoxemia (HCC) [J96.01]  COVID-19 [U07.1]                   Date / Time: 6/10/2025  5:06 PM    Patient Admission Status: Inpatient   Readmission Risk (Low < 19, Mod (19-27), High > 27): Readmission Risk Score: 32.3    Current PCP: Lamar Mondragon MD  PCP verified by ? Yes    Chart Reviewed: Yes      History Provided by: Patient, Medical Record  Patient Orientation: Alert and Oriented    Patient Cognition: Alert    Hospitalization in the last 30 days (Readmission):  Yes    If yes, Readmission Assessment in  Navigator will be completed and shown below.  Readmission Assessment  Number of Days since last admission?: 8-30 days  Previous Disposition: Long Term Care  Who is being Interviewed: Patient  What was the patient's/caregiver's perception as to why they think they needed to return back to the hospital?: Other (Comment) (unresolved condition)  Did you visit your Primary Care Physician after you left the hospital, before you returned this time?: Yes  Did you see a specialist, such as Cardiac, Pulmonary, Orthopedic Physician, etc. after you left the hospital?: No  Who advised the patient to return to the hospital?: Other Nurse (Navigator, CTN)  Does the patient report anything that got in the way of taking their medications?: No  In our efforts to provide the best possible care to you and others like you, can you think of anything that we could have done to help you after you left the hospital the first time, so that you might not have needed to return so soon?: Other (Comment) (na)       Advance Directives:

## 2025-06-11 NOTE — H&P
Internal Medicine  PGY 3  History & Physical      CC: SOB    History Obtained From:  patient    HISTORY OF PRESENT ILLNESS:    Shoaib Rothman is  a 64 y.o. female with PMH severe COPD recently d/c with 5 L supplemental O2 and with recurrent admissions, frequent mucous plugging requiring frequent bronch, breast ca, bladder tumor in remission, DVT/PE, CKD IV, HTN, insomnia, RLS, MDD/PIPPA who presented to the ED with cc SOB.     Patient has notably been recently discharged from hospitalization 5/19/25-5/27-25 for COPD exacerbation where pulmonology was consulted. She was d/c with 5 L supplemental O2. She was treated with abx, bronch, nebs.     At this presentation, patient reports that she was saturating on the 5 L supplemental O2 and doing good for a few days, then had to continue to keep gradually increasing the supplemental oxygenation every few days since wasn't working anymore for her. She reports that she kept uping the amount of O2 liters until she reached the max on concentrator at 10 L. She reports that she then at times put on 15 L from the oxygen tank when access is available to her at the facility which she reports is not all the time.    At the ED, patient presented with hypoxia requiring 15 L supplemental O2 and eventually was placed on HFNC --> vapotherm. She additionally was afebrile. Lab analysis was conducted revealing creatinine of 2.3 above her baseline of about 1.5 in addition to hyperglycemia, pbnp elevation to 1026, troponinemia of 42 stable on repeat, and chronic anemia of hb 10.1. VBG was conducted with pH 7.320/pCO2 72.7/HCO3 30.4. ECG was also conducted and without concern of ACS. She received 125 mg and was admitted for further management.     Past Medical History:        Diagnosis Date    Asthma     Breast cancer (HCC) 2019    Cancer (HCC)     Breast cancer    Diastolic CHF (HCC)     History of therapeutic radiation     Hx antineoplastic chemo     Hypertension     Kidney calculi

## 2025-06-11 NOTE — CASE COMMUNICATION
IR TELEPHONE CONTACT NOTE:    I called Ms Rothman with regards to the referral for lymphangiogram and thoracic duct embolization for plastic bronchitis. She reports that she is inpatient at the [facility].  I am currently at the [place] location so the IR NP will visit Ms. Rothman and discuss the procedure with the patient.      I will be available for answering any questions or provide supplemental information as necessary.    Ronald Arambula MD  Interventional Radiology

## 2025-06-12 LAB
ALBUMIN SERPL-MCNC: 3 G/DL (ref 3.4–5)
ANION GAP SERPL CALCULATED.3IONS-SCNC: 8 MMOL/L (ref 3–16)
BASOPHILS # BLD: 0 K/UL (ref 0–0.2)
BASOPHILS NFR BLD: 0 %
BUN SERPL-MCNC: 74 MG/DL (ref 7–20)
CALCIUM SERPL-MCNC: 8.3 MG/DL (ref 8.3–10.6)
CHLORIDE SERPL-SCNC: 103 MMOL/L (ref 99–110)
CO2 SERPL-SCNC: 30 MMOL/L (ref 21–32)
CREAT SERPL-MCNC: 2.7 MG/DL (ref 0.6–1.2)
DEPRECATED RDW RBC AUTO: 16.1 % (ref 12.4–15.4)
ECHO BSA: 2.41 M2
EOSINOPHIL # BLD: 0 K/UL (ref 0–0.6)
EOSINOPHIL NFR BLD: 0 %
GFR SERPLBLD CREATININE-BSD FMLA CKD-EPI: 19 ML/MIN/{1.73_M2}
GLUCOSE BLD-MCNC: 102 MG/DL (ref 70–99)
GLUCOSE BLD-MCNC: 149 MG/DL (ref 70–99)
GLUCOSE BLD-MCNC: 211 MG/DL (ref 70–99)
GLUCOSE BLD-MCNC: 258 MG/DL (ref 70–99)
GLUCOSE SERPL-MCNC: 165 MG/DL (ref 70–99)
HCT VFR BLD AUTO: 27.5 % (ref 36–48)
HGB BLD-MCNC: 8.9 G/DL (ref 12–16)
LYMPHOCYTES # BLD: 1.2 K/UL (ref 1–5.1)
LYMPHOCYTES NFR BLD: 11 %
MAGNESIUM SERPL-MCNC: 2.07 MG/DL (ref 1.8–2.4)
MCH RBC QN AUTO: 30.8 PG (ref 26–34)
MCHC RBC AUTO-ENTMCNC: 32.5 G/DL (ref 31–36)
MCV RBC AUTO: 94.8 FL (ref 80–100)
MONOCYTES # BLD: 0.3 K/UL (ref 0–1.3)
MONOCYTES NFR BLD: 3 %
NEUTROPHILS # BLD: 9.1 K/UL (ref 1.7–7.7)
NEUTROPHILS NFR BLD: 86 %
PERFORMED ON: ABNORMAL
PHOSPHATE SERPL-MCNC: 5.2 MG/DL (ref 2.5–4.9)
PLATELET # BLD AUTO: 242 K/UL (ref 135–450)
PMV BLD AUTO: 7.5 FL (ref 5–10.5)
POTASSIUM SERPL-SCNC: 5 MMOL/L (ref 3.5–5.1)
RBC # BLD AUTO: 2.9 M/UL (ref 4–5.2)
SODIUM SERPL-SCNC: 141 MMOL/L (ref 136–145)
WBC # BLD AUTO: 10.6 K/UL (ref 4–11)

## 2025-06-12 PROCEDURE — 94640 AIRWAY INHALATION TREATMENT: CPT

## 2025-06-12 PROCEDURE — 6370000000 HC RX 637 (ALT 250 FOR IP)

## 2025-06-12 PROCEDURE — 99233 SBSQ HOSP IP/OBS HIGH 50: CPT | Performed by: HOSPITALIST

## 2025-06-12 PROCEDURE — 2060000000 HC ICU INTERMEDIATE R&B

## 2025-06-12 PROCEDURE — 2580000003 HC RX 258

## 2025-06-12 PROCEDURE — 85025 COMPLETE CBC W/AUTO DIFF WBC: CPT

## 2025-06-12 PROCEDURE — 2500000003 HC RX 250 WO HCPCS

## 2025-06-12 PROCEDURE — 94761 N-INVAS EAR/PLS OXIMETRY MLT: CPT

## 2025-06-12 PROCEDURE — 2700000000 HC OXYGEN THERAPY PER DAY

## 2025-06-12 PROCEDURE — 93970 EXTREMITY STUDY: CPT | Performed by: SURGERY

## 2025-06-12 PROCEDURE — 6360000002 HC RX W HCPCS

## 2025-06-12 PROCEDURE — 80069 RENAL FUNCTION PANEL: CPT

## 2025-06-12 PROCEDURE — 83735 ASSAY OF MAGNESIUM: CPT

## 2025-06-12 PROCEDURE — 99232 SBSQ HOSP IP/OBS MODERATE 35: CPT | Performed by: INTERNAL MEDICINE

## 2025-06-12 RX ORDER — DIPHENHYDRAMINE HYDROCHLORIDE 50 MG/ML
25 INJECTION, SOLUTION INTRAMUSCULAR; INTRAVENOUS ONCE
Status: COMPLETED | OUTPATIENT
Start: 2025-06-12 | End: 2025-06-12

## 2025-06-12 RX ADMIN — DIPHENHYDRAMINE HYDROCHLORIDE 25 MG: 50 INJECTION INTRAMUSCULAR; INTRAVENOUS at 20:35

## 2025-06-12 RX ADMIN — HYDROXYZINE HYDROCHLORIDE 25 MG: 25 TABLET, FILM COATED ORAL at 20:32

## 2025-06-12 RX ADMIN — ROPINIROLE HYDROCHLORIDE 0.25 MG: 0.25 TABLET, FILM COATED ORAL at 20:54

## 2025-06-12 RX ADMIN — NICOTINE POLACRILEX 2 MG: 2 LOZENGE ORAL at 22:45

## 2025-06-12 RX ADMIN — BARICITINIB 1 MG: 2 TABLET, FILM COATED ORAL at 13:21

## 2025-06-12 RX ADMIN — PANTOPRAZOLE SODIUM 20 MG: 20 TABLET, DELAYED RELEASE ORAL at 11:03

## 2025-06-12 RX ADMIN — APIXABAN 2.5 MG: 2.5 TABLET, FILM COATED ORAL at 11:03

## 2025-06-12 RX ADMIN — APIXABAN 2.5 MG: 2.5 TABLET, FILM COATED ORAL at 20:31

## 2025-06-12 RX ADMIN — NICOTINE POLACRILEX 2 MG: 2 LOZENGE ORAL at 13:22

## 2025-06-12 RX ADMIN — SODIUM CHLORIDE 30 MG/ML INHALATION SOLUTION 4 ML: 30 SOLUTION INHALANT at 13:26

## 2025-06-12 RX ADMIN — IPRATROPIUM BROMIDE 0.5 MG: 0.5 SOLUTION RESPIRATORY (INHALATION) at 09:16

## 2025-06-12 RX ADMIN — GUAIFENESIN 600 MG: 600 TABLET, MULTILAYER, EXTENDED RELEASE ORAL at 20:31

## 2025-06-12 RX ADMIN — SODIUM CHLORIDE 30 MG/ML INHALATION SOLUTION 4 ML: 30 SOLUTION INHALANT at 09:17

## 2025-06-12 RX ADMIN — SERTRALINE 50 MG: 50 TABLET, FILM COATED ORAL at 11:03

## 2025-06-12 RX ADMIN — SODIUM CHLORIDE, PRESERVATIVE FREE 10 ML: 5 INJECTION INTRAVENOUS at 11:03

## 2025-06-12 RX ADMIN — Medication 6 MG: at 20:32

## 2025-06-12 RX ADMIN — SODIUM CHLORIDE 30 MG/ML INHALATION SOLUTION 4 ML: 30 SOLUTION INHALANT at 20:37

## 2025-06-12 RX ADMIN — NICOTINE POLACRILEX 2 MG: 2 LOZENGE ORAL at 20:30

## 2025-06-12 RX ADMIN — SODIUM CHLORIDE, PRESERVATIVE FREE 5 ML: 5 INJECTION INTRAVENOUS at 22:00

## 2025-06-12 RX ADMIN — DEXAMETHASONE SODIUM PHOSPHATE 6 MG: 4 INJECTION, SOLUTION INTRAMUSCULAR; INTRAVENOUS at 11:03

## 2025-06-12 RX ADMIN — INSULIN LISPRO 4 UNITS: 100 INJECTION, SOLUTION INTRAVENOUS; SUBCUTANEOUS at 20:32

## 2025-06-12 RX ADMIN — CARVEDILOL 6.25 MG: 6.25 TABLET, FILM COATED ORAL at 11:03

## 2025-06-12 RX ADMIN — GUAIFENESIN 600 MG: 600 TABLET, MULTILAYER, EXTENDED RELEASE ORAL at 11:03

## 2025-06-12 RX ADMIN — IPRATROPIUM BROMIDE 0.5 MG: 0.5 SOLUTION RESPIRATORY (INHALATION) at 20:38

## 2025-06-12 RX ADMIN — TRAZODONE HYDROCHLORIDE 25 MG: 50 TABLET ORAL at 20:31

## 2025-06-12 RX ADMIN — INSULIN GLARGINE 5 UNITS: 100 INJECTION, SOLUTION SUBCUTANEOUS at 20:32

## 2025-06-12 RX ADMIN — LEVALBUTEROL 1.25 MG: 1.25 SOLUTION, CONCENTRATE RESPIRATORY (INHALATION) at 20:37

## 2025-06-12 RX ADMIN — AMLODIPINE BESYLATE 5 MG: 5 TABLET ORAL at 11:03

## 2025-06-12 RX ADMIN — TEMAZEPAM 15 MG: 15 CAPSULE ORAL at 20:31

## 2025-06-12 RX ADMIN — LETROZOLE 2.5 MG: 2.5 TABLET ORAL at 11:03

## 2025-06-12 RX ADMIN — NICOTINE POLACRILEX 2 MG: 2 LOZENGE ORAL at 18:12

## 2025-06-12 RX ADMIN — CARVEDILOL 6.25 MG: 6.25 TABLET, FILM COATED ORAL at 20:30

## 2025-06-12 RX ADMIN — INSULIN LISPRO 8 UNITS: 100 INJECTION, SOLUTION INTRAVENOUS; SUBCUTANEOUS at 18:11

## 2025-06-12 RX ADMIN — IPRATROPIUM BROMIDE 0.5 MG: 0.5 SOLUTION RESPIRATORY (INHALATION) at 13:26

## 2025-06-12 ASSESSMENT — PAIN SCALES - GENERAL
PAINLEVEL_OUTOF10: 0

## 2025-06-13 LAB
ALBUMIN SERPL-MCNC: 3.1 G/DL (ref 3.4–5)
ANION GAP SERPL CALCULATED.3IONS-SCNC: 9 MMOL/L (ref 3–16)
ANISOCYTOSIS BLD QL SMEAR: ABNORMAL
BASOPHILS # BLD: 0 K/UL (ref 0–0.2)
BASOPHILS NFR BLD: 0 %
BUN SERPL-MCNC: 80 MG/DL (ref 7–20)
CALCIUM SERPL-MCNC: 8.7 MG/DL (ref 8.3–10.6)
CHLORIDE SERPL-SCNC: 104 MMOL/L (ref 99–110)
CO2 SERPL-SCNC: 29 MMOL/L (ref 21–32)
CREAT SERPL-MCNC: 2.5 MG/DL (ref 0.6–1.2)
DEPRECATED RDW RBC AUTO: 16.8 % (ref 12.4–15.4)
EOSINOPHIL # BLD: 0 K/UL (ref 0–0.6)
EOSINOPHIL NFR BLD: 0 %
GFR SERPLBLD CREATININE-BSD FMLA CKD-EPI: 21 ML/MIN/{1.73_M2}
GLUCOSE BLD-MCNC: 174 MG/DL (ref 70–99)
GLUCOSE BLD-MCNC: 188 MG/DL (ref 70–99)
GLUCOSE BLD-MCNC: 234 MG/DL (ref 70–99)
GLUCOSE BLD-MCNC: 255 MG/DL (ref 70–99)
GLUCOSE SERPL-MCNC: 186 MG/DL (ref 70–99)
HCT VFR BLD AUTO: 28.5 % (ref 36–48)
HGB BLD-MCNC: 9.1 G/DL (ref 12–16)
LYMPHOCYTES # BLD: 0.7 K/UL (ref 1–5.1)
LYMPHOCYTES NFR BLD: 6 %
MAGNESIUM SERPL-MCNC: 2.22 MG/DL (ref 1.8–2.4)
MCH RBC QN AUTO: 30.8 PG (ref 26–34)
MCHC RBC AUTO-ENTMCNC: 32 G/DL (ref 31–36)
MCV RBC AUTO: 96.2 FL (ref 80–100)
METAMYELOCYTES NFR BLD MANUAL: 1 %
MONOCYTES # BLD: 0.5 K/UL (ref 0–1.3)
MONOCYTES NFR BLD: 5 %
NEUTROPHILS # BLD: 8.5 K/UL (ref 1.7–7.7)
NEUTROPHILS NFR BLD: 87 %
PERFORMED ON: ABNORMAL
PHOSPHATE SERPL-MCNC: 5.5 MG/DL (ref 2.5–4.9)
PLATELET # BLD AUTO: 250 K/UL (ref 135–450)
PMV BLD AUTO: 7.8 FL (ref 5–10.5)
POLYCHROMASIA BLD QL SMEAR: ABNORMAL
POTASSIUM SERPL-SCNC: 5.3 MMOL/L (ref 3.5–5.1)
RBC # BLD AUTO: 2.96 M/UL (ref 4–5.2)
SODIUM SERPL-SCNC: 142 MMOL/L (ref 136–145)
STOMATOCYTES BLD QL SMEAR: ABNORMAL
VARIANT LYMPHS NFR BLD MANUAL: 1 % (ref 0–6)
WBC # BLD AUTO: 9.7 K/UL (ref 4–11)

## 2025-06-13 PROCEDURE — 6360000002 HC RX W HCPCS

## 2025-06-13 PROCEDURE — 2500000003 HC RX 250 WO HCPCS

## 2025-06-13 PROCEDURE — 85025 COMPLETE CBC W/AUTO DIFF WBC: CPT

## 2025-06-13 PROCEDURE — 94640 AIRWAY INHALATION TREATMENT: CPT

## 2025-06-13 PROCEDURE — 83735 ASSAY OF MAGNESIUM: CPT

## 2025-06-13 PROCEDURE — 6370000000 HC RX 637 (ALT 250 FOR IP)

## 2025-06-13 PROCEDURE — 94761 N-INVAS EAR/PLS OXIMETRY MLT: CPT

## 2025-06-13 PROCEDURE — 94669 MECHANICAL CHEST WALL OSCILL: CPT

## 2025-06-13 PROCEDURE — 99233 SBSQ HOSP IP/OBS HIGH 50: CPT | Performed by: HOSPITALIST

## 2025-06-13 PROCEDURE — 2580000003 HC RX 258

## 2025-06-13 PROCEDURE — 99233 SBSQ HOSP IP/OBS HIGH 50: CPT | Performed by: INTERNAL MEDICINE

## 2025-06-13 PROCEDURE — 2060000000 HC ICU INTERMEDIATE R&B

## 2025-06-13 PROCEDURE — 94660 CPAP INITIATION&MGMT: CPT

## 2025-06-13 PROCEDURE — 80069 RENAL FUNCTION PANEL: CPT

## 2025-06-13 RX ADMIN — IPRATROPIUM BROMIDE 0.5 MG: 0.5 SOLUTION RESPIRATORY (INHALATION) at 08:23

## 2025-06-13 RX ADMIN — IPRATROPIUM BROMIDE 0.5 MG: 0.5 SOLUTION RESPIRATORY (INHALATION) at 20:17

## 2025-06-13 RX ADMIN — IPRATROPIUM BROMIDE 0.5 MG: 0.5 SOLUTION RESPIRATORY (INHALATION) at 12:49

## 2025-06-13 RX ADMIN — NICOTINE POLACRILEX 2 MG: 2 LOZENGE ORAL at 18:33

## 2025-06-13 RX ADMIN — AMLODIPINE BESYLATE 5 MG: 5 TABLET ORAL at 07:51

## 2025-06-13 RX ADMIN — INSULIN LISPRO 4 UNITS: 100 INJECTION, SOLUTION INTRAVENOUS; SUBCUTANEOUS at 16:45

## 2025-06-13 RX ADMIN — TRAZODONE HYDROCHLORIDE 25 MG: 50 TABLET ORAL at 21:41

## 2025-06-13 RX ADMIN — SODIUM CHLORIDE 30 MG/ML INHALATION SOLUTION 4 ML: 30 SOLUTION INHALANT at 08:23

## 2025-06-13 RX ADMIN — CARVEDILOL 6.25 MG: 6.25 TABLET, FILM COATED ORAL at 21:41

## 2025-06-13 RX ADMIN — INSULIN GLARGINE 5 UNITS: 100 INJECTION, SOLUTION SUBCUTANEOUS at 21:07

## 2025-06-13 RX ADMIN — ROPINIROLE HYDROCHLORIDE 0.25 MG: 0.25 TABLET, FILM COATED ORAL at 21:40

## 2025-06-13 RX ADMIN — CARVEDILOL 6.25 MG: 6.25 TABLET, FILM COATED ORAL at 07:51

## 2025-06-13 RX ADMIN — DEXAMETHASONE SODIUM PHOSPHATE 6 MG: 4 INJECTION, SOLUTION INTRAMUSCULAR; INTRAVENOUS at 07:51

## 2025-06-13 RX ADMIN — GUAIFENESIN 600 MG: 600 TABLET, MULTILAYER, EXTENDED RELEASE ORAL at 21:41

## 2025-06-13 RX ADMIN — SODIUM CHLORIDE 30 MG/ML INHALATION SOLUTION 4 ML: 30 SOLUTION INHALANT at 20:17

## 2025-06-13 RX ADMIN — SODIUM CHLORIDE, PRESERVATIVE FREE 10 ML: 5 INJECTION INTRAVENOUS at 07:52

## 2025-06-13 RX ADMIN — INSULIN LISPRO 8 UNITS: 100 INJECTION, SOLUTION INTRAVENOUS; SUBCUTANEOUS at 08:05

## 2025-06-13 RX ADMIN — PANTOPRAZOLE SODIUM 20 MG: 20 TABLET, DELAYED RELEASE ORAL at 07:52

## 2025-06-13 RX ADMIN — APIXABAN 2.5 MG: 2.5 TABLET, FILM COATED ORAL at 07:51

## 2025-06-13 RX ADMIN — APIXABAN 2.5 MG: 2.5 TABLET, FILM COATED ORAL at 21:41

## 2025-06-13 RX ADMIN — SERTRALINE 50 MG: 50 TABLET, FILM COATED ORAL at 07:51

## 2025-06-13 RX ADMIN — SODIUM CHLORIDE, PRESERVATIVE FREE 10 ML: 5 INJECTION INTRAVENOUS at 21:42

## 2025-06-13 RX ADMIN — BARICITINIB 1 MG: 2 TABLET, FILM COATED ORAL at 10:48

## 2025-06-13 RX ADMIN — NICOTINE POLACRILEX 2 MG: 2 LOZENGE ORAL at 10:49

## 2025-06-13 RX ADMIN — Medication 6 MG: at 21:41

## 2025-06-13 RX ADMIN — INSULIN LISPRO 4 UNITS: 100 INJECTION, SOLUTION INTRAVENOUS; SUBCUTANEOUS at 21:07

## 2025-06-13 RX ADMIN — ACETAMINOPHEN 650 MG: 325 TABLET ORAL at 21:50

## 2025-06-13 RX ADMIN — TEMAZEPAM 15 MG: 15 CAPSULE ORAL at 21:40

## 2025-06-13 RX ADMIN — SODIUM CHLORIDE 30 MG/ML INHALATION SOLUTION 4 ML: 30 SOLUTION INHALANT at 12:49

## 2025-06-13 RX ADMIN — NICOTINE POLACRILEX 2 MG: 2 LOZENGE ORAL at 15:14

## 2025-06-13 RX ADMIN — GUAIFENESIN 600 MG: 600 TABLET, MULTILAYER, EXTENDED RELEASE ORAL at 07:51

## 2025-06-13 RX ADMIN — IPRATROPIUM BROMIDE 0.5 MG: 0.5 SOLUTION RESPIRATORY (INHALATION) at 16:13

## 2025-06-13 RX ADMIN — LETROZOLE 2.5 MG: 2.5 TABLET ORAL at 07:52

## 2025-06-13 RX ADMIN — HYDROXYZINE HYDROCHLORIDE 25 MG: 25 TABLET, FILM COATED ORAL at 21:41

## 2025-06-13 RX ADMIN — NICOTINE POLACRILEX 2 MG: 2 LOZENGE ORAL at 21:07

## 2025-06-13 NOTE — RT PROTOCOL NOTE
RT Inhaler-Nebulizer Bronchodilator Protocol Note    There is a bronchodilator order in the chart from a provider indicating to follow the RT Bronchodilator Protocol and there is an “Initiate RT Inhaler-Nebulizer Bronchodilator Protocol” order as well (see protocol at bottom of note).    CXR Findings:  No results found.    The findings from the last RT Protocol Assessment were as follows:   History Pulmonary Disease: Chronic pulmonary disease  Respiratory Pattern: Mild dyspnea at rest, irregular pattern, or RR 21-25 bpm  Breath Sounds: Slightly diminished and/or crackles  Cough: Weak, productive  Indication for Bronchodilator Therapy:    Bronchodilator Assessment Score: 10    Aerosolized bronchodilator medication orders have been revised according to the RT Inhaler-Nebulizer Bronchodilator Protocol below.    Respiratory Therapist to perform RT Therapy Protocol Assessment initially then follow the protocol.  Repeat RT Therapy Protocol Assessment PRN for score 0-3 or on second treatment, BID, and PRN for scores above 3.    No Indications - adjust the frequency to every 6 hours PRN wheezing or bronchospasm, if no treatments needed after 48 hours then discontinue using Per Protocol order mode.     If indication present, adjust the RT bronchodilator orders based on the Bronchodilator Assessment Score as indicated below.  Use Inhaler orders unless patient has one or more of the following: on home nebulizer, not able to hold breath for 10 seconds, is not alert and oriented, cannot activate and use MDI correctly, or respiratory rate 25 breaths per minute or more, then use the equivalent nebulizer order(s) with same Frequency and PRN reasons based on the score.  If a patient is on this medication at home then do not decrease Frequency below that used at home.    0-3 - enter or revise RT bronchodilator order(s) to equivalent RT Bronchodilator order with Frequency of every 4 hours PRN for wheezing or increased work of

## 2025-06-14 ENCOUNTER — APPOINTMENT (OUTPATIENT)
Dept: GENERAL RADIOLOGY | Age: 65
DRG: 137 | End: 2025-06-14
Payer: COMMERCIAL

## 2025-06-14 LAB
ALBUMIN SERPL-MCNC: 3.4 G/DL (ref 3.4–5)
ANION GAP SERPL CALCULATED.3IONS-SCNC: 11 MMOL/L (ref 3–16)
BASOPHILS # BLD: 0 K/UL (ref 0–0.2)
BASOPHILS NFR BLD: 0 %
BUN SERPL-MCNC: 85 MG/DL (ref 7–20)
CALCIUM SERPL-MCNC: 8.8 MG/DL (ref 8.3–10.6)
CHLORIDE SERPL-SCNC: 100 MMOL/L (ref 99–110)
CO2 SERPL-SCNC: 30 MMOL/L (ref 21–32)
CREAT SERPL-MCNC: 2.3 MG/DL (ref 0.6–1.2)
DEPRECATED RDW RBC AUTO: 16.3 % (ref 12.4–15.4)
EOSINOPHIL # BLD: 0 K/UL (ref 0–0.6)
EOSINOPHIL NFR BLD: 0 %
GFR SERPLBLD CREATININE-BSD FMLA CKD-EPI: 23 ML/MIN/{1.73_M2}
GLUCOSE BLD-MCNC: 113 MG/DL (ref 70–99)
GLUCOSE BLD-MCNC: 129 MG/DL (ref 70–99)
GLUCOSE BLD-MCNC: 222 MG/DL (ref 70–99)
GLUCOSE BLD-MCNC: 284 MG/DL (ref 70–99)
GLUCOSE SERPL-MCNC: 219 MG/DL (ref 70–99)
HCT VFR BLD AUTO: 30.5 % (ref 36–48)
HGB BLD-MCNC: 9.8 G/DL (ref 12–16)
LYMPHOCYTES # BLD: 1.1 K/UL (ref 1–5.1)
LYMPHOCYTES NFR BLD: 12 %
MAGNESIUM SERPL-MCNC: 1.87 MG/DL (ref 1.8–2.4)
MCH RBC QN AUTO: 30.4 PG (ref 26–34)
MCHC RBC AUTO-ENTMCNC: 32 G/DL (ref 31–36)
MCV RBC AUTO: 95 FL (ref 80–100)
MONOCYTES # BLD: 0.3 K/UL (ref 0–1.3)
MONOCYTES NFR BLD: 3 %
NEUTROPHILS # BLD: 8 K/UL (ref 1.7–7.7)
NEUTROPHILS NFR BLD: 85 %
NRBC BLD-RTO: 1 /100 WBC
PERFORMED ON: ABNORMAL
PHOSPHATE SERPL-MCNC: 4.9 MG/DL (ref 2.5–4.9)
PLATELET # BLD AUTO: 255 K/UL (ref 135–450)
PMV BLD AUTO: 7.6 FL (ref 5–10.5)
POTASSIUM SERPL-SCNC: 5.2 MMOL/L (ref 3.5–5.1)
RBC # BLD AUTO: 3.21 M/UL (ref 4–5.2)
SODIUM SERPL-SCNC: 141 MMOL/L (ref 136–145)
WBC # BLD AUTO: 9.4 K/UL (ref 4–11)

## 2025-06-14 PROCEDURE — 6360000002 HC RX W HCPCS

## 2025-06-14 PROCEDURE — 2500000003 HC RX 250 WO HCPCS

## 2025-06-14 PROCEDURE — 80069 RENAL FUNCTION PANEL: CPT

## 2025-06-14 PROCEDURE — 2700000000 HC OXYGEN THERAPY PER DAY

## 2025-06-14 PROCEDURE — 2580000003 HC RX 258

## 2025-06-14 PROCEDURE — 6370000000 HC RX 637 (ALT 250 FOR IP)

## 2025-06-14 PROCEDURE — 94640 AIRWAY INHALATION TREATMENT: CPT

## 2025-06-14 PROCEDURE — 99233 SBSQ HOSP IP/OBS HIGH 50: CPT | Performed by: INTERNAL MEDICINE

## 2025-06-14 PROCEDURE — 94660 CPAP INITIATION&MGMT: CPT

## 2025-06-14 PROCEDURE — 85025 COMPLETE CBC W/AUTO DIFF WBC: CPT

## 2025-06-14 PROCEDURE — 71045 X-RAY EXAM CHEST 1 VIEW: CPT

## 2025-06-14 PROCEDURE — 94669 MECHANICAL CHEST WALL OSCILL: CPT

## 2025-06-14 PROCEDURE — 94761 N-INVAS EAR/PLS OXIMETRY MLT: CPT

## 2025-06-14 PROCEDURE — 83735 ASSAY OF MAGNESIUM: CPT

## 2025-06-14 PROCEDURE — 2060000000 HC ICU INTERMEDIATE R&B

## 2025-06-14 RX ORDER — DIPHENHYDRAMINE HYDROCHLORIDE 50 MG/ML
25 INJECTION, SOLUTION INTRAMUSCULAR; INTRAVENOUS ONCE
Status: COMPLETED | OUTPATIENT
Start: 2025-06-14 | End: 2025-06-14

## 2025-06-14 RX ORDER — FUROSEMIDE 10 MG/ML
40 INJECTION INTRAMUSCULAR; INTRAVENOUS ONCE
Status: COMPLETED | OUTPATIENT
Start: 2025-06-14 | End: 2025-06-14

## 2025-06-14 RX ORDER — ACETYLCYSTEINE 200 MG/ML
600 SOLUTION ORAL; RESPIRATORY (INHALATION)
Status: DISCONTINUED | OUTPATIENT
Start: 2025-06-14 | End: 2025-06-24 | Stop reason: HOSPADM

## 2025-06-14 RX ADMIN — DEXAMETHASONE SODIUM PHOSPHATE 6 MG: 4 INJECTION, SOLUTION INTRAMUSCULAR; INTRAVENOUS at 08:03

## 2025-06-14 RX ADMIN — ACETYLCYSTEINE 600 MG: 100 SOLUTION RESPIRATORY (INHALATION) at 15:30

## 2025-06-14 RX ADMIN — TEMAZEPAM 15 MG: 15 CAPSULE ORAL at 20:54

## 2025-06-14 RX ADMIN — SODIUM CHLORIDE 30 MG/ML INHALATION SOLUTION 4 ML: 30 SOLUTION INHALANT at 15:30

## 2025-06-14 RX ADMIN — AMLODIPINE BESYLATE 5 MG: 5 TABLET ORAL at 08:03

## 2025-06-14 RX ADMIN — Medication 6 MG: at 20:53

## 2025-06-14 RX ADMIN — ROPINIROLE HYDROCHLORIDE 0.25 MG: 0.25 TABLET, FILM COATED ORAL at 20:51

## 2025-06-14 RX ADMIN — APIXABAN 2.5 MG: 2.5 TABLET, FILM COATED ORAL at 08:03

## 2025-06-14 RX ADMIN — NICOTINE POLACRILEX 2 MG: 2 LOZENGE ORAL at 16:01

## 2025-06-14 RX ADMIN — SODIUM CHLORIDE 30 MG/ML INHALATION SOLUTION 4 ML: 30 SOLUTION INHALANT at 20:28

## 2025-06-14 RX ADMIN — TRAZODONE HYDROCHLORIDE 25 MG: 50 TABLET ORAL at 20:53

## 2025-06-14 RX ADMIN — DIPHENHYDRAMINE HYDROCHLORIDE 25 MG: 50 INJECTION INTRAMUSCULAR; INTRAVENOUS at 22:56

## 2025-06-14 RX ADMIN — NICOTINE POLACRILEX 2 MG: 2 LOZENGE ORAL at 19:57

## 2025-06-14 RX ADMIN — INSULIN LISPRO 4 UNITS: 100 INJECTION, SOLUTION INTRAVENOUS; SUBCUTANEOUS at 20:53

## 2025-06-14 RX ADMIN — NICOTINE POLACRILEX 2 MG: 2 LOZENGE ORAL at 23:19

## 2025-06-14 RX ADMIN — INSULIN LISPRO 8 UNITS: 100 INJECTION, SOLUTION INTRAVENOUS; SUBCUTANEOUS at 16:03

## 2025-06-14 RX ADMIN — HYDROXYZINE HYDROCHLORIDE 25 MG: 25 TABLET, FILM COATED ORAL at 20:53

## 2025-06-14 RX ADMIN — NICOTINE POLACRILEX 2 MG: 2 LOZENGE ORAL at 13:33

## 2025-06-14 RX ADMIN — SODIUM CHLORIDE, PRESERVATIVE FREE 10 ML: 5 INJECTION INTRAVENOUS at 08:04

## 2025-06-14 RX ADMIN — ACETYLCYSTEINE 600 MG: 100 SOLUTION RESPIRATORY (INHALATION) at 20:28

## 2025-06-14 RX ADMIN — BARICITINIB 1 MG: 2 TABLET, FILM COATED ORAL at 09:12

## 2025-06-14 RX ADMIN — FUROSEMIDE 40 MG: 10 INJECTION, SOLUTION INTRAMUSCULAR; INTRAVENOUS at 10:24

## 2025-06-14 RX ADMIN — CARVEDILOL 6.25 MG: 6.25 TABLET, FILM COATED ORAL at 08:03

## 2025-06-14 RX ADMIN — APIXABAN 2.5 MG: 2.5 TABLET, FILM COATED ORAL at 20:51

## 2025-06-14 RX ADMIN — INSULIN GLARGINE 5 UNITS: 100 INJECTION, SOLUTION SUBCUTANEOUS at 20:53

## 2025-06-14 RX ADMIN — SERTRALINE 50 MG: 50 TABLET, FILM COATED ORAL at 08:03

## 2025-06-14 RX ADMIN — IPRATROPIUM BROMIDE 0.5 MG: 0.5 SOLUTION RESPIRATORY (INHALATION) at 15:30

## 2025-06-14 RX ADMIN — PANTOPRAZOLE SODIUM 20 MG: 20 TABLET, DELAYED RELEASE ORAL at 08:04

## 2025-06-14 RX ADMIN — GUAIFENESIN 600 MG: 600 TABLET, MULTILAYER, EXTENDED RELEASE ORAL at 20:53

## 2025-06-14 RX ADMIN — CARVEDILOL 6.25 MG: 6.25 TABLET, FILM COATED ORAL at 20:53

## 2025-06-14 RX ADMIN — LETROZOLE 2.5 MG: 2.5 TABLET ORAL at 08:03

## 2025-06-14 RX ADMIN — GUAIFENESIN 600 MG: 600 TABLET, MULTILAYER, EXTENDED RELEASE ORAL at 08:03

## 2025-06-14 RX ADMIN — IPRATROPIUM BROMIDE 0.5 MG: 0.5 SOLUTION RESPIRATORY (INHALATION) at 20:28

## 2025-06-15 ENCOUNTER — APPOINTMENT (OUTPATIENT)
Dept: GENERAL RADIOLOGY | Age: 65
DRG: 137 | End: 2025-06-15
Payer: COMMERCIAL

## 2025-06-15 LAB
ALBUMIN SERPL-MCNC: 3.2 G/DL (ref 3.4–5)
ANION GAP SERPL CALCULATED.3IONS-SCNC: 11 MMOL/L (ref 3–16)
BACTERIA BLD CULT ORG #2: NORMAL
BACTERIA BLD CULT: NORMAL
BASOPHILS # BLD: 0 K/UL (ref 0–0.2)
BASOPHILS NFR BLD: 0 %
BUN SERPL-MCNC: 89 MG/DL (ref 7–20)
CALCIUM SERPL-MCNC: 8.3 MG/DL (ref 8.3–10.6)
CHLORIDE SERPL-SCNC: 102 MMOL/L (ref 99–110)
CO2 SERPL-SCNC: 29 MMOL/L (ref 21–32)
CREAT SERPL-MCNC: 2.4 MG/DL (ref 0.6–1.2)
CRP SERPL-MCNC: 11.2 MG/L (ref 0–5.1)
DEPRECATED RDW RBC AUTO: 16.2 % (ref 12.4–15.4)
EOSINOPHIL # BLD: 0 K/UL (ref 0–0.6)
EOSINOPHIL NFR BLD: 0 %
GFR SERPLBLD CREATININE-BSD FMLA CKD-EPI: 22 ML/MIN/{1.73_M2}
GLUCOSE BLD-MCNC: 124 MG/DL (ref 70–99)
GLUCOSE BLD-MCNC: 166 MG/DL (ref 70–99)
GLUCOSE BLD-MCNC: 196 MG/DL (ref 70–99)
GLUCOSE BLD-MCNC: 218 MG/DL (ref 70–99)
GLUCOSE SERPL-MCNC: 143 MG/DL (ref 70–99)
HCT VFR BLD AUTO: 29.1 % (ref 36–48)
HGB BLD-MCNC: 9.3 G/DL (ref 12–16)
LYMPHOCYTES # BLD: 1.4 K/UL (ref 1–5.1)
LYMPHOCYTES NFR BLD: 13 %
MAGNESIUM SERPL-MCNC: 1.91 MG/DL (ref 1.8–2.4)
MCH RBC QN AUTO: 30.3 PG (ref 26–34)
MCHC RBC AUTO-ENTMCNC: 32 G/DL (ref 31–36)
MCV RBC AUTO: 94.6 FL (ref 80–100)
MONOCYTES # BLD: 0.7 K/UL (ref 0–1.3)
MONOCYTES NFR BLD: 6 %
MYELOCYTES NFR BLD MANUAL: 2 %
NEUTROPHILS # BLD: 9 K/UL (ref 1.7–7.7)
NEUTROPHILS NFR BLD: 79 %
NRBC BLD-RTO: 1 /100 WBC
PATH INTERP BLD-IMP: NO
PERFORMED ON: ABNORMAL
PHOSPHATE SERPL-MCNC: 5.2 MG/DL (ref 2.5–4.9)
PLATELET # BLD AUTO: 244 K/UL (ref 135–450)
PLATELET BLD QL SMEAR: ADEQUATE
PMV BLD AUTO: 7.4 FL (ref 5–10.5)
POLYCHROMASIA BLD QL SMEAR: ABNORMAL
POTASSIUM SERPL-SCNC: 4.9 MMOL/L (ref 3.5–5.1)
RBC # BLD AUTO: 3.07 M/UL (ref 4–5.2)
RBC MORPH BLD: NORMAL
SLIDE REVIEW: ABNORMAL
SODIUM SERPL-SCNC: 142 MMOL/L (ref 136–145)
WBC # BLD AUTO: 11.1 K/UL (ref 4–11)

## 2025-06-15 PROCEDURE — 83735 ASSAY OF MAGNESIUM: CPT

## 2025-06-15 PROCEDURE — 6360000002 HC RX W HCPCS

## 2025-06-15 PROCEDURE — 6370000000 HC RX 637 (ALT 250 FOR IP)

## 2025-06-15 PROCEDURE — 99233 SBSQ HOSP IP/OBS HIGH 50: CPT | Performed by: INTERNAL MEDICINE

## 2025-06-15 PROCEDURE — 71045 X-RAY EXAM CHEST 1 VIEW: CPT

## 2025-06-15 PROCEDURE — 2500000003 HC RX 250 WO HCPCS

## 2025-06-15 PROCEDURE — 94761 N-INVAS EAR/PLS OXIMETRY MLT: CPT

## 2025-06-15 PROCEDURE — 94660 CPAP INITIATION&MGMT: CPT

## 2025-06-15 PROCEDURE — 86140 C-REACTIVE PROTEIN: CPT

## 2025-06-15 PROCEDURE — 94640 AIRWAY INHALATION TREATMENT: CPT

## 2025-06-15 PROCEDURE — 80069 RENAL FUNCTION PANEL: CPT

## 2025-06-15 PROCEDURE — 94669 MECHANICAL CHEST WALL OSCILL: CPT

## 2025-06-15 PROCEDURE — 2580000003 HC RX 258

## 2025-06-15 PROCEDURE — 2700000000 HC OXYGEN THERAPY PER DAY

## 2025-06-15 PROCEDURE — 85025 COMPLETE CBC W/AUTO DIFF WBC: CPT

## 2025-06-15 PROCEDURE — 2060000000 HC ICU INTERMEDIATE R&B

## 2025-06-15 RX ORDER — DIPHENHYDRAMINE HYDROCHLORIDE 50 MG/ML
25 INJECTION, SOLUTION INTRAMUSCULAR; INTRAVENOUS ONCE
Status: COMPLETED | OUTPATIENT
Start: 2025-06-15 | End: 2025-06-15

## 2025-06-15 RX ADMIN — NICOTINE POLACRILEX 4 MG: 4 LOZENGE ORAL at 20:00

## 2025-06-15 RX ADMIN — CARVEDILOL 6.25 MG: 6.25 TABLET, FILM COATED ORAL at 08:13

## 2025-06-15 RX ADMIN — APIXABAN 2.5 MG: 2.5 TABLET, FILM COATED ORAL at 08:13

## 2025-06-15 RX ADMIN — NICOTINE POLACRILEX 4 MG: 4 LOZENGE ORAL at 14:22

## 2025-06-15 RX ADMIN — TRAZODONE HYDROCHLORIDE 25 MG: 50 TABLET ORAL at 21:12

## 2025-06-15 RX ADMIN — SODIUM CHLORIDE 30 MG/ML INHALATION SOLUTION 4 ML: 30 SOLUTION INHALANT at 08:32

## 2025-06-15 RX ADMIN — ROPINIROLE HYDROCHLORIDE 0.25 MG: 0.25 TABLET, FILM COATED ORAL at 21:12

## 2025-06-15 RX ADMIN — SERTRALINE 50 MG: 50 TABLET, FILM COATED ORAL at 08:13

## 2025-06-15 RX ADMIN — ACETYLCYSTEINE 600 MG: 100 SOLUTION RESPIRATORY (INHALATION) at 15:50

## 2025-06-15 RX ADMIN — GUAIFENESIN 600 MG: 600 TABLET, MULTILAYER, EXTENDED RELEASE ORAL at 08:13

## 2025-06-15 RX ADMIN — LETROZOLE 2.5 MG: 2.5 TABLET ORAL at 08:13

## 2025-06-15 RX ADMIN — INSULIN GLARGINE 5 UNITS: 100 INJECTION, SOLUTION SUBCUTANEOUS at 21:13

## 2025-06-15 RX ADMIN — INSULIN LISPRO 4 UNITS: 100 INJECTION, SOLUTION INTRAVENOUS; SUBCUTANEOUS at 13:09

## 2025-06-15 RX ADMIN — NICOTINE POLACRILEX 4 MG: 4 LOZENGE ORAL at 18:27

## 2025-06-15 RX ADMIN — NICOTINE POLACRILEX 4 MG: 4 LOZENGE ORAL at 08:23

## 2025-06-15 RX ADMIN — SODIUM CHLORIDE, PRESERVATIVE FREE 10 ML: 5 INJECTION INTRAVENOUS at 21:13

## 2025-06-15 RX ADMIN — IPRATROPIUM BROMIDE 0.5 MG: 0.5 SOLUTION RESPIRATORY (INHALATION) at 08:31

## 2025-06-15 RX ADMIN — ACETAMINOPHEN 650 MG: 325 TABLET ORAL at 08:21

## 2025-06-15 RX ADMIN — AMLODIPINE BESYLATE 5 MG: 5 TABLET ORAL at 08:13

## 2025-06-15 RX ADMIN — HYDROXYZINE HYDROCHLORIDE 25 MG: 25 TABLET, FILM COATED ORAL at 21:13

## 2025-06-15 RX ADMIN — DEXAMETHASONE SODIUM PHOSPHATE 6 MG: 4 INJECTION, SOLUTION INTRAMUSCULAR; INTRAVENOUS at 08:13

## 2025-06-15 RX ADMIN — IPRATROPIUM BROMIDE 0.5 MG: 0.5 SOLUTION RESPIRATORY (INHALATION) at 11:29

## 2025-06-15 RX ADMIN — NICOTINE POLACRILEX 2 MG: 2 LOZENGE ORAL at 00:20

## 2025-06-15 RX ADMIN — IPRATROPIUM BROMIDE 0.5 MG: 0.5 SOLUTION RESPIRATORY (INHALATION) at 15:50

## 2025-06-15 RX ADMIN — NICOTINE POLACRILEX 4 MG: 4 LOZENGE ORAL at 16:05

## 2025-06-15 RX ADMIN — DIPHENHYDRAMINE HYDROCHLORIDE 25 MG: 50 INJECTION INTRAMUSCULAR; INTRAVENOUS at 13:06

## 2025-06-15 RX ADMIN — PANTOPRAZOLE SODIUM 20 MG: 20 TABLET, DELAYED RELEASE ORAL at 08:14

## 2025-06-15 RX ADMIN — APIXABAN 2.5 MG: 2.5 TABLET, FILM COATED ORAL at 21:13

## 2025-06-15 RX ADMIN — NICOTINE POLACRILEX 4 MG: 4 LOZENGE ORAL at 00:36

## 2025-06-15 RX ADMIN — CARVEDILOL 6.25 MG: 6.25 TABLET, FILM COATED ORAL at 21:12

## 2025-06-15 RX ADMIN — NICOTINE POLACRILEX 4 MG: 4 LOZENGE ORAL at 12:06

## 2025-06-15 RX ADMIN — BARICITINIB 1 MG: 2 TABLET, FILM COATED ORAL at 10:17

## 2025-06-15 RX ADMIN — Medication 6 MG: at 21:12

## 2025-06-15 RX ADMIN — NICOTINE POLACRILEX 4 MG: 4 LOZENGE ORAL at 17:17

## 2025-06-15 RX ADMIN — SODIUM CHLORIDE 30 MG/ML INHALATION SOLUTION 4 ML: 30 SOLUTION INHALANT at 15:50

## 2025-06-15 RX ADMIN — ACETYLCYSTEINE 600 MG: 100 SOLUTION RESPIRATORY (INHALATION) at 20:18

## 2025-06-15 RX ADMIN — INSULIN LISPRO 4 UNITS: 100 INJECTION, SOLUTION INTRAVENOUS; SUBCUTANEOUS at 08:16

## 2025-06-15 RX ADMIN — TEMAZEPAM 15 MG: 15 CAPSULE ORAL at 21:12

## 2025-06-15 RX ADMIN — GUAIFENESIN 600 MG: 600 TABLET, MULTILAYER, EXTENDED RELEASE ORAL at 21:12

## 2025-06-15 RX ADMIN — NICOTINE POLACRILEX 4 MG: 4 LOZENGE ORAL at 22:00

## 2025-06-15 RX ADMIN — SODIUM CHLORIDE 30 MG/ML INHALATION SOLUTION 4 ML: 30 SOLUTION INHALANT at 20:18

## 2025-06-15 RX ADMIN — NICOTINE POLACRILEX 4 MG: 4 LOZENGE ORAL at 04:15

## 2025-06-15 RX ADMIN — SODIUM CHLORIDE, PRESERVATIVE FREE 10 ML: 5 INJECTION INTRAVENOUS at 08:21

## 2025-06-15 RX ADMIN — ACETYLCYSTEINE 600 MG: 100 SOLUTION RESPIRATORY (INHALATION) at 08:32

## 2025-06-15 RX ADMIN — NICOTINE POLACRILEX 4 MG: 4 LOZENGE ORAL at 10:22

## 2025-06-15 RX ADMIN — IPRATROPIUM BROMIDE 0.5 MG: 0.5 SOLUTION RESPIRATORY (INHALATION) at 20:18

## 2025-06-15 ASSESSMENT — PAIN SCALES - GENERAL
PAINLEVEL_OUTOF10: 0
PAINLEVEL_OUTOF10: 3

## 2025-06-15 NOTE — RT PROTOCOL NOTE
RT Inhaler-Nebulizer Bronchodilator Protocol Note    There is a bronchodilator order in the chart from a provider indicating to follow the RT Bronchodilator Protocol and there is an “Initiate RT Inhaler-Nebulizer Bronchodilator Protocol” order as well (see protocol at bottom of note).    CXR Findings:  XR CHEST PORTABLE  Result Date: 6/14/2025  1. Interval development of complete opacification right hemithorax, mucus plugging suspected. Electronically signed by Porfirio Shanks MD      The findings from the last RT Protocol Assessment were as follows:   History Pulmonary Disease: Chronic pulmonary disease  Respiratory Pattern: Regular pattern and RR 12-20 bpm  Breath Sounds: Intermittent or unilateral wheezes  Cough: Strong, productive  Indication for Bronchodilator Therapy:    Bronchodilator Assessment Score: 7  Will continue with QID pt will benefit.    Aerosolized bronchodilator medication orders have been revised according to the RT Inhaler-Nebulizer Bronchodilator Protocol below.    Respiratory Therapist to perform RT Therapy Protocol Assessment initially then follow the protocol.  Repeat RT Therapy Protocol Assessment PRN for score 0-3 or on second treatment, BID, and PRN for scores above 3.    No Indications - adjust the frequency to every 6 hours PRN wheezing or bronchospasm, if no treatments needed after 48 hours then discontinue using Per Protocol order mode.     If indication present, adjust the RT bronchodilator orders based on the Bronchodilator Assessment Score as indicated below.  Use Inhaler orders unless patient has one or more of the following: on home nebulizer, not able to hold breath for 10 seconds, is not alert and oriented, cannot activate and use MDI correctly, or respiratory rate 25 breaths per minute or more, then use the equivalent nebulizer order(s) with same Frequency and PRN reasons based on the score.  If a patient is on this medication at home then do not decrease Frequency below

## 2025-06-16 PROBLEM — I89.8 PLASTIC BRONCHITIS: Status: ACTIVE | Noted: 2025-06-16

## 2025-06-16 PROBLEM — J40 PLASTIC BRONCHITIS: Status: ACTIVE | Noted: 2025-06-16

## 2025-06-16 PROBLEM — J96.21 ACUTE ON CHRONIC RESPIRATORY FAILURE WITH HYPOXEMIA (HCC): Status: ACTIVE | Noted: 2025-06-10

## 2025-06-16 LAB
ALBUMIN SERPL-MCNC: 3.1 G/DL (ref 3.4–5)
ANION GAP SERPL CALCULATED.3IONS-SCNC: 7 MMOL/L (ref 3–16)
BASO STIPL BLD QL SMEAR: ABNORMAL
BASOPHILS # BLD: 0 K/UL (ref 0–0.2)
BASOPHILS NFR BLD: 0 %
BUN SERPL-MCNC: 84 MG/DL (ref 7–20)
CALCIUM SERPL-MCNC: 8.9 MG/DL (ref 8.3–10.6)
CHLORIDE SERPL-SCNC: 103 MMOL/L (ref 99–110)
CO2 SERPL-SCNC: 32 MMOL/L (ref 21–32)
CREAT SERPL-MCNC: 2.2 MG/DL (ref 0.6–1.2)
DEPRECATED RDW RBC AUTO: 16.2 % (ref 12.4–15.4)
EOSINOPHIL # BLD: 0 K/UL (ref 0–0.6)
EOSINOPHIL NFR BLD: 0 %
GFR SERPLBLD CREATININE-BSD FMLA CKD-EPI: 25 ML/MIN/{1.73_M2}
GLUCOSE BLD-MCNC: 178 MG/DL (ref 70–99)
GLUCOSE BLD-MCNC: 199 MG/DL (ref 70–99)
GLUCOSE BLD-MCNC: 221 MG/DL (ref 70–99)
GLUCOSE BLD-MCNC: 303 MG/DL (ref 70–99)
GLUCOSE SERPL-MCNC: 100 MG/DL (ref 70–99)
HCT VFR BLD AUTO: 28.4 % (ref 36–48)
HGB BLD-MCNC: 9.3 G/DL (ref 12–16)
LYMPHOCYTES # BLD: 1.4 K/UL (ref 1–5.1)
LYMPHOCYTES NFR BLD: 13 %
MAGNESIUM SERPL-MCNC: 1.94 MG/DL (ref 1.8–2.4)
MCH RBC QN AUTO: 31.1 PG (ref 26–34)
MCHC RBC AUTO-ENTMCNC: 32.8 G/DL (ref 31–36)
MCV RBC AUTO: 94.8 FL (ref 80–100)
MONOCYTES # BLD: 0.8 K/UL (ref 0–1.3)
MONOCYTES NFR BLD: 8 %
MYELOCYTES NFR BLD MANUAL: 1 %
NEUTROPHILS # BLD: 8.4 K/UL (ref 1.7–7.7)
NEUTROPHILS NFR BLD: 78 %
NRBC BLD-RTO: 1 /100 WBC
PATH INTERP BLD-IMP: NO
PERFORMED ON: ABNORMAL
PHOSPHATE SERPL-MCNC: 5.4 MG/DL (ref 2.5–4.9)
PLATELET # BLD AUTO: 226 K/UL (ref 135–450)
PLATELET BLD QL SMEAR: ADEQUATE
PMV BLD AUTO: 7.4 FL (ref 5–10.5)
POLYCHROMASIA BLD QL SMEAR: ABNORMAL
POTASSIUM SERPL-SCNC: 5 MMOL/L (ref 3.5–5.1)
RBC # BLD AUTO: 2.99 M/UL (ref 4–5.2)
SLIDE REVIEW: ABNORMAL
SODIUM SERPL-SCNC: 142 MMOL/L (ref 136–145)
WBC # BLD AUTO: 10.6 K/UL (ref 4–11)

## 2025-06-16 PROCEDURE — 6370000000 HC RX 637 (ALT 250 FOR IP)

## 2025-06-16 PROCEDURE — 2500000003 HC RX 250 WO HCPCS

## 2025-06-16 PROCEDURE — 2700000000 HC OXYGEN THERAPY PER DAY

## 2025-06-16 PROCEDURE — 99233 SBSQ HOSP IP/OBS HIGH 50: CPT | Performed by: INTERNAL MEDICINE

## 2025-06-16 PROCEDURE — 6360000002 HC RX W HCPCS

## 2025-06-16 PROCEDURE — 83735 ASSAY OF MAGNESIUM: CPT

## 2025-06-16 PROCEDURE — 2580000003 HC RX 258

## 2025-06-16 PROCEDURE — 94669 MECHANICAL CHEST WALL OSCILL: CPT

## 2025-06-16 PROCEDURE — 94640 AIRWAY INHALATION TREATMENT: CPT

## 2025-06-16 PROCEDURE — 2060000000 HC ICU INTERMEDIATE R&B

## 2025-06-16 PROCEDURE — 85025 COMPLETE CBC W/AUTO DIFF WBC: CPT

## 2025-06-16 PROCEDURE — 80069 RENAL FUNCTION PANEL: CPT

## 2025-06-16 PROCEDURE — 94660 CPAP INITIATION&MGMT: CPT

## 2025-06-16 PROCEDURE — 94761 N-INVAS EAR/PLS OXIMETRY MLT: CPT

## 2025-06-16 RX ADMIN — INSULIN LISPRO 4 UNITS: 100 INJECTION, SOLUTION INTRAVENOUS; SUBCUTANEOUS at 08:18

## 2025-06-16 RX ADMIN — SODIUM CHLORIDE 30 MG/ML INHALATION SOLUTION 4 ML: 30 SOLUTION INHALANT at 20:24

## 2025-06-16 RX ADMIN — HYDROXYZINE HYDROCHLORIDE 25 MG: 25 TABLET, FILM COATED ORAL at 21:14

## 2025-06-16 RX ADMIN — LETROZOLE 2.5 MG: 2.5 TABLET ORAL at 08:20

## 2025-06-16 RX ADMIN — Medication 6 MG: at 21:15

## 2025-06-16 RX ADMIN — IPRATROPIUM BROMIDE 0.5 MG: 0.5 SOLUTION RESPIRATORY (INHALATION) at 15:54

## 2025-06-16 RX ADMIN — SODIUM CHLORIDE, PRESERVATIVE FREE 10 ML: 5 INJECTION INTRAVENOUS at 21:17

## 2025-06-16 RX ADMIN — TEMAZEPAM 15 MG: 15 CAPSULE ORAL at 21:15

## 2025-06-16 RX ADMIN — SODIUM CHLORIDE 30 MG/ML INHALATION SOLUTION 4 ML: 30 SOLUTION INHALANT at 08:38

## 2025-06-16 RX ADMIN — ACETYLCYSTEINE 600 MG: 100 SOLUTION RESPIRATORY (INHALATION) at 08:38

## 2025-06-16 RX ADMIN — SERTRALINE 50 MG: 50 TABLET, FILM COATED ORAL at 08:20

## 2025-06-16 RX ADMIN — NICOTINE POLACRILEX 4 MG: 4 LOZENGE ORAL at 18:39

## 2025-06-16 RX ADMIN — GUAIFENESIN 600 MG: 600 TABLET, MULTILAYER, EXTENDED RELEASE ORAL at 08:20

## 2025-06-16 RX ADMIN — ROPINIROLE HYDROCHLORIDE 0.25 MG: 0.25 TABLET, FILM COATED ORAL at 21:15

## 2025-06-16 RX ADMIN — IPRATROPIUM BROMIDE 0.5 MG: 0.5 SOLUTION RESPIRATORY (INHALATION) at 08:37

## 2025-06-16 RX ADMIN — PANTOPRAZOLE SODIUM 20 MG: 20 TABLET, DELAYED RELEASE ORAL at 08:19

## 2025-06-16 RX ADMIN — DEXAMETHASONE SODIUM PHOSPHATE 6 MG: 4 INJECTION, SOLUTION INTRAMUSCULAR; INTRAVENOUS at 08:19

## 2025-06-16 RX ADMIN — AMLODIPINE BESYLATE 5 MG: 5 TABLET ORAL at 08:19

## 2025-06-16 RX ADMIN — APIXABAN 2.5 MG: 2.5 TABLET, FILM COATED ORAL at 21:15

## 2025-06-16 RX ADMIN — NICOTINE POLACRILEX 4 MG: 4 LOZENGE ORAL at 21:00

## 2025-06-16 RX ADMIN — INSULIN LISPRO 4 UNITS: 100 INJECTION, SOLUTION INTRAVENOUS; SUBCUTANEOUS at 17:42

## 2025-06-16 RX ADMIN — ACETYLCYSTEINE 600 MG: 100 SOLUTION RESPIRATORY (INHALATION) at 20:25

## 2025-06-16 RX ADMIN — INSULIN GLARGINE 5 UNITS: 100 INJECTION, SOLUTION SUBCUTANEOUS at 21:15

## 2025-06-16 RX ADMIN — ACETAMINOPHEN 650 MG: 325 TABLET ORAL at 21:20

## 2025-06-16 RX ADMIN — IPRATROPIUM BROMIDE 0.5 MG: 0.5 SOLUTION RESPIRATORY (INHALATION) at 11:53

## 2025-06-16 RX ADMIN — CARVEDILOL 6.25 MG: 6.25 TABLET, FILM COATED ORAL at 08:20

## 2025-06-16 RX ADMIN — ACETYLCYSTEINE 600 MG: 100 SOLUTION RESPIRATORY (INHALATION) at 15:54

## 2025-06-16 RX ADMIN — NICOTINE POLACRILEX 4 MG: 4 LOZENGE ORAL at 16:34

## 2025-06-16 RX ADMIN — TRAZODONE HYDROCHLORIDE 25 MG: 50 TABLET ORAL at 21:14

## 2025-06-16 RX ADMIN — IPRATROPIUM BROMIDE 0.5 MG: 0.5 SOLUTION RESPIRATORY (INHALATION) at 20:25

## 2025-06-16 RX ADMIN — NICOTINE POLACRILEX 4 MG: 4 LOZENGE ORAL at 09:15

## 2025-06-16 RX ADMIN — LEVALBUTEROL 1.25 MG: 1.25 SOLUTION, CONCENTRATE RESPIRATORY (INHALATION) at 20:25

## 2025-06-16 RX ADMIN — INSULIN LISPRO 12 UNITS: 100 INJECTION, SOLUTION INTRAVENOUS; SUBCUTANEOUS at 21:15

## 2025-06-16 RX ADMIN — BARICITINIB 1 MG: 2 TABLET, FILM COATED ORAL at 09:17

## 2025-06-16 RX ADMIN — GUAIFENESIN 600 MG: 600 TABLET, MULTILAYER, EXTENDED RELEASE ORAL at 21:15

## 2025-06-16 RX ADMIN — SODIUM CHLORIDE 30 MG/ML INHALATION SOLUTION 4 ML: 30 SOLUTION INHALANT at 15:54

## 2025-06-16 RX ADMIN — NICOTINE POLACRILEX 4 MG: 4 LOZENGE ORAL at 12:26

## 2025-06-16 RX ADMIN — APIXABAN 2.5 MG: 2.5 TABLET, FILM COATED ORAL at 08:19

## 2025-06-16 RX ADMIN — SODIUM CHLORIDE, PRESERVATIVE FREE 10 ML: 5 INJECTION INTRAVENOUS at 08:19

## 2025-06-16 RX ADMIN — NICOTINE POLACRILEX 4 MG: 4 LOZENGE ORAL at 13:28

## 2025-06-16 RX ADMIN — CARVEDILOL 6.25 MG: 6.25 TABLET, FILM COATED ORAL at 21:14

## 2025-06-16 ASSESSMENT — PAIN SCALES - GENERAL
PAINLEVEL_OUTOF10: 5
PAINLEVEL_OUTOF10: 0
PAINLEVEL_OUTOF10: 0
PAINLEVEL_OUTOF10: 3
PAINLEVEL_OUTOF10: 3
PAINLEVEL_OUTOF10: 0

## 2025-06-16 ASSESSMENT — PAIN DESCRIPTION - LOCATION: LOCATION: KNEE

## 2025-06-16 ASSESSMENT — PAIN SCALES - WONG BAKER
WONGBAKER_NUMERICALRESPONSE: NO HURT
WONGBAKER_NUMERICALRESPONSE: NO HURT

## 2025-06-16 ASSESSMENT — PAIN DESCRIPTION - DESCRIPTORS: DESCRIPTORS: ACHING;DISCOMFORT

## 2025-06-16 ASSESSMENT — PAIN DESCRIPTION - ORIENTATION: ORIENTATION: RIGHT;LEFT

## 2025-06-16 NOTE — CARE COORDINATION
9:17 AM  Patient continues on 30L Vapotherm.  Referral sent to [facility] and Benja LOZADA CM following.    Electronically signed by Chris Cortes RN, CM on 6/16/2025 at 9:17 AM.  Phone: [phone]  Fax: [phone]

## 2025-06-17 ENCOUNTER — ANESTHESIA (OUTPATIENT)
Dept: ENDOSCOPY | Age: 65
End: 2025-06-17
Payer: COMMERCIAL

## 2025-06-17 ENCOUNTER — ANESTHESIA EVENT (OUTPATIENT)
Dept: ENDOSCOPY | Age: 65
End: 2025-06-17
Payer: COMMERCIAL

## 2025-06-17 LAB
ALBUMIN SERPL-MCNC: 3.1 G/DL (ref 3.4–5)
ANION GAP SERPL CALCULATED.3IONS-SCNC: 9 MMOL/L (ref 3–16)
ANISOCYTOSIS BLD QL SMEAR: ABNORMAL
BASOPHILS # BLD: 0 K/UL (ref 0–0.2)
BASOPHILS NFR BLD: 0 %
BUN SERPL-MCNC: 80 MG/DL (ref 7–20)
CALCIUM SERPL-MCNC: 8.5 MG/DL (ref 8.3–10.6)
CHLORIDE SERPL-SCNC: 107 MMOL/L (ref 99–110)
CO2 SERPL-SCNC: 31 MMOL/L (ref 21–32)
CREAT SERPL-MCNC: 2.3 MG/DL (ref 0.6–1.2)
DEPRECATED RDW RBC AUTO: 16.3 % (ref 12.4–15.4)
EOSINOPHIL # BLD: 0 K/UL (ref 0–0.6)
EOSINOPHIL NFR BLD: 0 %
GFR SERPLBLD CREATININE-BSD FMLA CKD-EPI: 23 ML/MIN/{1.73_M2}
GLUCOSE BLD-MCNC: 109 MG/DL (ref 70–99)
GLUCOSE BLD-MCNC: 117 MG/DL (ref 70–99)
GLUCOSE BLD-MCNC: 138 MG/DL (ref 70–99)
GLUCOSE BLD-MCNC: 207 MG/DL (ref 70–99)
GLUCOSE BLD-MCNC: 320 MG/DL (ref 70–99)
GLUCOSE SERPL-MCNC: 136 MG/DL (ref 70–99)
HCT VFR BLD AUTO: 29 % (ref 36–48)
HGB BLD-MCNC: 9.3 G/DL (ref 12–16)
LYMPHOCYTES # BLD: 1.5 K/UL (ref 1–5.1)
LYMPHOCYTES NFR BLD: 12 %
MAGNESIUM SERPL-MCNC: 2.03 MG/DL (ref 1.8–2.4)
MCH RBC QN AUTO: 30.8 PG (ref 26–34)
MCHC RBC AUTO-ENTMCNC: 32.1 G/DL (ref 31–36)
MCV RBC AUTO: 95.7 FL (ref 80–100)
MONOCYTES # BLD: 0.4 K/UL (ref 0–1.3)
MONOCYTES NFR BLD: 4 %
NEUTROPHILS # BLD: 8.8 K/UL (ref 1.7–7.7)
NEUTROPHILS NFR BLD: 82 %
PERFORMED ON: ABNORMAL
PHOSPHATE SERPL-MCNC: 6.1 MG/DL (ref 2.5–4.9)
PLATELET # BLD AUTO: 219 K/UL (ref 135–450)
PMV BLD AUTO: 7.8 FL (ref 5–10.5)
POTASSIUM SERPL-SCNC: 5 MMOL/L (ref 3.5–5.1)
RBC # BLD AUTO: 3.02 M/UL (ref 4–5.2)
SODIUM SERPL-SCNC: 147 MMOL/L (ref 136–145)
VARIANT LYMPHS NFR BLD MANUAL: 2 % (ref 0–6)
WBC # BLD AUTO: 10.7 K/UL (ref 4–11)

## 2025-06-17 PROCEDURE — 3609027000 HC BRONCHOSCOPY: Performed by: INTERNAL MEDICINE

## 2025-06-17 PROCEDURE — 87206 SMEAR FLUORESCENT/ACID STAI: CPT

## 2025-06-17 PROCEDURE — 87633 RESP VIRUS 12-25 TARGETS: CPT

## 2025-06-17 PROCEDURE — 0BCC8ZZ EXTIRPATION OF MATTER FROM RIGHT UPPER LUNG LOBE, VIA NATURAL OR ARTIFICIAL OPENING ENDOSCOPIC: ICD-10-PCS | Performed by: INTERNAL MEDICINE

## 2025-06-17 PROCEDURE — 31641 BRONCHOSCOPY TREAT BLOCKAGE: CPT | Performed by: INTERNAL MEDICINE

## 2025-06-17 PROCEDURE — 7100000000 HC PACU RECOVERY - FIRST 15 MIN: Performed by: INTERNAL MEDICINE

## 2025-06-17 PROCEDURE — 2700000000 HC OXYGEN THERAPY PER DAY

## 2025-06-17 PROCEDURE — 31635 BRONCHOSCOPY W/FB REMOVAL: CPT | Performed by: INTERNAL MEDICINE

## 2025-06-17 PROCEDURE — 99233 SBSQ HOSP IP/OBS HIGH 50: CPT | Performed by: INTERNAL MEDICINE

## 2025-06-17 PROCEDURE — 94660 CPAP INITIATION&MGMT: CPT

## 2025-06-17 PROCEDURE — 6360000002 HC RX W HCPCS: Performed by: INTERNAL MEDICINE

## 2025-06-17 PROCEDURE — 83735 ASSAY OF MAGNESIUM: CPT

## 2025-06-17 PROCEDURE — 0BDC8ZX EXTRACTION OF RIGHT UPPER LUNG LOBE, VIA NATURAL OR ARTIFICIAL OPENING ENDOSCOPIC, DIAGNOSTIC: ICD-10-PCS | Performed by: INTERNAL MEDICINE

## 2025-06-17 PROCEDURE — 6370000000 HC RX 637 (ALT 250 FOR IP)

## 2025-06-17 PROCEDURE — 2720000010 HC SURG SUPPLY STERILE: Performed by: INTERNAL MEDICINE

## 2025-06-17 PROCEDURE — 2709999900 HC NON-CHARGEABLE SUPPLY: Performed by: INTERNAL MEDICINE

## 2025-06-17 PROCEDURE — 6360000002 HC RX W HCPCS

## 2025-06-17 PROCEDURE — 31645 BRNCHSC W/THER ASPIR 1ST: CPT | Performed by: INTERNAL MEDICINE

## 2025-06-17 PROCEDURE — 87070 CULTURE OTHR SPECIMN AEROBIC: CPT

## 2025-06-17 PROCEDURE — 3700000001 HC ADD 15 MINUTES (ANESTHESIA): Performed by: INTERNAL MEDICINE

## 2025-06-17 PROCEDURE — 87205 SMEAR GRAM STAIN: CPT

## 2025-06-17 PROCEDURE — 2580000003 HC RX 258

## 2025-06-17 PROCEDURE — 87186 SC STD MICRODIL/AGAR DIL: CPT

## 2025-06-17 PROCEDURE — 87102 FUNGUS ISOLATION CULTURE: CPT

## 2025-06-17 PROCEDURE — XW0DXM6 INTRODUCTION OF BARICITINIB INTO MOUTH AND PHARYNX, EXTERNAL APPROACH, NEW TECHNOLOGY GROUP 6: ICD-10-PCS | Performed by: INTERNAL MEDICINE

## 2025-06-17 PROCEDURE — 2500000003 HC RX 250 WO HCPCS

## 2025-06-17 PROCEDURE — 94761 N-INVAS EAR/PLS OXIMETRY MLT: CPT

## 2025-06-17 PROCEDURE — 87106 FUNGI IDENTIFICATION YEAST: CPT

## 2025-06-17 PROCEDURE — 2060000000 HC ICU INTERMEDIATE R&B

## 2025-06-17 PROCEDURE — 88305 TISSUE EXAM BY PATHOLOGIST: CPT

## 2025-06-17 PROCEDURE — 7100000001 HC PACU RECOVERY - ADDTL 15 MIN: Performed by: INTERNAL MEDICINE

## 2025-06-17 PROCEDURE — 3700000000 HC ANESTHESIA ATTENDED CARE: Performed by: INTERNAL MEDICINE

## 2025-06-17 PROCEDURE — 87077 CULTURE AEROBIC IDENTIFY: CPT

## 2025-06-17 PROCEDURE — 94669 MECHANICAL CHEST WALL OSCILL: CPT

## 2025-06-17 PROCEDURE — 87305 ASPERGILLUS AG IA: CPT

## 2025-06-17 PROCEDURE — 85025 COMPLETE CBC W/AUTO DIFF WBC: CPT

## 2025-06-17 PROCEDURE — 94640 AIRWAY INHALATION TREATMENT: CPT

## 2025-06-17 PROCEDURE — 87116 MYCOBACTERIA CULTURE: CPT

## 2025-06-17 PROCEDURE — 80069 RENAL FUNCTION PANEL: CPT

## 2025-06-17 PROCEDURE — 31625 BRONCHOSCOPY W/BIOPSY(S): CPT | Performed by: INTERNAL MEDICINE

## 2025-06-17 RX ORDER — LIDOCAINE HCL/PF 100 MG/5ML
SYRINGE (ML) INJECTION
Status: DISCONTINUED | OUTPATIENT
Start: 2025-06-17 | End: 2025-06-17 | Stop reason: SDUPTHER

## 2025-06-17 RX ORDER — SODIUM CHLORIDE 0.9 % (FLUSH) 0.9 %
5-40 SYRINGE (ML) INJECTION PRN
Status: DISCONTINUED | OUTPATIENT
Start: 2025-06-17 | End: 2025-06-17 | Stop reason: HOSPADM

## 2025-06-17 RX ORDER — SODIUM CHLORIDE, SODIUM LACTATE, POTASSIUM CHLORIDE, CALCIUM CHLORIDE 600; 310; 30; 20 MG/100ML; MG/100ML; MG/100ML; MG/100ML
INJECTION, SOLUTION INTRAVENOUS CONTINUOUS
Status: DISCONTINUED | OUTPATIENT
Start: 2025-06-17 | End: 2025-06-17 | Stop reason: HOSPADM

## 2025-06-17 RX ORDER — SODIUM CHLORIDE, SODIUM LACTATE, POTASSIUM CHLORIDE, CALCIUM CHLORIDE 600; 310; 30; 20 MG/100ML; MG/100ML; MG/100ML; MG/100ML
INJECTION, SOLUTION INTRAVENOUS
Status: DISCONTINUED | OUTPATIENT
Start: 2025-06-17 | End: 2025-06-17 | Stop reason: SDUPTHER

## 2025-06-17 RX ORDER — ONDANSETRON 2 MG/ML
4 INJECTION INTRAMUSCULAR; INTRAVENOUS
Status: DISCONTINUED | OUTPATIENT
Start: 2025-06-17 | End: 2025-06-17 | Stop reason: HOSPADM

## 2025-06-17 RX ORDER — PHENYLEPHRINE HCL IN 0.9% NACL 1 MG/10 ML
SYRINGE (ML) INTRAVENOUS
Status: DISCONTINUED | OUTPATIENT
Start: 2025-06-17 | End: 2025-06-17 | Stop reason: SDUPTHER

## 2025-06-17 RX ORDER — IPRATROPIUM BROMIDE AND ALBUTEROL SULFATE 2.5; .5 MG/3ML; MG/3ML
1 SOLUTION RESPIRATORY (INHALATION)
Status: DISCONTINUED | OUTPATIENT
Start: 2025-06-17 | End: 2025-06-17 | Stop reason: HOSPADM

## 2025-06-17 RX ORDER — SODIUM CHLORIDE 9 MG/ML
INJECTION, SOLUTION INTRAVENOUS PRN
Status: DISCONTINUED | OUTPATIENT
Start: 2025-06-17 | End: 2025-06-17 | Stop reason: HOSPADM

## 2025-06-17 RX ORDER — ONDANSETRON 2 MG/ML
INJECTION INTRAMUSCULAR; INTRAVENOUS
Status: DISCONTINUED | OUTPATIENT
Start: 2025-06-17 | End: 2025-06-17 | Stop reason: SDUPTHER

## 2025-06-17 RX ORDER — ROCURONIUM BROMIDE 10 MG/ML
INJECTION, SOLUTION INTRAVENOUS
Status: DISCONTINUED | OUTPATIENT
Start: 2025-06-17 | End: 2025-06-17 | Stop reason: SDUPTHER

## 2025-06-17 RX ORDER — LIDOCAINE HYDROCHLORIDE 10 MG/ML
1 INJECTION, SOLUTION EPIDURAL; INFILTRATION; INTRACAUDAL; PERINEURAL
Status: DISCONTINUED | OUTPATIENT
Start: 2025-06-17 | End: 2025-06-17 | Stop reason: HOSPADM

## 2025-06-17 RX ORDER — NALOXONE HYDROCHLORIDE 0.4 MG/ML
INJECTION, SOLUTION INTRAMUSCULAR; INTRAVENOUS; SUBCUTANEOUS PRN
Status: DISCONTINUED | OUTPATIENT
Start: 2025-06-17 | End: 2025-06-17 | Stop reason: HOSPADM

## 2025-06-17 RX ORDER — PROPOFOL 10 MG/ML
INJECTION, EMULSION INTRAVENOUS
Status: DISCONTINUED | OUTPATIENT
Start: 2025-06-17 | End: 2025-06-17 | Stop reason: SDUPTHER

## 2025-06-17 RX ORDER — DEXAMETHASONE SODIUM PHOSPHATE 4 MG/ML
INJECTION, SOLUTION INTRA-ARTICULAR; INTRALESIONAL; INTRAMUSCULAR; INTRAVENOUS; SOFT TISSUE
Status: DISCONTINUED | OUTPATIENT
Start: 2025-06-17 | End: 2025-06-17 | Stop reason: SDUPTHER

## 2025-06-17 RX ORDER — SODIUM CHLORIDE 0.9 % (FLUSH) 0.9 %
5-40 SYRINGE (ML) INJECTION EVERY 12 HOURS SCHEDULED
Status: DISCONTINUED | OUTPATIENT
Start: 2025-06-17 | End: 2025-06-17 | Stop reason: HOSPADM

## 2025-06-17 RX ORDER — LEVALBUTEROL 1.25 MG/.5ML
1.25 SOLUTION, CONCENTRATE RESPIRATORY (INHALATION) 3 TIMES DAILY
Status: DISCONTINUED | OUTPATIENT
Start: 2025-06-17 | End: 2025-06-24 | Stop reason: HOSPADM

## 2025-06-17 RX ADMIN — SUGAMMADEX 200 MG: 100 INJECTION, SOLUTION INTRAVENOUS at 12:03

## 2025-06-17 RX ADMIN — LEVALBUTEROL 1.25 MG: 1.25 SOLUTION, CONCENTRATE RESPIRATORY (INHALATION) at 08:09

## 2025-06-17 RX ADMIN — ROCURONIUM BROMIDE 50 MG: 10 INJECTION, SOLUTION INTRAVENOUS at 11:24

## 2025-06-17 RX ADMIN — IPRATROPIUM BROMIDE 0.5 MG: 0.5 SOLUTION RESPIRATORY (INHALATION) at 08:09

## 2025-06-17 RX ADMIN — DEXAMETHASONE SODIUM PHOSPHATE 4 MG: 4 INJECTION INTRA-ARTICULAR; INTRALESIONAL; INTRAMUSCULAR; INTRAVENOUS; SOFT TISSUE at 11:28

## 2025-06-17 RX ADMIN — HYDROXYZINE HYDROCHLORIDE 25 MG: 25 TABLET, FILM COATED ORAL at 20:39

## 2025-06-17 RX ADMIN — PROPOFOL 130 MG: 10 INJECTION, EMULSION INTRAVENOUS at 11:24

## 2025-06-17 RX ADMIN — SODIUM CHLORIDE, PRESERVATIVE FREE 10 ML: 5 INJECTION INTRAVENOUS at 21:38

## 2025-06-17 RX ADMIN — DEXAMETHASONE SODIUM PHOSPHATE 6 MG: 4 INJECTION, SOLUTION INTRAMUSCULAR; INTRAVENOUS at 09:43

## 2025-06-17 RX ADMIN — ONDANSETRON 4 MG: 2 INJECTION INTRAMUSCULAR; INTRAVENOUS at 11:28

## 2025-06-17 RX ADMIN — SODIUM CHLORIDE 30 MG/ML INHALATION SOLUTION 4 ML: 30 SOLUTION INHALANT at 16:08

## 2025-06-17 RX ADMIN — GUAIFENESIN 600 MG: 600 TABLET, MULTILAYER, EXTENDED RELEASE ORAL at 09:43

## 2025-06-17 RX ADMIN — SODIUM CHLORIDE, SODIUM LACTATE, POTASSIUM CHLORIDE, AND CALCIUM CHLORIDE: .6; .31; .03; .02 INJECTION, SOLUTION INTRAVENOUS at 11:14

## 2025-06-17 RX ADMIN — Medication 150 MCG: at 11:34

## 2025-06-17 RX ADMIN — LEVALBUTEROL 1.25 MG: 1.25 SOLUTION, CONCENTRATE RESPIRATORY (INHALATION) at 16:08

## 2025-06-17 RX ADMIN — LETROZOLE 2.5 MG: 2.5 TABLET ORAL at 09:43

## 2025-06-17 RX ADMIN — ACETYLCYSTEINE 600 MG: 100 SOLUTION RESPIRATORY (INHALATION) at 16:08

## 2025-06-17 RX ADMIN — IPRATROPIUM BROMIDE 0.5 MG: 0.5 SOLUTION RESPIRATORY (INHALATION) at 12:36

## 2025-06-17 RX ADMIN — SERTRALINE 50 MG: 50 TABLET, FILM COATED ORAL at 09:43

## 2025-06-17 RX ADMIN — TEMAZEPAM 15 MG: 15 CAPSULE ORAL at 21:37

## 2025-06-17 RX ADMIN — Medication 6 MG: at 20:39

## 2025-06-17 RX ADMIN — INSULIN LISPRO 12 UNITS: 100 INJECTION, SOLUTION INTRAVENOUS; SUBCUTANEOUS at 17:13

## 2025-06-17 RX ADMIN — ACETAMINOPHEN 650 MG: 325 TABLET ORAL at 22:45

## 2025-06-17 RX ADMIN — Medication 100 MG: at 11:24

## 2025-06-17 RX ADMIN — ROPINIROLE HYDROCHLORIDE 0.25 MG: 0.25 TABLET, FILM COATED ORAL at 20:39

## 2025-06-17 RX ADMIN — NICOTINE POLACRILEX 4 MG: 4 LOZENGE ORAL at 13:12

## 2025-06-17 RX ADMIN — BARICITINIB 1 MG: 2 TABLET, FILM COATED ORAL at 10:42

## 2025-06-17 RX ADMIN — NICOTINE POLACRILEX 4 MG: 4 LOZENGE ORAL at 22:14

## 2025-06-17 RX ADMIN — SODIUM CHLORIDE, PRESERVATIVE FREE 10 ML: 5 INJECTION INTRAVENOUS at 09:44

## 2025-06-17 RX ADMIN — LEVALBUTEROL 1.25 MG: 1.25 SOLUTION, CONCENTRATE RESPIRATORY (INHALATION) at 20:05

## 2025-06-17 RX ADMIN — ACETYLCYSTEINE 600 MG: 100 SOLUTION RESPIRATORY (INHALATION) at 08:09

## 2025-06-17 RX ADMIN — Medication 150 MCG: at 11:31

## 2025-06-17 RX ADMIN — INSULIN GLARGINE 5 UNITS: 100 INJECTION, SOLUTION SUBCUTANEOUS at 20:40

## 2025-06-17 RX ADMIN — NICOTINE POLACRILEX 4 MG: 4 LOZENGE ORAL at 18:28

## 2025-06-17 RX ADMIN — TRAZODONE HYDROCHLORIDE 25 MG: 50 TABLET ORAL at 20:39

## 2025-06-17 RX ADMIN — ACETYLCYSTEINE 600 MG: 100 SOLUTION RESPIRATORY (INHALATION) at 20:06

## 2025-06-17 RX ADMIN — PANTOPRAZOLE SODIUM 20 MG: 20 TABLET, DELAYED RELEASE ORAL at 09:43

## 2025-06-17 RX ADMIN — INSULIN LISPRO 4 UNITS: 100 INJECTION, SOLUTION INTRAVENOUS; SUBCUTANEOUS at 20:40

## 2025-06-17 RX ADMIN — IPRATROPIUM BROMIDE 0.5 MG: 0.5 SOLUTION RESPIRATORY (INHALATION) at 20:05

## 2025-06-17 RX ADMIN — IPRATROPIUM BROMIDE 0.5 MG: 0.5 SOLUTION RESPIRATORY (INHALATION) at 16:08

## 2025-06-17 RX ADMIN — PROPOFOL 50 MG: 10 INJECTION, EMULSION INTRAVENOUS at 11:50

## 2025-06-17 RX ADMIN — NICOTINE POLACRILEX 4 MG: 4 LOZENGE ORAL at 17:13

## 2025-06-17 RX ADMIN — GUAIFENESIN 600 MG: 600 TABLET, MULTILAYER, EXTENDED RELEASE ORAL at 21:38

## 2025-06-17 RX ADMIN — NICOTINE POLACRILEX 4 MG: 4 LOZENGE ORAL at 20:37

## 2025-06-17 RX ADMIN — NICOTINE POLACRILEX 4 MG: 4 LOZENGE ORAL at 09:43

## 2025-06-17 ASSESSMENT — PAIN SCALES - GENERAL
PAINLEVEL_OUTOF10: 0
PAINLEVEL_OUTOF10: 5
PAINLEVEL_OUTOF10: 0

## 2025-06-17 ASSESSMENT — PAIN - FUNCTIONAL ASSESSMENT
PAIN_FUNCTIONAL_ASSESSMENT: ACTIVITIES ARE NOT PREVENTED
PAIN_FUNCTIONAL_ASSESSMENT: NONE - DENIES PAIN

## 2025-06-17 ASSESSMENT — PAIN DESCRIPTION - LOCATION: LOCATION: BACK

## 2025-06-17 ASSESSMENT — ENCOUNTER SYMPTOMS: SHORTNESS OF BREATH: 1

## 2025-06-17 ASSESSMENT — PAIN DESCRIPTION - FREQUENCY: FREQUENCY: INTERMITTENT

## 2025-06-17 ASSESSMENT — PAIN DESCRIPTION - DESCRIPTORS: DESCRIPTORS: ACHING;DISCOMFORT

## 2025-06-17 ASSESSMENT — PAIN DESCRIPTION - ONSET: ONSET: GRADUAL

## 2025-06-17 ASSESSMENT — PAIN DESCRIPTION - PAIN TYPE: TYPE: ACUTE PAIN

## 2025-06-17 ASSESSMENT — PAIN DESCRIPTION - ORIENTATION: ORIENTATION: MID

## 2025-06-17 NOTE — ANESTHESIA POSTPROCEDURE EVALUATION
Department of Anesthesiology  Postprocedure Note    Patient: Shoaib Rothman  MRN: [id number]  YOB: 1960  Date of evaluation: 6/17/2025    Procedure Summary       Date: 06/17/25 Room / Location: [place] [address] / [facility]    Anesthesia Start: 1113 Anesthesia Stop: 1229    Procedure: BRONCHOSCOPY DIAGNOSTIC OR CELL WASH ONLY / CRYO BIOPAY Diagnosis:       Acute respiratory failure with hypoxia (HCC)      (Acute respiratory failure with hypoxia (HCC) [J96.01])    Surgeons: Maximo Tyler MD Responsible Provider: Bo Aly MD    Anesthesia Type: general ASA Status: 4            Anesthesia Type: No value filed.    Carole Phase I: Carole Score: 9    Carole Phase II:      Anesthesia Post Evaluation    Patient location during evaluation: PACU  Patient participation: complete - patient participated  Level of consciousness: awake  Pain score: 0  Airway patency: patent  Nausea & Vomiting: no nausea  Cardiovascular status: hemodynamically stable  Respiratory status: acceptable  Hydration status: stable  Pain management: adequate    No notable events documented.

## 2025-06-17 NOTE — CARE COORDINATION
11:29 AM  Cert pending  [id number]    Electronically signed by Chris Cortes RN, CM on 6/17/2025 at 11:30 AM.  Phone: [phone]  Fax: [phone]    9:07 AM  [facility] following; unable to admit with 40L vapo therm.   CM following.     Electronically signed by Chris Cortes RN, CM on 6/17/2025 at 9:10 AM.  Phone: [phone]  Fax: [phone]

## 2025-06-17 NOTE — ANESTHESIA PRE PROCEDURE
Department of Anesthesiology  Preprocedure Note       Name:  Shoaib Rothman   Age:  64 y.o.  :  1960                                          MRN:  [id number]         Date:  2025      Surgeon: Surgeon(s):  Maximo Tyler MD    Procedure: Procedure(s):  BRONCHOSCOPY - possible cryotherapy    Medications prior to admission:   Prior to Admission medications    Medication Sig Start Date End Date Taking? Authorizing Provider   empagliflozin (JARDIANCE) 10 MG tablet Take 1 tablet by mouth daily 5/15/25  Yes Lm Lockett MD   sodium zirconium cyclosilicate (LOKELMA) 10 g PACK oral suspension Take 1 packet by mouth every other day 25  Yes Marisela Bradley MD   carvedilol (COREG) 6.25 MG tablet Take 1 tablet by mouth 2 times daily Hold for BP <100/60 or HR <55   Yes Chago Mckeon MD   predniSONE (DELTASONE) 20 MG tablet Take 1 tablet by mouth daily 4/3/25 9/30/25 Yes Lm Lockett MD   amLODIPine (NORVASC) 10 MG tablet Take 0.5 tablets by mouth daily Hold if Blood pressure less than 110 systolic 4/3/25  Yes Lm Lockett MD   acetaminophen (TYLENOL) 500 MG tablet Take 1 tablet by mouth every 6 hours as needed for Pain  Patient taking differently: Take 1 tablet by mouth 3 times daily 3/27/25  Yes Karley Jasso APRN - CNP   guaiFENesin (MUCINEX) 600 MG extended release tablet Take 1 tablet by mouth 2 times daily 3/27/25  Yes Karley Jasso APRN - CNP   sodium chloride, Inhalant, 3 % nebulizer solution Take 4 mLs by nebulization in the morning, at noon, and at bedtime 3/27/25  Yes Karley Jasso APRN - CNP   spironolactone (ALDACTONE) 25 MG tablet Take 1 tablet by mouth daily 3/28/25  Yes Karley Jasso APRN - CNP   torsemide (DEMADEX) 20 MG tablet Take 1 tablet by mouth daily 3/6/25  Yes Saji Mays MD   apixaban (ELIQUIS) 2.5 MG TABS tablet Take 1 tablet by mouth in the morning and at bedtime   Yes Chago Mckeon MD   Calcium Carbonate-Vit D-Min (CALCIUM 600 +

## 2025-06-17 NOTE — PROCEDURES
Bronchoalveolar lavage would have added anything.  Gross observation of the secretions obtained from the washings did appear to be casts of the small airways, similar to her previous bronchoscopies.    The patient tolerated the procedure well. Recovery will be per endoscopy protocol.    Procedures performed:  Therapeutic aspiration, Foreign body removal with cryo, and endobronchial forceps biopsy    Estimated blood loss:  Minimal    FOLLOW UP:  Cultures, fungal studies, and path sent.    Maximo Tyler MD

## 2025-06-18 ENCOUNTER — APPOINTMENT (OUTPATIENT)
Dept: GENERAL RADIOLOGY | Age: 65
DRG: 137 | End: 2025-06-18
Payer: COMMERCIAL

## 2025-06-18 LAB
ACID FAST STN SPEC QL: NORMAL
ALBUMIN SERPL-MCNC: 3.1 G/DL (ref 3.4–5)
ANION GAP SERPL CALCULATED.3IONS-SCNC: 6 MMOL/L (ref 3–16)
BASOPHILS # BLD: 0 K/UL (ref 0–0.2)
BASOPHILS NFR BLD: 0.3 %
BUN SERPL-MCNC: 78 MG/DL (ref 7–20)
CALCIUM SERPL-MCNC: 8.4 MG/DL (ref 8.3–10.6)
CHLORIDE SERPL-SCNC: 102 MMOL/L (ref 99–110)
CO2 SERPL-SCNC: 32 MMOL/L (ref 21–32)
CREAT SERPL-MCNC: 2.3 MG/DL (ref 0.6–1.2)
DEPRECATED RDW RBC AUTO: 16.3 % (ref 12.4–15.4)
EOSINOPHIL # BLD: 0 K/UL (ref 0–0.6)
EOSINOPHIL NFR BLD: 0.1 %
GFR SERPLBLD CREATININE-BSD FMLA CKD-EPI: 23 ML/MIN/{1.73_M2}
GLUCOSE BLD-MCNC: 131 MG/DL (ref 70–99)
GLUCOSE BLD-MCNC: 139 MG/DL (ref 70–99)
GLUCOSE BLD-MCNC: 200 MG/DL (ref 70–99)
GLUCOSE BLD-MCNC: 268 MG/DL (ref 70–99)
GLUCOSE SERPL-MCNC: 108 MG/DL (ref 70–99)
HCT VFR BLD AUTO: 27.9 % (ref 36–48)
HGB BLD-MCNC: 8.8 G/DL (ref 12–16)
LYMPHOCYTES # BLD: 0.7 K/UL (ref 1–5.1)
LYMPHOCYTES NFR BLD: 5.7 %
MAGNESIUM SERPL-MCNC: 1.9 MG/DL (ref 1.8–2.4)
MCH RBC QN AUTO: 30.4 PG (ref 26–34)
MCHC RBC AUTO-ENTMCNC: 31.7 G/DL (ref 31–36)
MCV RBC AUTO: 95.8 FL (ref 80–100)
MONOCYTES # BLD: 0.5 K/UL (ref 0–1.3)
MONOCYTES NFR BLD: 4 %
NEUTROPHILS # BLD: 11.3 K/UL (ref 1.7–7.7)
NEUTROPHILS NFR BLD: 89.9 %
ORGANISM: ABNORMAL
ORGANISM: ABNORMAL
PERFORMED ON: ABNORMAL
PHOSPHATE SERPL-MCNC: 4.7 MG/DL (ref 2.5–4.9)
PLATELET # BLD AUTO: 225 K/UL (ref 135–450)
PMV BLD AUTO: 8.2 FL (ref 5–10.5)
POTASSIUM SERPL-SCNC: 5.7 MMOL/L (ref 3.5–5.1)
RBC # BLD AUTO: 2.91 M/UL (ref 4–5.2)
REPORT: NORMAL
RESP PATH DNA+RNA PNL L RESP NAA+NON-PRB: ABNORMAL
RESP PATH DNA+RNA PNL L RESP NAA+NON-PRB: ABNORMAL
SODIUM SERPL-SCNC: 140 MMOL/L (ref 136–145)
WBC # BLD AUTO: 12.6 K/UL (ref 4–11)

## 2025-06-18 PROCEDURE — 6360000002 HC RX W HCPCS

## 2025-06-18 PROCEDURE — 99233 SBSQ HOSP IP/OBS HIGH 50: CPT | Performed by: INTERNAL MEDICINE

## 2025-06-18 PROCEDURE — 6370000000 HC RX 637 (ALT 250 FOR IP)

## 2025-06-18 PROCEDURE — 2580000003 HC RX 258

## 2025-06-18 PROCEDURE — 94660 CPAP INITIATION&MGMT: CPT

## 2025-06-18 PROCEDURE — 6360000002 HC RX W HCPCS: Performed by: INTERNAL MEDICINE

## 2025-06-18 PROCEDURE — 2700000000 HC OXYGEN THERAPY PER DAY

## 2025-06-18 PROCEDURE — 83735 ASSAY OF MAGNESIUM: CPT

## 2025-06-18 PROCEDURE — 94761 N-INVAS EAR/PLS OXIMETRY MLT: CPT

## 2025-06-18 PROCEDURE — 94669 MECHANICAL CHEST WALL OSCILL: CPT

## 2025-06-18 PROCEDURE — 80069 RENAL FUNCTION PANEL: CPT

## 2025-06-18 PROCEDURE — 2500000003 HC RX 250 WO HCPCS

## 2025-06-18 PROCEDURE — 85025 COMPLETE CBC W/AUTO DIFF WBC: CPT

## 2025-06-18 PROCEDURE — 2060000000 HC ICU INTERMEDIATE R&B

## 2025-06-18 PROCEDURE — 94640 AIRWAY INHALATION TREATMENT: CPT

## 2025-06-18 PROCEDURE — 71045 X-RAY EXAM CHEST 1 VIEW: CPT

## 2025-06-18 RX ORDER — OXYCODONE HYDROCHLORIDE 5 MG/1
2.5 TABLET ORAL ONCE
Refills: 0 | Status: COMPLETED | OUTPATIENT
Start: 2025-06-18 | End: 2025-06-18

## 2025-06-18 RX ADMIN — ACETYLCYSTEINE 600 MG: 100 SOLUTION RESPIRATORY (INHALATION) at 20:10

## 2025-06-18 RX ADMIN — HYDROXYZINE HYDROCHLORIDE 25 MG: 25 TABLET, FILM COATED ORAL at 21:15

## 2025-06-18 RX ADMIN — SODIUM CHLORIDE 30 MG/ML INHALATION SOLUTION 4 ML: 30 SOLUTION INHALANT at 07:57

## 2025-06-18 RX ADMIN — SODIUM CHLORIDE 30 MG/ML INHALATION SOLUTION 4 ML: 30 SOLUTION INHALANT at 15:25

## 2025-06-18 RX ADMIN — NICOTINE POLACRILEX 4 MG: 4 LOZENGE ORAL at 09:43

## 2025-06-18 RX ADMIN — NICOTINE POLACRILEX 4 MG: 4 LOZENGE ORAL at 01:00

## 2025-06-18 RX ADMIN — SODIUM CHLORIDE 30 MG/ML INHALATION SOLUTION 4 ML: 30 SOLUTION INHALANT at 20:11

## 2025-06-18 RX ADMIN — NICOTINE POLACRILEX 4 MG: 4 LOZENGE ORAL at 15:44

## 2025-06-18 RX ADMIN — BARICITINIB 1 MG: 2 TABLET, FILM COATED ORAL at 12:08

## 2025-06-18 RX ADMIN — NICOTINE POLACRILEX 4 MG: 4 LOZENGE ORAL at 14:22

## 2025-06-18 RX ADMIN — APIXABAN 2.5 MG: 2.5 TABLET, FILM COATED ORAL at 09:43

## 2025-06-18 RX ADMIN — ROPINIROLE HYDROCHLORIDE 0.25 MG: 0.25 TABLET, FILM COATED ORAL at 21:32

## 2025-06-18 RX ADMIN — Medication 6 MG: at 21:15

## 2025-06-18 RX ADMIN — CARVEDILOL 6.25 MG: 6.25 TABLET, FILM COATED ORAL at 21:13

## 2025-06-18 RX ADMIN — GUAIFENESIN 600 MG: 600 TABLET, MULTILAYER, EXTENDED RELEASE ORAL at 21:14

## 2025-06-18 RX ADMIN — PANTOPRAZOLE SODIUM 20 MG: 20 TABLET, DELAYED RELEASE ORAL at 09:46

## 2025-06-18 RX ADMIN — AMLODIPINE BESYLATE 5 MG: 5 TABLET ORAL at 09:44

## 2025-06-18 RX ADMIN — GUAIFENESIN 600 MG: 600 TABLET, MULTILAYER, EXTENDED RELEASE ORAL at 09:44

## 2025-06-18 RX ADMIN — OXYCODONE 2.5 MG: 5 TABLET ORAL at 01:24

## 2025-06-18 RX ADMIN — SODIUM CHLORIDE, PRESERVATIVE FREE 10 ML: 5 INJECTION INTRAVENOUS at 09:51

## 2025-06-18 RX ADMIN — SERTRALINE 50 MG: 50 TABLET, FILM COATED ORAL at 09:44

## 2025-06-18 RX ADMIN — NICOTINE POLACRILEX 4 MG: 4 LOZENGE ORAL at 19:26

## 2025-06-18 RX ADMIN — INSULIN GLARGINE 5 UNITS: 100 INJECTION, SOLUTION SUBCUTANEOUS at 21:15

## 2025-06-18 RX ADMIN — ACETYLCYSTEINE 600 MG: 100 SOLUTION RESPIRATORY (INHALATION) at 07:57

## 2025-06-18 RX ADMIN — IPRATROPIUM BROMIDE 0.5 MG: 0.5 SOLUTION RESPIRATORY (INHALATION) at 12:15

## 2025-06-18 RX ADMIN — LETROZOLE 2.5 MG: 2.5 TABLET ORAL at 09:46

## 2025-06-18 RX ADMIN — MEROPENEM 1000 MG: 1 INJECTION INTRAVENOUS at 22:35

## 2025-06-18 RX ADMIN — LEVALBUTEROL 1.25 MG: 1.25 SOLUTION, CONCENTRATE RESPIRATORY (INHALATION) at 07:57

## 2025-06-18 RX ADMIN — TRAZODONE HYDROCHLORIDE 25 MG: 50 TABLET ORAL at 21:15

## 2025-06-18 RX ADMIN — SODIUM ZIRCONIUM CYCLOSILICATE 10 G: 10 POWDER, FOR SUSPENSION ORAL at 09:43

## 2025-06-18 RX ADMIN — INSULIN LISPRO 8 UNITS: 100 INJECTION, SOLUTION INTRAVENOUS; SUBCUTANEOUS at 17:45

## 2025-06-18 RX ADMIN — DEXAMETHASONE SODIUM PHOSPHATE 6 MG: 4 INJECTION, SOLUTION INTRAMUSCULAR; INTRAVENOUS at 09:44

## 2025-06-18 RX ADMIN — ACETYLCYSTEINE 600 MG: 100 SOLUTION RESPIRATORY (INHALATION) at 15:25

## 2025-06-18 RX ADMIN — SODIUM CHLORIDE, PRESERVATIVE FREE 10 ML: 5 INJECTION INTRAVENOUS at 21:15

## 2025-06-18 RX ADMIN — CARVEDILOL 6.25 MG: 6.25 TABLET, FILM COATED ORAL at 09:44

## 2025-06-18 RX ADMIN — NICOTINE POLACRILEX 4 MG: 4 LOZENGE ORAL at 22:32

## 2025-06-18 RX ADMIN — APIXABAN 2.5 MG: 2.5 TABLET, FILM COATED ORAL at 21:14

## 2025-06-18 RX ADMIN — NICOTINE POLACRILEX 4 MG: 4 LOZENGE ORAL at 20:53

## 2025-06-18 RX ADMIN — VANCOMYCIN HYDROCHLORIDE 2250 MG: 1 INJECTION, POWDER, LYOPHILIZED, FOR SOLUTION INTRAVENOUS at 12:16

## 2025-06-18 RX ADMIN — LEVALBUTEROL 1.25 MG: 1.25 SOLUTION, CONCENTRATE RESPIRATORY (INHALATION) at 15:25

## 2025-06-18 RX ADMIN — NICOTINE POLACRILEX 4 MG: 4 LOZENGE ORAL at 17:40

## 2025-06-18 RX ADMIN — IPRATROPIUM BROMIDE 0.5 MG: 0.5 SOLUTION RESPIRATORY (INHALATION) at 15:25

## 2025-06-18 RX ADMIN — NICOTINE POLACRILEX 4 MG: 4 LOZENGE ORAL at 13:10

## 2025-06-18 RX ADMIN — MEROPENEM 1000 MG: 1 INJECTION INTRAVENOUS at 12:21

## 2025-06-18 RX ADMIN — IPRATROPIUM BROMIDE 0.5 MG: 0.5 SOLUTION RESPIRATORY (INHALATION) at 20:10

## 2025-06-18 RX ADMIN — LEVALBUTEROL 1.25 MG: 1.25 SOLUTION, CONCENTRATE RESPIRATORY (INHALATION) at 20:10

## 2025-06-18 RX ADMIN — TEMAZEPAM 15 MG: 15 CAPSULE ORAL at 21:32

## 2025-06-18 RX ADMIN — IPRATROPIUM BROMIDE 0.5 MG: 0.5 SOLUTION RESPIRATORY (INHALATION) at 07:57

## 2025-06-18 RX ADMIN — INSULIN LISPRO 4 UNITS: 100 INJECTION, SOLUTION INTRAVENOUS; SUBCUTANEOUS at 12:09

## 2025-06-18 ASSESSMENT — PAIN SCALES - GENERAL: PAINLEVEL_OUTOF10: 0

## 2025-06-18 NOTE — CARE COORDINATION
12:35 PM  LTACH approved through 6/24. Unable to admit at 40L at 100%    Electronically signed by Chris Cortes RN, CM on 6/18/2025 at 12:35 PM.  Phone: [phone]  Fax: [phone]

## 2025-06-18 NOTE — CONSULTS
[facility] -  Clinical Pharmacy Note    Vancomycin - Management by Pharmacy    Consult Date(s): 6/18/25  Consulting Provider(s): Dr Chen    Assessment / Plan   Nosocomial PNA- Vancomycin  Concurrent Antimicrobials: Meropenem  Day of Vanc Therapy / Ordered Duration: Day 1 of 7  Current Dosing Method: Intermittent Dosing by Levels  Therapeutic Goal: Trough ~ 15 mg/L  Current Dose / Plan:   Renal funcion: pt with CHANTELLE on CKD4.(FSGS and solitary kidney).  SCrs ranged 2.3-2.7 since admission; 2.3 today.  Baseline appears to be ~ 1.8  UOP 0.9 ml/kg/hr past 24h   Will dose intermittently to start  Load of 2250 mg IV x1 this am.  Placeholder entered  Level tomorrow am to assess clearance and guide further dosing  Will continue to monitor clinical condition and make adjustments to regimen as appropriate.    Thank you for consulting pharmacy,    Please call with any questions    Nhi Wood  Pharm.D. [facility]  [phone] (Main Pharmacy)        Interval update:  therapy initiation     Subjective/Objective:   Shoaib Rothman is a 64 y.o. female with a PMHx significant for severe COPD recently d/c with 5 L supplemental O2 and with recurrent admissions, frequent mucous plugging requiring frequent bronch, breast ca, bladder tumor in remission, DVT/PE, CKD IV, HTN, insomnia, RLS, MDD/PIPPA who presented to the ED on 6/11 with cc SOB. Admitted with acute on chronic hypoxic respiratory failure, multifocal PNA, COVID-19 pneumonia, CHANTELLE on CKD4, and lactic acidosis. Started on baricitinib and dexamethasone per pulm.   BAL done on 6/17 growing MRSA and E Coli . Started 6/18 on meropenem given hx of ESBL, and vancomycin .     Pharmacy is consulted to dose vancomycin.    Ht Readings from Last 1 Encounters:   06/10/25 1.778 m (5' 10\")     Wt Readings from Last 1 Encounters:   06/18/25 117.5 kg (259 lb 0.6 oz)     Current & Prior Antimicrobial Regimen(s):  Meropenem (6/18-current)  Vancomycin - pharmacy to dose as below:    Vancomycin doses

## 2025-06-19 LAB
ALBUMIN SERPL-MCNC: 3 G/DL (ref 3.4–5)
ANION GAP SERPL CALCULATED.3IONS-SCNC: 9 MMOL/L (ref 3–16)
ANISOCYTOSIS BLD QL SMEAR: ABNORMAL
BACTERIA SPEC RESP CULT: ABNORMAL
BACTERIA SPEC RESP CULT: ABNORMAL
BASOPHILS # BLD: 0 K/UL (ref 0–0.2)
BASOPHILS NFR BLD: 0 %
BUN SERPL-MCNC: 81 MG/DL (ref 7–20)
CALCIUM SERPL-MCNC: 8.5 MG/DL (ref 8.3–10.6)
CHLORIDE SERPL-SCNC: 104 MMOL/L (ref 99–110)
CO2 SERPL-SCNC: 28 MMOL/L (ref 21–32)
CREAT SERPL-MCNC: 2.2 MG/DL (ref 0.6–1.2)
DEPRECATED RDW RBC AUTO: 16.5 % (ref 12.4–15.4)
EOSINOPHIL # BLD: 0 K/UL (ref 0–0.6)
EOSINOPHIL NFR BLD: 0 %
FUNGUS SPEC CULT: ABNORMAL
GFR SERPLBLD CREATININE-BSD FMLA CKD-EPI: 25 ML/MIN/{1.73_M2}
GLUCOSE BLD-MCNC: 175 MG/DL (ref 70–99)
GLUCOSE BLD-MCNC: 179 MG/DL (ref 70–99)
GLUCOSE BLD-MCNC: 182 MG/DL (ref 70–99)
GLUCOSE BLD-MCNC: 238 MG/DL (ref 70–99)
GLUCOSE SERPL-MCNC: 120 MG/DL (ref 70–99)
GRAM STN SPEC: ABNORMAL
HCT VFR BLD AUTO: 28.4 % (ref 36–48)
HGB BLD-MCNC: 8.9 G/DL (ref 12–16)
LOEFFLER MB STN SPEC: ABNORMAL
LYMPHOCYTES # BLD: 1.1 K/UL (ref 1–5.1)
LYMPHOCYTES NFR BLD: 10 %
MAGNESIUM SERPL-MCNC: 2.16 MG/DL (ref 1.8–2.4)
MCH RBC QN AUTO: 30 PG (ref 26–34)
MCHC RBC AUTO-ENTMCNC: 31.3 G/DL (ref 31–36)
MCV RBC AUTO: 95.9 FL (ref 80–100)
METAMYELOCYTES NFR BLD MANUAL: 2 %
MONOCYTES # BLD: 0.2 K/UL (ref 0–1.3)
MONOCYTES NFR BLD: 2 %
NEUTROPHILS # BLD: 9.9 K/UL (ref 1.7–7.7)
NEUTROPHILS NFR BLD: 86 %
NRBC BLD-RTO: 1 /100 WBC
ORGANISM: ABNORMAL
ORGANISM: ABNORMAL
PERFORMED ON: ABNORMAL
PHOSPHATE SERPL-MCNC: 5.7 MG/DL (ref 2.5–4.9)
PLATELET # BLD AUTO: 237 K/UL (ref 135–450)
PMV BLD AUTO: 8 FL (ref 5–10.5)
POTASSIUM SERPL-SCNC: 5.4 MMOL/L (ref 3.5–5.1)
RBC # BLD AUTO: 2.97 M/UL (ref 4–5.2)
SODIUM SERPL-SCNC: 141 MMOL/L (ref 136–145)
VANCOMYCIN SERPL-MCNC: 17.3 UG/ML
WBC # BLD AUTO: 11.2 K/UL (ref 4–11)

## 2025-06-19 PROCEDURE — 99233 SBSQ HOSP IP/OBS HIGH 50: CPT | Performed by: INTERNAL MEDICINE

## 2025-06-19 PROCEDURE — 80202 ASSAY OF VANCOMYCIN: CPT

## 2025-06-19 PROCEDURE — 6370000000 HC RX 637 (ALT 250 FOR IP): Performed by: INTERNAL MEDICINE

## 2025-06-19 PROCEDURE — 6360000002 HC RX W HCPCS

## 2025-06-19 PROCEDURE — 2500000003 HC RX 250 WO HCPCS

## 2025-06-19 PROCEDURE — 6370000000 HC RX 637 (ALT 250 FOR IP)

## 2025-06-19 PROCEDURE — 94640 AIRWAY INHALATION TREATMENT: CPT

## 2025-06-19 PROCEDURE — 94669 MECHANICAL CHEST WALL OSCILL: CPT

## 2025-06-19 PROCEDURE — 2580000003 HC RX 258

## 2025-06-19 PROCEDURE — 94761 N-INVAS EAR/PLS OXIMETRY MLT: CPT

## 2025-06-19 PROCEDURE — 2060000000 HC ICU INTERMEDIATE R&B

## 2025-06-19 PROCEDURE — 2700000000 HC OXYGEN THERAPY PER DAY

## 2025-06-19 PROCEDURE — 83735 ASSAY OF MAGNESIUM: CPT

## 2025-06-19 PROCEDURE — 6360000002 HC RX W HCPCS: Performed by: INTERNAL MEDICINE

## 2025-06-19 PROCEDURE — 80069 RENAL FUNCTION PANEL: CPT

## 2025-06-19 PROCEDURE — 85025 COMPLETE CBC W/AUTO DIFF WBC: CPT

## 2025-06-19 RX ADMIN — INSULIN GLARGINE 5 UNITS: 100 INJECTION, SOLUTION SUBCUTANEOUS at 20:57

## 2025-06-19 RX ADMIN — DEXAMETHASONE SODIUM PHOSPHATE 6 MG: 4 INJECTION, SOLUTION INTRAMUSCULAR; INTRAVENOUS at 09:48

## 2025-06-19 RX ADMIN — SODIUM CHLORIDE, PRESERVATIVE FREE 10 ML: 5 INJECTION INTRAVENOUS at 20:57

## 2025-06-19 RX ADMIN — IPRATROPIUM BROMIDE 0.5 MG: 0.5 SOLUTION RESPIRATORY (INHALATION) at 20:23

## 2025-06-19 RX ADMIN — APIXABAN 2.5 MG: 2.5 TABLET, FILM COATED ORAL at 09:48

## 2025-06-19 RX ADMIN — AMLODIPINE BESYLATE 5 MG: 5 TABLET ORAL at 09:48

## 2025-06-19 RX ADMIN — NICOTINE POLACRILEX 4 MG: 4 LOZENGE ORAL at 19:48

## 2025-06-19 RX ADMIN — IPRATROPIUM BROMIDE 0.5 MG: 0.5 SOLUTION RESPIRATORY (INHALATION) at 15:41

## 2025-06-19 RX ADMIN — PANTOPRAZOLE SODIUM 20 MG: 20 TABLET, DELAYED RELEASE ORAL at 09:49

## 2025-06-19 RX ADMIN — ACETYLCYSTEINE 600 MG: 100 SOLUTION RESPIRATORY (INHALATION) at 15:44

## 2025-06-19 RX ADMIN — ACETYLCYSTEINE 600 MG: 100 SOLUTION RESPIRATORY (INHALATION) at 10:18

## 2025-06-19 RX ADMIN — SERTRALINE 50 MG: 50 TABLET, FILM COATED ORAL at 09:48

## 2025-06-19 RX ADMIN — ROPINIROLE HYDROCHLORIDE 0.25 MG: 0.25 TABLET, FILM COATED ORAL at 20:57

## 2025-06-19 RX ADMIN — WATER 2000 MG: 1 INJECTION INTRAMUSCULAR; INTRAVENOUS; SUBCUTANEOUS at 09:47

## 2025-06-19 RX ADMIN — TEMAZEPAM 15 MG: 15 CAPSULE ORAL at 22:27

## 2025-06-19 RX ADMIN — SODIUM CHLORIDE 30 MG/ML INHALATION SOLUTION 4 ML: 30 SOLUTION INHALANT at 20:24

## 2025-06-19 RX ADMIN — NICOTINE POLACRILEX 4 MG: 4 LOZENGE ORAL at 17:17

## 2025-06-19 RX ADMIN — GUAIFENESIN 600 MG: 600 TABLET, MULTILAYER, EXTENDED RELEASE ORAL at 20:57

## 2025-06-19 RX ADMIN — CARVEDILOL 6.25 MG: 6.25 TABLET, FILM COATED ORAL at 20:56

## 2025-06-19 RX ADMIN — NICOTINE POLACRILEX 4 MG: 4 LOZENGE ORAL at 22:27

## 2025-06-19 RX ADMIN — NICOTINE POLACRILEX 4 MG: 4 LOZENGE ORAL at 20:49

## 2025-06-19 RX ADMIN — LETROZOLE 2.5 MG: 2.5 TABLET ORAL at 09:49

## 2025-06-19 RX ADMIN — HYDROXYZINE HYDROCHLORIDE 25 MG: 25 TABLET, FILM COATED ORAL at 20:56

## 2025-06-19 RX ADMIN — INSULIN LISPRO 4 UNITS: 100 INJECTION, SOLUTION INTRAVENOUS; SUBCUTANEOUS at 16:48

## 2025-06-19 RX ADMIN — Medication 6 MG: at 20:57

## 2025-06-19 RX ADMIN — GUAIFENESIN 600 MG: 600 TABLET, MULTILAYER, EXTENDED RELEASE ORAL at 09:48

## 2025-06-19 RX ADMIN — SODIUM ZIRCONIUM CYCLOSILICATE 10 G: 10 POWDER, FOR SUSPENSION ORAL at 09:47

## 2025-06-19 RX ADMIN — ACETYLCYSTEINE 600 MG: 100 SOLUTION RESPIRATORY (INHALATION) at 20:24

## 2025-06-19 RX ADMIN — CARVEDILOL 6.25 MG: 6.25 TABLET, FILM COATED ORAL at 09:48

## 2025-06-19 RX ADMIN — IPRATROPIUM BROMIDE 0.5 MG: 0.5 SOLUTION RESPIRATORY (INHALATION) at 10:18

## 2025-06-19 RX ADMIN — LEVALBUTEROL 1.25 MG: 1.25 SOLUTION, CONCENTRATE RESPIRATORY (INHALATION) at 20:23

## 2025-06-19 RX ADMIN — NICOTINE POLACRILEX 4 MG: 4 LOZENGE ORAL at 00:47

## 2025-06-19 RX ADMIN — APIXABAN 2.5 MG: 2.5 TABLET, FILM COATED ORAL at 20:56

## 2025-06-19 RX ADMIN — BARICITINIB 1 MG: 2 TABLET, FILM COATED ORAL at 10:02

## 2025-06-19 RX ADMIN — SODIUM CHLORIDE, PRESERVATIVE FREE 10 ML: 5 INJECTION INTRAVENOUS at 09:49

## 2025-06-19 RX ADMIN — SODIUM CHLORIDE 30 MG/ML INHALATION SOLUTION 4 ML: 30 SOLUTION INHALANT at 10:19

## 2025-06-19 RX ADMIN — LEVALBUTEROL 1.25 MG: 1.25 SOLUTION, CONCENTRATE RESPIRATORY (INHALATION) at 15:41

## 2025-06-19 RX ADMIN — LEVALBUTEROL 1.25 MG: 1.25 SOLUTION, CONCENTRATE RESPIRATORY (INHALATION) at 10:18

## 2025-06-19 RX ADMIN — SODIUM CHLORIDE 30 MG/ML INHALATION SOLUTION 4 ML: 30 SOLUTION INHALANT at 15:44

## 2025-06-19 RX ADMIN — VANCOMYCIN HYDROCHLORIDE 750 MG: 750 INJECTION, POWDER, LYOPHILIZED, FOR SOLUTION INTRAVENOUS at 16:48

## 2025-06-19 RX ADMIN — TRAZODONE HYDROCHLORIDE 25 MG: 50 TABLET ORAL at 20:56

## 2025-06-19 RX ADMIN — NICOTINE POLACRILEX 4 MG: 4 LOZENGE ORAL at 12:00

## 2025-06-19 RX ADMIN — INSULIN LISPRO 4 UNITS: 100 INJECTION, SOLUTION INTRAVENOUS; SUBCUTANEOUS at 13:19

## 2025-06-19 ASSESSMENT — PAIN SCALES - GENERAL: PAINLEVEL_OUTOF10: 0

## 2025-06-19 NOTE — CONSULTS
[facility] -  Clinical Pharmacy Note    Vancomycin - Management by Pharmacy    Consult Date(s): 6/18/25  Consulting Provider(s): Dr Chen    Assessment / Plan   Nosocomial MRSA/ E Coli PNA- Vancomycin  Concurrent Antimicrobials: Meropenem  Day of Vanc Therapy / Ordered Duration: Day 2 of 7  Current Dosing Method: Intermittent Dosing by Levels  Therapeutic Goal: Trough ~ 15 mg/L  Current Dose / Plan:   Renal funcion: pt with CHANTELLE on CKD4.(FSGS and solitary kidney).  SCrs ranged 2.3-2.7 since admission; 2.2 today.  Baseline appears to be ~ 1.8  UOP 0.5 ml/kg/hr past 24h with one undocumented occurrence  Continue intermittent dosing for now  Level this am = 17.3 (drawn ~16 h after load of 2250 mg yesterday)  Re-dose this afternoon with 750 mg IV x1  Level tomorrow am to assess clearance and guide further dosing  Will continue to monitor clinical condition and make adjustments to regimen as appropriate.    Thank you for consulting pharmacy,  Please call with any questions    Nhi Wood  Pharm.D. [facility]  [phone] (Main Pharmacy)        Interval update:  BAL culture growing E Coli- sensitivities pending, PNA panel detected EColi and MRSA.. O2 requirements decreased 30 ? 24L today. Pt afebrile and HDS.  Remains on dexamethasone (day 9/10) and baricitinib (day 9/14) for COVID-19.      Subjective/Objective:   Shoaib Rothman is a 64 y.o. female with a PMHx significant for severe COPD recently d/c with 5 L supplemental O2 and with recurrent admissions, frequent mucous plugging requiring frequent bronch, breast ca, bladder tumor in remission, DVT/PE, CKD IV, HTN, insomnia, RLS, MDD/PIPPA who presented to the ED on 6/11 with cc SOB. Admitted with acute on chronic hypoxic respiratory failure, multifocal PNA, COVID-19 pneumonia, CHANTELLE on CKD4, and lactic acidosis. Started on baricitinib and dexamethasone per pulm.   BAL done on 6/17 growing MRSA and E Coli . Started 6/18 on meropenem given hx of ESBL, and vancomycin .

## 2025-06-20 LAB
ALBUMIN SERPL-MCNC: 3.1 G/DL (ref 3.4–5)
ANION GAP SERPL CALCULATED.3IONS-SCNC: 9 MMOL/L (ref 3–16)
ANISOCYTOSIS BLD QL SMEAR: ABNORMAL
ASPERGILLUS GALACTO AG: NEGATIVE
BASOPHILS # BLD: 0 K/UL (ref 0–0.2)
BASOPHILS NFR BLD: 0 %
BUN SERPL-MCNC: 74 MG/DL (ref 7–20)
CALCIUM SERPL-MCNC: 8.4 MG/DL (ref 8.3–10.6)
CHLORIDE SERPL-SCNC: 105 MMOL/L (ref 99–110)
CO2 SERPL-SCNC: 28 MMOL/L (ref 21–32)
CREAT SERPL-MCNC: 1.9 MG/DL (ref 0.6–1.2)
DEPRECATED RDW RBC AUTO: 16.1 % (ref 12.4–15.4)
EOSINOPHIL # BLD: 0 K/UL (ref 0–0.6)
EOSINOPHIL NFR BLD: 0 %
GALACTOMANNAN AG SERPL IA-ACNC: 0.13
GFR SERPLBLD CREATININE-BSD FMLA CKD-EPI: 29 ML/MIN/{1.73_M2}
GLUCOSE BLD-MCNC: 131 MG/DL (ref 70–99)
GLUCOSE BLD-MCNC: 138 MG/DL (ref 70–99)
GLUCOSE BLD-MCNC: 150 MG/DL (ref 70–99)
GLUCOSE BLD-MCNC: 212 MG/DL (ref 70–99)
GLUCOSE SERPL-MCNC: 126 MG/DL (ref 70–99)
HCT VFR BLD AUTO: 28.1 % (ref 36–48)
HGB BLD-MCNC: 9.1 G/DL (ref 12–16)
LYMPHOCYTES # BLD: 0.8 K/UL (ref 1–5.1)
LYMPHOCYTES NFR BLD: 9 %
MAGNESIUM SERPL-MCNC: 1.97 MG/DL (ref 1.8–2.4)
MCH RBC QN AUTO: 31 PG (ref 26–34)
MCHC RBC AUTO-ENTMCNC: 32.2 G/DL (ref 31–36)
MCV RBC AUTO: 96 FL (ref 80–100)
METAMYELOCYTES NFR BLD MANUAL: 1 %
MONOCYTES # BLD: 0.3 K/UL (ref 0–1.3)
MONOCYTES NFR BLD: 3 %
MYELOCYTES NFR BLD MANUAL: 3 %
NEUTROPHILS # BLD: 7.8 K/UL (ref 1.7–7.7)
NEUTROPHILS NFR BLD: 84 %
OVALOCYTES BLD QL SMEAR: ABNORMAL
PERFORMED ON: ABNORMAL
PHOSPHATE SERPL-MCNC: 5 MG/DL (ref 2.5–4.9)
PLATELET # BLD AUTO: 254 K/UL (ref 135–450)
PMV BLD AUTO: 7.8 FL (ref 5–10.5)
POIKILOCYTOSIS BLD QL SMEAR: ABNORMAL
POLYCHROMASIA BLD QL SMEAR: ABNORMAL
POTASSIUM SERPL-SCNC: 5.2 MMOL/L (ref 3.5–5.1)
RBC # BLD AUTO: 2.93 M/UL (ref 4–5.2)
RBC MORPH BLD: NORMAL
SODIUM SERPL-SCNC: 142 MMOL/L (ref 136–145)
VANCOMYCIN SERPL-MCNC: 23.2 UG/ML
WBC # BLD AUTO: 8.9 K/UL (ref 4–11)

## 2025-06-20 PROCEDURE — 99233 SBSQ HOSP IP/OBS HIGH 50: CPT | Performed by: INTERNAL MEDICINE

## 2025-06-20 PROCEDURE — 2700000000 HC OXYGEN THERAPY PER DAY

## 2025-06-20 PROCEDURE — 6360000002 HC RX W HCPCS

## 2025-06-20 PROCEDURE — 6370000000 HC RX 637 (ALT 250 FOR IP)

## 2025-06-20 PROCEDURE — 85025 COMPLETE CBC W/AUTO DIFF WBC: CPT

## 2025-06-20 PROCEDURE — 6370000000 HC RX 637 (ALT 250 FOR IP): Performed by: INTERNAL MEDICINE

## 2025-06-20 PROCEDURE — 83735 ASSAY OF MAGNESIUM: CPT

## 2025-06-20 PROCEDURE — 2500000003 HC RX 250 WO HCPCS

## 2025-06-20 PROCEDURE — 94761 N-INVAS EAR/PLS OXIMETRY MLT: CPT

## 2025-06-20 PROCEDURE — 94669 MECHANICAL CHEST WALL OSCILL: CPT

## 2025-06-20 PROCEDURE — 2580000003 HC RX 258

## 2025-06-20 PROCEDURE — 94660 CPAP INITIATION&MGMT: CPT

## 2025-06-20 PROCEDURE — 6360000002 HC RX W HCPCS: Performed by: INTERNAL MEDICINE

## 2025-06-20 PROCEDURE — 80069 RENAL FUNCTION PANEL: CPT

## 2025-06-20 PROCEDURE — 94640 AIRWAY INHALATION TREATMENT: CPT

## 2025-06-20 PROCEDURE — 80202 ASSAY OF VANCOMYCIN: CPT

## 2025-06-20 PROCEDURE — 2060000000 HC ICU INTERMEDIATE R&B

## 2025-06-20 RX ORDER — PREDNISONE 20 MG/1
40 TABLET ORAL DAILY
Status: COMPLETED | OUTPATIENT
Start: 2025-06-21 | End: 2025-06-23

## 2025-06-20 RX ORDER — PREDNISONE 10 MG/1
10 TABLET ORAL DAILY
Status: DISCONTINUED | OUTPATIENT
Start: 2025-06-27 | End: 2025-06-24 | Stop reason: HOSPADM

## 2025-06-20 RX ORDER — PREDNISONE 5 MG/1
5 TABLET ORAL DAILY
Status: DISCONTINUED | OUTPATIENT
Start: 2025-06-30 | End: 2025-06-24 | Stop reason: HOSPADM

## 2025-06-20 RX ORDER — PREDNISONE 20 MG/1
20 TABLET ORAL DAILY
Status: DISCONTINUED | OUTPATIENT
Start: 2025-06-24 | End: 2025-06-24 | Stop reason: HOSPADM

## 2025-06-20 RX ADMIN — INSULIN GLARGINE 5 UNITS: 100 INJECTION, SOLUTION SUBCUTANEOUS at 21:39

## 2025-06-20 RX ADMIN — ACETYLCYSTEINE 600 MG: 100 SOLUTION RESPIRATORY (INHALATION) at 07:59

## 2025-06-20 RX ADMIN — CARVEDILOL 6.25 MG: 6.25 TABLET, FILM COATED ORAL at 21:40

## 2025-06-20 RX ADMIN — SERTRALINE 50 MG: 50 TABLET, FILM COATED ORAL at 09:03

## 2025-06-20 RX ADMIN — HYDROXYZINE HYDROCHLORIDE 25 MG: 25 TABLET, FILM COATED ORAL at 21:39

## 2025-06-20 RX ADMIN — IPRATROPIUM BROMIDE 0.5 MG: 0.5 SOLUTION RESPIRATORY (INHALATION) at 07:58

## 2025-06-20 RX ADMIN — NICOTINE POLACRILEX 4 MG: 4 LOZENGE ORAL at 21:42

## 2025-06-20 RX ADMIN — LEVALBUTEROL 1.25 MG: 1.25 SOLUTION, CONCENTRATE RESPIRATORY (INHALATION) at 15:42

## 2025-06-20 RX ADMIN — NICOTINE POLACRILEX 4 MG: 4 LOZENGE ORAL at 09:18

## 2025-06-20 RX ADMIN — IPRATROPIUM BROMIDE 0.5 MG: 0.5 SOLUTION RESPIRATORY (INHALATION) at 19:55

## 2025-06-20 RX ADMIN — CARVEDILOL 6.25 MG: 6.25 TABLET, FILM COATED ORAL at 09:02

## 2025-06-20 RX ADMIN — ROPINIROLE HYDROCHLORIDE 0.25 MG: 0.25 TABLET, FILM COATED ORAL at 21:40

## 2025-06-20 RX ADMIN — NICOTINE POLACRILEX 4 MG: 4 LOZENGE ORAL at 13:25

## 2025-06-20 RX ADMIN — TRAZODONE HYDROCHLORIDE 25 MG: 50 TABLET ORAL at 21:40

## 2025-06-20 RX ADMIN — SODIUM CHLORIDE, PRESERVATIVE FREE 10 ML: 5 INJECTION INTRAVENOUS at 21:43

## 2025-06-20 RX ADMIN — SODIUM CHLORIDE 30 MG/ML INHALATION SOLUTION 4 ML: 30 SOLUTION INHALANT at 19:56

## 2025-06-20 RX ADMIN — ACETYLCYSTEINE 600 MG: 100 SOLUTION RESPIRATORY (INHALATION) at 15:42

## 2025-06-20 RX ADMIN — SODIUM CHLORIDE 30 MG/ML INHALATION SOLUTION 4 ML: 30 SOLUTION INHALANT at 15:42

## 2025-06-20 RX ADMIN — APIXABAN 2.5 MG: 2.5 TABLET, FILM COATED ORAL at 09:03

## 2025-06-20 RX ADMIN — NICOTINE POLACRILEX 4 MG: 4 LOZENGE ORAL at 17:47

## 2025-06-20 RX ADMIN — PANTOPRAZOLE SODIUM 20 MG: 20 TABLET, DELAYED RELEASE ORAL at 09:02

## 2025-06-20 RX ADMIN — SODIUM CHLORIDE, PRESERVATIVE FREE 10 ML: 5 INJECTION INTRAVENOUS at 09:12

## 2025-06-20 RX ADMIN — WATER 2000 MG: 1 INJECTION INTRAMUSCULAR; INTRAVENOUS; SUBCUTANEOUS at 09:27

## 2025-06-20 RX ADMIN — NICOTINE POLACRILEX 4 MG: 4 LOZENGE ORAL at 08:18

## 2025-06-20 RX ADMIN — NICOTINE POLACRILEX 4 MG: 4 LOZENGE ORAL at 19:19

## 2025-06-20 RX ADMIN — NICOTINE POLACRILEX 4 MG: 4 LOZENGE ORAL at 15:34

## 2025-06-20 RX ADMIN — NICOTINE POLACRILEX 4 MG: 4 LOZENGE ORAL at 23:42

## 2025-06-20 RX ADMIN — Medication 6 MG: at 21:40

## 2025-06-20 RX ADMIN — BARICITINIB 1 MG: 2 TABLET, FILM COATED ORAL at 09:49

## 2025-06-20 RX ADMIN — ACETYLCYSTEINE 600 MG: 100 SOLUTION RESPIRATORY (INHALATION) at 19:56

## 2025-06-20 RX ADMIN — NICOTINE POLACRILEX 4 MG: 4 LOZENGE ORAL at 00:11

## 2025-06-20 RX ADMIN — GUAIFENESIN 600 MG: 600 TABLET, MULTILAYER, EXTENDED RELEASE ORAL at 21:39

## 2025-06-20 RX ADMIN — VANCOMYCIN HYDROCHLORIDE 500 MG: 500 INJECTION, POWDER, LYOPHILIZED, FOR SOLUTION INTRAVENOUS at 16:55

## 2025-06-20 RX ADMIN — LEVALBUTEROL 1.25 MG: 1.25 SOLUTION, CONCENTRATE RESPIRATORY (INHALATION) at 19:55

## 2025-06-20 RX ADMIN — INSULIN LISPRO 4 UNITS: 100 INJECTION, SOLUTION INTRAVENOUS; SUBCUTANEOUS at 17:04

## 2025-06-20 RX ADMIN — APIXABAN 2.5 MG: 2.5 TABLET, FILM COATED ORAL at 21:39

## 2025-06-20 RX ADMIN — GUAIFENESIN 600 MG: 600 TABLET, MULTILAYER, EXTENDED RELEASE ORAL at 09:03

## 2025-06-20 RX ADMIN — DEXAMETHASONE SODIUM PHOSPHATE 6 MG: 4 INJECTION, SOLUTION INTRAMUSCULAR; INTRAVENOUS at 09:05

## 2025-06-20 RX ADMIN — LEVALBUTEROL 1.25 MG: 1.25 SOLUTION, CONCENTRATE RESPIRATORY (INHALATION) at 07:58

## 2025-06-20 RX ADMIN — NICOTINE POLACRILEX 4 MG: 4 LOZENGE ORAL at 12:08

## 2025-06-20 RX ADMIN — NICOTINE POLACRILEX 4 MG: 4 LOZENGE ORAL at 20:38

## 2025-06-20 RX ADMIN — LETROZOLE 2.5 MG: 2.5 TABLET ORAL at 09:03

## 2025-06-20 RX ADMIN — SODIUM ZIRCONIUM CYCLOSILICATE 10 G: 10 POWDER, FOR SUSPENSION ORAL at 09:12

## 2025-06-20 RX ADMIN — SODIUM CHLORIDE 30 MG/ML INHALATION SOLUTION 4 ML: 30 SOLUTION INHALANT at 07:59

## 2025-06-20 RX ADMIN — AMLODIPINE BESYLATE 5 MG: 5 TABLET ORAL at 09:03

## 2025-06-20 RX ADMIN — TEMAZEPAM 15 MG: 15 CAPSULE ORAL at 21:40

## 2025-06-20 RX ADMIN — IPRATROPIUM BROMIDE 0.5 MG: 0.5 SOLUTION RESPIRATORY (INHALATION) at 15:42

## 2025-06-20 ASSESSMENT — PAIN SCALES - GENERAL
PAINLEVEL_OUTOF10: 0

## 2025-06-21 LAB
ALBUMIN SERPL-MCNC: 1.7 G/DL (ref 3.4–5)
ALBUMIN SERPL-MCNC: 3.2 G/DL (ref 3.4–5)
ANION GAP SERPL CALCULATED.3IONS-SCNC: 13 MMOL/L (ref 3–16)
ANION GAP SERPL CALCULATED.3IONS-SCNC: 8 MMOL/L (ref 3–16)
ANISOCYTOSIS BLD QL SMEAR: ABNORMAL
BASOPHILS # BLD: 0 K/UL (ref 0–0.2)
BASOPHILS # BLD: 0 K/UL (ref 0–0.2)
BASOPHILS NFR BLD: 0 %
BASOPHILS NFR BLD: 0.1 %
BUN SERPL-MCNC: 45 MG/DL (ref 7–20)
BUN SERPL-MCNC: 69 MG/DL (ref 7–20)
CALCIUM SERPL-MCNC: 4.7 MG/DL (ref 8.3–10.6)
CALCIUM SERPL-MCNC: 8.4 MG/DL (ref 8.3–10.6)
CHLORIDE SERPL-SCNC: 103 MMOL/L (ref 99–110)
CHLORIDE SERPL-SCNC: 121 MMOL/L (ref 99–110)
CO2 SERPL-SCNC: 16 MMOL/L (ref 21–32)
CO2 SERPL-SCNC: 24 MMOL/L (ref 21–32)
CREAT SERPL-MCNC: 1 MG/DL (ref 0.6–1.2)
CREAT SERPL-MCNC: 1.8 MG/DL (ref 0.6–1.2)
DEPRECATED RDW RBC AUTO: 16.4 % (ref 12.4–15.4)
DEPRECATED RDW RBC AUTO: 16.5 % (ref 12.4–15.4)
EOSINOPHIL # BLD: 0 K/UL (ref 0–0.6)
EOSINOPHIL # BLD: 0 K/UL (ref 0–0.6)
EOSINOPHIL NFR BLD: 0 %
EOSINOPHIL NFR BLD: 0.4 %
GFR SERPLBLD CREATININE-BSD FMLA CKD-EPI: 31 ML/MIN/{1.73_M2}
GFR SERPLBLD CREATININE-BSD FMLA CKD-EPI: 63 ML/MIN/{1.73_M2}
GLUCOSE BLD-MCNC: 111 MG/DL (ref 70–99)
GLUCOSE BLD-MCNC: 146 MG/DL (ref 70–99)
GLUCOSE BLD-MCNC: 171 MG/DL (ref 70–99)
GLUCOSE BLD-MCNC: 254 MG/DL (ref 70–99)
GLUCOSE BLD-MCNC: 283 MG/DL (ref 70–99)
GLUCOSE SERPL-MCNC: 145 MG/DL (ref 70–99)
GLUCOSE SERPL-MCNC: 73 MG/DL (ref 70–99)
HCT VFR BLD AUTO: 21.5 % (ref 36–48)
HCT VFR BLD AUTO: 29.2 % (ref 36–48)
HGB BLD-MCNC: 6.9 G/DL (ref 12–16)
HGB BLD-MCNC: 9.2 G/DL (ref 12–16)
LYMPHOCYTES # BLD: 0.7 K/UL (ref 1–5.1)
LYMPHOCYTES # BLD: 1 K/UL (ref 1–5.1)
LYMPHOCYTES NFR BLD: 11 %
LYMPHOCYTES NFR BLD: 9.9 %
MAGNESIUM SERPL-MCNC: 1 MG/DL (ref 1.8–2.4)
MCH RBC QN AUTO: 30.4 PG (ref 26–34)
MCH RBC QN AUTO: 30.8 PG (ref 26–34)
MCHC RBC AUTO-ENTMCNC: 31.6 G/DL (ref 31–36)
MCHC RBC AUTO-ENTMCNC: 31.9 G/DL (ref 31–36)
MCV RBC AUTO: 96.3 FL (ref 80–100)
MCV RBC AUTO: 96.6 FL (ref 80–100)
MONOCYTES # BLD: 0.2 K/UL (ref 0–1.3)
MONOCYTES # BLD: 0.5 K/UL (ref 0–1.3)
MONOCYTES NFR BLD: 2 %
MONOCYTES NFR BLD: 7.2 %
NEUTROPHILS # BLD: 5.4 K/UL (ref 1.7–7.7)
NEUTROPHILS # BLD: 7.7 K/UL (ref 1.7–7.7)
NEUTROPHILS NFR BLD: 82.4 %
NEUTROPHILS NFR BLD: 87 %
PERFORMED ON: ABNORMAL
PHOSPHATE SERPL-MCNC: 2.7 MG/DL (ref 2.5–4.9)
PHOSPHATE SERPL-MCNC: 4.5 MG/DL (ref 2.5–4.9)
PLATELET # BLD AUTO: 215 K/UL (ref 135–450)
PLATELET # BLD AUTO: 267 K/UL (ref 135–450)
PMV BLD AUTO: 7.9 FL (ref 5–10.5)
PMV BLD AUTO: 8.3 FL (ref 5–10.5)
POTASSIUM SERPL-SCNC: 2.8 MMOL/L (ref 3.5–5.1)
POTASSIUM SERPL-SCNC: 4.7 MMOL/L (ref 3.5–5.1)
RBC # BLD AUTO: 2.22 M/UL (ref 4–5.2)
RBC # BLD AUTO: 3.03 M/UL (ref 4–5.2)
SODIUM SERPL-SCNC: 140 MMOL/L (ref 136–145)
SODIUM SERPL-SCNC: 145 MMOL/L (ref 136–145)
STOMATOCYTES BLD QL SMEAR: ABNORMAL
VANCOMYCIN SERPL-MCNC: 10.4 UG/ML
WBC # BLD AUTO: 6.6 K/UL (ref 4–11)
WBC # BLD AUTO: 8.8 K/UL (ref 4–11)

## 2025-06-21 PROCEDURE — 99232 SBSQ HOSP IP/OBS MODERATE 35: CPT | Performed by: INTERNAL MEDICINE

## 2025-06-21 PROCEDURE — 6370000000 HC RX 637 (ALT 250 FOR IP)

## 2025-06-21 PROCEDURE — 80069 RENAL FUNCTION PANEL: CPT

## 2025-06-21 PROCEDURE — 2580000003 HC RX 258

## 2025-06-21 PROCEDURE — 36415 COLL VENOUS BLD VENIPUNCTURE: CPT

## 2025-06-21 PROCEDURE — 2500000003 HC RX 250 WO HCPCS

## 2025-06-21 PROCEDURE — 6370000000 HC RX 637 (ALT 250 FOR IP): Performed by: INTERNAL MEDICINE

## 2025-06-21 PROCEDURE — 2060000000 HC ICU INTERMEDIATE R&B

## 2025-06-21 PROCEDURE — 6360000002 HC RX W HCPCS: Performed by: INTERNAL MEDICINE

## 2025-06-21 PROCEDURE — 2700000000 HC OXYGEN THERAPY PER DAY

## 2025-06-21 PROCEDURE — 94640 AIRWAY INHALATION TREATMENT: CPT

## 2025-06-21 PROCEDURE — 80202 ASSAY OF VANCOMYCIN: CPT

## 2025-06-21 PROCEDURE — 6360000002 HC RX W HCPCS

## 2025-06-21 PROCEDURE — 94660 CPAP INITIATION&MGMT: CPT

## 2025-06-21 PROCEDURE — 83735 ASSAY OF MAGNESIUM: CPT

## 2025-06-21 PROCEDURE — 94669 MECHANICAL CHEST WALL OSCILL: CPT

## 2025-06-21 PROCEDURE — 94761 N-INVAS EAR/PLS OXIMETRY MLT: CPT

## 2025-06-21 PROCEDURE — 85025 COMPLETE CBC W/AUTO DIFF WBC: CPT

## 2025-06-21 RX ORDER — ACETAMINOPHEN 500 MG
1000 TABLET ORAL EVERY 6 HOURS SCHEDULED
Status: DISCONTINUED | OUTPATIENT
Start: 2025-06-21 | End: 2025-06-24 | Stop reason: HOSPADM

## 2025-06-21 RX ORDER — DIPHENHYDRAMINE HCL 25 MG
25 TABLET ORAL ONCE
Status: COMPLETED | OUTPATIENT
Start: 2025-06-21 | End: 2025-06-21

## 2025-06-21 RX ORDER — ENOXAPARIN SODIUM 100 MG/ML
30 INJECTION SUBCUTANEOUS 2 TIMES DAILY
Status: DISCONTINUED | OUTPATIENT
Start: 2025-06-21 | End: 2025-06-24 | Stop reason: HOSPADM

## 2025-06-21 RX ADMIN — ACETYLCYSTEINE 600 MG: 100 SOLUTION RESPIRATORY (INHALATION) at 07:48

## 2025-06-21 RX ADMIN — IPRATROPIUM BROMIDE 0.5 MG: 0.5 SOLUTION RESPIRATORY (INHALATION) at 07:46

## 2025-06-21 RX ADMIN — NICOTINE POLACRILEX 4 MG: 4 LOZENGE ORAL at 09:51

## 2025-06-21 RX ADMIN — SODIUM CHLORIDE 1250 MG: 0.9 INJECTION, SOLUTION INTRAVENOUS at 12:23

## 2025-06-21 RX ADMIN — AMLODIPINE BESYLATE 5 MG: 5 TABLET ORAL at 08:05

## 2025-06-21 RX ADMIN — SODIUM CHLORIDE 30 MG/ML INHALATION SOLUTION 4 ML: 30 SOLUTION INHALANT at 19:53

## 2025-06-21 RX ADMIN — HYDROXYZINE HYDROCHLORIDE 25 MG: 25 TABLET, FILM COATED ORAL at 22:13

## 2025-06-21 RX ADMIN — ROPINIROLE HYDROCHLORIDE 0.25 MG: 0.25 TABLET, FILM COATED ORAL at 22:11

## 2025-06-21 RX ADMIN — INSULIN GLARGINE 5 UNITS: 100 INJECTION, SOLUTION SUBCUTANEOUS at 22:12

## 2025-06-21 RX ADMIN — DIPHENHYDRAMINE HYDROCHLORIDE 25 MG: 25 TABLET ORAL at 01:42

## 2025-06-21 RX ADMIN — LETROZOLE 2.5 MG: 2.5 TABLET ORAL at 08:07

## 2025-06-21 RX ADMIN — SODIUM CHLORIDE, PRESERVATIVE FREE 10 ML: 5 INJECTION INTRAVENOUS at 08:08

## 2025-06-21 RX ADMIN — INSULIN LISPRO 8 UNITS: 100 INJECTION, SOLUTION INTRAVENOUS; SUBCUTANEOUS at 17:07

## 2025-06-21 RX ADMIN — IPRATROPIUM BROMIDE 0.5 MG: 0.5 SOLUTION RESPIRATORY (INHALATION) at 13:44

## 2025-06-21 RX ADMIN — ACETYLCYSTEINE 600 MG: 100 SOLUTION RESPIRATORY (INHALATION) at 19:53

## 2025-06-21 RX ADMIN — GUAIFENESIN 600 MG: 600 TABLET, MULTILAYER, EXTENDED RELEASE ORAL at 08:07

## 2025-06-21 RX ADMIN — ENOXAPARIN SODIUM 30 MG: 100 INJECTION SUBCUTANEOUS at 22:13

## 2025-06-21 RX ADMIN — SODIUM CHLORIDE 30 MG/ML INHALATION SOLUTION 4 ML: 30 SOLUTION INHALANT at 07:48

## 2025-06-21 RX ADMIN — NICOTINE POLACRILEX 4 MG: 4 LOZENGE ORAL at 21:21

## 2025-06-21 RX ADMIN — ENOXAPARIN SODIUM 30 MG: 100 INJECTION SUBCUTANEOUS at 12:25

## 2025-06-21 RX ADMIN — ACETYLCYSTEINE 600 MG: 100 SOLUTION RESPIRATORY (INHALATION) at 13:44

## 2025-06-21 RX ADMIN — SODIUM CHLORIDE, PRESERVATIVE FREE 10 ML: 5 INJECTION INTRAVENOUS at 22:17

## 2025-06-21 RX ADMIN — SODIUM ZIRCONIUM CYCLOSILICATE 10 G: 10 POWDER, FOR SUSPENSION ORAL at 16:16

## 2025-06-21 RX ADMIN — Medication 6 MG: at 22:13

## 2025-06-21 RX ADMIN — NICOTINE POLACRILEX 4 MG: 4 LOZENGE ORAL at 22:20

## 2025-06-21 RX ADMIN — LEVALBUTEROL 1.25 MG: 1.25 SOLUTION, CONCENTRATE RESPIRATORY (INHALATION) at 07:46

## 2025-06-21 RX ADMIN — NICOTINE POLACRILEX 4 MG: 4 LOZENGE ORAL at 12:26

## 2025-06-21 RX ADMIN — PREDNISONE 40 MG: 20 TABLET ORAL at 08:07

## 2025-06-21 RX ADMIN — LEVALBUTEROL 1.25 MG: 1.25 SOLUTION, CONCENTRATE RESPIRATORY (INHALATION) at 19:53

## 2025-06-21 RX ADMIN — TEMAZEPAM 15 MG: 15 CAPSULE ORAL at 22:12

## 2025-06-21 RX ADMIN — NICOTINE POLACRILEX 4 MG: 4 LOZENGE ORAL at 19:17

## 2025-06-21 RX ADMIN — LEVALBUTEROL 1.25 MG: 1.25 SOLUTION, CONCENTRATE RESPIRATORY (INHALATION) at 13:44

## 2025-06-21 RX ADMIN — NICOTINE POLACRILEX 4 MG: 4 LOZENGE ORAL at 14:23

## 2025-06-21 RX ADMIN — GUAIFENESIN 600 MG: 600 TABLET, MULTILAYER, EXTENDED RELEASE ORAL at 22:12

## 2025-06-21 RX ADMIN — NICOTINE POLACRILEX 4 MG: 4 LOZENGE ORAL at 01:42

## 2025-06-21 RX ADMIN — PANTOPRAZOLE SODIUM 20 MG: 20 TABLET, DELAYED RELEASE ORAL at 08:07

## 2025-06-21 RX ADMIN — IPRATROPIUM BROMIDE 0.5 MG: 0.5 SOLUTION RESPIRATORY (INHALATION) at 19:53

## 2025-06-21 RX ADMIN — BARICITINIB 1 MG: 2 TABLET, FILM COATED ORAL at 09:53

## 2025-06-21 RX ADMIN — NICOTINE POLACRILEX 4 MG: 4 LOZENGE ORAL at 08:04

## 2025-06-21 RX ADMIN — SERTRALINE 50 MG: 50 TABLET, FILM COATED ORAL at 08:07

## 2025-06-21 RX ADMIN — WATER 2000 MG: 1 INJECTION INTRAMUSCULAR; INTRAVENOUS; SUBCUTANEOUS at 09:51

## 2025-06-21 RX ADMIN — CARVEDILOL 6.25 MG: 6.25 TABLET, FILM COATED ORAL at 22:13

## 2025-06-21 RX ADMIN — SODIUM CHLORIDE 30 MG/ML INHALATION SOLUTION 4 ML: 30 SOLUTION INHALANT at 13:45

## 2025-06-21 RX ADMIN — TRAZODONE HYDROCHLORIDE 25 MG: 50 TABLET ORAL at 22:13

## 2025-06-21 RX ADMIN — NICOTINE POLACRILEX 4 MG: 4 LOZENGE ORAL at 16:57

## 2025-06-21 RX ADMIN — CARVEDILOL 6.25 MG: 6.25 TABLET, FILM COATED ORAL at 08:07

## 2025-06-22 LAB
ALBUMIN SERPL-MCNC: 3.2 G/DL (ref 3.4–5)
ANION GAP SERPL CALCULATED.3IONS-SCNC: 11 MMOL/L (ref 3–16)
ANISOCYTOSIS BLD QL SMEAR: ABNORMAL
BASOPHILS # BLD: 0 K/UL (ref 0–0.2)
BASOPHILS NFR BLD: 0 %
BUN SERPL-MCNC: 62 MG/DL (ref 7–20)
CALCIUM SERPL-MCNC: 8.4 MG/DL (ref 8.3–10.6)
CHLORIDE SERPL-SCNC: 104 MMOL/L (ref 99–110)
CO2 SERPL-SCNC: 28 MMOL/L (ref 21–32)
CREAT SERPL-MCNC: 1.8 MG/DL (ref 0.6–1.2)
DACRYOCYTES BLD QL SMEAR: ABNORMAL
DEPRECATED RDW RBC AUTO: 16.4 % (ref 12.4–15.4)
EOSINOPHIL # BLD: 0.1 K/UL (ref 0–0.6)
EOSINOPHIL NFR BLD: 1 %
GFR SERPLBLD CREATININE-BSD FMLA CKD-EPI: 31 ML/MIN/{1.73_M2}
GLUCOSE BLD-MCNC: 101 MG/DL (ref 70–99)
GLUCOSE BLD-MCNC: 122 MG/DL (ref 70–99)
GLUCOSE BLD-MCNC: 287 MG/DL (ref 70–99)
GLUCOSE BLD-MCNC: 288 MG/DL (ref 70–99)
GLUCOSE SERPL-MCNC: 126 MG/DL (ref 70–99)
HCT VFR BLD AUTO: 30.7 % (ref 36–48)
HGB BLD-MCNC: 9.7 G/DL (ref 12–16)
LYMPHOCYTES # BLD: 0.8 K/UL (ref 1–5.1)
LYMPHOCYTES NFR BLD: 10 %
MAGNESIUM SERPL-MCNC: 1.61 MG/DL (ref 1.8–2.4)
MCH RBC QN AUTO: 30 PG (ref 26–34)
MCHC RBC AUTO-ENTMCNC: 31.4 G/DL (ref 31–36)
MCV RBC AUTO: 95.7 FL (ref 80–100)
MONOCYTES # BLD: 0.5 K/UL (ref 0–1.3)
MONOCYTES NFR BLD: 6 %
NEUTROPHILS # BLD: 6.8 K/UL (ref 1.7–7.7)
NEUTROPHILS NFR BLD: 83 %
OVALOCYTES BLD QL SMEAR: ABNORMAL
PERFORMED ON: ABNORMAL
PHOSPHATE SERPL-MCNC: 4.3 MG/DL (ref 2.5–4.9)
PLATELET # BLD AUTO: 284 K/UL (ref 135–450)
PMV BLD AUTO: 7.5 FL (ref 5–10.5)
POTASSIUM SERPL-SCNC: 3.9 MMOL/L (ref 3.5–5.1)
RBC # BLD AUTO: 3.21 M/UL (ref 4–5.2)
SCHISTOCYTES BLD QL SMEAR: ABNORMAL
SODIUM SERPL-SCNC: 143 MMOL/L (ref 136–145)
STOMATOCYTES BLD QL SMEAR: ABNORMAL
VANCOMYCIN SERPL-MCNC: 20.8 UG/ML
WBC # BLD AUTO: 8.2 K/UL (ref 4–11)

## 2025-06-22 PROCEDURE — 2500000003 HC RX 250 WO HCPCS

## 2025-06-22 PROCEDURE — 94669 MECHANICAL CHEST WALL OSCILL: CPT

## 2025-06-22 PROCEDURE — 6370000000 HC RX 637 (ALT 250 FOR IP)

## 2025-06-22 PROCEDURE — 99232 SBSQ HOSP IP/OBS MODERATE 35: CPT | Performed by: INTERNAL MEDICINE

## 2025-06-22 PROCEDURE — 94761 N-INVAS EAR/PLS OXIMETRY MLT: CPT

## 2025-06-22 PROCEDURE — 80202 ASSAY OF VANCOMYCIN: CPT

## 2025-06-22 PROCEDURE — 6360000002 HC RX W HCPCS

## 2025-06-22 PROCEDURE — 2700000000 HC OXYGEN THERAPY PER DAY

## 2025-06-22 PROCEDURE — 6360000002 HC RX W HCPCS: Performed by: INTERNAL MEDICINE

## 2025-06-22 PROCEDURE — 94640 AIRWAY INHALATION TREATMENT: CPT

## 2025-06-22 PROCEDURE — 2580000003 HC RX 258

## 2025-06-22 PROCEDURE — 85025 COMPLETE CBC W/AUTO DIFF WBC: CPT

## 2025-06-22 PROCEDURE — 99233 SBSQ HOSP IP/OBS HIGH 50: CPT | Performed by: INTERNAL MEDICINE

## 2025-06-22 PROCEDURE — 83735 ASSAY OF MAGNESIUM: CPT

## 2025-06-22 PROCEDURE — 2060000000 HC ICU INTERMEDIATE R&B

## 2025-06-22 PROCEDURE — 80069 RENAL FUNCTION PANEL: CPT

## 2025-06-22 RX ORDER — MAGNESIUM SULFATE IN WATER 40 MG/ML
2000 INJECTION, SOLUTION INTRAVENOUS ONCE
Status: COMPLETED | OUTPATIENT
Start: 2025-06-22 | End: 2025-06-22

## 2025-06-22 RX ADMIN — SODIUM CHLORIDE 30 MG/ML INHALATION SOLUTION 4 ML: 30 SOLUTION INHALANT at 07:49

## 2025-06-22 RX ADMIN — NICOTINE POLACRILEX 4 MG: 4 LOZENGE ORAL at 15:16

## 2025-06-22 RX ADMIN — NICOTINE POLACRILEX 4 MG: 4 LOZENGE ORAL at 17:12

## 2025-06-22 RX ADMIN — TEMAZEPAM 15 MG: 15 CAPSULE ORAL at 21:29

## 2025-06-22 RX ADMIN — INSULIN LISPRO 8 UNITS: 100 INJECTION, SOLUTION INTRAVENOUS; SUBCUTANEOUS at 20:04

## 2025-06-22 RX ADMIN — INSULIN GLARGINE 5 UNITS: 100 INJECTION, SOLUTION SUBCUTANEOUS at 21:30

## 2025-06-22 RX ADMIN — CARVEDILOL 6.25 MG: 6.25 TABLET, FILM COATED ORAL at 08:42

## 2025-06-22 RX ADMIN — SODIUM CHLORIDE, PRESERVATIVE FREE 10 ML: 5 INJECTION INTRAVENOUS at 08:42

## 2025-06-22 RX ADMIN — VANCOMYCIN HYDROCHLORIDE 750 MG: 750 INJECTION, POWDER, LYOPHILIZED, FOR SOLUTION INTRAVENOUS at 16:03

## 2025-06-22 RX ADMIN — LETROZOLE 2.5 MG: 2.5 TABLET ORAL at 08:42

## 2025-06-22 RX ADMIN — TRAZODONE HYDROCHLORIDE 25 MG: 50 TABLET ORAL at 21:30

## 2025-06-22 RX ADMIN — GUAIFENESIN 600 MG: 600 TABLET, MULTILAYER, EXTENDED RELEASE ORAL at 08:42

## 2025-06-22 RX ADMIN — ACETYLCYSTEINE 600 MG: 100 SOLUTION RESPIRATORY (INHALATION) at 20:12

## 2025-06-22 RX ADMIN — NICOTINE POLACRILEX 4 MG: 4 LOZENGE ORAL at 22:31

## 2025-06-22 RX ADMIN — IPRATROPIUM BROMIDE 0.5 MG: 0.5 SOLUTION RESPIRATORY (INHALATION) at 20:12

## 2025-06-22 RX ADMIN — INSULIN LISPRO 8 UNITS: 100 INJECTION, SOLUTION INTRAVENOUS; SUBCUTANEOUS at 17:11

## 2025-06-22 RX ADMIN — GUAIFENESIN 600 MG: 600 TABLET, MULTILAYER, EXTENDED RELEASE ORAL at 21:31

## 2025-06-22 RX ADMIN — Medication 6 MG: at 21:30

## 2025-06-22 RX ADMIN — ROPINIROLE HYDROCHLORIDE 0.25 MG: 0.25 TABLET, FILM COATED ORAL at 21:30

## 2025-06-22 RX ADMIN — LEVALBUTEROL 1.25 MG: 1.25 SOLUTION, CONCENTRATE RESPIRATORY (INHALATION) at 14:18

## 2025-06-22 RX ADMIN — MAGNESIUM SULFATE HEPTAHYDRATE 2000 MG: 40 INJECTION, SOLUTION INTRAVENOUS at 13:44

## 2025-06-22 RX ADMIN — AMLODIPINE BESYLATE 5 MG: 5 TABLET ORAL at 08:42

## 2025-06-22 RX ADMIN — NICOTINE POLACRILEX 4 MG: 4 LOZENGE ORAL at 13:49

## 2025-06-22 RX ADMIN — NICOTINE POLACRILEX 4 MG: 4 LOZENGE ORAL at 07:47

## 2025-06-22 RX ADMIN — IPRATROPIUM BROMIDE 0.5 MG: 0.5 SOLUTION RESPIRATORY (INHALATION) at 07:49

## 2025-06-22 RX ADMIN — IPRATROPIUM BROMIDE 0.5 MG: 0.5 SOLUTION RESPIRATORY (INHALATION) at 14:19

## 2025-06-22 RX ADMIN — NICOTINE POLACRILEX 4 MG: 4 LOZENGE ORAL at 11:33

## 2025-06-22 RX ADMIN — SODIUM CHLORIDE 30 MG/ML INHALATION SOLUTION 4 ML: 30 SOLUTION INHALANT at 20:12

## 2025-06-22 RX ADMIN — NICOTINE POLACRILEX 4 MG: 4 LOZENGE ORAL at 19:19

## 2025-06-22 RX ADMIN — SODIUM CHLORIDE 30 MG/ML INHALATION SOLUTION 4 ML: 30 SOLUTION INHALANT at 14:19

## 2025-06-22 RX ADMIN — NICOTINE POLACRILEX 4 MG: 4 LOZENGE ORAL at 21:31

## 2025-06-22 RX ADMIN — ALTEPLASE 1 MG: 2.2 INJECTION, POWDER, LYOPHILIZED, FOR SOLUTION INTRAVENOUS at 23:04

## 2025-06-22 RX ADMIN — PREDNISONE 40 MG: 20 TABLET ORAL at 08:42

## 2025-06-22 RX ADMIN — WATER 2000 MG: 1 INJECTION INTRAMUSCULAR; INTRAVENOUS; SUBCUTANEOUS at 11:27

## 2025-06-22 RX ADMIN — SODIUM CHLORIDE, PRESERVATIVE FREE 10 ML: 5 INJECTION INTRAVENOUS at 21:31

## 2025-06-22 RX ADMIN — SERTRALINE 50 MG: 50 TABLET, FILM COATED ORAL at 08:41

## 2025-06-22 RX ADMIN — ACETYLCYSTEINE 600 MG: 100 SOLUTION RESPIRATORY (INHALATION) at 14:19

## 2025-06-22 RX ADMIN — CARVEDILOL 6.25 MG: 6.25 TABLET, FILM COATED ORAL at 20:04

## 2025-06-22 RX ADMIN — ACETAMINOPHEN 1000 MG: 500 TABLET ORAL at 00:42

## 2025-06-22 RX ADMIN — LEVALBUTEROL 1.25 MG: 1.25 SOLUTION, CONCENTRATE RESPIRATORY (INHALATION) at 20:11

## 2025-06-22 RX ADMIN — ACETAMINOPHEN 1000 MG: 500 TABLET ORAL at 22:31

## 2025-06-22 RX ADMIN — PANTOPRAZOLE SODIUM 20 MG: 20 TABLET, DELAYED RELEASE ORAL at 08:42

## 2025-06-22 RX ADMIN — LEVALBUTEROL 1.25 MG: 1.25 SOLUTION, CONCENTRATE RESPIRATORY (INHALATION) at 07:49

## 2025-06-22 RX ADMIN — BARICITINIB 1 MG: 2 TABLET, FILM COATED ORAL at 08:41

## 2025-06-22 RX ADMIN — HYDROXYZINE HYDROCHLORIDE 25 MG: 25 TABLET, FILM COATED ORAL at 21:30

## 2025-06-22 RX ADMIN — NICOTINE POLACRILEX 4 MG: 4 LOZENGE ORAL at 05:51

## 2025-06-22 ASSESSMENT — PAIN SCALES - GENERAL: PAINLEVEL_OUTOF10: 3

## 2025-06-22 ASSESSMENT — PAIN - FUNCTIONAL ASSESSMENT: PAIN_FUNCTIONAL_ASSESSMENT: ACTIVITIES ARE NOT PREVENTED

## 2025-06-22 ASSESSMENT — PAIN DESCRIPTION - LOCATION: LOCATION: HEAD

## 2025-06-22 ASSESSMENT — PAIN DESCRIPTION - ONSET: ONSET: GRADUAL

## 2025-06-22 ASSESSMENT — PAIN SCALES - WONG BAKER: WONGBAKER_NUMERICALRESPONSE: NO HURT

## 2025-06-22 ASSESSMENT — PAIN DESCRIPTION - PAIN TYPE: TYPE: ACUTE PAIN

## 2025-06-22 ASSESSMENT — PAIN DESCRIPTION - FREQUENCY: FREQUENCY: INTERMITTENT

## 2025-06-22 ASSESSMENT — PAIN DESCRIPTION - DESCRIPTORS: DESCRIPTORS: ACHING

## 2025-06-22 ASSESSMENT — PAIN DESCRIPTION - ORIENTATION: ORIENTATION: MID

## 2025-06-23 ENCOUNTER — ANESTHESIA (OUTPATIENT)
Dept: INTERVENTIONAL RADIOLOGY/VASCULAR | Age: 65
DRG: 137 | End: 2025-06-23
Payer: COMMERCIAL

## 2025-06-23 ENCOUNTER — ANESTHESIA EVENT (OUTPATIENT)
Dept: INTERVENTIONAL RADIOLOGY/VASCULAR | Age: 65
DRG: 137 | End: 2025-06-23
Payer: COMMERCIAL

## 2025-06-23 ENCOUNTER — HOSPITAL ENCOUNTER (INPATIENT)
Dept: INTERVENTIONAL RADIOLOGY/VASCULAR | Age: 65
Discharge: HOME | DRG: 137 | End: 2025-06-25
Attending: STUDENT IN AN ORGANIZED HEALTH CARE EDUCATION/TRAINING PROGRAM
Payer: COMMERCIAL

## 2025-06-23 ENCOUNTER — APPOINTMENT (OUTPATIENT)
Dept: GENERAL RADIOLOGY | Age: 65
DRG: 137 | End: 2025-06-23
Payer: COMMERCIAL

## 2025-06-23 VITALS
DIASTOLIC BLOOD PRESSURE: 75 MMHG | OXYGEN SATURATION: 98 % | SYSTOLIC BLOOD PRESSURE: 137 MMHG | RESPIRATION RATE: 17 BRPM | HEART RATE: 95 BPM | TEMPERATURE: 97.8 F

## 2025-06-23 LAB
ANION GAP SERPL CALCULATED.3IONS-SCNC: 10 MMOL/L (ref 3–16)
BUN SERPL-MCNC: 64 MG/DL (ref 7–20)
CALCIUM SERPL-MCNC: 8.9 MG/DL (ref 8.3–10.6)
CHLORIDE SERPL-SCNC: 106 MMOL/L (ref 99–110)
CO2 SERPL-SCNC: 29 MMOL/L (ref 21–32)
CREAT SERPL-MCNC: 1.9 MG/DL (ref 0.6–1.2)
ECHO BSA: 2.41 M2
GFR SERPLBLD CREATININE-BSD FMLA CKD-EPI: 29 ML/MIN/{1.73_M2}
GLUCOSE BLD-MCNC: 141 MG/DL (ref 70–99)
GLUCOSE BLD-MCNC: 178 MG/DL (ref 70–99)
GLUCOSE BLD-MCNC: 249 MG/DL (ref 70–99)
GLUCOSE SERPL-MCNC: 77 MG/DL (ref 70–99)
INR PPP: 0.92 (ref 0.86–1.14)
PERFORMED ON: ABNORMAL
POTASSIUM SERPL-SCNC: 4.6 MMOL/L (ref 3.5–5.1)
PROTHROMBIN TIME: 12.7 SEC (ref 12.1–14.9)
SODIUM SERPL-SCNC: 145 MMOL/L (ref 136–145)

## 2025-06-23 PROCEDURE — 2580000003 HC RX 258

## 2025-06-23 PROCEDURE — C1769 GUIDE WIRE: HCPCS | Performed by: STUDENT IN AN ORGANIZED HEALTH CARE EDUCATION/TRAINING PROGRAM

## 2025-06-23 PROCEDURE — 6360000004 HC RX CONTRAST MEDICATION: Performed by: STUDENT IN AN ORGANIZED HEALTH CARE EDUCATION/TRAINING PROGRAM

## 2025-06-23 PROCEDURE — C1887 CATHETER, GUIDING: HCPCS | Performed by: STUDENT IN AN ORGANIZED HEALTH CARE EDUCATION/TRAINING PROGRAM

## 2025-06-23 PROCEDURE — C1889 IMPLANT/INSERT DEVICE, NOC: HCPCS | Performed by: STUDENT IN AN ORGANIZED HEALTH CARE EDUCATION/TRAINING PROGRAM

## 2025-06-23 PROCEDURE — 37241 VASC EMBOLIZE/OCCLUDE VENOUS: CPT

## 2025-06-23 PROCEDURE — 81003 URINALYSIS AUTO W/O SCOPE: CPT

## 2025-06-23 PROCEDURE — 2500000003 HC RX 250 WO HCPCS

## 2025-06-23 PROCEDURE — C1894 INTRO/SHEATH, NON-LASER: HCPCS | Performed by: STUDENT IN AN ORGANIZED HEALTH CARE EDUCATION/TRAINING PROGRAM

## 2025-06-23 PROCEDURE — 74018 RADEX ABDOMEN 1 VIEW: CPT

## 2025-06-23 PROCEDURE — 6360000002 HC RX W HCPCS

## 2025-06-23 PROCEDURE — 6370000000 HC RX 637 (ALT 250 FOR IP)

## 2025-06-23 PROCEDURE — 6360000002 HC RX W HCPCS: Performed by: INTERNAL MEDICINE

## 2025-06-23 PROCEDURE — 3700000000 HC ANESTHESIA ATTENDED CARE: Performed by: STUDENT IN AN ORGANIZED HEALTH CARE EDUCATION/TRAINING PROGRAM

## 2025-06-23 PROCEDURE — 85610 PROTHROMBIN TIME: CPT

## 2025-06-23 PROCEDURE — 36415 COLL VENOUS BLD VENIPUNCTURE: CPT

## 2025-06-23 PROCEDURE — 80048 BASIC METABOLIC PNL TOTAL CA: CPT

## 2025-06-23 PROCEDURE — 2700000000 HC OXYGEN THERAPY PER DAY

## 2025-06-23 PROCEDURE — 2060000000 HC ICU INTERMEDIATE R&B

## 2025-06-23 PROCEDURE — 99232 SBSQ HOSP IP/OBS MODERATE 35: CPT | Performed by: INTERNAL MEDICINE

## 2025-06-23 PROCEDURE — 7100000001 HC PACU RECOVERY - ADDTL 15 MIN: Performed by: STUDENT IN AN ORGANIZED HEALTH CARE EDUCATION/TRAINING PROGRAM

## 2025-06-23 PROCEDURE — 7100000000 HC PACU RECOVERY - FIRST 15 MIN: Performed by: STUDENT IN AN ORGANIZED HEALTH CARE EDUCATION/TRAINING PROGRAM

## 2025-06-23 PROCEDURE — 2709999900 HC NON-CHARGEABLE SUPPLY: Performed by: STUDENT IN AN ORGANIZED HEALTH CARE EDUCATION/TRAINING PROGRAM

## 2025-06-23 PROCEDURE — 94640 AIRWAY INHALATION TREATMENT: CPT

## 2025-06-23 PROCEDURE — 94761 N-INVAS EAR/PLS OXIMETRY MLT: CPT

## 2025-06-23 PROCEDURE — 3700000001 HC ADD 15 MINUTES (ANESTHESIA): Performed by: STUDENT IN AN ORGANIZED HEALTH CARE EDUCATION/TRAINING PROGRAM

## 2025-06-23 PROCEDURE — B70C1ZZ PLAIN RADIOGRAPHY OF PELVIC LYMPHATICS USING LOW OSMOLAR CONTRAST: ICD-10-PCS | Performed by: INTERNAL MEDICINE

## 2025-06-23 RX ORDER — SODIUM CHLORIDE 0.9 % (FLUSH) 0.9 %
5-40 SYRINGE (ML) INJECTION EVERY 12 HOURS SCHEDULED
OUTPATIENT
Start: 2025-06-23

## 2025-06-23 RX ORDER — HYDROMORPHONE HYDROCHLORIDE 1 MG/ML
0.5 INJECTION, SOLUTION INTRAMUSCULAR; INTRAVENOUS; SUBCUTANEOUS EVERY 5 MIN PRN
Refills: 0 | OUTPATIENT
Start: 2025-06-23

## 2025-06-23 RX ORDER — IOPAMIDOL 755 MG/ML
INJECTION, SOLUTION INTRAVASCULAR PRN
Status: COMPLETED | OUTPATIENT
Start: 2025-06-23 | End: 2025-06-23

## 2025-06-23 RX ORDER — SODIUM CHLORIDE, SODIUM LACTATE, POTASSIUM CHLORIDE, CALCIUM CHLORIDE 600; 310; 30; 20 MG/100ML; MG/100ML; MG/100ML; MG/100ML
INJECTION, SOLUTION INTRAVENOUS
Status: DISCONTINUED | OUTPATIENT
Start: 2025-06-23 | End: 2025-06-23 | Stop reason: SDUPTHER

## 2025-06-23 RX ORDER — HYDROMORPHONE HYDROCHLORIDE 1 MG/ML
0.5 INJECTION, SOLUTION INTRAMUSCULAR; INTRAVENOUS; SUBCUTANEOUS EVERY 10 MIN PRN
Refills: 0 | OUTPATIENT
Start: 2025-06-23

## 2025-06-23 RX ORDER — ROCURONIUM BROMIDE 10 MG/ML
INJECTION, SOLUTION INTRAVENOUS
Status: DISCONTINUED | OUTPATIENT
Start: 2025-06-23 | End: 2025-06-23 | Stop reason: SDUPTHER

## 2025-06-23 RX ORDER — METOCLOPRAMIDE HYDROCHLORIDE 5 MG/ML
10 INJECTION INTRAMUSCULAR; INTRAVENOUS
OUTPATIENT
Start: 2025-06-23

## 2025-06-23 RX ORDER — MEPERIDINE HYDROCHLORIDE 25 MG/ML
12.5 INJECTION INTRAMUSCULAR; INTRAVENOUS; SUBCUTANEOUS EVERY 5 MIN PRN
Refills: 0 | OUTPATIENT
Start: 2025-06-23

## 2025-06-23 RX ORDER — GLYCOPYRROLATE 0.2 MG/ML
INJECTION INTRAMUSCULAR; INTRAVENOUS
Status: DISCONTINUED | OUTPATIENT
Start: 2025-06-23 | End: 2025-06-23 | Stop reason: SDUPTHER

## 2025-06-23 RX ORDER — PROPOFOL 10 MG/ML
INJECTION, EMULSION INTRAVENOUS
Status: DISCONTINUED | OUTPATIENT
Start: 2025-06-23 | End: 2025-06-23 | Stop reason: SDUPTHER

## 2025-06-23 RX ORDER — ONDANSETRON 2 MG/ML
INJECTION INTRAMUSCULAR; INTRAVENOUS
Status: DISCONTINUED | OUTPATIENT
Start: 2025-06-23 | End: 2025-06-23 | Stop reason: SDUPTHER

## 2025-06-23 RX ORDER — DIPHENHYDRAMINE HYDROCHLORIDE 50 MG/ML
12.5 INJECTION, SOLUTION INTRAMUSCULAR; INTRAVENOUS
OUTPATIENT
Start: 2025-06-23

## 2025-06-23 RX ORDER — LIDOCAINE HYDROCHLORIDE 20 MG/ML
INJECTION, SOLUTION INFILTRATION; PERINEURAL
Status: DISCONTINUED | OUTPATIENT
Start: 2025-06-23 | End: 2025-06-23 | Stop reason: SDUPTHER

## 2025-06-23 RX ORDER — SODIUM CHLORIDE 9 MG/ML
INJECTION, SOLUTION INTRAVENOUS PRN
OUTPATIENT
Start: 2025-06-23

## 2025-06-23 RX ORDER — VASOPRESSIN 20 [USP'U]/ML
INJECTION, SOLUTION INTRAVENOUS
Status: DISCONTINUED | OUTPATIENT
Start: 2025-06-23 | End: 2025-06-23 | Stop reason: SDUPTHER

## 2025-06-23 RX ORDER — SODIUM CHLORIDE 0.9 % (FLUSH) 0.9 %
5-40 SYRINGE (ML) INJECTION PRN
OUTPATIENT
Start: 2025-06-23

## 2025-06-23 RX ORDER — NALOXONE HYDROCHLORIDE 0.4 MG/ML
INJECTION, SOLUTION INTRAMUSCULAR; INTRAVENOUS; SUBCUTANEOUS PRN
OUTPATIENT
Start: 2025-06-23

## 2025-06-23 RX ORDER — CALCIUM CHLORIDE 100 MG/ML
INJECTION INTRAVENOUS; INTRAVENTRICULAR
Status: DISCONTINUED | OUTPATIENT
Start: 2025-06-23 | End: 2025-06-23 | Stop reason: SDUPTHER

## 2025-06-23 RX ORDER — DEXAMETHASONE SODIUM PHOSPHATE 4 MG/ML
INJECTION, SOLUTION INTRA-ARTICULAR; INTRALESIONAL; INTRAMUSCULAR; INTRAVENOUS; SOFT TISSUE
Status: DISCONTINUED | OUTPATIENT
Start: 2025-06-23 | End: 2025-06-23 | Stop reason: SDUPTHER

## 2025-06-23 RX ADMIN — IPRATROPIUM BROMIDE 0.5 MG: 0.5 SOLUTION RESPIRATORY (INHALATION) at 18:28

## 2025-06-23 RX ADMIN — ENOXAPARIN SODIUM 30 MG: 100 INJECTION SUBCUTANEOUS at 21:04

## 2025-06-23 RX ADMIN — PHENYLEPHRINE HYDROCHLORIDE 100 MCG: 10 INJECTION, SOLUTION INTRAVENOUS at 08:31

## 2025-06-23 RX ADMIN — PHENYLEPHRINE HYDROCHLORIDE 100 MCG: 10 INJECTION, SOLUTION INTRAVENOUS at 08:38

## 2025-06-23 RX ADMIN — SUGAMMADEX 300 MG: 100 INJECTION, SOLUTION INTRAVENOUS at 11:11

## 2025-06-23 RX ADMIN — PHENYLEPHRINE HYDROCHLORIDE 100 MCG: 10 INJECTION, SOLUTION INTRAVENOUS at 08:47

## 2025-06-23 RX ADMIN — NICOTINE POLACRILEX 4 MG: 4 LOZENGE ORAL at 17:22

## 2025-06-23 RX ADMIN — LIDOCAINE HYDROCHLORIDE 100 MG: 20 INJECTION, SOLUTION INFILTRATION; PERINEURAL at 08:16

## 2025-06-23 RX ADMIN — ROPINIROLE HYDROCHLORIDE 0.25 MG: 0.25 TABLET, FILM COATED ORAL at 21:06

## 2025-06-23 RX ADMIN — ACETAMINOPHEN 1000 MG: 500 TABLET ORAL at 06:03

## 2025-06-23 RX ADMIN — GLYCOPYRROLATE 0.2 MG: 0.2 INJECTION INTRAMUSCULAR; INTRAVENOUS at 08:31

## 2025-06-23 RX ADMIN — NICOTINE POLACRILEX 4 MG: 4 LOZENGE ORAL at 04:05

## 2025-06-23 RX ADMIN — LEVALBUTEROL 1.25 MG: 1.25 SOLUTION, CONCENTRATE RESPIRATORY (INHALATION) at 18:30

## 2025-06-23 RX ADMIN — NICOTINE POLACRILEX 4 MG: 4 LOZENGE ORAL at 19:10

## 2025-06-23 RX ADMIN — NICOTINE POLACRILEX 4 MG: 4 LOZENGE ORAL at 14:55

## 2025-06-23 RX ADMIN — SODIUM CHLORIDE, PRESERVATIVE FREE 10 ML: 5 INJECTION INTRAVENOUS at 21:54

## 2025-06-23 RX ADMIN — PHENYLEPHRINE HYDROCHLORIDE 100 MCG: 10 INJECTION, SOLUTION INTRAVENOUS at 09:19

## 2025-06-23 RX ADMIN — SODIUM CHLORIDE 30 MG/ML INHALATION SOLUTION 4 ML: 30 SOLUTION INHALANT at 18:30

## 2025-06-23 RX ADMIN — GUAIFENESIN 600 MG: 600 TABLET, MULTILAYER, EXTENDED RELEASE ORAL at 21:04

## 2025-06-23 RX ADMIN — NICOTINE POLACRILEX 4 MG: 4 LOZENGE ORAL at 16:19

## 2025-06-23 RX ADMIN — WATER 2000 MG: 1 INJECTION INTRAMUSCULAR; INTRAVENOUS; SUBCUTANEOUS at 09:03

## 2025-06-23 RX ADMIN — BARICITINIB 1 MG: 2 TABLET, FILM COATED ORAL at 13:07

## 2025-06-23 RX ADMIN — GLYCOPYRROLATE 0.2 MG: 0.2 INJECTION INTRAMUSCULAR; INTRAVENOUS at 11:00

## 2025-06-23 RX ADMIN — NICOTINE POLACRILEX 4 MG: 4 LOZENGE ORAL at 21:54

## 2025-06-23 RX ADMIN — SODIUM CHLORIDE 30 MG/ML INHALATION SOLUTION 4 ML: 30 SOLUTION INHALANT at 20:29

## 2025-06-23 RX ADMIN — LEVALBUTEROL 1.25 MG: 1.25 SOLUTION, CONCENTRATE RESPIRATORY (INHALATION) at 20:29

## 2025-06-23 RX ADMIN — VASOPRESSIN 0.5 UNITS: 20 INJECTION INTRAVENOUS at 09:28

## 2025-06-23 RX ADMIN — PHENYLEPHRINE HYDROCHLORIDE 100 MCG: 10 INJECTION, SOLUTION INTRAVENOUS at 08:35

## 2025-06-23 RX ADMIN — LEVALBUTEROL 1.25 MG: 1.25 SOLUTION, CONCENTRATE RESPIRATORY (INHALATION) at 12:44

## 2025-06-23 RX ADMIN — ROCURONIUM BROMIDE 20 MG: 10 INJECTION, SOLUTION INTRAVENOUS at 09:52

## 2025-06-23 RX ADMIN — Medication 6 MG: at 21:03

## 2025-06-23 RX ADMIN — VASOPRESSIN 1 UNITS: 20 INJECTION INTRAVENOUS at 08:47

## 2025-06-23 RX ADMIN — ONDANSETRON 4 MG: 2 INJECTION INTRAMUSCULAR; INTRAVENOUS at 08:24

## 2025-06-23 RX ADMIN — PHENYLEPHRINE HYDROCHLORIDE 40 MCG/MIN: 10 INJECTION, SOLUTION INTRAVENOUS at 08:30

## 2025-06-23 RX ADMIN — INSULIN GLARGINE 5 UNITS: 100 INJECTION, SOLUTION SUBCUTANEOUS at 21:03

## 2025-06-23 RX ADMIN — SODIUM CHLORIDE, PRESERVATIVE FREE 10 ML: 5 INJECTION INTRAVENOUS at 12:36

## 2025-06-23 RX ADMIN — GLYCOPYRROLATE 0.2 MG: 0.2 INJECTION INTRAMUSCULAR; INTRAVENOUS at 08:51

## 2025-06-23 RX ADMIN — TRAZODONE HYDROCHLORIDE 25 MG: 50 TABLET ORAL at 21:04

## 2025-06-23 RX ADMIN — ROCURONIUM BROMIDE 10 MG: 10 INJECTION, SOLUTION INTRAVENOUS at 10:40

## 2025-06-23 RX ADMIN — SERTRALINE 50 MG: 50 TABLET, FILM COATED ORAL at 12:34

## 2025-06-23 RX ADMIN — ACETYLCYSTEINE 600 MG: 100 SOLUTION RESPIRATORY (INHALATION) at 20:29

## 2025-06-23 RX ADMIN — AMLODIPINE BESYLATE 5 MG: 5 TABLET ORAL at 12:34

## 2025-06-23 RX ADMIN — PROPOFOL 120 MG: 10 INJECTION, EMULSION INTRAVENOUS at 08:16

## 2025-06-23 RX ADMIN — NICOTINE POLACRILEX 4 MG: 4 LOZENGE ORAL at 13:54

## 2025-06-23 RX ADMIN — PHENYLEPHRINE HYDROCHLORIDE 100 MCG: 10 INJECTION, SOLUTION INTRAVENOUS at 08:41

## 2025-06-23 RX ADMIN — ACETAMINOPHEN 1000 MG: 500 TABLET ORAL at 12:34

## 2025-06-23 RX ADMIN — ROCURONIUM BROMIDE 10 MG: 10 INJECTION, SOLUTION INTRAVENOUS at 10:11

## 2025-06-23 RX ADMIN — DEXAMETHASONE SODIUM PHOSPHATE 4 MG: 4 INJECTION INTRA-ARTICULAR; INTRALESIONAL; INTRAMUSCULAR; INTRAVENOUS; SOFT TISSUE at 08:24

## 2025-06-23 RX ADMIN — PHENYLEPHRINE HYDROCHLORIDE 100 MCG: 10 INJECTION, SOLUTION INTRAVENOUS at 08:24

## 2025-06-23 RX ADMIN — GUAIFENESIN 600 MG: 600 TABLET, MULTILAYER, EXTENDED RELEASE ORAL at 12:36

## 2025-06-23 RX ADMIN — SODIUM CHLORIDE, SODIUM LACTATE, POTASSIUM CHLORIDE, AND CALCIUM CHLORIDE: .6; .31; .03; .02 INJECTION, SOLUTION INTRAVENOUS at 07:48

## 2025-06-23 RX ADMIN — LETROZOLE 2.5 MG: 2.5 TABLET ORAL at 12:36

## 2025-06-23 RX ADMIN — PANTOPRAZOLE SODIUM 20 MG: 20 TABLET, DELAYED RELEASE ORAL at 12:34

## 2025-06-23 RX ADMIN — NICOTINE POLACRILEX 4 MG: 4 LOZENGE ORAL at 23:39

## 2025-06-23 RX ADMIN — IPRATROPIUM BROMIDE 0.5 MG: 0.5 SOLUTION RESPIRATORY (INHALATION) at 12:44

## 2025-06-23 RX ADMIN — PREDNISONE 40 MG: 20 TABLET ORAL at 12:34

## 2025-06-23 RX ADMIN — ROCURONIUM BROMIDE 20 MG: 10 INJECTION, SOLUTION INTRAVENOUS at 09:07

## 2025-06-23 RX ADMIN — PHENYLEPHRINE HYDROCHLORIDE 100 MCG: 10 INJECTION, SOLUTION INTRAVENOUS at 08:21

## 2025-06-23 RX ADMIN — ACETAMINOPHEN 1000 MG: 500 TABLET ORAL at 16:19

## 2025-06-23 RX ADMIN — ROCURONIUM BROMIDE 50 MG: 10 INJECTION, SOLUTION INTRAVENOUS at 08:16

## 2025-06-23 RX ADMIN — INSULIN LISPRO 4 UNITS: 100 INJECTION, SOLUTION INTRAVENOUS; SUBCUTANEOUS at 21:03

## 2025-06-23 RX ADMIN — NICOTINE POLACRILEX 4 MG: 4 LOZENGE ORAL at 06:03

## 2025-06-23 RX ADMIN — ALTEPLASE 2 MG: 2.2 INJECTION, POWDER, LYOPHILIZED, FOR SOLUTION INTRAVENOUS at 14:30

## 2025-06-23 RX ADMIN — SODIUM CHLORIDE, SODIUM LACTATE, POTASSIUM CHLORIDE, AND CALCIUM CHLORIDE: .6; .31; .03; .02 INJECTION, SOLUTION INTRAVENOUS at 10:35

## 2025-06-23 RX ADMIN — PHENYLEPHRINE HYDROCHLORIDE 100 MCG: 10 INJECTION, SOLUTION INTRAVENOUS at 08:44

## 2025-06-23 RX ADMIN — CARVEDILOL 6.25 MG: 6.25 TABLET, FILM COATED ORAL at 21:04

## 2025-06-23 RX ADMIN — PHENYLEPHRINE HYDROCHLORIDE 100 MCG: 10 INJECTION, SOLUTION INTRAVENOUS at 08:27

## 2025-06-23 RX ADMIN — NICOTINE POLACRILEX 4 MG: 4 LOZENGE ORAL at 00:36

## 2025-06-23 RX ADMIN — CALCIUM CHLORIDE 1 G: 100 INJECTION, SOLUTION INTRAVENOUS at 08:33

## 2025-06-23 RX ADMIN — NICOTINE POLACRILEX 4 MG: 4 LOZENGE ORAL at 12:30

## 2025-06-23 RX ADMIN — TEMAZEPAM 15 MG: 15 CAPSULE ORAL at 21:04

## 2025-06-23 RX ADMIN — HYDROXYZINE HYDROCHLORIDE 25 MG: 25 TABLET, FILM COATED ORAL at 21:04

## 2025-06-23 RX ADMIN — PHENYLEPHRINE HYDROCHLORIDE 100 MCG: 10 INJECTION, SOLUTION INTRAVENOUS at 08:19

## 2025-06-23 RX ADMIN — IOPAMIDOL 100 ML: 755 INJECTION, SOLUTION INTRAVENOUS at 11:11

## 2025-06-23 RX ADMIN — VASOPRESSIN 0.5 UNITS: 20 INJECTION INTRAVENOUS at 10:52

## 2025-06-23 RX ADMIN — CARVEDILOL 6.25 MG: 6.25 TABLET, FILM COATED ORAL at 12:35

## 2025-06-23 RX ADMIN — VANCOMYCIN HYDROCHLORIDE 750 MG: 750 INJECTION, POWDER, LYOPHILIZED, FOR SOLUTION INTRAVENOUS at 15:17

## 2025-06-23 ASSESSMENT — ENCOUNTER SYMPTOMS: SHORTNESS OF BREATH: 1

## 2025-06-23 NOTE — ANESTHESIA POSTPROCEDURE EVALUATION
Department of Anesthesiology  Postprocedure Note    Patient: Shoaib Rothman  MRN: [id number]  YOB: 1960  Date of evaluation: 6/23/2025    Procedure Summary       Date: 06/23/25 Room / Location: [facility] Special Procedures    Anesthesia Start: 0748 Anesthesia Stop: 1137    Procedure: IR EMBOLIZATION VENOUS Diagnosis:       Acute respiratory failure with hypoxia (HCC)      (thoracic duct embolization)    Scheduled Providers: Armando Arriola MD Responsible Provider: Armando Arriola MD    Anesthesia Type: general ASA Status: 3            Anesthesia Type: No value filed.    Carole Phase I: Carole Score: 8    Carole Phase II:      Anesthesia Post Evaluation    Patient location during evaluation: PACU  Patient participation: complete - patient participated  Level of consciousness: awake and alert  Pain score: 0  Airway patency: patent  Nausea & Vomiting: no nausea and no vomiting  Cardiovascular status: hemodynamically stable  Respiratory status: acceptable  Hydration status: euvolemic  Pain management: adequate    No notable events documented.

## 2025-06-23 NOTE — CARE COORDINATION
DISCHARGE PLANNING:  Chart reviewed.  Patient is from [facility] as a long term care resident. No pre-cert needed to return.    Current discharge plan is [facility].  Pre-cert approved through 6/24.    Plan for lymphangiogram today.  Currently on 15L oxygen.  Per Dr. Polanco, hopeful for discharge to [facility] tomorrow.    CM team will continue to follow.  Sandhya Lynch, RN Case Manager  [phone]

## 2025-06-23 NOTE — BRIEF OP NOTE
Interventional Radiology Post Procedure    Date: 6/23/2025    Physician: Ronald Arambula MD     Pre-op Diagnosis: plastic bronchitis    Post-op Diagnosis: same    Variation from Planned Procedure: None       Findings:   - thin frail inguinal lymph nodes resulting in suboptimal pelvic lymphangiogram and no opacification of the thoracic duct  - LUE venous access for attempts at retrograde access to TDE were unsuccessful.  TD may be right sided and could be occluded by the RIJ port    Patient condition: Stable    Estimated Blood Loss: 15 mL    Specimens:  None      Plan: KUB later today and then daily in AM until contrast is seen in the retroperitoneum.  Then Non contrast CT and consideration of attempting antegrade access.    Signed,  Ronald Arambula MD  11:22 AM  6/23/2025

## 2025-06-23 NOTE — FLOWSHEET NOTE
PACU Transfer to Floor Note      Current Allergies: Pcn [penicillins], Cefepime, and Hydralazine    Pt meets criteria as per Erna Score and ASPAN Standards to transfer to next phase of care.     Recent Labs     06/22/25  1649 06/22/25  1928   POCGLU 288* 287*       Vitals:    06/23/25 1200   BP: 137/75   Pulse: 95   Resp: 17   Temp: (P) 97.8 °F (36.6 °C)   SpO2: 98%     Vitals within 20% of pt's admission vitals as per ERNA SCORE    SpO2: 98 %    O2 Flow Rate (L/min): 12 L/min      Intake/Output Summary (Last 24 hours) at 6/23/2025 1231  Last data filed at 6/23/2025 1124  Gross per 24 hour   Intake 2030 ml   Output 1900 ml   Net 130 ml       Pain assessment:  pt denies    Patient was assessed for alterations to skin integrity. There were no alterations observed.    Is patient incontinent: no    Handoff report given at bedside to RN for 4455        6/23/2025 12:31 PM

## 2025-06-23 NOTE — ANESTHESIA PRE PROCEDURE
Type & Screen (If Applicable):  Lab Results   Component Value Date    ABORH O POS 07/02/2019    LABANTI NEG 07/02/2019       Drug/Infectious Status (If Applicable):  Lab Results   Component Value Date/Time    HIV Non-Reactive 12/09/2020 04:14 PM       COVID-19 Screening (If Applicable):   Lab Results   Component Value Date/Time    COVID19 DETECTED 06/10/2025 07:28 PM    COVID19 NOT DETECTED 09/22/2020 07:00 PM           Anesthesia Evaluation  Patient summary reviewed and Nursing notes reviewed   no history of anesthetic complications:   Airway: Mallampati: II  TM distance: >3 FB   Neck ROM: full  Mouth opening: > = 3 FB   Dental:          Pulmonary:   (+) pneumonia:  COPD:  shortness of breath:         asthma:                            Cardiovascular:    (+) hypertension:, CHF: diastolic, VENTURA:                  Neuro/Psych:               GI/Hepatic/Renal:   (+) renal disease: CRI and ARF          Endo/Other:                     Abdominal:             Vascular:   + PE.       Other Findings:       Anesthesia Plan      general     ASA 3     (65-year-old female presents for lymphangiogram.  Plan general anesthesia with ASA standard monitors.  Questions answered.  Patient agreeable with anesthetic plan.  )  Induction: intravenous.      Anesthetic plan and risks discussed with patient.      Plan discussed with CRNA.    Attending anesthesiologist reviewed and agrees with Preprocedure content            CHET MARCUM MD   6/23/2025

## 2025-06-23 NOTE — PROGRESS NOTES
Patient admitted to PACU # 10 from Cath Lab at 1124.  Attached to PACU monitoring system and report received from anesthesia provider.  Patient was reported to be hemodynamically stable during procedure.  Patient drowsy on admission and denied pain.

## 2025-06-24 ENCOUNTER — APPOINTMENT (OUTPATIENT)
Dept: CT IMAGING | Age: 65
DRG: 137 | End: 2025-06-24
Payer: COMMERCIAL

## 2025-06-24 ENCOUNTER — APPOINTMENT (OUTPATIENT)
Dept: GENERAL RADIOLOGY | Age: 65
DRG: 137 | End: 2025-06-24
Payer: COMMERCIAL

## 2025-06-24 VITALS
TEMPERATURE: 97.8 F | DIASTOLIC BLOOD PRESSURE: 79 MMHG | RESPIRATION RATE: 16 BRPM | HEIGHT: 70 IN | BODY MASS INDEX: 38.22 KG/M2 | HEART RATE: 68 BPM | WEIGHT: 266.98 LBS | OXYGEN SATURATION: 97 % | SYSTOLIC BLOOD PRESSURE: 157 MMHG

## 2025-06-24 LAB
ALBUMIN SERPL-MCNC: 3.2 G/DL (ref 3.4–5)
ANION GAP SERPL CALCULATED.3IONS-SCNC: 11 MMOL/L (ref 3–16)
BACTERIA URNS QL MICRO: ABNORMAL /HPF
BASOPHILS # BLD: 0 K/UL (ref 0–0.2)
BASOPHILS NFR BLD: 0.2 %
BILIRUB UR QL STRIP.AUTO: NEGATIVE
BUN SERPL-MCNC: 58 MG/DL (ref 7–20)
CALCIUM SERPL-MCNC: 8.8 MG/DL (ref 8.3–10.6)
CHLORIDE SERPL-SCNC: 106 MMOL/L (ref 99–110)
CLARITY UR: CLEAR
CO2 SERPL-SCNC: 29 MMOL/L (ref 21–32)
COLOR UR: YELLOW
CREAT SERPL-MCNC: 1.9 MG/DL (ref 0.6–1.2)
DEPRECATED RDW RBC AUTO: 16.6 % (ref 12.4–15.4)
EOSINOPHIL # BLD: 0 K/UL (ref 0–0.6)
EOSINOPHIL NFR BLD: 0 %
EPI CELLS #/AREA URNS HPF: ABNORMAL /HPF (ref 0–5)
GFR SERPLBLD CREATININE-BSD FMLA CKD-EPI: 29 ML/MIN/{1.73_M2}
GLUCOSE BLD-MCNC: 129 MG/DL (ref 70–99)
GLUCOSE BLD-MCNC: 152 MG/DL (ref 70–99)
GLUCOSE BLD-MCNC: 174 MG/DL (ref 70–99)
GLUCOSE SERPL-MCNC: 116 MG/DL (ref 70–99)
GLUCOSE UR STRIP.AUTO-MCNC: 100 MG/DL
HCT VFR BLD AUTO: 29.6 % (ref 36–48)
HGB BLD-MCNC: 9.4 G/DL (ref 12–16)
HGB UR QL STRIP.AUTO: NEGATIVE
KETONES UR STRIP.AUTO-MCNC: NEGATIVE MG/DL
LEUKOCYTE ESTERASE UR QL STRIP.AUTO: NEGATIVE
LYMPHOCYTES # BLD: 0.6 K/UL (ref 1–5.1)
LYMPHOCYTES NFR BLD: 6.4 %
MAGNESIUM SERPL-MCNC: 2.01 MG/DL (ref 1.8–2.4)
MCH RBC QN AUTO: 30.6 PG (ref 26–34)
MCHC RBC AUTO-ENTMCNC: 31.7 G/DL (ref 31–36)
MCV RBC AUTO: 96.7 FL (ref 80–100)
MONOCYTES # BLD: 0.5 K/UL (ref 0–1.3)
MONOCYTES NFR BLD: 5.8 %
NEUTROPHILS # BLD: 8.2 K/UL (ref 1.7–7.7)
NEUTROPHILS NFR BLD: 87.6 %
NITRITE UR QL STRIP.AUTO: NEGATIVE
PERFORMED ON: ABNORMAL
PH UR STRIP.AUTO: 6 [PH] (ref 5–8)
PHOSPHATE SERPL-MCNC: 5.4 MG/DL (ref 2.5–4.9)
PLATELET # BLD AUTO: 305 K/UL (ref 135–450)
PMV BLD AUTO: 7.3 FL (ref 5–10.5)
POTASSIUM SERPL-SCNC: 5 MMOL/L (ref 3.5–5.1)
PROT UR STRIP.AUTO-MCNC: 100 MG/DL
RBC # BLD AUTO: 3.06 M/UL (ref 4–5.2)
RBC #/AREA URNS HPF: ABNORMAL /HPF (ref 0–4)
SODIUM SERPL-SCNC: 146 MMOL/L (ref 136–145)
SP GR UR STRIP.AUTO: 1.02 (ref 1–1.03)
UA COMPLETE W REFLEX CULTURE PNL UR: ABNORMAL
UA DIPSTICK W REFLEX MICRO PNL UR: YES
URN SPEC COLLECT METH UR: ABNORMAL
UROBILINOGEN UR STRIP-ACNC: 0.2 E.U./DL
WBC # BLD AUTO: 9.4 K/UL (ref 4–11)
WBC #/AREA URNS HPF: ABNORMAL /HPF (ref 0–5)

## 2025-06-24 PROCEDURE — 6360000002 HC RX W HCPCS: Performed by: INTERNAL MEDICINE

## 2025-06-24 PROCEDURE — 71045 X-RAY EXAM CHEST 1 VIEW: CPT

## 2025-06-24 PROCEDURE — 94761 N-INVAS EAR/PLS OXIMETRY MLT: CPT

## 2025-06-24 PROCEDURE — 80069 RENAL FUNCTION PANEL: CPT

## 2025-06-24 PROCEDURE — 83735 ASSAY OF MAGNESIUM: CPT

## 2025-06-24 PROCEDURE — 6370000000 HC RX 637 (ALT 250 FOR IP)

## 2025-06-24 PROCEDURE — 6360000002 HC RX W HCPCS

## 2025-06-24 PROCEDURE — 2500000003 HC RX 250 WO HCPCS

## 2025-06-24 PROCEDURE — 94640 AIRWAY INHALATION TREATMENT: CPT

## 2025-06-24 PROCEDURE — 85025 COMPLETE CBC W/AUTO DIFF WBC: CPT

## 2025-06-24 PROCEDURE — 2580000003 HC RX 258

## 2025-06-24 PROCEDURE — 2700000000 HC OXYGEN THERAPY PER DAY

## 2025-06-24 PROCEDURE — 99233 SBSQ HOSP IP/OBS HIGH 50: CPT | Performed by: INTERNAL MEDICINE

## 2025-06-24 PROCEDURE — 71250 CT THORAX DX C-: CPT

## 2025-06-24 RX ORDER — ACETYLCYSTEINE 200 MG/ML
600 SOLUTION ORAL; RESPIRATORY (INHALATION)
Qty: 30 ML | Refills: 0 | Status: SHIPPED | OUTPATIENT
Start: 2025-06-24

## 2025-06-24 RX ORDER — PREDNISONE 5 MG/1
TABLET ORAL
Qty: 17 TABLET | Refills: 0 | Status: SHIPPED | OUTPATIENT
Start: 2025-06-25 | End: 2025-07-04

## 2025-06-24 RX ADMIN — SODIUM CHLORIDE 30 MG/ML INHALATION SOLUTION 4 ML: 30 SOLUTION INHALANT at 15:47

## 2025-06-24 RX ADMIN — PREDNISONE 20 MG: 20 TABLET ORAL at 08:00

## 2025-06-24 RX ADMIN — LETROZOLE 2.5 MG: 2.5 TABLET ORAL at 08:00

## 2025-06-24 RX ADMIN — NICOTINE POLACRILEX 4 MG: 4 LOZENGE ORAL at 12:05

## 2025-06-24 RX ADMIN — AMLODIPINE BESYLATE 5 MG: 5 TABLET ORAL at 08:01

## 2025-06-24 RX ADMIN — WATER 2000 MG: 1 INJECTION INTRAMUSCULAR; INTRAVENOUS; SUBCUTANEOUS at 11:05

## 2025-06-24 RX ADMIN — ENOXAPARIN SODIUM 30 MG: 100 INJECTION SUBCUTANEOUS at 08:03

## 2025-06-24 RX ADMIN — NICOTINE POLACRILEX 4 MG: 4 LOZENGE ORAL at 04:50

## 2025-06-24 RX ADMIN — SODIUM CHLORIDE 30 MG/ML INHALATION SOLUTION 4 ML: 30 SOLUTION INHALANT at 08:15

## 2025-06-24 RX ADMIN — IPRATROPIUM BROMIDE 0.5 MG: 0.5 SOLUTION RESPIRATORY (INHALATION) at 15:47

## 2025-06-24 RX ADMIN — LEVALBUTEROL 1.25 MG: 1.25 SOLUTION, CONCENTRATE RESPIRATORY (INHALATION) at 15:47

## 2025-06-24 RX ADMIN — SODIUM CHLORIDE, PRESERVATIVE FREE 10 ML: 5 INJECTION INTRAVENOUS at 11:07

## 2025-06-24 RX ADMIN — NICOTINE POLACRILEX 4 MG: 4 LOZENGE ORAL at 14:25

## 2025-06-24 RX ADMIN — VANCOMYCIN HYDROCHLORIDE 750 MG: 750 INJECTION, POWDER, LYOPHILIZED, FOR SOLUTION INTRAVENOUS at 16:08

## 2025-06-24 RX ADMIN — NICOTINE POLACRILEX 4 MG: 4 LOZENGE ORAL at 13:06

## 2025-06-24 RX ADMIN — BARICITINIB 1 MG: 2 TABLET, FILM COATED ORAL at 09:01

## 2025-06-24 RX ADMIN — ACETYLCYSTEINE 600 MG: 100 SOLUTION RESPIRATORY (INHALATION) at 15:47

## 2025-06-24 RX ADMIN — CARVEDILOL 6.25 MG: 6.25 TABLET, FILM COATED ORAL at 08:01

## 2025-06-24 RX ADMIN — NICOTINE POLACRILEX 4 MG: 4 LOZENGE ORAL at 15:40

## 2025-06-24 RX ADMIN — GUAIFENESIN 600 MG: 600 TABLET, MULTILAYER, EXTENDED RELEASE ORAL at 08:00

## 2025-06-24 RX ADMIN — NICOTINE POLACRILEX 4 MG: 4 LOZENGE ORAL at 11:05

## 2025-06-24 RX ADMIN — NICOTINE POLACRILEX 4 MG: 4 LOZENGE ORAL at 18:23

## 2025-06-24 RX ADMIN — IPRATROPIUM BROMIDE 0.5 MG: 0.5 SOLUTION RESPIRATORY (INHALATION) at 08:15

## 2025-06-24 RX ADMIN — LEVALBUTEROL 1.25 MG: 1.25 SOLUTION, CONCENTRATE RESPIRATORY (INHALATION) at 08:15

## 2025-06-24 RX ADMIN — SERTRALINE 50 MG: 50 TABLET, FILM COATED ORAL at 08:01

## 2025-06-24 RX ADMIN — PANTOPRAZOLE SODIUM 20 MG: 20 TABLET, DELAYED RELEASE ORAL at 08:01

## 2025-06-24 RX ADMIN — ACETYLCYSTEINE 600 MG: 100 SOLUTION RESPIRATORY (INHALATION) at 08:15

## 2025-06-24 ASSESSMENT — ENCOUNTER SYMPTOMS
COUGH: 0
NAUSEA: 0
SHORTNESS OF BREATH: 1
WHEEZING: 0
VOMITING: 0

## 2025-06-24 NOTE — DISCHARGE INSTR - COC
Continuity of Care Form    Patient Name: Shoaib Rothman   :  1960  MRN:  [id number]    Admit date:  6/10/2025  Discharge date:  25    Code Status Order: Full Code   Advance Directives:    Date/Time Healthcare Directive Type of Healthcare Directive Copy in Chart Healthcare Agent Appointed Healthcare Agent's Name Healthcare Agent's Phone Number    [phone] Yes, patient has an advance directive for healthcare treatment  Durable power of  for health care  --  --  Atul Holiday  [phone]             Admitting Physician:  Babita Hassan MD  PCP: Lamar Mondragon MD    Discharging Nurse: AB  Discharging Hospital Unit/Room#: 4455/4455-01  Discharging Unit Phone Number: [phone]    Emergency Contact:   Extended Emergency Contact Information  Primary Emergency Contact: patria,jason  Home Phone: [phone]  Work Phone: [phone]  Mobile Phone: [phone]  Relation: Brother/Sister   needed? No    Past Surgical History:  Past Surgical History:   Procedure Laterality Date    APPENDECTOMY      BREAST SURGERY      BRONCHOSCOPY N/A 2024    BRONCHOSCOPY performed by Fernando Dhillon MD at [facility] ENDOSCOPY    BRONCHOSCOPY N/A 2025    BRONCHOSCOPY ALVEOLAR LAVAGE / BX performed by Guille Mabry MD at [facility] ENDOSCOPY    BRONCHOSCOPY N/A 2025    BRONCHOSCOPY ALVEOLAR LAVAGE performed by Maximo Tyler MD at [facility] ENDOSCOPY    BRONCHOSCOPY N/A 3/21/2025    BRONCHOSCOPY CRYOTHERAPY / BIOPSY performed by Fernando Dhillon MD at [facility] ENDOSCOPY    BRONCHOSCOPY N/A 2025    BRONCHOSCOPY CRYOTHERAPY performed by Fernnado Dhillon MD at [facility] ENDOSCOPY    BRONCHOSCOPY  2025    BRONCHOSCOPY CRYOTHERAPY performed by Bernadine Mejia MD at [facility] ENDOSCOPY    BRONCHOSCOPY N/A 2025    BRONCHOSCOPY ALVEOLAR LAVAGE RUL superior segment performed by Bernadine Mejia MD at [facility] ENDOSCOPY    BRONCHOSCOPY N/A 2025    BRONCHOSCOPY BIOPSY

## 2025-06-24 NOTE — PLAN OF CARE
Problem: Chronic Conditions and Co-morbidities  Goal: Patient's chronic conditions and co-morbidity symptoms are monitored and maintained or improved  6/11/2025 1703 by Veronica Blake RN  Outcome: Progressing  Flowsheets (Taken 6/11/2025 0441 by Samy Sun, RN)  Care Plan - Patient's Chronic Conditions and Co-Morbidity Symptoms are Monitored and Maintained or Improved:   Monitor and assess patient's chronic conditions and comorbid symptoms for stability, deterioration, or improvement   Collaborate with multidisciplinary team to address chronic and comorbid conditions and prevent exacerbation or deterioration   Update acute care plan with appropriate goals if chronic or comorbid symptoms are exacerbated and prevent overall improvement and discharge     Problem: Discharge Planning  Goal: Discharge to home or other facility with appropriate resources  6/11/2025 1703 by Veronica Blake RN  Outcome: Progressing  Flowsheets (Taken 6/11/2025 0441 by Samy Sun, RN)  Discharge to home or other facility with appropriate resources:   Identify barriers to discharge with patient and caregiver   Identify discharge learning needs (meds, wound care, etc)   Refer to discharge planning if patient needs post-hospital services based on physician order or complex needs related to functional status, cognitive ability or social support system   Arrange for needed discharge resources and transportation as appropriate   Arrange for interpreters to assist at discharge as needed     Problem: Safety - Adult  Goal: Free from fall injury  6/11/2025 1703 by Veronica Blake RN  Outcome: Progressing  Flowsheets (Taken 6/11/2025 0441 by Samy Sun RN)  Free From Fall Injury:   Based on caregiver fall risk screen, instruct family/caregiver to ask for assistance with transferring infant if caregiver noted to have fall risk factors   Instruct family/caregiver on patient safety     Problem: Respiratory - Adult  Goal: 
  Problem: Chronic Conditions and Co-morbidities  Goal: Patient's chronic conditions and co-morbidity symptoms are monitored and maintained or improved  6/11/2025 2045 by Nir Rojo RN  Outcome: Progressing  Flowsheets (Taken 6/11/2025 2045)  Care Plan - Patient's Chronic Conditions and Co-Morbidity Symptoms are Monitored and Maintained or Improved:   Monitor and assess patient's chronic conditions and comorbid symptoms for stability, deterioration, or improvement   Collaborate with multidisciplinary team to address chronic and comorbid conditions and prevent exacerbation or deterioration   Update acute care plan with appropriate goals if chronic or comorbid symptoms are exacerbated and prevent overall improvement and discharge  6/11/2025 1703 by Veronica Blake RN  Outcome: Progressing  Flowsheets (Taken 6/11/2025 0441 by Samy Sun, RN)  Care Plan - Patient's Chronic Conditions and Co-Morbidity Symptoms are Monitored and Maintained or Improved:   Monitor and assess patient's chronic conditions and comorbid symptoms for stability, deterioration, or improvement   Collaborate with multidisciplinary team to address chronic and comorbid conditions and prevent exacerbation or deterioration   Update acute care plan with appropriate goals if chronic or comorbid symptoms are exacerbated and prevent overall improvement and discharge     Problem: Discharge Planning  Goal: Discharge to home or other facility with appropriate resources  6/11/2025 2045 by Nir Rojo RN  Outcome: Progressing  Flowsheets (Taken 6/11/2025 2045)  Discharge to home or other facility with appropriate resources:   Identify discharge learning needs (meds, wound care, etc)   Arrange for interpreters to assist at discharge as needed   Arrange for needed discharge resources and transportation as appropriate   Identify barriers to discharge with patient and caregiver     Problem: Safety - Adult  Goal: Free from fall injury  6/11/2025 
  Problem: Chronic Conditions and Co-morbidities  Goal: Patient's chronic conditions and co-morbidity symptoms are monitored and maintained or improved  6/11/2025 2046 by Nir Rojo RN  Outcome: Progressing  Flowsheets (Taken 6/11/2025 2046)  Care Plan - Patient's Chronic Conditions and Co-Morbidity Symptoms are Monitored and Maintained or Improved:   Monitor and assess patient's chronic conditions and comorbid symptoms for stability, deterioration, or improvement   Collaborate with multidisciplinary team to address chronic and comorbid conditions and prevent exacerbation or deterioration   Update acute care plan with appropriate goals if chronic or comorbid symptoms are exacerbated and prevent overall improvement and discharge     Problem: Discharge Planning  Goal: Discharge to home or other facility with appropriate resources  6/11/2025 2046 by Nir Rojo RN  Outcome: Progressing  Flowsheets (Taken 6/11/2025 2046)  Discharge to home or other facility with appropriate resources:   Arrange for needed discharge resources and transportation as appropriate   Identify discharge learning needs (meds, wound care, etc)   Arrange for interpreters to assist at discharge as needed   Refer to discharge planning if patient needs post-hospital services based on physician order or complex needs related to functional status, cognitive ability or social support system     Problem: Safety - Adult  Goal: Free from fall injury  6/11/2025 2046 by Nir Rojo RN  Outcome: Progressing  Flowsheets (Taken 6/11/2025 2046)  Free From Fall Injury:   Based on caregiver fall risk screen, instruct family/caregiver to ask for assistance with transferring infant if caregiver noted to have fall risk factors   Instruct family/caregiver on patient safety     Problem: Respiratory - Adult  Goal: Achieves optimal ventilation and oxygenation  6/11/2025 2046 by Nir Rojo RN  Outcome: Progressing  Flowsheets (Taken 6/11/2025 
  Problem: Chronic Conditions and Co-morbidities  Goal: Patient's chronic conditions and co-morbidity symptoms are monitored and maintained or improved  6/18/2025 1534 by Charles Armstrong, RN  Outcome: Progressing     Problem: Discharge Planning  Goal: Discharge to home or other facility with appropriate resources  Outcome: Progressing     Problem: Safety - Adult  Goal: Free from fall injury  6/18/2025 1534 by Charles Armstrong, RN  Outcome: Progressing     Problem: Respiratory - Adult  Goal: Achieves optimal ventilation and oxygenation  6/18/2025 1534 by Charles Armstrong, RN  Outcome: Progressing     Problem: Infection - Adult  Goal: Absence of infection at discharge  Outcome: Progressing     Problem: Metabolic/Fluid and Electrolytes - Adult  Goal: Electrolytes maintained within normal limits  Outcome: Progressing     Problem: Skin/Tissue Integrity  Goal: Skin integrity remains intact  Description: 1.  Monitor for areas of redness and/or skin breakdown2.  Assess vascular access sites hourly3.  Every 4-6 hours minimum:  Change oxygen saturation probe site4.  Every 4-6 hours:  If on nasal continuous positive airway pressure, respiratory therapy assess nares and determine need for appliance change or resting period  Outcome: Progressing     Problem: Pain  Goal: Verbalizes/displays adequate comfort level or baseline comfort level  Outcome: Progressing     Problem: ABCDS Injury Assessment  Goal: Absence of physical injury  6/18/2025 1534 by Charles Armstrong, RN  Outcome: Progressing     
  Problem: Chronic Conditions and Co-morbidities  Goal: Patient's chronic conditions and co-morbidity symptoms are monitored and maintained or improved  6/18/2025 2003 by Autumn Marquis RN  Outcome: Progressing  Note: Mange her copd better   6/18/2025 1534 by Charles Armstrong RN  Outcome: Progressing     Problem: Discharge Planning  Goal: Discharge to home or other facility with appropriate resources  6/18/2025 2003 by Autumn Marquis RN  Outcome: Progressing  6/18/2025 1534 by Charles Armstrong RN  Outcome: Progressing     Problem: Safety - Adult  Goal: Free from fall injury  6/18/2025 2003 by Autumn Marquis RN  Outcome: Progressing  6/18/2025 1534 by Charles Armstrong RN  Outcome: Progressing     Problem: Respiratory - Adult  Goal: Achieves optimal ventilation and oxygenation  6/18/2025 2003 by Autumn Marquis RN  Outcome: Progressing  6/18/2025 1534 by Charles Armstrong RN  Outcome: Progressing     Problem: Infection - Adult  Goal: Absence of infection at discharge  6/18/2025 2003 by Autumn Marquis RN  Outcome: Progressing  6/18/2025 1534 by Charles Armstrong RN  Outcome: Progressing  Goal: Absence of infection during hospitalization  Outcome: Progressing     Problem: Metabolic/Fluid and Electrolytes - Adult  Goal: Electrolytes maintained within normal limits  6/18/2025 2003 by Autumn Marquis RN  Outcome: Progressing  6/18/2025 1534 by Charles Armstrong RN  Outcome: Progressing  Goal: Glucose maintained within prescribed range  Outcome: Progressing     Problem: Skin/Tissue Integrity  Goal: Skin integrity remains intact  Description: 1.  Monitor for areas of redness and/or skin breakdown2.  Assess vascular access sites hourly3.  Every 4-6 hours minimum:  Change oxygen saturation probe site4.  Every 4-6 hours:  If on nasal continuous positive airway pressure, respiratory therapy assess nares and determine need for appliance change or resting period  6/18/2025 2003 by Autumn Marquis 
  Problem: Chronic Conditions and Co-morbidities  Goal: Patient's chronic conditions and co-morbidity symptoms are monitored and maintained or improved  6/19/2025 1954 by Atuumn Marquis RN  Outcome: Progressing  6/19/2025 1549 by Charles Armstrong RN  Outcome: Progressing     Problem: Discharge Planning  Goal: Discharge to home or other facility with appropriate resources  6/19/2025 1954 by Autumn aMrquis RN  Outcome: Progressing  6/19/2025 1549 by Charles Armstrong RN  Outcome: Progressing     Problem: Safety - Adult  Goal: Free from fall injury  6/19/2025 1954 by Autumn Marquis RN  Outcome: Progressing  6/19/2025 1549 by Charles Armstrong RN  Outcome: Progressing     Problem: Respiratory - Adult  Goal: Achieves optimal ventilation and oxygenation  6/19/2025 1954 by Autumn Marquis RN  Outcome: Progressing  Note: Patient needs to quit smoking  not open to stopping   6/19/2025 1549 by Charles Armstrong RN  Outcome: Progressing     Problem: Infection - Adult  Goal: Absence of infection at discharge  6/19/2025 1954 by Autumn Marquis RN  Outcome: Progressing  6/19/2025 1549 by Charles Armstrong RN  Outcome: Progressing  Goal: Absence of infection during hospitalization  Outcome: Progressing     Problem: Metabolic/Fluid and Electrolytes - Adult  Goal: Electrolytes maintained within normal limits  6/19/2025 1954 by Autumn Marquis RN  Outcome: Progressing  6/19/2025 1549 by Charles Armstrong RN  Outcome: Progressing  Goal: Glucose maintained within prescribed range  Outcome: Progressing     Problem: Skin/Tissue Integrity  Goal: Skin integrity remains intact  Description: 1.  Monitor for areas of redness and/or skin breakdown2.  Assess vascular access sites hourly3.  Every 4-6 hours minimum:  Change oxygen saturation probe site4.  Every 4-6 hours:  If on nasal continuous positive airway pressure, respiratory therapy assess nares and determine need for appliance change or resting period  6/19/2025 
  Problem: Chronic Conditions and Co-morbidities  Goal: Patient's chronic conditions and co-morbidity symptoms are monitored and maintained or improved  6/23/2025 0824 by Arpita Ruth RN  Outcome: Progressing     Problem: Discharge Planning  Goal: Discharge to home or other facility with appropriate resources  6/23/2025 0824 by Arpita Ruth RN  Outcome: Progressing     Problem: Safety - Adult  Goal: Free from fall injury  6/23/2025 0824 by Arpita Ruth RN  Outcome: Progressing     
  Problem: Chronic Conditions and Co-morbidities  Goal: Patient's chronic conditions and co-morbidity symptoms are monitored and maintained or improved  Outcome: Progressing     Problem: Discharge Planning  Goal: Discharge to home or other facility with appropriate resources  Outcome: Progressing     Problem: Safety - Adult  Goal: Free from fall injury  Outcome: Progressing     Problem: Respiratory - Adult  Goal: Achieves optimal ventilation and oxygenation  Outcome: Progressing     Problem: Infection - Adult  Goal: Absence of infection at discharge  Outcome: Progressing     Problem: Metabolic/Fluid and Electrolytes - Adult  Goal: Electrolytes maintained within normal limits  Outcome: Progressing     Problem: Skin/Tissue Integrity  Goal: Skin integrity remains intact  Description: 1.  Monitor for areas of redness and/or skin breakdown2.  Assess vascular access sites hourly3.  Every 4-6 hours minimum:  Change oxygen saturation probe site4.  Every 4-6 hours:  If on nasal continuous positive airway pressure, respiratory therapy assess nares and determine need for appliance change or resting period  Outcome: Progressing     Problem: Pain  Goal: Verbalizes/displays adequate comfort level or baseline comfort level  Outcome: Progressing     Problem: ABCDS Injury Assessment  Goal: Absence of physical injury  Outcome: Progressing     Problem: Nutrition Deficit:  Goal: Optimize nutritional status  Outcome: Progressing     
  Problem: Chronic Conditions and Co-morbidities  Goal: Patient's chronic conditions and co-morbidity symptoms are monitored and maintained or improved  Outcome: Progressing  Flowsheets (Taken 6/13/2025 1532)  Care Plan - Patient's Chronic Conditions and Co-Morbidity Symptoms are Monitored and Maintained or Improved:   Monitor and assess patient's chronic conditions and comorbid symptoms for stability, deterioration, or improvement   Collaborate with multidisciplinary team to address chronic and comorbid conditions and prevent exacerbation or deterioration   Update acute care plan with appropriate goals if chronic or comorbid symptoms are exacerbated and prevent overall improvement and discharge     Problem: Discharge Planning  Goal: Discharge to home or other facility with appropriate resources  Outcome: Progressing  Flowsheets (Taken 6/13/2025 1532)  Discharge to home or other facility with appropriate resources:   Identify barriers to discharge with patient and caregiver   Arrange for needed discharge resources and transportation as appropriate   Identify discharge learning needs (meds, wound care, etc)   Arrange for interpreters to assist at discharge as needed   Refer to discharge planning if patient needs post-hospital services based on physician order or complex needs related to functional status, cognitive ability or social support system     Problem: Safety - Adult  Goal: Free from fall injury  Outcome: Progressing  Flowsheets (Taken 6/13/2025 1532)  Free From Fall Injury: Instruct family/caregiver on patient safety     Problem: Respiratory - Adult  Goal: Achieves optimal ventilation and oxygenation  Outcome: Progressing  Flowsheets (Taken 6/13/2025 1532)  Achieves optimal ventilation and oxygenation:   Assess for changes in respiratory status   Assess for changes in mentation and behavior   Position to facilitate oxygenation and minimize respiratory effort   Oxygen supplementation based on oxygen 
  Problem: Chronic Conditions and Co-morbidities  Goal: Patient's chronic conditions and co-morbidity symptoms are monitored and maintained or improved  Outcome: Progressing  Flowsheets (Taken 6/14/2025 1408 by Marek Orta RN)  Care Plan - Patient's Chronic Conditions and Co-Morbidity Symptoms are Monitored and Maintained or Improved:   Monitor and assess patient's chronic conditions and comorbid symptoms for stability, deterioration, or improvement   Collaborate with multidisciplinary team to address chronic and comorbid conditions and prevent exacerbation or deterioration   Update acute care plan with appropriate goals if chronic or comorbid symptoms are exacerbated and prevent overall improvement and discharge  Note: Pt v/s stable on vapotherm 20 L 100% fiO2.  SpO2>88%.  Some desat w/ exertion this am.  Pulm aware.       Problem: Discharge Planning  Goal: Discharge to home or other facility with appropriate resources  Outcome: Progressing  Flowsheets (Taken 6/20/2025 1032)  Discharge to home or other facility with appropriate resources:   Identify barriers to discharge with patient and caregiver   Arrange for needed discharge resources and transportation as appropriate   Identify discharge learning needs (meds, wound care, etc)     Problem: Safety - Adult  Goal: Free from fall injury  Outcome: Progressing  Flowsheets (Taken 6/20/2025 1032)  Free From Fall Injury: Instruct family/caregiver on patient safety  Note: Pt is a Fall Risk. See Perez Fall Risk Score. Pt bed in low position and side rails up. Call light and belongings in reach. Pt encouraged to call for assistance. Will continue with hourly rounds for PO intake, pain needs, toileting, and repositioning as needed.         Problem: Skin/Tissue Integrity  Goal: Skin integrity remains intact  Description: 1.  Monitor for areas of redness and/or skin breakdown2.  Assess vascular access sites hourly3.  Every 4-6 hours minimum:  Change oxygen saturation 
  Problem: Chronic Conditions and Co-morbidities  Goal: Patient's chronic conditions and co-morbidity symptoms are monitored and maintained or improved  Outcome: Progressing  Flowsheets (Taken 6/14/2025 1408 by Marek Orta, RN)  Care Plan - Patient's Chronic Conditions and Co-Morbidity Symptoms are Monitored and Maintained or Improved:   Monitor and assess patient's chronic conditions and comorbid symptoms for stability, deterioration, or improvement   Collaborate with multidisciplinary team to address chronic and comorbid conditions and prevent exacerbation or deterioration   Update acute care plan with appropriate goals if chronic or comorbid symptoms are exacerbated and prevent overall improvement and discharge     Problem: Discharge Planning  Goal: Discharge to home or other facility with appropriate resources  6/17/2025 1531 by Tiki Alonzo, RN  Outcome: Progressing  Flowsheets (Taken 6/17/2025 1531)  Discharge to home or other facility with appropriate resources:   Identify barriers to discharge with patient and caregiver   Identify discharge learning needs (meds, wound care, etc)   Arrange for needed discharge resources and transportation as appropriate     Problem: Safety - Adult  Goal: Free from fall injury  6/18/2025 0202 by Shakira Claudio RN  Outcome: Progressing  Flowsheets (Taken 6/17/2025 1531 by Tiki Alonzo, RN)  Free From Fall Injury: Instruct family/caregiver on patient safety     Problem: Respiratory - Adult  Goal: Achieves optimal ventilation and oxygenation  6/18/2025 0202 by Shakira Claudio RN  Outcome: Progressing  Flowsheets (Taken 6/17/2025 1531 by Tiki Alonzo, RN)  Achieves optimal ventilation and oxygenation:   Assess for changes in respiratory status   Oxygen supplementation based on oxygen saturation or arterial blood gases   Assess for changes in mentation and behavior     Problem: Pain  Goal: Verbalizes/displays adequate comfort level or baseline comfort 
  Problem: Chronic Conditions and Co-morbidities  Goal: Patient's chronic conditions and co-morbidity symptoms are monitored and maintained or improved  Outcome: Progressing  Flowsheets (Taken 6/14/2025 1408 by Marek Orta, RN)  Care Plan - Patient's Chronic Conditions and Co-Morbidity Symptoms are Monitored and Maintained or Improved:   Monitor and assess patient's chronic conditions and comorbid symptoms for stability, deterioration, or improvement   Collaborate with multidisciplinary team to address chronic and comorbid conditions and prevent exacerbation or deterioration   Update acute care plan with appropriate goals if chronic or comorbid symptoms are exacerbated and prevent overall improvement and discharge     Problem: Discharge Planning  Goal: Discharge to home or other facility with appropriate resources  Outcome: Progressing  Flowsheets (Taken 6/14/2025 1408 by Marek Orta, RN)  Discharge to home or other facility with appropriate resources:   Identify barriers to discharge with patient and caregiver   Arrange for needed discharge resources and transportation as appropriate   Identify discharge learning needs (meds, wound care, etc)   Arrange for interpreters to assist at discharge as needed   Refer to discharge planning if patient needs post-hospital services based on physician order or complex needs related to functional status, cognitive ability or social support system     Problem: Safety - Adult  Goal: Free from fall injury  Outcome: Progressing  Flowsheets (Taken 6/14/2025 1408 by Marek Orta, RN)  Free From Fall Injury: Instruct family/caregiver on patient safety     Problem: Respiratory - Adult  Goal: Achieves optimal ventilation and oxygenation  6/16/2025 0452 by Shakira Claudio, RN  Outcome: Progressing  Flowsheets (Taken 6/15/2025 1633 by Maximo Perez, RN)  Achieves optimal ventilation and oxygenation:   Assess for changes in respiratory status   Assess for changes in 
  Problem: Chronic Conditions and Co-morbidities  Goal: Patient's chronic conditions and co-morbidity symptoms are monitored and maintained or improved  Outcome: Progressing  Flowsheets (Taken 6/14/2025 1408 by Marek Orta, RN)  Care Plan - Patient's Chronic Conditions and Co-Morbidity Symptoms are Monitored and Maintained or Improved:   Monitor and assess patient's chronic conditions and comorbid symptoms for stability, deterioration, or improvement   Collaborate with multidisciplinary team to address chronic and comorbid conditions and prevent exacerbation or deterioration   Update acute care plan with appropriate goals if chronic or comorbid symptoms are exacerbated and prevent overall improvement and discharge     Problem: Safety - Adult  Goal: Free from fall injury  Outcome: Progressing  Flowsheets (Taken 6/16/2025 1006 by Tiki Alonzo, RN)  Free From Fall Injury: Instruct family/caregiver on patient safety     Problem: Respiratory - Adult  Goal: Achieves optimal ventilation and oxygenation  Outcome: Progressing  Flowsheets (Taken 6/16/2025 1006 by Tiki Alonzo, RN)  Achieves optimal ventilation and oxygenation:   Assess for changes in respiratory status   Assess for changes in mentation and behavior   Respiratory therapy support as indicated   Initiate smoking cessation protocol as indicated   Position to facilitate oxygenation and minimize respiratory effort  Note: Remains on vapotherm     Problem: Skin/Tissue Integrity  Goal: Skin integrity remains intact  Description: 1.  Monitor for areas of redness and/or skin breakdown2.  Assess vascular access sites hourly3.  Every 4-6 hours minimum:  Change oxygen saturation probe site4.  Every 4-6 hours:  If on nasal continuous positive airway pressure, respiratory therapy assess nares and determine need for appliance change or resting period  Outcome: Progressing  Flowsheets (Taken 6/16/2025 1006 by Tiki Alonzo, RN)  Skin Integrity Remains Intact:   Monitor 
  Problem: Chronic Conditions and Co-morbidities  Goal: Patient's chronic conditions and co-morbidity symptoms are monitored and maintained or improved  Outcome: Progressing  Flowsheets (Taken 6/14/2025 1408)  Care Plan - Patient's Chronic Conditions and Co-Morbidity Symptoms are Monitored and Maintained or Improved:   Monitor and assess patient's chronic conditions and comorbid symptoms for stability, deterioration, or improvement   Collaborate with multidisciplinary team to address chronic and comorbid conditions and prevent exacerbation or deterioration   Update acute care plan with appropriate goals if chronic or comorbid symptoms are exacerbated and prevent overall improvement and discharge     Problem: Discharge Planning  Goal: Discharge to home or other facility with appropriate resources  Outcome: Progressing  Flowsheets (Taken 6/14/2025 1408)  Discharge to home or other facility with appropriate resources:   Identify barriers to discharge with patient and caregiver   Arrange for needed discharge resources and transportation as appropriate   Identify discharge learning needs (meds, wound care, etc)   Arrange for interpreters to assist at discharge as needed   Refer to discharge planning if patient needs post-hospital services based on physician order or complex needs related to functional status, cognitive ability or social support system     Problem: Safety - Adult  Goal: Free from fall injury  Outcome: Progressing  Flowsheets (Taken 6/14/2025 1408)  Free From Fall Injury: Instruct family/caregiver on patient safety     Problem: Respiratory - Adult  Goal: Achieves optimal ventilation and oxygenation  Outcome: Progressing  Flowsheets (Taken 6/14/2025 1408)  Achieves optimal ventilation and oxygenation:   Assess for changes in respiratory status   Assess for changes in mentation and behavior   Position to facilitate oxygenation and minimize respiratory effort   Oxygen supplementation based on oxygen 
  Problem: Chronic Conditions and Co-morbidities  Goal: Patient's chronic conditions and co-morbidity symptoms are monitored and maintained or improved  Outcome: Progressing  Flowsheets (Taken 6/21/2025 1330)  Care Plan - Patient's Chronic Conditions and Co-Morbidity Symptoms are Monitored and Maintained or Improved:   Monitor and assess patient's chronic conditions and comorbid symptoms for stability, deterioration, or improvement   Collaborate with multidisciplinary team to address chronic and comorbid conditions and prevent exacerbation or deterioration     Problem: Discharge Planning  Goal: Discharge to home or other facility with appropriate resources  Outcome: Progressing  Flowsheets (Taken 6/21/2025 1330)  Discharge to home or other facility with appropriate resources:   Identify barriers to discharge with patient and caregiver   Identify discharge learning needs (meds, wound care, etc)   Arrange for needed discharge resources and transportation as appropriate  Note: Pt remains on vapotherm     Problem: Safety - Adult  Goal: Free from fall injury  Outcome: Progressing  Flowsheets (Taken 6/21/2025 1330)  Free From Fall Injury: Instruct family/caregiver on patient safety  Note: Pt is a Fall Risk. See Perez Fall Risk Score. Pt bed in low position and side rails up. Call light and belongings in reach. Pt encouraged to call for assistance. Will continue with hourly rounds for PO intake, pain needs, toileting, and repositioning as needed.        Problem: Respiratory - Adult  Goal: Achieves optimal ventilation and oxygenation  Outcome: Progressing  Flowsheets (Taken 6/21/2025 1330)  Achieves optimal ventilation and oxygenation:   Assess for changes in respiratory status   Position to facilitate oxygenation and minimize respiratory effort   Assess for changes in mentation and behavior   Encourage broncho-pulmonary hygiene including cough, deep breathe, incentive spirometry   Oxygen supplementation based on oxygen 
  Problem: Chronic Conditions and Co-morbidities  Goal: Patient's chronic conditions and co-morbidity symptoms are monitored and maintained or improved  Outcome: Progressing  Flowsheets (Taken 6/23/2025 0427)  Care Plan - Patient's Chronic Conditions and Co-Morbidity Symptoms are Monitored and Maintained or Improved:   Collaborate with multidisciplinary team to address chronic and comorbid conditions and prevent exacerbation or deterioration   Monitor and assess patient's chronic conditions and comorbid symptoms for stability, deterioration, or improvement   Update acute care plan with appropriate goals if chronic or comorbid symptoms are exacerbated and prevent overall improvement and discharge     Problem: Discharge Planning  Goal: Discharge to home or other facility with appropriate resources  Outcome: Progressing  Flowsheets (Taken 6/23/2025 0427)  Discharge to home or other facility with appropriate resources:   Identify barriers to discharge with patient and caregiver   Arrange for needed discharge resources and transportation as appropriate   Identify discharge learning needs (meds, wound care, etc)   Refer to discharge planning if patient needs post-hospital services based on physician order or complex needs related to functional status, cognitive ability or social support system     Problem: Safety - Adult  Goal: Free from fall injury  Outcome: Progressing  Flowsheets (Taken 6/23/2025 0427)  Free From Fall Injury:   Instruct family/caregiver on patient safety   Based on caregiver fall risk screen, instruct family/caregiver to ask for assistance with transferring infant if caregiver noted to have fall risk factors     Problem: Respiratory - Adult  Goal: Achieves optimal ventilation and oxygenation  Outcome: Progressing  Flowsheets (Taken 6/23/2025 0427)  Achieves optimal ventilation and oxygenation:   Assess for changes in respiratory status   Assess for changes in mentation and behavior   Position to 
  Problem: Chronic Conditions and Co-morbidities  Goal: Patient's chronic conditions and co-morbidity symptoms are monitored and maintained or improved  Outcome: Progressing  Flowsheets (Taken 6/24/2025 0532)  Care Plan - Patient's Chronic Conditions and Co-Morbidity Symptoms are Monitored and Maintained or Improved:   Monitor and assess patient's chronic conditions and comorbid symptoms for stability, deterioration, or improvement   Collaborate with multidisciplinary team to address chronic and comorbid conditions and prevent exacerbation or deterioration   Update acute care plan with appropriate goals if chronic or comorbid symptoms are exacerbated and prevent overall improvement and discharge     Problem: Discharge Planning  Goal: Discharge to home or other facility with appropriate resources  Outcome: Progressing  Flowsheets (Taken 6/24/2025 0532)  Discharge to home or other facility with appropriate resources:   Identify barriers to discharge with patient and caregiver   Arrange for needed discharge resources and transportation as appropriate   Identify discharge learning needs (meds, wound care, etc)   Refer to discharge planning if patient needs post-hospital services based on physician order or complex needs related to functional status, cognitive ability or social support system     Problem: Safety - Adult  Goal: Free from fall injury  Outcome: Progressing  Flowsheets (Taken 6/24/2025 0532)  Free From Fall Injury:   Based on caregiver fall risk screen, instruct family/caregiver to ask for assistance with transferring infant if caregiver noted to have fall risk factors   Instruct family/caregiver on patient safety     Problem: Respiratory - Adult  Goal: Achieves optimal ventilation and oxygenation  Outcome: Progressing  Flowsheets (Taken 6/24/2025 0532)  Achieves optimal ventilation and oxygenation:   Assess for changes in respiratory status   Assess for changes in mentation and behavior   Position to 
  Problem: Discharge Planning  Goal: Discharge to home or other facility with appropriate resources  6/16/2025 1006 by Tiki Alonzo, RN  Outcome: Progressing  Flowsheets (Taken 6/16/2025 1006)  Discharge to home or other facility with appropriate resources:   Identify barriers to discharge with patient and caregiver   Identify discharge learning needs (meds, wound care, etc)   Arrange for needed discharge resources and transportation as appropriate     Problem: Safety - Adult  Goal: Free from fall injury  6/16/2025 1006 by Tiki Alonzo, RN  Outcome: Progressing  Flowsheets (Taken 6/16/2025 1006)  Free From Fall Injury: Instruct family/caregiver on patient safety     Problem: Respiratory - Adult  Goal: Achieves optimal ventilation and oxygenation  6/16/2025 1006 by Tiki Alonzo RN  Outcome: Progressing  Flowsheets (Taken 6/16/2025 1006)  Achieves optimal ventilation and oxygenation:   Assess for changes in respiratory status   Assess for changes in mentation and behavior   Respiratory therapy support as indicated   Initiate smoking cessation protocol as indicated   Position to facilitate oxygenation and minimize respiratory effort  Note: Pt on 30L heated vapo therm at 80%     Problem: Skin/Tissue Integrity  Goal: Skin integrity remains intact  Description: 1.  Monitor for areas of redness and/or skin breakdown2.  Assess vascular access sites hourly3.  Every 4-6 hours minimum:  Change oxygen saturation probe site4.  Every 4-6 hours:  If on nasal continuous positive airway pressure, respiratory therapy assess nares and determine need for appliance change or resting period  Outcome: Progressing  Flowsheets (Taken 6/16/2025 1006)  Skin Integrity Remains Intact:   Monitor for areas of redness and/or skin breakdown   Pressure redistribution bed/mattress (bed type)   Check visual cues for pain   Turn and reposition as indicated     Problem: Pain  Goal: Verbalizes/displays adequate comfort level or baseline comfort 
  Problem: Discharge Planning  Goal: Discharge to home or other facility with appropriate resources  Outcome: Progressing  Flowsheets (Taken 6/17/2025 1531)  Discharge to home or other facility with appropriate resources:   Identify barriers to discharge with patient and caregiver   Identify discharge learning needs (meds, wound care, etc)   Arrange for needed discharge resources and transportation as appropriate     Problem: Safety - Adult  Goal: Free from fall injury  6/17/2025 1531 by Tiki Alonzo, RN  Outcome: Progressing  Flowsheets (Taken 6/17/2025 1531)  Free From Fall Injury: Instruct family/caregiver on patient safety  Note: Pt is a Fall Risk. See Perez Fall Risk Score. Pt bed in low position and side rails up. Call light and belongings in reach. Pt encouraged to call for assistance. Will continue with hourly rounds for PO intake, pain needs, toileting, and repositioning as needed.        Problem: Respiratory - Adult  Goal: Achieves optimal ventilation and oxygenation  6/17/2025 1531 by Tiki Alonzo RN  Outcome: Progressing  Flowsheets (Taken 6/17/2025 1531)  Achieves optimal ventilation and oxygenation:   Assess for changes in respiratory status   Oxygen supplementation based on oxygen saturation or arterial blood gases   Assess for changes in mentation and behavior     Problem: Infection - Adult  Goal: Absence of infection at discharge  Outcome: Progressing  Flowsheets (Taken 6/17/2025 1531)  Absence of infection at discharge:   Assess and monitor for signs and symptoms of infection   Monitor lab/diagnostic results   Monitor endotracheal (as able) and nasal secretions for changes in amount and color   Administer medications as ordered     Problem: Skin/Tissue Integrity  Goal: Skin integrity remains intact  Description: 1.  Monitor for areas of redness and/or skin breakdown2.  Assess vascular access sites hourly3.  Every 4-6 hours minimum:  Change oxygen saturation probe site4.  Every 4-6 hours:  If on 
  Problem: Discharge Planning  Goal: Discharge to home or other facility with appropriate resources  Outcome: Progressing  Flowsheets (Taken 6/22/2025 1352)  Discharge to home or other facility with appropriate resources:   Identify barriers to discharge with patient and caregiver   Arrange for interpreters to assist at discharge as needed  Note: Pt scheduled for procedure at 730 am 6/23, a lymphangiogram     Problem: Safety - Adult  Goal: Free from fall injury  Outcome: Progressing  Flowsheets (Taken 6/22/2025 1354)  Free From Fall Injury: Instruct family/caregiver on patient safety  Note: Pt is a Fall Risk. See Perez Fall Risk Score. Pt bed in low position and side rails up. Call light and belongings in reach. Pt encouraged to call for assistance. Will continue with hourly rounds for PO intake, pain needs, toileting, and repositioning as needed.        Problem: Respiratory - Adult  Goal: Achieves optimal ventilation and oxygenation  Outcome: Progressing  Flowsheets (Taken 6/22/2025 1359)  Achieves optimal ventilation and oxygenation:   Assess for changes in respiratory status   Oxygen supplementation based on oxygen saturation or arterial blood gases   Assess for changes in mentation and behavior   Encourage broncho-pulmonary hygiene including cough, deep breathe, incentive spirometry     Problem: Skin/Tissue Integrity  Goal: Skin integrity remains intact  Description: 1.  Monitor for areas of redness and/or skin breakdown2.  Assess vascular access sites hourly3.  Every 4-6 hours minimum:  Change oxygen saturation probe site4.  Every 4-6 hours:  If on nasal continuous positive airway pressure, respiratory therapy assess nares and determine need for appliance change or resting period  Outcome: Progressing  Flowsheets (Taken 6/22/2025 1356)  Skin Integrity Remains Intact:   Monitor for areas of redness and/or skin breakdown   Pressure redistribution bed/mattress (bed type)   Every 4-6 hours minimum:  Change oxygen 
  Problem: Respiratory - Adult  Goal: Achieves optimal ventilation and oxygenation  Outcome: Not Progressing  Flowsheets (Taken 6/15/2025 1633)  Achieves optimal ventilation and oxygenation:   Assess for changes in respiratory status   Assess for changes in mentation and behavior   Position to facilitate oxygenation and minimize respiratory effort   Oxygen supplementation based on oxygen saturation or arterial blood gases  Note: Pt has remained on Vapotherm at between 25-40L/min and % FiO2 this shift. Pt does experience significant dyspnea with exertion that is relieved with rest. Pt continues to received scheduled IV steroids per MD order and scheduled Baricitinib per MD order (see eMAR).      Problem: Pain  Goal: Verbalizes/displays adequate comfort level or baseline comfort level  Outcome: Progressing  Flowsheets (Taken 6/15/2025 1637)  Verbalizes/displays adequate comfort level or baseline comfort level:   Encourage patient to monitor pain and request assistance   Assess pain using appropriate pain scale   Administer analgesics based on type and severity of pain and evaluate response   Implement non-pharmacological measures as appropriate and evaluate response  Note: Pt has not had any complaints of pain noted this shift.      Problem: Respiratory - Adult  Goal: Achieves optimal ventilation and oxygenation  Outcome: Not Progressing  Flowsheets (Taken 6/15/2025 1633)  Achieves optimal ventilation and oxygenation:   Assess for changes in respiratory status   Assess for changes in mentation and behavior   Position to facilitate oxygenation and minimize respiratory effort   Oxygen supplementation based on oxygen saturation or arterial blood gases  Note: Pt has remained on Vapotherm at between 25-40L/min and % FiO2 this shift. Pt does experience significant dyspnea with exertion that is relieved with rest. Pt continues to received scheduled IV steroids per MD order and scheduled Baricitinib per MD order 
  Problem: Safety - Adult  Goal: Free from fall injury  6/24/2025 1048 by Carissa Whitehead, RN  Outcome: Progressing  Flowsheets (Taken 6/24/2025 1048)  Free From Fall Injury: Instruct family/caregiver on patient safety     Problem: Respiratory - Adult  Goal: Achieves optimal ventilation and oxygenation  6/24/2025 1048 by Carissa Whitehead, RN  Outcome: Progressing  Flowsheets (Taken 6/24/2025 1048)  Achieves optimal ventilation and oxygenation: Assess for changes in respiratory status     Problem: Pain  Goal: Verbalizes/displays adequate comfort level or baseline comfort level  6/24/2025 1048 by Carissa Whitehead RN  Outcome: Progressing  Flowsheets (Taken 6/24/2025 1048)  Verbalizes/displays adequate comfort level or baseline comfort level:   Encourage patient to monitor pain and request assistance   Assess pain using appropriate pain scale   Administer analgesics based on type and severity of pain and evaluate response   Consider cultural and social influences on pain and pain management   Implement non-pharmacological measures as appropriate and evaluate response   Notify Licensed Independent Practitioner if interventions unsuccessful or patient reports new pain     
Attempted to see patient, in cath lab for lymphangiogram  
BAL molecular panel returned with more than 1 M DNA copies/mL of E coli and MRSA. Given history of E coli ESBL I will initiate treatment with meropenem (documented in 5/2025 that patient tolerates despite anaphylaxis to penicillins) and IV vancomycin. No fevers or clinically significant leukocytosis this admission but may explain why patient has not been able to wean off her O2 requirement.  
I saw and evaluated the patient, performing the key elements of the service.  I discussed the findings, assessment and plan with the resident and agree with the resident's findings and plan as documented in the resident's note.     Patient is 64-year-old female with PMH significant for severe COPD, chronic hypoxic respiratory failure on 5 L of supplemental oxygen, recurrent pneumonias with mucous plugging with features of plastic bronchitis, CKD stage IV, history of breast cancer, history of DVT/PE on Eliquis, resident of long-term.  With concern for shortness of breath and worsening of oxygen requirement.    Patient seen and examined today.  She is currently on Vapotherm.    Physical exam  General Chronically ill looking, on Vapotherm  HEENT head atraumatic, nontender, bowel present  Respiratory on Vapotherm rhonchorous breathing sounds.  Cardiac S1, S2 audible, regular rate and rhythm, no murmur noted  Neuro alert oriented x 3.    Assessment and plan  #Acute on chronic hypoxic respiratory failure currently on Vapotherm  #COVID pneumonia   #History of recurrent mucous plugging  Discussed with Dr. Marshall he is more concerned about CT finding consistent with COVID-pneumonia rather than mucous plugging.  Continue Decadron 6 mg for 10 days.  Baricitinib  Trend inflammatory markers.  Wean off oxygen to keep SO2 between 88 to 90%.    #CKD stage IV with history of FSGS      .  Disposition.  Continue supportive care for COVID-19.  Wean off oxygen as tolerated.  Inpatient.  
Assess the need for suctioning and aspirate as needed   Initiate smoking cessation protocol as indicated   Position to facilitate oxygenation and minimize respiratory effort   Assess for changes in respiratory status   Assess for changes in mentation and behavior   Oxygen supplementation based on oxygen saturation or arterial blood gases   Encourage broncho-pulmonary hygiene including cough, deep breathe, incentive spirometry   Assess and instruct to report shortness of breath or any respiratory difficulty     Problem: Infection - Adult  Goal: Absence of infection at discharge  Outcome: Progressing  Flowsheets (Taken 6/11/2025 0441)  Absence of infection at discharge:   Assess and monitor for signs and symptoms of infection   Monitor lab/diagnostic results   Monitor all insertion sites i.e., indwelling lines, tubes and drains   Monitor endotracheal (as able) and nasal secretions for changes in amount and color   Delta appropriate cooling/warming therapies per order   Administer medications as ordered   Instruct and encourage patient and family to use good hand hygiene technique   Identify and instruct in appropriate isolation precautions for identified infection/condition     Problem: Infection - Adult  Goal: Absence of infection during hospitalization  Outcome: Progressing  Flowsheets (Taken 6/11/2025 0441)  Absence of infection during hospitalization:   Assess and monitor for signs and symptoms of infection   Monitor lab/diagnostic results   Monitor all insertion sites i.e., indwelling lines, tubes and drains   Monitor endotracheal (as able) and nasal secretions for changes in amount and color   Delta appropriate cooling/warming therapies per order   Administer medications as ordered   Instruct and encourage patient and family to use good hand hygiene technique   Identify and instruct in appropriate isolation precautions for identified infection/condition     Problem: Metabolic/Fluid and Electrolytes -

## 2025-06-24 NOTE — CARE COORDINATION
Case Management Assessment            Discharge Note                    Date / Time of Note: 6/24/2025 10:26 AM                  Discharge Note Completed by: Chris Cortes RN    Patient Name: Shoaib Rothman   YOB: 1960  Diagnosis: COPD exacerbation (HCC) [J44.1]  Acute respiratory failure with hypoxemia (HCC) [J96.01]  COVID-19 [U07.1]   Date / Time: 6/10/2025  5:06 PM    Current PCP: Lamar Mondragon MD  Clinic patient: No    Hospitalization in the last 30 days: Yes  Readmission Assessment  Number of Days since last admission?: 8-30 days  Previous Disposition: Long Term Care  Who is being Interviewed: Patient  What was the patient's/caregiver's perception as to why they think they needed to return back to the hospital?: Other (Comment) (unresolved condition)  Did you visit your Primary Care Physician after you left the hospital, before you returned this time?: Yes  Did you see a specialist, such as Cardiac, Pulmonary, Orthopedic Physician, etc. after you left the hospital?: No  Who advised the patient to return to the hospital?: Other Nurse (Navigator, CTN)  Does the patient report anything that got in the way of taking their medications?: No  In our efforts to provide the best possible care to you and others like you, can you think of anything that we could have done to help you after you left the hospital the first time, so that you might not have needed to return so soon?: Other (Comment) (na)    Advance Directives:  Code Status: Full Code  [place] DNR form completed and on chart: Not Indicated    Financial:  [facility] / Plan: [facility] / Product Type: *No Product type* /      Pharmacy:    [facility] [place], [place] - [address] -  [phone] - F [phone]  [address] [place] [address]  Phone: [phone] Fax: [phone]      Assistance purchasing medications?: Potential Assistance Purchasing Medications: No  Assistance

## 2025-06-24 NOTE — CARE COORDINATION
10:26 AM  [facility] able to accommodate imaging requests from Pulm team    Electronically signed by Chris Cortes RN, CM on 6/24/2025 at 10:26 AM.  Phone: [phone]  Fax: [phone]        9:31 AM  Tentative transport time set for 7 pm today    Electronically signed by Chris Cortes RN, CM on 6/24/2025 at 9:31 AM.  Phone: [phone]  Fax: [phone]          8:54 AM  Message sent to Yu ANDERSEN at [facility] to see if [facility] is able to accommodate daily KUBs and CT when needed. Awaiting response. Per MD, if [facility] is able to accommodate, pt potentially can dc.    Electronically signed by Chris Cortes RN, CM on 6/24/2025 at 8:55 AM.  Phone: [phone]  Fax: [phone]

## 2025-06-24 NOTE — PROGRESS NOTES
Internal Medicine Progress Note    Patient: Shoaib Rothman   : 1960   Admit Date: 6/10/2025     Date: 2025     Interval History     Patient missed during rounds as she was down for her lymphangiogram.  Procedure was unsuccessful.  Cathflo ordered over night for occluded port.  Reordered cathflo as port remains occluded.    ROS     Review of Systems  Objective     I/Os:  I/O last 3 completed shifts:  In: 1120 [P.O.:1110; I.V.:10]  Out: 3900 [Urine:3900]    Vital Signs:  Patient Vitals for the past 3 hrs:   BP Temp Temp src Pulse Resp SpO2   25 1252 -- -- -- 91 -- 96 %   25 1249 -- -- -- 94 16 95 %   25 1216 (!) 136/94 98.7 °F (37.1 °C) Axillary 97 16 93 %       Physical Exam    Medication:  Scheduled:     vancomycin  750 mg IntraVENous Q24H    acetaminophen  1,000 mg Oral 4 times per day    ipratropium  0.5 mg Nebulization TID RT    [Held by provider] enoxaparin  30 mg SubCUTAneous BID    [START ON 2025] predniSONE  20 mg Oral Daily    Followed by    [START ON 2025] predniSONE  10 mg Oral Daily    Followed by    [START ON 2025] predniSONE  5 mg Oral Daily    cefTRIAXone (ROCEPHIN) IV  2,000 mg IntraVENous Q24H    levalbuterol  1.25 mg Nebulization TID    acetylcysteine  600 mg Inhalation TID RT    insulin lispro  0-16 Units SubCUTAneous 4x Daily AC & HS    insulin glargine  5 Units SubCUTAneous Nightly    baricitinib  1 mg Oral Daily    traZODone  25 mg Oral Nightly    [Held by provider] torsemide  20 mg Oral Daily    temazepam  15 mg Oral Nightly    [Held by provider] spironolactone  25 mg Oral Daily    amLODIPine  5 mg Oral Daily    [Held by provider] apixaban  2.5 mg Oral BID    carvedilol  6.25 mg Oral BID    guaiFENesin  600 mg Oral BID    hydrOXYzine HCl  25 mg Oral Nightly    letrozole  2.5 mg Oral Daily    melatonin  6 mg Oral Nightly    pantoprazole  20 mg Oral Daily    rOPINIRole  0.25 mg Oral Nightly    sertraline  50 mg Oral QAM    sodium chloride 
      Internal Medicine Progress Note    Patient: Shoaib Rothman   : 1960   Admit Date: 6/10/2025     Date: 2025     Interval History     Patient seen at bedside on 20L which was later increased to 35L by RT. BAL cultures grew e coli sensitive to ceftriaxone, so Merrem was changed. Plan for lymphangiogram with thoracic duct embolization on     ROS     Review of Systems   Constitutional:  Positive for fatigue.   Respiratory:  Positive for shortness of breath.      Objective     I/Os:  I/O last 3 completed shifts:  In: 720 [P.O.:720]  Out:  [Urine:]    Vital Signs:  Patient Vitals for the past 3 hrs:   BP Temp Temp src Pulse Resp SpO2   25 1025 -- -- -- (!) 101 -- (!) 88 %   25 1019 -- -- -- 99 24 (!) 89 %   25 0943 133/81 97.7 °F (36.5 °C) Axillary -- 18 --       Physical Exam  Vitals reviewed.   Constitutional:       General: She is not in acute distress.     Appearance: She is obese. She is ill-appearing. She is not toxic-appearing.   HENT:      Head: Normocephalic and atraumatic.   Cardiovascular:      Rate and Rhythm: Normal rate and regular rhythm.      Heart sounds:      No gallop.   Pulmonary:      Breath sounds: Wheezing present. No decreased breath sounds or rales.      Comments: Diminished breath sounds bilaterally at lung bases  Abdominal:      General: Abdomen is flat.      Palpations: Abdomen is soft.   Musculoskeletal:      Right lower leg: No edema.      Left lower leg: No edema.   Neurological:      Mental Status: She is alert.         Medication:  Scheduled:     vancomycin  750 mg IntraVENous Once    cefTRIAXone (ROCEPHIN) IV  2,000 mg IntraVENous Q24H    vancomycin (VANCOCIN) intermittent dosing (placeholder)   Other RX Placeholder    sodium zirconium cyclosilicate  10 g Oral Daily    levalbuterol  1.25 mg Nebulization TID    acetylcysteine  600 mg Inhalation TID RT    insulin lispro  0-16 Units SubCUTAneous 4x Daily AC & HS    insulin glargine  5 
      Internal Medicine Progress Note    Patient: Shoaib Rothman   : 1960   Admit Date: 6/10/2025     Date: 2025     Interval History     Patient seen at bedside. O2 requirement improved to 11L.  Plan to discharge with reattempt at lymphangiogram in 2-4 weeks.    ROS     Review of Systems   Constitutional:  Positive for fatigue.   Respiratory:  Positive for shortness of breath.      Objective     I/Os:  I/O last 3 completed shifts:  In: 2820 [P.O.:1500; I.V.:1320]  Out: 2700 [Urine:2700]    Vital Signs:  Patient Vitals for the past 3 hrs:   BP Temp Temp src Pulse Resp SpO2   25 1111 (!) 147/84 97.4 °F (36.3 °C) Axillary 81 19 92 %       Physical Exam  Vitals reviewed.   Constitutional:       General: She is not in acute distress.     Appearance: She is obese. She is ill-appearing. She is not toxic-appearing.   HENT:      Head: Normocephalic and atraumatic.   Cardiovascular:      Rate and Rhythm: Normal rate and regular rhythm.      Heart sounds:      No gallop.   Pulmonary:      Breath sounds: Wheezing present. No decreased breath sounds or rales.      Comments: Diminished breath sounds bilaterally at lung bases  Abdominal:      General: Abdomen is flat.      Palpations: Abdomen is soft.   Musculoskeletal:      Right lower leg: No edema.      Left lower leg: No edema.   Neurological:      Mental Status: She is alert.         Medication:  Scheduled:     vancomycin  750 mg IntraVENous Q24H    acetaminophen  1,000 mg Oral 4 times per day    ipratropium  0.5 mg Nebulization TID RT    enoxaparin  30 mg SubCUTAneous BID    predniSONE  20 mg Oral Daily    Followed by    [START ON 2025] predniSONE  10 mg Oral Daily    Followed by    [START ON 2025] predniSONE  5 mg Oral Daily    levalbuterol  1.25 mg Nebulization TID    acetylcysteine  600 mg Inhalation TID RT    insulin lispro  0-16 Units SubCUTAneous 4x Daily AC & HS    insulin glargine  5 Units SubCUTAneous Nightly    traZODone  25 mg Oral 
      Internal Medicine Progress Note    Patient: Shoaib Rothman   : 1960   Admit Date: 6/10/2025     Date: 2025     Interval History     Patient seen at bedside. O2 requirement improved to 15L. She reports feeling the same as yesterday.  Lovenox held at midnight for procedure tomorrow    ROS     Review of Systems   Constitutional:  Positive for fatigue.   Respiratory:  Positive for shortness of breath.      Objective     I/Os:  I/O last 3 completed shifts:  In: 1110 [P.O.:1110]  Out: 4300 [Urine:4300]    Vital Signs:  Patient Vitals for the past 3 hrs:   BP Temp Temp src Pulse Resp SpO2   25 1516 (!) 154/86 97.6 °F (36.4 °C) Axillary 87 20 95 %   25 1435 -- -- -- 86 -- 96 %   25 1428 -- -- -- 81 -- 95 %       Physical Exam  Vitals reviewed.   Constitutional:       General: She is not in acute distress.     Appearance: She is obese. She is ill-appearing. She is not toxic-appearing.   HENT:      Head: Normocephalic and atraumatic.   Cardiovascular:      Rate and Rhythm: Normal rate and regular rhythm.      Heart sounds:      No gallop.   Pulmonary:      Breath sounds: Wheezing present. No decreased breath sounds or rales.      Comments: Diminished breath sounds bilaterally at lung bases  Abdominal:      General: Abdomen is flat.      Palpations: Abdomen is soft.   Musculoskeletal:      Right lower leg: No edema.      Left lower leg: No edema.   Neurological:      Mental Status: She is alert.         Medication:  Scheduled:     vancomycin  750 mg IntraVENous Q24H    acetaminophen  1,000 mg Oral 4 times per day    ipratropium  0.5 mg Nebulization TID RT    [Held by provider] enoxaparin  30 mg SubCUTAneous BID    predniSONE  40 mg Oral Daily    Followed by    [START ON 2025] predniSONE  20 mg Oral Daily    Followed by    [START ON 2025] predniSONE  10 mg Oral Daily    Followed by    [START ON 2025] predniSONE  5 mg Oral Daily    cefTRIAXone (ROCEPHIN) IV  2,000 mg 
      Internal Medicine Progress Note    Patient: Shoaib Rothman   : 1960   Admit Date: 6/10/2025     Date: 2025     Interval History     Patient seen at bedside. O2 requirement improving. She reports feeling better.    ROS     Review of Systems   Constitutional:  Positive for fatigue.   Respiratory:  Positive for shortness of breath.      Objective     I/Os:  I/O last 3 completed shifts:  In: 240 [P.O.:240]  Out: 2300 [Urine:2300]    Vital Signs:  Patient Vitals for the past 3 hrs:   BP Temp Temp src Pulse Resp SpO2   25 1329 -- -- -- 85 -- 93 %   25 1206 (!) 148/82 98 °F (36.7 °C) Oral 83 22 94 %       Physical Exam  Vitals reviewed.   Constitutional:       General: She is not in acute distress.     Appearance: She is obese. She is ill-appearing. She is not toxic-appearing.   HENT:      Head: Normocephalic and atraumatic.   Cardiovascular:      Rate and Rhythm: Normal rate and regular rhythm.      Heart sounds:      No gallop.   Pulmonary:      Breath sounds: Wheezing present. No decreased breath sounds or rales.      Comments: Diminished breath sounds bilaterally at lung bases  Abdominal:      General: Abdomen is flat.      Palpations: Abdomen is soft.   Musculoskeletal:      Right lower leg: No edema.      Left lower leg: No edema.   Neurological:      Mental Status: She is alert.         Medication:  Scheduled:     [START ON 2025] predniSONE  40 mg Oral Daily    Followed by    [START ON 2025] predniSONE  20 mg Oral Daily    Followed by    [START ON 2025] predniSONE  10 mg Oral Daily    Followed by    [START ON 2025] predniSONE  5 mg Oral Daily    vancomycin  500 mg IntraVENous Once    cefTRIAXone (ROCEPHIN) IV  2,000 mg IntraVENous Q24H    vancomycin (VANCOCIN) intermittent dosing (placeholder)   Other RX Placeholder    sodium zirconium cyclosilicate  10 g Oral Daily    levalbuterol  1.25 mg Nebulization TID    acetylcysteine  600 mg Inhalation TID RT    insulin 
      Internal Medicine Progress Note    Patient: Shoaib Rothman   : 1960   Admit Date: 6/10/2025     Date: 2025     Interval History     Patient seen at bedside. O2 requirement stable. She reports feeling the same as yesterday.    ROS     Review of Systems   Constitutional:  Positive for fatigue.   Respiratory:  Positive for shortness of breath.      Objective     I/Os:  I/O last 3 completed shifts:  In: 1320 [P.O.:1320]  Out: 4600 [Urine:4600]    Vital Signs:  Patient Vitals for the past 3 hrs:   SpO2   25 1012 99 %       Physical Exam  Vitals reviewed.   Constitutional:       General: She is not in acute distress.     Appearance: She is obese. She is ill-appearing. She is not toxic-appearing.   HENT:      Head: Normocephalic and atraumatic.   Cardiovascular:      Rate and Rhythm: Normal rate and regular rhythm.      Heart sounds:      No gallop.   Pulmonary:      Breath sounds: Wheezing present. No decreased breath sounds or rales.      Comments: Diminished breath sounds bilaterally at lung bases  Abdominal:      General: Abdomen is flat.      Palpations: Abdomen is soft.   Musculoskeletal:      Right lower leg: No edema.      Left lower leg: No edema.   Neurological:      Mental Status: She is alert.         Medication:  Scheduled:     acetaminophen  1,000 mg Oral 4 times per day    ipratropium  0.5 mg Nebulization TID RT    vancomycin  1,250 mg IntraVENous Once    enoxaparin  30 mg SubCUTAneous BID    predniSONE  40 mg Oral Daily    Followed by    [START ON 2025] predniSONE  20 mg Oral Daily    Followed by    [START ON 2025] predniSONE  10 mg Oral Daily    Followed by    [START ON 2025] predniSONE  5 mg Oral Daily    cefTRIAXone (ROCEPHIN) IV  2,000 mg IntraVENous Q24H    vancomycin (VANCOCIN) intermittent dosing (placeholder)   Other RX Placeholder    sodium zirconium cyclosilicate  10 g Oral Daily    levalbuterol  1.25 mg Nebulization TID    acetylcysteine  600 mg 
      Internal Medicine Progress Note    Patient: Shoaib Rothman   : 1960   Admit Date: 6/10/2025     Date: 2025     Interval History     Patient seen resting comfortably at bedside.  Saturating at 97% on Vapotherm with FiO2 100.  Order changed to reflect goal oxygen saturation of 88.  Suspected COVID-pneumonia being treated with Decadron and baricitinib.  Pulmonology consulted, will likely require diagnostic/therapeutic bronchoscopy during this admission.  Will continue to trend CRP until it trends downward.    ROS     Review of Systems   Constitutional:  Negative for chills and fever.   HENT:  Negative for congestion, rhinorrhea and sore throat.    Respiratory:  Positive for shortness of breath. Negative for cough, chest tightness and wheezing.    Cardiovascular:  Negative for chest pain.   All other systems reviewed and are negative.       Objective     I/Os:  I/O last 3 completed shifts:  In: 432.3 [P.O.:400; IV Piggyback:32.3]  Out: 500 [Urine:500]    Vital Signs:  Patient Vitals for the past 3 hrs:   BP Temp Temp src Pulse Resp SpO2   25 1243 -- -- -- 90 -- 94 %   25 1200 -- -- -- -- -- 92 %   25 1152 (!) 156/94 97.6 °F (36.4 °C) Axillary 97 18 (!) 87 %       Physical Exam:  Physical Exam  Constitutional:       General: She is not in acute distress.     Appearance: She is obese. She is ill-appearing.   Eyes:      Extraocular Movements: Extraocular movements intact.   Cardiovascular:      Rate and Rhythm: Regular rhythm. Tachycardia present.   Pulmonary:      Effort: Pulmonary effort is normal.      Breath sounds: Normal breath sounds. No wheezing, rhonchi or rales.      Comments: HFNC  Abdominal:      General: Bowel sounds are normal. There is no distension.      Palpations: Abdomen is soft.      Tenderness: There is no abdominal tenderness.   Musculoskeletal:      Cervical back: Normal range of motion.      Right lower leg: No edema.      Left lower leg: No edema.   Skin:     
      Internal Medicine Progress Note    Patient: Shoaib Rothman   : 1960   Admit Date: 6/10/2025     Date: 2025     Interval History     Patient seen resting comfortably at bedside. Patient c/o fatigue. Supplemental O2 down from 25 L to 15 L.  Pulmonology agrees with current management.    ROS     Review of Systems   Constitutional:  Positive for fatigue. Negative for chills and fever.   HENT:  Negative for congestion, rhinorrhea and sore throat.    Respiratory:  Positive for shortness of breath. Negative for cough, chest tightness and wheezing.    Cardiovascular:  Negative for chest pain.   All other systems reviewed and are negative.       Objective     I/Os:  I/O last 3 completed shifts:  In: 1053.3 [P.O.:1021; IV Piggyback:32.3]  Out: 2650 [Urine:2650]    Vital Signs:  Patient Vitals for the past 3 hrs:   BP Temp Temp src Pulse Resp SpO2   25 1337 -- -- -- 80 18 91 %   25 1230 (!) 142/82 98.3 °F (36.8 °C) Oral 91 18 96 %       Physical Exam:  Physical Exam  Constitutional:       General: She is not in acute distress.     Appearance: She is obese. She is ill-appearing.   Eyes:      Extraocular Movements: Extraocular movements intact.   Cardiovascular:      Rate and Rhythm: Normal rate and regular rhythm.   Pulmonary:      Effort: Pulmonary effort is normal.      Breath sounds: Normal breath sounds. No wheezing, rhonchi or rales.   Abdominal:      General: Bowel sounds are normal. There is no distension.      Palpations: Abdomen is soft.      Tenderness: There is no abdominal tenderness.   Musculoskeletal:      Cervical back: Normal range of motion.      Right lower leg: No edema.      Left lower leg: No edema.   Skin:     General: Skin is warm and dry.   Neurological:      General: No focal deficit present.      Mental Status: She is alert and oriented to person, place, and time.   Psychiatric:         Mood and Affect: Mood normal.         Behavior: Behavior normal. 
      Internal Medicine Progress Note    Patient: Shoaib Rothman   : 1960   Admit Date: 6/10/2025     Date: 2025     Interval History     Reports still feeling SOB. Will likely have an extended recovery time. CM has contacted LTACs for placement.    ROS     Review of Systems   Respiratory:  Positive for shortness of breath.      Objective     I/Os:  I/O last 3 completed shifts:  In: 1060 [P.O.:1060]  Out: 2850 [Urine:2850]    Vital Signs:  Patient Vitals for the past 3 hrs:   BP Temp Temp src Pulse Resp SpO2   25 1504 127/81 98.2 °F (36.8 °C) Axillary 94 21 90 %   25 1231 134/84 98.1 °F (36.7 °C) Axillary 92 22 94 %       Physical Exam  Vitals reviewed.   Constitutional:       General: She is not in acute distress.     Appearance: She is obese. She is ill-appearing. She is not toxic-appearing.   HENT:      Head: Normocephalic and atraumatic.   Cardiovascular:      Rate and Rhythm: Normal rate and regular rhythm.      Heart sounds:      No gallop.   Pulmonary:      Breath sounds: Wheezing present. No decreased breath sounds or rales.      Comments: Diminished breath sounds bilaterally at lung bases  Abdominal:      General: Abdomen is flat.      Palpations: Abdomen is soft.   Musculoskeletal:      Right lower leg: No edema.      Left lower leg: No edema.   Neurological:      Mental Status: She is alert.         Medication:  Scheduled:     acetylcysteine  600 mg Inhalation TID RT    insulin lispro  0-16 Units SubCUTAneous 4x Daily AC & HS    insulin glargine  5 Units SubCUTAneous Nightly    dexAMETHasone  6 mg IntraVENous Daily    baricitinib  1 mg Oral Daily    ipratropium  0.5 mg Nebulization 4x Daily RT    traZODone  25 mg Oral Nightly    [Held by provider] torsemide  20 mg Oral Daily    temazepam  15 mg Oral Nightly    [Held by provider] spironolactone  25 mg Oral Daily    amLODIPine  5 mg Oral Daily    apixaban  2.5 mg Oral BID    carvedilol  6.25 mg Oral BID    guaiFENesin  600 mg Oral 
      Internal Medicine Progress Note    Patient: Shoaib Rothman   : 1960   Admit Date: 6/10/2025     Date: 2025     Interval History     Resting, defers breakfast to rest longer. Denies shortness of breath.    ROS     Review of Systems   Respiratory:  Negative for shortness of breath.         Objective     I/Os:  I/O last 3 completed shifts:  In: 690 [P.O.:690]  Out: 2550 [Urine:2550]    Vital Signs:  Patient Vitals for the past 3 hrs:   BP Temp Temp src Pulse Resp SpO2   25 0856 -- -- -- 69 -- 98 %   25 0801 125/78 97.6 °F (36.4 °C) Axillary 74 20 100 %       Physical Exam:  Physical Exam  Vitals reviewed.   Constitutional:       General: She is not in acute distress.     Appearance: She is obese. She is ill-appearing. She is not toxic-appearing.   HENT:      Head: Normocephalic and atraumatic.   Cardiovascular:      Rate and Rhythm: Normal rate and regular rhythm.      Heart sounds:      Gallop present. S3 sounds present.   Pulmonary:      Breath sounds: Examination of the right-upper field reveals decreased breath sounds. Examination of the right-middle field reveals decreased breath sounds. Examination of the right-lower field reveals decreased breath sounds. Decreased breath sounds present. No wheezing, rhonchi or rales.   Abdominal:      General: Abdomen is flat.      Palpations: Abdomen is soft.   Musculoskeletal:      Right lower le+ Pitting Edema present.      Left lower le+ Pitting Edema present.   Neurological:      Mental Status: She is alert.         Medication:  Scheduled:     insulin lispro  0-16 Units SubCUTAneous 4x Daily AC & HS    insulin glargine  5 Units SubCUTAneous Nightly    dexAMETHasone  6 mg IntraVENous Daily    baricitinib  1 mg Oral Daily    ipratropium  0.5 mg Nebulization 4x Daily RT    traZODone  25 mg Oral Nightly    [Held by provider] torsemide  20 mg Oral Daily    temazepam  15 mg Oral Nightly    [Held by provider] spironolactone  25 mg Oral 
      Internal Medicine Progress Note    Patient: Shoaib Rothman   : 1960   Admit Date: 6/10/2025     Date: 6/15/2025     Interval History     Feels like she has been gasping more since yesterday.    ROS     Review of Systems   Respiratory:  Positive for shortness of breath.         Objective     I/Os:  I/O last 3 completed shifts:  In: 390 [P.O.:390]  Out: 2950 [Urine:2950]    Vital Signs:  Patient Vitals for the past 3 hrs:   BP Temp Temp src Pulse Resp SpO2 Weight   06/15/25 0832 -- -- -- 96 -- 95 % --   06/15/25 0822 -- -- -- 76 24 92 % --   06/15/25 0751 139/75 97.4 °F (36.3 °C) Oral 100 20 (!) 89 % --   06/15/25 0621 -- -- -- -- -- 99 % --   06/15/25 0615 -- -- -- -- -- 99 % --   06/15/25 0604 -- -- -- -- -- 94 % --   06/15/25 0601 -- -- -- -- -- (!) 82 % --   06/15/25 0600 -- -- -- -- -- -- 118.3 kg (260 lb 12.9 oz)       Physical Exam  Vitals reviewed.   Constitutional:       General: She is not in acute distress.     Appearance: She is obese. She is ill-appearing. She is not toxic-appearing.   HENT:      Head: Normocephalic and atraumatic.   Cardiovascular:      Rate and Rhythm: Normal rate and regular rhythm.      Heart sounds:      No gallop.   Pulmonary:      Breath sounds: Wheezing and rhonchi present. No decreased breath sounds or rales.   Abdominal:      General: Abdomen is flat.      Palpations: Abdomen is soft.   Musculoskeletal:      Right lower leg: No edema.      Left lower leg: No edema.   Neurological:      Mental Status: She is alert.         Medication:  Scheduled:     acetylcysteine  600 mg Inhalation TID RT    insulin lispro  0-16 Units SubCUTAneous 4x Daily AC & HS    insulin glargine  5 Units SubCUTAneous Nightly    dexAMETHasone  6 mg IntraVENous Daily    baricitinib  1 mg Oral Daily    ipratropium  0.5 mg Nebulization 4x Daily RT    traZODone  25 mg Oral Nightly    [Held by provider] torsemide  20 mg Oral Daily    temazepam  15 mg Oral Nightly    [Held by provider] 
    Pulmonary Progress Note    Patient Name: Shoaib Rothman   Patient : 1960   Date: 2025   Admit Date: 6/10/2025     CC: Shortness of breath     Interval History  Patient feeling tired this morning and that her breathing is a bit more labored. She is having a significant, productive cough but the sputum is not being expectorated. Metanebs were added to assist. She is still on 20L of vapotherm.    HPI  Ms. Rothamn is a 63 yo female with pmhx of severe COPD recently discharged on 5L of oxygen with recurrent admissions, frequent mucous plugging requiring frequent bronch, breast ca, bladder tumor in remission, DVT/PE, CKD IV, HTN, insomnia, RLS, MDD/PIPPA who presented to the ED with cc SOB.      She reports that starting a few days ago, she started requiring more oxygen to help her breathe due to her shortness of breath. She reports coughing up off white mucus.      On presentation, she does not have an elevated white count but she does have neutrophilia, CRP and ESR elevated at 93.9 and 56, respectively, and was found to be covid positive.      Pulm was consulted for acute hypoxic respiratory failure.    MEDICATIONS   insulin lispro  0-16 Units SubCUTAneous 4x Daily AC & HS    insulin glargine  5 Units SubCUTAneous Nightly    dexAMETHasone  6 mg IntraVENous Daily    baricitinib  1 mg Oral Daily    ipratropium  0.5 mg Nebulization 4x Daily RT    traZODone  25 mg Oral Nightly    [Held by provider] torsemide  20 mg Oral Daily    temazepam  15 mg Oral Nightly    [Held by provider] spironolactone  25 mg Oral Daily    amLODIPine  5 mg Oral Daily    apixaban  2.5 mg Oral BID    carvedilol  6.25 mg Oral BID    guaiFENesin  600 mg Oral BID    hydrOXYzine HCl  25 mg Oral Nightly    letrozole  2.5 mg Oral Daily    melatonin  6 mg Oral Nightly    pantoprazole  20 mg Oral Daily    rOPINIRole  0.25 mg Oral Nightly    sertraline  50 mg Oral QAM    sodium chloride (Inhalant)  4 mL Nebulization TID    [Held by provider] 
    Pulmonary Progress Note    Patient Name: Shoaib Rothman   Patient : 1960   Date: 2025   Admit Date: 6/10/2025     CC: Shortness of breath     Interval History  Patient feeling tired this morning, not producing any sputum with her cough. Was on 25L satting at 99%, gradually decreased to 15L. Will continue with her treatment of decadron and baricitinib.     HPI  Ms. Rothman is a 65 yo female with pmhx of severe COPD recently discharged on 5L of oxygen with recurrent admissions, frequent mucous plugging requiring frequent bronch, breast ca, bladder tumor in remission, DVT/PE, CKD IV, HTN, insomnia, RLS, MDD/PIPPA who presented to the ED with cc SOB.      She reports that starting a few days ago, she started requiring more oxygen to help her breathe due to her shortness of breath. She reports coughing up off white mucus.      On presentation, she does not have an elevated white count but she does have neutrophilia, CRP and ESR elevated at 93.9 and 56, respectively, and was found to be covid positive.      Pulm was consulted for acute hypoxic respiratory failure.    MEDICATIONS   insulin lispro  0-16 Units SubCUTAneous 4x Daily AC & HS    insulin glargine  5 Units SubCUTAneous Nightly    dexAMETHasone  6 mg IntraVENous Daily    baricitinib  1 mg Oral Daily    ipratropium  0.5 mg Nebulization 4x Daily RT    traZODone  25 mg Oral Nightly    [Held by provider] torsemide  20 mg Oral Daily    temazepam  15 mg Oral Nightly    [Held by provider] spironolactone  25 mg Oral Daily    amLODIPine  5 mg Oral Daily    apixaban  2.5 mg Oral BID    carvedilol  6.25 mg Oral BID    guaiFENesin  600 mg Oral BID    hydrOXYzine HCl  25 mg Oral Nightly    letrozole  2.5 mg Oral Daily    melatonin  6 mg Oral Nightly    pantoprazole  20 mg Oral Daily    rOPINIRole  0.25 mg Oral Nightly    sertraline  50 mg Oral QAM    sodium chloride (Inhalant)  4 mL Nebulization TID    [Held by provider] sodium zirconium cyclosilicate  10 g 
   Pulmonary & Critical Care Medicine    Admit Date: 6/10/2025  PCP: Lamar Mondragon MD    CC:  COVID pneumonia   Events of Last 24 hours:   Currently on 15L high flow, but needs to use a NRB in addition to recover following movement.    Vitals:  Tmax:  VITALS:  BP (!) 151/87   Pulse 95   Temp 97.7 °F (36.5 °C) (Axillary)   Resp 20   Ht 1.778 m (5' 10\")   Wt 120 kg (264 lb 8.8 oz)   LMP 2014   SpO2 90%   BMI 37.96 kg/m²   24HR INTAKE/OUTPUT:    Intake/Output Summary (Last 24 hours) at 2025 1312  Last data filed at 2025 0928  Gross per 24 hour   Intake 870 ml   Output 2550 ml   Net -1680 ml     CURRENT PULSE OXIMETRY:  SpO2: 90 %  24HR PULSE OXIMETRY RANGE:  SpO2  Av.5 %  Min: 90 %  Max: 100 %    EXAM:  General: No distress.  Alert and cooperative  Neck: Trachea midline. Neck is supple   Resp: No accessory muscle use.  Diminished air entry in the right lung base, but improved in upper and mid lung fields.  CV: Regular rate. Regular rhythm. No mumur or rub. No edema.   GI: Non-tender. Non-distended.      Medications:    IV:   dextrose      sodium chloride           Scheduled Meds:   vancomycin  750 mg IntraVENous Q24H    magnesium sulfate  2,000 mg IntraVENous Once    acetaminophen  1,000 mg Oral 4 times per day    ipratropium  0.5 mg Nebulization TID RT    [Held by provider] enoxaparin  30 mg SubCUTAneous BID    predniSONE  40 mg Oral Daily    Followed by    [START ON 2025] predniSONE  20 mg Oral Daily    Followed by    [START ON 2025] predniSONE  10 mg Oral Daily    Followed by    [START ON 2025] predniSONE  5 mg Oral Daily    cefTRIAXone (ROCEPHIN) IV  2,000 mg IntraVENous Q24H    levalbuterol  1.25 mg Nebulization TID    acetylcysteine  600 mg Inhalation TID RT    insulin lispro  0-16 Units SubCUTAneous 4x Daily AC & HS    insulin glargine  5 Units SubCUTAneous Nightly    baricitinib  1 mg Oral Daily    traZODone  25 mg Oral Nightly    [Held by provider] torsemide  20 
   Pulmonary & Critical Care Medicine    Admit Date: 6/10/2025  PCP: Lamar Mondragon MD    CC:  COVID pneumonia   Events of Last 24 hours:   Feeling much better after her bronchoscopy.  Still hypoxic, but had been down to 30L vapotherm.  Was back to 40L @100% for my exam however.      Vitals:  Tmax:  VITALS:  /74   Pulse 75   Temp 98 °F (36.7 °C) (Axillary)   Resp 18   Ht 1.778 m (5' 10\")   Wt 117.5 kg (259 lb 0.6 oz)   LMP 2014   SpO2 93%   BMI 37.17 kg/m²   24HR INTAKE/OUTPUT:    Intake/Output Summary (Last 24 hours) at 2025 1743  Last data filed at 2025 1019  Gross per 24 hour   Intake 720 ml   Output 1250 ml   Net -530 ml     CURRENT PULSE OXIMETRY:  SpO2: 93 %  24HR PULSE OXIMETRY RANGE:  SpO2  Av.2 %  Min: 86 %  Max: 100 %    EXAM:  General: Mild distress.  Alert and cooperative  Neck: Trachea midline. Neck is supple   Resp: No accessory muscle use.  Diminished air entry in the right lung base, but improved in upper and mid lung fields.  CV: Regular rate. Regular rhythm. No mumur or rub. No edema.   GI: Non-tender. Non-distended.      Medications:    IV:   dextrose      sodium chloride           Scheduled Meds:   sodium zirconium cyclosilicate  10 g Oral Every Other Day    meropenem  1,000 mg IntraVENous Q12H    vancomycin (VANCOCIN) intermittent dosing (placeholder)   Other RX Placeholder    levalbuterol  1.25 mg Nebulization TID    acetylcysteine  600 mg Inhalation TID RT    insulin lispro  0-16 Units SubCUTAneous 4x Daily AC & HS    insulin glargine  5 Units SubCUTAneous Nightly    dexAMETHasone  6 mg IntraVENous Daily    baricitinib  1 mg Oral Daily    ipratropium  0.5 mg Nebulization 4x Daily RT    traZODone  25 mg Oral Nightly    [Held by provider] torsemide  20 mg Oral Daily    temazepam  15 mg Oral Nightly    [Held by provider] spironolactone  25 mg Oral Daily    amLODIPine  5 mg Oral Daily    apixaban  2.5 mg Oral BID    carvedilol  6.25 mg Oral BID    guaiFENesin  
   Pulmonary & Critical Care Medicine    Admit Date: 6/10/2025  PCP: Lamar Mondragon MD    CC:  COVID pneumonia   Events of Last 24 hours:   Laying flat.   She is complaining of SOB.  Breathing is not labored.  She is able to speak full sentences.    There is no fever.        Vitals:  Tmax:  VITALS:  BP (!) 146/96   Pulse 88   Temp 97.8 °F (36.6 °C) (Axillary)   Resp 20   Ht 1.778 m (5' 10\")   Wt 118.3 kg (260 lb 12.9 oz)   LMP 2014   SpO2 94%   BMI 37.42 kg/m²   24HR INTAKE/OUTPUT:    Intake/Output Summary (Last 24 hours) at 6/15/2025 1602  Last data filed at 6/15/2025 1421  Gross per 24 hour   Intake 820 ml   Output 2200 ml   Net -1380 ml     CURRENT PULSE OXIMETRY:  SpO2: 94 %  24HR PULSE OXIMETRY RANGE:  SpO2  Av.9 %  Min: 82 %  Max: 99 %    EXAM:  General: No distress. Sleeping    Neck: Trachea midline. Neck is supple   Resp: No accessory muscle use.  Diminished air entry on the right side.    CV: Regular rate. Regular rhythm. No mumur or rub. No edema.   GI: Non-tender. Non-distended.      Medications:    IV:   dextrose      sodium chloride           Scheduled Meds:   acetylcysteine  600 mg Inhalation TID RT    insulin lispro  0-16 Units SubCUTAneous 4x Daily AC & HS    insulin glargine  5 Units SubCUTAneous Nightly    dexAMETHasone  6 mg IntraVENous Daily    baricitinib  1 mg Oral Daily    ipratropium  0.5 mg Nebulization 4x Daily RT    traZODone  25 mg Oral Nightly    [Held by provider] torsemide  20 mg Oral Daily    temazepam  15 mg Oral Nightly    [Held by provider] spironolactone  25 mg Oral Daily    amLODIPine  5 mg Oral Daily    apixaban  2.5 mg Oral BID    carvedilol  6.25 mg Oral BID    guaiFENesin  600 mg Oral BID    hydrOXYzine HCl  25 mg Oral Nightly    letrozole  2.5 mg Oral Daily    melatonin  6 mg Oral Nightly    pantoprazole  20 mg Oral Daily    rOPINIRole  0.25 mg Oral Nightly    sertraline  50 mg Oral QAM    sodium chloride (Inhalant)  4 mL Nebulization TID    [Held by 
   Pulmonary & Critical Care Medicine    Admit Date: 6/10/2025  PCP: Lamar Mondragon MD    CC:  COVID pneumonia   Events of Last 24 hours:   Lymphangiogram was attempted today.    She is on 15L high flow, however after dropping it to 10L sats were 97%    Vitals:  Tmax:  VITALS:  BP (!) 136/94   Pulse 91   Temp 98.7 °F (37.1 °C) (Axillary)   Resp 16   Ht 1.778 m (5' 10\")   Wt 117.3 kg (258 lb 9.6 oz)   LMP 2014   SpO2 96%   BMI 37.11 kg/m²   24HR INTAKE/OUTPUT:    Intake/Output Summary (Last 24 hours) at 2025 1435  Last data filed at 2025 1247  Gross per 24 hour   Intake 1840 ml   Output 1500 ml   Net 340 ml     CURRENT PULSE OXIMETRY:  SpO2: 96 %  24HR PULSE OXIMETRY RANGE:  SpO2  Av %  Min: 90 %  Max: 100 %    EXAM:  General: No distress.  Alert and cooperative  Neck: Trachea midline. Neck is supple   Resp: No accessory muscle use.  Diminished air entry in the right lung base, but improved in upper and mid lung fields.  CV: Regular rate. Regular rhythm. No mumur or rub. No edema.   GI: Non-tender. Non-distended.      Medications:    IV:   dextrose      sodium chloride           Scheduled Meds:   vancomycin  750 mg IntraVENous Q24H    acetaminophen  1,000 mg Oral 4 times per day    ipratropium  0.5 mg Nebulization TID RT    enoxaparin  30 mg SubCUTAneous BID    [START ON 2025] predniSONE  20 mg Oral Daily    Followed by    [START ON 2025] predniSONE  10 mg Oral Daily    Followed by    [START ON 2025] predniSONE  5 mg Oral Daily    cefTRIAXone (ROCEPHIN) IV  2,000 mg IntraVENous Q24H    levalbuterol  1.25 mg Nebulization TID    acetylcysteine  600 mg Inhalation TID RT    insulin lispro  0-16 Units SubCUTAneous 4x Daily AC & HS    insulin glargine  5 Units SubCUTAneous Nightly    baricitinib  1 mg Oral Daily    traZODone  25 mg Oral Nightly    [Held by provider] torsemide  20 mg Oral Daily    temazepam  15 mg Oral Nightly    [Held by provider] spironolactone  25 mg Oral 
   Pulmonary & Critical Care Medicine    Admit Date: 6/10/2025  PCP: Lamar Mondragon MD    CC:  COVID pneumonia   Events of Last 24 hours:   Remains short of breath, on 40 L Vapotherm.  She desaturates with talking.  She has some green phlegm that she is coughed up but she says it takes days of work to get it out.      Vitals:  Tmax:  VITALS:  /81   Pulse 94   Temp 98.2 °F (36.8 °C) (Axillary)   Resp 24   Ht 1.778 m (5' 10\")   Wt 118.3 kg (260 lb 12.9 oz)   LMP 2014   SpO2 100%   BMI 37.42 kg/m²   24HR INTAKE/OUTPUT:    Intake/Output Summary (Last 24 hours) at 2025 1724  Last data filed at 2025 1600  Gross per 24 hour   Intake 600 ml   Output 1850 ml   Net -1250 ml     CURRENT PULSE OXIMETRY:  SpO2: 100 %  24HR PULSE OXIMETRY RANGE:  SpO2  Av.5 %  Min: 86 %  Max: 100 %    EXAM:  General: Mild distress.  Alert and cooperative  Neck: Trachea midline. Neck is supple   Resp: No accessory muscle use.  Diminished air entry on the right side.    CV: Regular rate. Regular rhythm. No mumur or rub. No edema.   GI: Non-tender. Non-distended.      Medications:    IV:   dextrose      sodium chloride           Scheduled Meds:   acetylcysteine  600 mg Inhalation TID RT    insulin lispro  0-16 Units SubCUTAneous 4x Daily AC & HS    insulin glargine  5 Units SubCUTAneous Nightly    dexAMETHasone  6 mg IntraVENous Daily    baricitinib  1 mg Oral Daily    ipratropium  0.5 mg Nebulization 4x Daily RT    traZODone  25 mg Oral Nightly    [Held by provider] torsemide  20 mg Oral Daily    temazepam  15 mg Oral Nightly    [Held by provider] spironolactone  25 mg Oral Daily    amLODIPine  5 mg Oral Daily    apixaban  2.5 mg Oral BID    carvedilol  6.25 mg Oral BID    guaiFENesin  600 mg Oral BID    hydrOXYzine HCl  25 mg Oral Nightly    letrozole  2.5 mg Oral Daily    melatonin  6 mg Oral Nightly    pantoprazole  20 mg Oral Daily    rOPINIRole  0.25 mg Oral Nightly    sertraline  50 mg Oral QAM    sodium 
   Pulmonary & Critical Care Medicine    Admit Date: 6/10/2025  PCP: Lamar Mondragon MD    CC:  COVID pneumonia   Events of Last 24 hours:   Remains short of breath, on 40 L Vapotherm.  She desaturates with talking.  She's agreeable to the bronch this morning and says she knows what to expect.      Vitals:  Tmax:  VITALS:  BP (!) 149/84   Pulse 85   Temp 98.3 °F (36.8 °C) (Axillary)   Resp 18   Ht 1.778 m (5' 10\")   Wt 117.5 kg (259 lb 0.7 oz)   LMP 2014   SpO2 93%   BMI 37.17 kg/m²   24HR INTAKE/OUTPUT:    Intake/Output Summary (Last 24 hours) at 2025 0930  Last data filed at 2025 0400  Gross per 24 hour   Intake 600 ml   Output 1750 ml   Net -1150 ml     CURRENT PULSE OXIMETRY:  SpO2: 93 %  24HR PULSE OXIMETRY RANGE:  SpO2  Av %  Min: 86 %  Max: 100 %    EXAM:  General: Mild distress.  Alert and cooperative  Neck: Trachea midline. Neck is supple   Resp: No accessory muscle use.  Diminished air entry on the right side.    CV: Regular rate. Regular rhythm. No mumur or rub. No edema.   GI: Non-tender. Non-distended.      Medications:    IV:   dextrose      sodium chloride           Scheduled Meds:   levalbuterol  1.25 mg Nebulization TID    acetylcysteine  600 mg Inhalation TID RT    insulin lispro  0-16 Units SubCUTAneous 4x Daily AC & HS    insulin glargine  5 Units SubCUTAneous Nightly    dexAMETHasone  6 mg IntraVENous Daily    baricitinib  1 mg Oral Daily    ipratropium  0.5 mg Nebulization 4x Daily RT    traZODone  25 mg Oral Nightly    [Held by provider] torsemide  20 mg Oral Daily    temazepam  15 mg Oral Nightly    [Held by provider] spironolactone  25 mg Oral Daily    amLODIPine  5 mg Oral Daily    apixaban  2.5 mg Oral BID    carvedilol  6.25 mg Oral BID    guaiFENesin  600 mg Oral BID    hydrOXYzine HCl  25 mg Oral Nightly    letrozole  2.5 mg Oral Daily    melatonin  6 mg Oral Nightly    pantoprazole  20 mg Oral Daily    rOPINIRole  0.25 mg Oral Nightly    sertraline  50 mg 
   Pulmonary & Critical Care Medicine    Admit Date: 6/10/2025  PCP: Lamar Mondragon MD    CC:  COVID pneumonia   Events of Last 24 hours:   Saturating well on 20L vapotherm, but had some hypoxia on the 20L when she woke up this morning.  She has CHRISTA diagnosed by a home sleep study at her nursing facility, but hasn't worn it in awhile.  Feeling better since the bronch, but continues to have a congested, but nonproductive cough.    Vitals:  Tmax:  VITALS:  BP (!) 149/90   Pulse 89   Temp 97.8 °F (36.6 °C) (Oral)   Resp 20   Ht 1.778 m (5' 10\")   Wt 118.2 kg (260 lb 9.3 oz)   LMP 2014   SpO2 90%   BMI 37.39 kg/m²   24HR INTAKE/OUTPUT:    Intake/Output Summary (Last 24 hours) at 2025 1041  Last data filed at 2025 0736  Gross per 24 hour   Intake --   Output 1950 ml   Net -1950 ml     CURRENT PULSE OXIMETRY:  SpO2: 90 %  24HR PULSE OXIMETRY RANGE:  SpO2  Av.9 %  Min: 88 %  Max: 100 %    EXAM:  General: No distress.  Alert and cooperative  Neck: Trachea midline. Neck is supple   Resp: No accessory muscle use.  Diminished air entry in the right lung base, but improved in upper and mid lung fields.  CV: Regular rate. Regular rhythm. No mumur or rub. No edema.   GI: Non-tender. Non-distended.      Medications:    IV:   dextrose      sodium chloride           Scheduled Meds:   cefTRIAXone (ROCEPHIN) IV  2,000 mg IntraVENous Q24H    vancomycin (VANCOCIN) intermittent dosing (placeholder)   Other RX Placeholder    sodium zirconium cyclosilicate  10 g Oral Daily    levalbuterol  1.25 mg Nebulization TID    acetylcysteine  600 mg Inhalation TID RT    insulin lispro  0-16 Units SubCUTAneous 4x Daily AC & HS    insulin glargine  5 Units SubCUTAneous Nightly    baricitinib  1 mg Oral Daily    ipratropium  0.5 mg Nebulization 4x Daily RT    traZODone  25 mg Oral Nightly    [Held by provider] torsemide  20 mg Oral Daily    temazepam  15 mg Oral Nightly    [Held by provider] spironolactone  25 mg Oral 
   Pulmonary & Critical Care Medicine    Admit Date: 6/10/2025  PCP: Lamar Mondragon MD    CC:  COVID pneumonia   Events of Last 24 hours:   Saturating well on 20L vapotherm, still with congested cough but minimal production.    Vitals:  Tmax:  VITALS:  BP (!) 141/101   Pulse 82   Temp 97.5 °F (36.4 °C) (Axillary)   Resp 18   Ht 1.778 m (5' 10\")   Wt 119.7 kg (263 lb 14.3 oz)   LMP 2014   SpO2 99%   BMI 37.86 kg/m²   24HR INTAKE/OUTPUT:    Intake/Output Summary (Last 24 hours) at 2025 1230  Last data filed at 2025 1013  Gross per 24 hour   Intake 960 ml   Output 2500 ml   Net -1540 ml     CURRENT PULSE OXIMETRY:  SpO2: 99 %  24HR PULSE OXIMETRY RANGE:  SpO2  Av %  Min: 91 %  Max: 100 %    EXAM:  General: No distress.  Alert and cooperative  Neck: Trachea midline. Neck is supple   Resp: No accessory muscle use.  Diminished air entry in the right lung base, but improved in upper and mid lung fields.  CV: Regular rate. Regular rhythm. No mumur or rub. No edema.   GI: Non-tender. Non-distended.      Medications:    IV:   dextrose      sodium chloride           Scheduled Meds:   acetaminophen  1,000 mg Oral 4 times per day    ipratropium  0.5 mg Nebulization TID RT    vancomycin  1,250 mg IntraVENous Once    enoxaparin  30 mg SubCUTAneous BID    predniSONE  40 mg Oral Daily    Followed by    [START ON 2025] predniSONE  20 mg Oral Daily    Followed by    [START ON 2025] predniSONE  10 mg Oral Daily    Followed by    [START ON 2025] predniSONE  5 mg Oral Daily    cefTRIAXone (ROCEPHIN) IV  2,000 mg IntraVENous Q24H    vancomycin (VANCOCIN) intermittent dosing (placeholder)   Other RX Placeholder    [Held by provider] sodium zirconium cyclosilicate  10 g Oral Daily    levalbuterol  1.25 mg Nebulization TID    acetylcysteine  600 mg Inhalation TID RT    insulin lispro  0-16 Units SubCUTAneous 4x Daily AC & HS    insulin glargine  5 Units SubCUTAneous Nightly    baricitinib  1 mg 
   Pulmonary & Critical Care Medicine    Admit Date: 6/10/2025  PCP: Lamar Mondragon MD    CC:  COVID pneumonia   Events of Last 24 hours:   She is sleeping.  She did not want to wake up.    Discussed with RN.  No new complains.  Has hypoxia with exertion that takes time to recover    At this time she is on 15L and 90% with O2 sat of 97%      Vitals:  Tmax:  VITALS:  /78   Pulse 69   Temp 97.6 °F (36.4 °C) (Axillary)   Resp 20   Ht 1.778 m (5' 10\")   Wt 118 kg (260 lb 2.3 oz)   LMP 2014   SpO2 98%   BMI 37.33 kg/m²   24HR INTAKE/OUTPUT:    Intake/Output Summary (Last 24 hours) at 2025 0935  Last data filed at 2025 0801  Gross per 24 hour   Intake 640 ml   Output 1650 ml   Net -1010 ml     CURRENT PULSE OXIMETRY:  SpO2: 98 %  24HR PULSE OXIMETRY RANGE:  SpO2  Av.3 %  Min: 90 %  Max: 100 %    EXAM:  General: No distress. Sleeping    Neck: Trachea midline. Neck is supple   Resp: No accessory muscle use.  Diminished air entry on the right side.    CV: Regular rate. Regular rhythm. No mumur or rub. No edema.   GI: Non-tender. Non-distended.      Medications:    IV:   dextrose      sodium chloride           Scheduled Meds:   insulin lispro  0-16 Units SubCUTAneous 4x Daily AC & HS    insulin glargine  5 Units SubCUTAneous Nightly    dexAMETHasone  6 mg IntraVENous Daily    baricitinib  1 mg Oral Daily    ipratropium  0.5 mg Nebulization 4x Daily RT    traZODone  25 mg Oral Nightly    [Held by provider] torsemide  20 mg Oral Daily    temazepam  15 mg Oral Nightly    [Held by provider] spironolactone  25 mg Oral Daily    amLODIPine  5 mg Oral Daily    apixaban  2.5 mg Oral BID    carvedilol  6.25 mg Oral BID    guaiFENesin  600 mg Oral BID    hydrOXYzine HCl  25 mg Oral Nightly    letrozole  2.5 mg Oral Daily    melatonin  6 mg Oral Nightly    pantoprazole  20 mg Oral Daily    rOPINIRole  0.25 mg Oral Nightly    sertraline  50 mg Oral QAM    sodium chloride (Inhalant)  4 mL Nebulization 
  Comprehensive Nutrition Assessment    RECOMMENDATIONS:  PO Diet: Low K+, low phos diet  Nutrition Supplement: Add Nepro Daily  Nutrition Education: No recommendation at this time     NUTRITION ASSESSMENT:   Nutritional summary & status: LOS assessment. Patient presented with SOB found to have COVID pneumonia and CHANTELLE with COPD exacerbation. Patients O2 requirements varying with vapotherm. Patient has been tolerating a regular, low K+, low Phos diet with varied but fair po intake with repiratory status being a barrier to po intake at times. Noted plans for possible lymphangiogram early next week per MD, last bronch 6/17. Bowels moving, labs reviewed and K+ and phos elevated. RD will order ONS and continue to monitor nutritional status.   Admission // PMH: COPD exacerbation // CKD4, COPD, HTN, Anemia    MALNUTRITION ASSESSMENT  Context of Malnutrition: Chronic Illness   Malnutrition Status: At risk for malnutrition  Findings of the 6 clinical characteristics of malnutrition (Minimum of 2 out of 6 clinical characteristics is required to make the diagnosis of moderate or severe Protein Calorie Malnutrition based on AND/ASPEN Guidelines):  Energy Intake:  Mild decrease in energy intake  Weight Loss:  No weight loss     Body Fat Loss:  No body fat loss     Muscle Mass Loss:  No muscle mass loss    Fluid Accumulation:  No fluid accumulation     Strength:  Not Performed    NUTRITION DIAGNOSIS   Predicted inadequate energy intake related to impaired respiratory function as evidenced by intake 0-25%, intake 26-50%, intake 51-75%    Nutrition Monitoring and Evaluation:   Food/Nutrient Intake Outcomes:  Food and Nutrient Intake, Supplement Intake  Physical Signs/Symptoms Outcomes:  Biochemical Data, GI Status, Meal Time Behavior, Nutrition Focused Physical Findings, Weight     OBJECTIVE DATA: Significant to nutrition assessment  Nutrition Related Findings: +BM 6/18, K+ 5.4, Phos 5.7  Wounds: None  Nutrition Goals: PO 
4 Eyes Skin Assessment     NAME:  Shoaib Rothman  YOB: 1960  MEDICAL RECORD NUMBER:  [id number]    The patient is being assessed for  Admission    I agree that at least one RN has performed a thorough Head to Toe Skin Assessment on the patient. ALL assessment sites listed below have been assessed.      Areas assessed by both nurses:    Head, Face, Ears, Shoulders, Back, Chest, Arms, Elbows, Hands, Sacrum. Buttock, Coccyx, Ischium, Legs. Feet and Heels, and Under Medical Devices         Does the Patient have a Wound? No noted wound(s)    Redness b/l buttocks, coccyx  Redness b/l heels  Scattered bruising       Manan Prevention initiated by RN: Yes  Wound Care Orders initiated by RN: No    Pressure Injury (Stage 3,4, Unstageable, DTI, NWPT, and Complex wounds) if present, place Wound referral order by RN under : No    New Ostomies, if present place, Ostomy referral order under : No     Nurse 1 eSignature: Electronically signed by LAYO TRUONG RN on 6/11/25 at 7:18 AM EDT    **SHARE this note so that the co-signing nurse can place an eSignature**    Nurse 2 eSignature: Electronically signed by Kathie Healy RN on 6/11/25 at 12:02 AM EDT    
Called pt brother Joni Belle. Update on pt given.  
From Endo drowsy, denies any pain at this time on 15 L NRB mask and 15 L/NC, tachypneic but patient claims it's a little better other VSS.    Report from CRNA and Jerrell RN.    S/P BRONCHOSCOPY DIAGNOSTIC OR CELL WASH ONLY / CRYO BIOPAY   Monitor Anesthesia Care   
Fuad, respiratory therapist at bedside and will do a duoneb breathing treatment.    
IR Progress Note:    Chief Complaint: plastic bronchitis     24 hour Interval History:Patient underwent pelvic lymphangiogram 6/23 with Dr Arambula. Thin/frail inguinal lymph nodes resulted in suboptimal study and no opacification of the thoracic duct. Retrograde access was also attempted and was unsuccessful.     Will plan to repeat procedure as an outpatient, patient okay for DC this afternoon to [facility].     VITAL SIGNS   BP (!) 147/84   Pulse 81   Temp 97.4 °F (36.3 °C) (Axillary)   Resp 19   Ht 1.778 m (5' 10\")   Wt 121.1 kg (266 lb 15.6 oz)   LMP 03/09/2014   SpO2 92%   BMI 38.31 kg/m²  O2 Flow Rate (L/min): 11 L/min       MEDICATIONS:      vancomycin  750 mg IntraVENous Q24H    acetaminophen  1,000 mg Oral 4 times per day    ipratropium  0.5 mg Nebulization TID RT    enoxaparin  30 mg SubCUTAneous BID    predniSONE  20 mg Oral Daily    Followed by    [START ON 6/27/2025] predniSONE  10 mg Oral Daily    Followed by    [START ON 6/30/2025] predniSONE  5 mg Oral Daily    levalbuterol  1.25 mg Nebulization TID    acetylcysteine  600 mg Inhalation TID RT    insulin lispro  0-16 Units SubCUTAneous 4x Daily AC & HS    insulin glargine  5 Units SubCUTAneous Nightly    traZODone  25 mg Oral Nightly    [Held by provider] torsemide  20 mg Oral Daily    temazepam  15 mg Oral Nightly    [Held by provider] spironolactone  25 mg Oral Daily    amLODIPine  5 mg Oral Daily    [Held by provider] apixaban  2.5 mg Oral BID    carvedilol  6.25 mg Oral BID    guaiFENesin  600 mg Oral BID    hydrOXYzine HCl  25 mg Oral Nightly    letrozole  2.5 mg Oral Daily    melatonin  6 mg Oral Nightly    pantoprazole  20 mg Oral Daily    rOPINIRole  0.25 mg Oral Nightly    sertraline  50 mg Oral QAM    sodium chloride (Inhalant)  4 mL Nebulization TID    sodium chloride flush  5-40 mL IntraVENous 2 times per day         DATA REVIEW:   Labs:    CBC:  Recent Labs     06/22/25  1013 06/24/25  0523   WBC 8.2 9.4   RBC 3.21* 3.06*   HGB 9.7* 
Nephrology Progress Note                                                                                                                                                                                                                                                                                                                                                               Office : [phone]     Fax :[phone]    Patient's Name: Shoaib Rothman  6/17/2025    Reason for Consult:  CHANTELLE on CKD  Requesting Physician:  Lamar Mondragon MD  Chief Complaint:    Chief Complaint   Patient presents with    Shortness of Breath     Pt brought in by EMS from [facility] for SOB x 2 days that is worse than her normal SOB. Hx of COPD, normally wears 5 L oxygen PRN but has been increasing oxygen levels x 2 days. Wearing non-rebreather upon arrival, EMS states initial sat was 75% but came up to 95%. Received 2 duonebs en route. Denies chest pain        Assessment/Plan     # CHANTELLE on CKD 4  # Primary FSGS - extensive fibrosis   # Solitary kidney  - Creatinine fluctuation in the setting of COVID & BP fluctuation  - Hold Aldactone and Torsemide for now   - UPCR: 4.0 mg  - Avoid nephrotoxins  - Closely monitor renal labs      # Acute on chronic respiratory failure  # COVID pneumonia   # End-stage COPD  # Recurrent mucus plugging   - Started on Decadron & Baricitinib   - Pulmonary on board  - S/p bronchoscopy 6/17 --> may be having a lymph angiogram this admission      # HTN  - BP better   - On Amlodipine and Coreg  - Hold Aldactone and Torsemide  - Monitor      # Anemia  - Monitor      # Acid- base/ Electrolyte imbalance   # Hyperkalemia  - Lokelma daily   - Low K diet  - Monitor   # Hyperphosphatemia  - Low phos diet        History of Present Ilness:    Shoaib Rothman is a 64 y.o. female with a PMH of end-stage COPD, frequent mucous plugging, breast cancer, HTN, CKD 4, who presented to the ER with complaints of shortness of 
Nephrology Progress Note                                                                                                                                                                                                                                                                                                                                                               Office : [phone]     Fax :[phone]    Patient's Name: Shoaib Rothman  6/12/2025    Reason for Consult:  CHANTELLE on CKD  Requesting Physician:  Lamar Mondragon MD  Chief Complaint:    Chief Complaint   Patient presents with    Shortness of Breath     Pt brought in by EMS from [facility] for SOB x 2 days that is worse than her normal SOB. Hx of COPD, normally wears 5 L oxygen PRN but has been increasing oxygen levels x 2 days. Wearing non-rebreather upon arrival, EMS states initial sat was 75% but came up to 95%. Received 2 duonebs en route. Denies chest pain        Assessment/Plan     # CHANTELLE on CKD 4  # Primary FSGS - extensive fibrosis   # Solitary kidney  - Creatinine fluctuation in the setting of COVID & BP fluctuation  - Hold Aldactone and torsemide for now   - UPCR: 4.0 mg  - Avoid nephrotoxins  - Closely monitor renal labs      # Acute on chronic respiratory failure  # COVID pneumonia   # End-stage COPD  # Recurrent mucus plugging   - Started on Decadron & Baricitinib   - Pulmonary consulted     # HTN  - BP softer this AM  - On Amlodipine and Coreg. Hold Aldactone and Torsemide  - Monitor      # Anemia  - Monitor      # Acid- base/ Electrolyte imbalance   # Hyperkalemia  - Lokelma PRN  - Monitor         History of Present Ilness:    Shoaib Rothman is a 64 y.o. female with a PMH of end-stage COPD, frequent mucous plugging, breast cancer, HTN, CKD 4, who presented to the ER with complaints of shortness of breath. Reports over the past several days she has had to increase her supplemental oxygen. In the ER, she presented with hypoxia 
Nephrology Progress Note                                                                                                                                                                                                                                                                                                                                                               Office : [phone]     Fax :[phone]    Patient's Name: Shoaib Rothman  6/22/2025    Reason for Consult:  CHANTELLE on CKD  Requesting Physician:  Lamar Mondragon MD  Chief Complaint:    Chief Complaint   Patient presents with    Shortness of Breath     Pt brought in by EMS from [facility] for SOB x 2 days that is worse than her normal SOB. Hx of COPD, normally wears 5 L oxygen PRN but has been increasing oxygen levels x 2 days. Wearing non-rebreather upon arrival, EMS states initial sat was 75% but came up to 95%. Received 2 duonebs en route. Denies chest pain        Assessment/Plan     # CHANTELLE on CKD 4- CHANTELLE resolved   # Primary FSGS - extensive fibrosis   # Solitary kidney  - Initial creatinine fluctuation in the setting of COVID & BP fluctuation  - Creatinine now improved   - Hold Aldactone and Torsemide for now   - UPCR: 4.0 gm  - Avoid nephrotoxins  - Closely monitor renal labs - no meds changes      # Acute on chronic respiratory failure  # COVID pneumonia   # End-stage COPD  # Recurrent mucus plugging - Plastic bronchitis   - Started on Decadron & Baricitinib   - Pulmonary on board  - S/p bronchoscopy 6/17 --> will have lymph angiogram this admission (plan for Monday)  - on HFNC      # HTN  - BP ok   - On Amlodipine and Coreg  - Hold Aldactone and Torsemide  - Monitor      # Anemia  - Monitor      # Acid- base/ Electrolyte imbalance   # Hyperkalemia- resolved   - Lokelma - stop   - Low K diet  - Monitor   # Hyperphosphatemia  - Low phos diet        History of Present Ilness:    Shoaib Rothman is a 64 y.o. female with a PMH of 
Nephrology Progress Note                                                                                                                                                                                                                                                                                                                                                               Office : [phone]     Fax :[phone]    Patient's Name: Shoaib Rothman  6/15/2025    Reason for Consult:  CHANTELLE on CKD  Requesting Physician:  Lamar Mondragon MD  Chief Complaint:    Chief Complaint   Patient presents with    Shortness of Breath     Pt brought in by EMS from [facility] for SOB x 2 days that is worse than her normal SOB. Hx of COPD, normally wears 5 L oxygen PRN but has been increasing oxygen levels x 2 days. Wearing non-rebreather upon arrival, EMS states initial sat was 75% but came up to 95%. Received 2 duonebs en route. Denies chest pain        Assessment/Plan     # CHANTELLE on CKD 4  # Primary FSGS - extensive fibrosis   # Solitary kidney  - Creatinine fluctuation in the setting of COVID & BP fluctuation  - Hold Aldactone and torsemide for now   - UPCR: 4.0 mg  - Avoid nephrotoxins  - Closely monitor renal labs      # Acute on chronic respiratory failure  # COVID pneumonia   # End-stage COPD  # Recurrent mucus plugging   - Started on Decadron & Baricitinib   - Pulmonary consulted     # HTN  - BP better   - On Amlodipine and Coreg  - Hold Aldactone and Torsemide  - Monitor      # Anemia  - Monitor      # Acid- base/ Electrolyte imbalance   # Hyperkalemia  - Lokelma PRN  - Low K diet   - Monitor         History of Present Ilness:    Shoaib Rothman is a 64 y.o. female with a PMH of end-stage COPD, frequent mucous plugging, breast cancer, HTN, CKD 4, who presented to the ER with complaints of shortness of breath. Reports over the past several days she has had to increase her supplemental oxygen. In the ER, she presented 
Nephrology Progress Note                                                                                                                                                                                                                                                                                                                                                               Office : [phone]     Fax :[phone]    Patient's Name: Shoaib Rothman  6/20/2025    Reason for Consult:  CHANTELLE on CKD  Requesting Physician:  Lamar Mondragon MD  Chief Complaint:    Chief Complaint   Patient presents with    Shortness of Breath     Pt brought in by EMS from [facility] for SOB x 2 days that is worse than her normal SOB. Hx of COPD, normally wears 5 L oxygen PRN but has been increasing oxygen levels x 2 days. Wearing non-rebreather upon arrival, EMS states initial sat was 75% but came up to 95%. Received 2 duonebs en route. Denies chest pain        Assessment/Plan     # CHANTELLE on CKD 4  # Primary FSGS - extensive fibrosis   # Solitary kidney  - Initial creatinine fluctuation in the setting of COVID & BP fluctuation  - Creatinine now improved   - Hold Aldactone and Torsemide for now   - UPCR: 4.0 gm  - Avoid nephrotoxins  - Closely monitor renal labs      # Acute on chronic respiratory failure  # COVID pneumonia   # End-stage COPD  # Recurrent mucus plugging - Plastic bronchitis   - Started on Decadron & Baricitinib   - Pulmonary on board  - S/p bronchoscopy 6/17 --> will have lymph angiogram this admission (plan for Monday)     # HTN  - BP better   - On Amlodipine and Coreg  - Hold Aldactone and Torsemide  - Monitor      # Anemia  - Monitor      # Acid- base/ Electrolyte imbalance   # Hyperkalemia  - Lokelma daily   - Low K diet  - Monitor   # Hyperphosphatemia  - Low phos diet        History of Present Ilness:    Shoaib Rothman is a 64 y.o. female with a PMH of end-stage COPD, frequent mucous plugging, breast cancer, 
Nephrology Progress Note                                                                                                                                                                                                                                                                                                                                                               Office : [phone]     Fax :[phone]    Patient's Name: Shoaib Rothman  6/18/2025    Reason for Consult:  CHANTELLE on CKD  Requesting Physician:  Lamar Mondragon MD  Chief Complaint:    Chief Complaint   Patient presents with    Shortness of Breath     Pt brought in by EMS from [facility] for SOB x 2 days that is worse than her normal SOB. Hx of COPD, normally wears 5 L oxygen PRN but has been increasing oxygen levels x 2 days. Wearing non-rebreather upon arrival, EMS states initial sat was 75% but came up to 95%. Received 2 duonebs en route. Denies chest pain        Assessment/Plan     # CHANTELLE on CKD 4  # Primary FSGS - extensive fibrosis   # Solitary kidney  - Creatinine fluctuation in the setting of COVID & BP fluctuation  - Hold Aldactone and torsemide for now   - UPCR: 4.0 mg  - Avoid nephrotoxins  - Closely monitor renal labs      # Acute on chronic respiratory failure  # COVID pneumonia   # End-stage COPD  # Recurrent mucus plugging   - Started on Decadron & Baricitinib   - Pulmonary on board     # HTN  - BP better   - On Amlodipine and Coreg  - Hold Aldactone and Torsemide  - Monitor      # Anemia  - Monitor      # Acid- base/ Electrolyte imbalance   # Hyperkalemia  - Lokelma daily   - Low K diet  - Monitor   #Hyperphosphatemia  - low phos diet        History of Present Ilness:    Shoaib Rothman is a 64 y.o. female with a PMH of end-stage COPD, frequent mucous plugging, breast cancer, HTN, CKD 4, who presented to the ER with complaints of shortness of breath. Reports over the past several days she has had to increase her 
Nephrology Progress Note                                                                                                                                                                                                                                                                                                                                                               Office : [phone]     Fax :[phone]    Patient's Name: Shoaib Rothman  6/17/2025    Reason for Consult:  CHANTELLE on CKD  Requesting Physician:  Lamar Mondragon MD  Chief Complaint:    Chief Complaint   Patient presents with    Shortness of Breath     Pt brought in by EMS from [facility] for SOB x 2 days that is worse than her normal SOB. Hx of COPD, normally wears 5 L oxygen PRN but has been increasing oxygen levels x 2 days. Wearing non-rebreather upon arrival, EMS states initial sat was 75% but came up to 95%. Received 2 duonebs en route. Denies chest pain        Assessment/Plan     # CHANTELLE on CKD 4  # Primary FSGS - extensive fibrosis   # Solitary kidney  - Creatinine fluctuation in the setting of COVID & BP fluctuation  - Hold Aldactone and torsemide for now   - UPCR: 4.0 mg  - Avoid nephrotoxins  - Closely monitor renal labs      # Acute on chronic respiratory failure  # COVID pneumonia   # End-stage COPD  # Recurrent mucus plugging   - Started on Decadron & Baricitinib   - Pulmonary on board     # HTN  - BP better   - On Amlodipine and Coreg  - Hold Aldactone and Torsemide  - Monitor      # Anemia  - Monitor      # Acid- base/ Electrolyte imbalance   # Hyperkalemia  - Lokelma PRN  - Low K diet  - Monitor   #Hyperphosphatemia  - low phos diet        History of Present Ilness:    Shoaib Rothman is a 64 y.o. female with a PMH of end-stage COPD, frequent mucous plugging, breast cancer, HTN, CKD 4, who presented to the ER with complaints of shortness of breath. Reports over the past several days she has had to increase her supplemental 
Nephrology Progress Note                                                                                                                                                                                                                                                                                                                                                               Office : [phone]     Fax :[phone]    Patient's Name: Shoaib Rothman  6/13/2025    Reason for Consult:  CHANTELLE on CKD  Requesting Physician:  Lamar Mondragon MD  Chief Complaint:    Chief Complaint   Patient presents with    Shortness of Breath     Pt brought in by EMS from [facility] for SOB x 2 days that is worse than her normal SOB. Hx of COPD, normally wears 5 L oxygen PRN but has been increasing oxygen levels x 2 days. Wearing non-rebreather upon arrival, EMS states initial sat was 75% but came up to 95%. Received 2 duonebs en route. Denies chest pain        Assessment/Plan     # CHANTELLE on CKD 4  # Primary FSGS - extensive fibrosis   # Solitary kidney  - Creatinine fluctuation in the setting of COVID & BP fluctuation  - Hold Aldactone and torsemide for now   - UPCR: 4.0 mg  - Avoid nephrotoxins  - Closely monitor renal labs      # Acute on chronic respiratory failure  # COVID pneumonia   # End-stage COPD  # Recurrent mucus plugging   - Started on Decadron & Baricitinib   - Pulmonary consulted     # HTN  - BP better   - On Amlodipine and Coreg.   - Hold Aldactone and Torsemide  - Monitor      # Anemia  - Monitor      # Acid- base/ Electrolyte imbalance   # Hyperkalemia  - Lokelma PRN  - Monitor         History of Present Ilness:    Shoaib Rothman is a 64 y.o. female with a PMH of end-stage COPD, frequent mucous plugging, breast cancer, HTN, CKD 4, who presented to the ER with complaints of shortness of breath. Reports over the past several days she has had to increase her supplemental oxygen. In the ER, she presented with hypoxia 
Nephrology Progress Note                                                                                                                                                                                                                                                                                                                                                               Office : [phone]     Fax :[phone]    Patient's Name: Shoaib Rothman  6/21/2025    Reason for Consult:  CHANTELLE on CKD  Requesting Physician:  Lamar Mondragon MD  Chief Complaint:    Chief Complaint   Patient presents with    Shortness of Breath     Pt brought in by EMS from [facility] for SOB x 2 days that is worse than her normal SOB. Hx of COPD, normally wears 5 L oxygen PRN but has been increasing oxygen levels x 2 days. Wearing non-rebreather upon arrival, EMS states initial sat was 75% but came up to 95%. Received 2 duonebs en route. Denies chest pain        Assessment/Plan     # CHANTELLE on CKD 4  # Primary FSGS - extensive fibrosis   # Solitary kidney  - Initial creatinine fluctuation in the setting of COVID & BP fluctuation  - Creatinine now improved   - Hold Aldactone and Torsemide for now   - UPCR: 4.0 gm  - Avoid nephrotoxins  - Closely monitor renal labs - no meds changes      # Acute on chronic respiratory failure  # COVID pneumonia   # End-stage COPD  # Recurrent mucus plugging - Plastic bronchitis   - Started on Decadron & Baricitinib   - Pulmonary on board  - S/p bronchoscopy 6/17 --> will have lymph angiogram this admission (plan for Monday)  - on HFNC      # HTN  - BP better   - On Amlodipine and Coreg  - Hold Aldactone and Torsemide  - Monitor      # Anemia  - Monitor      # Acid- base/ Electrolyte imbalance   # Hyperkalemia  - Lokelma daily - 10 gr   - Low K diet  - Monitor   # Hyperphosphatemia  - Low phos diet        History of Present Ilness:    Shoaib Rothman is a 64 y.o. female with a PMH of end-stage COPD, 
Nephrology Progress Note                                                                                                                                                                                                                                                                                                                                                               Office : [phone]     Fax :[phone]    Patient's Name: Shoaib Rothman  6/16/2025    Reason for Consult:  CHANTELLE on CKD  Requesting Physician:  Lamar Mondragon MD  Chief Complaint:    Chief Complaint   Patient presents with    Shortness of Breath     Pt brought in by EMS from [facility] for SOB x 2 days that is worse than her normal SOB. Hx of COPD, normally wears 5 L oxygen PRN but has been increasing oxygen levels x 2 days. Wearing non-rebreather upon arrival, EMS states initial sat was 75% but came up to 95%. Received 2 duonebs en route. Denies chest pain        Assessment/Plan     # CHANTELLE on CKD 4  # Primary FSGS - extensive fibrosis   # Solitary kidney  - Creatinine fluctuation in the setting of COVID & BP fluctuation  - Hold Aldactone and torsemide for now   - UPCR: 4.0 mg  - Avoid nephrotoxins  - Closely monitor renal labs      # Acute on chronic respiratory failure  # COVID pneumonia   # End-stage COPD  # Recurrent mucus plugging   - Started on Decadron & Baricitinib   - Pulmonary on board     # HTN  - BP better   - On Amlodipine and Coreg  - Hold Aldactone and Torsemide  - Monitor      # Anemia  - Monitor      # Acid- base/ Electrolyte imbalance   # Hyperkalemia  - Lokelma PRN  - Low K diet  - Monitor   #Hyperphosphatemia  - low phos diet        History of Present Ilness:    Shoaib Rothman is a 64 y.o. female with a PMH of end-stage COPD, frequent mucous plugging, breast cancer, HTN, CKD 4, who presented to the ER with complaints of shortness of breath. Reports over the past several days she has had to increase her supplemental 
Nephrology Progress Note                                                                                                                                                                                                                                                                                                                                                               Office : [phone]     Fax :[phone]    Patient's Name: Shoaib Rothman  6/14/2025    Reason for Consult:  CHANTELLE on CKD  Requesting Physician:  Lamar Monrdagon MD  Chief Complaint:    Chief Complaint   Patient presents with    Shortness of Breath     Pt brought in by EMS from [facility] for SOB x 2 days that is worse than her normal SOB. Hx of COPD, normally wears 5 L oxygen PRN but has been increasing oxygen levels x 2 days. Wearing non-rebreather upon arrival, EMS states initial sat was 75% but came up to 95%. Received 2 duonebs en route. Denies chest pain        Assessment/Plan     # CHANTELLE on CKD 4  # Primary FSGS - extensive fibrosis   # Solitary kidney  - Creatinine fluctuation in the setting of COVID & BP fluctuation  - Hold Aldactone and torsemide for now   - UPCR: 4.0 mg  - Avoid nephrotoxins  - Closely monitor renal labs      # Acute on chronic respiratory failure  # COVID pneumonia   # End-stage COPD  # Recurrent mucus plugging   - Started on Decadron & Baricitinib   - Pulmonary consulted     # HTN  - BP better   - On Amlodipine and Coreg.   - Hold Aldactone and Torsemide  - Monitor      # Anemia  - Monitor      # Acid- base/ Electrolyte imbalance   # Hyperkalemia  - Lokelma PRN  - Low K diet   - Monitor         History of Present Ilness:    Shoaib Rothman is a 64 y.o. female with a PMH of end-stage COPD, frequent mucous plugging, breast cancer, HTN, CKD 4, who presented to the ER with complaints of shortness of breath. Reports over the past several days she has had to increase her supplemental oxygen. In the ER, she presented 
PACU Transfer Note    Vitals:    06/17/25 1245   BP: (!) 144/92   Pulse: 86   Resp: 24   Temp: 97 °F (36.1 °C)   SpO2: 98%     BP within 20% range.    In: 500 [I.V.:500]  Out: 400 [Urine:400]    Pain assessment:    Pain Level: 0    Report given to Receiving unit MORRIS Fairbanks.    Will transport patient with [facility] respiratory therapist.    6/17/2025 1:15 PM      
Patient back in 0093.    
Patient is requiring a CT trip and is on Vapotherm 25L/100%. She is non-compliant with NIV, so she is going down on a non-rebreather. She is currently 93% on a 15L non-rebreather.  
Patient transported to cath lab for procedure.   
Pt refused CHG bath, pt was educated on the importance of the bath, pt still declined. Stated \" she will get it later \"   
Report called to MORRIS Fairbanks  
[facility] -  Clinical Pharmacy Note    Vancomycin - Management by Pharmacy    Consult Date(s): 6/18/25  Consulting Provider(s): Dr Chen    Assessment / Plan   Nosocomial MRSA/ E Coli PNA- Vancomycin  Concurrent Antimicrobials: Ceftriaxone, Baricitinib   Day of Vanc Therapy / Ordered Duration: Day 3 of 7  Current Dosing Method: Intermittent Dosing by Levels  Therapeutic Goal: Trough ~ 15 mg/L  Current Dose / Plan:   Pt with CHANTELLE on CKD4 (FSGS and solitary kidney).  SCrs ranged 2.3-2.7 since admission; down to 1.9 today.  Baseline appears to be ~ 1.8.  Patient with 1.1 L output of urine already today.   Continue intermittent dosing for now.     Level this am = 23.2 (drawn ~12 h after dose yesterday)  Will re-dose tonight with 500 mg IV x1  Plan for level 6/21 AM to reassess.  May consider scheduling dose tomorrow if SCr remains near baseline.   Will continue to monitor clinical condition and make adjustments to regimen as appropriate.    Thank you for consulting pharmacy,  Elaine Tejeda PharmD., BCPS   6/20/2025 11:46 AM  Wireless: [phone]        Interval update:  Per Pulm, bronchoscopy showed evidence of plastic bronchitis.  O2 requirements continue to downtrend.  Radiology planning lymphangiogram on Monday.    Subjective/Objective:   Shoaib Rothman is a 64 y.o. female with a PMHx significant for severe COPD recently d/c with 5 L supplemental O2 and with recurrent admissions, frequent mucous plugging requiring frequent bronch, breast ca, bladder tumor in remission, DVT/PE, CKD IV, HTN, insomnia, RLS, MDD/PIPPA who presented to the ED on 6/11 with cc SOB. Admitted with acute on chronic hypoxic respiratory failure, multifocal PNA, COVID-19 pneumonia, CHANTELLE on CKD4, and lactic acidosis. Started on baricitinib and dexamethasone per pulm.   BAL done on 6/17 growing MRSA and E Coli . Started 6/18 on meropenem given hx of ESBL, and vancomycin.     Pharmacy is consulted to dose vancomycin.    Ht Readings from Last 1 
[facility] -  Clinical Pharmacy Note    Vancomycin - Management by Pharmacy    Consult Date(s): 6/18/25  Consulting Provider(s): Dr Chen    Assessment / Plan   Nosocomial MRSA & E Coli PNA, Plastic bronchitis - Vancomycin  Concurrent Antimicrobials: Ceftriaxone, Baricitinib   Day of Vanc Therapy / Ordered Duration: Day 5 of 7  Current Dosing Method: Intermittent Dosing by Levels  Therapeutic Goal: Trough ~ 15 mg/L  Current Dose / Plan:   Pt with CHANTELLE on CKD4 (FSGS and solitary kidney); baseline SCr ~1.8.  SCr today 1.8, stable from yesterday.  UOP documented as 1.1 mL/kg/hr in the past 24 hrs.    Currently dosing intermittently via levels.    Level today = 20.8 mg/L, drawn ~22 hr after dose yesterday.    As SCr at baseline, and UOP adequate, will tentatively schedule vancomycin regimen today.   Will begin 750mg IV q24h later today.   Regimen predicts an AUC of ~455 with trough ~14.3 mg/L  Will order further levels as clinically appropriate.   Will continue to monitor clinical condition and make adjustments to regimen as appropriate.    Thank you for consulting pharmacy,  Elaine Tejeda PharmD., BCPS   6/22/2025 11:43 AM  Wireless: [phone]        Interval update:  Weaning down heated HFNC, now at 15 L/min.  Afebrile.  Planning for lymphangiogram 6/23.      Subjective/Objective:   Shoaib Rothman is a 64 y.o. female with a PMHx significant for severe COPD recently d/c with 5 L supplemental O2 and with recurrent admissions, frequent mucous plugging requiring frequent bronch, breast ca, bladder tumor in remission, DVT/PE, CKD IV, HTN, insomnia, RLS, MDD/PIPPA who presented to the ED on 6/11 with cc SOB. Admitted with acute on chronic hypoxic respiratory failure, multifocal PNA, COVID-19 pneumonia, CHANTELLE on CKD4, and lactic acidosis. Started on baricitinib and dexamethasone per pulm.   BAL done on 6/17 growing MRSA and E Coli . Started 6/18 on meropenem given hx of ESBL, and vancomycin.     Pharmacy is consulted to 
[facility] -  Clinical Pharmacy Note    Vancomycin - Management by Pharmacy    Consult Date(s): 6/18/25  Consulting Provider(s): Dr Chen    Assessment / Plan   Nosocomial MRSA & E Coli PNA, Plastic bronchitis - Vancomycin  Concurrent Antimicrobials: Ceftriaxone, Baricitinib   Day of Vanc Therapy / Ordered Duration: 7 of 7  Current Dosing Method: Bayesian Guided AUC Dosing  Therapeutic Goal: -600 mg/L*hr   Current Dose / Plan:   Pt with CHANTELLE on CKD4 (FSGS and solitary kidney); baseline SCr ~1.8.   SCr stable today at 1.7.  UOP 0.6 mL/kg/hr over past 24h.  On 750mg IV q24h.   Regimen predicts an AUC of ~466 with trough ~14.8 mg/L  Due for last dose of vancomycin today to complete 7day course.  Will continue to monitor clinical condition and make adjustments to regimen as appropriate.    Please call with questions--  Patricia Alves, PharmD, BCPS  Wireless: [phone]   6/24/2025 8:36 AM          Interval update:  IR attempted lymphangiogram but unsuccessful, IR recommending daily abdominal XR until contrast seen in retroperitoneum, then CT abd.    Subjective/Objective:   Shoaib Rothman is a 65 y.o. female with a PMHx significant for severe COPD recently d/c with 5 L supplemental O2 and with recurrent admissions, frequent mucous plugging requiring frequent bronch, breast ca, bladder tumor in remission, DVT/PE, CKD IV, HTN, insomnia, RLS, MDD/PIPPA who presented to the ED on 6/11 with cc SOB. Admitted with acute on chronic hypoxic respiratory failure, multifocal PNA, COVID-19 pneumonia, CHANTELLE on CKD4, and lactic acidosis. Started on baricitinib and dexamethasone per pulm.   BAL done on 6/17 growing MRSA and E Coli . Started 6/18 on meropenem given hx of ESBL, and vancomycin.     Pharmacy is consulted to dose vancomycin.    Ht Readings from Last 1 Encounters:   06/10/25 1.778 m (5' 10\")     Wt Readings from Last 1 Encounters:   06/24/25 121.1 kg (266 lb 15.6 oz)     Current & Prior Antimicrobial Regimen(s):   
[facility] -  Clinical Pharmacy Note    Vancomycin - Management by Pharmacy    Consult Date(s): 6/18/25  Consulting Provider(s): Dr Chen    Assessment / Plan   Nosocomial MRSA & E Coli PNA- Vancomycin  Concurrent Antimicrobials: Ceftriaxone, Baricitinib   Day of Vanc Therapy / Ordered Duration: Day 3 of 7  Current Dosing Method: Intermittent Dosing by Levels  Therapeutic Goal: Trough ~ 15 mg/L  Current Dose / Plan:   Pt with CHANTELLE on CKD4 (FSGS and solitary kidney); baseline SCr ~1.8.  SCr today 1 -- skeptical that labs this AM are accurate.  However, pt with improved UOP, documented as 1.1 mL/kg/hr in the past 24 hrs.    Currently dosing intermittently via levels.     Level today = 10.4 mg/L     Will order 1250mg IV x1 today (provides ~10 mg/kg)  Plan for a random level 6/22 AM.  Will reassess ability to schedule regimen tomorrow.    Will continue to monitor clinical condition and make adjustments to regimen as appropriate.    Thank you for consulting pharmacy,  Elaine Tejeda PharmD., BCPS   6/21/2025 9:18 AM  Wireless: [phone]        Interval update:  Continues on heated HFNC.  Afebrile.  Labs appear inconsistent today from prior days.  Continuing to dose vancomycin via levels.    Subjective/Objective:   Shoaib Rothman is a 64 y.o. female with a PMHx significant for severe COPD recently d/c with 5 L supplemental O2 and with recurrent admissions, frequent mucous plugging requiring frequent bronch, breast ca, bladder tumor in remission, DVT/PE, CKD IV, HTN, insomnia, RLS, MDD/PIPPA who presented to the ED on 6/11 with cc SOB. Admitted with acute on chronic hypoxic respiratory failure, multifocal PNA, COVID-19 pneumonia, CHANTELLE on CKD4, and lactic acidosis. Started on baricitinib and dexamethasone per pulm.   BAL done on 6/17 growing MRSA and E Coli . Started 6/18 on meropenem given hx of ESBL, and vancomycin.     Pharmacy is consulted to dose vancomycin.    Ht Readings from Last 1 Encounters:   06/10/25 1.778 
[facility] - Clinical Pharmacy Note  Ceftriaxone ordered for pneumonia. Dose has been adjusted from 1000 mg to 2000 mg based on dose adjustment policy.  Body mass index is 37.39 kg/m².  Pharmacist to automatically order ceftriaxone 2000 mg dose, despite indication (outside of gonorrhea) for patients with BMI >= 30 kg/m2  Site/Type of Infection Dose and Route Frequency   Bacteremia 2000 mg intravenously Every 24 hours   Critically Ill 2000 mg intravenously Every 24 hours   Endocarditis (Enterococcus synergy) 2000 mg intravenously Every 12 hours   Endocarditis (non-Enterococcus synergy) 2000 mg intravenously Every 24 hours   Intra-abdominal infection 2000 mg intravenously Every 24 hours   Spontaneous bacterial peritonitis treatment 2000 mg intravenously Every 24 hours   Spontaneous bacterial peritonitis prophylaxis 1000 mg intravenously Every 24 hours   Meningitis and CNS infections 2000 mg intravenously Every 12 hours   Osteomyelitis/septic arthritis  2000 mg intravenously Every 24 hours   Prosthetic joint infections 2000 mg intravenously Every 24 hours   Skin and soft tissue infections 1000 mg intravenously Every 24 hours   Pneumonia  1000 mg intravenously  Every 24 hours   Urinary tract infections 1000 mg intravenously Every 24 hours   Gonorrhea < 150 kg 500 mg intramuscularly ONCE   Gonorrhea >= 150 kg 1000 mg intramuscularly ONCE   Uncomplicated disseminated gonorrhea 1000 mg intramuscularly or intravenously Every 24 hours     Mylene Castellano, KERRIE 6/19/2025   
[facility] -  Clinical Pharmacy Note    Vancomycin - Management by Pharmacy    Consult Date(s): 6/18/25  Consulting Provider(s): Dr Chen    Assessment / Plan   Nosocomial MRSA & E Coli PNA, Plastic bronchitis - Vancomycin  Concurrent Antimicrobials: Ceftriaxone, Baricitinib   Day of Vanc Therapy / Ordered Duration: Day 6 of 7  Current Dosing Method: Bayesian Guided AUC Dosing  Therapeutic Goal: -600 mg/L*hr   Current Dose / Plan:   Pt with CHANTELLE on CKD4 (FSGS and solitary kidney); baseline SCr ~1.8.   SCr yesterday = 1.8.   BMP not collected today with AM labs - unclear why  UOP 0.9 mL/kg/hr over past 24h.  On 750mg IV q24h later today.   Regimen predicts an AUC of ~453 with trough ~14.3 mg/L  Will order further levels as clinically appropriate.   Will continue to monitor clinical condition and make adjustments to regimen as appropriate.    Please call with questions--  Patricia Alves, PharmD, BCPS  Wireless: [phone]   6/23/2025 8:33 AM          Interval update:  Off unit for lymphangiogram this AM. BMP not collected as part of AM labs. Planning for possible discharge tomorrow.    Subjective/Objective:   Shoaib Rothman is a 65 y.o. female with a PMHx significant for severe COPD recently d/c with 5 L supplemental O2 and with recurrent admissions, frequent mucous plugging requiring frequent bronch, breast ca, bladder tumor in remission, DVT/PE, CKD IV, HTN, insomnia, RLS, MDD/PIPPA who presented to the ED on 6/11 with cc SOB. Admitted with acute on chronic hypoxic respiratory failure, multifocal PNA, COVID-19 pneumonia, CHANTELLE on CKD4, and lactic acidosis. Started on baricitinib and dexamethasone per pulm.   BAL done on 6/17 growing MRSA and E Coli . Started 6/18 on meropenem given hx of ESBL, and vancomycin.     Pharmacy is consulted to dose vancomycin.    Ht Readings from Last 1 Encounters:   06/10/25 1.778 m (5' 10\")     Wt Readings from Last 1 Encounters:   06/23/25 117.3 kg (258 lb 9.6 oz)     Current 
pt had just pulled herself up in bed, O2 may have been removed for a small time. increased to 40L 100% to help her recover.   
guaiFENesin  600 mg Oral BID    hydrOXYzine HCl  25 mg Oral Nightly    letrozole  2.5 mg Oral Daily    melatonin  6 mg Oral Nightly    pantoprazole  20 mg Oral Daily    rOPINIRole  0.25 mg Oral Nightly    sertraline  50 mg Oral QAM    sodium chloride (Inhalant)  4 mL Nebulization TID    sodium chloride flush  5-40 mL IntraVENous 2 times per day         Diet: ADULT DIET; Regular; Low Potassium (Less than 3000 mg/day); Low Phosphorus (Less than 1000 mg)     Results:  CBC:   Recent Labs     06/17/25  0612 06/18/25  0500 06/19/25  0420   WBC 10.7 12.6* 11.2*   HGB 9.3* 8.8* 8.9*   HCT 29.0* 27.9* 28.4*   MCV 95.7 95.8 95.9    225 237     BMP:   Recent Labs     06/17/25  0612 06/18/25  0500 06/19/25  0420   * 140 141   K 5.0 5.7* 5.4*    102 104   CO2 31 32 28   PHOS 6.1* 4.7 5.7*   BUN 80* 78* 81*   CREATININE 2.3* 2.3* 2.2*     LIVER PROFILE: No results for input(s): \"AST\", \"ALT\", \"LIPASE\", \"AMYLASE\", \"BILIDIR\", \"BILITOT\", \"ALKPHOS\" in the last 72 hours.    Invalid input(s): \"ALB\"  PT/INR: No results for input(s): \"PROTIME\", \"INR\" in the last 72 hours.  APTT: No results for input(s): \"APTT\" in the last 72 hours.  UA:No results for input(s): \"NITRITE\", \"COLORU\", \"PHUR\", \"LABCAST\", \"WBCUA\", \"RBCUA\", \"MUCUS\", \"TRICHOMONAS\", \"YEAST\", \"BACTERIA\", \"CLARITYU\", \"SPECGRAV\", \"LEUKOCYTESUR\", \"UROBILINOGEN\", \"BILIRUBINUR\", \"BLOODU\", \"GLUCOSEU\", \"AMORPHOUS\" in the last 72 hours.    Invalid input(s): \"KETONESU\"      Assessment/Plan:  64 y.o. female with acute on chronic hypoxemic respiratory failure secondary to COVID-pneumonia and plastic bronchitis.    Patient is now status post her seventh bronchoscopy and this 1 appeared more like a conventional pneumonia but she still had tentacle like casts that came out of her smaller subsegments.    Her respiratory cultures came back with MRSA and E. coli so she has been started on antibiotics vancomycin and her E.coli is sensitive so she's also on rocephin day 
  Recent Labs     06/10/25  1831   PROBNP 1,026*       ABGs:  No results for input(s): \"PHART\", \"KAQ9GFC\", \"PO2ART\", \"AFI8BRL\", \"BEART\", \"THGBART\", \"C3CODODK\", \"MSK0RJJ\" in the last 72 hours.      VBGs:   Recent Labs     06/10/25  1831   PHVEN 7.230*   HER2EZG 72.7*   PO2VEN 30.9       INR: No results for input(s): \"INR\" in the last 72 hours.    aPTT: No results for input(s): \"APTT\" in the last 72 hours.    Procalcitonin:   Recent Labs     06/10/25  1831   PROCAL 0.14       CRP:   Recent Labs     06/11/25 0125   CRP 93.9*       ESR:   Recent Labs     06/11/25 0125   SEDRATE 56*       Microbiology:  Results       Procedure Component Value Units Date/Time    Culture, Blood 1 [[id number]] Collected: 06/11/25 0010    Order Status: Completed Specimen: Blood Updated: 06/12/25 0115     Blood Culture, Routine No Growth to date.  Any change in status will be called.    Narrative:      ORDER#: [id number]                          ORDERED BY: JITENDRA BRO  SOURCE: Blood                              COLLECTED:  06/11/25 00:10  ANTIBIOTICS AT ROSAL[place].:                      RECEIVED :  06/11/25 15:09  If child <=2 yrs old please draw pediatric bottle.~Blood Culture 1    Culture, Blood 2 [[id number]] Collected: 06/11/25 0010    Order Status: Completed Specimen: Blood Updated: 06/12/25 0115     Culture, Blood 2 No Growth to date.  Any change in status will be called.    Narrative:      ORDER#: [id number]                          ORDERED BY: JITENDRA BRO  SOURCE: Blood                              COLLECTED:  06/11/25 00:10  ANTIBIOTICS AT ROSALBA.:                      RECEIVED :  06/11/25 15:09  If child <=2 yrs old please draw pediatric bottle.~Blood Culture #2    Pneumonia Panel, Molecular [[id number]]     Order Status: Sent Specimen: Sputum Expectorated     Legionella antigen, urine [[id number]]     Order Status: Sent Specimen: Urine     Strep Pneumoniae Antigen [[id number]]     Order Status: Sent Specimen: Urine, clean catch     
                     RECEIVED :  06/11/25 15:09  If child <=2 yrs old please draw pediatric bottle.~Blood Culture 1    Culture, Blood 2 [[id number]] Collected: 06/11/25 0010    Order Status: Completed Specimen: Blood Updated: 06/15/25 0115     Culture, Blood 2 No Growth after 4 days of incubation.    Narrative:      ORDER#: [id number]                          ORDERED BY: JITENDRA BRO  SOURCE: Blood                              COLLECTED:  06/11/25 00:10  ANTIBIOTICS AT ROSALBA.:                      RECEIVED :  06/11/25 15:09  If child <=2 yrs old please draw pediatric bottle.~Blood Culture #2    COVID-19 & Influenza Combo [[id number]]  (Abnormal) Collected: 06/10/25 1928    Order Status: Completed Specimen: Nasopharyngeal Swab Updated: 06/10/25 1959     SARS-CoV-2 RNA, RT PCR DETECTED     Comment: Detected results are indicative of the presence of SARS-CoV-2,  clinical correlation with patient history and other diagnostic  information is necessary to determine patient infection status.  A Detected results do not rule out bacterial infection or  co-infection with other pathogens.    Testing was performed using VARUN BERRY SARS-CoV-2 and Influenza A/B  nucleic acid assay. This test is a multiplex Real-Time Reverse  Transcriptase Polymerase Chain Reaction (RT-PCR)-based in vitro  diagnostic test intended for the qualitative detection of nucleic  acids from SARS-CoV-2, influenza A, and influenza B in nasopharyngeal  and nasal swab specimens for use under the FDA’s Emergency Use  Authorization (EUA) only.    Patient Fact Sheet:  https://www.fda.gov/media/099468/download  Provider Fact Sheet: https://www.fda.gov/media/409015/download  EUA: https://www.fda.gov/media/182064/download  IFU: https://www.fda.gov/media/736651/download    Methodology:  RT-PCR          Influenza A NOT DETECTED     Comment: Note:  Influenza A and Influenza B negative results should be considered  presumptive in samples that have a Detected SARS-CoV-2 
PORTABLE   Final Result      Partial reaeration of the right lung involving the upper lobe. Persistent increased density in the right middle and lower lobes consistent with infiltrate/atelectasis.      Electronically signed by Lilly Rubalcava MD      XR CHEST PORTABLE   Final Result   1. Interval development of complete opacification right hemithorax, mucus plugging suspected.      Electronically signed by Porfirio Shanks MD      Vascular duplex lower extremity venous bilateral   Final Result      CT CHEST WO CONTRAST   Final Result   1.  Diffuse bilateral groundglass opacities with focal areas of consolidation which has progressed from prior examination consistent with multifocal pneumonia.   2.  Emphysema.   3.  Coronary artery calcified lesions are present.      Electronically signed by Haider Ventura MD      XR CHEST PORTABLE   Final Result   Impression: Left perihilar and right basilar pulmonary opacities suggesting multifocal pneumonia or edema. This appears similar to prior studies.      Electronically signed by Colten Wheeler MD            Assessment & Plan   Shoaib Rothman is a 64 y.o. female with signs and symptoms consistent with:     Acute on Chronic Hypoxic Respiratory Failure 2/2  Multifocal PNA, worsened opacities from recent imaging  COVID PNA  Persisting Moderate Right Sided Pleural Effusion appears worsened  Severe COPD  Patient presents with AonC hypoxic respiratory failure after recent hospital d/c. She was with progressive increase in needs for supplemental oxygenation. She does notably have COVID positive at presentation but denies any other acute symptoms  Ddx include mucous plugging as patient reported to have plastic bronchitis   vs PE in the setting of immobility 2/2 hypoxia in addition to recent hospitalization however less likely as patient has been on Eliquis  Plan:  -covid positive, flu negative  -bacterial pna work up negative  -s/p solumedrol 125 mg in the ED  -decadron 6 mg daily 
Temp 97.8 °F (36.6 °C) (Oral)   Resp 20   Ht 1.778 m (5' 10\")   Wt 117.3 kg (258 lb 9.6 oz)   LMP 03/09/2014   SpO2 99%   BMI 37.11 kg/m²   Constitutional:  OAA X3 NAD Yes  Skin: no rash, turgor wnl  Heent:  eomi, mmm  Neck: no bruits or jvd noted  Cardiovascular:  S1, S2 without m/r/g  Respiratory: CTA B. + HFNC  Abdomen:  soft, nt, nd  Ext:  lower extremity edema Yes: better   Psychiatric: mood and affect stable  Musculoskeletal:  Rom, muscular strength intact    Data:   Labs:  CBC:   Recent Labs     06/21/25  0844 06/21/25  1009 06/22/25  1013   WBC 6.6 8.8 8.2   HGB 6.9* 9.2* 9.7*    267 284     BMP:    Recent Labs     06/21/25  0844 06/21/25  1009 06/22/25  1013    140 143   K 2.8* 4.7 3.9   * 103 104   CO2 16* 24 28   BUN 45* 69* 62*   CREATININE 1.0 1.8* 1.8*   GLUCOSE 73 145* 126*     Ca/Mg/Phos:   Recent Labs     06/21/25  0844 06/21/25  1009 06/22/25  1013   CALCIUM 4.7* 8.4 8.4   MG 1.00*  --  1.61*   PHOS 2.7 4.5 4.3     Hepatic:   No results for input(s): \"AST\", \"ALT\", \"BILITOT\", \"ALKPHOS\" in the last 72 hours.    Invalid input(s): \"ALB\"    Troponin: No results for input(s): \"TROPONINI\" in the last 72 hours.  BNP: No results for input(s): \"BNP\" in the last 72 hours.  Lipids: No results for input(s): \"CHOL\", \"TRIG\", \"HDL\" in the last 72 hours.    Invalid input(s): \"LDLCALC\", \"LABVLDL\"  ABGs: No results for input(s): \"PHART\", \"PO2ART\", \"INS3NJH\" in the last 72 hours.  INR:   Recent Labs     06/23/25  0726   INR 0.92     UA:No results for input(s): \"COLORU\", \"CLARITYU\", \"GLUCOSEU\", \"BILIRUBINUR\", \"KETUA\", \"SPECGRAV\", \"BLOODU\", \"PHUR\", \"PROTEINU\", \"UROBILINOGEN\", \"NITRU\", \"LEUKOCYTESUR\", \"URINETYPE\" in the last 72 hours.    Invalid input(s): \"LABMICR\"   Urine Microscopic: No results for input(s): \"LABCAST\", \"BACTERIA\", \"COMU\", \"HYALCAST\", \"WBCUA\", \"RBCUA\" in the last 72 hours.    Invalid input(s): \"EPIU\"  Urine Culture: No results for input(s): \"LABURIN\" in the last 72 hours.  Urine 
suspected.      Electronically signed by Porfirio Shanks MD      Vascular duplex lower extremity venous bilateral   Final Result      CT CHEST WO CONTRAST   Final Result   1.  Diffuse bilateral groundglass opacities with focal areas of consolidation which has progressed from prior examination consistent with multifocal pneumonia.   2.  Emphysema.   3.  Coronary artery calcified lesions are present.      Electronically signed by Haider Ventura MD      XR CHEST PORTABLE   Final Result   Impression: Left perihilar and right basilar pulmonary opacities suggesting multifocal pneumonia or edema. This appears similar to prior studies.      Electronically signed by Colten Wheeler MD      XR ABDOMEN (KUB) (SINGLE AP VIEW)    (Results Pending)   CT CHEST ABDOMEN PELVIS WO CONTRAST Additional Contrast? None    (Results Pending)       Medical Decision Making:  The following items were considered in medical decision making:  Discussion of patient care with other providers  Reviewed clinical lab tests  Reviewed radiology tests  Reviewed other diagnostic tests/interventions    Sukhdev Chávez MD   [facility]  Office : [phone] or through [employer]  Fax :[phone]

## 2025-06-24 NOTE — CONSULTS
Pulmonary Progress Note    Patient Name: Shoaib Rothman   Patient : 1960   Date: 2025   Admit Date: 6/10/2025     CC: COVID pneumonia    Interval History  Weaned to 10L yesterday; increased overnight to 15L again, but per chart review has been satting % on that.     Satting 92% on NRB 10L on assessment. Pt continues to endorse dyspnea and feels she has not improved since yesterday.     MEDICATIONS   vancomycin  750 mg IntraVENous Q24H    acetaminophen  1,000 mg Oral 4 times per day    ipratropium  0.5 mg Nebulization TID RT    enoxaparin  30 mg SubCUTAneous BID    predniSONE  20 mg Oral Daily    Followed by    [START ON 2025] predniSONE  10 mg Oral Daily    Followed by    [START ON 2025] predniSONE  5 mg Oral Daily    cefTRIAXone (ROCEPHIN) IV  2,000 mg IntraVENous Q24H    levalbuterol  1.25 mg Nebulization TID    acetylcysteine  600 mg Inhalation TID RT    insulin lispro  0-16 Units SubCUTAneous 4x Daily AC & HS    insulin glargine  5 Units SubCUTAneous Nightly    traZODone  25 mg Oral Nightly    [Held by provider] torsemide  20 mg Oral Daily    temazepam  15 mg Oral Nightly    [Held by provider] spironolactone  25 mg Oral Daily    amLODIPine  5 mg Oral Daily    [Held by provider] apixaban  2.5 mg Oral BID    carvedilol  6.25 mg Oral BID    guaiFENesin  600 mg Oral BID    hydrOXYzine HCl  25 mg Oral Nightly    letrozole  2.5 mg Oral Daily    melatonin  6 mg Oral Nightly    pantoprazole  20 mg Oral Daily    rOPINIRole  0.25 mg Oral Nightly    sertraline  50 mg Oral QAM    sodium chloride (Inhalant)  4 mL Nebulization TID    sodium chloride flush  5-40 mL IntraVENous 2 times per day       Continuous Infusions:   dextrose      sodium chloride         ROS  Review of Systems   Constitutional:  Negative for chills, diaphoresis and fever.   Respiratory:  Positive for shortness of breath. Negative for cough and wheezing.    Gastrointestinal:  Negative for nausea and vomiting.

## 2025-06-25 ENCOUNTER — TELEPHONE (OUTPATIENT)
Dept: INTERVENTIONAL RADIOLOGY/VASCULAR | Age: 65
End: 2025-06-25

## 2025-06-25 DIAGNOSIS — J40 PLASTIC BRONCHITIS: Primary | ICD-10-CM

## 2025-06-25 DIAGNOSIS — I89.8 PLASTIC BRONCHITIS: Primary | ICD-10-CM

## 2025-06-25 NOTE — TELEPHONE ENCOUNTER
Spoke to Sandy  about appt on 7/11/25 @0800, per sandy if patient is still requiring oxygen then they may not be able to transfer her. Per Sandy she will call with update next Thursday. Sandy given instructions about npo status and medication that may need to be held

## 2025-06-25 NOTE — TELEPHONE ENCOUNTER
(Josephine) called back and requested the order for the testing and sts that the patient should have the procedure at [facility]. Nhi CHAN called  back for clarification.

## 2025-06-25 NOTE — TELEPHONE ENCOUNTER
Attempted to call patient to schedule procedure. No answer left message for patient to call back. Number used [phone]    Procedure: Lymphangiogram   Approving Radiologist: Dr. Arambula

## 2025-07-01 LAB
ACID FAST STN SPEC QL: NORMAL
MYCOBACTERIUM SPEC CULT: NORMAL

## 2025-07-03 LAB — MISCELLANEOUS LAB TEST ORDER: NORMAL

## 2025-07-08 LAB
ACID FAST STN SPEC QL: NORMAL
MYCOBACTERIUM SPEC CULT: NORMAL

## 2025-07-09 ENCOUNTER — TELEPHONE (OUTPATIENT)
Dept: INTERVENTIONAL RADIOLOGY/VASCULAR | Age: 65
End: 2025-07-09

## 2025-07-09 NOTE — TELEPHONE ENCOUNTER
Spoke with RN from [facility]. Pt currently inpatient at [facility], IR will attempt lymphangiogram on Thursday.

## 2025-07-15 LAB
ACID FAST STN SPEC QL: NORMAL
MYCOBACTERIUM SPEC CULT: NORMAL

## 2025-07-21 LAB
FUNGUS SPEC CULT: ABNORMAL
LOEFFLER MB STN SPEC: ABNORMAL
ORGANISM: ABNORMAL

## 2025-07-22 LAB
ACID FAST STN SPEC QL: NORMAL
MYCOBACTERIUM SPEC CULT: NORMAL

## 2025-07-29 LAB
ACID FAST STN SPEC QL: NORMAL
MYCOBACTERIUM SPEC CULT: NORMAL

## 2025-08-05 LAB
ACID FAST STN SPEC QL: NORMAL
MYCOBACTERIUM SPEC CULT: NORMAL

## (undated) DEVICE — OCCLUSIVE GAUZE STRIP,3% BISMUTH TRIBROMOPHENATE IN PETROLATUM BLEND: Brand: XEROFORM

## (undated) DEVICE — TRAP SPEC 80ML MUCUS

## (undated) DEVICE — STOCKINETTE,DOUBLE PLY,6X48,STERILE: Brand: MEDLINE

## (undated) DEVICE — INTENDED FOR TISSUE SEPARATION, AND OTHER PROCEDURES THAT REQUIRE A SHARP SURGICAL BLADE TO PUNCTURE OR CUT.: Brand: BARD-PARKER ® CARBON RIB-BACK BLADES

## (undated) DEVICE — GARMENT,MEDLINE,DVT,INT,CALF,MED, GEN2: Brand: MEDLINE

## (undated) DEVICE — E-Z CLEAN, NON-STICK, PTFE COATED, ELECTROSURGICAL BLADE ELECTRODE, MODIFIED EXTENDED INSULATION, 2.5 INCH (6.35 CM): Brand: MEGADYNE

## (undated) DEVICE — Device

## (undated) DEVICE — SUTURE VCRL SZ 3-0 L18IN ABSRB UD L26MM SH 1/2 CIR J864D

## (undated) DEVICE — RESERVOIR,SUCTION,100CC,SILICONE: Brand: MEDLINE

## (undated) DEVICE — DRAIN,WOUND,15FR,3/16,FULL-FLUTED: Brand: MEDLINE

## (undated) DEVICE — SURE SET-DOUBLE BASIN-LF: Brand: MEDLINE INDUSTRIES, INC.

## (undated) DEVICE — Device: Brand: ERBE

## (undated) DEVICE — SYRINGE, LUER LOCK, 10ML: Brand: MEDLINE

## (undated) DEVICE — DRAPE,T,LAPARO,TRANS,STERILE: Brand: MEDLINE

## (undated) DEVICE — SUTURE MCRYL SZ 4-0 L27IN ABSRB UD L19MM PS-2 1/2 CIR PRIM Y426H

## (undated) DEVICE — CANNULA NSL 13FT TUBE AD ETCO2 DIV SAMP M

## (undated) DEVICE — SHEARS ENDOSCP L9CM CRV HARM FOCS +

## (undated) DEVICE — TURNOVER KIT RM INF CTRL TECH

## (undated) DEVICE — TRAY PREP DRY W/ PREM GLV 2 APPL 6 SPNG 2 UNDPD 1 OVERWRAP

## (undated) DEVICE — ST FLUFF LG 1 PLY: Brand: DEROYAL

## (undated) DEVICE — FLUID TRAP FOR MINIVAC ES EQUIP FLD TRAP

## (undated) DEVICE — SYRINGE MED 30ML STD CLR PLAS LUERLOCK TIP N CTRL DISP

## (undated) DEVICE — GOWN,SIRUS,POLYRNF,BRTHSLV,XL,30/CS: Brand: MEDLINE

## (undated) DEVICE — GOWN,SIRUS,POLYRNF,BRTHSLV,LG,30/CS: Brand: MEDLINE

## (undated) DEVICE — PLATE ES AD W 9FT CRD 2

## (undated) DEVICE — GAUZE,SPONGE,4"X4",16PLY,XRAY,STRL,LF: Brand: MEDLINE

## (undated) DEVICE — SINGLE-USE BIOPSY FORCEPS: Brand: RADIAL JAW 4

## (undated) DEVICE — GOWN,SIRUS,POLYRNF,BRTHSLV,XLN/XL,20/CS: Brand: MEDLINE

## (undated) DEVICE — SUTURE VCRL SZ 3-0 L27IN ABSRB UD L26MM SH 1/2 CIR J416H

## (undated) DEVICE — MHCZ CYSTO: Brand: MEDLINE INDUSTRIES, INC.

## (undated) DEVICE — DRESSING FOAM DISK DIA1IN H 7MM HYDRPHLC CHG IMPREG IN SL

## (undated) DEVICE — CONMED PEDIATRIC GASTROSCOPE SHEATHED CYTOLOGY BRUSH, RING HANDLE, 3 MM X 160 CM: Brand: CONMED

## (undated) DEVICE — GLOVE SURG SZ 65 L12IN FNGR THK87MIL DK GRN LTX POLYMER W

## (undated) DEVICE — Z DISCONTINUED USE 2749457 TUBING SAMP AD W12.5XH8.4IN D9.1IN NSL ORAL SMRT CAPNOLINE

## (undated) DEVICE — FORCEPS BX L100CM DIA1.8MM WRK CHN 2MM PULM S STL RAD JAW 4

## (undated) DEVICE — APPLIER LIG CLP M L11IN TI STR RNG HNDL FOR 20 CLP DISP

## (undated) DEVICE — 3M™ TEGADERM™ TRANSPARENT FILM DRESSING FRAME STYLE, 1628, 6 IN X 8 IN (15 CM X 20 CM), 10/CT 8CT/CASE: Brand: 3M™ TEGADERM™

## (undated) DEVICE — ELECTROSURGICAL PENCIL ROCKER SWITCH NON COATED BLADE ELECTRODE 10 FT (3 M) CORD HOLSTER: Brand: MEGADYNE

## (undated) DEVICE — FLEXIBLE ENDOSCOPE AND SPECIMEN SAMPLING SYSTEM: Brand: ASCOPE™ 5 BRONCHO HD 5.6/2.8 SAMPLER SET

## (undated) DEVICE — SPONGE,LAP,18"X18",DLX,XR,ST,5/PK,40/PK: Brand: MEDLINE

## (undated) DEVICE — SKIN AFFIX SURG ADHESIVE 72/CS 0.55ML: Brand: MEDLINE

## (undated) DEVICE — 3M™ TEGADERM™ TRANSPARENT FILM DRESSING FRAME STYLE, 1626W, 4 IN X 4-3/4 IN (10 CM X 12 CM), 50/CT 4CT/CASE: Brand: 3M™ TEGADERM™

## (undated) DEVICE — 3M™ TEGADERM™ TRANSPARENT FILM DRESSING FRAME STYLE, 1629, 8 IN X 12 IN (20 CM X 30 CM), 10/CT 8CT/CASE: Brand: 3M™ TEGADERM™

## (undated) DEVICE — MARKER SKIN GENTIAN VLT 2 TIP W RUL PREP RESIST INK SKIN

## (undated) DEVICE — CHLORAPREP 26ML ORANGE

## (undated) DEVICE — SURGICAL SET UP - SURE SET: Brand: MEDLINE INDUSTRIES, INC.

## (undated) DEVICE — SYRINGE IRRIG 60ML SFT PLIABLE BLB EZ TO GRP 1 HND USE W/

## (undated) DEVICE — PAD,NON-ADHERENT,3X8,STERILE,LF,1/PK: Brand: MEDLINE

## (undated) DEVICE — GLOVE SURG SZ 6 L112IN FNGR THK75MIL STRW LTX POLYMER BEAD

## (undated) DEVICE — GLOVE ORANGE PI 8 1/2   MSG9085

## (undated) DEVICE — TUBING, SUCTION, 1/4" X 12', STRAIGHT: Brand: MEDLINE

## (undated) DEVICE — BRA SURG COMPR XL 40-42 IN ZIPPER

## (undated) DEVICE — SOLUTION IRRIGATION STRL H2O 1000 ML UROMATIC CONTAINER

## (undated) DEVICE — GLOVE ORANGE PI 7   MSG9070

## (undated) DEVICE — SUTURE MCRYL SZ 3-0 L27IN ABSRB UD L19MM PS-2 3/8 CIR PRIM Y427H

## (undated) DEVICE — GLOVE ORANGE PI 7 1/2   MSG9075

## (undated) DEVICE — STAPLER SKIN H3.9MM WIRE DIA0.58MM CRWN 6.9MM 35 STPL ROT

## (undated) DEVICE — COVER LT HNDL BLU PLAS

## (undated) DEVICE — STANDARD HYPODERMIC NEEDLE,POLYPROPYLENE HUB: Brand: MONOJECT

## (undated) DEVICE — PACK,UNIVERSAL,NO GOWNS: Brand: MEDLINE

## (undated) DEVICE — MEDI-VAC YANKAUER SUCTION HANDLE: Brand: CARDINAL HEALTH

## (undated) DEVICE — DRAPE IRRIG FLD WRM W44XL44IN W/ AORN STD PRTBL INTRATEMP

## (undated) DEVICE — SOLUTION IV 1000ML 0.9% SOD CHL

## (undated) DEVICE — TRAP,MUCUS SPECIMEN, 80CC: Brand: MEDLINE

## (undated) DEVICE — SYSTEM SKIN CLSR 22CM DERMBND PRINEO

## (undated) DEVICE — PENCIL ES ULT VAC W TELSCP NOSE EZ CLN BLDE 10FT